# Patient Record
Sex: MALE | Race: WHITE | Employment: FULL TIME | URBAN - METROPOLITAN AREA
[De-identification: names, ages, dates, MRNs, and addresses within clinical notes are randomized per-mention and may not be internally consistent; named-entity substitution may affect disease eponyms.]

---
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
## 2020-01-02 ENCOUNTER — TELEPHONE (OUTPATIENT)
Dept: NEPHROLOGY | Facility: CLINIC | Age: 20
End: 2020-01-02

## 2020-01-02 NOTE — TELEPHONE ENCOUNTER
Patients mom called to report patients /79 due to increasing ramipril to 2.5 daily. Patient will have labs drawn tomorrow. email order to patients mom as he will have drawn at [facility].  Azeb Villanueva LPN  Nephrology  [phone]

## 2020-01-03 ENCOUNTER — AMBULATORY - HEALTHEAST (OUTPATIENT)
Dept: LAB | Facility: CLINIC | Age: 20
End: 2020-01-03

## 2020-01-03 DIAGNOSIS — N18.4 CHRONIC KIDNEY DISEASE, STAGE IV (SEVERE) (H): ICD-10-CM

## 2020-01-03 LAB
ALBUMIN SERPL-MCNC: 3.1 G/DL (ref 3.5–5)
ANION GAP SERPL CALCULATED.3IONS-SCNC: 9 MMOL/L (ref 5–18)
BUN SERPL-MCNC: 64 MG/DL (ref 8–22)
CALCIUM SERPL-MCNC: 8.8 MG/DL (ref 8.5–10.5)
CHLORIDE BLD-SCNC: 113 MMOL/L (ref 98–107)
CO2 SERPL-SCNC: 16 MMOL/L (ref 22–31)
CREAT SERPL-MCNC: 5.57 MG/DL (ref 0.7–1.3)
GFR SERPL CREATININE-BSD FRML MDRD: 13 ML/MIN/1.73M2
GLUCOSE BLD-MCNC: 94 MG/DL (ref 70–125)
PHOSPHATE SERPL-MCNC: 5.5 MG/DL (ref 2.5–4.5)
POTASSIUM BLD-SCNC: 5 MMOL/L (ref 3.5–5)
SODIUM SERPL-SCNC: 138 MMOL/L (ref 136–145)

## 2020-01-07 NOTE — TELEPHONE ENCOUNTER
Reviewed with Dr. Diaz, please have renal panel rechecked as soon as possible and ok to schedule to see Dr. Osorio end of February. Renal panel entered. Left voice message for patient, with mom.  Azeb Villanueva LPN  Nephrology  [phone]

## 2020-01-07 NOTE — TELEPHONE ENCOUNTER
Spoke to patients mom and she said she will try to talk to him again regarding kidney smart. She reports his appetite is not well, eating pizza and lots of things unhealthy for him. Cr 5.57. K 5.0. updated Care everywhere. Message to Dr. Diaz. Appointment in April. Message if more labs are needed before or sooner appointment.  Azeb Villanueva LPN  Nephrology  [phone]

## 2020-01-08 ENCOUNTER — AMBULATORY - HEALTHEAST (OUTPATIENT)
Dept: LAB | Facility: CLINIC | Age: 20
End: 2020-01-08

## 2020-01-08 LAB
ALBUMIN SERPL-MCNC: 3.1 G/DL (ref 3.5–5)
ANION GAP SERPL CALCULATED.3IONS-SCNC: 8 MMOL/L (ref 5–18)
BUN SERPL-MCNC: 67 MG/DL (ref 8–22)
CALCIUM SERPL-MCNC: 8.7 MG/DL (ref 8.5–10.5)
CHLORIDE BLD-SCNC: 117 MMOL/L (ref 98–107)
CO2 SERPL-SCNC: 14 MMOL/L (ref 22–31)
CREAT SERPL-MCNC: 4.4 MG/DL (ref 0.7–1.3)
GFR SERPL CREATININE-BSD FRML MDRD: 17 ML/MIN/1.73M2
GLUCOSE BLD-MCNC: 94 MG/DL (ref 70–125)
PHOSPHATE SERPL-MCNC: 4.8 MG/DL (ref 2.5–4.5)
POTASSIUM BLD-SCNC: 4.8 MMOL/L (ref 3.5–5)
SODIUM SERPL-SCNC: 139 MMOL/L (ref 136–145)

## 2020-01-08 NOTE — TELEPHONE ENCOUNTER
Patients mom, Darcy called to report that patient had labs drawn today. Updated Care everywhere:  Cr 4.40 from 5.57, K 4.8 from 5.0, phosphorus 4.8 from 5.5. informed Darcy of lab results. Darcy stating that she talked to patient and wanted to know if a referral to dietician would be ok before Kidney Smart at this time. Will send to Dr. Diaz for review.  Azeb Villanueva LPN  Nephrology  [phone]

## 2020-01-10 DIAGNOSIS — N18.4 CKD (CHRONIC KIDNEY DISEASE) STAGE 4, GFR 15-29 ML/MIN (H): Primary | ICD-10-CM

## 2020-01-10 DIAGNOSIS — N18.30 CKD (CHRONIC KIDNEY DISEASE) STAGE 3, GFR 30-59 ML/MIN (H): ICD-10-CM

## 2020-01-10 RX ORDER — RAMIPRIL 1.25 MG/1
2.5 CAPSULE ORAL DAILY
Qty: 60 CAPSULE | Refills: 11 | Status: SHIPPED | OUTPATIENT
Start: 2020-01-10 | End: 2021-11-01

## 2020-01-10 NOTE — TELEPHONE ENCOUNTER
Reviewed labs with Dr. Diaz (update Care everywhere). Cr 4.40, K 4.8. recommendations per Dr. Diaz, no changes to medications at this time, please repeat renal panel in 2 weeks and referral to dietician ok. Orders placed. Left voice message for patients Darcy owens. Will follow up in a couple weeks.  Azeb Villanueva LPN  Nephrology  [phone]

## 2020-01-20 ENCOUNTER — ALLIED HEALTH/NURSE VISIT (OUTPATIENT)
Dept: TRANSPLANT | Facility: CLINIC | Age: 20
End: 2020-01-20
Attending: INTERNAL MEDICINE
Payer: COMMERCIAL

## 2020-01-20 DIAGNOSIS — N18.4 CKD (CHRONIC KIDNEY DISEASE) STAGE 4, GFR 15-29 ML/MIN (H): Primary | ICD-10-CM

## 2020-01-20 PROCEDURE — 97802 MEDICAL NUTRITION INDIV IN: CPT | Mod: ZF

## 2020-01-20 NOTE — PROGRESS NOTES
Outpatient MNT     Time Spent: 30 minutes  Visit Type: Initial  Referring Physician: Dr. Diaz  Reason for RD Visit: Nutrition education: low Na+, low K+, moderate protein   Pt accompanied by: mom    Medical dx associated with RD referral  CKD Stage 4    Nutrition Assessment  Appetite: Pt reports having a good appetite. He eats TID meals with some snacking. The pt is trying to follow a low Na+/low K+ diet but is not sure what foods follow these restrictions.     Vitamins, Supplements, Pertinent Meds: Fish oil, Vit D  Herbal Medicines/Supplements: None    Diet Recall  Breakfast Hot sandwich like tuna melt, kathleen, or grilled cheese with ham; chili; soup   Lunch Sub sandwich or chicken strips/onion rings from [employer]    Dinner Meat and potatoes, meat and veggies    Snacks Popcorn with salt as PM snack    Beverages > 1 gallon of water   Alcohol None    Dining out 4 times per week      Physical Activity  No designated exercise time      Anthropometrics  Height:   71 in   BMI:    38 kg/m2    Weight Status:Obesity Grade II BMI 35-39.9   Weight:  271 lbs            IBW (lb): 172#  % IBW: 158%    Wt Hx: Pt and his mom report that he has slowly been gaining wt over the last 2 years.     Adj/dosing BW: 197 lbs/89 kg       Malnutrition  % Intake: No decreased intake noted  % Weight Loss: None noted  Subcutaneous Fat Loss: None noted  Muscle Loss: None noted   Fluid Accumulation/Edema: None noted  Malnutrition Diagnosis: Patient does not meet two of the above criteria necessary for diagnosing malnutrition     Estimated Nutrition Needs  Energy  8686-1862 kcal/day      (20-25 kcal/kg dosing BW for desired wt loss)     Protein  71    (0.8 g/kg for maintenance with CKD)         Fluid  1 ml/kcal or per MD     Nutrition Diagnosis  Food and nutrition-related knowledge deficit related to CKD diet as evidenced by the pt and his mom verbalizing little to no education on the low Na+, low K+, and moderate protein diet.     Nutrition  Intervention  Nutrition Education:  (1) Discussed the importance of adequate but not excessive protein. Helped the pt identify several sources of protein in his diet and encouraged him to not exceed 75 g protein per day.     (2) Encouraged the pt to continue the low Na+ and low K+ diet. Provided the pt with a low K+ foods grocery list and resources of kidney friendly recipes.     (3) Discussed strategies for wt loss including increased physical activity, decreased portions, decreased snacking, etc.     Patient Understanding: Pt verbalized understanding of education provided.  Expected Compliance: Good  Follow-Up Plans: PRN     Nutrition Goals  1. Pt to consume <2000 mg of Na+ and <2000 mg of K+ per day   2. Patient to consume % of estimated energy and protein needs between TID meals and snacks.  3. Weight loss vs weight maintenance       Provided pt with contact info.   Deonna Francisco RD, LD  [facility] [phone]

## 2020-01-23 ENCOUNTER — TELEPHONE (OUTPATIENT)
Dept: NEPHROLOGY | Facility: CLINIC | Age: 20
End: 2020-01-23

## 2020-01-23 NOTE — TELEPHONE ENCOUNTER
Received call from patients mom, Darcy, wanting to know Dr. Diaz's thoughts on patient starting Ritalin again. Await to hear thoughts from MD. Will follow up with Darcy when I hear back. She does report that patient went to Nutrition consult and had good information and useful tips. Patient went to Kidney Smart class last week through [employer], and mom states that this was not as useful but they did learn some things about dialysis they did not know before.  Will follow up regarding Ritalin and send update to Dr. Diaz.  Azeb Villanueva, OPAL  Nephrology  [phone]

## 2020-01-27 NOTE — TELEPHONE ENCOUNTER
Reviewed with lois Houston to restart Ritalin. Left voice message with Darcy, kobe mom with recommendation. Provided number for call back with any questions. Patient has appointment with Dr. Osorio the end of Feb.  Azeb Villanueva LPN  Nephrology  [phone]

## 2020-01-30 ENCOUNTER — TELEPHONE (OUTPATIENT)
Dept: NEPHROLOGY | Facility: CLINIC | Age: 20
End: 2020-01-30

## 2020-01-30 DIAGNOSIS — N18.4 CKD (CHRONIC KIDNEY DISEASE) STAGE 4, GFR 15-29 ML/MIN (H): Primary | ICD-10-CM

## 2020-01-30 NOTE — TELEPHONE ENCOUNTER
Patients mom, Darcy called to report that patient was having some leg cramping and side cramping and wanted Dr. Diaz to be aware. Reviewed with Dr. Diaz, please have patient repeat renal panel and magnesium as soon as he can. Would also refer for kidney transplant.  Informed Darcy of recommendations and referral. Labs emailed to Darcy, patient to get drawn tomorrow. Darcy does report that patients cramps have subsided some.  Azeb Villanueva LPN  Nephrology  [phone]

## 2020-01-31 ENCOUNTER — AMBULATORY - HEALTHEAST (OUTPATIENT)
Dept: LAB | Facility: CLINIC | Age: 20
End: 2020-01-31

## 2020-01-31 DIAGNOSIS — N18.4 CKD (CHRONIC KIDNEY DISEASE) STAGE 4, GFR 15-29 ML/MIN (H): ICD-10-CM

## 2020-01-31 LAB — MAGNESIUM SERPL-MCNC: 2 MG/DL (ref 1.8–2.6)

## 2020-02-03 ENCOUNTER — AMBULATORY - HEALTHEAST (OUTPATIENT)
Dept: LAB | Facility: CLINIC | Age: 20
End: 2020-02-03

## 2020-02-03 LAB
ALBUMIN SERPL-MCNC: 3.2 G/DL (ref 3.5–5)
ANION GAP SERPL CALCULATED.3IONS-SCNC: 9 MMOL/L (ref 5–18)
BUN SERPL-MCNC: 59 MG/DL (ref 8–22)
CALCIUM SERPL-MCNC: 8.9 MG/DL (ref 8.5–10.5)
CHLORIDE BLD-SCNC: 116 MMOL/L (ref 98–107)
CO2 SERPL-SCNC: 15 MMOL/L (ref 22–31)
CREAT SERPL-MCNC: 4.19 MG/DL (ref 0.7–1.3)
GFR SERPL CREATININE-BSD FRML MDRD: 18 ML/MIN/1.73M2
GLUCOSE BLD-MCNC: 110 MG/DL (ref 70–125)
PHOSPHATE SERPL-MCNC: 3.9 MG/DL (ref 2.5–4.5)
POTASSIUM BLD-SCNC: 4.2 MMOL/L (ref 3.5–5)
SODIUM SERPL-SCNC: 140 MMOL/L (ref 136–145)

## 2020-02-04 ENCOUNTER — TELEPHONE (OUTPATIENT)
Dept: NEPHROLOGY | Facility: CLINIC | Age: 20
End: 2020-02-04

## 2020-02-04 NOTE — TELEPHONE ENCOUNTER
Call to patients mom to inform her of patients results. Cr 4.19, K 4.2, Mag 2.0. message routed to Dr. Diaz for review. Will follow up with any other recommendations.  Azeb Villanueva LPN  Nephrology  [phone]

## 2020-02-10 DIAGNOSIS — N18.4 CKD (CHRONIC KIDNEY DISEASE) STAGE 4, GFR 15-29 ML/MIN (H): Primary | ICD-10-CM

## 2020-02-26 ENCOUNTER — OFFICE VISIT (OUTPATIENT)
Dept: NEPHROLOGY | Facility: CLINIC | Age: 20
End: 2020-02-26
Attending: INTERNAL MEDICINE
Payer: COMMERCIAL

## 2020-02-26 VITALS
TEMPERATURE: 97.8 F | HEART RATE: 70 BPM | DIASTOLIC BLOOD PRESSURE: 76 MMHG | SYSTOLIC BLOOD PRESSURE: 118 MMHG | OXYGEN SATURATION: 96 %

## 2020-02-26 DIAGNOSIS — E87.8 LOW BICARBONATE LEVEL: ICD-10-CM

## 2020-02-26 DIAGNOSIS — N25.81 SECONDARY RENAL HYPERPARATHYROIDISM (H): ICD-10-CM

## 2020-02-26 DIAGNOSIS — I15.9 SECONDARY HYPERTENSION: ICD-10-CM

## 2020-02-26 DIAGNOSIS — N18.4 CKD (CHRONIC KIDNEY DISEASE) STAGE 4, GFR 15-29 ML/MIN (H): ICD-10-CM

## 2020-02-26 DIAGNOSIS — N18.4 CKD (CHRONIC KIDNEY DISEASE) STAGE 4, GFR 15-29 ML/MIN (H): Primary | ICD-10-CM

## 2020-02-26 LAB
ALBUMIN SERPL-MCNC: 3.3 G/DL (ref 3.4–5)
ANION GAP SERPL CALCULATED.3IONS-SCNC: 7 MMOL/L (ref 3–14)
BUN SERPL-MCNC: 56 MG/DL (ref 7–30)
CALCIUM SERPL-MCNC: 8.2 MG/DL (ref 8.5–10.1)
CHLORIDE SERPL-SCNC: 112 MMOL/L (ref 98–110)
CO2 SERPL-SCNC: 21 MMOL/L (ref 20–32)
CREAT SERPL-MCNC: 3.58 MG/DL (ref 0.5–1)
CREAT UR-MCNC: 48 MG/DL
ERYTHROCYTE [DISTWIDTH] IN BLOOD BY AUTOMATED COUNT: 12.1 % (ref 10–15)
FERRITIN SERPL-MCNC: 133 NG/ML (ref 26–388)
GFR SERPL CREATININE-BSD FRML MDRD: 23 ML/MIN/{1.73_M2}
GLUCOSE SERPL-MCNC: 105 MG/DL (ref 70–99)
HCT VFR BLD AUTO: 36.6 % (ref 40–53)
HGB BLD-MCNC: 12.3 G/DL (ref 13.3–17.7)
IRON SATN MFR SERPL: 26 % (ref 15–46)
IRON SERPL-MCNC: 66 UG/DL (ref 35–180)
MAGNESIUM SERPL-MCNC: 1.7 MG/DL (ref 1.6–2.3)
MCH RBC QN AUTO: 31 PG (ref 26.5–33)
MCHC RBC AUTO-ENTMCNC: 33.6 G/DL (ref 31.5–36.5)
MCV RBC AUTO: 92 FL (ref 78–100)
PHOSPHATE SERPL-MCNC: 3.9 MG/DL (ref 2.5–4.5)
PLATELET # BLD AUTO: 250 10E9/L (ref 150–450)
POTASSIUM SERPL-SCNC: 4.4 MMOL/L (ref 3.4–5.3)
PROT UR-MCNC: 2.06 G/L
PROT/CREAT 24H UR: 4.31 G/G CR (ref 0–0.2)
PTH-INTACT SERPL-MCNC: 201 PG/ML (ref 18–80)
RBC # BLD AUTO: 3.97 10E12/L (ref 4.4–5.9)
SODIUM SERPL-SCNC: 140 MMOL/L (ref 133–144)
TIBC SERPL-MCNC: 251 UG/DL (ref 240–430)
WBC # BLD AUTO: 6.6 10E9/L (ref 4–11)

## 2020-02-26 PROCEDURE — 82306 VITAMIN D 25 HYDROXY: CPT | Performed by: INTERNAL MEDICINE

## 2020-02-26 PROCEDURE — 85027 COMPLETE CBC AUTOMATED: CPT | Performed by: INTERNAL MEDICINE

## 2020-02-26 PROCEDURE — G0463 HOSPITAL OUTPT CLINIC VISIT: HCPCS | Mod: ZF

## 2020-02-26 PROCEDURE — 36415 COLL VENOUS BLD VENIPUNCTURE: CPT | Performed by: INTERNAL MEDICINE

## 2020-02-26 PROCEDURE — 80069 RENAL FUNCTION PANEL: CPT | Performed by: INTERNAL MEDICINE

## 2020-02-26 PROCEDURE — 83735 ASSAY OF MAGNESIUM: CPT | Performed by: INTERNAL MEDICINE

## 2020-02-26 PROCEDURE — 83540 ASSAY OF IRON: CPT | Performed by: INTERNAL MEDICINE

## 2020-02-26 PROCEDURE — 82728 ASSAY OF FERRITIN: CPT | Performed by: INTERNAL MEDICINE

## 2020-02-26 PROCEDURE — 83550 IRON BINDING TEST: CPT | Performed by: INTERNAL MEDICINE

## 2020-02-26 PROCEDURE — 83970 ASSAY OF PARATHORMONE: CPT | Performed by: INTERNAL MEDICINE

## 2020-02-26 PROCEDURE — 84156 ASSAY OF PROTEIN URINE: CPT | Performed by: INTERNAL MEDICINE

## 2020-02-26 RX ORDER — METHYLPHENIDATE HYDROCHLORIDE 10 MG/1
20 TABLET ORAL PRN
COMMUNITY
Start: 2020-01-17 | End: 2020-03-17

## 2020-02-26 ASSESSMENT — PAIN SCALES - GENERAL: PAINLEVEL: NO PAIN (0)

## 2020-02-26 NOTE — NURSING NOTE
Chief Complaint   Patient presents with     RECHECK     CKD Stage 4             /78 (BP Location: Left arm, Patient Position: Sitting, Cuff Size: Adult Large)   Pulse 70   Temp 97.8  F (36.6  C) (Oral)   SpO2 96%         Cheri Jara CMA    2/26/2020 3:41 PM

## 2020-02-26 NOTE — LETTER
"2020      RE: Boogie Villa  [address] [facility] [address]         Nephrology Clinic    2020     Name: Boogie Villa  MRN: [id number]  Age: 19 year old  : 2000  Referring provider: Edwin Palomo     Assessment and Plan:   1. 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    --  Initial had rise in Cr up to 5.5 mg/dL after starting higher dose Ramipril, but now creatinine is back to 3.5. No obvious insult, use of NSAID, or any additional medication that could explain the rise in Crt, and no clear explanation why the eGFR improved. He has an atrophic kidney, and may be more dependent on RAAS for perfusion for potentially one functioning kidney. He has been doing dietary modification, and better adherence to medications.   For now, will continue CKD education and ongoing preparation for Tx evaluation and RRT.  Otherwise,   - Continue RAAS blockade with ramipril 2.5 mg daily for management of persistent albuminuria and CKD  - He should continue to avoid NSAIDs  - Transplant evaluation  assigned, he will schedule when able   - Completed \"Kidney Smart\" class, at this time, he would pursue PD if Dialysis would be needed     2. Hypertension: At goal goal of <130/80 in clinic.  Euvolemic on exam.    - Continued on atenolol 37.5 mg, amlodipine 5 mg and will increase ramipril 2.5 mg daily  - Given previous severe rise in Crt after raising ramipril, will stick with current regimen.  Can consider re challenge of higher dose ACEi at 2020 follow up if renal function is stable, but would use caution     3. CKD-MBD: Corrected Ca and phos at goal. PTH and Vit D pending  - Continue cholecalciferol at 2000 units daily  - No need for phos binders or activated vitamin D (e.g. calcitriol) at this time     4. Low bicarb: Bicarb  improved to 21 today with improved adherence to Bicarb. Still with " some issues taking TID, but he will work on it  -encourage compliance with sodium bicarb tabs (650 mg) 4 tabs TID     5. Anemia: Mild and due to CKD. Hgb at goal (12.3 today). Iron stores adequate.   -Will follow up Iron Studies. No indication for TOM at this time      6. Urology: Followed by Dr. Melgar    Follow-up: April 6th 2020 w Dr. Diaz    Pt staffed with Dr. Wiley.     HASEEB Osorio MD  Neph Fellow  [phone]    Reason For Visit:   Follow-up.     HPI:   Boogie Villa is a 19 year old male with a history of posterior urethral valves with correction in the first year of life.  Recurrent episodes of urinary tract infection. Patient was last seen by Dr. Palomo in pediatric nephrology for follow-up of chronic kidney disease related to posterior ureteral valves. The only concern was medication compliance and he and his mother reported very partial (3-4 days a week) compliance. Plan at that time was to transfer care to adult nephrology. He was last seen in clinic with Dr. Diaz 11/2019.      Interval history:  Since last visit, the patient is doing well overall. He is at [employer] in [place]. Classes are going, no problems with focus after starting methylphenidate.   He is catheterizing every other day, and overall is doing well. He has been previously instructed to cath more often, but it has been hard to do so often. At his last visit, he was catheterizing at thsi frequency at last visit. No pain with cath, no bloody return or cloudy return. No change in UOP.  Appetite is normal. No abnormal taste or nausea. No orthopnea or PND, LE swelling. No diffuse itching. No fevers or chills. No chest pain or SOB with activity, he has been doing some exercise classes intermittently, looking to make it regular.     Review of Systems:   Pertinent items are noted in HPI or as below, remainder of complete ROS is negative.      Active Medications:     Current Outpatient Medications:      amLODIPine (NORVASC) 5 MG  tablet, Take 1 tablet (5 mg) by mouth daily, Disp: 90 tablet, Rfl: 3     atenolol (TENORMIN) 25 MG tablet, Take 1.5 tablets (37.5 mg) by mouth daily, Disp: 135 tablet, Rfl: 3     calcium carbonate (OSCAL 500) 1250 (500 Ca) MG TABS tablet, , Disp: , Rfl:      Cholecalciferol (VITAMIN D) 2000 units CAPS, , Disp: , Rfl:      FLUoxetine (PROZAC) 40 MG capsule, , Disp: , Rfl:      OXYBUTYNIN CHLORIDE, 10 mg daily., Disp: , Rfl:      ramipril (ALTACE) 1.25 MG capsule, Take 2 capsules (2.5 mg) by mouth daily, Disp: 60 capsule, Rfl: 11     sodium bicarbonate 650 MG tablet, Take 4 tablets (2,600 mg) by mouth 3 times daily, Disp: 180 tablet, Rfl: 3     Allergies:   Dust mites; Mold; and Other [seasonal allergies]      Past Medical History:  Attention deficit disorder    Posterior urethral valves   Lymphangioma  Hypertension  Chronic kidney disease stage 4, GFR 15-29 ml/min (H)    Past Surgical History:  ablation of posterior urethral valves - 2000  ureteral reimplantation with tapering - 2003    Social History:   Presents to clinic alone.  Tobacco Use: Never smoker.  Alcohol Use: Not on file.  PCP: VALENTIN MEEKS    Physical Exam:  /78 (BP Location: Left arm, Patient Position: Sitting, Cuff Size: Adult Large)   Pulse 70   Temp 97.8  F (36.6  C) (Oral)   SpO2 96%    GENERAL APPEARANCE: alert and no distress  EYES: PERRL, no scleral icterus  HENT: mouth without ulcers or lesions  NECK: supple, no adenopathy  RESP: lungs clear to auscultation   CV: regular rhythm, normal rate, no rub  GI: abd soft, NT, ND  : no CVA tenderness  Extremities: trace edema  SKIN: no concerning rash  NEURO: mentation intact and speech normal  PSYCH: affect normal/bright      Laboratory:  CMP  Recent Labs   Lab Test 11/25/19  0859 05/13/19  0712 12/04/18  1054 06/08/18  1256 01/16/18  1102 08/09/17  1626 02/10/17  1004    144 142 148* 144 146*  --    POTASSIUM 4.9 4.3 4.6 4.8 5.0 5.0  --    CHLORIDE 117* 116* 116* 121* 113* 120*  --     CO2 17* 20 17* 19* 25 14*  --    ANIONGAP 6 8 9 8 6 12  --    GLC 94 114* 100* 87 79 97  --    BUN 57* 54* 56* 47* 37* 50*  --    CR 3.80* 3.59* 3.33* 2.51* 2.40* 2.39*  --    GFRESTIMATED 22* 23* 24* 34* 36* 36*  --    GFRESTBLACK 25* 27* 29* 41* 43* 43*  --    NABIL 8.5 8.7* 8.8* 8.5* 8.6* 8.7*  --    MAG  --   --  1.9 1.6 1.8 1.9  --    PHOS 4.1 4.1 4.7* 4.6 4.0 3.8  --    PROTTOTAL  --   --  6.9 6.4*  --   --   --    ALBUMIN 3.1* 3.2* 3.1* 2.8* 3.0* 3.4  --    BILITOTAL  --   --  0.2 0.2  --   --   --    ALKPHOS  --   --  87 74  --  91  --    AST  --   --  20 20  --   --  24   ALT  --   --  24 19  --   --  18     CBC  Recent Labs   Lab Test 02/26/20  1520 11/25/19  0859 05/13/19  0712 12/04/18  1054 06/08/18  1256   HGB 12.3* 12.4* 12.7* 13.7 13.3   WBC 6.6  --  5.8 5.0 5.8   RBC 3.97*  --  4.07* 4.51 4.24*   HCT 36.6*  --  38.2* 41.4 39.1*   MCV 92  --  94 92 92   MCH 31.0  --  31.2 30.4 31.4   MCHC 33.6  --  33.2 33.1 34.0   RDW 12.1  --  12.5 12.6 12.6     --  232 232 201     INR  No lab results found.  ABG  No lab results found.   URINE STUDIES  Recent Labs   Lab Test 05/13/19  0728 12/04/18  1056 06/08/18  1310 02/10/17  1004   COLOR Straw Straw Straw Straw   APPEARANCE Clear Clear Clear Clear   URINEGLC 50* Negative Negative Negative   URINEBILI Negative Negative Negative Negative   URINEKETONE Negative Negative Negative Negative   SG 1.008 1.006 1.006 1.004   UBLD Negative Trace* Negative Negative   URINEPH 7.0 6.5 6.5 6.5   PROTEIN >499* 100* 100* 100*   NITRITE Negative Negative Negative Negative   LEUKEST Negative Negative Negative Negative   RBCU <1 <1 <1 <1   WBCU <1 1 <1 2     Recent Labs   Lab Test 11/25/19  0904 05/13/19  0728 12/04/18  1056 06/08/18  1310 02/10/17  1004 08/26/16  1150 02/19/16  1000   UTPG 4.92* 4.31* 4.59* 6.69* 2.78* 2.45* 2.17*     PTH  Recent Labs   Lab Test 11/25/19  0859 05/13/19  0712 12/04/18  1054 06/08/18  1256 01/16/18  1102 08/09/17  1626 02/10/17  1002  08/26/16  1245 02/19/16  0955   PTHI 154* 110* 177* 96* 133* 66 39 47 42     IRON STUDIES   Recent Labs   Lab Test 05/13/19  0712 06/08/18  1256 01/16/18  1102 08/09/17  1626 02/10/17  1002   IRON 71 87 116 93 54    257 290 284 297   IRONSAT 26 34 40 33 18   DAVID 109 82 86 100 113         Sukh Osorio MD

## 2020-02-26 NOTE — PROGRESS NOTES
"  Nephrology Clinic    2020     Name: Boogie Villa  MRN: [id number]  Age: 19 year old  : 2000  Referring provider: Edwin Palomo     Assessment and Plan:   1. 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    --  Initial had rise in Cr up to 5.5 mg/dL after starting higher dose Ramipril, but now creatinine is back to 3.5. No obvious insult, use of NSAID, or any additional medication that could explain the rise in Crt, and no clear explanation why the eGFR improved. He has an atrophic kidney, and may be more dependent on RAAS for perfusion for potentially one functioning kidney. He has been doing dietary modification, and better adherence to medications.   For now, will continue CKD education and ongoing preparation for Tx evaluation and RRT.  Otherwise,   - Continue RAAS blockade with ramipril 2.5 mg daily for management of persistent albuminuria and CKD  - He should continue to avoid NSAIDs  - Transplant evaluation assigned, he will schedule when able   - Completed \"Kidney Smart\" class, at this time, he would pursue PD if Dialysis would be needed     2. Hypertension: At goal goal of <130/80 in clinic.  Euvolemic on exam.    - Continued on atenolol 37.5 mg, amlodipine 5 mg and will increase ramipril 2.5 mg daily  - Given previous severe rise in Crt after raising ramipril, will stick with current regimen.  Can consider re challenge of higher dose ACEi at 2020 follow up if renal function is stable, but would use caution     3. CKD-MBD: Corrected Ca and phos at goal. PTH and Vit D pending  - Continue cholecalciferol at 2000 units daily  - No need for phos binders or activated vitamin D (e.g. calcitriol) at this time     4. Low bicarb: Bicarb improved to 21 today with improved adherence to Bicarb. Still with some issues taking TID, but he will work on it  -encourage compliance with sodium " bicarb tabs (650 mg) 4 tabs TID     5. Anemia: Mild and due to CKD. Hgb at goal (12.3 today). Iron stores adequate.   -Will follow up Iron Studies. No indication for TOM at this time      6. Urology: Followed by Dr. Melgar    Follow-up: April 6th 2020 w Dr. Diaz    Pt staffed with Dr. Wiley.     HASEEB Osorio MD  Neph Fellow  [phone]    Reason For Visit:   Follow-up.     HPI:   Boogie Villa is a 19 year old male with a history of posterior urethral valves with correction in the first year of life.  Recurrent episodes of urinary tract infection. Patient was last seen by Dr. Palomo in pediatric nephrology for follow-up of chronic kidney disease related to posterior ureteral valves. The only concern was medication compliance and he and his mother reported very partial (3-4 days a week) compliance. Plan at that time was to transfer care to adult nephrology. He was last seen in clinic with Dr. Diaz 11/2019.      Interval history:  Since last visit, the patient is doing well overall. He is at [employer] in [place]. Classes are going, no problems with focus after starting methylphenidate.   He is catheterizing every other day, and overall is doing well. He has been previously instructed to cath more often, but it has been hard to do so often. At his last visit, he was catheterizing at thsi frequency at last visit. No pain with cath, no bloody return or cloudy return. No change in UOP.  Appetite is normal. No abnormal taste or nausea. No orthopnea or PND, LE swelling. No diffuse itching. No fevers or chills. No chest pain or SOB with activity, he has been doing some exercise classes intermittently, looking to make it regular.     Review of Systems:   Pertinent items are noted in HPI or as below, remainder of complete ROS is negative.      Active Medications:     Current Outpatient Medications:      amLODIPine (NORVASC) 5 MG tablet, Take 1 tablet (5 mg) by mouth daily, Disp: 90 tablet, Rfl: 3     atenolol  (TENORMIN) 25 MG tablet, Take 1.5 tablets (37.5 mg) by mouth daily, Disp: 135 tablet, Rfl: 3     calcium carbonate (OSCAL 500) 1250 (500 Ca) MG TABS tablet, , Disp: , Rfl:      Cholecalciferol (VITAMIN D) 2000 units CAPS, , Disp: , Rfl:      FLUoxetine (PROZAC) 40 MG capsule, , Disp: , Rfl:      OXYBUTYNIN CHLORIDE, 10 mg daily., Disp: , Rfl:      ramipril (ALTACE) 1.25 MG capsule, Take 2 capsules (2.5 mg) by mouth daily, Disp: 60 capsule, Rfl: 11     sodium bicarbonate 650 MG tablet, Take 4 tablets (2,600 mg) by mouth 3 times daily, Disp: 180 tablet, Rfl: 3     Allergies:   Dust mites; Mold; and Other [seasonal allergies]      Past Medical History:  Attention deficit disorder    Posterior urethral valves   Lymphangioma  Hypertension  Chronic kidney disease stage 4, GFR 15-29 ml/min (H)    Past Surgical History:  ablation of posterior urethral valves - 2000  ureteral reimplantation with tapering - 2003    Social History:   Presents to clinic alone.  Tobacco Use: Never smoker.  Alcohol Use: Not on file.  PCP: VALENTIN MEEKS    Physical Exam:  /78 (BP Location: Left arm, Patient Position: Sitting, Cuff Size: Adult Large)   Pulse 70   Temp 97.8  F (36.6  C) (Oral)   SpO2 96%    GENERAL APPEARANCE: alert and no distress  EYES: PERRL, no scleral icterus  HENT: mouth without ulcers or lesions  NECK: supple, no adenopathy  RESP: lungs clear to auscultation   CV: regular rhythm, normal rate, no rub  GI: abd soft, NT, ND  : no CVA tenderness  Extremities: trace edema  SKIN: no concerning rash  NEURO: mentation intact and speech normal  PSYCH: affect normal/bright      Laboratory:  CMP  Recent Labs   Lab Test 11/25/19  0859 05/13/19  0712 12/04/18  1054 06/08/18  1256 01/16/18  1102 08/09/17  1626 02/10/17  1004    144 142 148* 144 146*  --    POTASSIUM 4.9 4.3 4.6 4.8 5.0 5.0  --    CHLORIDE 117* 116* 116* 121* 113* 120*  --    CO2 17* 20 17* 19* 25 14*  --    ANIONGAP 6 8 9 8 6 12  --    GLC 94 114* 100*  87 79 97  --    BUN 57* 54* 56* 47* 37* 50*  --    CR 3.80* 3.59* 3.33* 2.51* 2.40* 2.39*  --    GFRESTIMATED 22* 23* 24* 34* 36* 36*  --    GFRESTBLACK 25* 27* 29* 41* 43* 43*  --    NABIL 8.5 8.7* 8.8* 8.5* 8.6* 8.7*  --    MAG  --   --  1.9 1.6 1.8 1.9  --    PHOS 4.1 4.1 4.7* 4.6 4.0 3.8  --    PROTTOTAL  --   --  6.9 6.4*  --   --   --    ALBUMIN 3.1* 3.2* 3.1* 2.8* 3.0* 3.4  --    BILITOTAL  --   --  0.2 0.2  --   --   --    ALKPHOS  --   --  87 74  --  91  --    AST  --   --  20 20  --   --  24   ALT  --   --  24 19  --   --  18     CBC  Recent Labs   Lab Test 02/26/20  1520 11/25/19  0859 05/13/19  0712 12/04/18  1054 06/08/18  1256   HGB 12.3* 12.4* 12.7* 13.7 13.3   WBC 6.6  --  5.8 5.0 5.8   RBC 3.97*  --  4.07* 4.51 4.24*   HCT 36.6*  --  38.2* 41.4 39.1*   MCV 92  --  94 92 92   MCH 31.0  --  31.2 30.4 31.4   MCHC 33.6  --  33.2 33.1 34.0   RDW 12.1  --  12.5 12.6 12.6     --  232 232 201     INR  No lab results found.  ABG  No lab results found.   URINE STUDIES  Recent Labs   Lab Test 05/13/19  0728 12/04/18  1056 06/08/18  1310 02/10/17  1004   COLOR Straw Straw Straw Straw   APPEARANCE Clear Clear Clear Clear   URINEGLC 50* Negative Negative Negative   URINEBILI Negative Negative Negative Negative   URINEKETONE Negative Negative Negative Negative   SG 1.008 1.006 1.006 1.004   UBLD Negative Trace* Negative Negative   URINEPH 7.0 6.5 6.5 6.5   PROTEIN >499* 100* 100* 100*   NITRITE Negative Negative Negative Negative   LEUKEST Negative Negative Negative Negative   RBCU <1 <1 <1 <1   WBCU <1 1 <1 2     Recent Labs   Lab Test 11/25/19  0904 05/13/19  0728 12/04/18  1056 06/08/18  1310 02/10/17  1004 08/26/16  1150 02/19/16  1000   UTPG 4.92* 4.31* 4.59* 6.69* 2.78* 2.45* 2.17*     PTH  Recent Labs   Lab Test 11/25/19  0859 05/13/19  0712 12/04/18  1054 06/08/18  1256 01/16/18  1102 08/09/17  1626 02/10/17  1002 08/26/16  1245 02/19/16  0955   PTHI 154* 110* 177* 96* 133* 66 39 47 42     IRON  STUDIES   Recent Labs   Lab Test 05/13/19  0712 06/08/18  1256 01/16/18  1102 08/09/17  1626 02/10/17  1002   IRON 71 87 116 93 54    257 290 284 297   IRONSAT 26 34 40 33 18   DAVID 109 82 86 100 113       I was present with the fellow during the history and exam.  I discussed the case with the fellow and agree with the findings as documented in the assessment and plan.  Hiral Wiley

## 2020-02-27 LAB — DEPRECATED CALCIDIOL+CALCIFEROL SERPL-MC: 24 UG/L (ref 20–75)

## 2020-04-06 ENCOUNTER — VIRTUAL VISIT (OUTPATIENT)
Dept: NEPHROLOGY | Facility: CLINIC | Age: 20
End: 2020-04-06
Attending: INTERNAL MEDICINE
Payer: COMMERCIAL

## 2020-04-06 DIAGNOSIS — N18.4 CKD (CHRONIC KIDNEY DISEASE) STAGE 4, GFR 15-29 ML/MIN (H): Primary | ICD-10-CM

## 2020-04-08 NOTE — PROGRESS NOTES
"  Nephrology Clinic - Telephone Visit    Name: Boogie Villa  MRN: [id number]  Age: 19 year old  : 2000  DOS: 2020  Referring provider: Edwin Palomo     Assessment and Plan:   1. 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  Baseline serum creatinine  ~3.6 (eGFR of 23 ml/min) which has been relatively stable over past year despite increaing RAAS blockade. GFR decreased to 22 ml/min today. Serum albumin continues to be low with nephrotic range urine protein excretion at ~4.3 g/g in 2020. Hemoglobin is stable . I discussed the possibility of a kidney transplant and the efficacy of a live vs  donor. Patient inquires about anti-rejection medication and I counseled him and his mother about the risk for infection and cancer (and importance of regular dermatology follow-up). He will be eligible for a transplant listing once his GFR falls below 20 ml/min (22 ml/min in 2020). I also discussed the possible use of peritoneal dialysis vs home hemodialysis and in-center hemodialysis.   - continue RAAS blockade with ramipril now at 2.5 mg daily for management of persistent albuminuria and CKD  - He should continue to avoid NSAIDs  - If he becomes ill, he was instructed to hold his ramipril   - We will pursue transplant evaluation should his eGFR fall <20 ml/min  - He has completed \"Kidney Smart\" class for additional education  - RTC in 3 months     2. Hypertension: He has not been monitoring his blood pressure at home but wast at goal of <130/80 at his last clinic visit after increasing ramipril with BPS in the 110's/70's.   - Continued on atenolol 37.5 mg, amlodipine 5 mg and will increase ramipril 2.5 mg daily; repeat renal panel in 3 months  - Anticipate discontinuing atenolol and increasing ramipril next for management hypertension and albuminuria     3. CKD-MBD: Corrected Ca and " phos have been at goal.  PTH mildly elevated at 201 (Feb, 2020).  Vit D deficient in past but improving with last level of 24 micrograms/L (Feb 2020).   - Continue cholecalciferol at 200 units daily  - No need for phos binders or activated vitamin D (e.g. calcitriol) at this time     4. Low bicarb: Bicarb low at 17 today.  Presumed non-AG metabolic acidosis due to CKD.    -encourage compliance with sodium bicarb tabs (650 mg) 4 tabs TID     5. Anemia: Mild and due to CKD. Hgb has been stable in the 12's. Iron stores adequate.   -no need for iron or EPO at this time      6. Seasonal Allergies: Patient dose not complain of persistent seasonal allergy related symptoms today. He feels that this is being well managed with Claritin. Instructed him that it is ok to take it daily for a short period of time if needed.  Otherwise, we discussed prescribing Singulair in the future if he continues to be symptomatic.     7. Obesity: Significant increase in weight during 2018. Patient and his mother inquire about the possible effect weight will have on a possible kidney transplant. I discussed the implications obesity has on chronic kidney disease, blood pressure control, and heart disease. Goal BMI for kidney transplant should be less than 35 (current 38). If this becomes an issue, he may follow-up with our weight management clinic.       8. Urology: Followed by Dr. Melgar      Follow-up: Return in about 3 months (around 7/6/2020).     Reason For Visit:   CKD follow-up.     HPI:   Boogie Villa is a 19 year old man with a history of posterior urethral valves with correction in the first year of life.  Recurrent episodes of urinary tract infection. Patient was previously followed by Dr. Palomo in pediatric nephrology for chronic kidney disease related to posterior ureteral valves. The only concern was medication compliance and he and his mother reported very partial (3-4 days a week) compliance in the past though has not been an  issue over the past year.      Interval history:  The patient is doing well overall. He reports that he no longer is working at a job due to medical advice from our office to reduce potential exposure to COVID. He reports that he gets catheterized every other day. He continues to take his medications regularly.  He denies dyspnea, edema and dysuria.       Review of Systems:   Pertinent items are noted in HPI or as below, remainder of complete ROS is negative.      Active Medications:     Current Outpatient Medications:      amLODIPine (NORVASC) 5 MG tablet, Take 1 tablet (5 mg) by mouth daily, Disp: 90 tablet, Rfl: 3     atenolol (TENORMIN) 25 MG tablet, Take 1.5 tablets (37.5 mg) by mouth daily, Disp: 135 tablet, Rfl: 3     calcium carbonate (OSCAL 500) 1250 (500 Ca) MG TABS tablet, daily , Disp: , Rfl:      Cholecalciferol (VITAMIN D) 2000 units CAPS, , Disp: , Rfl:      ramipril (ALTACE) 1.25 MG capsule, Take 2 capsules (2.5 mg) by mouth daily, Disp: 60 capsule, Rfl: 11     sodium bicarbonate 650 MG tablet, Take 4 tablets (2,600 mg) by mouth 3 times daily (Patient taking differently: Take 2,600 mg by mouth daily ), Disp: 180 tablet, Rfl: 3     Allergies:   Dust mites; Mold; and Other [seasonal allergies]      Past Medical History:  Attention deficit disorder    Posterior urethral valves   Lymphangioma  Hypertension  Chronic kidney disease stage 4, GFR 15-29 ml/min (H)    Past Surgical History:  ablation of posterior urethral valves - 2000  ureteral reimplantation with tapering - 2003    Family History:   No family history on file.      Social History:   Presents to clinic alone.  Tobacco Use: Never smoker.  Alcohol Use: Not on file.  PCP: VALENTIN MEEKS    Physical Exam:  Not performed due to telephone visit.       Laboratory:  CMP  Recent Labs   Lab Test 02/26/20  1520 11/25/19  0859 05/13/19  0712 12/04/18  1054 06/08/18  1256 01/16/18  1102 08/09/17  1626 02/10/17  1004    140 144 142 148* 144 146*  --     POTASSIUM 4.4 4.9 4.3 4.6 4.8 5.0 5.0  --    CHLORIDE 112* 117* 116* 116* 121* 113* 120*  --    CO2 21 17* 20 17* 19* 25 14*  --    ANIONGAP 7 6 8 9 8 6 12  --    * 94 114* 100* 87 79 97  --    BUN 56* 57* 54* 56* 47* 37* 50*  --    CR 3.58* 3.80* 3.59* 3.33* 2.51* 2.40* 2.39*  --    GFRESTIMATED 23* 22* 23* 24* 34* 36* 36*  --    GFRESTBLACK 27* 25* 27* 29* 41* 43* 43*  --    NABIL 8.2* 8.5 8.7* 8.8* 8.5* 8.6* 8.7*  --    MAG 1.7  --   --  1.9 1.6 1.8 1.9  --    PHOS 3.9 4.1 4.1 4.7* 4.6 4.0 3.8  --    PROTTOTAL  --   --   --  6.9 6.4*  --   --   --    ALBUMIN 3.3* 3.1* 3.2* 3.1* 2.8* 3.0* 3.4  --    BILITOTAL  --   --   --  0.2 0.2  --   --   --    ALKPHOS  --   --   --  87 74  --  91  --    AST  --   --   --  20 20  --   --  24   ALT  --   --   --  24 19  --   --  18     CBC  Recent Labs   Lab Test 02/26/20  1520 11/25/19  0859 05/13/19  0712 12/04/18  1054 06/08/18  1256   HGB 12.3* 12.4* 12.7* 13.7 13.3   WBC 6.6  --  5.8 5.0 5.8   RBC 3.97*  --  4.07* 4.51 4.24*   HCT 36.6*  --  38.2* 41.4 39.1*   MCV 92  --  94 92 92   MCH 31.0  --  31.2 30.4 31.4   MCHC 33.6  --  33.2 33.1 34.0   RDW 12.1  --  12.5 12.6 12.6     --  232 232 201     URINE STUDIES  Recent Labs   Lab Test 05/13/19  0728 12/04/18  1056 06/08/18  1310 02/10/17  1004   COLOR Straw Straw Straw Straw   APPEARANCE Clear Clear Clear Clear   URINEGLC 50* Negative Negative Negative   URINEBILI Negative Negative Negative Negative   URINEKETONE Negative Negative Negative Negative   SG 1.008 1.006 1.006 1.004   UBLD Negative Trace* Negative Negative   URINEPH 7.0 6.5 6.5 6.5   PROTEIN >499* 100* 100* 100*   NITRITE Negative Negative Negative Negative   LEUKEST Negative Negative Negative Negative   RBCU <1 <1 <1 <1   WBCU <1 1 <1 2     Recent Labs   Lab Test 02/26/20  1530 11/25/19  0904 05/13/19  0728 12/04/18  1056 06/08/18  1310 02/10/17  1004 08/26/16  1150 02/19/16  1000   UTPG 4.31* 4.92* 4.31* 4.59* 6.69* 2.78* 2.45* 2.17*  "    PTH  Recent Labs   Lab Test 02/26/20  1520 11/25/19  0859 05/13/19  0712 12/04/18  1054 06/08/18  1256 01/16/18  1102 08/09/17  1626 02/10/17  1002 08/26/16  1245 02/19/16  0955   PTHI 201* 154* 110* 177* 96* 133* 66 39 47 42     IRON STUDIES   Recent Labs   Lab Test 02/26/20  1520 05/13/19  0712 06/08/18  1256 01/16/18  1102 08/09/17  1626 02/10/17  1002   IRON 66 71 87 116 93 54    269 257 290 284 297   IRONSAT 26 26 34 40 33 18   DAVID 133 109 82 86 100 113              Boogie Villa is a 19 year old male who is being evaluated via a billable telephone visit.      The patient has been notified of following:     \"This telephone visit will be conducted via a call between you and your physician/provider. We have found that certain health care needs can be provided without the need for a physical exam.  This service lets us provide the care you need with a short phone conversation.  If a prescription is necessary we can send it directly to your pharmacy.  If lab work is needed we can place an order for that and you can then stop by our lab to have the test done at a later time.    Telephone visits are billed at different rates depending on your insurance coverage. During this emergency period, for some insurers they may be billed the same as an in-person visit.  Please reach out to your insurance provider with any questions.    If during the course of the call the physician/provider feels a telephone visit is not appropriate, you will not be charged for this service.\"    Patient has given verbal consent for Telephone visit?  Yes    Total time spent was 30 minutes, and more than 50% oftime was spent in counseling and/or coordination of care regarding principles of multidisciplinary care, medication management, and chronic kidney disease education.    Michael Diaz MD  "

## 2020-04-22 ENCOUNTER — TELEPHONE (OUTPATIENT)
Dept: NEPHROLOGY | Facility: CLINIC | Age: 20
End: 2020-04-22

## 2020-04-22 NOTE — TELEPHONE ENCOUNTER
Message left for patients mom, STD paperwork emailed to them and sent for scanning.  Azeb Villanueva LPN  Nephrology  [phone]

## 2020-08-10 DIAGNOSIS — N18.4 CKD (CHRONIC KIDNEY DISEASE) STAGE 4, GFR 15-29 ML/MIN (H): Primary | ICD-10-CM

## 2020-08-17 ENCOUNTER — TELEPHONE (OUTPATIENT)
Dept: TRANSPLANT | Facility: CLINIC | Age: 20
End: 2020-08-17

## 2020-08-17 ENCOUNTER — VIRTUAL VISIT (OUTPATIENT)
Dept: NEPHROLOGY | Facility: CLINIC | Age: 20
End: 2020-08-17
Attending: INTERNAL MEDICINE
Payer: COMMERCIAL

## 2020-08-17 DIAGNOSIS — N18.4 CKD (CHRONIC KIDNEY DISEASE) STAGE 4, GFR 15-29 ML/MIN (H): Primary | ICD-10-CM

## 2020-08-17 ASSESSMENT — PAIN SCALES - GENERAL: PAINLEVEL: NO PAIN (0)

## 2020-08-17 NOTE — PROGRESS NOTES
Left voicemail for patient to call back to set up telemedicine visit, will call again before appointment time.  Marisol Gonzalez CMA on 8/17/2020 at 10:11 AM

## 2020-08-17 NOTE — LETTER
8/17/2020       RE: Boogie iVlla  [address] [id number]     Dear Colleague,    Thank you for referring your patient, Boogie Villa, to the [facility] NEPHROLOGY at [facility]. Please see a copy of my visit note below.      Nephrology Virtual Visit  August 17, 2020      Boogie Villa MRN:[id number] YOB: 2000  Date of Admission:(Not on file)  Primary care provider: Maurice Sultana  Requesting physician: Michael Diaz*    ASSESSMENT AND RECOMMENDATIONS:     1. 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    Baseline Cr 3.58 - 3.80 , since 2019  His Cr was in the mid 2S in 2017   Most recent Cr 3.58  And GFR 23 ml/min on  2/26/2020  ACR  Gradually rising  : 1.75 g/gCr ( 2016) --> 2.31 g/gCr ( 2017) --> 3.64 g/gCr ( 2018) --> 3.15 g/gCr ( 2019) --> 4g/gCr ( 11/2019)   UPCR has been rising similarly. Had significant elevation upto 6.69 g/gCr in 6/2018 . But in the last 2 years it has been in the 4 g/gCr range .  Most recent  4.31 g/gCr ( 2/26/2020)   He has attended the kidney smart class   He has met with nutritionist and is trying to follow a low potassium diet   He will be eligible for a transplant listing once his GFR falls below 20 ml/min (22 ml/min today).   - continue RAAS blockade with ramipril for persistent albuminuria and CKD  - He should continue to avoid NSAIDs  - If he becomes ill, he was instructed to hold his ramipril   - We will pursue transplant evaluation should his eGFR fall <20 ml/min  -  Advised him to get labs done which are due. He is concerned about COVID19 infection , so he did not do the labs . I acknowledged his concerns and I also agree with him that there is indeed risk of getting COVID19 Infection . But I also explained to him the dangers of his CKD going unsupervised without labs which  includes risks of severe electrolyte/acid base  abnormalities leading to complications  Including uremia,cardiac arrest and death. Also advised him universal precautions like wearing masks, washing hands and face regularly, maintaining 6 feet distance  To decrease risk of COVID19 infection .He verbalized understanding of what was discussed . He said he will give it a thought and later decide wether he will like to do the labs , with ongoing COVID 19 pandemic   - Lab orders were updated   - RTC in 3 months     2. Hypertension:  No recent BP  Encouraged him to use his home machine to start recording his blod pressure and heart rate and maintain  Log  For nor we will continue his current regimen   - Atenolol 37.5 mg, amlodipine 5 mg and Ramipril 2.5 mg daily  - Anticipate discontinuing atenolol and increasing ramipril next for management hypertension and albuminuria     3. CKD-MBD: Corrected Ca and phos ar goal.  PTH mildly elevated at 201 ( 2/26/20) .  Vit D 26 ( 2/26/20) .   - Continue cholecalciferol at 200 units daily  - No need for phos binders or activated vitamin D (e.g. calcitriol) at this time     4. Low bicarb: Bicarb low at  21 ( 2/26/20) .  Presumed non-AG metabolic acidosis due to CKD.    -encourage compliance with sodium bicarb tabs (650 mg) 4 tabs TID     5. Anemia: Mild and due to CKD.   Labs on 2/26/2020: Hb 12.3, ferritin 133  IS 26 - Iron replete   -no need for iron or EPO at this time      6. Seasonal Allergies:  On claritin prn      7. Obesity: once again counseled him on weight loss importance as that can be a potential barrier for his future renal transplant   Goal BMI for kidney transplant should be less than 35 (current 38). If this becomes an issue, he may follow-up with our weight management clinic.    Last weight at home 281 lbs  BMI Readings from Last 4 Encounters:   11/25/19 37.81 kg/m  (>99 %, Z= 2.48)*   06/25/19 36.68 kg/m  (>99 %, Z= 2.43)*   05/13/19 37.79 kg/m  (>99 %, Z= 2.53)*    12/04/18 36.64 kg/m  (>99 %, Z= 2.47)*     * Growth percentiles are based on CDC (Boys, 2-20 Years) data.     8. Urology: Followed by Dr. Melgar    9. Medication non compliance - I commended him for being honest in giving an accurate history .  He forgets to take meds every other day at least I encouraged him to have some sort of pill reminder in place to help him with medication compliance . There are pill reminder apps available in phone also . I explained to him not being compliant with his medications can lead to further worsening of his kidney disease  And increase cardiovascular complications risk    Patient verbalized understanding  Of what was discussed with him  And had no further questions . I asked him if he wants his parents to be part of this conversation and he said no to that .    Follow up in 3 months  With labs     Patient staffed with  Dr Joe Cuevas MD, FACP  Nephrology Fellow   [facility]   Pager [phone]        REASON FOR VISIT : Follow up    BRIEF HISTORY  Admitting provider and nursing notes reviewed  Boogie Villa is a 20 year old male with a history of posterior urethral valves with correction in the first year of life.  Recurrent episodes of urinary tract infection.   Patient was last seen by Dr. Palomo in pediatric nephrology on 5/13/2019  for follow-up of chronic kidney disease related to posterior ureteral valves. He was first sen here at Adult Nephrology clinic on 11/25/2019.  The only concern was medication compliance and he and his mother at that time reported very partial (3-4 days a week) compliance.      INTERVAL HISTORY     He reports that he gets catheterized  1-2 times a week .  Also voids around 2 times a day .  No hematuria or voiding difficulty. No flank pain . He reports good amount of UOP . Unable to quantify the UOP  Also during catheterization too, he gets almost similar amount of UOP like his usual UOP . He does not feel all that different  for him wether he caths or not .  He does not feel like there is a lot of urine in his bladder.   He would not like an overnight catheterization because it causes him too much pain.   He reports that he does not produce a large amount of urine every time he catheterizes himself.   Denies any CP/SOB/Cough/abdomen pain/nausea/vomiting.  No confusion/headache/focal weakness         PAST MEDICAL HISTORY:  Reviewed with patient on 08/17/2020     Past Medical History:   Diagnosis Date     ADD (attention deficit disorder)      CKD (chronic kidney disease)      Posterior urethral valves        Past Surgical History:   Procedure Laterality Date     ablation of posterior urethral valves  2000     ureteral reimplantation with tapering  2003        MEDICATIONS:    Prior to Admission medications    Medication Sig Last Dose Taking? Auth Provider   amLODIPine (NORVASC) 5 MG tablet Take 1 tablet (5 mg) by mouth daily Taking  Michael Diaz MD   atenolol (TENORMIN) 25 MG tablet Take 1.5 tablets (37.5 mg) by mouth daily Taking  Michael Diaz MD   calcium carbonate (OSCAL 500) 1250 (500 Ca) MG TABS tablet daily  Taking  Reported, Patient   Cholecalciferol (VITAMIN D) 2000 units CAPS  Taking  Reported, Patient   ramipril (ALTACE) 1.25 MG capsule Take 2 capsules (2.5 mg) by mouth daily Taking  Michael Diaz MD   sodium bicarbonate 650 MG tablet Take 4 tablets (2,600 mg) by mouth 3 times daily  Patient taking differently: Take 2,600 mg by mouth daily  Taking  Michael Daiz MD            ALLERGIES:    Allergies   Allergen Reactions     Dust Mites      Runny nose and watery eyes     Mold      Runny nose and watery eyes     Other [Seasonal Allergies]      Grass, Ragweed - gets runny nose and watery eyes       REVIEW OF SYSTEMS:  A comprehensive of systems was negative except as noted above.    SOCIAL HISTORY:   Social History     Socioeconomic History     Marital status: Single     Spouse  name: Not on file     Number of children: Not on file     Years of education: Not on file     Highest education level: Not on file   Occupational History     Not on file   Social Needs     Financial resource strain: Not on file     Food insecurity     Worry: Not on file     Inability: Not on file     Transportation needs     Medical: Not on file     Non-medical: Not on file   Tobacco Use     Smoking status: Never Smoker     Smokeless tobacco: Never Used   Substance and Sexual Activity     Alcohol use: Not on file     Drug use: Not on file     Sexual activity: Not on file   Lifestyle     Physical activity     Days per week: Not on file     Minutes per session: Not on file     Stress: Not on file   Relationships     Social connections     Talks on phone: Not on file     Gets together: Not on file     Attends Rastafari service: Not on file     Active member of club or organization: Not on file     Attends meetings of clubs or organizations: Not on file     Relationship status: Not on file     Intimate partner violence     Fear of current or ex partner: Not on file     Emotionally abused: Not on file     Physically abused: Not on file     Forced sexual activity: Not on file   Other Topics Concern     Not on file   Social History Narrative     Not on file     Reviewed with patient      FAMILY MEDICAL HISTORY:   Reviewed with patient   Non contributary  PHYSICAL EXAM:   Patient awake and alert, conversing normally, no conversational dyspnea and speaking in full sentences . Comprehension is normal    URINE STUDIES  Recent Labs   Lab Test 05/13/19  0728 12/04/18  1056 06/08/18  1310 02/10/17  1004   COLOR Straw Straw Straw Straw   APPEARANCE Clear Clear Clear Clear   URINEGLC 50* Negative Negative Negative   URINEBILI Negative Negative Negative Negative   URINEKETONE Negative Negative Negative Negative   SG 1.008 1.006 1.006 1.004   UBLD Negative Trace* Negative Negative   URINEPH 7.0 6.5 6.5 6.5   PROTEIN >499* 100* 100*  "100*   NITRITE Negative Negative Negative Negative   LEUKEST Negative Negative Negative Negative   RBCU <1 <1 <1 <1   WBCU <1 1 <1 2     Recent Labs   Lab Test 02/26/20  1530 11/25/19  0904 05/13/19  0728 12/04/18  1056 06/08/18  1310 02/10/17  1004 08/26/16  1150 02/19/16  1000   UTPG 4.31* 4.92* 4.31* 4.59* 6.69* 2.78* 2.45* 2.17*     PTH  Recent Labs   Lab Test 02/26/20  1520 11/25/19  0859 05/13/19  0712 12/04/18  1054 06/08/18  1256 01/16/18  1102 08/09/17  1626 02/10/17  1002 08/26/16  1245 02/19/16  0955   PTHI 201* 154* 110* 177* 96* 133* 66 39 47 42     IRON STUDIES  Recent Labs   Lab Test 02/26/20  1520 05/13/19  0712 06/08/18  1256 01/16/18  1102 08/09/17  1626 02/10/17  1002   IRON 66 71 87 116 93 54    269 257 290 284 297   IRONSAT 26 26 34 40 33 18   DAVID 133 109 82 86 100 113       IMAGING:  All imaging studies reviewed by me.     Faith Pak MD     This patient has been evaluated by me, Michael Diaz MD, on 8/17/2020.  I discussed with the fellow, Dr. Cuevas, and agree with the findings and plan in this note.  I have reviewed medications, vital signs and labs.       Total time I spent was 30 minutes, and more than 50% of face to face time with the patient was spent in counseling and/or coordination of care regarding principles of multidisciplinary care, medication management, and CKD education.    Michael Diaz MD      Left voicemail for patient to call back to set up telemedicine visit, will call again before appointment time.  Marisol Gonzalez CMA on 8/17/2020 at 10:11 AM      Boogie Villa is a 20 year old male who is being evaluated via a billable video visit.      The patient has been notified of following:     \"This video visit will be conducted via a call between you and your physician/provider. We have found that certain health care needs can be provided without the need for an in-person physical exam.  This service lets us provide the care you need with a video " "conversation.  If a prescription is necessary we can send it directly to your pharmacy.  If lab work is needed we can place an order for that and you can then stop by our lab to have the test done at a later time.    Video visits are billed at different rates depending on your insurance coverage.  Please reach out to your insurance provider with any questions.    If during the course of the call the physician/provider feels a video visit is not appropriate, you will not be charged for this service.\"    Patient has given verbal consent for Video visit? Yes  How would you like to obtain your AVS? MyChart  If you are dropped from the video visit, the video invite should be resent to: Text to cell phone: [phone]  Will anyone else be joining your video visit? No        Video-Visit Details    Type of service:  Video Visit    Video Start Time: 10:36 AM  Video End Time: 11:07 AM    Originating Location (pt. Location): Home    Distant Location (provider location):  [facility] NEPHROLOGY     Platform used for Video Visit: Sulaiman Pak MD  Staffed by Dr Diaz     This patient has been evaluated by me, Michael Diaz MD, on 8/17/2020.  I discussed with the fellow, Dr. Cuevas, and agree with the findings and plan in this note.  I have reviewed medications, vital signs and labs.       Total time I spent was 30 minutes, and more than 50% of face to face time with the patient was spent in counseling and/or coordination of care regarding principles of multidisciplinary care, medication management, and CKD education.    Michael Diaz MD        "

## 2020-08-17 NOTE — TELEPHONE ENCOUNTER
Patient Call: Voicemail  Date/Time: 8/17/20 1222pm  Reason for call: Mother left voicemail requesting a call back re: how patient can start the process to get on kidney waitlist

## 2020-08-17 NOTE — PROGRESS NOTES
"Boogie Villa is a 20 year old male who is being evaluated via a billable video visit.      The patient has been notified of following:     \"This video visit will be conducted via a call between you and your physician/provider. We have found that certain health care needs can be provided without the need for an in-person physical exam.  This service lets us provide the care you need with a video conversation.  If a prescription is necessary we can send it directly to your pharmacy.  If lab work is needed we can place an order for that and you can then stop by our lab to have the test done at a later time.    Video visits are billed at different rates depending on your insurance coverage.  Please reach out to your insurance provider with any questions.    If during the course of the call the physician/provider feels a video visit is not appropriate, you will not be charged for this service.\"    Patient has given verbal consent for Video visit? Yes  How would you like to obtain your AVS? MyChart  If you are dropped from the video visit, the video invite should be resent to: Text to cell phone: [phone]  Will anyone else be joining your video visit? No        Video-Visit Details    Type of service:  Video Visit    Video Start Time: 10:36 AM  Video End Time: 11:07 AM    Originating Location (pt. Location): Home    Distant Location (provider location):  [facility] NEPHROLOGY     Platform used for Video Visit: Sulaiman Pak MD  Staffed by Dr Diaz     This patient has been evaluated by me, Michael Diaz MD, on 8/17/2020.  I discussed with the fellow, Dr. Cuevas, and agree with the findings and plan in this note.  I have reviewed medications, vital signs and labs.       Total time I spent was 30 minutes, and more than 50% of face to face time with the patient was spent in counseling and/or coordination of care regarding principles of multidisciplinary care, medication management, and CKD education.  "   Michael Diaz MD

## 2020-08-17 NOTE — PROGRESS NOTES
Nephrology Virtual Visit  August 17, 2020      Boogie Villa MRN:[id number] YOB: 2000  Date of Admission:(Not on file)  Primary care provider: Maurice Sultana  Requesting physician: Michael Diaz*    ASSESSMENT AND RECOMMENDATIONS:     1. 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    Baseline Cr 3.58 - 3.80 , since 2019  His Cr was in the mid 2S in 2017   Most recent Cr 3.58  And GFR 23 ml/min on  2/26/2020  ACR  Gradually rising  : 1.75 g/gCr ( 2016) --> 2.31 g/gCr ( 2017) --> 3.64 g/gCr ( 2018) --> 3.15 g/gCr ( 2019) --> 4g/gCr ( 11/2019)   UPCR has been rising similarly. Had significant elevation upto 6.69 g/gCr in 6/2018 . But in the last 2 years it has been in the 4 g/gCr range .  Most recent  4.31 g/gCr ( 2/26/2020)   He has attended the kidney smart class   He has met with nutritionist and is trying to follow a low potassium diet   He will be eligible for a transplant listing once his GFR falls below 20 ml/min (22 ml/min today).   - continue RAAS blockade with ramipril for persistent albuminuria and CKD  - He should continue to avoid NSAIDs  - If he becomes ill, he was instructed to hold his ramipril   - We will pursue transplant evaluation should his eGFR fall <20 ml/min  -  Advised him to get labs done which are due. He is concerned about COVID19 infection , so he did not do the labs . I acknowledged his concerns and I also agree with him that there is indeed risk of getting COVID19 Infection . But I also explained to him the dangers of his CKD going unsupervised without labs which includes risks of severe electrolyte/acid base  abnormalities leading to complications  Including uremia,cardiac arrest and death. Also advised him universal precautions like wearing masks, washing hands and face regularly, maintaining 6 feet distance  To decrease risk of COVID19  infection .He verbalized understanding of what was discussed . He said he will give it a thought and later decide wether he will like to do the labs , with ongoing COVID 19 pandemic   - Lab orders were updated   - RTC in 3 months     2. Hypertension:  No recent BP  Encouraged him to use his home machine to start recording his blod pressure and heart rate and maintain  Log  For nor we will continue his current regimen   - Atenolol 37.5 mg, amlodipine 5 mg and Ramipril 2.5 mg daily  - Anticipate discontinuing atenolol and increasing ramipril next for management hypertension and albuminuria     3. CKD-MBD: Corrected Ca and phos ar goal.  PTH mildly elevated at 201 ( 2/26/20) .  Vit D 26 ( 2/26/20) .   - Continue cholecalciferol at 200 units daily  - No need for phos binders or activated vitamin D (e.g. calcitriol) at this time     4. Low bicarb: Bicarb low at  21 ( 2/26/20) .  Presumed non-AG metabolic acidosis due to CKD.    -encourage compliance with sodium bicarb tabs (650 mg) 4 tabs TID     5. Anemia: Mild and due to CKD.   Labs on 2/26/2020: Hb 12.3, ferritin 133  IS 26 - Iron replete   -no need for iron or EPO at this time      6. Seasonal Allergies:  On claritin prn      7. Obesity: once again counseled him on weight loss importance as that can be a potential barrier for his future renal transplant   Goal BMI for kidney transplant should be less than 35 (current 38). If this becomes an issue, he may follow-up with our weight management clinic.    Last weight at home 281 lbs  BMI Readings from Last 4 Encounters:   11/25/19 37.81 kg/m  (>99 %, Z= 2.48)*   06/25/19 36.68 kg/m  (>99 %, Z= 2.43)*   05/13/19 37.79 kg/m  (>99 %, Z= 2.53)*   12/04/18 36.64 kg/m  (>99 %, Z= 2.47)*     * Growth percentiles are based on CDC (Boys, 2-20 Years) data.     8. Urology: Followed by Dr. Melgar    9. Medication non compliance - I commended him for being honest in giving an accurate history .  He forgets to take meds every other  day at least I encouraged him to have some sort of pill reminder in place to help him with medication compliance . There are pill reminder apps available in phone also . I explained to him not being compliant with his medications can lead to further worsening of his kidney disease  And increase cardiovascular complications risk    Patient verbalized understanding  Of what was discussed with him  And had no further questions . I asked him if he wants his parents to be part of this conversation and he said no to that .    Follow up in 3 months  With labs     Patient staffed with  Dr Joe Cuevas MD, FACP  Nephrology Fellow   [facility]   Pager [phone]        REASON FOR VISIT : Follow up    BRIEF HISTORY  Admitting provider and nursing notes reviewed  Boogie Villa is a 20 year old male with a history of posterior urethral valves with correction in the first year of life.  Recurrent episodes of urinary tract infection.   Patient was last seen by Dr. Palomo in pediatric nephrology on 5/13/2019  for follow-up of chronic kidney disease related to posterior ureteral valves. He was first sen here at Adult Nephrology clinic on 11/25/2019.  The only concern was medication compliance and he and his mother at that time reported very partial (3-4 days a week) compliance.      INTERVAL HISTORY     He reports that he gets catheterized  1-2 times a week .  Also voids around 2 times a day .  No hematuria or voiding difficulty. No flank pain . He reports good amount of UOP . Unable to quantify the UOP  Also during catheterization too, he gets almost similar amount of UOP like his usual UOP . He does not feel all that different for him wether he caths or not .  He does not feel like there is a lot of urine in his bladder.   He would not like an overnight catheterization because it causes him too much pain.   He reports that he does not produce a large amount of urine every time he catheterizes himself.    Denies any CP/SOB/Cough/abdomen pain/nausea/vomiting.  No confusion/headache/focal weakness         PAST MEDICAL HISTORY:  Reviewed with patient on 08/17/2020     Past Medical History:   Diagnosis Date     ADD (attention deficit disorder)      CKD (chronic kidney disease)      Posterior urethral valves        Past Surgical History:   Procedure Laterality Date     ablation of posterior urethral valves  2000     ureteral reimplantation with tapering  2003        MEDICATIONS:    Prior to Admission medications    Medication Sig Last Dose Taking? Auth Provider   amLODIPine (NORVASC) 5 MG tablet Take 1 tablet (5 mg) by mouth daily Taking  Michael Diaz MD   atenolol (TENORMIN) 25 MG tablet Take 1.5 tablets (37.5 mg) by mouth daily Taking  Michael Diaz MD   calcium carbonate (OSCAL 500) 1250 (500 Ca) MG TABS tablet daily  Taking  Reported, Patient   Cholecalciferol (VITAMIN D) 2000 units CAPS  Taking  Reported, Patient   ramipril (ALTACE) 1.25 MG capsule Take 2 capsules (2.5 mg) by mouth daily Taking  Michael Diaz MD   sodium bicarbonate 650 MG tablet Take 4 tablets (2,600 mg) by mouth 3 times daily  Patient taking differently: Take 2,600 mg by mouth daily  Taking  Michael Diaz MD            ALLERGIES:    Allergies   Allergen Reactions     Dust Mites      Runny nose and watery eyes     Mold      Runny nose and watery eyes     Other [Seasonal Allergies]      Grass, Ragweed - gets runny nose and watery eyes       REVIEW OF SYSTEMS:  A comprehensive of systems was negative except as noted above.    SOCIAL HISTORY:   Social History     Socioeconomic History     Marital status: Single     Spouse name: Not on file     Number of children: Not on file     Years of education: Not on file     Highest education level: Not on file   Occupational History     Not on file   Social Needs     Financial resource strain: Not on file     Food insecurity     Worry: Not on file      Inability: Not on file     Transportation needs     Medical: Not on file     Non-medical: Not on file   Tobacco Use     Smoking status: Never Smoker     Smokeless tobacco: Never Used   Substance and Sexual Activity     Alcohol use: Not on file     Drug use: Not on file     Sexual activity: Not on file   Lifestyle     Physical activity     Days per week: Not on file     Minutes per session: Not on file     Stress: Not on file   Relationships     Social connections     Talks on phone: Not on file     Gets together: Not on file     Attends Uatsdin service: Not on file     Active member of club or organization: Not on file     Attends meetings of clubs or organizations: Not on file     Relationship status: Not on file     Intimate partner violence     Fear of current or ex partner: Not on file     Emotionally abused: Not on file     Physically abused: Not on file     Forced sexual activity: Not on file   Other Topics Concern     Not on file   Social History Narrative     Not on file     Reviewed with patient      FAMILY MEDICAL HISTORY:   Reviewed with patient   Non contributary  PHYSICAL EXAM:   Patient awake and alert, conversing normally, no conversational dyspnea and speaking in full sentences . Comprehension is normal    URINE STUDIES  Recent Labs   Lab Test 05/13/19  0728 12/04/18  1056 06/08/18  1310 02/10/17  1004   COLOR Straw Straw Straw Straw   APPEARANCE Clear Clear Clear Clear   URINEGLC 50* Negative Negative Negative   URINEBILI Negative Negative Negative Negative   URINEKETONE Negative Negative Negative Negative   SG 1.008 1.006 1.006 1.004   UBLD Negative Trace* Negative Negative   URINEPH 7.0 6.5 6.5 6.5   PROTEIN >499* 100* 100* 100*   NITRITE Negative Negative Negative Negative   LEUKEST Negative Negative Negative Negative   RBCU <1 <1 <1 <1   WBCU <1 1 <1 2     Recent Labs   Lab Test 02/26/20  1530 11/25/19  0904 05/13/19  0728 12/04/18  1056 06/08/18  1310 02/10/17  1004 08/26/16  1150  02/19/16  1000   UTPG 4.31* 4.92* 4.31* 4.59* 6.69* 2.78* 2.45* 2.17*     PTH  Recent Labs   Lab Test 02/26/20  1520 11/25/19  0859 05/13/19  0712 12/04/18  1054 06/08/18  1256 01/16/18  1102 08/09/17  1626 02/10/17  1002 08/26/16  1245 02/19/16  0955   PTHI 201* 154* 110* 177* 96* 133* 66 39 47 42     IRON STUDIES  Recent Labs   Lab Test 02/26/20  1520 05/13/19  0712 06/08/18  1256 01/16/18  1102 08/09/17  1626 02/10/17  1002   IRON 66 71 87 116 93 54    269 257 290 284 297   IRONSAT 26 26 34 40 33 18   DAVID 133 109 82 86 100 113       IMAGING:  All imaging studies reviewed by me.     Faith Pak MD     This patient has been evaluated by me, Michael Diaz MD, on 8/17/2020.  I discussed with the fellow, Dr. Cuevas, and agree with the findings and plan in this note.  I have reviewed medications, vital signs and labs.       Total time I spent was 30 minutes, and more than 50% of face to face time with the patient was spent in counseling and/or coordination of care regarding principles of multidisciplinary care, medication management, and CKD education.    Michael Diaz MD

## 2020-08-18 ENCOUNTER — AMBULATORY - HEALTHEAST (OUTPATIENT)
Dept: LAB | Facility: CLINIC | Age: 20
End: 2020-08-18

## 2020-08-18 DIAGNOSIS — N18.4 CHRONIC KIDNEY DISEASE, STAGE IV (SEVERE) (H): ICD-10-CM

## 2020-08-18 LAB
ALBUMIN SERPL-MCNC: 3.3 G/DL (ref 3.5–5)
ANION GAP SERPL CALCULATED.3IONS-SCNC: 9 MMOL/L (ref 5–18)
BUN SERPL-MCNC: 50 MG/DL (ref 8–22)
CALCIUM SERPL-MCNC: 9 MG/DL (ref 8.5–10.5)
CHLORIDE BLD-SCNC: 114 MMOL/L (ref 98–107)
CO2 SERPL-SCNC: 15 MMOL/L (ref 22–31)
CREAT SERPL-MCNC: 4.45 MG/DL (ref 0.7–1.3)
CREAT UR-MCNC: 90 MG/DL
ERYTHROCYTE [DISTWIDTH] IN BLOOD BY AUTOMATED COUNT: 12 % (ref 11–14.5)
GFR SERPL CREATININE-BSD FRML MDRD: 17 ML/MIN/1.73M2
GLUCOSE BLD-MCNC: 162 MG/DL (ref 70–125)
HCT VFR BLD AUTO: 36.8 % (ref 40–54)
HGB BLD-MCNC: 12.4 G/DL (ref 14–18)
MCH RBC QN AUTO: 30.7 PG (ref 27–34)
MCHC RBC AUTO-ENTMCNC: 33.7 G/DL (ref 32–36)
MCV RBC AUTO: 91 FL (ref 80–100)
PHOSPHATE SERPL-MCNC: 3.8 MG/DL (ref 2.5–4.5)
PLATELET # BLD AUTO: 237 THOU/UL (ref 140–440)
PMV BLD AUTO: 10.1 FL (ref 8.5–12.5)
POTASSIUM BLD-SCNC: 4.6 MMOL/L (ref 3.5–5)
PROTEIN, RANDOM URINE - HISTORICAL: 314 MG/DL
PROTEIN/CREAT RATIO, RANDOM UR: 3.49
RBC # BLD AUTO: 4.04 MILL/UL (ref 4.4–6.2)
SODIUM SERPL-SCNC: 138 MMOL/L (ref 136–145)
WBC: 5.3 THOU/UL (ref 4–11)

## 2020-08-20 ENCOUNTER — TELEPHONE (OUTPATIENT)
Dept: NEPHROLOGY | Facility: CLINIC | Age: 20
End: 2020-08-20

## 2020-08-20 LAB
CREAT UR-MCNC: 87.3 MG/DL
MICROALBUMIN UR-MCNC: 223.52 MG/DL (ref 0–1.99)
MICROALBUMIN/CREAT UR: 2560.4 MG/G

## 2020-08-28 LAB
ALBUMIN SERPL-MCNC: 3.3 G/DL
ALBUMIN URINE MG/G CR: 2560.4 MG/G CREATININE
ALBUMIN URINE MG/SPEC: 223.52
ANION GAP SERPL CALCULATED.3IONS-SCNC: 9 MMOL/L
BUN SERPL-MCNC: 50 MG/DL
CALCIUM SERPL-MCNC: 9 MG/DL
CHLORIDE SERPLBLD-SCNC: 114 MMOL/L
CO2 SERPL-SCNC: 15 MMOL/L
CREAT SERPL-MCNC: 4.45 MG/DL
CREATININE URINE: 87.3
CREATININE URINE: 90
ERYTHROCYTE [DISTWIDTH] IN BLOOD BY AUTOMATED COUNT: ABNORMAL %
GFR SERPL CREATININE-BSD FRML MDRD: 17 ML/MIN/1.7
GLUCOSE SERPL-MCNC: 162 MG/DL (ref 70–99)
HCT VFR BLD AUTO: 36.8 %
HEMOGLOBIN: 12.4 G/DL (ref 13.3–17.7)
MCH RBC QN AUTO: 30.7 PG
MCHC RBC AUTO-ENTMCNC: 33.7 G/DL
MCV RBC AUTO: 91 FL
PHOSPHATE SERPL-MCNC: 3.8 MG/DL
PLATELET # BLD AUTO: 237 10^9/L
POTASSIUM SERPL-SCNC: 4.6 MMOL/L
PROT UR-MCNC: 314 G/DL
PROTEIN/CREATININE RATIO - QUEST: 3.49
RBC # BLD AUTO: 4.04 10^12/L
SODIUM SERPL-SCNC: 138 MMOL/L
WBC # BLD AUTO: 5.3 10^9/L

## 2020-09-11 ENCOUNTER — RECORDS - HEALTHEAST (OUTPATIENT)
Dept: ADMINISTRATIVE | Facility: OTHER | Age: 20
End: 2020-09-11

## 2020-09-11 ENCOUNTER — TELEPHONE (OUTPATIENT)
Dept: UROLOGY | Facility: CLINIC | Age: 20
End: 2020-09-11

## 2020-09-11 DIAGNOSIS — N31.9 NEUROGENIC BLADDER: Primary | ICD-10-CM

## 2020-09-11 NOTE — TELEPHONE ENCOUNTER
Darcy-patient's mother was notified that we extended the patient's renal ultrasound order and I notified her that we only need a creatinine (per new protocol). Darcy agreed with the plan. Orders was extended and placed int he patient chart.        Darshan Epstein MA

## 2020-09-11 NOTE — TELEPHONE ENCOUNTER
Health Call Center    Phone Message    May a detailed message be left on voicemail: yes     Reason for Call: Order(s): Other:   Reason for requested: Yearly follow up with US and BMP labs requested by Cris at last appt on 2019.  Orders have .  Please extend order (or place new ones) and then call Darcy so she can call imaging to get them scheduled  Date needed: 2020  Provider name: Cris Thakur      Action Taken: Message routed to:  [facility]  Urology    Travel Screening: Not Applicable

## 2020-09-17 ENCOUNTER — TELEPHONE (OUTPATIENT)
Dept: NEPHROLOGY | Facility: CLINIC | Age: 20
End: 2020-09-17

## 2020-09-17 NOTE — TELEPHONE ENCOUNTER
[facility] Call Center    Phone Message    May a detailed message be left on voicemail: yes     Reason for Call: Requesting Results   Name/type of test: Blood draw, labs  Date of test: 8/18/20  Was test done at a location other than [facility] (Please fill in the location if not [facility])?: Yes: [facility] in [place] at the hospital      Action Taken: Message routed to:  [facility] Nephrology    Travel Screening: Not Applicable

## 2020-09-22 LAB
ALBUMIN SERPL-MCNC: 3.3 G/DL
ALBUMIN URINE MG/G CR: 2560.4 MG/G CREATININE
ANION GAP SERPL CALCULATED.3IONS-SCNC: 9 MMOL/L
BUN SERPL-MCNC: 50 MG/DL
CALCIUM SERPL-MCNC: 9 MG/DL
CHLORIDE SERPLBLD-SCNC: 114 MMOL/L
CO2 SERPL-SCNC: 15 MMOL/L
CREAT SERPL-MCNC: 4.45 MG/DL
CREATININE URINE: 90
ERYTHROCYTE [DISTWIDTH] IN BLOOD BY AUTOMATED COUNT: 12 %
GFR SERPL CREATININE-BSD FRML MDRD: 17 ML/MIN/1.73M2
GLUCOSE SERPL-MCNC: 162 MG/DL (ref 70–99)
HCT VFR BLD AUTO: 36.8 %
HEMOGLOBIN: 12.4 G/DL (ref 13.3–17.7)
MCH RBC QN AUTO: 30.7 PG
MCHC RBC AUTO-ENTMCNC: 33.7 G/DL
MCV RBC AUTO: 91 FL
MICROALBUMIN RANDOM UR. - NOTE: 232.52 TEXT
PHOSPHATE SERPL-MCNC: 3.8 MG/DL
PLATELET # BLD AUTO: 237 10^9/L
POTASSIUM SERPL-SCNC: 4.6 MMOL/L
PROT UR-MCNC: 314 G/DL
PROTEIN/CREATININE RATIO - QUEST: 3.49
RBC # BLD AUTO: 4.04 10^12/L
SODIUM SERPL-SCNC: 138 MMOL/L
WBC # BLD AUTO: 5.3 10^9/L

## 2020-09-22 NOTE — TELEPHONE ENCOUNTER
"Clinic Care Coordination Contact  OUTREACH    Referral Information:  Referral Source: IP/TCU Report  Reason for Contact: Follow up from TCU discharge and right TKA  Care Conference: No     Universal Utilization:   ED Visits in last year: 0  Hospital visits in last year: 1  Last PCP appointment: 12/04/17  Missed Appointments: 0     Multiple Providers or Specialists: Ortho    Clinical Concerns:  Current Medical Concerns: Right TKA is healed.  Pt is scheduled to have left TKA tomorrow.     Current Behavioral Concerns: None  Education Provided to patient: To call clinic with all medical concerns. Pt states understanding.   Clinical Pathway: None    Medication Management:  Pt is independent with medication management.  Pt states no changes to regimen since last outreach.  No side effects reported.  No issues obtaining medications.      Functional Status:  Mobility Status: Independent  Equipment Currently Used at Home: none  Transportation: Pt is independent right now but  will provide post surgical.     Psychosocial:  Current living arrangement:: I live in a private home with spouse  Financial/Insurance: [employer].  Denies financial concerns.     Resources and Interventions:  Advanced Care Plans/Directives on file:: No  Patient/Caregiver understanding: Pt state she is doing well and \"feeling really good\"  She is having a left TKA tomorrow and has had her pre op physical last week. She denies pain and states right knee has healed well.  She is eating and sleeping well.  Had questions about her labs (Hgb and Carbon Dioxide)  that were flagged on lab report.   Labs were only off by 1 point and is wondering if she should worry about this.  Informed her that CC will consult with PCP and update her as needed.  CC states that labs will be drawn again prior to her surgery tomorrow.  Plan is for pt to go to TCU after surgery/discharge from hospital.   No other concerns expressed today.    Frequency of Care Coordination: Every " Entered results from 8/18 per patient request. My chart sent. Results have already been addressed.  Azeb Villanueva LPN  Nephrology  [phone]     3 weeks  Upcoming appointment: 12/29/17     Plan: Pt to have left TKA completed as scheduled.  CC will assess for needs post TKA in 3 weeks.    Erika Martines RN  [facility]  Care Coordination  [phone]

## 2020-10-01 ENCOUNTER — TELEPHONE (OUTPATIENT)
Dept: NEPHROLOGY | Facility: CLINIC | Age: 20
End: 2020-10-01

## 2020-10-01 DIAGNOSIS — N18.4 CKD (CHRONIC KIDNEY DISEASE) STAGE 4, GFR 15-29 ML/MIN (H): ICD-10-CM

## 2020-10-01 NOTE — TELEPHONE ENCOUNTER
Reviewed my chart message with Dr. Diaz, recommendation is to have patient have another renal panel drawn and go from there.   Spoke to patients mom, Darcy. She reports that patient really is not wanting to leave the house due to COVID and world happenings at this time and that he would not get another renal panel drawn at this time. Writer informed her of his creatinine levels in the past to most current and she reports that patient is feeling fine, no concerns. We will schedule telephone visit with Dr. Diaz Nov 2nd so they can further discuss plan moving forward and she agreed to call the clinic or my chart should patient become ill or have any changes to his current status.   Azeb Villanueva LPN  Nephrology  [phone]

## 2020-10-05 ENCOUNTER — REFERRAL (OUTPATIENT)
Dept: TRANSPLANT | Facility: CLINIC | Age: 20
End: 2020-10-05

## 2020-10-05 DIAGNOSIS — Q60.5 CONGENITAL RENAL AGENESIS AND DYSGENESIS: ICD-10-CM

## 2020-10-05 DIAGNOSIS — Q64.31 CONGENITAL URETHRAL VALVE OBSTRUCTION: ICD-10-CM

## 2020-10-05 DIAGNOSIS — I10 ESSENTIAL HYPERTENSION: ICD-10-CM

## 2020-10-05 DIAGNOSIS — Q60.2 CONGENITAL RENAL AGENESIS AND DYSGENESIS: ICD-10-CM

## 2020-10-05 DIAGNOSIS — Z01.818 PRE-TRANSPLANT EVALUATION FOR KIDNEY TRANSPLANT: ICD-10-CM

## 2020-10-05 DIAGNOSIS — N18.6 ESRD (END STAGE RENAL DISEASE) (H): Primary | ICD-10-CM

## 2020-10-05 DIAGNOSIS — Z76.82 ORGAN TRANSPLANT CANDIDATE: ICD-10-CM

## 2020-10-05 NOTE — Clinical Note
Please see orders for mini pre K eval - needs tx surg, tx nutritionist, Weight Loss Management appts - in person. BMI is 41.8. Pt is not on dialysis. Pt needs in person appts with visitor exception for Mom to attend. Pt is 21 yo, multiple medical issues and mental health issues.  Thanks ale

## 2020-10-05 NOTE — LETTER
Boogie Villa  [address] [place] [id number]          Dear Boogie,    Thank you for your interest in the Transplant Center at [facility], [facility]. We look forward to being a part of your care team and assisting you through the transplant process.    As we discussed, your transplant coordinator is Alexandra Grijalva (Kidney).  You may call your coordinator at any time with questions or concerns.  Your first scheduled call will be on 11/6/2020.  If this needs to change, call [phone].    Please complete the following.    1. Fill out and return the enclosed forms    Authorization for Electronic Communication    Authorization to Discuss Protected Health Information    Authorization for Release of Protected Health Information    Authorization for Care Everywhere Release of Information    2. Sign up for:    [employer], access to your electronic medical record (see enclosed pamphlet)    [employer], a transplant education website    You can use these tools to learn more about your transplant, communicate with your care team, and track your medical details      Sincerely,    Solid Organ Transplant  [facility], [facility]  cc: Maurice Sultana (PCP) Michael Diaz MD

## 2020-10-05 NOTE — LETTER
Boogie Villa  [address] [address]          Dear Boogie,    Thank you for your interest in the Transplant Center at [facility], [facility]. We look forward to being a part of your care team and assisting you through the transplant process.    As we discussed, your transplant coordinator is Alexandra Grijalva (Kidney).  You may call your coordinator at any time with questions or concerns.  Your first scheduled call will be on 11/6/2020.  If this needs to change, call [phone].    Please complete the following.    1. Fill out and return the enclosed forms    Authorization for Electronic Communication    Authorization to Discuss Protected Health Information    Authorization for Release of Protected Health Information    2. Sign up for:    [employer], access to your electronic medical record (see enclosed pamphlet)    [employer], a transplant education website    You can use these tools to learn more about your transplant, communicate with your care team, and track your medical details      Sincerely,      Solid Organ Transplant  [facility], [facility]    cc: Referring Physician PCP

## 2020-10-07 ENCOUNTER — TELEPHONE (OUTPATIENT)
Dept: UROLOGY | Facility: CLINIC | Age: 20
End: 2020-10-07

## 2020-10-07 NOTE — TELEPHONE ENCOUNTER
[facility] Call Center    Phone Message    May a detailed message be left on voicemail: yes     Reason for Call: Order(s): Other:   Reason for requested: Darcy states that Cris Poseysigifredocayla wanted an US done prior to his upcoming appointment. She is wanting to have it done at [facility] and is requesting that the order be faxed there.  Their phone number is [phone].  Please call Darcy back once the order has been submitted so she can schedule  Date needed: 10/7/2020  Provider name: Cris Thakur      Action Taken: Message routed to:  [facility]  Urology    Travel Screening: Not Applicable

## 2020-10-07 NOTE — TELEPHONE ENCOUNTER
Darcy was notified that we faxed the patient's renal ultrasound order to [facility] radiology at [phone]. She was notified that the needs a creatinine. Darcy agreed with the plan and we faxed the creatinine lab order to [facility] lab at  [phone].      Darshan Epstein MA

## 2020-10-08 ENCOUNTER — TELEPHONE (OUTPATIENT)
Dept: UROLOGY | Facility: CLINIC | Age: 20
End: 2020-10-08

## 2020-10-08 ENCOUNTER — RECORDS - HEALTHEAST (OUTPATIENT)
Dept: ADMINISTRATIVE | Facility: OTHER | Age: 20
End: 2020-10-08

## 2020-10-08 NOTE — TELEPHONE ENCOUNTER
Eligio from [facility] states that he received the Renal ultrasound.    Darshan Epstein MA

## 2020-10-08 NOTE — TELEPHONE ENCOUNTER
Jeffrey Shelton,     Can you please fax the renal ultrasound and creatinine order to [facility] ? If you look in my  the big white folder I faxed this orders yesterday. Can you please refax these orders?       Thanks, Darshan Epstein MA

## 2020-10-08 NOTE — TELEPHONE ENCOUNTER
[facility] Call Center    Phone Message    May a detailed message be left on voicemail: yes     Reason for Call: Order(s): Other:   Reason for requested: US Renal Complete  Date needed: 10/8/20  Provider name: Cris Del Valle calling from radiology to request that Boogie's order be faxed to the [facility] location for Boogie to have his ultrasound done there. Eligio says they are not able to access the current one that is in Boogie's file, so they would like it faxed to [phone]. Please give Eligio a call back if there are any questions.      Action Taken: Message routed to:  [facility]    Travel Screening: Not Applicable

## 2020-10-13 ENCOUNTER — PRE VISIT (OUTPATIENT)
Dept: UROLOGY | Facility: CLINIC | Age: 20
End: 2020-10-13

## 2020-10-13 ENCOUNTER — TELEPHONE (OUTPATIENT)
Dept: UROLOGY | Facility: CLINIC | Age: 20
End: 2020-10-13

## 2020-10-13 NOTE — TELEPHONE ENCOUNTER
Visit Type : year follow up with labs and imaging before     Records/Orders: sent message to get labs and imaging before appointment     Pt Contacted: no    At Rooming: video

## 2020-10-13 NOTE — TELEPHONE ENCOUNTER
----- Message from Mis Mercedes CMA sent at 10/13/2020  9:19 AM CDT -----  Please call this patient and have him get labs and imaging before appointment with Cris Abebe

## 2020-10-13 NOTE — TELEPHONE ENCOUNTER
Spoke with pt mother who state imaging and labs are being done at [facility] in [place] in the next week prior to appt on 10/20.

## 2020-10-19 ENCOUNTER — AMBULATORY - HEALTHEAST (OUTPATIENT)
Dept: LAB | Facility: CLINIC | Age: 20
End: 2020-10-19

## 2020-10-19 ENCOUNTER — HOSPITAL ENCOUNTER (OUTPATIENT)
Dept: ULTRASOUND IMAGING | Facility: CLINIC | Age: 20
Discharge: HOME | End: 2020-10-19

## 2020-10-19 DIAGNOSIS — N18.4 CHRONIC KIDNEY DISEASE, STAGE IV (SEVERE) (H): ICD-10-CM

## 2020-10-19 DIAGNOSIS — N31.9 NEUROGENIC BLADDER: ICD-10-CM

## 2020-10-19 LAB
ALBUMIN SERPL-MCNC: 3.7 G/DL (ref 3.5–5)
ALBUMIN UR-MCNC: ABNORMAL MG/DL
ALP SERPL-CCNC: 72 U/L (ref 45–120)
ALT SERPL W P-5'-P-CCNC: 22 U/L (ref 0–45)
APPEARANCE UR: CLEAR
AST SERPL W P-5'-P-CCNC: 16 U/L (ref 0–40)
BACTERIA #/AREA URNS HPF: ABNORMAL HPF
BASOPHILS # BLD AUTO: 0 THOU/UL (ref 0–0.2)
BASOPHILS NFR BLD AUTO: 1 % (ref 0–2)
BILIRUB DIRECT SERPL-MCNC: 0.1 MG/DL
BILIRUB SERPL-MCNC: 0.4 MG/DL (ref 0–1)
BILIRUB UR QL STRIP: NEGATIVE
COLOR UR AUTO: YELLOW
CREAT SERPL-MCNC: 5.56 MG/DL (ref 0.7–1.3)
EOSINOPHIL # BLD AUTO: 0.2 THOU/UL (ref 0–0.4)
EOSINOPHIL NFR BLD AUTO: 2 % (ref 0–6)
ERYTHROCYTE [DISTWIDTH] IN BLOOD BY AUTOMATED COUNT: 12.5 % (ref 11–14.5)
GFR SERPL CREATININE-BSD FRML MDRD: 13 ML/MIN/1.73M2
GLUCOSE UR STRIP-MCNC: ABNORMAL MG/DL
HCT VFR BLD AUTO: 41.6 % (ref 40–54)
HGB BLD-MCNC: 13.2 G/DL (ref 14–18)
HGB UR QL STRIP: ABNORMAL
IMM GRANULOCYTES # BLD: 0 THOU/UL
IMM GRANULOCYTES NFR BLD: 1 %
KETONES UR STRIP-MCNC: NEGATIVE MG/DL
LEUKOCYTE ESTERASE UR QL STRIP: NEGATIVE
LYMPHOCYTES # BLD AUTO: 2 THOU/UL (ref 0.8–4.4)
LYMPHOCYTES NFR BLD AUTO: 28 % (ref 20–40)
MCH RBC QN AUTO: 30.6 PG (ref 27–34)
MCHC RBC AUTO-ENTMCNC: 31.7 G/DL (ref 32–36)
MCV RBC AUTO: 96 FL (ref 80–100)
MONOCYTES # BLD AUTO: 0.4 THOU/UL (ref 0–0.9)
MONOCYTES NFR BLD AUTO: 6 % (ref 2–10)
MUCOUS THREADS #/AREA URNS LPF: ABNORMAL LPF
NEUTROPHILS # BLD AUTO: 4.4 THOU/UL (ref 2–7.7)
NEUTROPHILS NFR BLD AUTO: 62 % (ref 50–70)
NITRATE UR QL: NEGATIVE
PH UR STRIP: 6 [PH] (ref 4.5–8)
PLATELET # BLD AUTO: 256 THOU/UL (ref 140–440)
PMV BLD AUTO: 9.9 FL (ref 8.5–12.5)
PROT SERPL-MCNC: 7.4 G/DL (ref 6–8)
PTH-INTACT SERPL-MCNC: 131 PG/ML (ref 10–86)
RBC # BLD AUTO: 4.32 MILL/UL (ref 4.4–6.2)
RBC #/AREA URNS AUTO: ABNORMAL HPF
SP GR UR STRIP: 1 (ref 1–1.03)
SQUAMOUS #/AREA URNS AUTO: ABNORMAL LPF
UROBILINOGEN UR STRIP-ACNC: ABNORMAL
WBC #/AREA URNS AUTO: ABNORMAL HPF
WBC: 7.1 THOU/UL (ref 4–11)

## 2020-10-20 ENCOUNTER — VIRTUAL VISIT (OUTPATIENT)
Dept: UROLOGY | Facility: CLINIC | Age: 20
End: 2020-10-20
Payer: COMMERCIAL

## 2020-10-20 DIAGNOSIS — Z93.52 MITROFANOFF APPENDICOVESICOSTOMY PRESENT (H): ICD-10-CM

## 2020-10-20 DIAGNOSIS — N31.9 NEUROGENIC BLADDER: Primary | ICD-10-CM

## 2020-10-20 DIAGNOSIS — N18.4 CKD (CHRONIC KIDNEY DISEASE) STAGE 4, GFR 15-29 ML/MIN (H): ICD-10-CM

## 2020-10-20 LAB
25(OH)D3 SERPL-MCNC: 20.7 NG/ML (ref 30–80)
ALBUMIN SERPL-MCNC: 3.8 G/DL (ref 3.5–5)
ANION GAP SERPL CALCULATED.3IONS-SCNC: 12 MMOL/L (ref 5–18)
BUN SERPL-MCNC: 77 MG/DL (ref 8–22)
CALCIUM SERPL-MCNC: 9.3 MG/DL (ref 8.5–10.5)
CHLORIDE BLD-SCNC: 114 MMOL/L (ref 98–107)
CO2 SERPL-SCNC: 11 MMOL/L (ref 22–31)
CREAT SERPL-MCNC: 5.42 MG/DL (ref 0.7–1.3)
GFR SERPL CREATININE-BSD FRML MDRD: 14 ML/MIN/1.73M2
GLUCOSE BLD-MCNC: 99 MG/DL (ref 70–125)
PHOSPHATE SERPL-MCNC: 4.8 MG/DL (ref 2.5–4.5)
POTASSIUM BLD-SCNC: 4.8 MMOL/L (ref 3.5–5)
SODIUM SERPL-SCNC: 137 MMOL/L (ref 136–145)

## 2020-10-20 PROCEDURE — 99213 OFFICE O/P EST LOW 20 MIN: CPT | Mod: 95 | Performed by: PHYSICIAN ASSISTANT

## 2020-10-20 NOTE — LETTER
"10/20/2020       RE: Boogie Villa  [address] [address]     Dear Colleague,    Thank you for referring your patient, Boogie Villa, to the [facility] UROLOGY CLINIC [place] at [facility]. Please see a copy of my visit note below.    Boogie Villa is a 20 year old male who is being evaluated via a billable video visit.      The patient has been notified of following:     \"This video visit will be conducted via a call between you and your physician/provider. We have found that certain health care needs can be provided without the need for an in-person physical exam.  This service lets us provide the care you need with a video conversation.  If a prescription is necessary we can send it directly to your pharmacy.  If lab work is needed we can place an order for that and you can then stop by our lab to have the test done at a later time.    Video visits are billed at different rates depending on your insurance coverage.  Please reach out to your insurance provider with any questions.    If during the course of the call the physician/provider feels a video visit is not appropriate, you will not be charged for this service.\"    Patient has given verbal consent for Video visit? Yes  How would you like to obtain your AVS? MyChart  If you are dropped from the video visit, the video invite should be resent to: Send to e-mail at: [email or web address]  Will anyone else be joining your video visit? Yes: mom. How would they like to receive their invitation? Send to e-mail at: [email or web address]      Follow-up Virtual Visit for Neurogenic Bladder    Name: Boogie Villa    MRN: [id number]   YOB: 2000  Accompanied at today's visit by: mother               Chief Complaint:   Neurogenic Bladder          History of Present Illness:   HISTORY: Boogie Villa is a 20 year old male with a history of neurogenic bladder " "secondary to posterior urethral valves s/p Mitrofanoff creation in 2009. Patient is not wheelchair bound. He has CKD with worsening renal function over the past year, most recent Cr 5.42 (eGFR 14). He follows with nephrology - last visit 4/6/2020 and will be considered for a renal transplant should eGFR fall <20 ml/min (has an upcoming visit on 11/2/2020 to discuss further). He established care with Dr. Morton in adult urology clinic on 6/18/18 at which time he was doing well - noted to have bilateral hydronephrosis at that time which was felt to be chronic. I then saw him in annual follow up with his mother on 6/25/19 at which time Boogie reported that he was only catheterizing once every other day, otherwise voiding volitionally per urethra. At that time, a thorough discussion was had regarding the risks for untreated urinary retention (including more rapid renal deterioration), and he was recommended to increase his frequency of CIC to 4 times daily.     He returns today for annual follow up and is again accompanied by his mother on today's virtual visit. Boogie states that he has not been catheterizing at all. He cannot actually remember the last time he catheterized per Mitrofanoff. He is voiding volitionally per urethra roughly 5 times per day. Has infrequent incontinence per Mitrofanoff stoma when his bladder is very full (never leaks per urethra). He feels that his incontinence is worse when he takes Ritalin. He denies any gross hematuria, dysuria, or recent UTI's. He does not feel that he needs to catheterize because he believes he is emptying his bladder well on his own and he is having no bothersome urinary symptoms. He finds catheterizing to be \"irritating.\"    Per chart review, last UDS in 2009 at [facility]. Scanned tracings somewhat difficult to read, though it appears that max filling capacity was ~217 mL with Pdet 3 cm H2O at capacity, no leaks, and Valsalva voiding with PVR ~120 mL. "     The following subcategories were reviewed with the patient and updated accordingly. If no updates, this indicates no change since last visit:    Previous Bladder Surgeries:  Previous Bladder Augmentation: none  Catheterizable stoma: appendiceal Mitrofanoff in 2009. Revisions include: none   Anti-incontinence procedures: none  Botox injections: None    Pertinent Medications:  Current Anticholinergics: previously took oxybutynin 10 mg daily; stopped for unclear reason between 9712-3668  Current Prophylactic antibiotics: None  Intravesical gentamycin:  None  Intravesical oxybutinin: None    Catheterization History:  The patient previously catheterized per Mitrofanoff stoma with a 14F Coude catheter once every other day. Catheterization is performed by self. The patient uses a new catheter each time. He does not irrigate the bladder. He has been recommended to leave a catheter in the stoma overnight due to high outputs, but he has not been doing this as he finds it painful to sleep with a catheter in his channel. Overnight outputs have been as high as >1 L in the past.   Today, notes that he has not catheterized at all in many months.     Incontinence History:  He occasionally leaks between voids/caths when his bladder is very full.    Urinary Tract Infection History:  Treated with antibiotics for positive culture and non-specific symptoms: 0 times in the last year  Treated with antibiotics for positive culture plus symptoms of UTI: 0 times in the last year    Bowel Movement History:  The patient has a bowel movement q1 days. Bowel regimen includes none           Past Medical History:     Past Medical History:   Diagnosis Date     ADD (attention deficit disorder)      CKD (chronic kidney disease)      Posterior urethral valves             Past Surgical History:     Past Surgical History:   Procedure Laterality Date     ablation of posterior urethral valves  2000     ureteral reimplantation with tapering  2003             Social History:     Social History     Tobacco Use     Smoking status: Never Smoker     Smokeless tobacco: Never Used   Substance Use Topics     Alcohol use: Not on file            Family History:   No family history on file.           Allergies:     Allergies   Allergen Reactions     Dust Mites      Runny nose and watery eyes     Mold      Runny nose and watery eyes     Other [Seasonal Allergies]      Grass, Ragweed - gets runny nose and watery eyes            Medications:     Current Outpatient Medications   Medication Sig     amLODIPine (NORVASC) 5 MG tablet Take 1 tablet (5 mg) by mouth daily     atenolol (TENORMIN) 25 MG tablet Take 1.5 tablets (37.5 mg) by mouth daily     calcium carbonate (OSCAL 500) 1250 (500 Ca) MG TABS tablet daily      Cholecalciferol (VITAMIN D) 2000 units CAPS      ramipril (ALTACE) 1.25 MG capsule Take 2 capsules (2.5 mg) by mouth daily     sodium bicarbonate 650 MG tablet Take 4 tablets (2,600 mg) by mouth 3 times daily (Patient taking differently: Take 2,600 mg by mouth daily )     No current facility-administered medications for this visit.              Review of Systems:    ROS: 14 point ROS neg other than the symptoms noted above in the HPI and PMH.          Physical Exam:   GENERAL: Healthy, alert and no distress  EYES: Eyes grossly normal to inspection.  No discharge or erythema, or obvious scleral/conjunctival abnormalities.  RESP: No audible wheeze, cough, or visible cyanosis.  No visible retractions or increased work of breathing.    SKIN: Visible skin clear. No significant rash, abnormal pigmentation or lesions.  NEURO: Cranial nerves grossly intact.  Mentation and speech appropriate for age.  PSYCH: Mentation appears normal, affect normal/bright, judgement and insight intact, normal speech and appearance well-groomed.         Data:    Imaging:  EXAM: US KIDNEY BILATERAL WITH BLADDER  LOCATION: [facility]  DATE/TIME: 10/19/2020 3:27 PM    Result  Impression   1.  Mild to moderate right and left hydronephrosis.  2.  Hyperechoic renal parenchyma, which is consistent with medical renal disease.  3.  Atrophic left kidney.  4.  Wall thickening of the urinary bladder suggests cystitis.          Labs:  Cr   5.42   10/19/2020 (GFR 14)  Lab Results   Component Value Date    CR 4.45 08/18/2020    CR 4.45 08/18/2020    CR 3.58 02/26/2020    CR 3.80 11/25/2019    CR 3.59 05/13/2019    CR 3.33 12/04/2018    CR 2.51 06/08/2018    CR 2.40 01/16/2018    CR 2.39 08/09/2017    CR 2.38 02/10/2017            Assessment and Plan:   20 year old male with neurogenic bladder and CKD secondary to posterior urethral valves s/p Mitrofanoff creation in 2009. In June 2019, he reported catheterizing once every other day due to not remembering to cath despite reminder alarms set on his phone. Today, he reports that he has not catheterized in many months. He is instead voiding volitionally per urethra. Does not feel that he needs to cath as he believes he is emptying well on his own with no recent UTI's, minimal urinary incontinence, or other issues. Reviewed most recent UDS from 2009 which shows low filling pressures and Valsalva voiding with mild incomplete emptying ( mL). Also discussed how his incomplete bladder emptying may be contributing to more rapid renal deterioration. His GFR has now dropped below 20 so discussion for renal transplant will likely be taking place at upcoming appointment on 11/2/2020 per patient's mother. Further discussed that his bladder management needs to be optimized prior to renal transplant. To this end, would recommend repeat urodynamics to get a better assessment of current bladder dynamics and emptying ability. Would also like patient to visit with Dr. Morton following UDS. If catheterization needs to be re-initiated, may help patient to hear from another source as well.     -UDS then follow up with Dr. Morton to review.     Cris Thakur,  NAVARRO  October 20, 2020           Video-Visit Details    Type of service:  Video Visit    Video Start Time: 1:01 PM  Video End Time: 1:21 PM    Originating Location (pt. Location): Home    Distant Location (provider location):  [facility] UROLOGY [facility]     Platform used for Video Visit: Sulaiman Thakur PA-C

## 2020-10-20 NOTE — PROGRESS NOTES
"Boogie Villa is a 20 year old male who is being evaluated via a billable video visit.      The patient has been notified of following:     \"This video visit will be conducted via a call between you and your physician/provider. We have found that certain health care needs can be provided without the need for an in-person physical exam.  This service lets us provide the care you need with a video conversation.  If a prescription is necessary we can send it directly to your pharmacy.  If lab work is needed we can place an order for that and you can then stop by our lab to have the test done at a later time.    Video visits are billed at different rates depending on your insurance coverage.  Please reach out to your insurance provider with any questions.    If during the course of the call the physician/provider feels a video visit is not appropriate, you will not be charged for this service.\"    Patient has given verbal consent for Video visit? Yes  How would you like to obtain your AVS? MyChart  If you are dropped from the video visit, the video invite should be resent to: Send to e-mail at: [email or web address]  Will anyone else be joining your video visit? Yes: mom. How would they like to receive their invitation? Send to e-mail at: [email or web address]      Follow-up Virtual Visit for Neurogenic Bladder    Name: Boogie Villa    MRN: [id number]   YOB: 2000  Accompanied at today's visit by: mother               Chief Complaint:   Neurogenic Bladder          History of Present Illness:   HISTORY: Boogie Villa is a 20 year old male with a history of neurogenic bladder secondary to posterior urethral valves s/p Mitrofanoff creation in 2009. Patient is not wheelchair bound. He has CKD with worsening renal function over the past year, most recent Cr 5.42 (eGFR 14). He follows with nephrology - last visit 4/6/2020 and will be considered for a renal transplant should eGFR fall " "<20 ml/min (has an upcoming visit on 11/2/2020 to discuss further). He established care with Dr. Morton in adult urology clinic on 6/18/18 at which time he was doing well - noted to have bilateral hydronephrosis at that time which was felt to be chronic. I then saw him in annual follow up with his mother on 6/25/19 at which time Boogie reported that he was only catheterizing once every other day, otherwise voiding volitionally per urethra. At that time, a thorough discussion was had regarding the risks for untreated urinary retention (including more rapid renal deterioration), and he was recommended to increase his frequency of CIC to 4 times daily.     He returns today for annual follow up and is again accompanied by his mother on today's virtual visit. Boogie states that he has not been catheterizing at all. He cannot actually remember the last time he catheterized per Mitrofanoff. He is voiding volitionally per urethra roughly 5 times per day. Has infrequent incontinence per Mitrofanoff stoma when his bladder is very full (never leaks per urethra). He feels that his incontinence is worse when he takes Ritalin. He denies any gross hematuria, dysuria, or recent UTI's. He does not feel that he needs to catheterize because he believes he is emptying his bladder well on his own and he is having no bothersome urinary symptoms. He finds catheterizing to be \"irritating.\"    Per chart review, last UDS in 2009 at [facility]. Scanned tracings somewhat difficult to read, though it appears that max filling capacity was ~217 mL with Pdet 3 cm H2O at capacity, no leaks, and Valsalva voiding with PVR ~120 mL.     The following subcategories were reviewed with the patient and updated accordingly. If no updates, this indicates no change since last visit:    Previous Bladder Surgeries:  Previous Bladder Augmentation: none  Catheterizable stoma: appendiceal Mitrofanoff in 2009. Revisions include: none   Anti-incontinence " procedures: none  Botox injections: None    Pertinent Medications:  Current Anticholinergics: previously took oxybutynin 10 mg daily; stopped for unclear reason between 4129-2132  Current Prophylactic antibiotics: None  Intravesical gentamycin:  None  Intravesical oxybutinin: None    Catheterization History:  The patient previously catheterized per Mitrofanoff stoma with a 14F Coude catheter once every other day. Catheterization is performed by self. The patient uses a new catheter each time. He does not irrigate the bladder. He has been recommended to leave a catheter in the stoma overnight due to high outputs, but he has not been doing this as he finds it painful to sleep with a catheter in his channel. Overnight outputs have been as high as >1 L in the past.   Today, notes that he has not catheterized at all in many months.     Incontinence History:  He occasionally leaks between voids/caths when his bladder is very full.    Urinary Tract Infection History:  Treated with antibiotics for positive culture and non-specific symptoms: 0 times in the last year  Treated with antibiotics for positive culture plus symptoms of UTI: 0 times in the last year    Bowel Movement History:  The patient has a bowel movement q1 days. Bowel regimen includes none           Past Medical History:     Past Medical History:   Diagnosis Date     ADD (attention deficit disorder)      CKD (chronic kidney disease)      Posterior urethral valves             Past Surgical History:     Past Surgical History:   Procedure Laterality Date     ablation of posterior urethral valves  2000     ureteral reimplantation with tapering  2003            Social History:     Social History     Tobacco Use     Smoking status: Never Smoker     Smokeless tobacco: Never Used   Substance Use Topics     Alcohol use: Not on file            Family History:   No family history on file.           Allergies:     Allergies   Allergen Reactions     Dust Mites      Runny  nose and watery eyes     Mold      Runny nose and watery eyes     Other [Seasonal Allergies]      Grass, Ragweed - gets runny nose and watery eyes            Medications:     Current Outpatient Medications   Medication Sig     amLODIPine (NORVASC) 5 MG tablet Take 1 tablet (5 mg) by mouth daily     atenolol (TENORMIN) 25 MG tablet Take 1.5 tablets (37.5 mg) by mouth daily     calcium carbonate (OSCAL 500) 1250 (500 Ca) MG TABS tablet daily      Cholecalciferol (VITAMIN D) 2000 units CAPS      ramipril (ALTACE) 1.25 MG capsule Take 2 capsules (2.5 mg) by mouth daily     sodium bicarbonate 650 MG tablet Take 4 tablets (2,600 mg) by mouth 3 times daily (Patient taking differently: Take 2,600 mg by mouth daily )     No current facility-administered medications for this visit.              Review of Systems:    ROS: 14 point ROS neg other than the symptoms noted above in the HPI and PMH.          Physical Exam:   GENERAL: Healthy, alert and no distress  EYES: Eyes grossly normal to inspection.  No discharge or erythema, or obvious scleral/conjunctival abnormalities.  RESP: No audible wheeze, cough, or visible cyanosis.  No visible retractions or increased work of breathing.    SKIN: Visible skin clear. No significant rash, abnormal pigmentation or lesions.  NEURO: Cranial nerves grossly intact.  Mentation and speech appropriate for age.  PSYCH: Mentation appears normal, affect normal/bright, judgement and insight intact, normal speech and appearance well-groomed.         Data:    Imaging:  EXAM: US KIDNEY BILATERAL WITH BLADDER  LOCATION: [facility]  DATE/TIME: 10/19/2020 3:27 PM    Result Impression   1.  Mild to moderate right and left hydronephrosis.  2.  Hyperechoic renal parenchyma, which is consistent with medical renal disease.  3.  Atrophic left kidney.  4.  Wall thickening of the urinary bladder suggests cystitis.          Labs:  Cr   5.42   10/19/2020 (GFR 14)  Lab Results   Component  Value Date    CR 4.45 08/18/2020    CR 4.45 08/18/2020    CR 3.58 02/26/2020    CR 3.80 11/25/2019    CR 3.59 05/13/2019    CR 3.33 12/04/2018    CR 2.51 06/08/2018    CR 2.40 01/16/2018    CR 2.39 08/09/2017    CR 2.38 02/10/2017            Assessment and Plan:   20 year old male with neurogenic bladder and CKD secondary to posterior urethral valves s/p Mitrofanoff creation in 2009. In June 2019, he reported catheterizing once every other day due to not remembering to cath despite reminder alarms set on his phone. Today, he reports that he has not catheterized in many months. He is instead voiding volitionally per urethra. Does not feel that he needs to cath as he believes he is emptying well on his own with no recent UTI's, minimal urinary incontinence, or other issues. Reviewed most recent UDS from 2009 which shows low filling pressures and Valsalva voiding with mild incomplete emptying ( mL). Also discussed how his incomplete bladder emptying may be contributing to more rapid renal deterioration. His GFR has now dropped below 20 so discussion for renal transplant will likely be taking place at upcoming appointment on 11/2/2020 per patient's mother. Further discussed that his bladder management needs to be optimized prior to renal transplant. To this end, would recommend repeat urodynamics to get a better assessment of current bladder dynamics and emptying ability. Would also like patient to visit with Dr. Morton following UDS. If catheterization needs to be re-initiated, may help patient to hear from another source as well.     -UDS then follow up with Dr. Morton to review.     Cris Thakur PA-C  October 20, 2020           Video-Visit Details    Type of service:  Video Visit    Video Start Time: 1:01 PM  Video End Time: 1:21 PM    Originating Location (pt. Location): Home    Distant Location (provider location):  [facility] UROLOGY [facility]     Platform used for Video Visit:  Sulaiman Thakur PA-C

## 2020-10-20 NOTE — TELEPHONE ENCOUNTER
Mom trying to reach claire- She is avaialbe for a call today after 230 and then available tomorrow until 3pm

## 2020-10-20 NOTE — PATIENT INSTRUCTIONS
UROLOGY CLINIC VISIT PATIENT INSTRUCTIONS    Schedule urodynamics testing next available followed by a visit with Dr. Morton to review results (same day if possible).    URODYNAMIC TESTING      Where should I go for this test?  o The procedure is performed at:     [facility] [place] [facility]   [address] [place] [id number]   [address]    o If you have questions about your test, please call our nurse triage line at [phone], option #2. If you need to cancel or reschedule your test for any reason, please notify us as soon as possible.    o Please check in approximately 15 minutes prior to your procedure time.        What is urodynamic testing?   o Urodynamic testing refers to a group of tests used to assess bladder function by measuring various aspects of urine storage and emptying. The test takes about 75 minutes. For most patients, the test is not painful.       How should I get ready for this test?  o Please try to arrive with a comfortably full bladder if possible because you will be asked to try and urinate prior to your study.  o If you received a bladder diary, please complete this prior to your urodynamic test and bring it with you to your appointment. A bladder diary measures how much fluid you are drinking and how often and how much you are urinating. You can also record any urinary leakage that may have occurred and what you were doing when you leaked.    o If you have chronic constipation, please take stool softeners for two days before your test.      What happens during the test?  o You will first be asked to empty your bladder in a private restroom into a special toilet called a uroflow machine which measures the rate of urine flow.  o A nurse will then place a very small tube (called a catheter) into your bladder. This drains any urine left over after urinating and also measures the pressures inside of your bladder during your test. Another small  catheter will then be placed into your rectum to measure abdominal pressures. Two small sticky patches will be placed on the skin near your anus to measure pelvic floor function.   o We will then instill contrast dye into your bladder through the bladder catheter. The contrast is very dense and will allow us to take x-ray pictures of your bladder intermittently during your test.  o You will be asked to tell us when you first start to feel like your bladder is filling up, when you have moderate urgency to urinate, when you have very strong urgency to urinate and finally when you feel that your bladder is full. Once you feel full you will be asked to try and urinate.    o You may be asked to cough or bear down several times during your procedure. The provider running your study will be looking for urine leakage during this time.        What happens after the test?  o The provider running your urodynamic study will share the results of your test on the day of your procedure or very soon after.  o After your test, you may go about your day as normal. You may notice some blood in your urine for a couple of days which should clear up on its own. You may also feel a more urgent need to use the toilet or you may need to go more often - this is due to having a catheter placed and should resolve on its own in a few days.      If you have any issues, questions or concerns in the meantime, do not hesitate to contact us at [phone] or via [employer].     It was a pleasure meeting with you today.  Thank you for allowing me and my team the privilege of caring for you today.  YOU are the reason we are here, and I truly hope we provided you with the excellent service you deserve.  Please let us know if there is anything else we can do for you so that we can be sure you are leaving completely satisfied with your care experience.

## 2020-10-21 LAB
MISCELLANEOUS TEST DEPT. - HE HISTORICAL: NORMAL
PERFORMING LAB: NORMAL
SPECIMEN STATUS: NORMAL
TEST NAME: NORMAL

## 2020-10-22 VITALS — WEIGHT: 300 LBS | HEIGHT: 71 IN | BODY MASS INDEX: 42 KG/M2

## 2020-10-22 ASSESSMENT — MIFFLIN-ST. JEOR: SCORE: 2392.92

## 2020-10-22 NOTE — TELEPHONE ENCOUNTER
PCP: Maurice Bolivar   Referring Provider: JAKE Diaz  Referring Diagnosis: ESRD    Is patient under the age of 65? YES  Is patient diabetic? NO  Is patient on insulin? NO  Was patient offered a pancreas transplant referral? NO    Is patient in a group home/assisted living? NO  Does patient have a guardian? NO    Referral intake process completed.  Patient is aware that after financial approval is received, medical records will be requested.   Patient confirmed for a callback from transplant coordinator on 11/6/2020. (within 2 weeks)  Tentative evaluation date Not scheduled. (within 4 weeks)    Confirmed coordinator will discuss evaluation process in more detail at the time of their call.   Patient is aware of the need to arrange age appropriate cancer screening, vaccinations, and dental care.  Reminded patient to complete questionnaire, complete medical records release, and review packet prior to evaluation visit .  Assessed patient for special needs (ie--wheelchair, assistance, guardian, and ):  None   Patient instructed to call [phone] with questions.

## 2020-11-02 ENCOUNTER — VIRTUAL VISIT (OUTPATIENT)
Dept: NEPHROLOGY | Facility: CLINIC | Age: 20
End: 2020-11-02
Attending: INTERNAL MEDICINE
Payer: COMMERCIAL

## 2020-11-02 DIAGNOSIS — N18.4 CKD (CHRONIC KIDNEY DISEASE) STAGE 4, GFR 15-29 ML/MIN (H): ICD-10-CM

## 2020-11-02 DIAGNOSIS — N18.5 CKD (CHRONIC KIDNEY DISEASE) STAGE 5, GFR LESS THAN 15 ML/MIN (H): Primary | ICD-10-CM

## 2020-11-02 LAB
ALBUMIN SERPL-MCNC: 3.8 G/DL
ANION GAP SERPL CALCULATED.3IONS-SCNC: 12 MMOL/L
BUN SERPL-MCNC: 77 MG/DL
CALCIUM SERPL-MCNC: 9.3 MG/DL
CHLORIDE SERPLBLD-SCNC: 114 MMOL/L
CO2 SERPL-SCNC: 11 MMOL/L
CREAT SERPL-MCNC: 5.42 MG/DL
ERYTHROCYTE [DISTWIDTH] IN BLOOD BY AUTOMATED COUNT: 12.5 %
GFR SERPL CREATININE-BSD FRML MDRD: 14 ML/MIN/1.7
GLUCOSE SERPL-MCNC: 99 MG/DL (ref 70–99)
HCT VFR BLD AUTO: 41.6 %
HEMOGLOBIN: 13.2 G/DL (ref 13.3–17.7)
MCH RBC QN AUTO: 30.6 PG
MCHC RBC AUTO-ENTMCNC: 31.7 G/DL
MCV RBC AUTO: 96 FL
PHOSPHATE SERPL-MCNC: 4.8 MG/DL
PLATELET # BLD AUTO: 256 10^9/L
PMV BLD: 9.9 FL
POTASSIUM SERPL-SCNC: 4.8 MMOL/L
PTH, INTACT: 131
RBC # BLD AUTO: 4.32 10^12/L
SODIUM SERPL-SCNC: 137 MMOL/L
VITAMIN D TOTAL - HISTORICAL: 20.7
WBC # BLD AUTO: 7.1 10^9/L

## 2020-11-02 PROCEDURE — 99215 OFFICE O/P EST HI 40 MIN: CPT | Mod: 95 | Performed by: INTERNAL MEDICINE

## 2020-11-02 ASSESSMENT — PAIN SCALES - GENERAL: PAINLEVEL: NO PAIN (0)

## 2020-11-02 NOTE — TELEPHONE ENCOUNTER
Reviewed pt's chart for pre-evaluation planning for kidney transplant. 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 Michael Diaz's note from 4/6/20. Follows w/ urology regularly at our center for neurogenic bladder, last seen 10/20/20 by LINO Marie. Note indicated that the pt was not self-catheterizing at all as he finds it painful. Other medical problems are HTN, ADHD, and hx of recurrent Shingles (last note in [facility] on 2/11/13, was referred to ID, no documentation from ID found at this time). No heart or lung problems noted. No known history of smoking, alcohol, or recreational drug use. Current BMI 41.8. All ok for mini evaluation d/t BMI. Will plan to schedule pt for surgeon visit, nutrition, and weight-loss management only at this time.

## 2020-11-02 NOTE — LETTER
2020       RE: Boogie Villa  [address] [facility] [address]     Dear Colleague,    Thank you for referring your patient, Boogie Villa, to the [facility] [place] at [facility]. Please see a copy of my visit note below.      Nephrology Clinic - Video Visit    Name: Boogie Villa  MRN: [id number]  Age: 20 year old  : 2000  DOS: 2020  Referring provider: Edwin Palomo     Assessment and Plan:   1. 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  Baseline serum creatinine has significantly changed over the past year as kidney function has declined more rapidly.  Last serum Cr ~5.4 (eGFR of 14 ml/min) associated with significant albuminuria of 2.5 g/g (on low dose ramipril).  Decline seems more rapid than expected over the year and may be due to decreased self catheterization though he is adamant he is able to volitionally void and has had no urinary retention.   - Patient is asymptomatic and has no urgent need for dialysis  - Discussed RRT modalities and access planning but patient remains unsure about modality and would like to hold of on access placement  -Ideally, patient could undergo preemptive kidney transplantation and is expected to undergo evaluation soon as he could now be listed given that his eGFR is consistently < 20 ml/min  - continue RAAS blockade with ramipril now at 2.5 mg daily for management of persistent albuminuria and CKD though will consider discontinuing in the future should his kidney function continue to decline significantly  - He should continue to avoid NSAIDs  - If he becomes ill, he was instructed to hold his ramipril   - RTC in 6 weeks     2. Hypertension: He appears slightly above goal of <130/80.  - Continued on atenolol 37.5 mg, amlodipine 5 mg and ramipril 2.5 mg daily  - Will  continue to monitor home blood pressure and if persistently above goal while consider increasing amlodipine to 10 mg next (and potentially discontinuing ramipril if kidney function continues to significantly decline)     3. CKD-MBD: Corrected Ca and phos have been at goal.  PTH mildly elevated at 201 (Feb, 2020).  Vit D deficient in past but improving with last level of 24 micrograms/L (Feb 2020).   - Continue cholecalciferol at 2000 units daily  - No need for phos binders or activated vitamin D (e.g. calcitriol) at this time     4. Low bicarb: Bicarb low at 11 .  Presumed non-AG metabolic acidosis due to CKD.  He has not been taking sodium bicarb as initially prescribed and is taking 4 tabs daily instead of TID.      -encourage sodium bicarb tabs (650 mg) 4 tabs BID for now     5. Anemia: Mild and due to CKD. Hgb has been stable in the 12s. Iron stores adequate.   -no need for iron or EPO at this time      6. Seasonal Allergies: Patient dose not complain of persistent seasonal allergy related symptoms today. He feels that this is being well managed with Claritin. Instructed him that it is ok to take it daily for a short period of time if needed.  Otherwise, we discussed prescribing Singulair in the future if he continues to be symptomatic.     7. Obesity: Significant increase in weight during 2018. Patient and his mother inquire about the possible effect weight will have on a possible kidney transplant. I discussed the implications obesity has on chronic kidney disease, blood pressure control, and heart disease. Goal BMI for kidney transplant should be less than 35. If this becomes an issue, he may follow-up with our weight management clinic.       8. Urology: Followed by Dr. Melgar      Follow-up: No follow-ups on file.     Reason For Visit:   CKD follow-up.     HPI:   Boogie Villa is a 19 year old man with a history of posterior urethral valves with correction in the first year of life.  Recurrent episodes  "of urinary tract infection. Patient was previously followed by Dr. Palomo in pediatric nephrology for chronic kidney disease related to posterior ureteral valves. The only concern was medication compliance and he and his mother reported very partial (3-4 days a week) compliance in the past though has not been an issue over the past year.      Interval history:  The patient is doing well overall. He reports that he no longer is working at a job due to medical advice from our office to reduce potential exposure to COVID. This past summer, he continues to not catheterize himself regularly despite recommendations from Urology.  He reports that when he catheterizes himself he does not get much urine. He is expected to f/u Urology for more urodynamic testing.  He continues to take his medications regularly.  His home BP has been around 140/85 with pulse in the high 60's. He denies dyspnea, edema, and urinary retention.  He has attended the \"Kidney Smart\" class but is unsure which dialysis modality he would like if needed.  He is expected to undergo a transplant evaluation seen.         Review of Systems:   Pertinent items are noted in HPI or as below, remainder of complete ROS is negative.      Active Medications:     Current Outpatient Medications:      amLODIPine (NORVASC) 5 MG tablet, Take 1 tablet (5 mg) by mouth daily, Disp: 90 tablet, Rfl: 3     atenolol (TENORMIN) 25 MG tablet, Take 1.5 tablets (37.5 mg) by mouth daily, Disp: 135 tablet, Rfl: 3     calcium carbonate (OSCAL 500) 1250 (500 Ca) MG TABS tablet, daily , Disp: , Rfl:      Cholecalciferol (VITAMIN D) 2000 units CAPS, , Disp: , Rfl:      ramipril (ALTACE) 1.25 MG capsule, Take 2 capsules (2.5 mg) by mouth daily, Disp: 60 capsule, Rfl: 11     sodium bicarbonate 650 MG tablet, Take 4 tablets (2,600 mg) by mouth 3 times daily (Patient taking differently: Take 2,600 mg by mouth daily ), Disp: 180 tablet, Rfl: 3     Allergies:   Dust mites, Mold, and Other " [seasonal allergies]      Past Medical History:  Attention deficit disorder    Posterior urethral valves   Lymphangioma  Hypertension  Chronic kidney disease stage 4, GFR 15-29 ml/min (H)    Past Surgical History:  ablation of posterior urethral valves - 2000  ureteral reimplantation with tapering - 2003    Family History:   No family history on file.      Social History:   Presents to clinic alone.  Tobacco Use: Never smoker.  Alcohol Use: Not on file.  PCP: VALENTIN MEEKS    Physical Exam:  Not performed as encounter was a telephone visit.     Laboratory:  CMP  Recent Labs   Lab Test 10/19/20 08/18/20 02/26/20  1520 11/25/19  0859 05/13/19  0712 12/04/18  1054 06/08/18  1256 01/16/18  1102 08/09/17  1626 02/10/17  1004    138  138 140 140 144 142 148* 144 146*  --    POTASSIUM 4.8 4.6  4.6 4.4 4.9 4.3 4.6 4.8 5.0 5.0  --    CHLORIDE 114 114  114 112* 117* 116* 116* 121* 113* 120*  --    CO2 11 15  15 21 17* 20 17* 19* 25 14*  --    ANIONGAP 12 9  9 7 6 8 9 8 6 12  --    GLC 99 162*  162* 105* 94 114* 100* 87 79 97  --    BUN 77 50  50 56* 57* 54* 56* 47* 37* 50*  --    CR 5.42 4.45  4.45 3.58* 3.80* 3.59* 3.33* 2.51* 2.40* 2.39*  --    GFRESTIMATED  --  17 23* 22* 23* 24* 34* 36* 36*  --    GFRESTBLACK  --   --  27* 25* 27* 29* 41* 43* 43*  --    NABIL 9.3 9.0  9.0 8.2* 8.5 8.7* 8.8* 8.5* 8.6* 8.7*  --    MAG  --   --  1.7  --   --  1.9 1.6 1.8 1.9  --    PHOS 4.8 3.8  3.8 3.9 4.1 4.1 4.7* 4.6 4.0 3.8  --    PROTTOTAL  --   --   --   --   --  6.9 6.4*  --   --   --    ALBUMIN 3.8 3.3  3.3 3.3* 3.1* 3.2* 3.1* 2.8* 3.0* 3.4  --    BILITOTAL  --   --   --   --   --  0.2 0.2  --   --   --    ALKPHOS  --   --   --   --   --  87 74  --  91  --    AST  --   --   --   --   --  20 20  --   --  24   ALT  --   --   --   --   --  24 19  --   --  18     CBC  Recent Labs   Lab Test 10/19/20 08/18/20 02/26/20  1520 11/25/19  0859 05/13/19  0712   HGB 13.2* 12.4*  12.4* 12.3* 12.4* 12.7*   WBC 7.1 5.3  5.3 6.6   "--  5.8   RBC 4.32 4.04  4.04 3.97*  --  4.07*   HCT 41.6 36.8  36.8 36.6*  --  38.2*   MCV 96 91  91 92  --  94   MCH 30.6 30.7  30.7 31.0  --  31.2   MCHC 31.7 33.7  33.7 33.6  --  33.2   RDW 12.5 12.0  12.0. 12.1  --  12.5    237  237 250  --  232     URINE STUDIES  Recent Labs   Lab Test 05/13/19  0728 12/04/18  1056 06/08/18  1310 02/10/17  1004   COLOR Straw Straw Straw Straw   APPEARANCE Clear Clear Clear Clear   URINEGLC 50* Negative Negative Negative   URINEBILI Negative Negative Negative Negative   URINEKETONE Negative Negative Negative Negative   SG 1.008 1.006 1.006 1.004   UBLD Negative Trace* Negative Negative   URINEPH 7.0 6.5 6.5 6.5   PROTEIN >499* 100* 100* 100*   NITRITE Negative Negative Negative Negative   LEUKEST Negative Negative Negative Negative   RBCU <1 <1 <1 <1   WBCU <1 1 <1 2     Recent Labs   Lab Test 02/26/20  1530 11/25/19  0904 05/13/19  0728 12/04/18  1056 06/08/18  1310 02/10/17  1004 08/26/16  1150 02/19/16  1000   UTPG 4.31* 4.92* 4.31* 4.59* 6.69* 2.78* 2.45* 2.17*     PTH  Recent Labs   Lab Test 02/26/20  1520 11/25/19  0859 05/13/19  0712 12/04/18  1054 06/08/18  1256 01/16/18  1102 08/09/17  1626 02/10/17  1002 08/26/16  1245 02/19/16  0955   PTHI 201* 154* 110* 177* 96* 133* 66 39 47 42     IRON STUDIES   Recent Labs   Lab Test 02/26/20  1520 05/13/19  0712 06/08/18  1256 01/16/18  1102 08/09/17  1626 02/10/17  1002   IRON 66 71 87 116 93 54    269 257 290 284 297   IRONSAT 26 26 34 40 33 18   DAVID 133 109 82 86 100 113                      Boogie Villa is a 20 year old male who is being evaluated via a billable video visit.      The patient has been notified of following:     \"This video visit will be conducted via a call between you and your physician/provider. We have found that certain health care needs can be provided without the need for an in-person physical exam.  This service lets us provide the care you need with a video conversation.  If " "a prescription is necessary we can send it directly to your pharmacy.  If lab work is needed we can place an order for that and you can then stop by our lab to have the test done at a later time.    Video visits are billed at different rates depending on your insurance coverage.  Please reach out to your insurance provider with any questions.    If during the course of the call the physician/provider feels a video visit is not appropriate, you will not be charged for this service.\"    Patient has given verbal consent for Video visit? Yes  How would you like to obtain your AVS? Mail a copy  If you are dropped from the video visit, the video invite should be resent to: Send to e-mail at: [email or web address]  Will anyone else be joining your video visit? No        Video-Visit Details    Type of service:  Video Visit    Video Start Time: 10:35 AM  Video End Time: 11:27 AM    Originating Location (pt. Location): Home    Distant Location (provider location):  [facility] [place]     Platform used for Video Visit: Sunbeam    Total time spent was >40 minutes, and more than 50% of face to face time was spent in counseling and/or coordination of care regarding principles of multidisciplinary care, medication management, and chronic kidney disease education.  Michael Diaz MD  [phone]            Again, thank you for allowing me to participate in the care of your patient.      Sincerely,    Michael Diaz MD      "

## 2020-11-02 NOTE — PROGRESS NOTES
Nephrology Clinic - Video Visit    Name: Boogie Villa  MRN: [id number]  Age: 20 year old  : 2000  DOS: 2020  Referring provider: Edwin Palomo     Assessment and Plan:   1. 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  Baseline serum creatinine has significantly changed over the past year as kidney function has declined more rapidly.  Last serum Cr ~5.4 (eGFR of 14 ml/min) associated with significant albuminuria of 2.5 g/g (on low dose ramipril).  Decline seems more rapid than expected over the year and may be due to decreased self catheterization though he is adamant he is able to volitionally void and has had no urinary retention.   - Patient is asymptomatic and has no urgent need for dialysis  - Discussed RRT modalities and access planning but patient remains unsure about modality and would like to hold of on access placement  -Ideally, patient could undergo preemptive kidney transplantation and is expected to undergo evaluation soon as he could now be listed given that his eGFR is consistently < 20 ml/min  - continue RAAS blockade with ramipril now at 2.5 mg daily for management of persistent albuminuria and CKD though will consider discontinuing in the future should his kidney function continue to decline significantly  - He should continue to avoid NSAIDs  - If he becomes ill, he was instructed to hold his ramipril   - RTC in 6 weeks     2. Hypertension: He appears slightly above goal of <130/80.  - Continued on atenolol 37.5 mg, amlodipine 5 mg and ramipril 2.5 mg daily  - Will continue to monitor home blood pressure and if persistently above goal while consider increasing amlodipine to 10 mg next (and potentially discontinuing ramipril if kidney function continues to significantly decline)     3. CKD-MBD: Corrected Ca and phos have been at goal.  PTH mildly elevated at 201 (Feb,  2020).  Vit D deficient in past but improving with last level of 24 micrograms/L (Feb 2020).   - Continue cholecalciferol at 2000 units daily  - No need for phos binders or activated vitamin D (e.g. calcitriol) at this time     4. Low bicarb: Bicarb low at 11 .  Presumed non-AG metabolic acidosis due to CKD.  He has not been taking sodium bicarb as initially prescribed and is taking 4 tabs daily instead of TID.      -encourage sodium bicarb tabs (650 mg) 4 tabs BID for now     5. Anemia: Mild and due to CKD. Hgb has been stable in the 12s. Iron stores adequate.   -no need for iron or EPO at this time      6. Seasonal Allergies: Patient dose not complain of persistent seasonal allergy related symptoms today. He feels that this is being well managed with Claritin. Instructed him that it is ok to take it daily for a short period of time if needed.  Otherwise, we discussed prescribing Singulair in the future if he continues to be symptomatic.     7. Obesity: Significant increase in weight during 2018. Patient and his mother inquire about the possible effect weight will have on a possible kidney transplant. I discussed the implications obesity has on chronic kidney disease, blood pressure control, and heart disease. Goal BMI for kidney transplant should be less than 35. If this becomes an issue, he may follow-up with our weight management clinic.       8. Urology: Followed by Dr. Melgar      Follow-up: No follow-ups on file.     Reason For Visit:   CKD follow-up.     HPI:   Boogie Villa is a 19 year old man with a history of posterior urethral valves with correction in the first year of life.  Recurrent episodes of urinary tract infection. Patient was previously followed by Dr. Palomo in pediatric nephrology for chronic kidney disease related to posterior ureteral valves. The only concern was medication compliance and he and his mother reported very partial (3-4 days a week) compliance in the past though has not been  "an issue over the past year.      Interval history:  The patient is doing well overall. He reports that he no longer is working at a job due to medical advice from our office to reduce potential exposure to COVID. This past summer, he continues to not catheterize himself regularly despite recommendations from Urology.  He reports that when he catheterizes himself he does not get much urine. He is expected to f/u Urology for more urodynamic testing.  He continues to take his medications regularly.  His home BP has been around 140/85 with pulse in the high 60's. He denies dyspnea, edema, and urinary retention.  He has attended the \"Kidney Smart\" class but is unsure which dialysis modality he would like if needed.  He is expected to undergo a transplant evaluation seen.         Review of Systems:   Pertinent items are noted in HPI or as below, remainder of complete ROS is negative.      Active Medications:     Current Outpatient Medications:      amLODIPine (NORVASC) 5 MG tablet, Take 1 tablet (5 mg) by mouth daily, Disp: 90 tablet, Rfl: 3     atenolol (TENORMIN) 25 MG tablet, Take 1.5 tablets (37.5 mg) by mouth daily, Disp: 135 tablet, Rfl: 3     calcium carbonate (OSCAL 500) 1250 (500 Ca) MG TABS tablet, daily , Disp: , Rfl:      Cholecalciferol (VITAMIN D) 2000 units CAPS, , Disp: , Rfl:      ramipril (ALTACE) 1.25 MG capsule, Take 2 capsules (2.5 mg) by mouth daily, Disp: 60 capsule, Rfl: 11     sodium bicarbonate 650 MG tablet, Take 4 tablets (2,600 mg) by mouth 3 times daily (Patient taking differently: Take 2,600 mg by mouth daily ), Disp: 180 tablet, Rfl: 3     Allergies:   Dust mites, Mold, and Other [seasonal allergies]      Past Medical History:  Attention deficit disorder    Posterior urethral valves   Lymphangioma  Hypertension  Chronic kidney disease stage 4, GFR 15-29 ml/min (H)    Past Surgical History:  ablation of posterior urethral valves - 2000  ureteral reimplantation with tapering - 2003    Family " History:   No family history on file.      Social History:   Presents to clinic alone.  Tobacco Use: Never smoker.  Alcohol Use: Not on file.  PCP: VALENTIN MEEKS    Physical Exam:  Not performed as encounter was a telephone visit.     Laboratory:  CMP  Recent Labs   Lab Test 10/19/20 08/18/20 02/26/20  1520 11/25/19  0859 05/13/19  0712 12/04/18  1054 06/08/18  1256 01/16/18  1102 08/09/17  1626 02/10/17  1004    138  138 140 140 144 142 148* 144 146*  --    POTASSIUM 4.8 4.6  4.6 4.4 4.9 4.3 4.6 4.8 5.0 5.0  --    CHLORIDE 114 114  114 112* 117* 116* 116* 121* 113* 120*  --    CO2 11 15  15 21 17* 20 17* 19* 25 14*  --    ANIONGAP 12 9  9 7 6 8 9 8 6 12  --    GLC 99 162*  162* 105* 94 114* 100* 87 79 97  --    BUN 77 50  50 56* 57* 54* 56* 47* 37* 50*  --    CR 5.42 4.45  4.45 3.58* 3.80* 3.59* 3.33* 2.51* 2.40* 2.39*  --    GFRESTIMATED  --  17 23* 22* 23* 24* 34* 36* 36*  --    GFRESTBLACK  --   --  27* 25* 27* 29* 41* 43* 43*  --    NABIL 9.3 9.0  9.0 8.2* 8.5 8.7* 8.8* 8.5* 8.6* 8.7*  --    MAG  --   --  1.7  --   --  1.9 1.6 1.8 1.9  --    PHOS 4.8 3.8  3.8 3.9 4.1 4.1 4.7* 4.6 4.0 3.8  --    PROTTOTAL  --   --   --   --   --  6.9 6.4*  --   --   --    ALBUMIN 3.8 3.3  3.3 3.3* 3.1* 3.2* 3.1* 2.8* 3.0* 3.4  --    BILITOTAL  --   --   --   --   --  0.2 0.2  --   --   --    ALKPHOS  --   --   --   --   --  87 74  --  91  --    AST  --   --   --   --   --  20 20  --   --  24   ALT  --   --   --   --   --  24 19  --   --  18     CBC  Recent Labs   Lab Test 10/19/20 08/18/20 02/26/20  1520 11/25/19  0859 05/13/19  0712   HGB 13.2* 12.4*  12.4* 12.3* 12.4* 12.7*   WBC 7.1 5.3  5.3 6.6  --  5.8   RBC 4.32 4.04  4.04 3.97*  --  4.07*   HCT 41.6 36.8  36.8 36.6*  --  38.2*   MCV 96 91  91 92  --  94   MCH 30.6 30.7  30.7 31.0  --  31.2   MCHC 31.7 33.7  33.7 33.6  --  33.2   RDW 12.5 12.0  12.0. 12.1  --  12.5    237  237 250  --  232     URINE STUDIES  Recent Labs   Lab Test  "05/13/19  0728 12/04/18  1056 06/08/18  1310 02/10/17  1004   COLOR Straw Straw Straw Straw   APPEARANCE Clear Clear Clear Clear   URINEGLC 50* Negative Negative Negative   URINEBILI Negative Negative Negative Negative   URINEKETONE Negative Negative Negative Negative   SG 1.008 1.006 1.006 1.004   UBLD Negative Trace* Negative Negative   URINEPH 7.0 6.5 6.5 6.5   PROTEIN >499* 100* 100* 100*   NITRITE Negative Negative Negative Negative   LEUKEST Negative Negative Negative Negative   RBCU <1 <1 <1 <1   WBCU <1 1 <1 2     Recent Labs   Lab Test 02/26/20  1530 11/25/19  0904 05/13/19  0728 12/04/18  1056 06/08/18  1310 02/10/17  1004 08/26/16  1150 02/19/16  1000   UTPG 4.31* 4.92* 4.31* 4.59* 6.69* 2.78* 2.45* 2.17*     PTH  Recent Labs   Lab Test 02/26/20  1520 11/25/19  0859 05/13/19  0712 12/04/18  1054 06/08/18  1256 01/16/18  1102 08/09/17  1626 02/10/17  1002 08/26/16  1245 02/19/16  0955   PTHI 201* 154* 110* 177* 96* 133* 66 39 47 42     IRON STUDIES   Recent Labs   Lab Test 02/26/20  1520 05/13/19  0712 06/08/18  1256 01/16/18  1102 08/09/17  1626 02/10/17  1002   IRON 66 71 87 116 93 54    269 257 290 284 297   IRONSAT 26 26 34 40 33 18   DAVID 133 109 82 86 100 113                      Boogie Villa is a 20 year old male who is being evaluated via a billable video visit.      The patient has been notified of following:     \"This video visit will be conducted via a call between you and your physician/provider. We have found that certain health care needs can be provided without the need for an in-person physical exam.  This service lets us provide the care you need with a video conversation.  If a prescription is necessary we can send it directly to your pharmacy.  If lab work is needed we can place an order for that and you can then stop by our lab to have the test done at a later time.    Video visits are billed at different rates depending on your insurance coverage.  Please reach out to your " "insurance provider with any questions.    If during the course of the call the physician/provider feels a video visit is not appropriate, you will not be charged for this service.\"    Patient has given verbal consent for Video visit? Yes  How would you like to obtain your AVS? Mail a copy  If you are dropped from the video visit, the video invite should be resent to: Send to e-mail at: [email or web address]  Will anyone else be joining your video visit? No        Video-Visit Details    Type of service:  Video Visit    Video Start Time: 10:35 AM  Video End Time: 11:27 AM    Originating Location (pt. Location): Home    Distant Location (provider location):  [facility] NEPHROLOGY [facility]     Platform used for Video Visit: MEDOVENT    Total time spent was >40 minutes, and more than 50% of face to face time was spent in counseling and/or coordination of care regarding principles of multidisciplinary care, medication management, and chronic kidney disease education.  Michael Diaz MD  [phone]        "

## 2020-11-16 ENCOUNTER — PRE VISIT (OUTPATIENT)
Dept: UROLOGY | Facility: CLINIC | Age: 20
End: 2020-11-16

## 2020-11-16 ENCOUNTER — HEALTH MAINTENANCE LETTER (OUTPATIENT)
Age: 20
End: 2020-11-16

## 2020-11-17 DIAGNOSIS — E87.20 METABOLIC ACIDOSIS: Primary | ICD-10-CM

## 2020-11-19 RX ORDER — SODIUM BICARBONATE 650 MG/1
TABLET ORAL
Qty: 180 TABLET | Refills: 23 | Status: SHIPPED | OUTPATIENT
Start: 2020-11-19 | End: 2021-11-01

## 2020-11-27 DIAGNOSIS — N18.4 CKD (CHRONIC KIDNEY DISEASE) STAGE 4, GFR 15-29 ML/MIN (H): Primary | ICD-10-CM

## 2020-12-03 ENCOUNTER — ANCILLARY PROCEDURE (OUTPATIENT)
Dept: RADIOLOGY | Facility: AMBULATORY SURGERY CENTER | Age: 20
End: 2020-12-03
Attending: PHYSICIAN ASSISTANT
Payer: COMMERCIAL

## 2020-12-03 ENCOUNTER — OFFICE VISIT (OUTPATIENT)
Dept: UROLOGY | Facility: CLINIC | Age: 20
End: 2020-12-03
Payer: COMMERCIAL

## 2020-12-03 VITALS — DIASTOLIC BLOOD PRESSURE: 82 MMHG | SYSTOLIC BLOOD PRESSURE: 162 MMHG | HEART RATE: 81 BPM

## 2020-12-03 DIAGNOSIS — N31.9 NEUROGENIC BLADDER: Primary | ICD-10-CM

## 2020-12-03 PROBLEM — F41.9 ANXIETY: Status: ACTIVE | Noted: 2017-12-11

## 2020-12-03 PROBLEM — F90.9 ADHD (ATTENTION DEFICIT HYPERACTIVITY DISORDER): Status: ACTIVE | Noted: 2020-12-03

## 2020-12-03 PROBLEM — N18.9 CHRONIC RENAL DISEASE: Status: ACTIVE | Noted: 2020-12-03

## 2020-12-03 LAB
ALBUMIN UR-MCNC: >300 MG/DL
APPEARANCE UR: CLEAR
BILIRUB UR QL STRIP: NEGATIVE
COLOR UR AUTO: YELLOW
GLUCOSE UR STRIP-MCNC: 100 MG/DL
HGB UR QL STRIP: ABNORMAL
KETONES UR STRIP-MCNC: NEGATIVE MG/DL
LEUKOCYTE ESTERASE UR QL STRIP: NEGATIVE
NITRATE UR QL: NEGATIVE
PH UR STRIP: 7 PH (ref 5–7)
SP GR UR STRIP: 1.02 (ref 1–1.03)
UROBILINOGEN UR STRIP-ACNC: 0.2 EU/DL (ref 0.2–1)

## 2020-12-03 PROCEDURE — 51600 INJECTION FOR BLADDER X-RAY: CPT | Mod: 51 | Performed by: PHYSICIAN ASSISTANT

## 2020-12-03 PROCEDURE — 81003 URINALYSIS AUTO W/O SCOPE: CPT | Performed by: PHYSICIAN ASSISTANT

## 2020-12-03 PROCEDURE — 74430 CONTRAST X-RAY BLADDER: CPT | Performed by: PHYSICIAN ASSISTANT

## 2020-12-03 PROCEDURE — 51741 ELECTRO-UROFLOWMETRY FIRST: CPT | Mod: 51 | Performed by: PHYSICIAN ASSISTANT

## 2020-12-03 PROCEDURE — 51784 ANAL/URINARY MUSCLE STUDY: CPT | Mod: 51 | Performed by: PHYSICIAN ASSISTANT

## 2020-12-03 PROCEDURE — 51797 INTRAABDOMINAL PRESSURE TEST: CPT | Performed by: PHYSICIAN ASSISTANT

## 2020-12-03 PROCEDURE — 51728 CYSTOMETROGRAM W/VP: CPT | Performed by: PHYSICIAN ASSISTANT

## 2020-12-03 RX ORDER — CEPHALEXIN 500 MG/1
500 CAPSULE ORAL ONCE
Status: COMPLETED | OUTPATIENT
Start: 2020-12-03 | End: 2020-12-03

## 2020-12-03 RX ORDER — METHYLPHENIDATE HYDROCHLORIDE 20 MG/1
20 TABLET ORAL PRN
COMMUNITY
Start: 2020-02-16 | End: 2024-07-01

## 2020-12-03 RX ADMIN — CEPHALEXIN 500 MG: 500 CAPSULE ORAL at 16:05

## 2020-12-03 ASSESSMENT — PAIN SCALES - GENERAL: PAINLEVEL: NO PAIN (0)

## 2020-12-03 NOTE — LETTER
12/3/2020       RE: Boogie Villa  [address] [address]     Dear Colleague,    Thank you for referring your patient, Boogie Villa, to the [facility] [place] at [facility]. Please see a copy of my visit note below.    PREPROCEDURE DIAGNOSES:    1. Neurogenic bladder secondary to posterior urethral valves s/p resection, ureteral reimplantation, and Mitrofanoff  2. CKD, stage 5    POSTPROCEDURE DIAGNOSES:  -Normal bladder capacity (615 mL) with normal filling sensations.  -Excellent bladder compliance without DO/DOI or LISA.  -Good detrusor contraction during voiding to max Pdet 42 cm H2O, which he augments with mild abdominal straining, particularly toward the end of voiding.   -Good flow rate (Qmax 38 mL/s) with a bell-shape flow curve and complete bladder emptying (final PVR 0 mL).  -Quiet EMG activity during voiding.  -BOOI is -34.4 which is not suggestive for bladder outlet obstruction.  -Fluoroscopy reveals a mildly trabeculated bladder wall without diveritculi or VUR. The bladder neck is closed during filling. Unable to capture voiding images as patient stood to void.     PROCEDURE:    1. Uroflowmetry.  2. Sterile urethral catheterization for measurement of postvoid residual urine volume.  3. Complex filling cystometrogram with measurement of bladder and rectal pressures.  4. Complex voiding cystometrogram with measurement of bladder and rectal pressures.  5. Electromyography of the pelvic floor during urodynamics.  6. Fluoroscopic imaging of the bladder during urodynamics, at least 3 views.    7. Interpretation of urodynamics and flouroscopic imaging.      INDICATIONS FOR PROCEDURE:  Mr. Boogie Villa is a pleasant 20 year old male with neurogenic bladder secondary to posterior urethral valves s/p resection, ureteral reimplantation, and Mitrofanoff. He also has CKD stage 5 with more rapid kidney function decline over the  past year than expected, possibly secondary to not catheterizing at all for the past 1+ years as he feels to be emptying well on his own per urethra. Video urodynamic assessment is requested today to better characterize Mr. Boogie Villa's voiding dysfunction.      VOIDING DIARY:  Patient did not complete.     DESCRIPTION OF PROCEDURE:  Risks, benefits, and alternatives to urodynamics were discussed with the patient and he wished to proceed.  Urodynamics are planned to better assess the primary etiology for Mr. Villa's urologic dysfunction.  The patient does not currently take anticholinergic medications for his bladder.  After informed consent was obtained, the patient was taken to the procedure room where uroflowmetry was performed. Findings below.     PRE-STUDY UROFLOWMETRY:  Voided volume: 496 mL.  Maximum flow rate: 35.6 mL/sec.  Average flow rate: 18.6 mL/sec.  Character of the curve: bell-shape.  Postvoid residual by catheter: 140 mL.  Pretest urine dipstick was negative for leukocytes and nitrites.    Next a 7F double-lumen urodynamics catheter was inserted into the bladder under sterile technique via Mitrofanoff.  A 7F abdominal manometry catheter was placed in the rectum.  EMG pads were placed on both sides of the anal verge.  The bladder was filled with 200 mL of Iohexol at 50 mL/minute and serial pressures were recorded.  With coughing there was an appropriate rise in vesical and abdominal pressures with no change in detrusor pressure, confirming good study catheter placement.    DURING THE FILLING PHASE:  First sensation: 196 mL.  First Desire: 333 mL.  Strong Desire: 464 mL.  Maximum Capacity: filled to 541 mL; true alf 615 mL based on final voided volume + PVR volume.    Uninhibited detrusor contractions: none.  Compliance: excellent. PDet=2.8 cmH20 at capacity.   Continence: no DOI or LISA.  EMG: concordant during filling.    DURING THE VOIDING PHASE:  Maximum detrusor contraction with void: 42  cm of H2O pressure, which he augments with mild abdominal straining, particularly toward the end of voiding.   Voided volume: 614 mL.  Maximum flow rate: 38 mL/sec.  Average flow rate: 15 mL/sec.  Postvoid Residual: 0 mL.  EMG activity: quiet.  Character of voiding curve: bell-shape.  BOOI: -34.4 (suggesting no obstruction - see key below)  [obstructed (DESHPANDE index [BOOI] ? 40); equivocal (no definite   obstruction; BOOI 20-40); and no obstruction (BOOI ? 20)]      FLUOROSCOPIC IMAGING OF THE BLADDER DURING URODYNAMICS:  Please note, image numbers on UDS tracings correlate with iSite series numbers on PACS images. Fluoroscopy during today's procedure demonstrated a mildly trabeculated bladder wall without diverticulae or cellules.  No vesicoureteral reflux was observed.  The bladder neck was closed during filling. Unable to capture voiding images as patient stood to void.  After voiding to completion, all catheters were removed and the patient was brought back into the consultation room to further discuss today's study results.      ASSESSMENT/PLAN:  Mr. Boogie Villa is a pleasant 20 year old male with neurogenic bladder who demonstrated the following findings today on urodynamic evaluation:    -Normal bladder capacity (615 mL) with normal filling sensations.  -Excellent bladder compliance without DO/DOI or LISA.  -Good detrusor contraction during voiding to max Pdet 42 cm H2O, which he augments with mild abdominal straining, particularly toward the end of voiding.   -Good flow rate (Qmax 38 mL/s) with a bell-shape flow curve and complete bladder emptying (final PVR 0 mL).  -Quiet EMG activity during voiding.  -BOOI is -34.4 which is not suggestive for bladder outlet obstruction.  -Fluoroscopy reveals a mildly trabeculated bladder wall without diveritculi or VUR. The bladder neck is closed during filling. Unable to capture voiding images as patient stood to void.     The patient will follow up as scheduled with  Dr. Morton to further discuss today's study results and make plans for how best to proceed.      - A single cephalexin was provided for UTI prophylaxis following completion of today's study per department protocol.  The risk of UTI with VUDS is low at ~2.5-3%.      Thank you for allowing me to participate in the care of Mr. Boogie Villa and please don't hesitate to contact me with any questions or concerns.      Cris Thakur PA-C  Urology Physician Assistant

## 2020-12-03 NOTE — PATIENT INSTRUCTIONS
Follow up with Dr. Morton to discuss the results of your Urodynamics Study, and further treatment options.    It was a pleasure meeting with you today.  Thank you for allowing me and my team the privilege of caring for you today.  YOU are the reason we are here, and I truly hope we provided you with the excellent service you deserve.  Please let us know if there is anything else we can do for you so that we can be sure you are leaving completely satisfied with your care experience.        Lilia Bo, CMA

## 2020-12-03 NOTE — NURSING NOTE
Chief Complaint   Patient presents with     Urodynamics Study         Patient Active Problem List   Diagnosis     Lymphangioma     HTN (hypertension)     CKD (chronic kidney disease) stage 4, GFR 15-29 ml/min (H)     ADHD (attention deficit hyperactivity disorder)     Allergic rhinitis     Allergic rhinitis due to pollen     Anxiety     Congenital posterior urethral valves     Decreased creatinine clearance     Iron deficiency     Neurogenic bladder     Obesity     Sensorineural hearing loss, asymmetrical     Chronic renal disease     Hypertension       Allergies   Allergen Reactions     Dust Mites      Runny nose and watery eyes     Mold      Runny nose and watery eyes     Other [Seasonal Allergies]      Grass, Ragweed - gets runny nose and watery eyes       Current Outpatient Medications   Medication Sig Dispense Refill     methylphenidate (RITALIN) 10 MG tablet Take 25 mg by mouth       amLODIPine (NORVASC) 5 MG tablet Take 1 tablet (5 mg) by mouth daily 90 tablet 3     atenolol (TENORMIN) 25 MG tablet Take 1.5 tablets (37.5 mg) by mouth daily 135 tablet 3     calcium carbonate (OSCAL 500) 1250 (500 Ca) MG TABS tablet daily        Cholecalciferol (VITAMIN D) 2000 units CAPS        ramipril (ALTACE) 1.25 MG capsule Take 2 capsules (2.5 mg) by mouth daily 60 capsule 11     sodium bicarbonate 650 MG tablet TAKE 4 TABLETS (2,600 MG) THREE TIMES A  tablet 23       Social History     Tobacco Use     Smoking status: Never Smoker     Smokeless tobacco: Never Used   Substance Use Topics     Alcohol use: Not on file     Drug use: Not on file       Invasive Procedure Safety Checklist:    Procedure: Urodynamics    Action: Complete sections and checkboxes as appropriate.  Pre-procedure:  1. Patient ID Verified with 2 identifiers (Genesis and  or MRN) : YES    2. Procedure and site verified with patient/designee (when able) : YES    3. Accurate consent documentation in medical record : YES    4. H&P (or appropriate  assessment) documented in medical record : N/A  H&P must be up to 30 days prior to procedure an updated within 24 hours of Procedure as applicable.     5. Relevant diagnostic and radiology test results appropriately labeled and displayed as applicable : YES    6. Blood products, implants, devices, and/or special equipment available for the procedure as applicable : YES    7. Procedure site(s) marked with provider initials [Exclusions: none] : NO    8. Marking not required. Reason : Yes  Procedure does not require site marking    Time Out:     Time-Out performed immediately prior to starting procedure, including verbal and active participation of all team members addressing: YES    1. Correct patient identity.  2. Confirmed that the correct side and site are marked.  3. An accurate procedure to be done.  4. Agreement on the procedure to be done.  5. Correct patient position.  6. Relevant images and results are properly labeled and appropriately displayed.  7. The need to administer antibiotics or fluids for irrigation purposes during the procedure as applicable.  8. Safety precautions based on patient history or medication use.    During Procedure: Verification of correct person, site, and procedure occurs any time the responsibility for care of the patient is transferred to another member of the care team.    The following medication was given:    MEDICATION:  Keflex  ROUTE: PO  SITE: Orally  DOSE: 500 mg  LOT #: [id number]  : [employer]  EXPIRATION DATE: 12/2021  NDC#: [id number]   Was there drug waste? No    Prior to administration, verified patient identity using patient's name and date of birth.  Due to administration, patient instructed to remain in clinic for 15 minutes  afterwards, and to report any adverse reaction to me immediately.    Drug Amount Wasted:  None.  Vial/Syringe: Single dose vial      The following medication was given:     MEDICATION:  Omnipaque (Iohexol Injection)  (240mgI/mL)  ROUTE: Provider Administered  SITE: Provider Administered via catheter  DOSE: 200mL  LOT #: [id number]  : [employer]  EXPIRATION DATE: 2/15/2023  NDC#: [address]-56   Was there drug waste? No      Lilia Bo CMA  12/3/2020  3:00 PM

## 2020-12-03 NOTE — PROGRESS NOTES
PREPROCEDURE DIAGNOSES:    1. Neurogenic bladder secondary to posterior urethral valves s/p resection, ureteral reimplantation, and Mitrofanoff  2. CKD, stage 5    POSTPROCEDURE DIAGNOSES:  -Normal bladder capacity (615 mL) with normal filling sensations.  -Excellent bladder compliance without DO/DOI or LISA.  -Good detrusor contraction during voiding to max Pdet 42 cm H2O, which he augments with mild abdominal straining, particularly toward the end of voiding.   -Good flow rate (Qmax 38 mL/s) with a bell-shape flow curve and complete bladder emptying (final PVR 0 mL).  -Quiet EMG activity during voiding.  -BOOI is -34.4 which is not suggestive for bladder outlet obstruction.  -Fluoroscopy reveals a mildly trabeculated bladder wall without diveritculi or VUR. The bladder neck is closed during filling. Unable to capture voiding images as patient stood to void.     PROCEDURE:    1. Uroflowmetry.  2. Sterile urethral catheterization for measurement of postvoid residual urine volume.  3. Complex filling cystometrogram with measurement of bladder and rectal pressures.  4. Complex voiding cystometrogram with measurement of bladder and rectal pressures.  5. Electromyography of the pelvic floor during urodynamics.  6. Fluoroscopic imaging of the bladder during urodynamics, at least 3 views.    7. Interpretation of urodynamics and flouroscopic imaging.      INDICATIONS FOR PROCEDURE:  Mr. Boogie Villa is a pleasant 20 year old male with neurogenic bladder secondary to posterior urethral valves s/p resection, ureteral reimplantation, and Mitrofanoff. He also has CKD stage 5 with more rapid kidney function decline over the past year than expected, possibly secondary to not catheterizing at all for the past 1+ years as he feels to be emptying well on his own per urethra. Video urodynamic assessment is requested today to better characterize Mr. Boogie Villa's voiding dysfunction.      VOIDING DIARY:  Patient did not  complete.     DESCRIPTION OF PROCEDURE:  Risks, benefits, and alternatives to urodynamics were discussed with the patient and he wished to proceed.  Urodynamics are planned to better assess the primary etiology for Mr. Villa's urologic dysfunction.  The patient does not currently take anticholinergic medications for his bladder.  After informed consent was obtained, the patient was taken to the procedure room where uroflowmetry was performed. Findings below.     PRE-STUDY UROFLOWMETRY:  Voided volume: 496 mL.  Maximum flow rate: 35.6 mL/sec.  Average flow rate: 18.6 mL/sec.  Character of the curve: bell-shape.  Postvoid residual by catheter: 140 mL.  Pretest urine dipstick was negative for leukocytes and nitrites.    Next a 7F double-lumen urodynamics catheter was inserted into the bladder under sterile technique via Mitrofanoff.  A 7F abdominal manometry catheter was placed in the rectum.  EMG pads were placed on both sides of the anal verge.  The bladder was filled with 200 mL of Iohexol at 50 mL/minute and serial pressures were recorded.  With coughing there was an appropriate rise in vesical and abdominal pressures with no change in detrusor pressure, confirming good study catheter placement.    DURING THE FILLING PHASE:  First sensation: 196 mL.  First Desire: 333 mL.  Strong Desire: 464 mL.  Maximum Capacity: filled to 541 mL; true senior care 615 mL based on final voided volume + PVR volume.    Uninhibited detrusor contractions: none.  Compliance: excellent. PDet=2.8 cmH20 at capacity.   Continence: no DOI or LISA.  EMG: concordant during filling.    DURING THE VOIDING PHASE:  Maximum detrusor contraction with void: 42 cm of H2O pressure, which he augments with mild abdominal straining, particularly toward the end of voiding.   Voided volume: 614 mL.  Maximum flow rate: 38 mL/sec.  Average flow rate: 15 mL/sec.  Postvoid Residual: 0 mL.  EMG activity: quiet.  Character of voiding curve: bell-shape.  BOOI: -34.4  (suggesting no obstruction - see key below)  [obstructed (DESHPANDE index [BOOI] ? 40); equivocal (no definite   obstruction; BOOI 20-40); and no obstruction (BOOI ? 20)]      FLUOROSCOPIC IMAGING OF THE BLADDER DURING URODYNAMICS:  Please note, image numbers on UDS tracings correlate with iSite series numbers on PACS images. Fluoroscopy during today's procedure demonstrated a mildly trabeculated bladder wall without diverticulae or cellules.  No vesicoureteral reflux was observed.  The bladder neck was closed during filling. Unable to capture voiding images as patient stood to void.  After voiding to completion, all catheters were removed and the patient was brought back into the consultation room to further discuss today's study results.      ASSESSMENT/PLAN:  Mr. Boogie Villa is a pleasant 20 year old male with neurogenic bladder who demonstrated the following findings today on urodynamic evaluation:    -Normal bladder capacity (615 mL) with normal filling sensations.  -Excellent bladder compliance without DO/DOI or LISA.  -Good detrusor contraction during voiding to max Pdet 42 cm H2O, which he augments with mild abdominal straining, particularly toward the end of voiding.   -Good flow rate (Qmax 38 mL/s) with a bell-shape flow curve and complete bladder emptying (final PVR 0 mL).  -Quiet EMG activity during voiding.  -BOOI is -34.4 which is not suggestive for bladder outlet obstruction.  -Fluoroscopy reveals a mildly trabeculated bladder wall without diveritculi or VUR. The bladder neck is closed during filling. Unable to capture voiding images as patient stood to void.     The patient will follow up as scheduled with Dr. Morton to further discuss today's study results and make plans for how best to proceed.      - A single cephalexin was provided for UTI prophylaxis following completion of today's study per department protocol.  The risk of UTI with VUDS is low at ~2.5-3%.      Thank you for allowing me to  participate in the care of Mr. Boogie Villa and please don't hesitate to contact me with any questions or concerns.      LIGIA BrayC  Urology Physician Assistant

## 2020-12-07 ENCOUNTER — PRE VISIT (OUTPATIENT)
Dept: UROLOGY | Facility: CLINIC | Age: 20
End: 2020-12-07

## 2020-12-07 NOTE — TELEPHONE ENCOUNTER
Reason for visit: review UDS    Relevant information: Neurogenic bladder, referred by Cris Thakur PA-C    Records/imaging/labs/orders: all records available    Pt called: no need for a call

## 2020-12-08 NOTE — TELEPHONE ENCOUNTER
Spoke with Mom today at length today. Reviewed the pre kidney transplant evaluation process. Confirmed with Mom ( Darcy ) that patient's height is 5 foot 11 inches and weight of 300 pounds to equal BMI of 41.8. Explained our upper BMI limit is generally at 35, but tx surgeon will decide at appt exactly how much weight loss is required for transplant.  Confirmed with Mom that pt is not yet on dialysis. Explained to Mom that we will start with tx surg, tx nutritionist and Weight Loss Management appts now. Explained standardly once BMI is down to 38 would proceed with remainder of eval then. Suggested that pt be seen in person with Mom in attendance for these because of patient's mental health issues and that Mom is a strong advocate for patient. Explained a  will call her soon to make these appts. Mom expressed very good understanding of all and was in good agreement with the plan.     Smart set orders into Epic for tx surg, tx nutritionist, Weight Loss Management - routed to .

## 2020-12-11 NOTE — TELEPHONE ENCOUNTER
REFERRAL INFORMATION:    Referring Provider:  Yani Torrez PA-C    Referring Clinic:  [facility]     Reason for Visit/Diagnosis: Pre Kidney, BMI 41.8, Weight Mgmt        FUTURE VISIT INFORMATION:    Appointment Date: 12/21/2020    Appointment Time: 2 PM      NOTES RECORD STATUS  DETAILS   OFFICE NOTE from Referring Provider N/A    OFFICE NOTE from Other Specialists N/A    HOSPITAL DISCHARGE SUMMARY/ ED VISITS  N/A    OPERATIVE REPORT N/A    ENDOSCOPY (EGD)  N/A    PERTINENT LABS Internal/ Care Everywhere    PATHOLOGY REPORTS (RELATED) N/A    IMAGING (CT, MRI, US, XR)  N/A

## 2020-12-14 ENCOUNTER — VIRTUAL VISIT (OUTPATIENT)
Dept: UROLOGY | Facility: CLINIC | Age: 20
End: 2020-12-14
Payer: COMMERCIAL

## 2020-12-14 DIAGNOSIS — Q64.2 POSTERIOR URETHRAL VALVES: ICD-10-CM

## 2020-12-14 DIAGNOSIS — N18.4 CKD (CHRONIC KIDNEY DISEASE) STAGE 4, GFR 15-29 ML/MIN (H): Primary | ICD-10-CM

## 2020-12-14 PROCEDURE — 99213 OFFICE O/P EST LOW 20 MIN: CPT | Mod: 95 | Performed by: UROLOGY

## 2020-12-14 NOTE — PROGRESS NOTES
"Boogie is a 20-year-old man with posterior urethral valves and end-stage renal disease.  He previously did self intermittent catheterization per urethra during the day and left indwelling catheter overnight for high output kidney failure.  However, he has not done that for years.  When I last saw him his creatinine was 2.5 about 2 years ago and now it is 5.  He is currently being considered for renal transplant.    He has been voiding on his own.  He has no urinary tract infections.  He has no blood in the urine.    Video urodynamics were recently done by my colleague Cris.  These demonstrate normal pressures during filling and good emptying of the bladder with some detrusor contraction augmented by Valsalva maneuvers.  There is no visible reflux of contrast up the ureters.    Based on these findings, Boogie would be a good candidate for renal transplant without any further changes to his bladder management.  He can continue with spontaneous voiding without any catheterization.  The only thing I would recommend is to repeat the urodynamics about 3 to 6 months after the kidney transplant to make sure that nothing has changed.  He agrees to this.    While he does not use the Mitrofanoff any longer, it would be ideal to place a catheter in the Mitrofanoff at the time of the transplant so as not to accidentally injure the Mitrofanoff which could lead to postoperative complication such as abscess or fistula.    I spent 20 minutes with the patient and his mother with greater than half time in consultation and coordination of care.    Video Visit Technology for this patient: Slidely Video Visit- Patient was left in waiting room    Boogie Villa is a 20 year old male who is being evaluated via a billable video visit.      The patient has been notified of following:     \"This video visit will be conducted via a call between you and your physician/provider. We have found that certain health care needs can be provided " "without the need for an in-person physical exam.  This service lets us provide the care you need with a video conversation.  If a prescription is necessary we can send it directly to your pharmacy.  If lab work is needed we can place an order for that and you can then stop by our lab to have the test done at a later time.    Video visits are billed at different rates depending on your insurance coverage.  Please reach out to your insurance provider with any questions.    If during the course of the call the physician/provider feels a video visit is not appropriate, you will not be charged for this service.\"    Patient has given verbal consent for Video visit? Yes  How would you like to obtain your AVS? MyChart  If you are dropped from the video visit, the video invite should be resent to: Text to cell phone: [phone]  Will anyone else be joining your video visit? No      Video-Visit Details    Type of service:  Video Visit    Video Start Time: 10:20  Video End Time: 10:42 AM    Originating Location (pt. Location): Home    Distant Location (provider location):  [facility] [place]     Platform used for Video Visit: Sulaiman Mortno MD        "

## 2020-12-14 NOTE — NURSING NOTE
Chief Complaint   Patient presents with     Follow Up     review UDS, NGB       Patient Active Problem List   Diagnosis     Lymphangioma     HTN (hypertension)     CKD (chronic kidney disease) stage 4, GFR 15-29 ml/min (H)     ADHD (attention deficit hyperactivity disorder)     Allergic rhinitis     Allergic rhinitis due to pollen     Anxiety     Congenital posterior urethral valves     Decreased creatinine clearance     Iron deficiency     Neurogenic bladder     Obesity     Sensorineural hearing loss, asymmetrical     Chronic renal disease     Hypertension       Allergies   Allergen Reactions     Dust Mites      Runny nose and watery eyes     Mold      Runny nose and watery eyes     Other [Seasonal Allergies]      Grass, Ragweed - gets runny nose and watery eyes       Current Outpatient Medications   Medication Sig Dispense Refill     amLODIPine (NORVASC) 5 MG tablet Take 1 tablet (5 mg) by mouth daily 90 tablet 3     atenolol (TENORMIN) 25 MG tablet Take 1.5 tablets (37.5 mg) by mouth daily 135 tablet 3     calcium carbonate (OSCAL 500) 1250 (500 Ca) MG TABS tablet daily        Cholecalciferol (VITAMIN D) 2000 units CAPS        methylphenidate (RITALIN) 10 MG tablet Take 25 mg by mouth       ramipril (ALTACE) 1.25 MG capsule Take 2 capsules (2.5 mg) by mouth daily 60 capsule 11     sodium bicarbonate 650 MG tablet TAKE 4 TABLETS (2,600 MG) THREE TIMES A  tablet 23       Social History     Tobacco Use     Smoking status: Never Smoker     Smokeless tobacco: Never Used   Substance Use Topics     Alcohol use: None     Drug use: None       Annemarie Holbrook LPN  12/14/2020  10:19 AM

## 2020-12-14 NOTE — LETTER
12/14/2020       RE: Boogie Vlila  [address] [address]     Dear Colleague,    Thank you for referring your patient, Boogie Villa, to the [facility] [place] [facility]. Please see a copy of my visit note below.    Boogie is a 20-year-old man with posterior urethral valves and end-stage renal disease.  He previously did self intermittent catheterization per urethra during the day and left indwelling catheter overnight for high output kidney failure.  However, he has not done that for years.  When I last saw him his creatinine was 2.5 about 2 years ago and now it is 5.  He is currently being considered for renal transplant.    He has been voiding on his own.  He has no urinary tract infections.  He has no blood in the urine.    Video urodynamics were recently done by my colleague Cris.  These demonstrate normal pressures during filling and good emptying of the bladder with some detrusor contraction augmented by Valsalva maneuvers.  There is no visible reflux of contrast up the ureters.    Based on these findings, Boogie would be a good candidate for renal transplant without any further changes to his bladder management.  He can continue with spontaneous voiding without any catheterization.  The only thing I would recommend is to repeat the urodynamics about 3 to 6 months after the kidney transplant to make sure that nothing has changed.  He agrees to this.    While he does not use the Mitrofanoff any longer, it would be ideal to place a catheter in the Mitrofanoff at the time of the transplant so as not to accidentally injure the Mitrofanoff which could lead to postoperative complication such as abscess or fistula.    I spent 20 minutes with the patient and his mother with greater than half time in consultation and coordination of care.    Video Visit Technology for this patient: Sulaiman Video Visit- Patient was left in  "waiting room    Boogie Villa is a 20 year old male who is being evaluated via a billable video visit.      The patient has been notified of following:     \"This video visit will be conducted via a call between you and your physician/provider. We have found that certain health care needs can be provided without the need for an in-person physical exam.  This service lets us provide the care you need with a video conversation.  If a prescription is necessary we can send it directly to your pharmacy.  If lab work is needed we can place an order for that and you can then stop by our lab to have the test done at a later time.    Video visits are billed at different rates depending on your insurance coverage.  Please reach out to your insurance provider with any questions.    If during the course of the call the physician/provider feels a video visit is not appropriate, you will not be charged for this service.\"    Patient has given verbal consent for Video visit? Yes  How would you like to obtain your AVS? MyChart  If you are dropped from the video visit, the video invite should be resent to: Text to cell phone: [phone]  Will anyone else be joining your video visit? No      Video-Visit Details    Type of service:  Video Visit    Video Start Time: 10:20  Video End Time: 10:42 AM    Originating Location (pt. Location): Home    Distant Location (provider location):  [facility]     Platform used for Video Visit: Sulaiman Morton MD    "

## 2020-12-16 ENCOUNTER — AMBULATORY - HEALTHEAST (OUTPATIENT)
Dept: LAB | Facility: CLINIC | Age: 20
End: 2020-12-16

## 2020-12-16 DIAGNOSIS — N18.4 CKD (CHRONIC KIDNEY DISEASE) STAGE 4, GFR 15-29 ML/MIN (H): ICD-10-CM

## 2020-12-16 LAB
ALBUMIN SERPL-MCNC: 3.6 G/DL (ref 3.5–5)
ANION GAP SERPL CALCULATED.3IONS-SCNC: 10 MMOL/L (ref 5–18)
BUN SERPL-MCNC: 75 MG/DL (ref 8–22)
CALCIUM SERPL-MCNC: 9.1 MG/DL (ref 8.5–10.5)
CHLORIDE BLD-SCNC: 114 MMOL/L (ref 98–107)
CO2 SERPL-SCNC: 15 MMOL/L (ref 22–31)
CREAT SERPL-MCNC: 5.27 MG/DL (ref 0.7–1.3)
CREAT UR-MCNC: 106.5 MG/DL
CREAT UR-MCNC: 107.8 MG/DL
GFR SERPL CREATININE-BSD FRML MDRD: 14 ML/MIN/1.73M2
GLUCOSE BLD-MCNC: 89 MG/DL (ref 70–125)
HGB BLD-MCNC: 12.9 G/DL (ref 14–18)
MICROALBUMIN UR-MCNC: 263.84 MG/DL (ref 0–1.99)
MICROALBUMIN/CREAT UR: 2447.5 MG/G
PHOSPHATE SERPL-MCNC: 4.6 MG/DL (ref 2.5–4.5)
POTASSIUM BLD-SCNC: 5 MMOL/L (ref 3.5–5)
PROTEIN, RANDOM URINE - HISTORICAL: 389 MG/DL
PROTEIN/CREAT RATIO, RANDOM UR: 3.65
PTH-INTACT SERPL-MCNC: 94 PG/ML (ref 10–86)
SODIUM SERPL-SCNC: 139 MMOL/L (ref 136–145)

## 2020-12-17 LAB — 25(OH)D3 SERPL-MCNC: 20.8 NG/ML (ref 30–80)

## 2020-12-18 LAB
ALBUMIN SERPL-MCNC: 3.6 G/DL
ALBUMIN URINE MG/G CR: 2447.5 MG/G CREATININE
ANION GAP SERPL CALCULATED.3IONS-SCNC: 10 MMOL/L
BUN SERPL-MCNC: 75 MG/DL
CALCIUM SERPL-MCNC: 9.1 MG/DL
CHLORIDE SERPLBLD-SCNC: 114 MMOL/L
CO2 SERPL-SCNC: 15 MMOL/L
CREAT SERPL-MCNC: 5.27 MG/DL
CREATININE URINE: 107.8
CREATININE URINE: 107.8
GLUCOSE SERPL-MCNC: 89 MG/DL (ref 70–99)
HEMOGLOBIN: 12.9 G/DL (ref 13.3–17.7)
PARATHYROID HORMONE INTACT: 94
PHOSPHATE SERPL-MCNC: 4.6 MG/DL
POTASSIUM SERPL-SCNC: 5 MMOL/L
PROT UR-MCNC: 389 G/DL
PROTEIN/CREATININE RATIO - QUEST: 3.65
SODIUM SERPL-SCNC: 139 MMOL/L
VITAMIN D 25 HYDROXY (EXTERNAL): 20.8

## 2020-12-21 ENCOUNTER — VIRTUAL VISIT (OUTPATIENT)
Dept: NEPHROLOGY | Facility: CLINIC | Age: 20
End: 2020-12-21
Attending: INTERNAL MEDICINE
Payer: COMMERCIAL

## 2020-12-21 ENCOUNTER — VIRTUAL VISIT (OUTPATIENT)
Dept: TRANSPLANT | Facility: CLINIC | Age: 20
End: 2020-12-21
Attending: PHYSICIAN ASSISTANT
Payer: COMMERCIAL

## 2020-12-21 ENCOUNTER — PRE VISIT (OUTPATIENT)
Dept: ENDOCRINOLOGY | Facility: CLINIC | Age: 20
End: 2020-12-21

## 2020-12-21 ENCOUNTER — VIRTUAL VISIT (OUTPATIENT)
Dept: ENDOCRINOLOGY | Facility: CLINIC | Age: 20
End: 2020-12-21
Attending: PHYSICIAN ASSISTANT
Payer: COMMERCIAL

## 2020-12-21 VITALS — HEIGHT: 71 IN | BODY MASS INDEX: 39.48 KG/M2 | WEIGHT: 282 LBS

## 2020-12-21 DIAGNOSIS — N18.6 ESRD (END STAGE RENAL DISEASE) (H): ICD-10-CM

## 2020-12-21 DIAGNOSIS — Z01.818 PRE-TRANSPLANT EVALUATION FOR KIDNEY TRANSPLANT: ICD-10-CM

## 2020-12-21 DIAGNOSIS — E66.812 OBESITY, CLASS II, BMI 35-39.9: Primary | ICD-10-CM

## 2020-12-21 DIAGNOSIS — Q60.2 CONGENITAL RENAL AGENESIS AND DYSGENESIS: ICD-10-CM

## 2020-12-21 DIAGNOSIS — E66.01 OBESITY, CLASS III, BMI 40-49.9 (MORBID OBESITY) (H): Primary | ICD-10-CM

## 2020-12-21 DIAGNOSIS — Z76.82 ORGAN TRANSPLANT CANDIDATE: ICD-10-CM

## 2020-12-21 DIAGNOSIS — Q60.5 CONGENITAL RENAL AGENESIS AND DYSGENESIS: ICD-10-CM

## 2020-12-21 DIAGNOSIS — Q64.31 CONGENITAL URETHRAL VALVE OBSTRUCTION: ICD-10-CM

## 2020-12-21 DIAGNOSIS — I10 ESSENTIAL HYPERTENSION: ICD-10-CM

## 2020-12-21 DIAGNOSIS — N18.5 CKD (CHRONIC KIDNEY DISEASE) STAGE 5, GFR LESS THAN 15 ML/MIN (H): Primary | ICD-10-CM

## 2020-12-21 PROCEDURE — 99202 OFFICE O/P NEW SF 15 MIN: CPT | Mod: 95 | Performed by: TRANSPLANT SURGERY

## 2020-12-21 PROCEDURE — 99203 OFFICE O/P NEW LOW 30 MIN: CPT | Mod: 95 | Performed by: NURSE PRACTITIONER

## 2020-12-21 PROCEDURE — 99214 OFFICE O/P EST MOD 30 MIN: CPT | Mod: 95 | Performed by: INTERNAL MEDICINE

## 2020-12-21 ASSESSMENT — MIFFLIN-ST. JEOR: SCORE: 2311.27

## 2020-12-21 ASSESSMENT — PAIN SCALES - GENERAL: PAINLEVEL: NO PAIN (0)

## 2020-12-21 NOTE — NURSING NOTE
"Chief Complaint   Patient presents with     Consult     New wt mgmt appt.        Vitals:    12/21/20 1332   Weight: 127.9 kg (282 lb)   Height: 1.803 m (5' 11\")       Body mass index is 39.33 kg/m .                            CARLO FLORENTINO, EMT    "

## 2020-12-21 NOTE — PROGRESS NOTES
"    New Medical Weight Management Consult    PATIENT:  Boogie Villa  MRN:         [id number]  :         2000  LEANN:         2020    Dear Joe    I had the pleasure of seeing your patient, Boogie Villa. Full intake/assessment was done to determine barriers to weight loss success and develop a treatment plan. Boogie Villa is a 20 year old male interested in treatment of medical problems associated with excess weight. He has a height of 5' 11\", a weight of 282 lbs 0 oz, and the calculated Body mass index is 39.33 kg/m .    ASSESSMENT/PLAN:  Boogie is a patient with early onset obesity with significant element of familial/genetic influence and with current health consequences. He does need aggressive weight loss plan due to need for kidney transplant.  Boogie Villa eats a high carb diet and eats to obtain specific degree of fullness.    His ability to lose weight is impacted by lack of education on nutrition and dietary needs.    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     No fluid restrictions.     Patient Goals 2020   I am interested in having a healthier weight to diminish current health problems: Yes   I am interested in having a healthier weight in order to prevent future health problems: Yes   I am interested in having a healthier weight in order to have " "a future surgery: Yes   If yes, please indicate which surgery? Kidney Transplant     Goal for kidney transplant is BMI < 35  Saw transplant today, needs BMI <38 to get listed for transplant      Referring Provider 12/18/2020   Please name the provider who referred you to Medical Weight Management.  If you do not know, please answer: \"I Don't Know\". Joe       Weight History 12/18/2020   How concerned are you about your weight? Somewhat Concerned   Would you describe your weight gain as gradual? Yes   I became overweight: As a Teenager   The following factors have contributed to my weight gain:  Change in Schedule, Eating Wrong Types of Food, Eating Too Much, Lack of Exercise, Genetic (Runs in the Family)   I have tried the following methods to lose weight: Watching Portions or Calories, Exercise   My lowest weight since age 18 was: 230   My highest weight since age 18 was: 280   The most weight I have ever lost was: (lbs) 5   I have the following family history of obesity/being overweight:  My father is overweight, One or more of my siblings are overweight, Many of my relatives are overweight   Has anyone in your family had weight loss surgery? No   How has your weight changed over the last year?  Gained   How many pounds? 25     No previous attempts at weight loss  Motivated by need for kidney transplant     Diet Recall Review with Patient 12/18/2020   Do you typically eat breakfast? No   Do you typically eat lunch? Yes   If you do eat lunch, what types of food do you typically eat?  Soup, hand pies, sandwiches, chimichangas   Do you typically eat supper? Yes   If you do eat supper, what types of food do you typically eat? Soup, hand pies, sandwiches, chimichangas   Do you typically eat snacks? No   Do you like vegetables?  No   Do you drink water? Yes   How many glasses of juice do you drink in a typical day? 0   How many of glasses of milk do you drink in a typical day? 0   How many 8oz glasses of sugar " containing drinks such as Jitendra-Aid/sweet tea do you drink in a day? 0   How many cans/bottles of sugar pop/soda/tea/sports drinks do you drink in a day? 0   How many cans/bottles of diet pop/soda/tea or sports drink do you drink in a day? 0   How often do you have a drink of alcohol? Never     2 meals a day   Gets distracted in the morning and forgets to eat until very hungry   Eats 4-5 hours after waking up - realizes he is very hunger then   No snacks   Second meal usually alone, with family on the weekends - leftovers- soup, chicken pattys, chimmichangas, calzones   Meal prep on Saturday/ Sunday - as a family   Feels like he needs larger portions to get full  Will take larger portions to start so he doesn't have to go back for seconds   Doesn't realize he is full until food is gone         Eating Habits 12/18/2020   Generally, my meals include foods like these: bread, pasta, rice, potatoes, corn, crackers, sweet dessert, pop, or juice. Everyday   Generally, my meals include foods like these: fried meats, brats, burgers, french fries, pizza, cheese, chips, or ice cream. A Few Times a Week   Eat fast food (like McDonalds, [employer], Taco Bell). Never   Eat at a buffet or sit-down restaurant. Once a Week   Eat most of my meals in front of the TV or computer. Everyday   Often skip meals, eat at random times, have no regular eating times. Everyday   Rarely sit down for a meal but snack or graze throughout.  Never   Eat extra snacks between meals. Less Than Weekly   Eat most of my food at the end of the day. A Few Times a Week   Eat in the middle of the night or wake up at night to eat. Never   Eat extra snacks to prevent or correct low blood sugar. Never   Eat to prevent acid reflux or stomach pain. Never   Worry about not having enough food to eat. Never   Have you been to the food shelf at least a few times this year? No   I eat when I am depressed. Never   I eat when I am stressed. Never   I eat when I am bored.  Never   I eat when I am anxious. Never   I eat when I am happy or as a reward. Less Than Weekly   I feel hungry all the time even if I just have eaten. Never   Feeling full is important to me. Once a Week   I finish all the food on my plate even if I am already full. A Few Times a Week   I can't resist eating delicious food or walk past the good food/smell. Less Than Weekly   I eat/snack without noticing that I am eating. Never   I eat when I am preparing the meal. Never   I eat more than usual when I see others eating. Never   I have trouble not eating sweets, ice cream, cookies, or chips if they are around the house. Never   I think about food all day. Less Than Weekly   What foods, if any, do you crave? None   Please list any other foods you crave? potatoes       Amount of Food 12/18/2020   I make myself vomit what I have eaten or use laxatives to get rid of food. Never   I eat a large amount of food, like a loaf of bread, a box of cookies, a pint/quart of ice cream, all at once. Never   I eat a large amount of food even when I am not hungry. Never   I eat rapidly. Everyday   I eat alone because I feel embarrassed and do not want others to see how much I have eaten. Never   I eat until I am uncomfortably full. Monthly   I feel bad, disgusted, or guilty after I overeat. Never   I make myself vomit what I have eaten or use laxatives to get rid of food. Never       Activity/Exercise History 12/18/2020   How much of a typical 12 hour day do you spend sitting? Most of the Day   How much of a typical 12 hour day do you spend lying down? Less Than Half the Day   How much of a typical day do you spend walking/standing? Less Than Half the Day   How many hours (not including work) do you spend on the TV/Video Games/Computer/Tablet/Phone? 6 Hours or More   How many times a week are you active for the purpose of exercise? Never   What keeps you from being more active? Unsure What To Do, Worried People Will Look At Me, Other    How many total minutes do you spend doing some activity for the purpose of exercising when you exercise? Less Than 15 Minutes     No current exercise routine   Plans to increase activity to 3 times a week     PAST MEDICAL HISTORY:  Past Medical History:   Diagnosis Date     ADD (attention deficit disorder)      CKD (chronic kidney disease)      Hypertension 2000     Posterior urethral valves        Work/Social History Reviewed With Patient 12/18/2020   My employment status is: Student   What is your marital status? Single   If in a relationship, is your significant other overweight? N/A   Do you have children? No   If you have children, are they overweight? N/A   Who do you live with?  Alone   Who does the food shopping?  Me       Mental Health History Reviewed With Patient 12/18/2020   Have you ever been physically or sexually abused? No   How often in the past 2 weeks have you felt little interest or pleasure in doing things? Not at all   Over the past 2 weeks how often have you felt down, depressed, or hopeless? Not at all       Sleep History Reviewed With Patient 12/18/2020   How many hours do you sleep at night? 8   Do you think that you snore loudly or has anybody ever heard you snore loudly (louder than talking or so loud it can be heard behind a shut door)? Yes   Has anyone seen or heard you stop breathing during your sleep? No   Do you often feel tired, fatigued, or sleepy during the day? No   Do you have a TV/Computer in your bedroom? Yes       MEDICATIONS:   Current Outpatient Medications   Medication Sig Dispense Refill     amLODIPine (NORVASC) 5 MG tablet Take 1 tablet (5 mg) by mouth daily 90 tablet 3     atenolol (TENORMIN) 25 MG tablet Take 1.5 tablets (37.5 mg) by mouth daily 135 tablet 3     calcium carbonate (OSCAL 500) 1250 (500 Ca) MG TABS tablet daily        Cholecalciferol (VITAMIN D) 2000 units CAPS        methylphenidate (RITALIN) 10 MG tablet Take 25 mg by mouth       ramipril (ALTACE)  "1.25 MG capsule Take 2 capsules (2.5 mg) by mouth daily 60 capsule 11     sodium bicarbonate 650 MG tablet TAKE 4 TABLETS (2,600 MG) THREE TIMES A  tablet 23       ALLERGIES:   Allergies   Allergen Reactions     Dust Mites      Runny nose and watery eyes     Mold      Runny nose and watery eyes     Other [Seasonal Allergies]      Grass, Ragweed - gets runny nose and watery eyes       PHYSICAL EXAM:  Ht 1.803 m (5' 11\")   Wt 127.9 kg (282 lb)   BMI 39.33 kg/m      Waist circumference:      Wt Readings from Last 4 Encounters:   12/21/20 127.9 kg (282 lb)   10/22/20 136.1 kg (300 lb)   11/25/19 123 kg (271 lb 1.6 oz) (>99 %, Z= 2.72)*   06/25/19 119.3 kg (263 lb) (>99 %, Z= 2.61)*     * Growth percentiles are based on CDC (Boys, 2-20 Years) data.     A & O x 3  HEENT: NCAT, mucous membranes moist  Respirations unlabored  Location of obesity: Mixed Obesity    FOLLOW-UP:   4 weeks.    TIME: 25 min spent on evaluation, management, counseling, education, & motivational interviewing with greater than 50 % of the total time was spent on counseling and coordinating care    Sincerely,    Yessenia Heller NP      "

## 2020-12-21 NOTE — LETTER
2020         RE: Boogie Villa  [address] [facility] [address]        Dear Colleague,    Thank you for referring your patient, Boogie Villa, to the [facility]. Please see a copy of my visit note below.      Transplant Surgery Consult Note    Medical record number: [id number]  YOB: 2000,   Consult requested  for evaluation of kidney transplant candidacy.    Assessment and Recommendations: Mr. Villa is a good candidate for transplantation and has a good understanding of the risks and benefits of this approach to management of renal failure and diabetes. The following issues should be addressed prior to transplant:     Mr. 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  Cardiology consult for cardiac risk stratification to be ordered: No  CT abdomen and pelvis without contrast to be ordered for assessment of vascular targets: No  Transplant listing labs ordered to include HLA, ABOx2, Cr, etc.  Dietician consult ordered: Yes  Social work consult ordered: Yes  Imaging reports reviewed:  none  Radiology images reviewed:Renal US    Will need to lose weight to become candidate for transplant.  Currently weighs 300 lb.  Needs to lose 20 lb to be listed.  When BMI 38 can come in for in person assessment.    The majority of our visit was spent in counselling, discussing the medical and surgical risks of living or  donor kidney and pancreas transplantation, either in a simultaneous or sequential fashion. I contrasted approximate wait time for SPK vs living vs  donor kidneys from normal (0-85%) or higher (%) kidney donor profile index (KDPI) donors and their associated outcomes. I would not recommend this individual to consider kidneys from high KDPI donors. The reason for this decision  is best summarized as: improved long term graft survival.  Access to transplant will be impacted by living donor availability and overall candidacy for SPK, as well as the influence of blood type and degree of sensitization. We discussed advantages of preemptive transplant as well as living donor kidney transplant, and graft and patient survival outcomes associated with these options. Potential surgical complications of kidney and pancreas transplantation include bleeding, clotting, infection, wound complications, anastomotic failure and other issues such as cardiac complications, pneumonia, deep venous thrombosis, pulmonary embolism, post transplant diabetes and death. We discussed the need for protocol biopsy of the kidney and the possible need for a ureteral stent (and subsequent removal). We discussed benefits and risks associated with different approaches to exocrine drainage of pancreatic secretions. We also discussed differences in the average length of stay, recovery process, and posttransplant lab and monitoring protocol. We discussed the risk of graft rejection and recurrent diabetic nephropathy in the setting of poor glycemic control. I emphasized the need for strict immunosuppression adherence and the potential for complications of immunosuppression such as skin cancer or lymphoma, as well as a very low but not zero risk of donor-derived disease transmission risks (infection, cancer). Mr. Villa asked good questions and the patient's candidacy will be reviewed at our Multidisciplinary Selection Committee. Thank you for the opportunity to participate in Mr. Villa's care.    Total time: 25 minutes  Counselling time: 20 minutes    Ken Raya MD, PhD  Associate Professor of Surgery      ---------------------------------------------------------------------------------------------------    HPI: Mr. 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   He has had several surgeries for  ureter repair and has a Mitrofanoff (not currently using).  Not on dialysis.  Has 2 possible donors (parents).  Otherwise well.      Past Medical History:   Diagnosis Date     ADD (attention deficit disorder)      CKD (chronic kidney disease)      Hypertension 2000     Posterior urethral valves      Past Surgical History:   Procedure Laterality Date     ABDOMEN SURGERY  2003    Bilateral urereral tapering and re-implantation     ABDOMEN SURGERY  2008    Mitrofanoff     ablation of posterior urethral valves  2000     APPENDECTOMY  2008     ureteral reimplantation with tapering  2003     No family history on file.  Social History     Socioeconomic History     Marital status: Single     Spouse name: Not on file     Number of children: Not on file     Years of education: Not on file     Highest education level: Not on file   Occupational History     Not on file   Social Needs     Financial resource strain: Not on file     Food insecurity     Worry: Not on file     Inability: Not on file     Transportation needs     Medical: Not on file     Non-medical: Not on file   Tobacco Use     Smoking status: Never Smoker     Smokeless tobacco: Never Used   Substance and Sexual Activity     Alcohol use: Not on file     Drug use: Not on file     Sexual activity: Not on file   Lifestyle     Physical activity     Days per week: Not on file     Minutes per session: Not on file     Stress: Not on file   Relationships     Social connections     Talks on phone: Not on file     Gets together: Not on file     Attends Restoration service: Not on file     Active member of club or organization: Not on file     Attends meetings of clubs or organizations: Not on file     Relationship status: Not on file     Intimate partner violence     Fear of current or ex partner: Not on file     Emotionally abused: Not on file     Physically abused: Not on file     Forced sexual activity: Not on file   Other Topics Concern     Parent/sibling w/ CABG, MI or  angioplasty before 65F 55M? Not Asked   Social History Narrative     Not on file       ROS:   CONSTITUTIONAL:  No fevers or chills  EYES: negative for icterus  ENT:  negative for hearing loss, tinnitus and sore throat  RESPIRATORY:  negative for cough, sputum, dyspnea  CARDIOVASCULAR:  negative for chest pain None  GASTROINTESTINAL:  negative for nausea, vomiting, diarrhea or constipation  GENITOURINARY:  negative for incontinence, dysuria, bladder emptying problems  HEME:  No easy bruising  INTEGUMENT:  negative for rash and pruritus  NEURO:  Negative for headache, seizure disorder    Allergies:   Allergies   Allergen Reactions     Dust Mites      Runny nose and watery eyes     Mold      Runny nose and watery eyes     Other [Seasonal Allergies]      Grass, Ragweed - gets runny nose and watery eyes       Medications:  Prescription Medications as of 12/21/2020       Rx Number Disp Refills Start End Last Dispensed Date Next Fill Date Owning Pharmacy    amLODIPine (NORVASC) 5 MG tablet  90 tablet 3 11/25/2019    [facility]  [address]    Sig: Take 1 tablet (5 mg) by mouth daily    Class: E-Prescribe    Route: Oral    atenolol (TENORMIN) 25 MG tablet  135 tablet 3 11/25/2019    [facility]  for [address]    Sig: Take 1.5 tablets (37.5 mg) by mouth daily    Class: E-Prescribe    Route: Oral    calcium carbonate (OSCAL 500) 1250 (500 Ca) MG TABS tablet    1/1/2017        Sig: daily     Class: Historical    Cholecalciferol (VITAMIN D) 2000 units CAPS    1/1/2017        Class: Historical    methylphenidate (RITALIN) 10 MG tablet    2/16/2020    [facility] - [address]    Sig: Take 25 mg by mouth    Class: Historical    Earliest Fill Date: 2/16/2020    Route: Oral    ramipril (ALTACE) 1.25 MG capsule  60 capsule 11 1/10/2020    [id number] - [place], [place] - [address] AT [facility] [address]    Sig: Take 2 capsules (2.5 mg) by mouth daily    Class: E-Prescribe    Route: Oral    sodium bicarbonate 650 MG tablet  180 tablet 23 11/19/2020    [facility] - [place], [place] - [address] [facility] [address]    Sig: TAKE 4 TABLETS (2,600 MG) THREE TIMES A DAY    Class: E-Prescribe          Exam:   Constitutional - A&O in NAD.   Eyes - no redness or discharge.  Sclera anicteric  Respiratory - no cough, no labored breathing  Musculoskeletal - range of motion normal  Skin - no discoloration, no jaundice  Neurological - no tremors.  No facial droop or dysarthria  Psychiatric - normal mood and affect  The rest of a comprehensive physical examination is deferred due to PHE (public health emergency) video visit restrictions    Diagnostics:   Recent Results (from the past 672 hour(s))   Clinitek Urine Macroscopic POCT    Collection Time: 12/03/20  3:25 PM   Result Value Ref Range    Color Urine Yellow     Appearance Urine Clear     Glucose Urine 100 (A) NEG^Negative mg/dL    Bilirubin Urine Negative NEG^Negative    Ketones Urine Negative NEG^Negative mg/dL    Specific Gravity Urine 1.025 1.003 - 1.035    Blood Urine Small (A) NEG^Negative    pH Urine 7.0 5.0 - 7.0 pH    Protein Albumin Urine >300 (A) NEG^Negative mg/dL    Urobilinogen Urine 0.2 0.2 - 1.0 EU/dL    Nitrite Urine Negative NEG^Negative    Leukocyte Esterase Urine Negative NEG^Negative   Renal panel    Collection Time: 12/16/20 12:00 AM   Result Value Ref Range    Sodium 139 mmol/L    Potassium 5.0 mmol/L    Chloride 114 mmol/L    Carbon Dioxide 15 mmol/L    Anion Gap 10 mmol/L    Glucose 89 70 - 99 mg/dL    Urea Nitrogen 75 mg/dL    Creatinine 5.27 mg/dL    Calcium 9.1 mg/dL    Phosphorus 4.6 mg/dL    Albumin 3.6 g/dL   Hemoglobin    Collection Time: 12/16/20 12:00 AM   Result Value Ref Range    Hemoglobin 12.9 (A) 13.3 - 17.7 g/dL   Parathyroid Hormone Intact    Collection Time: 12/16/20 12:00 AM   Result  "Value Ref Range    Parathyroid Hormone Intact 94    Vitamin D 25-Hydroxy ([facility])    Collection Time: 12/16/20 12:00 AM   Result Value Ref Range    Vitamin D 25 Hydroxy (External) 20.8    Protein  random urine with Creat Ratio    Collection Time: 12/16/20 12:00 AM   Result Value Ref Range    Protein Random Urine 389     Creatinine Urine 107.8     Protein/Creatinine Ratio 3.65    Albumin Random Urine Quantitative with Creat Ratio    Collection Time: 12/16/20 12:00 AM   Result Value Ref Range    Albumin Urine mg/g Cr 2,447.5 MG/G Creatinine    Creatinine Urine 107.8      No results found for: LARRY Villa is a 20 year old male who is being evaluated via a billable video visit.          The patient has been notified of following:     \"This video visit will be conducted via a call between you and your physician/provider. We have found that certain health care needs can be provided without the need for an in-person physical exam.  This service lets us provide the care you need with a video conversation.  If a prescription is necessary we can send it directly to your pharmacy.  If lab work is needed we can place an order for that and you can then stop by our lab to have the test done at a later time.    Video visits are billed at different rates depending on your insurance coverage.  Please reach out to your insurance provider with any questions.    If during the course of the call the physician/provider feels a video visit is not appropriate, you will not be charged for this service.\"    Patient has given verbal consent for Video visit? Yes  How would you like to obtain your AVS? MyChart  If you are dropped from the video visit, the video invite should be resent to: Text to cell phone: [phone]  Will anyone else be joining your video visit? No      Video-Visit Details    Type of service:  Video Visit    Video Start Time: 1:20 pm  Video End Time: 1:45 PM    Originating Location (pt. " Location): Home    Distant Location (provider location):  [facility]     Platform used for Video Visit: Sulaiman Raya MD

## 2020-12-21 NOTE — PROGRESS NOTES
"Boogie Villa is a 20 year old male who is being evaluated via a billable video visit.      The patient has been notified of following:     \"This video visit will be conducted via a call between you and your physician/provider. We have found that certain health care needs can be provided without the need for an in-person physical exam.  This service lets us provide the care you need with a video conversation.  If a prescription is necessary we can send it directly to your pharmacy.  If lab work is needed we can place an order for that and you can then stop by our lab to have the test done at a later time.    Video visits are billed at different rates depending on your insurance coverage.  Please reach out to your insurance provider with any questions.    If during the course of the call the physician/provider feels a video visit is not appropriate, you will not be charged for this service.\"    Patient has given verbal consent for Video visit? Yes  How would you like to obtain your AVS? MyChart  If you are dropped from the video visit, the video invite should be resent to: Text to cell phone: [phone]  Will anyone else be joining your video visit? No        Video-Visit Details    Type of service:  Video Visit    Video Start Time: 1355  Video End Time: 1422    Originating Location (pt. Location): Home    Distant Location (provider location):  [facility] [place]     Platform used for Video Visit: AmWell     Last 10 minutes had to be by phone on doximity due to connection issues     Yessenia Heller NP      "

## 2020-12-21 NOTE — LETTER
"12/21/2020       RE: Boogie Villa  [address] [address]     Dear Colleague,    Thank you for referring your patient, Boogie Villa, to the [facility] [place] at [facility]. Please see a copy of my visit note below.    Boogie Villa is a 20 year old male who is being evaluated via a billable video visit.     The patient has been notified of following:     \"This video visit will be conducted via a call between you and your physician/provider. We have found that certain health care needs can be provided without the need for an in-person physical exam.  This service lets us provide the care you need with a video conversation.  If a prescription is necessary we can send it directly to your pharmacy.  If lab work is needed we can place an order for that and you can then stop by our lab to have the test done at a later time.    Video visits are billed at different rates depending on your insurance coverage.  Please reach out to your insurance provider with any questions.    If during the course of the call the physician/provider feels a video visit is not appropriate, you will not be charged for this service.\"    Patient has given verbal consent for Video visit? Yes  How would you like to obtain your AVS? MyChart  If you are dropped from the video visit, the video invite should be resent to: Text to cell phone: [phone]  Will anyone else be joining your video visit? No      Video-Visit Details    Type of service:  Video Visit    Video Start Time: 10:30 AM  Video End Time: 10:42 AM    Time with staff Dr Diaz: 11:12-11:17 AM    Originating Location (pt. 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  Baseline serum creatinine has significantly changed over the past year as kidney function has declined more rapidly.  Last serum Cr ~5.3 associated with significant albuminuria of 2.5 g/g (on low dose ramipril).  - Patient is asymptomatic and has no urgent need for dialysis  - Undergoing evaluation for kidney transplant  - Continue ramipril for now but may discontinue if kidney function worsens  - Continue to avoid NSAIDs  - If he becomes ill, he is instructed to hold his ramipril    HTN  Patient unsure of recent measurements. Will continue with current regimen of atenolol, amlodipine, and ramipril.    MBD  PTH improved to 94. Most recent Vit D ~21. Phos remains normal. No indication for phos binder or activated vit D. Continue cholecalciferol supplement.    Low bicarb/NAGMA  Presumed due to CKD. Bicarb improved to 15 from 11 after increasing bicarb supplement to 4 pills of 650 mg BID from daily although patient does note he continues to take it daily sometimes.  - Encouraged increasing supplement to 4 pills BID consistently, and discussed that he should ideally be taking this TID    Anemia  Mild and due to CKD. Hgb has been stable in the 12s. Iron stores adequate. No indication for iron supplementation or Epo at this time.    Follow-up in 10 weeks    Discussed with Dr Joe Payne MD  Renal Fellow  [phone]    HPI: Boogie Villa is a 20 year old male who presents for follow-up. PMH includes history of posterior urethral valves with correction in the first year of life.  Recurrent episodes of urinary tract infection. Patient was previously followed by Dr. Palomo in pediatric nephrology for chronic kidney disease related to posterior ureteral valves. The only concern was medication compliance and he and his mother reported very partial (3-4 days a week) compliance in the past though has not been an issue over the past  year.    Interval History:  Continues to feel unsure about which dialysis modality he would like to pursue. Admits he feels like he has received enough information via [employer] and otherwise but needs to sit down and think about his preference more. Now trying to take sodium bicarbonate 650 mg 4 pills BID as instructed at previous visit but does still take the supplement daily instead of BID and can be better at this. Denies any specific concerns. Notes he has not recently checked his BP but denies any symptoms of uncontrolled HTN including headache, SOB, chest pain, vision changes. No changes in urine, including dysuria, hematuria, change in frequency, thinks he is making about the same amount and no issues voiding. Denies nausea/vomiting, diarrhea, abdominal pain, lightheadedness, cough, fever/chills, appetite is good and hydrating well. No issues taking medications and denies taking anything new or OTC recently.    Allergies   Allergen Reactions     Dust Mites      Runny nose and watery eyes     Mold      Runny nose and watery eyes     Other [Seasonal Allergies]      Grass, Ragweed - gets runny nose and watery eyes            amLODIPine (NORVASC) 5 MG tablet, Take 1 tablet (5 mg) by mouth daily       atenolol (TENORMIN) 25 MG tablet, Take 1.5 tablets (37.5 mg) by mouth daily       calcium carbonate (OSCAL 500) 1250 (500 Ca) MG TABS tablet, daily        Cholecalciferol (VITAMIN D) 2000 units CAPS,        methylphenidate (RITALIN) 10 MG tablet, Take 25 mg by mouth       ramipril (ALTACE) 1.25 MG capsule, Take 2 capsules (2.5 mg) by mouth daily       sodium bicarbonate 650 MG tablet, TAKE 4 TABLETS (2,600 MG) THREE TIMES A DAY    No current facility-administered medications on file prior to visit.       Past Medical History:   Diagnosis Date     ADD (attention deficit disorder)      CKD (chronic kidney disease)      Hypertension 2000     Posterior urethral valves        Past Surgical History:   Procedure  Laterality Date     ABDOMEN SURGERY  2003    Bilateral urereral tapering and re-implantation     ABDOMEN SURGERY  2008    Mitrofanoff     ablation of posterior urethral valves  2000     APPENDECTOMY  2008     ureteral reimplantation with tapering  2003       Social History     Tobacco Use     Smoking status: Never Smoker     Smokeless tobacco: Never Used   Substance Use Topics     Alcohol use: None     Drug use: None     No family history on file.    ROS: A 12 system review of systems was negative other than noted here or above.    Exam:  There were no vitals taken for this visit.    GENERAL APPEARANCE: alert and no distress  EYES: EOMI, no scleral icterus  HENT: mouth without ulcers or lesions  RESP: breathing non-labored  NEURO: mentation intact and speech normal  PSYCH: affect normal/bright    Results:  No visits with results within 1 Day(s) from this visit.   Latest known visit with results is:   Abstract on 12/18/2020   Component Date Value Ref Range Status     Sodium 12/16/2020 139  mmol/L Final     Potassium 12/16/2020 5.0  mmol/L Final     Chloride 12/16/2020 114  mmol/L Final     Carbon Dioxide 12/16/2020 15  mmol/L Final     Anion Gap 12/16/2020 10  mmol/L Final     Glucose 12/16/2020 89  70 - 99 mg/dL Final     Urea Nitrogen 12/16/2020 75  mg/dL Final     Creatinine 12/16/2020 5.27  mg/dL Final     Calcium 12/16/2020 9.1  mg/dL Final     Phosphorus 12/16/2020 4.6  mg/dL Final     Albumin 12/16/2020 3.6  g/dL Final     Hemoglobin 12/16/2020 12.9* 13.3 - 17.7 g/dL Final     Parathyroid Hormone Intact 12/16/2020 94   Final     Vitamin D 25 Hydroxy (External) 12/16/2020 20.8   Final     Protein Random Urine 12/16/2020 389   Final     Creatinine Urine 12/16/2020 107.8   Final     Protein/Creatinine Ratio 12/16/2020 3.65   Final     Albumin Urine mg/g Cr 12/16/2020 2,447.5  MG/G Creatinine Final     Creatinine Urine 12/16/2020 107.8   Final       Attestation signed by Michael Diaz MD at 1/10/2021   5:40 PM:  This patient has been evaluated by me, Michael Diaz MD, on 12/21/2020.  I discussed with the fellow, Dr. Payne, and agree with the findings and plan in this note.  I have reviewed medications, vital signs and labs.       Total time I spent was 30 minutes, and more than 50% of face to face time with the patient was spent in counseling and/or coordination of care regarding principles of multidisciplinary care, medication management, and CKD education.    Michael Diaz MD

## 2020-12-21 NOTE — PROGRESS NOTES
"Boogie Villa is a 20 year old male who is being evaluated via a billable video visit.     The patient has been notified of following:     \"This video visit will be conducted via a call between you and your physician/provider. We have found that certain health care needs can be provided without the need for an in-person physical exam.  This service lets us provide the care you need with a video conversation.  If a prescription is necessary we can send it directly to your pharmacy.  If lab work is needed we can place an order for that and you can then stop by our lab to have the test done at a later time.    Video visits are billed at different rates depending on your insurance coverage.  Please reach out to your insurance provider with any questions.    If during the course of the call the physician/provider feels a video visit is not appropriate, you will not be charged for this service.\"    Patient has given verbal consent for Video visit? Yes  How would you like to obtain your AVS? MyChart  If you are dropped from the video visit, the video invite should be resent to: Text to cell phone: [phone]  Will anyone else be joining your video visit? No      Video-Visit Details    Type of service:  Video Visit    Video Start Time: 10:30 AM  Video End Time: 10:42 AM    Time with staff Dr Diaz: 11:12-11:17 AM    Originating Location (pt. 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  Baseline serum creatinine has significantly changed over the past year as kidney function has declined more rapidly.  Last serum Cr ~5.3 associated with significant albuminuria of 2.5 g/g (on low dose ramipril).  - Patient is " asymptomatic and has no urgent need for dialysis  - Undergoing evaluation for kidney transplant  - Continue ramipril for now but may discontinue if kidney function worsens  - Continue to avoid NSAIDs  - If he becomes ill, he is instructed to hold his ramipril    HTN  Patient unsure of recent measurements. Will continue with current regimen of atenolol, amlodipine, and ramipril.    MBD  PTH improved to 94. Most recent Vit D ~21. Phos remains normal. No indication for phos binder or activated vit D. Continue cholecalciferol supplement.    Low bicarb/NAGMA  Presumed due to CKD. Bicarb improved to 15 from 11 after increasing bicarb supplement to 4 pills of 650 mg BID from daily although patient does note he continues to take it daily sometimes.  - Encouraged increasing supplement to 4 pills BID consistently, and discussed that he should ideally be taking this TID    Anemia  Mild and due to CKD. Hgb has been stable in the 12s. Iron stores adequate. No indication for iron supplementation or Epo at this time.    Follow-up in 10 weeks    Discussed with Dr Joe Payne MD  Renal Fellow  [phone]    HPI: Boogie Villa is a 20 year old male who presents for follow-up. PMH includes history of posterior urethral valves with correction in the first year of life.  Recurrent episodes of urinary tract infection. Patient was previously followed by Dr. Palomo in pediatric nephrology for chronic kidney disease related to posterior ureteral valves. The only concern was medication compliance and he and his mother reported very partial (3-4 days a week) compliance in the past though has not been an issue over the past year.    Interval History:  Continues to feel unsure about which dialysis modality he would like to pursue. Admits he feels like he has received enough information via [employer] and otherwise but needs to sit down and think about his preference more. Now trying to take sodium bicarbonate 650 mg 4 pills BID  as instructed at previous visit but does still take the supplement daily instead of BID and can be better at this. Denies any specific concerns. Notes he has not recently checked his BP but denies any symptoms of uncontrolled HTN including headache, SOB, chest pain, vision changes. No changes in urine, including dysuria, hematuria, change in frequency, thinks he is making about the same amount and no issues voiding. Denies nausea/vomiting, diarrhea, abdominal pain, lightheadedness, cough, fever/chills, appetite is good and hydrating well. No issues taking medications and denies taking anything new or OTC recently.    Allergies   Allergen Reactions     Dust Mites      Runny nose and watery eyes     Mold      Runny nose and watery eyes     Other [Seasonal Allergies]      Grass, Ragweed - gets runny nose and watery eyes            amLODIPine (NORVASC) 5 MG tablet, Take 1 tablet (5 mg) by mouth daily       atenolol (TENORMIN) 25 MG tablet, Take 1.5 tablets (37.5 mg) by mouth daily       calcium carbonate (OSCAL 500) 1250 (500 Ca) MG TABS tablet, daily        Cholecalciferol (VITAMIN D) 2000 units CAPS,        methylphenidate (RITALIN) 10 MG tablet, Take 25 mg by mouth       ramipril (ALTACE) 1.25 MG capsule, Take 2 capsules (2.5 mg) by mouth daily       sodium bicarbonate 650 MG tablet, TAKE 4 TABLETS (2,600 MG) THREE TIMES A DAY    No current facility-administered medications on file prior to visit.       Past Medical History:   Diagnosis Date     ADD (attention deficit disorder)      CKD (chronic kidney disease)      Hypertension 2000     Posterior urethral valves        Past Surgical History:   Procedure Laterality Date     ABDOMEN SURGERY  2003    Bilateral urereral tapering and re-implantation     ABDOMEN SURGERY  2008    Mitrofanoff     ablation of posterior urethral valves  2000     APPENDECTOMY  2008     ureteral reimplantation with tapering  2003       Social History     Tobacco Use     Smoking status: Never  Smoker     Smokeless tobacco: Never Used   Substance Use Topics     Alcohol use: None     Drug use: None     No family history on file.    ROS: A 12 system review of systems was negative other than noted here or above.    Exam:  There were no vitals taken for this visit.    GENERAL APPEARANCE: alert and no distress  EYES: EOMI, no scleral icterus  HENT: mouth without ulcers or lesions  RESP: breathing non-labored  NEURO: mentation intact and speech normal  PSYCH: affect normal/bright    Results:  No visits with results within 1 Day(s) from this visit.   Latest known visit with results is:   Abstract on 12/18/2020   Component Date Value Ref Range Status     Sodium 12/16/2020 139  mmol/L Final     Potassium 12/16/2020 5.0  mmol/L Final     Chloride 12/16/2020 114  mmol/L Final     Carbon Dioxide 12/16/2020 15  mmol/L Final     Anion Gap 12/16/2020 10  mmol/L Final     Glucose 12/16/2020 89  70 - 99 mg/dL Final     Urea Nitrogen 12/16/2020 75  mg/dL Final     Creatinine 12/16/2020 5.27  mg/dL Final     Calcium 12/16/2020 9.1  mg/dL Final     Phosphorus 12/16/2020 4.6  mg/dL Final     Albumin 12/16/2020 3.6  g/dL Final     Hemoglobin 12/16/2020 12.9* 13.3 - 17.7 g/dL Final     Parathyroid Hormone Intact 12/16/2020 94   Final     Vitamin D 25 Hydroxy (External) 12/16/2020 20.8   Final     Protein Random Urine 12/16/2020 389   Final     Creatinine Urine 12/16/2020 107.8   Final     Protein/Creatinine Ratio 12/16/2020 3.65   Final     Albumin Urine mg/g Cr 12/16/2020 2,447.5  MG/G Creatinine Final     Creatinine Urine 12/16/2020 107.8   Final

## 2020-12-21 NOTE — PROGRESS NOTES
Transplant Surgery Consult Note    Medical record number: [id number]  YOB: 2000,   Consult requested  for evaluation of kidney transplant candidacy.    Assessment and Recommendations: Mr. Villa is a good candidate for transplantation and has a good understanding of the risks and benefits of this approach to management of renal failure and diabetes. The following issues should be addressed prior to transplant:     Mr. 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  Cardiology consult for cardiac risk stratification to be ordered: No  CT abdomen and pelvis without contrast to be ordered for assessment of vascular targets: No  Transplant listing labs ordered to include HLA, ABOx2, Cr, etc.  Dietician consult ordered: Yes  Social work consult ordered: Yes  Imaging reports reviewed:  none  Radiology images reviewed:Renal US    Will need to lose weight to become candidate for transplant.  Currently weighs 300 lb.  Needs to lose 20 lb to be listed.  When BMI 38 can come in for in person assessment.    The majority of our visit was spent in counselling, discussing the medical and surgical risks of living or  donor kidney and pancreas transplantation, either in a simultaneous or sequential fashion. I contrasted approximate wait time for SPK vs living vs  donor kidneys from normal (0-85%) or higher (%) kidney donor profile index (KDPI) donors and their associated outcomes. I would not recommend this individual to consider kidneys from high KDPI donors. The reason for this decision is best summarized as: improved long term graft survival.  Access to transplant will be impacted by living donor availability and overall candidacy for SPK, as well as the influence of blood type and degree of sensitization. We discussed advantages of  preemptive transplant as well as living donor kidney transplant, and graft and patient survival outcomes associated with these options. Potential surgical complications of kidney and pancreas transplantation include bleeding, clotting, infection, wound complications, anastomotic failure and other issues such as cardiac complications, pneumonia, deep venous thrombosis, pulmonary embolism, post transplant diabetes and death. We discussed the need for protocol biopsy of the kidney and the possible need for a ureteral stent (and subsequent removal). We discussed benefits and risks associated with different approaches to exocrine drainage of pancreatic secretions. We also discussed differences in the average length of stay, recovery process, and posttransplant lab and monitoring protocol. We discussed the risk of graft rejection and recurrent diabetic nephropathy in the setting of poor glycemic control. I emphasized the need for strict immunosuppression adherence and the potential for complications of immunosuppression such as skin cancer or lymphoma, as well as a very low but not zero risk of donor-derived disease transmission risks (infection, cancer). Mr. Villa asked good questions and the patient's candidacy will be reviewed at our Multidisciplinary Selection Committee. Thank you for the opportunity to participate in Mr. Villa's care.    Total time: 25 minutes  Counselling time: 20 minutes    Ken Raya MD, PhD  Associate Professor of Surgery      ---------------------------------------------------------------------------------------------------    HPI: Mr. 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   He has had several surgeries for ureter repair and has a Mitrofanoff (not currently using).  Not on dialysis.  Has 2 possible donors (parents).  Otherwise well.      Past Medical History:   Diagnosis Date     ADD (attention deficit disorder)      CKD (chronic kidney disease)       Hypertension 2000     Posterior urethral valves      Past Surgical History:   Procedure Laterality Date     ABDOMEN SURGERY  2003    Bilateral urereral tapering and re-implantation     ABDOMEN SURGERY  2008    Mitrofanoff     ablation of posterior urethral valves  2000     APPENDECTOMY  2008     ureteral reimplantation with tapering  2003     No family history on file.  Social History     Socioeconomic History     Marital status: Single     Spouse name: Not on file     Number of children: Not on file     Years of education: Not on file     Highest education level: Not on file   Occupational History     Not on file   Social Needs     Financial resource strain: Not on file     Food insecurity     Worry: Not on file     Inability: Not on file     Transportation needs     Medical: Not on file     Non-medical: Not on file   Tobacco Use     Smoking status: Never Smoker     Smokeless tobacco: Never Used   Substance and Sexual Activity     Alcohol use: Not on file     Drug use: Not on file     Sexual activity: Not on file   Lifestyle     Physical activity     Days per week: Not on file     Minutes per session: Not on file     Stress: Not on file   Relationships     Social connections     Talks on phone: Not on file     Gets together: Not on file     Attends [employer] service: Not on file     Active member of club or organization: Not on file     Attends meetings of clubs or organizations: Not on file     Relationship status: Not on file     Intimate partner violence     Fear of current or ex partner: Not on file     Emotionally abused: Not on file     Physically abused: Not on file     Forced sexual activity: Not on file   Other Topics Concern     Parent/sibling w/ CABG, MI or angioplasty before 65F 55M? Not Asked   Social History Narrative     Not on file       ROS:   CONSTITUTIONAL:  No fevers or chills  EYES: negative for icterus  ENT:  negative for hearing loss, tinnitus and sore throat  RESPIRATORY:  negative for  cough, sputum, dyspnea  CARDIOVASCULAR:  negative for chest pain None  GASTROINTESTINAL:  negative for nausea, vomiting, diarrhea or constipation  GENITOURINARY:  negative for incontinence, dysuria, bladder emptying problems  HEME:  No easy bruising  INTEGUMENT:  negative for rash and pruritus  NEURO:  Negative for headache, seizure disorder    Allergies:   Allergies   Allergen Reactions     Dust Mites      Runny nose and watery eyes     Mold      Runny nose and watery eyes     Other [Seasonal Allergies]      Grass, Ragweed - gets runny nose and watery eyes       Medications:  Prescription Medications as of 12/21/2020       Rx Number Disp Refills Start End Last Dispensed Date Next Fill Date Owning Pharmacy    amLODIPine (NORVASC) 5 MG tablet  90 tablet 3 11/25/2019    [facility]  for [address]    Sig: Take 1 tablet (5 mg) by mouth daily    Class: E-Prescribe    Route: Oral    atenolol (TENORMIN) 25 MG tablet  135 tablet 3 11/25/2019    [facility]  for [address]    Sig: Take 1.5 tablets (37.5 mg) by mouth daily    Class: E-Prescribe    Route: Oral    calcium carbonate (OSCAL 500) 1250 (500 Ca) MG TABS tablet    1/1/2017        Sig: daily     Class: Historical    Cholecalciferol (VITAMIN D) 2000 units CAPS    1/1/2017        Class: Historical    methylphenidate (RITALIN) 10 MG tablet    2/16/2020    [facility] - [address]    Sig: Take 25 mg by mouth    Class: Historical    Earliest Fill Date: 2/16/2020    Route: Oral    ramipril (ALTACE) 1.25 MG capsule  60 capsule 11 1/10/2020    [id number] - [place], [place] - [address]    Sig: Take 2 capsules (2.5 mg) by mouth daily    Class: E-Prescribe    Route: Oral    sodium bicarbonate 650 MG tablet  180 tablet 23 11/19/2020    [facility] [address]  [facility]    Sig: TAKE 4 TABLETS (2,600 MG) THREE TIMES A DAY    Class: E-Prescribe          Exam:   Constitutional - A&O in NAD.   Eyes - no redness or discharge.  Sclera anicteric  Respiratory - no cough, no labored breathing  Musculoskeletal - range of motion normal  Skin - no discoloration, no jaundice  Neurological - no tremors.  No facial droop or dysarthria  Psychiatric - normal mood and affect  The rest of a comprehensive physical examination is deferred due to PHE (public health emergency) video visit restrictions    Diagnostics:   Recent Results (from the past 672 hour(s))   [facility] Urine Macroscopic POCT    Collection Time: 12/03/20  3:25 PM   Result Value Ref Range    Color Urine Yellow     Appearance Urine Clear     Glucose Urine 100 (A) NEG^Negative mg/dL    Bilirubin Urine Negative NEG^Negative    Ketones Urine Negative NEG^Negative mg/dL    Specific Gravity Urine 1.025 1.003 - 1.035    Blood Urine Small (A) NEG^Negative    pH Urine 7.0 5.0 - 7.0 pH    Protein Albumin Urine >300 (A) NEG^Negative mg/dL    Urobilinogen Urine 0.2 0.2 - 1.0 EU/dL    Nitrite Urine Negative NEG^Negative    Leukocyte Esterase Urine Negative NEG^Negative   Renal panel    Collection Time: 12/16/20 12:00 AM   Result Value Ref Range    Sodium 139 mmol/L    Potassium 5.0 mmol/L    Chloride 114 mmol/L    Carbon Dioxide 15 mmol/L    Anion Gap 10 mmol/L    Glucose 89 70 - 99 mg/dL    Urea Nitrogen 75 mg/dL    Creatinine 5.27 mg/dL    Calcium 9.1 mg/dL    Phosphorus 4.6 mg/dL    Albumin 3.6 g/dL   Hemoglobin    Collection Time: 12/16/20 12:00 AM   Result Value Ref Range    Hemoglobin 12.9 (A) 13.3 - 17.7 g/dL   Parathyroid Hormone Intact    Collection Time: 12/16/20 12:00 AM   Result Value Ref Range    Parathyroid Hormone Intact 94    Vitamin D 25-Hydroxy ([facility])    Collection Time: 12/16/20 12:00 AM   Result Value Ref Range    Vitamin D 25 Hydroxy (External) 20.8    Protein  random urine with Creat Ratio    Collection Time:  "12/16/20 12:00 AM   Result Value Ref Range    Protein Random Urine 389     Creatinine Urine 107.8     Protein/Creatinine Ratio 3.65    Albumin Random Urine Quantitative with Creat Ratio    Collection Time: 12/16/20 12:00 AM   Result Value Ref Range    Albumin Urine mg/g Cr 2,447.5 MG/G Creatinine    Creatinine Urine 107.8      No results found for: LARRY Villa is a 20 year old male who is being evaluated via a billable video visit.          The patient has been notified of following:     \"This video visit will be conducted via a call between you and your physician/provider. We have found that certain health care needs can be provided without the need for an in-person physical exam.  This service lets us provide the care you need with a video conversation.  If a prescription is necessary we can send it directly to your pharmacy.  If lab work is needed we can place an order for that and you can then stop by our lab to have the test done at a later time.    Video visits are billed at different rates depending on your insurance coverage.  Please reach out to your insurance provider with any questions.    If during the course of the call the physician/provider feels a video visit is not appropriate, you will not be charged for this service.\"    Patient has given verbal consent for Video visit? Yes  How would you like to obtain your AVS? MyChart  If you are dropped from the video visit, the video invite should be resent to: Text to cell phone: [phone]  Will anyone else be joining your video visit? No      Video-Visit Details    Type of service:  Video Visit    Video Start Time: 1:20 pm  Video End Time: 1:45 PM    Originating Location (pt. Location): Home    Distant Location (provider location):  [facility]     Platform used for Video Visit: Sulaiman Raya MD        "

## 2020-12-21 NOTE — LETTER
"2020       RE: Boogie Villa  [address] [facility] [address]     Dear Colleague,    Thank you for referring your patient, Boogie Villa, to the [facility]. Please see a copy of my visit note below.        New Medical Weight Management Consult    PATIENT:  Boogie Villa  MRN:         [id number]  :         2000  LEANN:         2020    Dear Joe    I had the pleasure of seeing your patient, Boogie Villa. Full intake/assessment was done to determine barriers to weight loss success and develop a treatment plan. Boogie Villa is a 20 year old male interested in treatment of medical problems associated with excess weight. He has a height of 5' 11\", a weight of 282 lbs 0 oz, and the calculated Body mass index is 39.33 kg/m .    ASSESSMENT/PLAN:  Boogie is a patient with early onset obesity with significant element of familial/genetic influence and with current health consequences. He does need aggressive weight loss plan due to need for kidney transplant.  Boogie Villa eats a high carb diet and eats to obtain specific degree of fullness.    His ability to lose weight is impacted by lack of education on nutrition and dietary needs.    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     No fluid restrictions.     Patient Goals 12/18/2020   I am interested in having a healthier weight to diminish current health problems: Yes   I am interested in having a healthier weight in order to prevent future health problems: Yes   I am interested in having a healthier weight in order to have a future surgery: Yes   If yes, please indicate which surgery? Kidney Transplant     Goal for kidney transplant is BMI < 35  Saw transplant today, needs BMI <38 to get listed for transplant      Referring Provider 12/18/2020   Please name the provider who referred you to Medical Weight Management.  If you do not know, please answer: \"I Don't Know\". Joe       Weight History 12/18/2020   How concerned are you about your weight? Somewhat Concerned   Would you describe your weight gain as gradual? Yes   I became overweight: As a Teenager   The following factors have contributed to my weight gain:  Change in Schedule, Eating Wrong Types of Food, Eating Too Much, Lack of Exercise, Genetic (Runs in the Family)   I have tried the following methods to lose weight: Watching Portions or Calories, Exercise   My lowest weight since age 18 was: 230   My highest weight since age 18 was: 280   The most weight I have ever lost was: (lbs) 5   I have the following family history of obesity/being overweight:  My father is overweight, One or more of my siblings are overweight, Many of my relatives are overweight   Has anyone in your family had weight loss surgery? No   How has your weight changed over the last year?  Gained   How many pounds? 25     No previous attempts at weight loss  Motivated by need for kidney transplant     Diet Recall Review with Patient 12/18/2020   Do you typically eat breakfast? No   Do you typically eat lunch? Yes   If you do eat lunch, what types of food do you typically eat?  Soup, hand pies, sandwiches, chimichangas   Do you typically eat supper? Yes   If you do eat supper, what types of " food do you typically eat? Soup, hand pies, sandwiches, chimichangas   Do you typically eat snacks? No   Do you like vegetables?  No   Do you drink water? Yes   How many glasses of juice do you drink in a typical day? 0   How many of glasses of milk do you drink in a typical day? 0   How many 8oz glasses of sugar containing drinks such as Jitendra-Aid/sweet tea do you drink in a day? 0   How many cans/bottles of sugar pop/soda/tea/sports drinks do you drink in a day? 0   How many cans/bottles of diet pop/soda/tea or sports drink do you drink in a day? 0   How often do you have a drink of alcohol? Never     2 meals a day   Gets distracted in the morning and forgets to eat until very hungry   Eats 4-5 hours after waking up - realizes he is very hunger then   No snacks   Second meal usually alone, with family on the weekends - leftovers- soup, chicken pattys, chimmichangas, calzones   Meal prep on Saturday/ Sunday - as a family   Feels like he needs larger portions to get full  Will take larger portions to start so he doesn't have to go back for seconds   Doesn't realize he is full until food is gone         Eating Habits 12/18/2020   Generally, my meals include foods like these: bread, pasta, rice, potatoes, corn, crackers, sweet dessert, pop, or juice. Everyday   Generally, my meals include foods like these: fried meats, brats, burgers, french fries, pizza, cheese, chips, or ice cream. A Few Times a Week   Eat fast food (like McDonalds, [employer], Taco Bell). Never   Eat at a buffet or sit-down restaurant. Once a Week   Eat most of my meals in front of the TV or computer. Everyday   Often skip meals, eat at random times, have no regular eating times. Everyday   Rarely sit down for a meal but snack or graze throughout.  Never   Eat extra snacks between meals. Less Than Weekly   Eat most of my food at the end of the day. A Few Times a Week   Eat in the middle of the night or wake up at night to eat. Never   Eat extra  snacks to prevent or correct low blood sugar. Never   Eat to prevent acid reflux or stomach pain. Never   Worry about not having enough food to eat. Never   Have you been to the food shelf at least a few times this year? No   I eat when I am depressed. Never   I eat when I am stressed. Never   I eat when I am bored. Never   I eat when I am anxious. Never   I eat when I am happy or as a reward. Less Than Weekly   I feel hungry all the time even if I just have eaten. Never   Feeling full is important to me. Once a Week   I finish all the food on my plate even if I am already full. A Few Times a Week   I can't resist eating delicious food or walk past the good food/smell. Less Than Weekly   I eat/snack without noticing that I am eating. Never   I eat when I am preparing the meal. Never   I eat more than usual when I see others eating. Never   I have trouble not eating sweets, ice cream, cookies, or chips if they are around the house. Never   I think about food all day. Less Than Weekly   What foods, if any, do you crave? None   Please list any other foods you crave? potatoes       Amount of Food 12/18/2020   I make myself vomit what I have eaten or use laxatives to get rid of food. Never   I eat a large amount of food, like a loaf of bread, a box of cookies, a pint/quart of ice cream, all at once. Never   I eat a large amount of food even when I am not hungry. Never   I eat rapidly. Everyday   I eat alone because I feel embarrassed and do not want others to see how much I have eaten. Never   I eat until I am uncomfortably full. Monthly   I feel bad, disgusted, or guilty after I overeat. Never   I make myself vomit what I have eaten or use laxatives to get rid of food. Never       Activity/Exercise History 12/18/2020   How much of a typical 12 hour day do you spend sitting? Most of the Day   How much of a typical 12 hour day do you spend lying down? Less Than Half the Day   How much of a typical day do you spend  walking/standing? Less Than Half the Day   How many hours (not including work) do you spend on the TV/Video Games/Computer/Tablet/Phone? 6 Hours or More   How many times a week are you active for the purpose of exercise? Never   What keeps you from being more active? Unsure What To Do, Worried People Will Look At Me, Other   How many total minutes do you spend doing some activity for the purpose of exercising when you exercise? Less Than 15 Minutes     No current exercise routine   Plans to increase activity to 3 times a week     PAST MEDICAL HISTORY:  Past Medical History:   Diagnosis Date     ADD (attention deficit disorder)      CKD (chronic kidney disease)      Hypertension 2000     Posterior urethral valves        Work/Social History Reviewed With Patient 12/18/2020   My employment status is: Student   What is your marital status? Single   If in a relationship, is your significant other overweight? N/A   Do you have children? No   If you have children, are they overweight? N/A   Who do you live with?  Alone   Who does the food shopping?  Me       Mental Health History Reviewed With Patient 12/18/2020   Have you ever been physically or sexually abused? No   How often in the past 2 weeks have you felt little interest or pleasure in doing things? Not at all   Over the past 2 weeks how often have you felt down, depressed, or hopeless? Not at all       Sleep History Reviewed With Patient 12/18/2020   How many hours do you sleep at night? 8   Do you think that you snore loudly or has anybody ever heard you snore loudly (louder than talking or so loud it can be heard behind a shut door)? Yes   Has anyone seen or heard you stop breathing during your sleep? No   Do you often feel tired, fatigued, or sleepy during the day? No   Do you have a TV/Computer in your bedroom? Yes       MEDICATIONS:   Current Outpatient Medications   Medication Sig Dispense Refill     amLODIPine (NORVASC) 5 MG tablet Take 1 tablet (5 mg) by  "mouth daily 90 tablet 3     atenolol (TENORMIN) 25 MG tablet Take 1.5 tablets (37.5 mg) by mouth daily 135 tablet 3     calcium carbonate (OSCAL 500) 1250 (500 Ca) MG TABS tablet daily        Cholecalciferol (VITAMIN D) 2000 units CAPS        methylphenidate (RITALIN) 10 MG tablet Take 25 mg by mouth       ramipril (ALTACE) 1.25 MG capsule Take 2 capsules (2.5 mg) by mouth daily 60 capsule 11     sodium bicarbonate 650 MG tablet TAKE 4 TABLETS (2,600 MG) THREE TIMES A  tablet 23       ALLERGIES:   Allergies   Allergen Reactions     Dust Mites      Runny nose and watery eyes     Mold      Runny nose and watery eyes     Other [Seasonal Allergies]      Grass, Ragweed - gets runny nose and watery eyes       PHYSICAL EXAM:  Ht 1.803 m (5' 11\")   Wt 127.9 kg (282 lb)   BMI 39.33 kg/m      Waist circumference:      Wt Readings from Last 4 Encounters:   12/21/20 127.9 kg (282 lb)   10/22/20 136.1 kg (300 lb)   11/25/19 123 kg (271 lb 1.6 oz) (>99 %, Z= 2.72)*   06/25/19 119.3 kg (263 lb) (>99 %, Z= 2.61)*     * Growth percentiles are based on CDC (Boys, 2-20 Years) data.     A & O x 3  HEENT: NCAT, mucous membranes moist  Respirations unlabored  Location of obesity: Mixed Obesity    FOLLOW-UP:   4 weeks.    TIME: 25 min spent on evaluation, management, counseling, education, & motivational interviewing with greater than 50 % of the total time was spent on counseling and coordinating care    Sincerely,    Yessenia Heller NP        Boogie Villa is a 20 year old male who is being evaluated via a billable video visit.      The patient has been notified of following:     \"This video visit will be conducted via a call between you and your physician/provider. We have found that certain health care needs can be provided without the need for an in-person physical exam.  This service lets us provide the care you need with a video conversation.  If a prescription is necessary we can send it directly to your pharmacy.  If lab " "work is needed we can place an order for that and you can then stop by our lab to have the test done at a later time.    Video visits are billed at different rates depending on your insurance coverage.  Please reach out to your insurance provider with any questions.    If during the course of the call the physician/provider feels a video visit is not appropriate, you will not be charged for this service.\"    Patient has given verbal consent for Video visit? Yes  How would you like to obtain your AVS? MyChart  If you are dropped from the video visit, the video invite should be resent to: Text to cell phone: [phone]  Will anyone else be joining your video visit? No        Video-Visit Details    Type of service:  Video Visit    Video Start Time: 1355  Video End Time: 1422    Originating Location (pt. Location): Home    Distant Location (provider location):  [facility] [place]     Platform used for Video Visit: AmWell     Last 10 minutes had to be by phone on doximity due to connection issues     Yessenia Heller NP        "

## 2020-12-21 NOTE — PROGRESS NOTES
[facility] Solid Organ Transplant  Outpatient MNT: Kidney Transplant Evaluation    Current BMI: 41.8 (HT 71 in,  lbs/136 kg)- data from 10/22   BMI is outside recommendation of <35 for kidney transplant  Goal weight for kidney transplant <250 lbs (50 lb loss) / per surgeon discretion      Time Spent: 15 minutes  Visit Type: Initial   Referring Physician: Finger   Pt accompanied by: his mom    History of previous txp: none   Dialysis: no    Nutrition Assessment  Follows lower sodium, lower K diet ~1 year. Pt doesn't read labels, but pt reports all his food is homemade (pt/mom cook). No added salt. Uses garlic, pepper, Mrs Dash.      Pt aware of weight goals for transplant. No previous weight loss attempts. May consider TID meals, smaller portions. Fruits/veggies not consistent. Does not like fruits or veggies. Moving in 1 week to apartment on his own. He has thought about establishing some routine activity (will have space in apartment), but hasn't thought of specifics quite yet. Pt reports his activity goal is for 3x/week. Has done dance videos online. Virtual reality.     Appetite: good/baseline     Vitamins, Supplements, Pertinent Meds: vit D, calcium, probiotic   Herbal Medicines/Supplements: none    Weight hx: going up over the past year, about 25 lbs; stopped working/less active     Edema: none     Diet Recall (no routine schedule)  Breakfast Skips d/t sleeping    Lunch Waits 3-4 hours after waking- soup (homemade with veggies), chicken brianne (with bun with cheese, lettuce, cueva), chimmichangas   Dinner 11p-2a: similar to L    Snacks None    Beverages Water   Alcohol None    Dining out 1x/week      Physical Activity  Previously was working in retail/on feet most of the time  Stopped working in March  No routine activity now      Labs  12/16 K 5 Phos 4.6     Anthropometrics   IBW/%IBW: 172/174  Dosing wt: 204 lbs/93 kg    Estimated Nutrition Needs   Calories: 9368-5827 (15-20 kcal/kg) for weight  "loss    Nutrition Diagnosis  Obesity r/t positive energy balance and inadequate physical activity AEB BMI 41.    Nutrition Intervention  Nutrition education provided:  Discussed strategies for weight loss. Discussed considering tracking food/calories and goal per above range. Discussed monitoring portion size/reverse measuring. Consider addition of fruit or veggie into intake, perhaps 1x/week. Discussed specifics around activity and ways to hold himself accountable (check in with mom, set routine/scheduled time, etc). Also reviewed eating a small snack within 1 hour of waking and why (ie string cheese stick, handful of fruit).     Discussed sodium intake (low sodium foods and drinks, seasoning food without salt and tips for low sodium diet). Reviewed wnl K/Phos labs.     Did not review post txp nutrition ed at this time. Will review once back for full eval.     Patient Understanding: Pt verbalized understanding of education provided.  Expected Engagement: Good  Follow-Up Plans: PRN     Nutrition Goals  Weight loss to goal wt: < 250 lbs (BMI <35 or per MD)     Baylee Roberson, RD, LD, CCTD    Boogie Villa is a 20 year old male who is being evaluated via a billable telephone visit.      The patient has been notified of following:     \"This telephone visit will be conducted via a call between you and your physician/provider. We have found that certain health care needs can be provided without the need for a physical exam.  This service lets us provide the care you need with a short phone conversation.  If a prescription is necessary we can send it directly to your pharmacy.  If lab work is needed we can place an order for that and you can then stop by our lab to have the test done at a later time.    Telephone visits are billed at different rates depending on your insurance coverage. During this emergency period, for some insurers they may be billed the same as an in-person visit.  Please reach out to your " "insurance provider with any questions.    If during the course of the call the physician/provider feels a telephone visit is not appropriate, you will not be charged for this service.\"    Patient has given verbal consent for Telephone visit?  yes    Phone call duration: 15 minutes                                      "

## 2020-12-21 NOTE — PATIENT INSTRUCTIONS
"Thank you for allowing us the privilege of caring for you. We hope we provided you with the excellent service you deserve.   Please let us know if there is anything else we can do for you so that we can be sure you are completely satisfied with your care experience.    To ensure the quality of our services you may be receiving a patient satisfaction survey from an independent patient satisfaction monitoring company.    The greatest compliment you can give is a \"Likely to Recommend\"    Your visit was with Yessenia Heller NP today.    Instructions per today's visit:     Jeffrey Villa, it was great to visit with you today.  Here is a review of our visit.  If our clinic scheduler is not able to reach you please call [phone] to schedule your next appointments.    -read information about topiramate (information attached at bottom of this section)   -read information about sleeve gastrectomy  -review seminar   [email or web address] to watch online seminar     -follow up 1/2021 with myself or Sunitha Juan     Information about Video Visits with [email or web address]: video visit information    Please call our contact center at [phone] to schedule your next appointments.  To find a lab location near you, please call [phone].  For any nursing questions or concerns call Jannet Matute LPN at [phone] or Verna Mcmanus RN at [phone]  Please call during clinic hours Monday through Friday 8:00a - 4:00p if you have questions or you can contact us via [employer] at anytime and we will reply during clinic hours.    Lab results will be communicated through My Chart or letter (if My Chart not used). Please call the clinic if you have not received communication after 1 week or if you have any questions.?  Clinic Fax: " [phone]  ______________________________________________________________________________________________________________________    _________________________________________________________________________________________________________________________________  Interested in working with a health ?  Health coaches work with you to improve your overall health and wellbeing.  They look at the whole person, and may involve discussion of different areas of life, including, but not limited to the four pillars of health (sleep, exercise, nutrition, and stress management). Discuss with your care team if you would like to start working a health .  Health Coaching-3 Pack: Schedule by calling [phone]    $99 for three health coaching visits    Visits may be done in person or via phone    Coaching is a partnership between the  and the client; Coaches do not prescribe or diagnose    Coaching helps inspire the client to reach his/her personal goals   _________________________________________________________________________________________________________________________________  SUPPORT GROUPS    [facility] Weight Loss Surgery Support Group    [facility] Weight Loss Surgery Support Group    The ealth [facility] Weight Loss Surgery Support Group is a patient-lead forum that meets monthly. Due to Covid-19, we are meeting remotely from 5 PM - 6 PM via Microsoft Teams. The group is facilitated by Latanya Edwards, the program s Clinical Coordinator. The support group shares experiences, encouragement and education. It is open to those who have had weight loss surgery, are scheduled for surgery, and those who are considering surgery.   If you are interested in attending, please contact the clinic via [employer] or call the nurse line directly at [phone] to inform our staff that you would like an invite sent to you. Please let us know the email you would like the invite  sent to. Prior to the meeting, a link with directions on how to join the meeting will be sent to you.    2020 Meetings December 2 - The group will not have a speaker. The focus will be to offer support to one another during the Holiday season.    2021 Meetings January 20 - Guest Speaker: Yobany Kim RD, CON[place]     February 17 - The group will not have a speaker. This will be a time of group support.     March 17 - Guest Speaker: Angelika Kirkland, [facility], [facility], [facility]     [facility] and [facility] Support Groups    Connections: Bariatric Care Support Group?  This group takes place the second Tuesday of each month from 6:30 PM - 8 PM virtually using Microsoft Teams. It is led by Evert Peres, Ph.D who is a Licensed Psychologist with the [facility] Comprehensive Weight Management Program. There is no cost for group participation and it is open to all [facility] (and those external to this program) pre- and post- operative bariatric surgery patients as well as their support system.   Please send an email to Evert Peres, Ph.D., LP at?[email or web address]?if you would like an invitation to the group and to learn about using Microsoft Teams.    2020 Meetings     November 10 - Healthy Eating  Guest Speaker: Yeny Gill RD, CONSTANZA     December 8    Connections: Post-Operative Bariatric Surgery Support Group  This support group meets the 4th Wednesday of the month from 11 AM - 12 PM virtually using Microsoft Teams. It is led by Kasey Marie RN. This is a support group for [facility] bariatric patients (and those external to [facility]) who have had bariatric surgery and are at least 3 months post-surgery. There is no cost to attend and registration is not required. Please send an email to Kasey Marie RN at [email or web address] if you would like an invitation to the group and to learn about using Microsoft Teams.    2020 Meetings November  25 December 23    [facility] Healthy Lifestyle Virtual Support Group    Healthy Lifestyle Virtual Support Group?    This group is held monthly on a Friday on the fourth Friday from 12:30 PM - to 1:30 PM. It is 60 minutes of small group guided discussion, support and resources led by National Board Certified Health , Kasey Villanueva. All are welcome who want a healthy lifestyle. To receive monthly invites to the Healthy Lifestyle Virtual Support Group or if you have any questions about having a health  or attending support group, please email Kasey [email or web address]. Kasey will send out invites for each session, with the phone number and the conference ID number specific to that session.    2020 Meetings     November 13 - The Importance of Self Care and Connection During Covid December 18 - Open Forum    2021 Meetings    January 29 - How to Stay Active and Healthy during the Winter Months    February 26 - Reading Food Labels: What do I Need to Know?  Guest Speaker: Joann Roach RD    March 19 - Finding Health, Happiness and Confidence at Every Size  Guest Speaker: Cydney Alvarez, Health Psychology Fellow    April 30 - Healthy Eating on a Budget  Guest Speaker: Trisha Hernandez RD    May 21 - Open Forum  _________________________________________________________________________________________    MEDICATION considering AT THIS APPOINTMENT  We are considering starting topiramate at bedtime.  Start one tab, 25 mg, for a week. Go up to 50 mg (2 tabs) for the next week. At the third week, take  3 tabs (75 mg).  Stay at 3 tabs until you are seen again.     For any questions/concerns contact Jannet Matute LPN at [phone]    (Do not stop taking it if you don't think it's working. For some people it works even though they do not feel much different.)    Topiramate (Topamax) is a medication that is used most often to treat migraine headaches or for seizures.  It has also been found to help with weight loss. Although it's not currently FDA approved for weight loss, it has been used safely for a number of years to help people who are carrying extra weight.     Just how topiramate helps with weight loss has not been exactly determined. However it seems to work on areas of the brain to quiet down signals related to eating.      Topiramate may make you:    >feel less interest in eating in between meals   >think less about food and eating   >find it easier to push the plate away   >find giving up pop easier    >have an easier time eating less    For some of our patients, the pills work right away. They feel and think quite differently about food. Other patients don't feel much of a change but find in fact they have lost weight! Like all weight loss medications, topiramate works best when you help it work.  This means:    1) Have less tempting high calorie (fattening) food around the house or office    2) Have lower calorie food (fruits, vegetables,low fat meats and dairy) for snacks    3) Eat out only one time or less each week.   4) Eat your meals at a table with the TV or computer off.    Side-effects. Topiramate is generally well tolerated. The main side-effects we see are:   Tingling in hands,feet, or face (usually not very troublesome)   Mental confusion and word finding trouble (about 10% of patients have this.)     Feeling sleepy or a bit dopey- this goes away very soon after starting.    One of the dangers of topiramate is the possibility of birth defects--if you get pregnant when you are on it, there is the risk that your baby will be born with a cleft lip or palate.  If you are on topiramate and of child bearing age, you need to be on a reliable form of birth control or refrain from sexual intercourse.     Please refer to the pharmacy insert for more information on side-effects. Since many pharmacists are not familiar with the use of topiramate in weight loss,  calling the clinic will get you the most accurate information on the use of this medication for weight loss.     In order to get refills of this or any medication we prescribe you must be seen in the medical weight mgmt clinic every 2-3 months. Please have your pharmacy fax a refill request to [phone].

## 2020-12-21 NOTE — LETTER
12/21/2020         RE: Boogie Villa  [address] [address]        Dear Colleague,    Thank you for referring your patient, Boogie Villa, to the [facility] TRANSPL[facility]. Please see a copy of my visit note below.    [facility] Solid Organ Transplant  Outpatient MNT: Kidney Transplant Evaluation    Current BMI: 41.8 (HT 71 in,  lbs/136 kg)- data from 10/22   BMI is outside recommendation of <35 for kidney transplant  Goal weight for kidney transplant <250 lbs (50 lb loss) / per surgeon discretion      Time Spent: 15 minutes  Visit Type: Initial   Referring Physician: Finger   Pt accompanied by: his mom    History of previous txp: none   Dialysis: no    Nutrition Assessment  Follows lower sodium, lower K diet ~1 year. Pt doesn't read labels, but pt reports all his food is homemade (pt/mom cook). No added salt. Uses garlic, pepper, Mrs Dash.      Pt aware of weight goals for transplant. No previous weight loss attempts. May consider TID meals, smaller portions. Fruits/veggies not consistent. Does not like fruits or veggies. Moving in 1 week to apartment on his own. He has thought about establishing some routine activity (will have space in apartment), but hasn't thought of specifics quite yet. Pt reports his activity goal is for 3x/week. Has done dance videos online. Virtual reality.     Appetite: good/baseline     Vitamins, Supplements, Pertinent Meds: vit D, calcium, probiotic   Herbal Medicines/Supplements: none    Weight hx: going up over the past year, about 25 lbs; stopped working/less active     Edema: none     Diet Recall (no routine schedule)  Breakfast Skips d/t sleeping    Lunch Waits 3-4 hours after waking- soup (homemade with veggies), chicken brianne (with bun with cheese, lettuce, cueva), chimmichangas   Dinner 11p-2a: similar to L    Snacks None    Beverages Water   Alcohol None    Dining out 1x/week      Physical Activity  Previously was working in  "retail/on feet most of the time  Stopped working in March  No routine activity now      Labs  12/16 K 5 Phos 4.6     Anthropometrics   IBW/%IBW: 172/174  Dosing wt: 204 lbs/93 kg    Estimated Nutrition Needs   Calories: 8068-6696 (15-20 kcal/kg) for weight loss    Nutrition Diagnosis  Obesity r/t positive energy balance and inadequate physical activity AEB BMI 41.    Nutrition Intervention  Nutrition education provided:  Discussed strategies for weight loss. Discussed considering tracking food/calories and goal per above range. Discussed monitoring portion size/reverse measuring. Consider addition of fruit or veggie into intake, perhaps 1x/week. Discussed specifics around activity and ways to hold himself accountable (check in with mom, set routine/scheduled time, etc). Also reviewed eating a small snack within 1 hour of waking and why (ie string cheese stick, handful of fruit).     Discussed sodium intake (low sodium foods and drinks, seasoning food without salt and tips for low sodium diet). Reviewed wnl K/Phos labs.     Did not review post txp nutrition ed at this time. Will review once back for full eval.     Patient Understanding: Pt verbalized understanding of education provided.  Expected Engagement: Good  Follow-Up Plans: PRN     Nutrition Goals  Weight loss to goal wt: < 250 lbs (BMI <35 or per MD)     Baylee Roberson, RD, LD, CCTD    Boogie Villa is a 20 year old male who is being evaluated via a billable telephone visit.      The patient has been notified of following:     \"This telephone visit will be conducted via a call between you and your physician/provider. We have found that certain health care needs can be provided without the need for a physical exam.  This service lets us provide the care you need with a short phone conversation.  If a prescription is necessary we can send it directly to your pharmacy.  If lab work is needed we can place an order for that and you can then stop by our lab " "to have the test done at a later time.    Telephone visits are billed at different rates depending on your insurance coverage. During this emergency period, for some insurers they may be billed the same as an in-person visit.  Please reach out to your insurance provider with any questions.    If during the course of the call the physician/provider feels a telephone visit is not appropriate, you will not be charged for this service.\"    Patient has given verbal consent for Telephone visit?  yes    Phone call duration: 15 minutes                              "

## 2020-12-31 ENCOUNTER — TELEPHONE (OUTPATIENT)
Dept: TRANSPLANT | Facility: CLINIC | Age: 20
End: 2020-12-31

## 2020-12-31 NOTE — TELEPHONE ENCOUNTER
Called patient today and LM on  that Transplant Team reviewed his status at yesterday's meeting and he is recommended to either lose 10 pounds or to engage in gastric sleeve surgery pathway in order to qualify to proceed with full eval - afterwhich could be listed. Generated Transplant Evaluation Summary Letter today in Epic - electronically routed.

## 2021-02-17 ENCOUNTER — VIRTUAL VISIT (OUTPATIENT)
Dept: ENDOCRINOLOGY | Facility: CLINIC | Age: 21
End: 2021-02-17
Payer: COMMERCIAL

## 2021-02-17 VITALS — HEIGHT: 71 IN | BODY MASS INDEX: 38.22 KG/M2 | WEIGHT: 273 LBS

## 2021-02-17 DIAGNOSIS — E66.812 CLASS 2 SEVERE OBESITY DUE TO EXCESS CALORIES WITH SERIOUS COMORBIDITY AND BODY MASS INDEX (BMI) OF 39.0 TO 39.9 IN ADULT (H): Primary | ICD-10-CM

## 2021-02-17 DIAGNOSIS — E66.01 CLASS 2 SEVERE OBESITY DUE TO EXCESS CALORIES WITH SERIOUS COMORBIDITY AND BODY MASS INDEX (BMI) OF 39.0 TO 39.9 IN ADULT (H): Primary | ICD-10-CM

## 2021-02-17 PROBLEM — E66.9 OBESITY: Status: ACTIVE | Noted: 2017-12-15

## 2021-02-17 PROCEDURE — 99213 OFFICE O/P EST LOW 20 MIN: CPT | Mod: GT | Performed by: NURSE PRACTITIONER

## 2021-02-17 ASSESSMENT — PAIN SCALES - GENERAL: PAINLEVEL: NO PAIN (0)

## 2021-02-17 ASSESSMENT — MIFFLIN-ST. JEOR: SCORE: 2270.45

## 2021-02-17 NOTE — PROGRESS NOTES
Return Medical Weight Management Note     Boogie Villa  MRN:  [id number]  :  2000  LEANN:  2021    Dear VALENTIN MEEKS,    I had the pleasure of seeing your patient Boogie Villa. He is a 20 year old male who I am continuing to see for treatment of obesity related to:       2020   I have the following health issues associated with obesity: High Blood Pressure   I have the following symptoms associated with obesity: Groin Rash       Assessment & Plan   Problem List Items Addressed This Visit        Digestive    Class 2 severe obesity due to excess calories with serious comorbidity and body mass index (BMI) of 39.0 to 39.9 in adult (H) - Primary     Down 9lb since last visit, 25lb in total since 10/2020. BMI is now 38.08. He will reach out to transplant team. Patient is essentially intermittent fasting, 1 meal a day- mid to late afternoon. Denies hunger between meals. Now living on own and independently making meals- meal preps ahead of time- looking for low sodium/ potassium meals online. Making good substitutes like turnips instead of potatoes. Continue with current plan. Discussed ongoing monitoring of progress and adjustments if needed.                     20 minutes spent on the date of the encounter doing chart review, history and exam, documentation and further activities as noted above  0956}  MEDICATIONS:  Continue current medications without change  FUTURE APPOINTMENTS:       - Follow-up visit in 3 months- sooner if needed      INTERVAL HISTORY:  Down 9lbs since last visit. Eating 1 meal a day, occasional snack. Has been meal prepping in advance.   Still wakes up late and eats mid to late afternoon.   Now living on his own  Not hungry outside of meal times      CURRENT WEIGHT:   273 lbs 0 oz    Initial Weight (lbs): 282 lbs  Last Visits Weight: 127.9 kg (282 lb)  Cumulative weight loss (lbs): 9  Weight Loss Percentage: 3.19%    Changes and Difficulties 2021   I have made the  "following changes to my diet since my last visit: Eating less   With regards to my diet, I am still struggling with: Eating healthy foods   I have made the following changes to my activity/exercise since my last visit: Exercising a few times per week (VR and swords)   With regards to my activity/exercise, I am still struggling with: Do other types of exercise       VITALS:  Ht 1.803 m (5' 11\")   Wt 123.8 kg (273 lb)   BMI 38.08 kg/m      MEDICATIONS:   Current Outpatient Medications   Medication Sig Dispense Refill     amLODIPine (NORVASC) 5 MG tablet Take 1 tablet (5 mg) by mouth daily 90 tablet 3     atenolol (TENORMIN) 25 MG tablet Take 1.5 tablets (37.5 mg) by mouth daily 135 tablet 3     calcium carbonate (OSCAL 500) 1250 (500 Ca) MG TABS tablet daily        Cholecalciferol (VITAMIN D) 2000 units CAPS        methylphenidate (RITALIN) 10 MG tablet Take 25 mg by mouth       ramipril (ALTACE) 1.25 MG capsule Take 2 capsules (2.5 mg) by mouth daily 60 capsule 11     sodium bicarbonate 650 MG tablet TAKE 4 TABLETS (2,600 MG) THREE TIMES A  tablet 23       Weight Loss Medication History Reviewed With Patient 2/17/2021   Which weight loss medications are you currently taking on a regular basis?  None       Abstract on 12/18/2020   Component Date Value Ref Range Status     Sodium 12/16/2020 139  mmol/L Final     Potassium 12/16/2020 5.0  mmol/L Final     Chloride 12/16/2020 114  mmol/L Final     Carbon Dioxide 12/16/2020 15  mmol/L Final     Anion Gap 12/16/2020 10  mmol/L Final     Glucose 12/16/2020 89  70 - 99 mg/dL Final     Urea Nitrogen 12/16/2020 75  mg/dL Final     Creatinine 12/16/2020 5.27  mg/dL Final     Calcium 12/16/2020 9.1  mg/dL Final     Phosphorus 12/16/2020 4.6  mg/dL Final     Albumin 12/16/2020 3.6  g/dL Final     Hemoglobin 12/16/2020 12.9* 13.3 - 17.7 g/dL Final     Parathyroid Hormone Intact 12/16/2020 94   Final     Vitamin D 25 Hydroxy (External) 12/16/2020 20.8   Final     Protein " "Random Urine 12/16/2020 389   Final     Creatinine Urine 12/16/2020 107.8   Final     Protein/Creatinine Ratio 12/16/2020 3.65   Final     Albumin Urine mg/g Cr 12/16/2020 2,447.5  MG/G Creatinine Final     Creatinine Urine 12/16/2020 107.8   Final       PHYSICAL EXAM:  Objective    Ht 1.803 m (5' 11\")   Wt 123.8 kg (273 lb)   BMI 38.08 kg/m    Vitals - Patient Reported  Pain Score: No Pain (0)      Vitals:  No vitals were obtained today due to virtual visit.    Physical Exam   GENERAL: Healthy, alert and no distress  EYES: Eyes grossly normal to inspection.  No discharge or erythema, or obvious scleral/conjunctival abnormalities.  RESP: No audible wheeze, cough, or visible cyanosis.  No visible retractions or increased work of breathing.    SKIN: Visible skin clear. No significant rash, abnormal pigmentation or lesions.  NEURO: Cranial nerves grossly intact.  Mentation and speech appropriate for age.  PSYCH: Mentation appears normal, affect normal/bright, judgement and insight intact, normal speech and appearance well-groomed.      Sincerely,    Yessenia Heller NP  "

## 2021-02-17 NOTE — PROGRESS NOTES
Boogie is a 20 year old who is being evaluated via a billable video visit.      How would you like to obtain your AVS? MyChart  If the video visit is dropped, the invitation should be resent by: Text to cell phone: [phone]  Will anyone else be joining your video visit? Yes: [phone]. How would they like to receive their invitation? Text to cell phone: [phone]      Video Start Time: 1325  Video-Visit Details    Type of service:  Video Visit    Video End Time:1334    Originating Location (pt. Location): Home    Distant Location (provider location):  [facility]NAG[facility] [place]     Platform used for Video Visit: [employer]

## 2021-02-17 NOTE — LETTER
2021       RE: Boogie Villa  [address] [facility] [address]     Dear Colleague,    Thank you for referring your patient, Boogie Villa, to the [facility]. Please see a copy of my visit note below.    Return Medical Weight Management Note     Boogie Villa  MRN:  [id number]  :  2000  LEANN:  2021    Dear VALENTIN MEEKS,    I had the pleasure of seeing your patient Boogie Villa. He is a 20 year old male who I am continuing to see for treatment of obesity related to:       2020   I have the following health issues associated with obesity: High Blood Pressure   I have the following symptoms associated with obesity: Groin Rash       Assessment & Plan   Problem List Items Addressed This Visit        Digestive    Class 2 severe obesity due to excess calories with serious comorbidity and body mass index (BMI) of 39.0 to 39.9 in adult (H) - Primary     Down 9lb since last visit, 25lb in total since 10/2020. BMI is now 38.08. He will reach out to transplant team. Patient is essentially intermittent fasting, 1 meal a day- mid to late afternoon. Denies hunger between meals. Now living on own and independently making meals- meal preps ahead of time- looking for low sodium/ potassium meals online. Making good substitutes like turnips instead of potatoes. Continue with current plan. Discussed ongoing monitoring of progress and adjustments if needed.                     20 minutes spent on the date of the encounter doing chart review, history and exam, documentation and further activities as noted above  0956}  MEDICATIONS:  Continue current medications without change  FUTURE APPOINTMENTS:       - Follow-up visit in 3 months- sooner if needed      INTERVAL HISTORY:  Down 9lbs since last visit. Eating 1 meal a day, occasional snack. Has been meal prepping in advance.   Still wakes up  "late and eats mid to late afternoon.   Now living on his own  Not hungry outside of meal times      CURRENT WEIGHT:   273 lbs 0 oz    Initial Weight (lbs): 282 lbs  Last Visits Weight: 127.9 kg (282 lb)  Cumulative weight loss (lbs): 9  Weight Loss Percentage: 3.19%    Changes and Difficulties 2/17/2021   I have made the following changes to my diet since my last visit: Eating less   With regards to my diet, I am still struggling with: Eating healthy foods   I have made the following changes to my activity/exercise since my last visit: Exercising a few times per week (MamboCar and swMinutta)   With regards to my activity/exercise, I am still struggling with: Do other types of exercise       VITALS:  Ht 1.803 m (5' 11\")   Wt 123.8 kg (273 lb)   BMI 38.08 kg/m      MEDICATIONS:   Current Outpatient Medications   Medication Sig Dispense Refill     amLODIPine (NORVASC) 5 MG tablet Take 1 tablet (5 mg) by mouth daily 90 tablet 3     atenolol (TENORMIN) 25 MG tablet Take 1.5 tablets (37.5 mg) by mouth daily 135 tablet 3     calcium carbonate (OSCAL 500) 1250 (500 Ca) MG TABS tablet daily        Cholecalciferol (VITAMIN D) 2000 units CAPS        methylphenidate (RITALIN) 10 MG tablet Take 25 mg by mouth       ramipril (ALTACE) 1.25 MG capsule Take 2 capsules (2.5 mg) by mouth daily 60 capsule 11     sodium bicarbonate 650 MG tablet TAKE 4 TABLETS (2,600 MG) THREE TIMES A  tablet 23       Weight Loss Medication History Reviewed With Patient 2/17/2021   Which weight loss medications are you currently taking on a regular basis?  None       Abstract on 12/18/2020   Component Date Value Ref Range Status     Sodium 12/16/2020 139  mmol/L Final     Potassium 12/16/2020 5.0  mmol/L Final     Chloride 12/16/2020 114  mmol/L Final     Carbon Dioxide 12/16/2020 15  mmol/L Final     Anion Gap 12/16/2020 10  mmol/L Final     Glucose 12/16/2020 89  70 - 99 mg/dL Final     Urea Nitrogen 12/16/2020 75  mg/dL Final     Creatinine " "12/16/2020 5.27  mg/dL Final     Calcium 12/16/2020 9.1  mg/dL Final     Phosphorus 12/16/2020 4.6  mg/dL Final     Albumin 12/16/2020 3.6  g/dL Final     Hemoglobin 12/16/2020 12.9* 13.3 - 17.7 g/dL Final     Parathyroid Hormone Intact 12/16/2020 94   Final     Vitamin D 25 Hydroxy (External) 12/16/2020 20.8   Final     Protein Random Urine 12/16/2020 389   Final     Creatinine Urine 12/16/2020 107.8   Final     Protein/Creatinine Ratio 12/16/2020 3.65   Final     Albumin Urine mg/g Cr 12/16/2020 2,447.5  MG/G Creatinine Final     Creatinine Urine 12/16/2020 107.8   Final       PHYSICAL EXAM:  Objective    Ht 1.803 m (5' 11\")   Wt 123.8 kg (273 lb)   BMI 38.08 kg/m    Vitals - Patient Reported  Pain Score: No Pain (0)      Vitals:  No vitals were obtained today due to virtual visit.    Physical Exam   GENERAL: Healthy, alert and no distress  EYES: Eyes grossly normal to inspection.  No discharge or erythema, or obvious scleral/conjunctival abnormalities.  RESP: No audible wheeze, cough, or visible cyanosis.  No visible retractions or increased work of breathing.    SKIN: Visible skin clear. No significant rash, abnormal pigmentation or lesions.  NEURO: Cranial nerves grossly intact.  Mentation and speech appropriate for age.  PSYCH: Mentation appears normal, affect normal/bright, judgement and insight intact, normal speech and appearance well-groomed.      Sincerely,    Yessenia Heller NP    Boogie is a 20 year old who is being evaluated via a billable video visit.      How would you like to obtain your AVS? MyChart  If the video visit is dropped, the invitation should be resent by: Text to cell phone: [phone]  Will anyone else be joining your video visit? Yes: [phone]. How would they like to receive their invitation? Text to cell phone: [phone]      Video Start Time: 1325  Video-Visit Details    Type of service:  Video Visit    Video End Time:1334    Originating Location (pt. Location): " Home    Distant Location (provider location):  [facility]NAG[facility]IN[facility] [place]     Platform used for Video Visit: Sulaiman

## 2021-02-17 NOTE — PATIENT INSTRUCTIONS
"It was a pleasure meeting with you today.    Thank you for allowing us the privilege of caring for you. We hope we provided you with the excellent service you deserve.   Please let us know if there is anything else we can do for you so that we can be sure you are leaving completely satisfied with your care experience.    To ensure the quality of our services you may be receiving a patient satisfaction survey from an independent patient satisfaction monitoring company.    The greatest compliment you can give is a \"Likely to Recommend\"      Your visit was with Yessenia Helelr NP today.     Instructions per today's visit:     Keep up the great work   Can look at heart healthy recipes as well  Follow up in 3 months, sooner if running into trouble       To schedule appointments with our team, please call [phone] option #1    Please call during clinic hours Monday through Friday 8:00a - 4:00p if you have questions or you can contact us via Twitch at anytime.      Nurses: [phone]  Fax: [phone]   _____________________________________________________________________________________________________________________________    Meal Replacement Products:    Here is the link to our new e-store where you can purchase our meal replacement products    [facility] E-PIERIS [employer]/store    The one week starter kit is a great way to sample a variety of products and see what works for you.    If you want more information about the product go to: [employer]    Free Shipping for orders over $75     Benefits of meal replacements products:    Portion and calorie control  Improved nutrition  Structured eating  Simplified food choices  Avoid contact with trigger foods  ______________________________________________________________________________________________________________________________    Healthy Lifestyle Support Group    [facility] [place] [facility]  Virtual Support " Group Now Available    60 minutes of small group guided discussion, support and resources    Led by Health , Kasey Villanueva    For questions, and to receive the invite information, contact Kasey at [email or web address]    All our welcome!    One Friday per month, 12:30pm to 1:30pm  SELF COMPASSION: July 31st  CREATING MY WELLNESS VISION: August 28th  MINDFULNESS PRACTICES FOR A CALM BODY & MIND: September 25th  MINDFUL EATING TOOLS: October 30th  HEALTHY& HAPPY HOLIDAYS: November 20th  OPEN FORUM: December 18th  _______________________________________________________________________________________________________________________________    Connections: Bariatric Care support group  Due to the Covid-19 restrictions, we will be doing our support group virtually using Microsoft Teams. You will need to get an invitation to the group from Evert Peres, Ph.D., LP at [email or web address] and to learn about using Microsoft Teams. The next meeting is on Tuesday, July 14 between 6:30 and 8 PM and there will be no formal speaker.    This group meets the second Tuesday of each month from 6:30 to 8 PM and will be held again at [facility] in the [address] [place] [address] (A-B room) when the Covid-19 restrictions are lifted. The group is led by Evert Peres, Ph.D., Licensed Psychologist, [facility] Comprehensive Weight Management Program. There is no cost for group participation and is open to all [facility] (and those external to this program) pre- and post-operative bariatric surgery patients as well as their support system.   _________________________________________________________________________________________________________________________________      Interested in working with a health ?  Health coaches work with you to improve your overall health and wellbeing.  They look at the whole person, and may involve discussion of different areas of life, including, but not  limited to the four pillars of health (sleep, exercise, nutrition, and stress management). Discuss with your care team if you would like to start working a health .     Health Coaching-3 Pack:      $99 for three health coaching visits    Visits may be done in person or via phone    Coaching is a partnership between the  and the client; Coaches do not prescribe or diagnose    Coaching helps inspire the client to reach his/her personal goals   __________________________________________________________________________________________________________________________________    [place] [facility] Get Moving Program    Our team of physical therapists is trained to help you understand and take control of your condition. They will perform a thorough evaluation to determine your ability for activity and develop a customized plan to fit your goals and physical ability.  Scheduling: Unsure if the Get Moving program is right for you? Discuss the program with your medical provider or diabetes educator. You can also call us at [phone] to ask questions or schedule an appointment.   [facility] Get Moving Program  ______________________________________________________________________________________________________________________  Bluetooth Scale:    We hope to provide you with high quality telephone and virtual healthcare visits while social distancing for COVID-19 is necessary, as well as in the future when virtual visits may be more convenient for you.     Our technology team made it possible for Bluetooth scales to send weight measurements to our electronic medical record. This allows weights from you weighing at home to securely flow into the medical record, which will improve telephone and virtual visits.   Additionally, studies have shown that adults actually lose more weight when their weights are automatically sent to someone else, and also that this process is not stressful for those adults.    Below  is a link for purchasing the scale, with a discount code for our patients. You may call your insurance company to see if they will reimburse you for the cost of the scale, as a piece of durable medical equipment. The scales only go up to a weight of 400 pounds. This is an issue and we are working with the developer on increasing this. We found no scales that go over 400lb that have blue-tooth for connecting to Gecko TV.    Scale to purchase: the [employer]  Body  Scale: [email or web address]=aw.ds    Discount Code: We have a discount code for our patients to bring the cost down to $50, the code is: [place]_Scale_20%off    Steps to link the scale to Gecko TV via an Android Phone (you can always disconnect at any point in the future):  1. The order must be placed first before the patient can access Track My Health within Gecko TV.  2. Download Google Fit matias from the Google Play Store   a. Log in or register using your Google account   3. Download the Gecko TV matias from Google Play Store  a. Select add organization   b. Search for [employer]pa and select it   c. Log into Gecko TV  d. Select Track My Health   e. Select the green connect my account button   f. When prompted log into your Google account   g. Select okay to confirm the account   4. Download the [employer] Health Mate matias from Google Play Store  a. [place] for [employer]   b. Go to profile   c. Tap google fit under the Apps section  d. Select the option to activate Google Fit integration   e. Select the same Google account   f. Select okay to confirm the account  g.   Steps to link the scale to Gecko TV via an iPhone (you can always disconnect at any point in the future):  **Note Sympler is not available for download on an iPad**  1. The order must be placed first before the patient can access Track My Health within Gecko TV.  2. Locate the Health matias on your iPhone.  a. Set up  your Apple Health account as prompted  b. The Sources page will show Apps that communicate with your Health queenie. Once all steps are completed, you should see [employer] and [employer] listed under the Apps section and your iPhone under the devices section.  i. Select Health Mate  1. Under 'ALLOW  Plovgh  TO WRITE DATA ensure the toggle is on for Weight.  2. This will allow the scale to add your weight to the [employer] [facility]  ii. Select MyChart  1. Under 'ALLOW  [place] - The Bigger Picture  TO READ DATA ensure the toggle is on for Weight.  2. This allows [employer] to grab the weight from [employer] [employer] so your provider can see your weights.  3. Download the [employer] queenie from the Queenie Store   a. Select add organization                                                  b. Search for [employer] and select it  c. Log into [employer]  d. Select Track My Health   e. Select the green connect my account button   f. Follow prompts to link your device to [employer].  4. Download the Withings health mate queenie in the Queenie Store   a. [place] [employer]   b. Go to profile   c. [employer] Health under the Apps section  d. If prompted to allow access with the Health Queenie, toggle weight on for read and write access.

## 2021-02-17 NOTE — ASSESSMENT & PLAN NOTE
Down 9lb since last visit, 25lb in total since 10/2020. BMI is now 38.08. He will reach out to transplant team. Patient is essentially intermittent fasting, 1 meal a day- mid to late afternoon. Denies hunger between meals. Now living on own and independently making meals- meal preps ahead of time- looking for low sodium/ potassium meals online. Making good substitutes like turnips instead of potatoes. Continue with current plan. Discussed ongoing monitoring of progress and adjustments if needed.

## 2021-02-17 NOTE — NURSING NOTE
"Chief Complaint   Patient presents with     Follow Up     Follow-up 2 Months Weight Management       Vitals:    02/17/21 1250   Weight: 123.8 kg (273 lb)   Height: 1.803 m (5' 11\")       Body mass index is 38.08 kg/m .                          "

## 2021-02-23 ENCOUNTER — TELEPHONE (OUTPATIENT)
Dept: TRANSPLANT | Facility: CLINIC | Age: 21
End: 2021-02-23

## 2021-02-23 DIAGNOSIS — Z76.82 ORGAN TRANSPLANT CANDIDATE: ICD-10-CM

## 2021-02-23 DIAGNOSIS — N18.9 CHRONIC RENAL FAILURE: ICD-10-CM

## 2021-02-23 DIAGNOSIS — Z01.818 PRE-TRANSPLANT EVALUATION FOR KIDNEY TRANSPLANT: ICD-10-CM

## 2021-02-23 NOTE — Clinical Note
Please see smart set orders for pre kidney alone transplant evaluation. Patient does not need a surg appt at this time - just had virtual with Finger on 12/30/2021.  Should have in person appt because he needs labs drawn so we can list him. Is not yet on dialysis. Talk with mom Darcy to schedule. Thanks, Alexandra

## 2021-02-23 NOTE — TELEPHONE ENCOUNTER
Received a phone call from patient's mom stating he has lost more then 10 pounds and ready to proceed with appts to complete pre kidney evaluation. Called her back and LM stating I called, to call me if she has questions but I will proceed with pre kidney transplant evaluation appts.     Reviewed recent Weight Loss Management note here on 02/17/2021 and BMI is down to 38 with weight of 273 pounds. Plan is to proceed with pre kidney transplant eval when BMI is down to 38.     Smart set into Epic for full pre kidney transplant evaluation - routed to .

## 2021-03-15 ENCOUNTER — IMMUNIZATION (OUTPATIENT)
Dept: NURSING | Facility: CLINIC | Age: 21
End: 2021-03-15
Payer: COMMERCIAL

## 2021-03-15 PROCEDURE — 91300 PR COVID VAC PFIZER DIL RECON 30 MCG/0.3 ML IM: CPT

## 2021-03-15 PROCEDURE — 0001A PR COVID VAC PFIZER DIL RECON 30 MCG/0.3 ML IM: CPT

## 2021-03-26 ENCOUNTER — TELEPHONE (OUTPATIENT)
Dept: TRANSPLANT | Facility: CLINIC | Age: 21
End: 2021-03-26

## 2021-03-26 NOTE — PROGRESS NOTES
[facility] Solid Organ Transplant  Outpatient MNT: Kidney Transplant Evaluation    Current BMI: 38 (HT 71.5 in,  lbs/125 kg)  BMI is outside recommendation of <35 for kidney transplant  Goal weight for kidney transplant <254 lbs (22 lb loss)/ per surgeon discretion    Frailty Assessment -- Not Frail (0/5)      Time Spent: 15 minutes  Visit Type: F/U  Referring Physician: Jonathan   Pt accompanied by: his mom     History of previous txp: none   Dialysis: no    Nutrition Assessment  Saw for eval 12/2020 BMI 41.8/300 lbs  Pt reports eating different foods in addition to monitoring portion size. His mom makes all his food. He does not keep snacks or any additional food in his apartment.     Vitamins, Supplements, Pertinent Meds: calcium, vit D, probiotic  Herbal Medicines/Supplements: none     Edema: none     Weight hx: pt has intentionally lost 25 lbs x 4 months     Food Security: any concerns about having enough money to buy food or access to grocery stores? No     Diet Recall  Breakfast None    Lunch Eats 1-2x/day    Dinner Mom makes Mexican bean pie, chicken wild rice soup, turnip soup   Snacks None    Beverages Water (150 oz/day)   Alcohol None    Dining out Food delivered 1x/week- nachos     Physical Activity  No routine      Labs  No recent K/Phos on file     Nutrition Diagnosis  Obesity r/t positive energy balance and inadequate physical activity AEB BMI 38.    Nutrition Intervention  Nutrition education provided:  Discussed ongoing recommendation for weight loss, perhaps only 10 more pounds per surgeon today.     Discussed sodium intake (low sodium foods and drinks, seasoning food without salt and tips for low sodium diet).  No recent K/Phos on file, but likely will get redrawn within coming weeks/month.     Reviewed post txp diet guidelines in brief (will review in further detail post txp):  (1) Review of proper food safety measures d/t immunosuppressant therapy post-op and increased risk for  food-borne illness    (2) Avoid the following post txp d/t risk for rejection, unknown effects on the organs, and/or potential interactions with immunosuppressants:  - Herbal, Chinese, holistic, chiropractic, natural, alternative medicines and supplements  - Detoxes and cleanses  - Weight loss pills  - Protein powders or other products with extracts or herbs (ie green tea extract)    (3) Med regimen and possible side effects    Patient Understanding: Pt verbalized understanding of education provided.  Expected Engagement: Good  Follow-Up Plans: PRN     Nutrition Goals  BMI <35 or <254 lbs for kidney transplant / per surgeon discretion     Baylee Roberson, RD, LD, CCTD

## 2021-03-26 NOTE — TELEPHONE ENCOUNTER
VM message left on VM reminding patient of upcoming PKE appointments 3/29/21 starting at 7:30 am. Patient instructed he may eat breakfast, take regularly scheduled medications and have 1 adult visitor accompany him. Request for return call and rescheduling of appointments if any Covid 19 symptoms/exposure within the last 14 days. Transplant Office number given.

## 2021-03-29 ENCOUNTER — ALLIED HEALTH/NURSE VISIT (OUTPATIENT)
Dept: TRANSPLANT | Facility: CLINIC | Age: 21
End: 2021-03-29
Attending: PHYSICIAN ASSISTANT
Payer: COMMERCIAL

## 2021-03-29 ENCOUNTER — OFFICE VISIT (OUTPATIENT)
Dept: TRANSPLANT | Facility: CLINIC | Age: 21
End: 2021-03-29
Attending: SURGERY
Payer: COMMERCIAL

## 2021-03-29 ENCOUNTER — DOCUMENTATION ONLY (OUTPATIENT)
Dept: TRANSPLANT | Facility: CLINIC | Age: 21
End: 2021-03-29

## 2021-03-29 ENCOUNTER — ANCILLARY PROCEDURE (OUTPATIENT)
Dept: GENERAL RADIOLOGY | Facility: CLINIC | Age: 21
End: 2021-03-29
Attending: PHYSICIAN ASSISTANT
Payer: COMMERCIAL

## 2021-03-29 ENCOUNTER — ANCILLARY PROCEDURE (OUTPATIENT)
Dept: CARDIOLOGY | Facility: CLINIC | Age: 21
End: 2021-03-29
Attending: PHYSICIAN ASSISTANT
Payer: COMMERCIAL

## 2021-03-29 VITALS
OXYGEN SATURATION: 95 % | HEIGHT: 72 IN | HEART RATE: 77 BPM | WEIGHT: 276.3 LBS | SYSTOLIC BLOOD PRESSURE: 135 MMHG | DIASTOLIC BLOOD PRESSURE: 74 MMHG | BODY MASS INDEX: 37.42 KG/M2

## 2021-03-29 DIAGNOSIS — Z01.818 PRE-TRANSPLANT EVALUATION FOR KIDNEY TRANSPLANT: Primary | ICD-10-CM

## 2021-03-29 DIAGNOSIS — Z76.82 ORGAN TRANSPLANT CANDIDATE: ICD-10-CM

## 2021-03-29 DIAGNOSIS — Z01.818 PRE-TRANSPLANT EVALUATION FOR KIDNEY TRANSPLANT: ICD-10-CM

## 2021-03-29 DIAGNOSIS — N18.9 CHRONIC RENAL FAILURE: ICD-10-CM

## 2021-03-29 DIAGNOSIS — I10 ESSENTIAL HYPERTENSION: ICD-10-CM

## 2021-03-29 DIAGNOSIS — Q60.5 CONGENITAL RENAL AGENESIS AND DYSGENESIS: ICD-10-CM

## 2021-03-29 DIAGNOSIS — Q60.2 CONGENITAL RENAL AGENESIS AND DYSGENESIS: ICD-10-CM

## 2021-03-29 DIAGNOSIS — N18.4 CKD (CHRONIC KIDNEY DISEASE) STAGE 4, GFR 15-29 ML/MIN (H): ICD-10-CM

## 2021-03-29 DIAGNOSIS — Q60.2 CONGENITAL RENAL AGENESIS AND DYSGENESIS: Primary | ICD-10-CM

## 2021-03-29 DIAGNOSIS — Q60.5 CONGENITAL RENAL AGENESIS AND DYSGENESIS: Primary | ICD-10-CM

## 2021-03-29 LAB
ABO + RH BLD: NORMAL
ALBUMIN SERPL-MCNC: 3.2 G/DL (ref 3.4–5)
ALBUMIN UR-MCNC: >499 MG/DL
ALP SERPL-CCNC: 73 U/L (ref 40–150)
ALT SERPL W P-5'-P-CCNC: 28 U/L (ref 0–70)
ANION GAP SERPL CALCULATED.3IONS-SCNC: 9 MMOL/L (ref 3–14)
APPEARANCE UR: CLEAR
APTT PPP: 26 SEC (ref 22–37)
AST SERPL W P-5'-P-CCNC: 11 U/L (ref 0–45)
BASOPHILS # BLD AUTO: 0 10E9/L (ref 0–0.2)
BASOPHILS NFR BLD AUTO: 0.5 %
BILIRUB SERPL-MCNC: 0.2 MG/DL (ref 0.2–1.3)
BILIRUB UR QL STRIP: NEGATIVE
BLD GP AB SCN SERPL QL: NORMAL
BLOOD BANK CMNT PATIENT-IMP: NORMAL
BUN SERPL-MCNC: 62 MG/DL (ref 7–30)
CALCIUM SERPL-MCNC: 8.9 MG/DL (ref 8.5–10.1)
CHLORIDE SERPL-SCNC: 114 MMOL/L (ref 94–109)
CO2 SERPL-SCNC: 18 MMOL/L (ref 20–32)
COLOR UR AUTO: ABNORMAL
CREAT SERPL-MCNC: 4.48 MG/DL (ref 0.66–1.25)
DIFFERENTIAL METHOD BLD: ABNORMAL
EOSINOPHIL # BLD AUTO: 0.2 10E9/L (ref 0–0.7)
EOSINOPHIL NFR BLD AUTO: 2.7 %
ERYTHROCYTE [DISTWIDTH] IN BLOOD BY AUTOMATED COUNT: 12.9 % (ref 10–15)
GFR SERPL CREATININE-BSD FRML MDRD: 17 ML/MIN/{1.73_M2}
GLUCOSE SERPL-MCNC: 90 MG/DL (ref 70–99)
GLUCOSE UR STRIP-MCNC: 50 MG/DL
HBV CORE AB SERPL QL IA: NONREACTIVE
HBV SURFACE AB SERPL IA-ACNC: 12.36 M[IU]/ML
HBV SURFACE AG SERPL QL IA: NONREACTIVE
HCT VFR BLD AUTO: 37.9 % (ref 40–53)
HCV AB SERPL QL IA: NONREACTIVE
HGB BLD-MCNC: 12.4 G/DL (ref 13.3–17.7)
HGB UR QL STRIP: NEGATIVE
HIV 1+2 AB+HIV1 P24 AG SERPL QL IA: NONREACTIVE
IMM GRANULOCYTES # BLD: 0 10E9/L (ref 0–0.4)
IMM GRANULOCYTES NFR BLD: 0.6 %
INR PPP: 1.06 (ref 0.86–1.14)
KETONES UR STRIP-MCNC: NEGATIVE MG/DL
LEUKOCYTE ESTERASE UR QL STRIP: NEGATIVE
LYMPHOCYTES # BLD AUTO: 1.9 10E9/L (ref 0.8–5.3)
LYMPHOCYTES NFR BLD AUTO: 28.4 %
MCH RBC QN AUTO: 30 PG (ref 26.5–33)
MCHC RBC AUTO-ENTMCNC: 32.7 G/DL (ref 31.5–36.5)
MCV RBC AUTO: 92 FL (ref 78–100)
MONOCYTES # BLD AUTO: 0.4 10E9/L (ref 0–1.3)
MONOCYTES NFR BLD AUTO: 6.2 %
MUCOUS THREADS #/AREA URNS LPF: PRESENT /LPF
NEUTROPHILS # BLD AUTO: 4.1 10E9/L (ref 1.6–8.3)
NEUTROPHILS NFR BLD AUTO: 61.6 %
NITRATE UR QL: NEGATIVE
NRBC # BLD AUTO: 0 10*3/UL
NRBC BLD AUTO-RTO: 0 /100
PH UR STRIP: 6 PH (ref 5–7)
PLATELET # BLD AUTO: 240 10E9/L (ref 150–450)
POTASSIUM SERPL-SCNC: 4 MMOL/L (ref 3.4–5.3)
PROT SERPL-MCNC: 7.1 G/DL (ref 6.8–8.8)
RBC # BLD AUTO: 4.14 10E12/L (ref 4.4–5.9)
RBC #/AREA URNS AUTO: 0 /HPF (ref 0–2)
SODIUM SERPL-SCNC: 140 MMOL/L (ref 133–144)
SOURCE: ABNORMAL
SP GR UR STRIP: 1.01 (ref 1–1.03)
SPECIMEN EXP DATE BLD: NORMAL
SPECIMEN EXP DATE BLD: NORMAL
T PALLIDUM AB SER QL: NONREACTIVE
UROBILINOGEN UR STRIP-MCNC: 0 MG/DL (ref 0–2)
WBC # BLD AUTO: 6.6 10E9/L (ref 4–11)
WBC #/AREA URNS AUTO: <1 /HPF (ref 0–5)

## 2021-03-29 PROCEDURE — 86147 CARDIOLIPIN ANTIBODY EA IG: CPT | Mod: 90 | Performed by: PATHOLOGY

## 2021-03-29 PROCEDURE — 86850 RBC ANTIBODY SCREEN: CPT | Mod: 90 | Performed by: PATHOLOGY

## 2021-03-29 PROCEDURE — 81001 URINALYSIS AUTO W/SCOPE: CPT | Performed by: PATHOLOGY

## 2021-03-29 PROCEDURE — 85610 PROTHROMBIN TIME: CPT | Performed by: PATHOLOGY

## 2021-03-29 PROCEDURE — 36415 COLL VENOUS BLD VENIPUNCTURE: CPT | Performed by: PATHOLOGY

## 2021-03-29 PROCEDURE — 86787 VARICELLA-ZOSTER ANTIBODY: CPT | Mod: 90 | Performed by: PATHOLOGY

## 2021-03-29 PROCEDURE — 86665 EPSTEIN-BARR CAPSID VCA: CPT | Mod: 90 | Performed by: PATHOLOGY

## 2021-03-29 PROCEDURE — 85613 RUSSELL VIPER VENOM DILUTED: CPT | Mod: 90 | Performed by: PATHOLOGY

## 2021-03-29 PROCEDURE — 87340 HEPATITIS B SURFACE AG IA: CPT | Mod: 90 | Performed by: PATHOLOGY

## 2021-03-29 PROCEDURE — 99214 OFFICE O/P EST MOD 30 MIN: CPT | Performed by: SURGERY

## 2021-03-29 PROCEDURE — 85670 THROMBIN TIME PLASMA: CPT | Mod: 90 | Performed by: PATHOLOGY

## 2021-03-29 PROCEDURE — 86481 TB AG RESPONSE T-CELL SUSP: CPT | Mod: 90 | Performed by: PATHOLOGY

## 2021-03-29 PROCEDURE — 85390 FIBRINOLYSINS SCREEN I&R: CPT | Performed by: PATHOLOGY

## 2021-03-29 PROCEDURE — 99000 SPECIMEN HANDLING OFFICE-LAB: CPT | Performed by: PATHOLOGY

## 2021-03-29 PROCEDURE — 86803 HEPATITIS C AB TEST: CPT | Mod: 90 | Performed by: PATHOLOGY

## 2021-03-29 PROCEDURE — 85025 COMPLETE CBC W/AUTO DIFF WBC: CPT | Performed by: PATHOLOGY

## 2021-03-29 PROCEDURE — 86901 BLOOD TYPING SEROLOGIC RH(D): CPT | Mod: 90 | Performed by: PATHOLOGY

## 2021-03-29 PROCEDURE — 86644 CMV ANTIBODY: CPT | Mod: 90 | Performed by: PATHOLOGY

## 2021-03-29 PROCEDURE — 86900 BLOOD TYPING SEROLOGIC ABO: CPT | Mod: 90 | Performed by: PATHOLOGY

## 2021-03-29 PROCEDURE — 71046 X-RAY EXAM CHEST 2 VIEWS: CPT | Mod: GC | Performed by: RADIOLOGY

## 2021-03-29 PROCEDURE — 85730 THROMBOPLASTIN TIME PARTIAL: CPT | Mod: 90 | Performed by: PATHOLOGY

## 2021-03-29 PROCEDURE — 85730 THROMBOPLASTIN TIME PARTIAL: CPT | Performed by: PATHOLOGY

## 2021-03-29 PROCEDURE — 99245 OFF/OP CONSLTJ NEW/EST HI 55: CPT

## 2021-03-29 PROCEDURE — 81241 F5 GENE: CPT | Performed by: PATHOLOGY

## 2021-03-29 PROCEDURE — 86780 TREPONEMA PALLIDUM: CPT | Mod: 90 | Performed by: PATHOLOGY

## 2021-03-29 PROCEDURE — 86905 BLOOD TYPING RBC ANTIGENS: CPT | Mod: 90 | Performed by: PATHOLOGY

## 2021-03-29 PROCEDURE — 86886 COOMBS TEST INDIRECT TITER: CPT | Mod: 90 | Performed by: PATHOLOGY

## 2021-03-29 PROCEDURE — 81240 F2 GENE: CPT | Mod: 90 | Performed by: PATHOLOGY

## 2021-03-29 PROCEDURE — 86706 HEP B SURFACE ANTIBODY: CPT | Mod: 90 | Performed by: PATHOLOGY

## 2021-03-29 PROCEDURE — 86704 HEP B CORE ANTIBODY TOTAL: CPT | Mod: 90 | Performed by: PATHOLOGY

## 2021-03-29 PROCEDURE — 80053 COMPREHEN METABOLIC PANEL: CPT | Performed by: PATHOLOGY

## 2021-03-29 PROCEDURE — G0452 MOLECULAR PATHOLOGY INTERPR: HCPCS | Performed by: PATHOLOGY

## 2021-03-29 PROCEDURE — 93306 TTE W/DOPPLER COMPLETE: CPT | Performed by: INTERNAL MEDICINE

## 2021-03-29 RX ORDER — NICOTINE 14MG/24HR
PATCH, TRANSDERMAL 24 HOURS TRANSDERMAL
COMMUNITY
End: 2023-02-09

## 2021-03-29 ASSESSMENT — MIFFLIN-ST. JEOR: SCORE: 2293.35

## 2021-03-29 NOTE — PROGRESS NOTES
Transplant Surgery Consult Note     Medical record number: [id number]  YOB: 2000,   Consult requested  for evaluation of kidney transplant candidacy.    Assessment and Recommendations:Mr. Villa appears to be a excellent candidate for kidney transplantation and has a good understanding of the risks and benefits of this approach to the management of renal failure. The following issues should be addressed prior to finalizing his transplant candidacy:     BMI: recommend additional ten pound weight loss prior to surgery. Has central adiposity with moderate pannus, thus high risk of infection postoperatively. Has been making good progress with diet modification and exercise, with steady 1-1.5 pound loss per week.     Urologic issues: Saw Dr. Morton in consultation. Has Mitrofanoff channel but does not catheterize this anymore. He voids normally and has appropriate bladder pressures without demonstrable ureteral reflux. Recommends that Mitrofanoff be catheterized during transplant to avoid injury, and that he follow up with Urology to have repeat urodynamics studies 3-6 months following transplant to verify no reflux and appropriate bladder pressures.     Transplant order: Mr. 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  Recommend he be considered as a candidate for kidney transplant alone.  Cardiology consult for cardiac risk stratification to be ordered: No- young, absence of risk factors, no symptoms with exertion. EKG/Echo pending   CT abdomen and pelvis without contrast to be ordered for assessment of vascular targets: No- young, nondiabetic, palpable femoral pulses  Transplant listing labs ordered to include HLA, ABOx2, Cr, etc.  Dietician consult ordered: Yes  Social work consult ordered: Yes  Imaging reports reviewed:  n/a  Radiology  images reviewed:n/a  Recipient suitable to move forward with work up of living donors:  Yes- mother/family interested in donation. Discussed tissue matching/eplet matching with them in length      The majority of our visit was spent in counselling, discussing the medical and surgical risks of kidney transplantation. We discussed approximate wait time and how that is influenced by issues such as blood type and sensitization (PRA) and access to a living donor. I contrasted potential waiting time for living vs  donor kidneys from  normal (0-85%) or higher (%) kidney donor profile index (KDPI) donors and their associated outcomes. I would not recommend this individual to consider kidneys from high KDPI donors. The reason for this decision is best summarized as: multiple potential living donors. Potential surgical complications of kidney transplantation include bleeding, superficial or deep wound complications (infection, hernia, lymphocele), ureteral anastomotic failure (leak or stenosis), graft thrombosis, need for reoperation and other issues such as cardiac complications, pneumonia, deep venous thrombosis, pulmonary embolism, post transplant diabetes and death. The potential for recurrent disease or need for retransplantation was also addressed. We discussed the possible need for ureteral stent (and subsequent removal), and the utility of protocol biopsy and laboratory studies to evaluate for rejection or recurrent disease. We discussed the risk of graft rejection, our center's average graft and patient survival rates, immunosuppression protocols, as well as the potential opportunity to participate in clinical trials.  We also discussed the average length of stay, recovery process, and posttransplant lab and monitoring protocol.  I emphasized the need for strict immunosuppression medication adherence and the potential for complications of immunosuppression such as skin cancer or lymphoma, as well as a  very low but not zero risk of donor-derived disease transmission risks (infection, cancer). Mr. Villa asked good questions and his candidacy will be reviewed at our Multidisciplinary Selection Committee. Thank you for the opportunity to participate in Mr. Villa's care.      Total time: 30 minutes  Counselling time: 25 minutes      John Castillo MD    ---------------------------------------------------------------------------------------------------    HPI: Mr. 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 The patient is non-diabetic.       The patient is not on dialysis.    Has potential kidney donors:  Yes .  Interested in participation in paired exchange if a donor is willing: Doesn't know     The patient has the following pertinent history:       No    Yes  Dialysis:    [x]      [] via:       Blood Transfusion                  [x]      []  Number of units:   Most recently:  Pregnancy:    [x]      [] Number:       Previous Transplant:  [x]      [] Details:    Cancer    [x]      [] Comment:   Kidney stones   [x]      [] Comment:      Recurrent infections  [x]      []  Type:                  Bladder dysfunction  []      [x] Cause:  Posterior urethral valves with reflux/ureteral reimplantation, s/p Mitrofanoff. Currently voids normally  Claudication   [x]      [] Distance:    Previous Amputation  [x]      [] Cause:     Chronic anticoagulation  [x]      [] Indication:   Hindu  [x]      []      Past Medical History:   Diagnosis Date     ADD (attention deficit disorder)      CKD (chronic kidney disease)      Hypertension 2000     Posterior urethral valves      Past Surgical History:   Procedure Laterality Date     ABDOMEN SURGERY  2003    Bilateral urereral tapering and re-implantation     ABDOMEN SURGERY  2008    Mitrofanoff     ablation of posterior urethral valves  2000     APPENDECTOMY  2008     ureteral reimplantation with tapering  2003     No family history on  file.  Social History     Socioeconomic History     Marital status: Single     Spouse name: Not on file     Number of children: Not on file     Years of education: Not on file     Highest education level: Not on file   Occupational History     Not on file   Social Needs     Financial resource strain: Not on file     Food insecurity     Worry: Not on file     Inability: Not on file     Transportation needs     Medical: Not on file     Non-medical: Not on file   Tobacco Use     Smoking status: Never Smoker     Smokeless tobacco: Never Used   Substance and Sexual Activity     Alcohol use: Not on file     Drug use: Not on file     Sexual activity: Not on file   Lifestyle     Physical activity     Days per week: Not on file     Minutes per session: Not on file     Stress: Not on file   Relationships     Social connections     Talks on phone: Not on file     Gets together: Not on file     Attends Zoroastrianism service: Not on file     Active member of club or organization: Not on file     Attends meetings of clubs or organizations: Not on file     Relationship status: Not on file     Intimate partner violence     Fear of current or ex partner: Not on file     Emotionally abused: Not on file     Physically abused: Not on file     Forced sexual activity: Not on file   Other Topics Concern     Parent/sibling w/ CABG, MI or angioplasty before 65F 55M? Not Asked   Social History Narrative     Not on file       ROS:   CONSTITUTIONAL:  No fevers or chills  EYES: negative for icterus  ENT:  negative for hearing loss, tinnitus and sore throat  RESPIRATORY:  negative for cough, sputum, dyspnea  CARDIOVASCULAR:  negative for chest pain negative for claudication symptoms or nonhealing lower extremity ulcers  GASTROINTESTINAL:  negative for nausea, vomiting, diarrhea or constipation  GENITOURINARY:  negative for incontinence, dysuria, bladder emptying problems  HEME:  No easy bruising  INTEGUMENT:  negative for rash and pruritus  NEURO:   Negative for headache, seizure disorder  Allergies:   Allergies   Allergen Reactions     Dust Mites      Runny nose and watery eyes     Mold      Runny nose and watery eyes     Other [Seasonal Allergies]      Grass, Ragweed - gets runny nose and watery eyes     Medications:  Prescription Medications as of 3/30/2021       Rx Number Disp Refills Start End Last Dispensed Date Next Fill Date Owning Pharmacy    amLODIPine (NORVASC) 5 MG tablet  90 tablet 3 2019    [facility]  for [address]    Sig: Take 1 tablet (5 mg) by mouth daily    Class: E-Prescribe    Route: Oral    atenolol (TENORMIN) 25 MG tablet  135 tablet 3 2019    [facility]  for [address]    Sig: Take 1.5 tablets (37.5 mg) by mouth daily    Class: E-Prescribe    Route: Oral    calcium carbonate (OSCAL 500) 1250 (500 Ca) MG TABS tablet    2017        Sig: daily     Class: Historical    Cholecalciferol (VITAMIN D) 2000 units CAPS    2017        Class: Historical    methylphenidate (RITALIN) 10 MG tablet    2020    [facility] - [address]    Sig: Take 25 mg by mouth    Class: Historical    Earliest Fill Date: 2020    Route: Oral    ramipril (ALTACE) 1.25 MG capsule  60 capsule 11 1/10/2020    [id number] - [place], [place] - [address]    Sig: Take 2 capsules (2.5 mg) by mouth daily    Class: E-Prescribe    Route: Oral    Saccharomyces boulardii (PROBIOTIC) 250 MG CAPS            Si capsule daily    Class: Historical    Route: Oral    sodium bicarbonate 650 MG tablet  180 tablet 23 2020    [facility] - [address]    Sig: TAKE 4 TABLETS (2,600 MG) THREE TIMES A DAY    Class: E-Prescribe        Exam:     There were no vitals taken for this visit.  Appearance: in no apparent distress.    Skin: normal, warm, dry  Eyes:  no redness or discharge.  Sclera anicteric  Head and Neck: Normal, no rashes or jaundice  Respiratory: easy respirations, no audible wheezing.  Abdomen: abdomen soft, nontender. Well-healed lower midline scar from ureteral reimplant and mitrofanoff creation. No hernia. Obese with overhanging pannus  Extremeties: femoral 2+/2+, Edema, none  Neuro: without deficit   Psychiatric: Normal mood and affect    Diagnostics:   Recent Results (from the past 672 hour(s))   EKG 12-lead, tracing only [EKG1]    Collection Time: 03/29/21 12:08 PM   Result Value Ref Range    Interpretation ECG Click View Image link to view waveform and result    Varicella Zoster Virus Antibody IgG [CQI9034]    Collection Time: 03/29/21  1:33 PM   Result Value Ref Range    Varicella Zoster Virus Antibody IgG 1.8 (H) 0.0 - 0.8 AI   Treponema Abs w Reflex to RPR and Titer [EBS2008]    Collection Time: 03/29/21  1:33 PM   Result Value Ref Range    Treponema Antibodies Nonreactive NR^Nonreactive   HIV Antigen Antibody Combo Pretransplant    Collection Time: 03/29/21  1:33 PM   Result Value Ref Range    HIV Antigen Antibody Combo Pretransplant Nonreactive NR^Nonreactive   Hepatitis C antibody [[id number]]    Collection Time: 03/29/21  1:33 PM   Result Value Ref Range    Hepatitis C Antibody Nonreactive NR^Nonreactive   Hepatitis B surface antigen [RVA217]    Collection Time: 03/29/21  1:33 PM   Result Value Ref Range    Hep B Surface Agn Nonreactive NR^Nonreactive   Hepatitis B Surface Antibody [[id number]]    Collection Time: 03/29/21  1:33 PM   Result Value Ref Range    Hepatitis B Surface Antibody 12.36 (H) <8.00 m[IU]/mL   Hepatitis B core antibody [[id number]]    Collection Time: 03/29/21  1:33 PM   Result Value Ref Range    Hepatitis B Core Dianne Nonreactive NR^Nonreactive   EBV Capsid Antibody IgG [[id number]]    Collection Time: 03/29/21  1:33 PM   Result Value Ref Range    EBV Capsid Antibody IgG <0.2 0.0 - 0.8 AI   CMV  Antibody IgG [[id number]]    Collection Time: 03/29/21  1:33 PM   Result Value Ref Range    CMV Antibody IgG <0.2 0.0 - 0.8 AI   Thrombin time [LST514]    Collection Time: 03/29/21  1:33 PM   Result Value Ref Range    Thrombin Time 17.0 13.0 - 19.0 sec   Partial thromboplastin time [LAB56]    Collection Time: 03/29/21  1:33 PM   Result Value Ref Range    PTT 26 22 - 37 sec   INR [NPQ0040]    Collection Time: 03/29/21  1:33 PM   Result Value Ref Range    INR 1.06 0.86 - 1.14   Cardiolipin Dianne IgG and IgM [LAB 6836]    Collection Time: 03/29/21  1:33 PM   Result Value Ref Range    Cardiolipin Antibody IgG <1.6 0.0 - 19.9 GPL-U/mL    Cardiolipin Antibody IgM <0.2 0.0 - 19.9 MPL-U/mL   CBC with platelets differential [[id number]]    Collection Time: 03/29/21  1:33 PM   Result Value Ref Range    WBC 6.6 4.0 - 11.0 10e9/L    RBC Count 4.14 (L) 4.4 - 5.9 10e12/L    Hemoglobin 12.4 (L) 13.3 - 17.7 g/dL    Hematocrit 37.9 (L) 40.0 - 53.0 %    MCV 92 78 - 100 fl    MCH 30.0 26.5 - 33.0 pg    MCHC 32.7 31.5 - 36.5 g/dL    RDW 12.9 10.0 - 15.0 %    Platelet Count 240 150 - 450 10e9/L    Diff Method Automated Method     % Neutrophils 61.6 %    % Lymphocytes 28.4 %    % Monocytes 6.2 %    % Eosinophils 2.7 %    % Basophils 0.5 %    % Immature Granulocytes 0.6 %    Nucleated RBCs 0 0 /100    Absolute Neutrophil 4.1 1.6 - 8.3 10e9/L    Absolute Lymphocytes 1.9 0.8 - 5.3 10e9/L    Absolute Monocytes 0.4 0.0 - 1.3 10e9/L    Absolute Eosinophils 0.2 0.0 - 0.7 10e9/L    Absolute Basophils 0.0 0.0 - 0.2 10e9/L    Abs Immature Granulocytes 0.0 0 - 0.4 10e9/L    Absolute Nucleated RBC 0.0    Comprehensive metabolic panel [LAB17]    Collection Time: 03/29/21  1:33 PM   Result Value Ref Range    Sodium 140 133 - 144 mmol/L    Potassium 4.0 3.4 - 5.3 mmol/L    Chloride 114 (H) 94 - 109 mmol/L    Carbon Dioxide 18 (L) 20 - 32 mmol/L    Anion Gap 9 3 - 14 mmol/L    Glucose 90 70 - 99 mg/dL    Urea Nitrogen 62 (H) 7 - 30 mg/dL    Creatinine 4.48 (H)  0.66 - 1.25 mg/dL    GFR Estimate 17 (L) >60 mL/min/[1.73_m2]    GFR Estimate If Black 20 (L) >60 mL/min/[1.73_m2]    Calcium 8.9 8.5 - 10.1 mg/dL    Bilirubin Total 0.2 0.2 - 1.3 mg/dL    Albumin 3.2 (L) 3.4 - 5.0 g/dL    Protein Total 7.1 6.8 - 8.8 g/dL    Alkaline Phosphatase 73 40 - 150 U/L    ALT 28 0 - 70 U/L    AST 11 0 - 45 U/L   Blood Group A Subtype    Collection Time: 03/29/21  1:33 PM   Result Value Ref Range    Antigen Type Canceled, Test credited     Blood Bank Comment       Patient is not ABO type A or AB. A subtyping not applicable.   ABO/Rh type and screen [[id number]]    Collection Time: 03/29/21  1:33 PM   Result Value Ref Range    ABO O     RH(D) Pos     Antibody Screen Neg     Test Valid Only At          [facility]    Specimen Expires 04/01/2021    ABO type    Collection Time: 03/29/21  1:55 PM   Result Value Ref Range    ABO O     RH(D) Pos     Specimen Expires 04/01/2021    UA with Microscopic reflex to Culture    Collection Time: 03/29/21  2:04 PM    Specimen: Midstream Urine   Result Value Ref Range    Color Urine Straw     Appearance Urine Clear     Glucose Urine 50 (A) NEG^Negative mg/dL    Bilirubin Urine Negative NEG^Negative    Ketones Urine Negative NEG^Negative mg/dL    Specific Gravity Urine 1.008 1.003 - 1.035    Blood Urine Negative NEG^Negative    pH Urine 6.0 5.0 - 7.0 pH    Protein Albumin Urine >499 (A) NEG^Negative mg/dL    Urobilinogen mg/dL 0.0 0.0 - 2.0 mg/dL    Nitrite Urine Negative NEG^Negative    Leukocyte Esterase Urine Negative NEG^Negative    Source Midstream Urine     WBC Urine <1 0 - 5 /HPF    RBC Urine 0 0 - 2 /HPF    Mucous Urine Present (A) NEG^Negative /LPF     No results found for: CPRA

## 2021-03-29 NOTE — LETTER
3/29/2021         RE: Boogie Villa  [address] [address]        Dear Colleague,    Thank you for referring your patient, Boogie Villa, to the [facility]. Please see a copy of my visit note below.    TRANSPLANT NEPHROLOGY RECIPIENT EVALUATION NOTE    Assessment and Plan:  # Kidney Transplant Evaluation: Patient is a good candidate overall. Benefits of a living donor transplant were discussed.    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 He is not yet on dialysis, eGFR 17 ml/min, and overall feeling well, but would likely benefit from a kidney transplant.     # Posterior Urethral Valves: s/p reimplantation and Mitrofanoff. Currently voiding volitionally. No recent UTI. Recently evaluated by urology with UDS showing normal pressures during filling, good emptying of the bladder with some detrusor contraction augmented by Valsalva maneuvers, and no evidence of reflux. Please see Dr. Morton's note from 12/14/2020 regarding perioperative recommendations.    # Obesity: following with weight loss mgmt and has had some success with weight loss. Current BMI 38. Appreciate dietician and surgeon input.     # Cardiac Risk: he has no known history of cardiac disease or events and is asymptomatic with exertion. EKG and ECHO pending.    # Health Maintenance: Dental: Up to date    Discussed the risks and benefits of a transplant, including the risk of surgery and immunosuppression medications.  Patient's overall evaluation will be discussed in the Transplant Program's regular meeting with a final recommendation on the patients suitability for transplant to be made at that time.    Pending completion of the full evaluation, patient presently appears to be enough of an acceptable kidney transplant recipient candidate to have any potential kidney donors start the evaluation process.    Evaluation:  Boogie Villa was seen in  consultation at the request of Dr. John Castillo for evaluation as a potential kidney transplant recipient.    Reason for Visit:  Boogie Villa is a 20-year-old male with CKD from posterior urethral valves, who presents for kidney transplant evaluation.    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 Recurrent UTIs were an issue when he was doing CIC, but now voiding volitionally (no longer using mitrofanoff), and hasn't had a UTI in some time. eGFR currently around 17 ml/min.        Kidney Disease Dx: Posterior urethral valves       Biopsy Proven: No         On Dialysis: No       Primary Nephrologist: Dr. Diaz       H/o Kidney Stones: No         Diabetic Hx: None           Cardiac/Vascular Disease Risk Factors:        - No known history         Viral Serology Status       CMV IgG Antibody: Negative       EBV IgG Antibody: Negative         Volume Status/Weight:        Volume status: Euvolemic       BMI: Body mass index is 38 kg/m .         Functional Capacity/Frailty:        He does some walking and dancing for exercise. No chest pain, SOB, or claudication.      Fatigue/Decreased Energy: [x] No [] Yes    Chest Pain or SOB with Exertion: [x] No [] Yes    Significant Weight Change: [x] No [] Yes    Nausea, Vomiting or Diarrhea: [x] No [] Yes    Fever, Sweats or Chills:  [x] No [] Yes    Leg Swelling [x] No [] Yes        History of Cancer: None    Other Significant Medical Issues: None    Review of Systems:  A comprehensive review of systems was obtained and negative, except as noted in the HPI or PMH.    Past Medical History:   Medical record was reviewed and PMH was discussed with patient and noted below.  Past Medical History:   Diagnosis Date     ADD (attention deficit disorder)      CKD (chronic kidney disease)      Hypertension 2000     Posterior urethral valves        Past Social History:   Past  Surgical History:   Procedure Laterality Date     ABDOMEN SURGERY  2003    Bilateral urereral tapering and re-implantation     ABDOMEN SURGERY  2008    Mitrofanoff     ablation of posterior urethral valves  2000     APPENDECTOMY  2008     ureteral reimplantation with tapering  2003     Personal history of bleeding or anesthesia problems: No    Family History:  No family history on file.    Personal History:   Social History     Socioeconomic History     Marital status: Single     Spouse name: Not on file     Number of children: Not on file     Years of education: Not on file     Highest education level: Not on file   Occupational History     Not on file   Social Needs     Financial resource strain: Not on file     Food insecurity     Worry: Not on file     Inability: Not on file     Transportation needs     Medical: Not on file     Non-medical: Not on file   Tobacco Use     Smoking status: Never Smoker     Smokeless tobacco: Never Used   Substance and Sexual Activity     Alcohol use: Not on file     Drug use: Not on file     Sexual activity: Not on file   Lifestyle     Physical activity     Days per week: Not on file     Minutes per session: Not on file     Stress: Not on file   Relationships     Social connections     Talks on phone: Not on file     Gets together: Not on file     Attends Spiritism service: Not on file     Active member of club or organization: Not on file     Attends meetings of clubs or organizations: Not on file     Relationship status: Not on file     Intimate partner violence     Fear of current or ex partner: Not on file     Emotionally abused: Not on file     Physically abused: Not on file     Forced sexual activity: Not on file   Other Topics Concern     Parent/sibling w/ CABG, MI or angioplasty before 65F 55M? Not Asked   Social History Narrative     Not on file       Allergies:  Allergies   Allergen Reactions     Dust Mites      Runny nose and watery eyes     Mold      Runny nose and  "watery eyes     Other [Seasonal Allergies]      Grass, Ragweed - gets runny nose and watery eyes       Medications:  Current Outpatient Medications   Medication Sig     amLODIPine (NORVASC) 5 MG tablet Take 1 tablet (5 mg) by mouth daily     atenolol (TENORMIN) 25 MG tablet Take 1.5 tablets (37.5 mg) by mouth daily     calcium carbonate (OSCAL 500) 1250 (500 Ca) MG TABS tablet daily      Cholecalciferol (VITAMIN D) 2000 units CAPS      methylphenidate (RITALIN) 10 MG tablet Take 25 mg by mouth     ramipril (ALTACE) 1.25 MG capsule Take 2 capsules (2.5 mg) by mouth daily     sodium bicarbonate 650 MG tablet TAKE 4 TABLETS (2,600 MG) THREE TIMES A DAY     No current facility-administered medications for this visit.        Vitals:  /74   Pulse 77   Ht 1.816 m (5' 11.5\")   Wt 125.3 kg (276 lb 4.8 oz)   SpO2 95%   BMI 38.00 kg/m      Exam:  GENERAL APPEARANCE: alert and no distress  HENT: mouth without ulcers or lesions, good dentition  LYMPHATICS: no cervical or supraclavicular nodes  RESP: lungs clear to auscultation - no rales, rhonchi or wheezes  CV: regular rhythm, normal rate, no rub, no murmur  EDEMA: no LE edema bilaterally  ABDOMEN: soft, nondistended, nontender  MS: extremities normal - no gross deformities noted, no evidence of inflammation in joints, no muscle tenderness  SKIN: no rash    Results:   No results found for this or any previous visit (from the past 336 hour(s)).    Physician Attestation   I, Imelda Barrera, saw and evaluated Boogie Villa as part of a shared visit.  I have reviewed and discussed with the advanced practice provider their history, physical and plan.    I personally reviewed the vital signs, medications, labs and imaging.    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 good candidate overall  - Posterior urethral valves: s/p reimplantation,no recent UTIs, urodynamic studies nl pressures, no reflux, urology evaluation by  12/2020  - Cardiac risk: asymptomatic, no hx of cardiac disease, EKG/echo  - obesity: refer to weight management clinic  - health maintenance: dental    Imelda [facility]  Date of Service (when I saw the patient): 3/29/2021      Again, thank you for allowing me to participate in the care of your patient.        Sincerely,        AMBER

## 2021-03-29 NOTE — PROGRESS NOTES
Psychosocial Assessment  Patient Name/ Age: Boogie Villa 20 year old   Medical Record #: [id number]  Duration of Interview:     30  min  Process:   Face-to-Face Interview                (counseling < 50%)   Present at Appointment: Boogie and his mother Darcy        :PAYAL Harp, [facility] Date:  March 29, 2021        Type of transplant: Kidney    Donor type:   Boogie indicated he does not know of any potential donors at this time.   Cadaver   Prior Transplants:    No Status of Transplant:       Current Living Situation    Location:   [address] [place] [address]  With Whom: lives alone       Family/ Social Support:    Boogie's parents and sister live in [place], [place].  He has a half Sister who lives in [place], [place].   Available, helpful   Committed relationship:   Single   Other supports:   Friends Available, helpful       Activities/ Functional Ability    Current level: Ambulatory and independent with ADL's     Transportation Drives self       Vocational/Employment/Financial     Employment   Unemployed and student   Job Description      Income   Other: Parents   Insurance  [facility] through father's employer.    At this time, patient can afford medication costs:  Yes  with parents assistance Private Insurance       Medical Status    Current mode of treatment for ESRD None   Complications - Non diabetic        Behavioral    Tobacco Use No Chemical Dependency No       Psychiatric Impairment No    Reading ability Good  Education Level: Currently working on obtaining his AA Degree Recent Legal History No    Coping Style/Strategies: Boogie indicated when under stress he will play video games.     Ability to Adhere to Complex Medical Regime: Yes     Adherence History:        Education  _X_ Medicare  _X_ Rehabilitation  _X_ Donor issues  _X_ Community resources  _X_ Post discharge housing  _X_ Financial resources  _X_ Medical insurance options  _X_ Psych adjustment  _X_ Family  adjustment  _X_ Health Care Directive - Provided Education and Declined Completing at this time.  Arvind is in agreement with his parents making his medical decisions for him if he is unable. Psychosocial Risks of Transplant Reviewed and Discussed:  _X_ Increased stress related to emotional,            family, social, employment or financial           situation  _X_ Affect on work and/or disability benefits  _X_ Affect on future life insurance  _X_ Transplant outcome expectations may           not be met  _X_ Mental Health Risks: anxiety,           depression, PTSD, guilt, grief and           chronic fatigue     Notable Items:   Unsure if Boogie will qualify for Medicare due to not having enough work credits at this time.      Final Evaluation/Assessment   Patient seemed to process information well. Appeared well informed, motivated and able to follow post transplant requirements. Behavior was appropriate during interview. Has adequate income and insurance coverage. Adequate social support. No major contraindications noted for transplant.  At this time patient appears to understand the risks and benefits of transplant.      Recommendation  Acceptable    Selection Criteria Met:  Plan for support Yes   Chemical Dependence Yes   Smoking Yes   Mental Health Yes   Adequate Finances Yes    Signature[facility]AL Harp, [facility]   Title: Clinical

## 2021-03-29 NOTE — LETTER
3/29/2021         RE: Boogie Villa  [address] [address]        Dear Colleague,    Thank you for referring your patient, Boogie Villa, to the [facility]. Please see a copy of my visit note below.      Transplant Surgery Consult Note     Medical record number: [id number]  YOB: 2000,   Consult requested  for evaluation of kidney transplant candidacy.    Assessment and Recommendations:Mr. Villa appears to be a excellent candidate for kidney transplantation and has a good understanding of the risks and benefits of this approach to the management of renal failure. The following issues should be addressed prior to finalizing his transplant candidacy:     BMI: recommend additional ten pound weight loss prior to surgery. Has central adiposity with moderate pannus, thus high risk of infection postoperatively. Has been making good progress with diet modification and exercise, with steady 1-1.5 pound loss per week.     Urologic issues: Saw Dr. Morton in consultation. Has Mitrofanoff channel but does not catheterize this anymore. He voids normally and has appropriate bladder pressures without demonstrable ureteral reflux. Recommends that Mitrofanoff be catheterized during transplant to avoid injury, and that he follow up with Urology to have repeat urodynamics studies 3-6 months following transplant to verify no reflux and appropriate bladder pressures.     Transplant order: Mr. 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  Recommend he be considered as a candidate for kidney transplant alone.  Cardiology consult for cardiac risk stratification to be ordered: No- young, absence of risk factors, no symptoms with exertion. EKG/Echo pending   CT abdomen and pelvis without contrast to be ordered for  assessment of vascular targets: No- young, nondiabetic, palpable femoral pulses  Transplant listing labs ordered to include HLA, ABOx2, Cr, etc.  Dietician consult ordered: Yes  Social work consult ordered: Yes  Imaging reports reviewed:  n/a  Radiology images reviewed:n/a  Recipient suitable to move forward with work up of living donors:  Yes- mother/family interested in donation. Discussed tissue matching/eplet matching with them in length      The majority of our visit was spent in counselling, discussing the medical and surgical risks of kidney transplantation. We discussed approximate wait time and how that is influenced by issues such as blood type and sensitization (PRA) and access to a living donor. I contrasted potential waiting time for living vs  donor kidneys from  normal (0-85%) or higher (%) kidney donor profile index (KDPI) donors and their associated outcomes. I would not recommend this individual to consider kidneys from high KDPI donors. The reason for this decision is best summarized as: multiple potential living donors. Potential surgical complications of kidney transplantation include bleeding, superficial or deep wound complications (infection, hernia, lymphocele), ureteral anastomotic failure (leak or stenosis), graft thrombosis, need for reoperation and other issues such as cardiac complications, pneumonia, deep venous thrombosis, pulmonary embolism, post transplant diabetes and death. The potential for recurrent disease or need for retransplantation was also addressed. We discussed the possible need for ureteral stent (and subsequent removal), and the utility of protocol biopsy and laboratory studies to evaluate for rejection or recurrent disease. We discussed the risk of graft rejection, our center's average graft and patient survival rates, immunosuppression protocols, as well as the potential opportunity to participate in clinical trials.  We also discussed the average  length of stay, recovery process, and posttransplant lab and monitoring protocol.  I emphasized the need for strict immunosuppression medication adherence and the potential for complications of immunosuppression such as skin cancer or lymphoma, as well as a very low but not zero risk of donor-derived disease transmission risks (infection, cancer). Mr. Villa asked good questions and his candidacy will be reviewed at our Multidisciplinary Selection Committee. Thank you for the opportunity to participate in Mr. Villa's care.      Total time: 30 minutes  Counselling time: 25 minutes      John Castillo MD    ---------------------------------------------------------------------------------------------------    HPI: Mr. 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 The patient is non-diabetic.       The patient is not on dialysis.    Has potential kidney donors:  Yes .  Interested in participation in paired exchange if a donor is willing: Doesn't know     The patient has the following pertinent history:       No    Yes  Dialysis:    [x]      [] via:       Blood Transfusion                  [x]      []  Number of units:   Most recently:  Pregnancy:    [x]      [] Number:       Previous Transplant:  [x]      [] Details:    Cancer    [x]      [] Comment:   Kidney stones   [x]      [] Comment:      Recurrent infections  [x]      []  Type:                  Bladder dysfunction  []      [x] Cause:  Posterior urethral valves with reflux/ureteral reimplantation, s/p Mitrofanoff. Currently voids normally  Claudication   [x]      [] Distance:    Previous Amputation  [x]      [] Cause:     Chronic anticoagulation  [x]      [] Indication:   Moravian  [x]      []      Past Medical History:   Diagnosis Date     ADD (attention deficit disorder)      CKD (chronic kidney disease)      Hypertension 2000     Posterior urethral valves      Past Surgical History:   Procedure Laterality Date     ABDOMEN  SURGERY  2003    Bilateral urereral tapering and re-implantation     ABDOMEN SURGERY  2008    Mitrofanoff     ablation of posterior urethral valves  2000     APPENDECTOMY  2008     ureteral reimplantation with tapering  2003     No family history on file.  Social History     Socioeconomic History     Marital status: Single     Spouse name: Not on file     Number of children: Not on file     Years of education: Not on file     Highest education level: Not on file   Occupational History     Not on file   Social Needs     Financial resource strain: Not on file     Food insecurity     Worry: Not on file     Inability: Not on file     Transportation needs     Medical: Not on file     Non-medical: Not on file   Tobacco Use     Smoking status: Never Smoker     Smokeless tobacco: Never Used   Substance and Sexual Activity     Alcohol use: Not on file     Drug use: Not on file     Sexual activity: Not on file   Lifestyle     Physical activity     Days per week: Not on file     Minutes per session: Not on file     Stress: Not on file   Relationships     Social connections     Talks on phone: Not on file     Gets together: Not on file     Attends Orthodox service: Not on file     Active member of club or organization: Not on file     Attends meetings of clubs or organizations: Not on file     Relationship status: Not on file     Intimate partner violence     Fear of current or ex partner: Not on file     Emotionally abused: Not on file     Physically abused: Not on file     Forced sexual activity: Not on file   Other Topics Concern     Parent/sibling w/ CABG, MI or angioplasty before 65F 55M? Not Asked   Social History Narrative     Not on file       ROS:   CONSTITUTIONAL:  No fevers or chills  EYES: negative for icterus  ENT:  negative for hearing loss, tinnitus and sore throat  RESPIRATORY:  negative for cough, sputum, dyspnea  CARDIOVASCULAR:  negative for chest pain negative for claudication symptoms or nonhealing lower  extremity ulcers  GASTROINTESTINAL:  negative for nausea, vomiting, diarrhea or constipation  GENITOURINARY:  negative for incontinence, dysuria, bladder emptying problems  HEME:  No easy bruising  INTEGUMENT:  negative for rash and pruritus  NEURO:  Negative for headache, seizure disorder  Allergies:   Allergies   Allergen Reactions     Dust Mites      Runny nose and watery eyes     Mold      Runny nose and watery eyes     Other [Seasonal Allergies]      Grass, Ragweed - gets runny nose and watery eyes     Medications:  Prescription Medications as of 3/30/2021       Rx Number Disp Refills Start End Last Dispensed Date Next Fill Date Owning Pharmacy    amLODIPine (NORVASC) 5 MG tablet  90 tablet 3 2019    [facility]  for [address]    Sig: Take 1 tablet (5 mg) by mouth daily    Class: E-Prescribe    Route: Oral    atenolol (TENORMIN) 25 MG tablet  135 tablet 3 2019    [facility]  for [address]    Sig: Take 1.5 tablets (37.5 mg) by mouth daily    Class: E-Prescribe    Route: Oral    calcium carbonate (OSCAL 500) 1250 (500 Ca) MG TABS tablet    2017        Sig: daily     Class: Historical    Cholecalciferol (VITAMIN D) 2000 units CAPS    2017        Class: Historical    methylphenidate (RITALIN) 10 MG tablet    2020    [facility] - [address]    Sig: Take 25 mg by mouth    Class: Historical    Earliest Fill Date: 2020    Route: Oral    ramipril (ALTACE) 1.25 MG capsule  60 capsule 11 1/10/2020    [id number] - [place], [place] - [address] AT [facility] [address]    Sig: Take 2 capsules (2.5 mg) by mouth daily    Class: E-Prescribe    Route: Oral    Saccharomyces boulardii (PROBIOTIC) 250 MG CAPS            Si capsule daily    Class: Historical    Route: Oral    sodium bicarbonate 650 MG tablet  180 tablet 23 2020     [facility] - [address]    Sig: TAKE 4 TABLETS (2,600 MG) THREE TIMES A DAY    Class: E-Prescribe        Exam:     There were no vitals taken for this visit.  Appearance: in no apparent distress.   Skin: normal, warm, dry  Eyes:  no redness or discharge.  Sclera anicteric  Head and Neck: Normal, no rashes or jaundice  Respiratory: easy respirations, no audible wheezing.  Abdomen: abdomen soft, nontender. Well-healed lower midline scar from ureteral reimplant and mitrofanoff creation. No hernia. Obese with overhanging pannus  Extremeties: femoral 2+/2+, Edema, none  Neuro: without deficit   Psychiatric: Normal mood and affect    Diagnostics:   Recent Results (from the past 672 hour(s))   EKG 12-lead, tracing only [EKG1]    Collection Time: 03/29/21 12:08 PM   Result Value Ref Range    Interpretation ECG Click View Image link to view waveform and result    Varicella Zoster Virus Antibody IgG [[id number]]    Collection Time: 03/29/21  1:33 PM   Result Value Ref Range    Varicella Zoster Virus Antibody IgG 1.8 (H) 0.0 - 0.8 AI   Treponema Abs w Reflex to RPR and Titer [[id number]]    Collection Time: 03/29/21  1:33 PM   Result Value Ref Range    Treponema Antibodies Nonreactive NR^Nonreactive   HIV Antigen Antibody Combo Pretransplant    Collection Time: 03/29/21  1:33 PM   Result Value Ref Range    HIV Antigen Antibody Combo Pretransplant Nonreactive NR^Nonreactive   Hepatitis C antibody [[id number]]    Collection Time: 03/29/21  1:33 PM   Result Value Ref Range    Hepatitis C Antibody Nonreactive NR^Nonreactive   Hepatitis B surface antigen [[id number]]    Collection Time: 03/29/21  1:33 PM   Result Value Ref Range    Hep B Surface Agn Nonreactive NR^Nonreactive   Hepatitis B Surface Antibody [[id number]]    Collection Time: 03/29/21  1:33 PM   Result Value Ref Range    Hepatitis B Surface Antibody 12.36 (H) <8.00 m[IU]/mL   Hepatitis B core antibody [[id number]]    Collection Time:  03/29/21  1:33 PM   Result Value Ref Range    Hepatitis B Core Dianne Nonreactive NR^Nonreactive   EBV Capsid Antibody IgG [[id number]]    Collection Time: 03/29/21  1:33 PM   Result Value Ref Range    EBV Capsid Antibody IgG <0.2 0.0 - 0.8 AI   CMV Antibody IgG [[id number]]    Collection Time: 03/29/21  1:33 PM   Result Value Ref Range    CMV Antibody IgG <0.2 0.0 - 0.8 AI   Thrombin time [ZIM511]    Collection Time: 03/29/21  1:33 PM   Result Value Ref Range    Thrombin Time 17.0 13.0 - 19.0 sec   Partial thromboplastin time [LAB56]    Collection Time: 03/29/21  1:33 PM   Result Value Ref Range    PTT 26 22 - 37 sec   INR [[id number]]    Collection Time: 03/29/21  1:33 PM   Result Value Ref Range    INR 1.06 0.86 - 1.14   Cardiolipin Dianne IgG and IgM [LAB 6836]    Collection Time: 03/29/21  1:33 PM   Result Value Ref Range    Cardiolipin Antibody IgG <1.6 0.0 - 19.9 GPL-U/mL    Cardiolipin Antibody IgM <0.2 0.0 - 19.9 MPL-U/mL   CBC with platelets differential [[id number]]    Collection Time: 03/29/21  1:33 PM   Result Value Ref Range    WBC 6.6 4.0 - 11.0 10e9/L    RBC Count 4.14 (L) 4.4 - 5.9 10e12/L    Hemoglobin 12.4 (L) 13.3 - 17.7 g/dL    Hematocrit 37.9 (L) 40.0 - 53.0 %    MCV 92 78 - 100 fl    MCH 30.0 26.5 - 33.0 pg    MCHC 32.7 31.5 - 36.5 g/dL    RDW 12.9 10.0 - 15.0 %    Platelet Count 240 150 - 450 10e9/L    Diff Method Automated Method     % Neutrophils 61.6 %    % Lymphocytes 28.4 %    % Monocytes 6.2 %    % Eosinophils 2.7 %    % Basophils 0.5 %    % Immature Granulocytes 0.6 %    Nucleated RBCs 0 0 /100    Absolute Neutrophil 4.1 1.6 - 8.3 10e9/L    Absolute Lymphocytes 1.9 0.8 - 5.3 10e9/L    Absolute Monocytes 0.4 0.0 - 1.3 10e9/L    Absolute Eosinophils 0.2 0.0 - 0.7 10e9/L    Absolute Basophils 0.0 0.0 - 0.2 10e9/L    Abs Immature Granulocytes 0.0 0 - 0.4 10e9/L    Absolute Nucleated RBC 0.0    Comprehensive metabolic panel [LAB17]    Collection Time: 03/29/21  1:33 PM   Result Value Ref Range    Sodium 140  133 - 144 mmol/L    Potassium 4.0 3.4 - 5.3 mmol/L    Chloride 114 (H) 94 - 109 mmol/L    Carbon Dioxide 18 (L) 20 - 32 mmol/L    Anion Gap 9 3 - 14 mmol/L    Glucose 90 70 - 99 mg/dL    Urea Nitrogen 62 (H) 7 - 30 mg/dL    Creatinine 4.48 (H) 0.66 - 1.25 mg/dL    GFR Estimate 17 (L) >60 mL/min/[1.73_m2]    GFR Estimate If Black 20 (L) >60 mL/min/[1.73_m2]    Calcium 8.9 8.5 - 10.1 mg/dL    Bilirubin Total 0.2 0.2 - 1.3 mg/dL    Albumin 3.2 (L) 3.4 - 5.0 g/dL    Protein Total 7.1 6.8 - 8.8 g/dL    Alkaline Phosphatase 73 40 - 150 U/L    ALT 28 0 - 70 U/L    AST 11 0 - 45 U/L   Blood Group A Subtype    Collection Time: 03/29/21  1:33 PM   Result Value Ref Range    Antigen Type Canceled, Test credited     Blood Bank Comment       Patient is not ABO type A or AB. A subtyping not applicable.   ABO/Rh type and screen [[id number]]    Collection Time: 03/29/21  1:33 PM   Result Value Ref Range    ABO O     RH(D) Pos     Antibody Screen Neg     Test Valid Only At          [facility]    Specimen Expires 04/01/2021    ABO type    Collection Time: 03/29/21  1:55 PM   Result Value Ref Range    ABO O     RH(D) Pos     Specimen Expires 04/01/2021    UA with Microscopic reflex to Culture    Collection Time: 03/29/21  2:04 PM    Specimen: Midstream Urine   Result Value Ref Range    Color Urine Straw     Appearance Urine Clear     Glucose Urine 50 (A) NEG^Negative mg/dL    Bilirubin Urine Negative NEG^Negative    Ketones Urine Negative NEG^Negative mg/dL    Specific Gravity Urine 1.008 1.003 - 1.035    Blood Urine Negative NEG^Negative    pH Urine 6.0 5.0 - 7.0 pH    Protein Albumin Urine >499 (A) NEG^Negative mg/dL    Urobilinogen mg/dL 0.0 0.0 - 2.0 mg/dL    Nitrite Urine Negative NEG^Negative    Leukocyte Esterase Urine Negative NEG^Negative    Source Midstream Urine     WBC Urine <1 0 - 5 /HPF    RBC Urine 0 0 - 2 /HPF    Mucous Urine Present (A) NEG^Negative /LPF     No results found  for: CPRA      Again, thank you for allowing me to participate in the care of your patient.        Sincerely,        John Castillo MD

## 2021-03-29 NOTE — PROGRESS NOTES
TRANSPLANT NEPHROLOGY RECIPIENT EVALUATION NOTE    Assessment and Plan:  # Kidney Transplant Evaluation: Patient is a good candidate overall. Benefits of a living donor transplant were discussed.    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 He is not yet on dialysis, eGFR 17 ml/min, and overall feeling well, but would likely benefit from a kidney transplant.     # Posterior Urethral Valves: s/p reimplantation and Mitrofanoff. Currently voiding volitionally. No recent UTI. Recently evaluated by urology with UDS showing normal pressures during filling, good emptying of the bladder with some detrusor contraction augmented by Valsalva maneuvers, and no evidence of reflux. Please see Dr. Morton's note from 12/14/2020 regarding perioperative recommendations.    # Obesity: following with weight loss mgmt and has had some success with weight loss. Current BMI 38. Appreciate dietician and surgeon input.     # Cardiac Risk: he has no known history of cardiac disease or events and is asymptomatic with exertion. EKG and ECHO pending.    # Health Maintenance: Dental: Up to date    Discussed the risks and benefits of a transplant, including the risk of surgery and immunosuppression medications.  Patient's overall evaluation will be discussed in the Transplant Program's regular meeting with a final recommendation on the patients suitability for transplant to be made at that time.    Pending completion of the full evaluation, patient presently appears to be enough of an acceptable kidney transplant recipient candidate to have any potential kidney donors start the evaluation process.    Evaluation:  Boogie Villa was seen in consultation at the request of Dr. John Castillo for evaluation as a potential kidney transplant recipient.    Reason for Visit:  Boogie Villa is a 20-year-old male with CKD from posterior urethral valves, who presents for kidney transplant  evaluation.    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 Recurrent UTIs were an issue when he was doing CIC, but now voiding volitionally (no longer using mitrofanoff), and hasn't had a UTI in some time. eGFR currently around 17 ml/min.        Kidney Disease Dx: Posterior urethral valves       Biopsy Proven: No         On Dialysis: No       Primary Nephrologist: Dr. Diaz       H/o Kidney Stones: No         Diabetic Hx: None           Cardiac/Vascular Disease Risk Factors:        - No known history         Viral Serology Status       CMV IgG Antibody: Negative       EBV IgG Antibody: Negative         Volume Status/Weight:        Volume status: Euvolemic       BMI: Body mass index is 38 kg/m .         Functional Capacity/Frailty:        He does some walking and dancing for exercise. No chest pain, SOB, or claudication.      Fatigue/Decreased Energy: [x] No [] Yes    Chest Pain or SOB with Exertion: [x] No [] Yes    Significant Weight Change: [x] No [] Yes    Nausea, Vomiting or Diarrhea: [x] No [] Yes    Fever, Sweats or Chills:  [x] No [] Yes    Leg Swelling [x] No [] Yes        History of Cancer: None    Other Significant Medical Issues: None    Review of Systems:  A comprehensive review of systems was obtained and negative, except as noted in the HPI or PMH.    Past Medical History:   Medical record was reviewed and PMH was discussed with patient and noted below.  Past Medical History:   Diagnosis Date     ADD (attention deficit disorder)      CKD (chronic kidney disease)      Hypertension 2000     Posterior urethral valves        Past Social History:   Past Surgical History:   Procedure Laterality Date     ABDOMEN SURGERY  2003    Bilateral urereral tapering and re-implantation     ABDOMEN SURGERY  2008    Mitrofanoff     ablation of posterior urethral valves  2000     APPENDECTOMY  2008     ureteral  reimplantation with tapering  2003     Personal history of bleeding or anesthesia problems: No    Family History:  No family history on file.    Personal History:   Social History     Socioeconomic History     Marital status: Single     Spouse name: Not on file     Number of children: Not on file     Years of education: Not on file     Highest education level: Not on file   Occupational History     Not on file   Social Needs     Financial resource strain: Not on file     Food insecurity     Worry: Not on file     Inability: Not on file     Transportation needs     Medical: Not on file     Non-medical: Not on file   Tobacco Use     Smoking status: Never Smoker     Smokeless tobacco: Never Used   Substance and Sexual Activity     Alcohol use: Not on file     Drug use: Not on file     Sexual activity: Not on file   Lifestyle     Physical activity     Days per week: Not on file     Minutes per session: Not on file     Stress: Not on file   Relationships     Social connections     Talks on phone: Not on file     Gets together: Not on file     Attends Hoahaoism service: Not on file     Active member of club or organization: Not on file     Attends meetings of clubs or organizations: Not on file     Relationship status: Not on file     Intimate partner violence     Fear of current or ex partner: Not on file     Emotionally abused: Not on file     Physically abused: Not on file     Forced sexual activity: Not on file   Other Topics Concern     Parent/sibling w/ CABG, MI or angioplasty before 65F 55M? Not Asked   Social History Narrative     Not on file       Allergies:  Allergies   Allergen Reactions     Dust Mites      Runny nose and watery eyes     Mold      Runny nose and watery eyes     Other [Seasonal Allergies]      Grass, Ragweed - gets runny nose and watery eyes       Medications:  Current Outpatient Medications   Medication Sig     amLODIPine (NORVASC) 5 MG tablet Take 1 tablet (5 mg) by mouth daily     atenolol  "(TENORMIN) 25 MG tablet Take 1.5 tablets (37.5 mg) by mouth daily     calcium carbonate (OSCAL 500) 1250 (500 Ca) MG TABS tablet daily      Cholecalciferol (VITAMIN D) 2000 units CAPS      methylphenidate (RITALIN) 10 MG tablet Take 25 mg by mouth     ramipril (ALTACE) 1.25 MG capsule Take 2 capsules (2.5 mg) by mouth daily     sodium bicarbonate 650 MG tablet TAKE 4 TABLETS (2,600 MG) THREE TIMES A DAY     No current facility-administered medications for this visit.        Vitals:  /74   Pulse 77   Ht 1.816 m (5' 11.5\")   Wt 125.3 kg (276 lb 4.8 oz)   SpO2 95%   BMI 38.00 kg/m      Exam:  GENERAL APPEARANCE: alert and no distress  HENT: mouth without ulcers or lesions, good dentition  LYMPHATICS: no cervical or supraclavicular nodes  RESP: lungs clear to auscultation - no rales, rhonchi or wheezes  CV: regular rhythm, normal rate, no rub, no murmur  EDEMA: no LE edema bilaterally  ABDOMEN: soft, nondistended, nontender  MS: extremities normal - no gross deformities noted, no evidence of inflammation in joints, no muscle tenderness  SKIN: no rash    Results:   No results found for this or any previous visit (from the past 336 hour(s)).    Physician Attestation   I, Imelda Barrera, saw and evaluated Boogie Villa as part of a shared visit.  I have reviewed and discussed with the advanced practice provider their history, physical and plan.    I personally reviewed the vital signs, medications, labs and imaging.    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 good candidate overall  - Posterior urethral valves: s/p reimplantation,no recent UTIs, urodynamic studies nl pressures, no reflux, urology evaluation by  12/2020  - Cardiac risk: asymptomatic, no hx of cardiac " disease, EKG/echo  - obesity: refer to weight management clinic  - health maintenance: dental    Imelda Barrera  Date of Service (when I saw the patient): 3/29/2021

## 2021-03-29 NOTE — PROGRESS NOTES
Kidney Transplant Evaluation - 12/30/2020      Summary    Team s concerns/comments:   1) BMI/Obesity  2) Cardiac risk assessment  3) Mitrofanoff channel  4) Health maintenance    Candidacy category: Yellow    Action/Plan:   1) Following w Weight Loss Management,   In process of losing weight. BMI currenently 38  2) EKG, Echocardiogram today  3) Urology evaluation per Dr. Morton. Recommend mitrofanoff channel be catheterized during transplant and f/u w Urology for repeat urodynamic study 3-6 months after transplant.  4) Dental UTD    Expected Selection Meeting Discussion: 4/7/21

## 2021-03-30 LAB
CARDIOLIPIN ANTIBODY IGG: <1.6 GPL-U/ML (ref 0–19.9)
CARDIOLIPIN ANTIBODY IGM: <0.2 MPL-U/ML (ref 0–19.9)
CMV IGG SERPL QL IA: <0.2 AI (ref 0–0.8)
EBV VCA IGG SER QL IA: <0.2 AI (ref 0–0.8)
INTERPRETATION ECG - MUSE: NORMAL
PROTOCOL CUTOFF: NORMAL
SA1 CELL: NORMAL
SA1 COMMENTS: NORMAL
SA1 HI RISK ABY: NORMAL
SA1 MOD RISK ABY: NORMAL
SA1 TEST METHOD: NORMAL
SA2 CELL: NORMAL
SA2 COMMENTS: NORMAL
SA2 HI RISK ABY UA: NORMAL
SA2 MOD RISK ABY: NORMAL
SA2 TEST METHOD: NORMAL
THROMBIN TIME: 17 SEC (ref 13–19)
UNACCEPTABLE ANTIGEN: NORMAL
UNOS CPRA: 0
VZV IGG SER QL IA: 1.8 AI (ref 0–0.8)

## 2021-03-31 LAB
BLD GP AB SCN TITR SERPL: NORMAL {TITER}
GAMMA INTERFERON BACKGROUND BLD IA-ACNC: 0.1 IU/ML
LA PPP-IMP: NEGATIVE
M TB IFN-G CD4+ BCKGRND COR BLD-ACNC: 9.9 IU/ML
M TB TUBERC IFN-G BLD QL: NEGATIVE
MITOGEN IGNF BCKGRD COR BLD-ACNC: 0 IU/ML
MITOGEN IGNF BCKGRD COR BLD-ACNC: 0 IU/ML

## 2021-04-02 LAB
A* LOCUS: NORMAL
A*: NORMAL
ABTEST METHOD: NORMAL
B* LOCUS: NORMAL
B*: NORMAL
BW-1: NORMAL
C* LOCUS: NORMAL
C*: NORMAL
DPA1* NMDP: NORMAL
DPA1*: NORMAL
DPB1* LOCUS NMDP: NORMAL
DPB1* NMDP: NORMAL
DPB1*: NORMAL
DPB1*LOCUS: NORMAL
DQA1*: NORMAL
DQA1*LOCUS: NORMAL
DQB1* LOCUS: NORMAL
DQB1*: NORMAL
DRB1* LOCUS: NORMAL
DRB1*: NORMAL
DRB3* LOCUS: NORMAL
DRSSO TEST METHOD: NORMAL

## 2021-04-05 ENCOUNTER — IMMUNIZATION (OUTPATIENT)
Dept: NURSING | Facility: CLINIC | Age: 21
End: 2021-04-05
Attending: FAMILY MEDICINE
Payer: COMMERCIAL

## 2021-04-05 PROCEDURE — 0002A PR COVID VAC PFIZER DIL RECON 30 MCG/0.3 ML IM: CPT

## 2021-04-05 PROCEDURE — 91300 PR COVID VAC PFIZER DIL RECON 30 MCG/0.3 ML IM: CPT

## 2021-04-06 LAB — COPATH REPORT: NORMAL

## 2021-04-16 ENCOUNTER — DOCUMENTATION ONLY (OUTPATIENT)
Dept: TRANSPLANT | Facility: CLINIC | Age: 21
End: 2021-04-16

## 2021-04-16 ENCOUNTER — TELEPHONE (OUTPATIENT)
Dept: TRANSPLANT | Facility: CLINIC | Age: 21
End: 2021-04-16

## 2021-04-16 DIAGNOSIS — Z76.82 AWAITING ORGAN TRANSPLANT: Primary | ICD-10-CM

## 2021-04-16 NOTE — PROGRESS NOTES
"Kidney Transplant Evaluation - 12/30/2020  Boogie Villa attended the pre-transplant patient education class today. The [facility] website pre-transplant modules were viewed; class participants were educated on using the site.     Content reviewed:    Living Donation and how to access that program    Paired exchange    Kidney Donor Profile Index (KDPI)    Waiting list issues (right to decline without penalty, high PHS risk donors, what to expect when called with an offer)     Hospital experience, length of stay, need to stay locally post-discharge (2-4 weeks)     Surgical options (with pictures)                             Post-surgery lifting and driving restrictions    Post-transplant routines, frequency of lab work and clinic visits    Need to stay locally post-discharge (2-4 weeks)    Role of Transplant Coordinator    Participants were informed of the benefits of transplant as well as potential risks such as infection, cancer, and death.  The need for total adherence with immunosuppression medications and following transplant regimens was stressed.  The overall evaluation/approval/listing process was reviewed.        The patient was provided with the following documents:  What You Need to Know About a Kidney Transplant  Adult Kidney Transplant - A Guide for Patients  SRTR Data Sheet - Kidney  Brochure - Kidney Allocation  Brochure - Multiple Listing and Waiting Time Transfer  What Every Patient Needs to Know (UNOS)  UNOS Facts and Figures  Finding a Donor  [facility] - Quick Start Guide  KDPI Consent  Receipt of Information form    Boogie Villa signed the  Receipt of Information for Organ Transplant Recipient and KDPI > 85% with a no response. \"      "

## 2021-04-16 NOTE — LETTER
2021    Boogie Villa  [address] [address]      Dear Mr. Villa,    This letter is sent to confirm that you are a candidate in the kidney transplant program at the [facility].  You were placed on the kidney INACTIVE waitlist on 2021.  This means you will accumulate waiting time but not receive  donor calls.  You have been placed on INACTIVE status due to an incomplete evaluation, please see my separate letter regarding the details of this.     Per our routine Office workflow, you will now be transferred to our kidney transplant wait list coordinator team. Your new coordinator is Ivis BENAVIDES assisted by Ely JOSEPH. Either can be reached by calling our Main Office Number at [phone].      Items we will need from you:      We will receive approval from your insurance company for the transplant procedure when your status is ready to be changed to ACTIVE.  It is critical that you notify us if there is any change in your insurance.  It is also important that you familiarize yourself with the details of your specific insurance policy.  Our patient  is available to assist you if you should have any questions regarding your coverage.      An ALA or PRA blood sample will need to be sent here every 3 months to match you with  donors or any potential living donors when you are on ACTIVE status. Ivis BENAVIDES will let you know when you need to start having these drawn. For your future use, special mailing boxes (called mailers) will be sent to you directly from the Outreach Department.  You should take the physician order form and the  to your home laboratory when it is time to for this testing to be done.  Additional mailers can be obtained by calling the Transplant Office and asking to speak to a kidney .      During this waiting period, we may request additional periodic  laboratory tests with your primary physician.  It will be your responsibility to remind your physician to forward your results to the Transplant Office.      We need to be kept informed of any changes in your medical condition such as:    o changes in your medications,   o significant changes in your health  o significant infections (such as pneumonia or abscesses)  o blood transfusions  o any condition which requires hospitalization  o any surgery      Remember to complete any routine cancer screening tests required before your transplant.  This includes colonoscopy; prostrate screening for men, and mammogram and gynecologic testing for women, as well as dental work.  Your primary care clinic can assist you with arranging for these exams.  Remind your caregivers to forward copies of the records and final reports.    We want you to know that our program has physician and surgeon coverage 24 hours a day, 365 days a year. If this coverage changes or there are substantial program changes, you will be notified in writing by letter.     Attached is a letter from the United Network for Organ Sharing (UNOS). It describes the services and information offered to patients by UNOS and the Organ Procurement and Transplantation Network.    We appreciate having had the opportunity to participate in your care.  If you have questions, please feel free to call the Transplant Office at [phone] or [phone].      Sincerely,       Kidney Transplant Program    Enclosures: ALA/PRA Physician Order, Telephone Contact List, Travel Resources, UNOS Letter, Waitlist Information Update and While You Are Waiting  CC: Dr. Maurice Sultana, Dr. Raciel Morton, Dr. Michael Diaz                                 The Organ Procurement and Transplantation Network  Toll-free patient services line:     Your resource for organ transplant information    If you have a question regarding your own medical care, you always should call your transplant  hospital first. However, for general organ transplant-related information, you can call the Organ Procurement and Transplantation Network (OPTN) toll-free patient services line at [phone]- [phone]. Anyone, including potential transplant candidates, candidates, recipients, family members, friends, living donors, and donor family members, can call this number to:          Talk about organ donation, living donation, the transplant process, the donation process, and transplant policies.    Get a free patient information kit with helpful booklets, waiting list and transplant information, and a list of all transplant hospitals.    Ask questions about the OPTN website (https://optn.transplant.hrsa.gov/), the United Network for Organ Sharing s (UNOS) website (https://unos.org/), or the UNOS website for living donors and transplant recipients. (https://www.transplantliving.org/).    Learn how the OPTN can help you.    Talk about any concerns that you may have with a transplant hospital.    The [facility] transplant system, the OPTN, is managed under federal contract by the United Network for Organ Sharing (UNOS), which is a non-profit charitable organization. The OPTN helps create and define organ sharing policies that make the best use of donated organs. This process continuously evaluating new advances and discoveries so policies can be adapted to best serve patients waiting for transplants. To do so, the OPTN works closely with transplant professionals, transplant patients, transplant candidates, donor families, living donors, and the public. All transplant programs and organ procurement organizations throughout the country are OPTN members and are obligated to follow the policies the OPTN creates for allocating organs.    The OPTN also is responsible for:      Providing educational material for patients, the public, and professionals.    Raising awareness of the need for donated organs and tissue.    Coordinating organ  procurement, matching, and placement.    Collecting information about every organ transplant and donation that occurs in the [place].    Remember, you should contact your transplant hospital directly if you have questions or concerns about your own medical care including medical records, work-up progress, and test results.    We are not your transplant hospital, and our staff will not be able to answer questions about your case, so please keep your transplant hospital s phone number handy.    However, while you research your transplant needs and learn as much as you can about transplantation and donation, we welcome your call to our toll-free patient services line at [phone].          Updated 4/1/2019

## 2021-04-16 NOTE — TELEPHONE ENCOUNTER
Contacted patient to review outcome of selection committee meeting (See selection committee encounter) and spoke with his mom Darcy at length.  Reviewed that Receipt of Information and KDPI > 85% consents were both received here. Reviewed [facility] patient education in detail today. Reviewed with Mom that patient is CMV and EBV negative with pre transplant labs. Explained that both of these can result in sometime severe infections post transplant and in the case of a severe EBV infection can turn in to post transplant lymphoproliferative disorder. Explained that patient will have more monitoring then standard post transplant to look for any developing signs of these infections. Explained to patient that he/she needs to complete all components of the evaluation to be eligible for active status on the waiting list or to proceed with a live donor kidney transplant.   Reviewed next steps based on outcomes: patient to lose weight to 265 pounds per Dr. Castillo's in person visit 03/26/2021 - will notify new coordinator Ivis when reached 265 pounds and then will need to RTC with Dr. Castillo for approval of weight loss.  Patient will be transferred to kidney wait list coordinator team now with Ivis BENAVIDES as new coord and Ely JOSEPH assisting.   Patient will be listed as inactive (is not on dialysis and evaluation is not complete)-will receive:   -An Evaluation Summary Letter indicating what is needed to complete evaluation-discussed with patient if they would like to have testing done with [facility] or locally   -A listing letter indicating inactive status   -A PRA Order   -PRA Mailers  Confirmed with patient that on successful completion of outstanding components, patient is eligible for active status and they will receive a follow-up call.   Confirmed that mom  has contact information for additional questions or concerns. Mom expressed excellent understanding of all and was in good agreement with the plan.    Listed patient today on kidney alone wait list on INACTIVE status due to an incomplete evaluation. Patient is not yet on dialysis. Patient does qualify for wait time with a GFR of 17 done with evaluation labs on 03/29/2021. Generated listing letter, Transplant Evaluation Summary Letter and PRA order today in Epic - electronically routed to patient and providers.

## 2021-04-16 NOTE — LETTER
04/16/21        Boogie Villa  [address] [address]        Dear Boogie,    It was a pleasure to see you recently for consideration of kidney transplantation. Your pre-transplant evaluation results were reviewed at our Multidisciplinary Selection Committee on 04/07/2021. The Committee is requesting the following items are completed before determining your candidacy:    1. Weight loss to 265 pounds per Dr. Castillo's consult 03/29/2021. Please call your coordinator, Ivis, when you have reached 265 and she will arrange for an appointment again for you to see Dr. Castillo for weight loss approval then.     You have already been added onto the kidney transplant waitlist today on INACTIVE status, please see my separate letter regarding the details of this. You have been placed on INACTIVE status due to the above item. When the above item has been successfully completed, you will then be eligible to be changed to ACTIVE status on the waitlist as well as to proceed with a live donor kidney transplant in the event of an approved live donor. If not yet on dialysis, we will be in contact with your primary nephrologist for input of the timing of when to schedule a live donor kidney transplant.     Please continue to have all potential live donors register now online with our Program at [email or web address].  You will be notified in the event of an approved live donor.     For any questions, please contact your new coordinator, Ivis, at the Transplant Office at [phone].              Sincerely,      Alexandra Grijalva RN BSN Transplant Coordinator   Solid Organ Transplant  [facility], [facility]    CC's: Dr. Maurice Sultana, Dr. Raciel Morton, Dr. Michael Diaz

## 2021-04-16 NOTE — LETTER
PHYSICIAN ORDER   ALA/PRA BLOOD    DATE & TIME ISSUED: 2021 4:48 PM  PATIENT NAME: Boogie Villa   : 2000     [facility] MR#  [id number]     DIAGNOSIS/ICD-10 CODE: Awaiting Organ transplant [Z76.82}   EXPIRES: (1 YEAR AFTER DATE ISSUED)  EVERY 12 weeks / 3 months   1. Please draw 20ml of blood in red top (plain) tube for Antileukocyte Antibody (ALA or PRA).   2. Label tubes with the patient s name, complete lab slip.         3. Mailers, lab slips with instructions are sent to patient separately.      4. Call the Outreach Lab at [phone] to reorder mailers.       5. Mail blood to (this address is also on the mailers):    IMMUNOLOGY LABORATORY   [facility]   Room [phone]  [address]        Elvis Houser MD  Surgical Director, Kidney Transplantation

## 2021-04-19 ENCOUNTER — VIRTUAL VISIT (OUTPATIENT)
Dept: NEPHROLOGY | Facility: CLINIC | Age: 21
End: 2021-04-19
Attending: INTERNAL MEDICINE
Payer: COMMERCIAL

## 2021-04-19 ENCOUNTER — DOCUMENTATION ONLY (OUTPATIENT)
Dept: TRANSPLANT | Facility: CLINIC | Age: 21
End: 2021-04-19

## 2021-04-19 DIAGNOSIS — N18.5 CKD (CHRONIC KIDNEY DISEASE) STAGE 5, GFR LESS THAN 15 ML/MIN (H): Primary | ICD-10-CM

## 2021-04-19 PROCEDURE — 99214 OFFICE O/P EST MOD 30 MIN: CPT | Mod: 95 | Performed by: INTERNAL MEDICINE

## 2021-04-19 SDOH — HEALTH STABILITY: MENTAL HEALTH: HOW OFTEN DO YOU HAVE 6 OR MORE DRINKS ON ONE OCCASION?: NEVER

## 2021-04-19 SDOH — HEALTH STABILITY: MENTAL HEALTH: HOW MANY STANDARD DRINKS CONTAINING ALCOHOL DO YOU HAVE ON A TYPICAL DAY?: NOT ASKED

## 2021-04-19 SDOH — HEALTH STABILITY: MENTAL HEALTH: HOW OFTEN DO YOU HAVE A DRINK CONTAINING ALCOHOL?: NEVER

## 2021-04-19 NOTE — PROGRESS NOTES
Boogie is a 20 year old who is being evaluated via a billable video visit.      How would you like to obtain your AVS? [employer]  If the video visit is dropped, the invitation should be resent by: Text to cell phone: [phone]  Will anyone else be joining your video visit? No      Video Start Time: 10:57 AM  Video-Visit Details    Type of service:  Video Visit    Video End Time: 11:30 AM    Originating Location (pt. Location): Home    Distant Location (provider location):  [facility] NEPHROLOGY [facility]     Platform used for Video Visit: [facility]    [phone]

## 2021-04-19 NOTE — LETTER
2021       RE: Boogie Villa  [address] [facility] [address]     Dear Colleague,    Thank you for referring your patient, Boogie Villa, to the [facility] at [facility]. Please see a copy of my visit note below.    Boogie is a 20 year old who is being evaluated via a billable video visit.      How would you like to obtain your AVS? MyChart  If the video visit is dropped, the invitation should be resent by: Text to cell phone: [phone]  Will anyone else be joining your video visit? No      Video Start Time: 10:57 AM  Video-Visit Details    Type of service:  Video Visit    Video End Time: 11:30 AM    Originating Location (pt. Location): Home    Distant Location (provider location):  [facility]     Platform used for Video Visit: [facility]    [phone]      Nephrology Clinic - Video Visit    Name: Boogie Villa  MRN: [id number]  Age: 21 year old  : 2000  DOS: 2020  Referring provider: Edwin Palomo     Assessment and Plan:   1. 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  Baseline serum creatinine has significantly changed over the past year as kidney function has declined more rapidly.  Last serum Cr ~4.5 (eGFR of 17 ml/min) associated with significant albuminuria of ~2.5 g/g (on low dose ramipril).  Decline seems more rapid than expected over the year and may be due to decreased self catheterization though he is adamant he is able to volitionally void and has had no urinary retention.   - Patient is asymptomatic and has no urgent need for dialysis  - Discussed RRT modalities and access planning but patient remains unsure about modality and would like to hold of on access placement  -Ideally, patient could undergo preemptive kidney  transplantation and is expected to undergo evaluation soon as he could now be listed given that his eGFR is consistently < 20 ml/min  - continue RAAS blockade with ramipril now at 2.5 mg daily for management of persistent albuminuria and CKD though will consider discontinuing in the future should his kidney function continue to decline significantly  - He should continue to avoid NSAIDs  - If he becomes ill, he was instructed to hold his ramipril   - RTC in 3 months     2. Hypertension: He appears slightly above goal of <130/80.  - Continued on atenolol 37.5 mg, amlodipine 5 mg and ramipril 2.5 mg daily  - Will continue to monitor home blood pressure and if persistently above goal while consider increasing amlodipine to 10 mg next (and potentially discontinuing ramipril if kidney function continues to significantly decline)     3. CKD-MBD: Corrected Ca and phos have been at goal.  Last PTH normal (Dec, 2020).  Vit D deficient in past but improving with last level of 24 micrograms/L (Feb 2020).   - Continue cholecalciferol at 2000 units daily  - No need for phos binders or activated vitamin D (e.g. calcitriol) at this time     4. Low bicarb: Bicarb low at 18 .  Presumed non-AG metabolic acidosis due to CKD.  He has not been taking sodium bicarb as initially prescribed and is taking 4 tabs daily instead of TID.      -encourage sodium bicarb tabs (650 mg) 4 tabs BID for now     5. Anemia: Mild and due to CKD. Hgb has been stable in the 12s. Iron stores adequate.   -no need for iron or EPO at this time      6. Seasonal Allergies: Patient dose not complain of persistent seasonal allergy related symptoms today. He feels that this is being well managed with Claritin. Instructed him that it is ok to take it daily for a short period of time if needed.  Otherwise, we discussed prescribing Singulair in the future if he continues to be symptomatic.     7. Obesity: Significant increase in weight during 2018. Patient and his  "mother inquire about the possible effect weight will have on a possible kidney transplant. I discussed the implications obesity has on chronic kidney disease, blood pressure control, and heart disease. Goal BMI for kidney transplant should be less than 35. If this becomes an issue, he may follow-up with our weight management clinic.       8. Urology: Followed by Dr. Melgar      Follow-up: No follow-ups on file.     Reason For Visit:   CKD follow-up.     HPI:   Boogie Villa is a 19 year old man with a history of posterior urethral valves with correction in the first year of life.  Recurrent episodes of urinary tract infection. Patient was previously followed by Dr. Palomo in pediatric nephrology for chronic kidney disease related to posterior ureteral valves. The only concern was medication compliance and he and his mother reported very partial (3-4 days a week) compliance in the past though has not been an issue over the past year.      Interval history:  The patient is doing well overall. He reports that he no longer is working at a job due to medical advice from our office to reduce potential exposure to COVID. This past summer, he continues to not catheterize himself regularly despite recommendations from Urology.  He reports that when he catheterizes himself he does not get much urine. He is expected to f/u Urology for more urodynamic testing.  He continues to take his medications regularly.  His home BP has been around 140/85 with pulse in the high 60's. He denies dyspnea, edema, and urinary retention.  He has attended the \"Kidney Smart\" class but is unsure which dialysis modality he would like if needed.  He is expected to undergo a transplant evaluation seen.         Review of Systems:   Pertinent items are noted in HPI or as below, remainder of complete ROS is negative.      Active Medications:     Current Outpatient Medications:      amLODIPine (NORVASC) 5 MG tablet, Take 1 tablet (5 mg) by mouth daily, " Disp: 90 tablet, Rfl: 3     atenolol (TENORMIN) 25 MG tablet, Take 1.5 tablets (37.5 mg) by mouth daily, Disp: 135 tablet, Rfl: 3     calcium carbonate (OSCAL 500) 1250 (500 Ca) MG TABS tablet, daily , Disp: , Rfl:      Cholecalciferol (VITAMIN D) 2000 units CAPS, , Disp: , Rfl:      methylphenidate (RITALIN) 10 MG tablet, Take 25 mg by mouth, Disp: , Rfl:      ramipril (ALTACE) 1.25 MG capsule, Take 2 capsules (2.5 mg) by mouth daily, Disp: 60 capsule, Rfl: 11     Saccharomyces boulardii (PROBIOTIC) 250 MG CAPS, 1 capsule daily, Disp: , Rfl:      sodium bicarbonate 650 MG tablet, TAKE 4 TABLETS (2,600 MG) THREE TIMES A DAY, Disp: 180 tablet, Rfl: 23     Allergies:   Dust mites, Mold, and Other [seasonal allergies]      Past Medical History:  Attention deficit disorder    Posterior urethral valves   Lymphangioma  Hypertension  Chronic kidney disease stage 4, GFR 15-29 ml/min (H)    Past Surgical History:  ablation of posterior urethral valves - 2000  ureteral reimplantation with tapering - 2003    Family History:   No family history on file.      Social History:   Presents to clinic alone.  Tobacco Use: Never smoker.  Alcohol Use: Not on file.  PCP: VALENTIN MEEKS    Physical Exam:  Not performed as encounter was a telephone visit.     Laboratory:  [facility]  Recent Labs   Lab Test 03/29/21  1333 12/16/20 10/19/20 08/18/20 02/26/20  1520 11/25/19  0859 05/13/19  0712 12/04/18  1054 06/08/18  1256 01/16/18  1102 08/09/17  1626 02/10/17  1004    139 137 138  138 140 140 144 142 148* 144 146*  --    POTASSIUM 4.0 5.0 4.8 4.6  4.6 4.4 4.9 4.3 4.6 4.8 5.0 5.0  --    CHLORIDE 114* 114 114 114  114 112* 117* 116* 116* 121* 113* 120*  --    CO2 18* 15 11 15  15 21 17* 20 17* 19* 25 14*  --    ANIONGAP 9 10 12 9  9 7 6 8 9 8 6 12  --    GLC 90 89 99 162*  162* 105* 94 114* 100* 87 79 97  --    BUN 62* 75 77 50  50 56* 57* 54* 56* 47* 37* 50*  --    CR 4.48* 5.27 5.42 4.45  4.45 3.58* 3.80* 3.59* 3.33* 2.51* 2.40*  2.39*  --    GFRESTIMATED 17*  --   --  17 23* 22* 23* 24* 34* 36* 36*  --    GFRESTBLACK 20*  --   --   --  27* 25* 27* 29* 41* 43* 43*  --    NABIL 8.9 9.1 9.3 9.0  9.0 8.2* 8.5 8.7* 8.8* 8.5* 8.6* 8.7*  --    MAG  --   --   --   --  1.7  --   --  1.9 1.6 1.8 1.9  --    PHOS  --  4.6 4.8 3.8  3.8 3.9 4.1 4.1 4.7* 4.6 4.0 3.8  --    PROTTOTAL 7.1  --   --   --   --   --   --  6.9 6.4*  --   --   --    ALBUMIN 3.2* 3.6 3.8 3.3  3.3 3.3* 3.1* 3.2* 3.1* 2.8* 3.0* 3.4  --    BILITOTAL 0.2  --   --   --   --   --   --  0.2 0.2  --   --   --    ALKPHOS 73  --   --   --   --   --   --  87 74  --  91  --    AST 11  --   --   --   --   --   --  20 20  --   --  24   ALT 28  --   --   --   --   --   --  24 19  --   --  18     CBC  Recent Labs   Lab Test 03/29/21  1333 12/16/20 10/19/20 08/18/20 02/26/20  1520   HGB 12.4* 12.9* 13.2* 12.4*  12.4* 12.3*   WBC 6.6  --  7.1 5.3  5.3 6.6   RBC 4.14*  --  4.32 4.04  4.04 3.97*   HCT 37.9*  --  41.6 36.8  36.8 36.6*   MCV 92  --  96 91  91 92   MCH 30.0  --  30.6 30.7  30.7 31.0   MCHC 32.7  --  31.7 33.7  33.7 33.6   RDW 12.9  --  12.5 12.0  12.0. 12.1     --  256 237  237 250     URINE STUDIES  Recent Labs   Lab Test 03/29/21  1404 12/03/20  1525 05/13/19  0728 12/04/18  1056 06/08/18  1310   COLOR Straw Yellow Straw Straw Straw   APPEARANCE Clear Clear Clear Clear Clear   URINEGLC 50* 100* 50* Negative Negative   URINEBILI Negative Negative Negative Negative Negative   URINEKETONE Negative Negative Negative Negative Negative   SG 1.008 1.025 1.008 1.006 1.006   UBLD Negative Small* Negative Trace* Negative   URINEPH 6.0 7.0 7.0 6.5 6.5   PROTEIN >499* >300* >499* 100* 100*   UROBILINOGEN  --  0.2  --   --   --    NITRITE Negative Negative Negative Negative Negative   LEUKEST Negative Negative Negative Negative Negative   RBCU 0  --  <1 <1 <1   WBCU <1  --  <1 1 <1     Recent Labs   Lab Test 02/26/20  1530 11/25/19  0904 05/13/19  0728 12/04/18  1056  06/08/18  1310 02/10/17  1004 08/26/16  1150 02/19/16  1000   UTPG 4.31* 4.92* 4.31* 4.59* 6.69* 2.78* 2.45* 2.17*     PTH  Recent Labs   Lab Test 12/16/20 02/26/20  1520 11/25/19  0859 05/13/19  0712 12/04/18  1054 06/08/18  1256 01/16/18  1102 08/09/17  1626 02/10/17  1002 08/26/16  1245 02/19/16  0955   PTHI 94 201* 154* 110* 177* 96* 133* 66 39 47 42     IRON STUDIES   Recent Labs   Lab Test 02/26/20  1520 05/13/19  0712 06/08/18  1256 01/16/18  1102 08/09/17  1626 02/10/17  1002   IRON 66 71 87 116 93 54    269 257 290 284 297   IRONSAT 26 26 34 40 33 18   DAVID 133 109 82 86 100 113                  All above labs reviewed by me.   Michael Diaz MD  [phone]

## 2021-04-19 NOTE — PROGRESS NOTES
Nephrology Clinic - Video Visit    Name: Boogie Villa  MRN: [id number]  Age: 21 year old  : 2000  DOS: 2020  Referring provider: Edwin Palomo     Assessment and Plan:   1. 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  Baseline serum creatinine has significantly changed over the past year as kidney function has declined more rapidly.  Last serum Cr ~4.5 (eGFR of 17 ml/min) associated with significant albuminuria of ~2.5 g/g (on low dose ramipril).  Decline seems more rapid than expected over the year and may be due to decreased self catheterization though he is adamant he is able to volitionally void and has had no urinary retention.   - Patient is asymptomatic and has no urgent need for dialysis  - Discussed RRT modalities and access planning but patient remains unsure about modality and would like to hold of on access placement  -Ideally, patient could undergo preemptive kidney transplantation and is expected to undergo evaluation soon as he could now be listed given that his eGFR is consistently < 20 ml/min  - continue RAAS blockade with ramipril now at 2.5 mg daily for management of persistent albuminuria and CKD though will consider discontinuing in the future should his kidney function continue to decline significantly  - He should continue to avoid NSAIDs  - If he becomes ill, he was instructed to hold his ramipril   - RTC in 3 months     2. Hypertension: He appears slightly above goal of <130/80.  - Continued on atenolol 37.5 mg, amlodipine 5 mg and ramipril 2.5 mg daily  - Will continue to monitor home blood pressure and if persistently above goal while consider increasing amlodipine to 10 mg next (and potentially discontinuing ramipril if kidney function continues to significantly decline)     3. CKD-MBD: Corrected Ca and phos have been at goal.  Last PTH normal (Dec, 2020).   Vit D deficient in past but improving with last level of 24 micrograms/L (Feb 2020).   - Continue cholecalciferol at 2000 units daily  - No need for phos binders or activated vitamin D (e.g. calcitriol) at this time     4. Low bicarb: Bicarb low at 18 .  Presumed non-AG metabolic acidosis due to CKD.  He has not been taking sodium bicarb as initially prescribed and is taking 4 tabs daily instead of TID.      -encourage sodium bicarb tabs (650 mg) 4 tabs BID for now     5. Anemia: Mild and due to CKD. Hgb has been stable in the 12s. Iron stores adequate.   -no need for iron or EPO at this time      6. Seasonal Allergies: Patient dose not complain of persistent seasonal allergy related symptoms today. He feels that this is being well managed with Claritin. Instructed him that it is ok to take it daily for a short period of time if needed.  Otherwise, we discussed prescribing Singulair in the future if he continues to be symptomatic.     7. Obesity: Significant increase in weight during 2018. Patient and his mother inquire about the possible effect weight will have on a possible kidney transplant. I discussed the implications obesity has on chronic kidney disease, blood pressure control, and heart disease. Goal BMI for kidney transplant should be less than 35. If this becomes an issue, he may follow-up with our weight management clinic.       8. Urology: Followed by Dr. Melgar      Follow-up: No follow-ups on file.     Reason For Visit:   CKD follow-up.     HPI:   Boogie Villa is a 19 year old man with a history of posterior urethral valves with correction in the first year of life.  Recurrent episodes of urinary tract infection. Patient was previously followed by Dr. Palomo in pediatric nephrology for chronic kidney disease related to posterior ureteral valves. The only concern was medication compliance and he and his mother reported very partial (3-4 days a week) compliance in the past though has not been an issue  "over the past year.      Interval history:  The patient is doing well overall. He reports that he no longer is working at a job due to medical advice from our office to reduce potential exposure to COVID. This past summer, he continues to not catheterize himself regularly despite recommendations from Urology.  He reports that when he catheterizes himself he does not get much urine. He is expected to f/u Urology for more urodynamic testing.  He continues to take his medications regularly.  His home BP has been around 140/85 with pulse in the high 60's. He denies dyspnea, edema, and urinary retention.  He has attended the \"Kidney Smart\" class but is unsure which dialysis modality he would like if needed.  He is expected to undergo a transplant evaluation seen.         Review of Systems:   Pertinent items are noted in HPI or as below, remainder of complete ROS is negative.      Active Medications:     Current Outpatient Medications:      amLODIPine (NORVASC) 5 MG tablet, Take 1 tablet (5 mg) by mouth daily, Disp: 90 tablet, Rfl: 3     atenolol (TENORMIN) 25 MG tablet, Take 1.5 tablets (37.5 mg) by mouth daily, Disp: 135 tablet, Rfl: 3     calcium carbonate (OSCAL 500) 1250 (500 Ca) MG TABS tablet, daily , Disp: , Rfl:      Cholecalciferol (VITAMIN D) 2000 units CAPS, , Disp: , Rfl:      methylphenidate (RITALIN) 10 MG tablet, Take 25 mg by mouth, Disp: , Rfl:      ramipril (ALTACE) 1.25 MG capsule, Take 2 capsules (2.5 mg) by mouth daily, Disp: 60 capsule, Rfl: 11     Saccharomyces boulardii (PROBIOTIC) 250 MG CAPS, 1 capsule daily, Disp: , Rfl:      sodium bicarbonate 650 MG tablet, TAKE 4 TABLETS (2,600 MG) THREE TIMES A DAY, Disp: 180 tablet, Rfl: 23     Allergies:   Dust mites, Mold, and Other [seasonal allergies]      Past Medical History:  Attention deficit disorder    Posterior urethral valves   Lymphangioma  Hypertension  Chronic kidney disease stage 4, GFR 15-29 ml/min (H)    Past Surgical History:  ablation " of posterior urethral valves - 2000  ureteral reimplantation with tapering - 2003    Family History:   No family history on file.      Social History:   Presents to clinic alone.  Tobacco Use: Never smoker.  Alcohol Use: Not on file.  PCP: VALENTIN MEEKS    Physical Exam:  Not performed as encounter was a telephone visit.     Laboratory:  CMP  Recent Labs   Lab Test 03/29/21  1333 12/16/20 10/19/20 08/18/20 02/26/20  1520 11/25/19  0859 05/13/19  0712 12/04/18  1054 06/08/18  1256 01/16/18  1102 08/09/17  1626 02/10/17  1004    139 137 138  138 140 140 144 142 148* 144 146*  --    POTASSIUM 4.0 5.0 4.8 4.6  4.6 4.4 4.9 4.3 4.6 4.8 5.0 5.0  --    CHLORIDE 114* 114 114 114  114 112* 117* 116* 116* 121* 113* 120*  --    CO2 18* 15 11 15  15 21 17* 20 17* 19* 25 14*  --    ANIONGAP 9 10 12 9  9 7 6 8 9 8 6 12  --    GLC 90 89 99 162*  162* 105* 94 114* 100* 87 79 97  --    BUN 62* 75 77 50  50 56* 57* 54* 56* 47* 37* 50*  --    CR 4.48* 5.27 5.42 4.45  4.45 3.58* 3.80* 3.59* 3.33* 2.51* 2.40* 2.39*  --    GFRESTIMATED 17*  --   --  17 23* 22* 23* 24* 34* 36* 36*  --    GFRESTBLACK 20*  --   --   --  27* 25* 27* 29* 41* 43* 43*  --    NABIL 8.9 9.1 9.3 9.0  9.0 8.2* 8.5 8.7* 8.8* 8.5* 8.6* 8.7*  --    MAG  --   --   --   --  1.7  --   --  1.9 1.6 1.8 1.9  --    PHOS  --  4.6 4.8 3.8  3.8 3.9 4.1 4.1 4.7* 4.6 4.0 3.8  --    PROTTOTAL 7.1  --   --   --   --   --   --  6.9 6.4*  --   --   --    ALBUMIN 3.2* 3.6 3.8 3.3  3.3 3.3* 3.1* 3.2* 3.1* 2.8* 3.0* 3.4  --    BILITOTAL 0.2  --   --   --   --   --   --  0.2 0.2  --   --   --    ALKPHOS 73  --   --   --   --   --   --  87 74  --  91  --    AST 11  --   --   --   --   --   --  20 20  --   --  24   ALT 28  --   --   --   --   --   --  24 19  --   --  18     CBC  Recent Labs   Lab Test 03/29/21  1333 12/16/20 10/19/20 08/18/20 02/26/20  1520   HGB 12.4* 12.9* 13.2* 12.4*  12.4* 12.3*   WBC 6.6  --  7.1 5.3  5.3 6.6   RBC 4.14*  --  4.32 4.04  4.04 3.97*    HCT 37.9*  --  41.6 36.8  36.8 36.6*   MCV 92  --  96 91  91 92   MCH 30.0  --  30.6 30.7  30.7 31.0   MCHC 32.7  --  31.7 33.7  33.7 33.6   RDW 12.9  --  12.5 12.0  12.0. 12.1     --  256 237  237 250     URINE STUDIES  Recent Labs   Lab Test 03/29/21  1404 12/03/20  1525 05/13/19  0728 12/04/18  1056 06/08/18  1310   COLOR Straw Yellow Straw Straw Straw   APPEARANCE Clear Clear Clear Clear Clear   URINEGLC 50* 100* 50* Negative Negative   URINEBILI Negative Negative Negative Negative Negative   URINEKETONE Negative Negative Negative Negative Negative   SG 1.008 1.025 1.008 1.006 1.006   UBLD Negative Small* Negative Trace* Negative   URINEPH 6.0 7.0 7.0 6.5 6.5   PROTEIN >499* >300* >499* 100* 100*   UROBILINOGEN  --  0.2  --   --   --    NITRITE Negative Negative Negative Negative Negative   LEUKEST Negative Negative Negative Negative Negative   RBCU 0  --  <1 <1 <1   WBCU <1  --  <1 1 <1     Recent Labs   Lab Test 02/26/20  1530 11/25/19  0904 05/13/19  0728 12/04/18  1056 06/08/18  1310 02/10/17  1004 08/26/16  1150 02/19/16  1000   UTPG 4.31* 4.92* 4.31* 4.59* 6.69* 2.78* 2.45* 2.17*     PTH  Recent Labs   Lab Test 12/16/20 02/26/20  1520 11/25/19  0859 05/13/19  0712 12/04/18  1054 06/08/18  1256 01/16/18  1102 08/09/17  1626 02/10/17  1002 08/26/16  1245 02/19/16  0955   PTHI 94 201* 154* 110* 177* 96* 133* 66 39 47 42     IRON STUDIES   Recent Labs   Lab Test 02/26/20  1520 05/13/19  0712 06/08/18  1256 01/16/18  1102 08/09/17  1626 02/10/17  1002   IRON 66 71 87 116 93 54    269 257 290 284 297   IRONSAT 26 26 34 40 33 18   DAVID 133 109 82 86 100 113                  All above labs reviewed by me.   Michael Diaz MD  [phone]

## 2021-05-03 ENCOUNTER — TELEPHONE (OUTPATIENT)
Dept: TRANSPLANT | Facility: CLINIC | Age: 21
End: 2021-05-03

## 2021-05-03 NOTE — TELEPHONE ENCOUNTER
Called pt to introduce myself as [facility] coordinator and check in on weight loss information. Left VM with direct information and encouraged to reach out with any questions/concerns.

## 2021-07-02 ENCOUNTER — TELEPHONE (OUTPATIENT)
Dept: ENDOCRINOLOGY | Facility: CLINIC | Age: 21
End: 2021-07-02

## 2021-07-16 ENCOUNTER — LAB (OUTPATIENT)
Dept: LAB | Facility: CLINIC | Age: 21
End: 2021-07-16
Payer: COMMERCIAL

## 2021-07-16 DIAGNOSIS — N18.4 CHRONIC KIDNEY DISEASE, STAGE IV (SEVERE) (H): ICD-10-CM

## 2021-07-16 DIAGNOSIS — I10 ESSENTIAL HYPERTENSION: ICD-10-CM

## 2021-07-16 DIAGNOSIS — Q60.5 CONGENITAL RENAL AGENESIS AND DYSGENESIS: ICD-10-CM

## 2021-07-16 DIAGNOSIS — N18.4 CKD (CHRONIC KIDNEY DISEASE) STAGE 4, GFR 15-29 ML/MIN (H): ICD-10-CM

## 2021-07-16 DIAGNOSIS — Z76.82 ORGAN TRANSPLANT CANDIDATE: ICD-10-CM

## 2021-07-16 DIAGNOSIS — Z01.818 PRE-TRANSPLANT EVALUATION FOR KIDNEY TRANSPLANT: Primary | ICD-10-CM

## 2021-07-16 DIAGNOSIS — Q60.2 CONGENITAL RENAL AGENESIS AND DYSGENESIS: ICD-10-CM

## 2021-07-16 LAB
ALBUMIN MFR UR ELPH: 308.7 MG/DL
ALBUMIN SERPL-MCNC: 3 G/DL (ref 3.5–5)
ANION GAP SERPL CALCULATED.3IONS-SCNC: 8 MMOL/L (ref 5–18)
BASOPHILS # BLD AUTO: 0 10E3/UL (ref 0–0.2)
BASOPHILS NFR BLD AUTO: 0 %
BUN SERPL-MCNC: 52 MG/DL (ref 8–22)
CALCIUM SERPL-MCNC: 8.5 MG/DL (ref 8.5–10.5)
CHLORIDE BLD-SCNC: 116 MMOL/L (ref 98–107)
CO2 SERPL-SCNC: 14 MMOL/L (ref 22–31)
CREAT SERPL-MCNC: 5.08 MG/DL (ref 0.7–1.3)
CREAT UR-MCNC: 51 MG/DL
EOSINOPHIL # BLD AUTO: 0.2 10E3/UL (ref 0–0.7)
EOSINOPHIL NFR BLD AUTO: 2 %
ERYTHROCYTE [DISTWIDTH] IN BLOOD BY AUTOMATED COUNT: 12.9 % (ref 10–15)
GFR SERPL CREATININE-BSD FRML MDRD: 15 ML/MIN/1.73M2
GLUCOSE BLD-MCNC: 106 MG/DL (ref 70–125)
HCT VFR BLD AUTO: 37.8 % (ref 40–53)
HGB BLD-MCNC: 12.5 G/DL (ref 13.3–17.7)
IMM GRANULOCYTES # BLD: 0 10E3/UL
IMM GRANULOCYTES NFR BLD: 1 %
LYMPHOCYTES # BLD AUTO: 2.1 10E3/UL (ref 0.8–5.3)
LYMPHOCYTES NFR BLD AUTO: 29 %
MCH RBC QN AUTO: 29.9 PG (ref 26.5–33)
MCHC RBC AUTO-ENTMCNC: 33.1 G/DL (ref 31.5–36.5)
MCV RBC AUTO: 90 FL (ref 78–100)
MONOCYTES # BLD AUTO: 0.4 10E3/UL (ref 0–1.3)
MONOCYTES NFR BLD AUTO: 5 %
NEUTROPHILS # BLD AUTO: 4.7 10E3/UL (ref 1.6–8.3)
NEUTROPHILS NFR BLD AUTO: 63 %
NRBC # BLD AUTO: 0 10E3/UL
NRBC BLD AUTO-RTO: 0 /100
PHOSPHATE SERPL-MCNC: 5.2 MG/DL (ref 2.5–4.5)
PLATELET # BLD AUTO: 256 10E3/UL (ref 150–450)
POTASSIUM BLD-SCNC: 4.5 MMOL/L (ref 3.5–5)
PROT/CREAT 24H UR: 6.05 MG/MG CR
PTH-INTACT SERPL-MCNC: 308 PG/ML (ref 10–86)
RBC # BLD AUTO: 4.18 10E6/UL (ref 4.4–5.9)
SODIUM SERPL-SCNC: 138 MMOL/L (ref 136–145)
WBC # BLD AUTO: 7.4 10E3/UL (ref 4–11)

## 2021-07-16 PROCEDURE — 83970 ASSAY OF PARATHORMONE: CPT

## 2021-07-16 PROCEDURE — 82043 UR ALBUMIN QUANTITATIVE: CPT

## 2021-07-16 PROCEDURE — 80069 RENAL FUNCTION PANEL: CPT

## 2021-07-16 PROCEDURE — 86833 HLA CLASS II HIGH DEFIN QUAL: CPT | Performed by: INTERNAL MEDICINE

## 2021-07-16 PROCEDURE — 36415 COLL VENOUS BLD VENIPUNCTURE: CPT

## 2021-07-16 PROCEDURE — 86832 HLA CLASS I HIGH DEFIN QUAL: CPT | Performed by: INTERNAL MEDICINE

## 2021-07-16 PROCEDURE — 85025 COMPLETE CBC W/AUTO DIFF WBC: CPT

## 2021-07-16 PROCEDURE — 82306 VITAMIN D 25 HYDROXY: CPT

## 2021-07-16 PROCEDURE — 84156 ASSAY OF PROTEIN URINE: CPT

## 2021-07-17 LAB
CREAT UR-MCNC: 52 MG/DL
MICROALBUMIN UR-MCNC: 210.74 MG/DL (ref 0–1.99)
MICROALBUMIN/CREAT UR: 4052.7 MG/G CR

## 2021-07-19 ENCOUNTER — VIRTUAL VISIT (OUTPATIENT)
Dept: NEPHROLOGY | Facility: CLINIC | Age: 21
End: 2021-07-19
Attending: INTERNAL MEDICINE
Payer: COMMERCIAL

## 2021-07-19 VITALS — WEIGHT: 270 LBS | HEIGHT: 71 IN | BODY MASS INDEX: 37.8 KG/M2

## 2021-07-19 DIAGNOSIS — N18.5 CKD (CHRONIC KIDNEY DISEASE) STAGE 5, GFR LESS THAN 15 ML/MIN (H): Primary | ICD-10-CM

## 2021-07-19 PROCEDURE — 99214 OFFICE O/P EST MOD 30 MIN: CPT | Mod: 95 | Performed by: INTERNAL MEDICINE

## 2021-07-19 ASSESSMENT — MIFFLIN-ST. JEOR: SCORE: 2251.84

## 2021-07-19 NOTE — LETTER
"2021       RE: Boogie Villa  [address] [facility] [address]     Dear Colleague,    Thank you for referring your patient, Boogie Villa, to the [facility] NEPHROLOGY CLINIC [place] [facility]. Please see a copy of my visit note below.    Chief Complaint   Patient presents with     Follow Up     3 month     Height 1.803 m (5' 11\"), weight 122.5 kg (270 lb).    Boogie is a 21 year old who is being evaluated via a billable video visit.      How would you like to obtain your AVS? MyChart  If the video visit is dropped, the invitation should be resent by: Other e-mail: use [email or web address]  Will anyone else be joining your video visit? No      Video Start Time: 10:14 AM  Video-Visit Details    Type of service:  Video Visit    Video End Time: 10:45 AM    Originating Location (pt. Location): Home    Distant Location (provider location):  [facility] NEPHROLOGY [facility]     Platform used for Video Visit: [facility]        Nephrology Clinic - Video Visit    Take bicarb 4 tabs BID; f/u in 3 mon    Name: Boogie Villa  MRN: [id number]  Age: 21 year old  : 2000  DOS: 2020  Referring provider: Edwin Palomo     Assessment and Plan:   1. 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  Baseline serum creatinine has significantly changed over the past year as kidney function has declined more rapidly.  Last serum Cr ~5.1 (eGFR of 15 ml/min) associated with significant albuminuria of ~4 g/g (on low dose ramipril).  Decline seems more rapid than expected over the year and may be due to decreased self catheterization though he is adamant he is able to volitionally void and has had no urinary retention.   - Patient is asymptomatic and has no urgent need for dialysis  - Discussed RRT modalities and access planning but patient " remains unsure about modality and would like to hold of on access placement  -Ideally, patient could undergo preemptive kidney transplantation and is now inactive on the kidney transplant waiting list until his BMI is < 35  - continue RAAS blockade with ramipril now at 2.5 mg daily for management of persistent albuminuria and CKD though will consider discontinuing in the future should his kidney function continue to decline significantly  - He should continue to avoid NSAIDs  - If he becomes ill, he was instructed to hold his ramipril   - RTC in 3 months     2. Hypertension: He appears slightly above goal of <130/80.  - Continued on atenolol 37.5 mg, amlodipine 5 mg and ramipril 2.5 mg daily  - Will continue to monitor home blood pressure and if persistently above goal while consider increasing amlodipine to 10 mg next (and potentially discontinuing ramipril if kidney function continues to significantly decline)     3. CKD-MBD: Corrected Ca and phos have been at goal.  Last PTH mildely elevated at 308 (July, 2021).  Vit D deficient in past but has improved but remains on low side.  - Continue cholecalciferol at 2000 units daily  - No need for phos binders or activated vitamin D (e.g. calcitriol) at this time     4. Low bicarb: Bicarb low at 14 .  Presumed non-AG metabolic acidosis due to CKD.  He has not been taking sodium bicarb as initially prescribed and is taking 4 tabs daily instead of TID.      -encourage sodium bicarb tabs (650 mg) 4 tabs BID for now     5. Anemia: Mild and due to CKD. Hgb has been stable in the 12s. Iron stores on low side.   -no need for iron or EPO at this time      6. Seasonal Allergies: Patient dose not complain of persistent seasonal allergy related symptoms today. He feels that this is being well managed with Claritin. Instructed him that it is ok to take it daily for a short period of time if needed.  Otherwise, we discussed prescribing Singulair in the future if he continues to be  "symptomatic.     7. Obesity: Significant increase in weight during 2018. Patient and his mother inquire about the possible effect weight will have on a possible kidney transplant. I discussed the implications obesity has on chronic kidney disease, blood pressure control, and heart disease. Goal BMI for kidney transplant should be less than 35.  -he will f/u with our weight loss management clinic     8. Urology: No active issues. Followed by Dr. Melgar      Follow-up: RTC in 3 months    Reason For Visit:   CKD follow-up.     HPI:   Boogie Villa is a 21 year old man with a history of posterior urethral valves with correction in the first year of life.  Recurrent episodes of urinary tract infection. Patient was previously followed by Dr. Palomo in pediatric nephrology for chronic kidney disease related to posterior ureteral valves. The only concern was medication compliance and he and his mother reported very partial (3-4 days a week) compliance in the past though has not been an issue over the past year.      Interval history:  The patient is doing well overall. This past summer, he continues to not catheterize himself regularly despite recommendations from Urology.  He reports that when he catheterizes himself he does not get much urine. He is expected to f/u Urology for more urodynamic testing.  He continues to take his medications regularly.  His home BP has been around 140/85 with pulse in the high 60's though he has not measured it more recently. He denies dyspnea, edema, and urinary retention.  He has attended the \"Kidney Smart\" class but is unsure which dialysis modality he would like if needed.  He appears to be an appropriate candidate for kidney transplantation and is now inactive on the list until his BMI is < 35.      Review of Systems:   Pertinent items are noted in HPI or as below, remainder of complete ROS is negative.      Active Medications:     Current Outpatient Medications:      amLODIPine " (NORVASC) 5 MG tablet, Take 1 tablet (5 mg) by mouth daily, Disp: 90 tablet, Rfl: 3     atenolol (TENORMIN) 25 MG tablet, Take 1.5 tablets (37.5 mg) by mouth daily, Disp: 135 tablet, Rfl: 3     calcium carbonate (OSCAL 500) 1250 (500 Ca) MG TABS tablet, daily , Disp: , Rfl:      Cholecalciferol (VITAMIN D) 2000 units CAPS, , Disp: , Rfl:      methylphenidate (RITALIN) 10 MG tablet, Take 25 mg by mouth, Disp: , Rfl:      ramipril (ALTACE) 1.25 MG capsule, Take 2 capsules (2.5 mg) by mouth daily, Disp: 60 capsule, Rfl: 11     Saccharomyces boulardii (PROBIOTIC) 250 MG CAPS, 1 capsule daily, Disp: , Rfl:      sodium bicarbonate 650 MG tablet, TAKE 4 TABLETS (2,600 MG) THREE TIMES A DAY, Disp: 180 tablet, Rfl: 23     Allergies:   Dust mites, Mold, and Other [seasonal allergies]      Past Medical History:  Attention deficit disorder    Posterior urethral valves   Lymphangioma  Hypertension  Chronic kidney disease stage 4, GFR 15-29 ml/min (H)    Past Surgical History:  ablation of posterior urethral valves - 2000  ureteral reimplantation with tapering - 2003    Family History:   No family history on file.      Social History:   Presents to clinic alone.  Tobacco Use: Never smoker.  Alcohol Use: Not on file.  PCP: VALENTIN MEEKS    Physical Exam:  Not performed as encounter was a telephone visit.     Laboratory:  CMP  Recent Labs   Lab Test 07/16/21  1650 03/29/21  1333 12/16/20  1611 12/16/20  0000 10/19/20  1614 08/18/20  1735 02/26/20  1520 01/31/20  1154 05/13/19  0712 12/04/18  1054 06/08/18  1256    140 139 139 137 138 140  --    < > 142 148*   POTASSIUM 4.5 4.0 5.0 5.0 4.8 4.6 4.4  --    < > 4.6 4.8   CHLORIDE 116* 114* 114* 114 114* 114* 112*  --    < > 116* 121*   CO2 14* 18* 15* 15 11* 15* 21  --    < > 17* 19*   ANIONGAP 8 9 10 10 12 9 7  --    < > 9 8    90 89 89 99 162* 105*  --    < > 100* 87   BUN 52* 62* 75* 75 77* 50* 56*  --    < > 56* 47*   CR 5.08* 4.48* 5.27* 5.27 5.42*  5.56* 4.45*  3.58*  --    < > 3.33* 2.51*   GFRESTIMATED 15* 17* 14*  --  14*  13* 17* 23*  --    < > 24* 34*   GFRESTBLACK  --  20* 17*  --  16*  16* 21* 27*  --    < > 29* 41*   NABIL 8.5 8.9 9.1 9.1 9.3 9.0 8.2*  --    < > 8.8* 8.5*   MAG  --   --   --   --   --   --  1.7 2.0  --  1.9 1.6   PHOS 5.2*  --  4.6* 4.6 4.8* 3.8 3.9  --    < > 4.7* 4.6   PROTTOTAL  --  7.1  --   --  7.4  --   --   --   --  6.9 6.4*   ALBUMIN 3.0* 3.2* 3.6 3.6 3.8  3.7 3.3* 3.3*  --    < > 3.1* 2.8*   BILITOTAL  --  0.2  --   --  0.4  --   --   --   --  0.2 0.2   ALKPHOS  --  73  --   --  72  --   --   --   --  87 74   AST  --  11  --   --  16  --   --   --   --  20 20   ALT  --  28  --   --  22  --   --   --   --  24 19    < > = values in this interval not displayed.     CBC  Recent Labs   Lab Test 07/16/21  1650 03/29/21  1333 12/16/20  1611 12/16/20  0000 10/19/20  1614 10/19/20  0000 08/18/20  1735   HGB 12.5* 12.4* 12.9* 12.9* 13.2* 13.2*   < >   WBC 7.4 6.6  --   --  7.1 7.1  --    RBC 4.18* 4.14*  --   --  4.32* 4.32  --    HCT 37.8* 37.9*  --   --  41.6 41.6  --    MCV 90 92  --   --  96 96  --    MCH 29.9 30.0  --   --  30.6 30.6  --    MCHC 33.1 32.7  --   --  31.7* 31.7  --    RDW 12.9 12.9  --   --  12.5 12.5  --     240  --   --  256 256  --     < > = values in this interval not displayed.     URINE STUDIES  Recent Labs   Lab Test 03/29/21  1404 12/03/20  1525 10/19/20  1645 05/13/19  0728 12/04/18  1056   COLOR Straw Yellow Yellow Straw Straw   APPEARANCE Clear Clear Clear Clear Clear   URINEGLC 50* 100* 50 mg/dL* 50* Negative   URINEBILI Negative Negative Negative Negative Negative   URINEKETONE Negative Negative Negative Negative Negative   SG 1.008 1.025 1.005 1.008 1.006   UBLD Negative Small* Small* Negative Trace*   URINEPH 6.0 7.0 6.0 7.0 6.5   PROTEIN >499* >300* >=500 mg/dL* >499* 100*   UROBILINOGEN  --  0.2 <2.0 E.U./dL  --   --    NITRITE Negative Negative Negative Negative Negative   LEUKEST Negative Negative  Negative Negative Negative   RBCU 0  --  0-2 <1 <1   WBCU <1  --  0-5 <1 1     Recent Labs   Lab Test 02/26/20  1530 11/25/19  0904 05/13/19  0728 12/04/18  1056 06/08/18  1310 02/10/17  1004 08/26/16  1150 02/19/16  1000   UTPG 4.31* 4.92* 4.31* 4.59* 6.69* 2.78* 2.45* 2.17*     PTH  Recent Labs   Lab Test 07/16/21  1650 12/16/20  1611 12/16/20  0000 10/19/20  1614 02/26/20  1520 11/25/19  0859 05/13/19  0712 12/04/18  1054 06/08/18  1256 01/16/18  1102 08/09/17  1626 02/10/17  1002 08/26/16  1245 02/19/16  0955   PTHI 308* 94* 94 131* 201* 154* 110* 177* 96* 133* 66 39 47 42     IRON STUDIES   Recent Labs   Lab Test 02/26/20  1520 05/13/19  0712 06/08/18  1256 01/16/18  1102 08/09/17  1626 02/10/17  1002   IRON 66 71 87 116 93 54    269 257 290 284 297   IRONSAT 26 26 34 40 33 18   DAVID 133 109 82 86 100 113                  All above labs reviewed by me.   Michael Diaz MD  [phone]          Again, thank you for allowing me to participate in the care of your patient.      Sincerely,    Michael Diaz MD

## 2021-07-19 NOTE — PROGRESS NOTES
"Chief Complaint   Patient presents with     Follow Up     3 month     Height 1.803 m (5' 11\"), weight 122.5 kg (270 lb).    Boogie is a 21 year old who is being evaluated via a billable video visit.      How would you like to obtain your AVS? MyChart  If the video visit is dropped, the invitation should be resent by: Other e-mail: use [email or web address]  Will anyone else be joining your video visit? No      Video Start Time: 10:14 AM  Video-Visit Details    Type of service:  Video Visit    Video End Time: 10:45 AM    Originating Location (pt. Location): Home    Distant Location (provider location):  [facility] NEPHROLOGY CLINIC [place]     Platform used for Video Visit: [employer]    "

## 2021-07-19 NOTE — PROGRESS NOTES
Nephrology Clinic - Video Visit    Take bicarb 4 tabs BID; f/u in 3 mon    Name: Boogie Villa  MRN: [id number]  Age: 21 year old  : 2000  DOS: 2020  Referring provider: Edwin Palomo     Assessment and Plan:   1. 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  Baseline serum creatinine has significantly changed over the past year as kidney function has declined more rapidly.  Last serum Cr ~5.1 (eGFR of 15 ml/min) associated with significant albuminuria of ~4 g/g (on low dose ramipril).  Decline seems more rapid than expected over the year and may be due to decreased self catheterization though he is adamant he is able to volitionally void and has had no urinary retention.   - Patient is asymptomatic and has no urgent need for dialysis  - Discussed RRT modalities and access planning but patient remains unsure about modality and would like to hold of on access placement  -Ideally, patient could undergo preemptive kidney transplantation and is now inactive on the kidney transplant waiting list until his BMI is < 35  - continue RAAS blockade with ramipril now at 2.5 mg daily for management of persistent albuminuria and CKD though will consider discontinuing in the future should his kidney function continue to decline significantly  - He should continue to avoid NSAIDs  - If he becomes ill, he was instructed to hold his ramipril   - RTC in 3 months     2. Hypertension: He appears slightly above goal of <130/80.  - Continued on atenolol 37.5 mg, amlodipine 5 mg and ramipril 2.5 mg daily  - Will continue to monitor home blood pressure and if persistently above goal while consider increasing amlodipine to 10 mg next (and potentially discontinuing ramipril if kidney function continues to significantly decline)     3. CKD-MBD: Corrected Ca and phos have been at goal.  Last PTH mildely elevated at  308 (July, 2021).  Vit D deficient in past but has improved but remains on low side.  - Continue cholecalciferol at 2000 units daily  - No need for phos binders or activated vitamin D (e.g. calcitriol) at this time     4. Low bicarb: Bicarb low at 14 .  Presumed non-AG metabolic acidosis due to CKD.  He has not been taking sodium bicarb as initially prescribed and is taking 4 tabs daily instead of TID.      -encourage sodium bicarb tabs (650 mg) 4 tabs BID for now     5. Anemia: Mild and due to CKD. Hgb has been stable in the 12s. Iron stores on low side.   -no need for iron or EPO at this time      6. Seasonal Allergies: Patient dose not complain of persistent seasonal allergy related symptoms today. He feels that this is being well managed with Claritin. Instructed him that it is ok to take it daily for a short period of time if needed.  Otherwise, we discussed prescribing Singulair in the future if he continues to be symptomatic.     7. Obesity: Significant increase in weight during 2018. Patient and his mother inquire about the possible effect weight will have on a possible kidney transplant. I discussed the implications obesity has on chronic kidney disease, blood pressure control, and heart disease. Goal BMI for kidney transplant should be less than 35.  -he will f/u with our weight loss management clinic     8. Urology: No active issues. Followed by Dr. Melgar      Follow-up: RTC in 3 months    Reason For Visit:   CKD follow-up.     HPI:   Boogie Villa is a 21 year old man with a history of posterior urethral valves with correction in the first year of life.  Recurrent episodes of urinary tract infection. Patient was previously followed by Dr. Palomo in pediatric nephrology for chronic kidney disease related to posterior ureteral valves. The only concern was medication compliance and he and his mother reported very partial (3-4 days a week) compliance in the past though has not been an issue over the past  "year.      Interval history:  The patient is doing well overall. This past summer, he continues to not catheterize himself regularly despite recommendations from Urology.  He reports that when he catheterizes himself he does not get much urine. He is expected to f/u Urology for more urodynamic testing.  He continues to take his medications regularly.  His home BP has been around 140/85 with pulse in the high 60's though he has not measured it more recently. He denies dyspnea, edema, and urinary retention.  He has attended the \"Kidney Smart\" class but is unsure which dialysis modality he would like if needed.  He appears to be an appropriate candidate for kidney transplantation and is now inactive on the list until his BMI is < 35.      Review of Systems:   Pertinent items are noted in HPI or as below, remainder of complete ROS is negative.      Active Medications:     Current Outpatient Medications:      amLODIPine (NORVASC) 5 MG tablet, Take 1 tablet (5 mg) by mouth daily, Disp: 90 tablet, Rfl: 3     atenolol (TENORMIN) 25 MG tablet, Take 1.5 tablets (37.5 mg) by mouth daily, Disp: 135 tablet, Rfl: 3     calcium carbonate (OSCAL 500) 1250 (500 Ca) MG TABS tablet, daily , Disp: , Rfl:      Cholecalciferol (VITAMIN D) 2000 units CAPS, , Disp: , Rfl:      methylphenidate (RITALIN) 10 MG tablet, Take 25 mg by mouth, Disp: , Rfl:      ramipril (ALTACE) 1.25 MG capsule, Take 2 capsules (2.5 mg) by mouth daily, Disp: 60 capsule, Rfl: 11     Saccharomyces boulardii (PROBIOTIC) 250 MG CAPS, 1 capsule daily, Disp: , Rfl:      sodium bicarbonate 650 MG tablet, TAKE 4 TABLETS (2,600 MG) THREE TIMES A DAY, Disp: 180 tablet, Rfl: 23     Allergies:   Dust mites, Mold, and Other [seasonal allergies]      Past Medical History:  Attention deficit disorder    Posterior urethral valves   Lymphangioma  Hypertension  Chronic kidney disease stage 4, GFR 15-29 ml/min (H)    Past Surgical History:  ablation of posterior urethral valves - " 2000  ureteral reimplantation with tapering - 2003    Family History:   No family history on file.      Social History:   Presents to clinic alone.  Tobacco Use: Never smoker.  Alcohol Use: Not on file.  PCP: VALENTIN MEEKS    Physical Exam:  Not performed as encounter was a telephone visit.     Laboratory:  CMP  Recent Labs   Lab Test 07/16/21  1650 03/29/21  1333 12/16/20  1611 12/16/20  0000 10/19/20  1614 08/18/20  1735 02/26/20  1520 01/31/20  1154 05/13/19  0712 12/04/18  1054 06/08/18  1256    140 139 139 137 138 140  --    < > 142 148*   POTASSIUM 4.5 4.0 5.0 5.0 4.8 4.6 4.4  --    < > 4.6 4.8   CHLORIDE 116* 114* 114* 114 114* 114* 112*  --    < > 116* 121*   CO2 14* 18* 15* 15 11* 15* 21  --    < > 17* 19*   ANIONGAP 8 9 10 10 12 9 7  --    < > 9 8    90 89 89 99 162* 105*  --    < > 100* 87   BUN 52* 62* 75* 75 77* 50* 56*  --    < > 56* 47*   CR 5.08* 4.48* 5.27* 5.27 5.42*  5.56* 4.45* 3.58*  --    < > 3.33* 2.51*   GFRESTIMATED 15* 17* 14*  --  14*  13* 17* 23*  --    < > 24* 34*   GFRESTBLACK  --  20* 17*  --  16*  16* 21* 27*  --    < > 29* 41*   NABIL 8.5 8.9 9.1 9.1 9.3 9.0 8.2*  --    < > 8.8* 8.5*   MAG  --   --   --   --   --   --  1.7 2.0  --  1.9 1.6   PHOS 5.2*  --  4.6* 4.6 4.8* 3.8 3.9  --    < > 4.7* 4.6   PROTTOTAL  --  7.1  --   --  7.4  --   --   --   --  6.9 6.4*   ALBUMIN 3.0* 3.2* 3.6 3.6 3.8  3.7 3.3* 3.3*  --    < > 3.1* 2.8*   BILITOTAL  --  0.2  --   --  0.4  --   --   --   --  0.2 0.2   ALKPHOS  --  73  --   --  72  --   --   --   --  87 74   AST  --  11  --   --  16  --   --   --   --  20 20   ALT  --  28  --   --  22  --   --   --   --  24 19    < > = values in this interval not displayed.     CBC  Recent Labs   Lab Test 07/16/21  1650 03/29/21  1333 12/16/20  1611 12/16/20  0000 10/19/20  1614 10/19/20  0000 08/18/20  1735   HGB 12.5* 12.4* 12.9* 12.9* 13.2* 13.2*   < >   WBC 7.4 6.6  --   --  7.1 7.1  --    RBC 4.18* 4.14*  --   --  4.32* 4.32  --    HCT  37.8* 37.9*  --   --  41.6 41.6  --    MCV 90 92  --   --  96 96  --    MCH 29.9 30.0  --   --  30.6 30.6  --    MCHC 33.1 32.7  --   --  31.7* 31.7  --    RDW 12.9 12.9  --   --  12.5 12.5  --     240  --   --  256 256  --     < > = values in this interval not displayed.     URINE STUDIES  Recent Labs   Lab Test 03/29/21  1404 12/03/20  1525 10/19/20  1645 05/13/19  0728 12/04/18  1056   COLOR Straw Yellow Yellow Straw Straw   APPEARANCE Clear Clear Clear Clear Clear   URINEGLC 50* 100* 50 mg/dL* 50* Negative   URINEBILI Negative Negative Negative Negative Negative   URINEKETONE Negative Negative Negative Negative Negative   SG 1.008 1.025 1.005 1.008 1.006   UBLD Negative Small* Small* Negative Trace*   URINEPH 6.0 7.0 6.0 7.0 6.5   PROTEIN >499* >300* >=500 mg/dL* >499* 100*   UROBILINOGEN  --  0.2 <2.0 E.U./dL  --   --    NITRITE Negative Negative Negative Negative Negative   LEUKEST Negative Negative Negative Negative Negative   RBCU 0  --  0-2 <1 <1   WBCU <1  --  0-5 <1 1     Recent Labs   Lab Test 02/26/20  1530 11/25/19  0904 05/13/19  0728 12/04/18  1056 06/08/18  1310 02/10/17  1004 08/26/16  1150 02/19/16  1000   UTPG 4.31* 4.92* 4.31* 4.59* 6.69* 2.78* 2.45* 2.17*     PTH  Recent Labs   Lab Test 07/16/21  1650 12/16/20  1611 12/16/20  0000 10/19/20  1614 02/26/20  1520 11/25/19  0859 05/13/19  0712 12/04/18  1054 06/08/18  1256 01/16/18  1102 08/09/17  1626 02/10/17  1002 08/26/16  1245 02/19/16  0955   PTHI 308* 94* 94 131* 201* 154* 110* 177* 96* 133* 66 39 47 42     IRON STUDIES   Recent Labs   Lab Test 02/26/20  1520 05/13/19  0712 06/08/18  1256 01/16/18  1102 08/09/17  1626 02/10/17  1002   IRON 66 71 87 116 93 54    269 257 290 284 297   IRONSAT 26 26 34 40 33 18   DAVID 133 109 82 86 100 113                  All above labs reviewed by me.   Michael Diaz MD  [phone]

## 2021-07-20 LAB
DEPRECATED CALCIDIOL+CALCIFEROL SERPL-MC: <23 UG/L (ref 20–75)
VITAMIN D2 SERPL-MCNC: <5 UG/L
VITAMIN D3 SERPL-MCNC: 18 UG/L

## 2021-07-22 LAB
PROTOCOL CUTOFF: NORMAL
SA 1 CELL: NORMAL
SA 1 TEST METHOD: NORMAL
SA 2 CELL: NORMAL
SA 2 TEST METHOD: NORMAL
SA1 HI RISK ABY: NORMAL
SA1 MOD RISK ABY: NORMAL
SA2 HI RISK ABY: NORMAL
SA2 MOD RISK ABY: NORMAL
UNACCEPTABLE ANTIGENS: NORMAL
UNOS CPRA: 0
ZZZSA 1  COMMENTS: NORMAL
ZZZSA 2 COMMENTS: NORMAL

## 2021-07-23 LAB — HISTOTRAC: YES

## 2021-09-18 ENCOUNTER — HEALTH MAINTENANCE LETTER (OUTPATIENT)
Age: 21
End: 2021-09-18

## 2021-10-21 DIAGNOSIS — N18.5 CKD (CHRONIC KIDNEY DISEASE) STAGE 5, GFR LESS THAN 15 ML/MIN (H): Primary | ICD-10-CM

## 2021-11-01 ENCOUNTER — VIRTUAL VISIT (OUTPATIENT)
Dept: NEPHROLOGY | Facility: CLINIC | Age: 21
End: 2021-11-01
Attending: INTERNAL MEDICINE
Payer: COMMERCIAL

## 2021-11-01 DIAGNOSIS — N18.5 CKD (CHRONIC KIDNEY DISEASE) STAGE 5, GFR LESS THAN 15 ML/MIN (H): ICD-10-CM

## 2021-11-01 DIAGNOSIS — I12.9 HYPERTENSION, RENAL: Primary | ICD-10-CM

## 2021-11-01 DIAGNOSIS — E87.20 METABOLIC ACIDOSIS: ICD-10-CM

## 2021-11-01 PROCEDURE — G0463 HOSPITAL OUTPT CLINIC VISIT: HCPCS | Mod: PN,RTG | Performed by: INTERNAL MEDICINE

## 2021-11-01 PROCEDURE — 99214 OFFICE O/P EST MOD 30 MIN: CPT | Mod: 95 | Performed by: INTERNAL MEDICINE

## 2021-11-01 RX ORDER — SODIUM BICARBONATE 650 MG/1
2600 TABLET ORAL 2 TIMES DAILY
Qty: 180 TABLET | Refills: 23 | Status: SHIPPED | OUTPATIENT
Start: 2021-11-01 | End: 2023-01-03

## 2021-11-01 RX ORDER — AMLODIPINE BESYLATE 5 MG/1
5 TABLET ORAL DAILY
Qty: 90 TABLET | Refills: 3 | Status: SHIPPED | OUTPATIENT
Start: 2021-11-01 | End: 2022-11-21

## 2021-11-01 RX ORDER — ATENOLOL 25 MG/1
37.5 TABLET ORAL DAILY
Qty: 135 TABLET | Refills: 3 | Status: SHIPPED | OUTPATIENT
Start: 2021-11-01 | End: 2022-12-28

## 2021-11-01 RX ORDER — MULTIVIT-MIN/IRON/FOLIC ACID/K 18-600-40
2000 CAPSULE ORAL DAILY
Qty: 90 CAPSULE | Refills: 11 | Status: ON HOLD | OUTPATIENT
Start: 2021-11-01 | End: 2024-01-05

## 2021-11-01 RX ORDER — RAMIPRIL 1.25 MG/1
2.5 CAPSULE ORAL DAILY
Qty: 60 CAPSULE | Refills: 11 | Status: SHIPPED | OUTPATIENT
Start: 2021-11-01 | End: 2023-02-09

## 2021-11-01 NOTE — LETTER
2021     RE: Boogie Villa  [address]  [place] [address]     Dear Colleague,    Thank you for referring your patient, Boogie Villa, to the [facility]PHROLOG[facility] [place] [facility]. Please see a copy of my visit note below.    Boogie is a 21 year old who is being evaluated via a billable video visit.      How would you like to obtain your AVS? MyChart  If the video visit is dropped, the invitation should be resent by: Text to cell phone: [phone]  Will anyone else be joining your video visit? No      Video Start Time: 9:31 AM  Video-Visit Details    Type of service:  Video Visit    Video End Time:9:50 AM    Originating Location (pt. Location): Home    Distant Location (provider location):  [facility] NEPHROLOG[facility]     Platform used for Video Visit: [facility]        Nephrology Clinic - Video Visit    Name: Boogie Villa  MRN: [id number]  Age: 21 year old  : 2000  DOS: 2021   Referring provider: Edwin Palomo     Assessment and Plan:   1. 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  Baseline serum creatinine has significantly changed over the past year as kidney function has declined more rapidly.  Last serum Cr ~5.1 (eGFR of 15 ml/min) associated with significant albuminuria of ~4 g/g (on low dose ramipril).  Decline seems more rapid than expected over the year and may be due to decreased self catheterization though he is adamant he is able to volitionally void and has had no urinary retention.   - Obtain routine CKD labs today  - Patient is asymptomatic and has no urgent need for dialysis  - Discussed RRT modalities and access planning but patient remains unsure about modality and would like to hold of on access placement  -Ideally, patient could undergo preemptive kidney  transplantation and is now inactive on the kidney transplant waiting list until his BMI is < 35  - continue RAAS blockade with ramipril now at 2.5 mg daily for management of persistent albuminuria and CKD though will consider discontinuing in the future should his kidney function continue to decline significantly  - He should continue to avoid NSAIDs  - If he becomes ill, he was instructed to hold his ramipril   - RTC in 4 months     2. Hypertension: He appears slightly above goal of <130/80 though we have no recent home measurements.  - Continued on atenolol 37.5 mg, amlodipine 5 mg and ramipril 2.5 mg daily  - Monitor home blood pressure and if persistently above goal will likely increase amlodipine to 10 mg next (and potentially discontinuing ramipril if kidney function continues to significantly decline)     3. CKD-MBD: Corrected Ca and phos have been at goal.  Last PTH mildely elevated at 308 (July, 2021).  Vit D deficient in past but has improved but remains on low side.  - Continue cholecalciferol at 2000 units daily  - No need for phos binders or activated vitamin D (e.g. calcitriol) at this time     4. Low bicarb: Bicarb low at 14 in July, 2021.  Presumed non-AG metabolic acidosis due to CKD.  He has not been taking sodium bicarb as initially prescribed and is taking 4 tabs daily instead of TID.      -encourage sodium bicarb tabs (650 mg) 4 tabs BID for now     5. Anemia: Mild and due to CKD. Hgb has been stable in the 12s. Iron stores on low side.   -no need for iron or EPO at this time      6. Seasonal Allergies: Patient dose not complain of persistent seasonal allergy related symptoms today. He feels that this is being well managed with Claritin. Instructed him that it is ok to take it daily for a short period of time if needed.  Otherwise, we discussed prescribing Singulair in the future if he continues to be symptomatic.     7. Obesity: Significant increase in weight during 2018. Patient and his mother  "have inquired about the possible effect weight will have on a possible kidney transplant. I discussed the implications obesity has on chronic kidney disease, blood pressure control, and heart disease. Goal BMI for kidney transplant should be less than 35.  -he will f/u with our weight loss management clinic     8. Urology: No active issues. Followed by Dr. Melgar    Follow-up: RTC in 4 months    Reason For Visit:   CKD follow-up.     HPI:   Boogie Villa is a 21 year old man with a history of posterior urethral valves with correction in the first year of life.  Recurrent episodes of urinary tract infection. Patient was previously followed by Dr. Palomo in pediatric nephrology for chronic kidney disease related to posterior ureteral valves. The only concern was medication compliance and he and his mother reported very partial (3-4 days a week) compliance in the past though has not been an issue over the past year.      Interval history:  The patient is doing well overall. He no longer has a need for self catheterizations.  He reports that when he catheterizes himself he does not get much urine. He is expected to f/u Urology for more urodynamic testing.  He continues to take his medications regularly.  He has not measured his home BP recently. He denies dyspnea, edema, and urinary retention.  He has attended the \"Kidney Smart\" class but is unsure which dialysis modality he would like if needed.  He appears to be an appropriate candidate for kidney transplantation and is now inactive on the list until his BMI is < 35.      Review of Systems:   Pertinent items are noted in HPI or as below, remainder of complete ROS is negative.      Active Medications:     Current Outpatient Medications:      amLODIPine (NORVASC) 5 MG tablet, Take 1 tablet (5 mg) by mouth daily, Disp: 90 tablet, Rfl: 3     atenolol (TENORMIN) 25 MG tablet, Take 1.5 tablets (37.5 mg) by mouth daily, Disp: 135 tablet, Rfl: 3     calcium carbonate (OSCAL " 500) 1250 (500 Ca) MG TABS tablet, daily , Disp: , Rfl:      Cholecalciferol (VITAMIN D) 2000 units CAPS, , Disp: , Rfl:      methylphenidate (RITALIN) 10 MG tablet, Take 25 mg by mouth, Disp: , Rfl:      ramipril (ALTACE) 1.25 MG capsule, Take 2 capsules (2.5 mg) by mouth daily, Disp: 60 capsule, Rfl: 11     Saccharomyces boulardii (PROBIOTIC) 250 MG CAPS, 1 capsule daily, Disp: , Rfl:      sodium bicarbonate 650 MG tablet, TAKE 4 TABLETS (2,600 MG) THREE TIMES A DAY, Disp: 180 tablet, Rfl: 23     Allergies:   Dust mites, Mold, and Other [seasonal allergies]      Past Medical History:  Attention deficit disorder    Posterior urethral valves   Lymphangioma  Hypertension  Chronic kidney disease stage 4, GFR 15-29 ml/min (H)    Past Surgical History:  ablation of posterior urethral valves - 2000  ureteral reimplantation with tapering - 2003    Family History:   No family history on file.      Social History:   Presents to clinic alone.  Tobacco Use: Never smoker.  Alcohol Use: Not on file.  PCP: VALENTIN MEEKS    Physical Exam:  Not performed as encounter was a video visit.     Laboratory:  [facility]  Recent Labs   Lab Test 07/16/21  1650 03/29/21  1333 12/16/20  1611 12/16/20  0000 10/19/20  1614 10/19/20  0000 08/18/20  1735 08/18/20  0000 02/26/20  1520 02/03/20  1451 01/31/20  1154 05/13/19  0712 12/04/18  1054 06/08/18  1256 06/08/18  1256    140 139 139 137   < > 138   < > 140   < >  --    < > 142   < > 148*   POTASSIUM 4.5 4.0 5.0 5.0 4.8   < > 4.6   < > 4.4   < >  --    < > 4.6   < > 4.8   CHLORIDE 116* 114* 114* 114 114*   < > 114*   < > 112*   < >  --    < > 116*   < > 121*   CO2 14* 18* 15* 15 11*   < > 15*   < > 21   < >  --    < > 17*   < > 19*   ANIONGAP 8 9 10 10 12   < > 9   < > 7   < >  --    < > 9   < > 8    90 89 89 99   < > 162*   < > 105*   < >  --    < > 100*   < > 87   BUN 52* 62* 75* 75 77*   < > 50*   < > 56*   < >  --    < > 56*   < > 47*   CR 5.08* 4.48* 5.27* 5.27 5.42*  5.56*   <  > 4.45*   < > 3.58*   < >  --    < > 3.33*   < > 2.51*   GFRESTIMATED 15* 17* 14*  --  14*  13*  --  17*   < > 23*   < >  --    < > 24*   < > 34*   GFRESTBLACK  --  20* 17*  --  16*  16*  --  21*  --  27*   < >  --    < > 29*   < > 41*   NABIL 8.5 8.9 9.1 9.1 9.3   < > 9.0   < > 8.2*   < >  --    < > 8.8*   < > 8.5*   MAG  --   --   --   --   --   --   --   --  1.7  --  2.0  --  1.9  --  1.6   PHOS 5.2*  --  4.6* 4.6 4.8*   < > 3.8   < > 3.9   < >  --    < > 4.7*   < > 4.6   PROTTOTAL  --  7.1  --   --  7.4  --   --   --   --   --   --   --  6.9  --  6.4*   ALBUMIN 3.0* 3.2* 3.6 3.6 3.8  3.7   < > 3.3*   < > 3.3*   < >  --    < > 3.1*   < > 2.8*   BILITOTAL  --  0.2  --   --  0.4  --   --   --   --   --   --   --  0.2  --  0.2   ALKPHOS  --  73  --   --  72  --   --   --   --   --   --   --  87  --  74   AST  --  11  --   --  16  --   --   --   --   --   --   --  20  --  20   ALT  --  28  --   --  22  --   --   --   --   --   --   --  24  --  19    < > = values in this interval not displayed.     CBC  Recent Labs   Lab Test 07/16/21  1650 03/29/21  1333 12/16/20  1611 12/16/20  0000 10/19/20  1614 10/19/20  0000 10/19/20  0000 08/18/20  1735   HGB 12.5* 12.4* 12.9* 12.9* 13.2*   < > 13.2*   < >   WBC 7.4 6.6  --   --  7.1  --  7.1  --    RBC 4.18* 4.14*  --   --  4.32*  --  4.32  --    HCT 37.8* 37.9*  --   --  41.6  --  41.6  --    MCV 90 92  --   --  96  --  96  --    MCH 29.9 30.0  --   --  30.6  --  30.6  --    MCHC 33.1 32.7  --   --  31.7*  --  31.7  --    RDW 12.9 12.9  --   --  12.5  --  12.5  --     240  --   --  256  --  256  --     < > = values in this interval not displayed.     URINE STUDIES  Recent Labs   Lab Test 03/29/21  1404 12/03/20  1525 10/19/20  1645 05/13/19  0728 12/04/18  1056 12/04/18  1056   COLOR Straw Yellow Yellow Straw   < > Straw   APPEARANCE Clear Clear Clear Clear   < > Clear   URINEGLC 50* 100* 50 mg/dL* 50*   < > Negative   URINEBILI Negative Negative Negative Negative   <  > Negative   URINEKETONE Negative Negative Negative Negative   < > Negative   SG 1.008 1.025 1.005 1.008   < > 1.006   UBLD Negative Small* Small* Negative   < > Trace*   URINEPH 6.0 7.0 6.0 7.0   < > 6.5   PROTEIN >499* >300* >=500 mg/dL* >499*   < > 100*   UROBILINOGEN  --  0.2 <2.0 E.U./dL  --   --   --    NITRITE Negative Negative Negative Negative   < > Negative   LEUKEST Negative Negative Negative Negative   < > Negative   RBCU 0  --  0-2 <1  --  <1   WBCU <1  --  0-5 <1  --  1    < > = values in this interval not displayed.     Recent Labs   Lab Test 02/26/20  1530 11/25/19  0904 05/13/19  0728 12/04/18  1056 06/08/18  1310 02/10/17  1004 08/26/16  1150 02/19/16  1000   UTPG 4.31* 4.92* 4.31* 4.59* 6.69* 2.78* 2.45* 2.17*     PTH  Recent Labs   Lab Test 07/16/21  1650 12/16/20  1611 12/16/20  0000 10/19/20  1614 02/26/20  1520 11/25/19  0859 05/13/19  0712 12/04/18  1054 06/08/18  1256 01/16/18  1102 08/09/17  1626 02/10/17  1002 08/26/16  1245 02/19/16  0955   PTHI 308* 94* 94 131* 201* 154* 110* 177* 96* 133* 66 39 47 42     IRON STUDIES   Recent Labs   Lab Test 02/26/20  1520 05/13/19  0712 06/08/18  1256 01/16/18  1102 08/09/17  1626 02/10/17  1002   IRON 66 71 87 116 93 54    269 257 290 284 297   IRONSAT 26 26 34 40 33 18   DAVID 133 109 82 86 100 113               All above labs reviewed by me.   Michael Diaz MD  [phone]

## 2021-11-01 NOTE — PROGRESS NOTES
Nephrology Clinic - Video Visit    Name: Boogie Villa  MRN: [id number]  Age: 21 year old  : 2000  DOS: 2021   Referring provider: Edwin Palomo     Assessment and Plan:   1. 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  Baseline serum creatinine has significantly changed over the past year as kidney function has declined more rapidly.  Last serum Cr ~5.1 (eGFR of 15 ml/min) associated with significant albuminuria of ~4 g/g (on low dose ramipril).  Decline seems more rapid than expected over the year and may be due to decreased self catheterization though he is adamant he is able to volitionally void and has had no urinary retention.   - Obtain routine CKD labs today  - Patient is asymptomatic and has no urgent need for dialysis  - Discussed RRT modalities and access planning but patient remains unsure about modality and would like to hold of on access placement  -Ideally, patient could undergo preemptive kidney transplantation and is now inactive on the kidney transplant waiting list until his BMI is < 35  - continue RAAS blockade with ramipril now at 2.5 mg daily for management of persistent albuminuria and CKD though will consider discontinuing in the future should his kidney function continue to decline significantly  - He should continue to avoid NSAIDs  - If he becomes ill, he was instructed to hold his ramipril   - RTC in 4 months     2. Hypertension: He appears slightly above goal of <130/80 though we have no recent home measurements.  - Continued on atenolol 37.5 mg, amlodipine 5 mg and ramipril 2.5 mg daily  - Monitor home blood pressure and if persistently above goal will likely increase amlodipine to 10 mg next (and potentially discontinuing ramipril if kidney function continues to significantly decline)     3. CKD-MBD: Corrected Ca and phos have been at goal.  Last  PTH mildely elevated at 308 (July, 2021).  Vit D deficient in past but has improved but remains on low side.  - Continue cholecalciferol at 2000 units daily  - No need for phos binders or activated vitamin D (e.g. calcitriol) at this time     4. Low bicarb: Bicarb low at 14 in July, 2021.  Presumed non-AG metabolic acidosis due to CKD.  He has not been taking sodium bicarb as initially prescribed and is taking 4 tabs daily instead of TID.      -encourage sodium bicarb tabs (650 mg) 4 tabs BID for now     5. Anemia: Mild and due to CKD. Hgb has been stable in the 12s. Iron stores on low side.   -no need for iron or EPO at this time      6. Seasonal Allergies: Patient dose not complain of persistent seasonal allergy related symptoms today. He feels that this is being well managed with Claritin. Instructed him that it is ok to take it daily for a short period of time if needed.  Otherwise, we discussed prescribing Singulair in the future if he continues to be symptomatic.     7. Obesity: Significant increase in weight during 2018. Patient and his mother have inquired about the possible effect weight will have on a possible kidney transplant. I discussed the implications obesity has on chronic kidney disease, blood pressure control, and heart disease. Goal BMI for kidney transplant should be less than 35.  -he will f/u with our weight loss management clinic     8. Urology: No active issues. Followed by Dr. Melgar    Follow-up: RTC in 4 months    Reason For Visit:   CKD follow-up.     HPI:   Boogie Villa is a 21 year old man with a history of posterior urethral valves with correction in the first year of life.  Recurrent episodes of urinary tract infection. Patient was previously followed by Dr. Palomo in pediatric nephrology for chronic kidney disease related to posterior ureteral valves. The only concern was medication compliance and he and his mother reported very partial (3-4 days a week) compliance in the past  "though has not been an issue over the past year.      Interval history:  The patient is doing well overall. He no longer has a need for self catheterizations.  He reports that when he catheterizes himself he does not get much urine. He is expected to f/u Urology for more urodynamic testing.  He continues to take his medications regularly.  He has not measured his home BP recently. He denies dyspnea, edema, and urinary retention.  He has attended the \"Kidney Smart\" class but is unsure which dialysis modality he would like if needed.  He appears to be an appropriate candidate for kidney transplantation and is now inactive on the list until his BMI is < 35.      Review of Systems:   Pertinent items are noted in HPI or as below, remainder of complete ROS is negative.      Active Medications:     Current Outpatient Medications:      amLODIPine (NORVASC) 5 MG tablet, Take 1 tablet (5 mg) by mouth daily, Disp: 90 tablet, Rfl: 3     atenolol (TENORMIN) 25 MG tablet, Take 1.5 tablets (37.5 mg) by mouth daily, Disp: 135 tablet, Rfl: 3     calcium carbonate (OSCAL 500) 1250 (500 Ca) MG TABS tablet, daily , Disp: , Rfl:      Cholecalciferol (VITAMIN D) 2000 units CAPS, , Disp: , Rfl:      methylphenidate (RITALIN) 10 MG tablet, Take 25 mg by mouth, Disp: , Rfl:      ramipril (ALTACE) 1.25 MG capsule, Take 2 capsules (2.5 mg) by mouth daily, Disp: 60 capsule, Rfl: 11     Saccharomyces boulardii (PROBIOTIC) 250 MG CAPS, 1 capsule daily, Disp: , Rfl:      sodium bicarbonate 650 MG tablet, TAKE 4 TABLETS (2,600 MG) THREE TIMES A DAY, Disp: 180 tablet, Rfl: 23     Allergies:   Dust mites, Mold, and Other [seasonal allergies]      Past Medical History:  Attention deficit disorder    Posterior urethral valves   Lymphangioma  Hypertension  Chronic kidney disease stage 4, GFR 15-29 ml/min (H)    Past Surgical History:  ablation of posterior urethral valves - 2000  ureteral reimplantation with tapering - 2003    Family History:   No " family history on file.      Social History:   Presents to clinic alone.  Tobacco Use: Never smoker.  Alcohol Use: Not on file.  PCP: VALENTIN MEEKS    Physical Exam:  Not performed as encounter was a video visit.     Laboratory:  CMP  Recent Labs   Lab Test 07/16/21  1650 03/29/21  1333 12/16/20  1611 12/16/20  0000 10/19/20  1614 10/19/20  0000 08/18/20  1735 08/18/20  0000 02/26/20  1520 02/03/20  1451 01/31/20  1154 05/13/19  0712 12/04/18  1054 06/08/18  1256 06/08/18  1256    140 139 139 137   < > 138   < > 140   < >  --    < > 142   < > 148*   POTASSIUM 4.5 4.0 5.0 5.0 4.8   < > 4.6   < > 4.4   < >  --    < > 4.6   < > 4.8   CHLORIDE 116* 114* 114* 114 114*   < > 114*   < > 112*   < >  --    < > 116*   < > 121*   CO2 14* 18* 15* 15 11*   < > 15*   < > 21   < >  --    < > 17*   < > 19*   ANIONGAP 8 9 10 10 12   < > 9   < > 7   < >  --    < > 9   < > 8    90 89 89 99   < > 162*   < > 105*   < >  --    < > 100*   < > 87   BUN 52* 62* 75* 75 77*   < > 50*   < > 56*   < >  --    < > 56*   < > 47*   CR 5.08* 4.48* 5.27* 5.27 5.42*  5.56*   < > 4.45*   < > 3.58*   < >  --    < > 3.33*   < > 2.51*   GFRESTIMATED 15* 17* 14*  --  14*  13*  --  17*   < > 23*   < >  --    < > 24*   < > 34*   GFRESTBLACK  --  20* 17*  --  16*  16*  --  21*  --  27*   < >  --    < > 29*   < > 41*   NABIL 8.5 8.9 9.1 9.1 9.3   < > 9.0   < > 8.2*   < >  --    < > 8.8*   < > 8.5*   MAG  --   --   --   --   --   --   --   --  1.7  --  2.0  --  1.9  --  1.6   PHOS 5.2*  --  4.6* 4.6 4.8*   < > 3.8   < > 3.9   < >  --    < > 4.7*   < > 4.6   PROTTOTAL  --  7.1  --   --  7.4  --   --   --   --   --   --   --  6.9  --  6.4*   ALBUMIN 3.0* 3.2* 3.6 3.6 3.8  3.7   < > 3.3*   < > 3.3*   < >  --    < > 3.1*   < > 2.8*   BILITOTAL  --  0.2  --   --  0.4  --   --   --   --   --   --   --  0.2  --  0.2   ALKPHOS  --  73  --   --  72  --   --   --   --   --   --   --  87  --  74   AST  --  11  --   --  16  --   --   --   --   --   --   --   20  --  20   ALT  --  28  --   --  22  --   --   --   --   --   --   --  24  --  19    < > = values in this interval not displayed.     CBC  Recent Labs   Lab Test 07/16/21  1650 03/29/21  1333 12/16/20  1611 12/16/20  0000 10/19/20  1614 10/19/20  0000 10/19/20  0000 08/18/20  1735   HGB 12.5* 12.4* 12.9* 12.9* 13.2*   < > 13.2*   < >   WBC 7.4 6.6  --   --  7.1  --  7.1  --    RBC 4.18* 4.14*  --   --  4.32*  --  4.32  --    HCT 37.8* 37.9*  --   --  41.6  --  41.6  --    MCV 90 92  --   --  96  --  96  --    MCH 29.9 30.0  --   --  30.6  --  30.6  --    MCHC 33.1 32.7  --   --  31.7*  --  31.7  --    RDW 12.9 12.9  --   --  12.5  --  12.5  --     240  --   --  256  --  256  --     < > = values in this interval not displayed.     URINE STUDIES  Recent Labs   Lab Test 03/29/21  1404 12/03/20  1525 10/19/20  1645 05/13/19  0728 12/04/18  1056 12/04/18  1056   COLOR Straw Yellow Yellow Straw   < > Straw   APPEARANCE Clear Clear Clear Clear   < > Clear   URINEGLC 50* 100* 50 mg/dL* 50*   < > Negative   URINEBILI Negative Negative Negative Negative   < > Negative   URINEKETONE Negative Negative Negative Negative   < > Negative   SG 1.008 1.025 1.005 1.008   < > 1.006   UBLD Negative Small* Small* Negative   < > Trace*   URINEPH 6.0 7.0 6.0 7.0   < > 6.5   PROTEIN >499* >300* >=500 mg/dL* >499*   < > 100*   UROBILINOGEN  --  0.2 <2.0 E.U./dL  --   --   --    NITRITE Negative Negative Negative Negative   < > Negative   LEUKEST Negative Negative Negative Negative   < > Negative   RBCU 0  --  0-2 <1  --  <1   WBCU <1  --  0-5 <1  --  1    < > = values in this interval not displayed.     Recent Labs   Lab Test 02/26/20  1530 11/25/19  0904 05/13/19  0728 12/04/18  1056 06/08/18  1310 02/10/17  1004 08/26/16  1150 02/19/16  1000   UTPG 4.31* 4.92* 4.31* 4.59* 6.69* 2.78* 2.45* 2.17*     PTH  Recent Labs   Lab Test 07/16/21  1650 12/16/20  1611 12/16/20  0000 10/19/20  1614 02/26/20  1520 11/25/19  0859 05/13/19  0712  12/04/18  1054 06/08/18  1256 01/16/18  1102 08/09/17  1626 02/10/17  1002 08/26/16  1245 02/19/16  0955   PTHI 308* 94* 94 131* 201* 154* 110* 177* 96* 133* 66 39 47 42     IRON STUDIES   Recent Labs   Lab Test 02/26/20  1520 05/13/19  0712 06/08/18  1256 01/16/18  1102 08/09/17  1626 02/10/17  1002   IRON 66 71 87 116 93 54    269 257 290 284 297   IRONSAT 26 26 34 40 33 18   DAVID 133 109 82 86 100 113                  All above labs reviewed by me.   Michael Diaz MD  [phone]

## 2021-11-01 NOTE — PROGRESS NOTES
Boogie is a 21 year old who is being evaluated via a billable video visit.      How would you like to obtain your AVS? [employer]  If the video visit is dropped, the invitation should be resent by: Text to cell phone: [phone]  Will anyone else be joining your video visit? No      Video Start Time: 9:31 AM  Video-Visit Details    Type of service:  Video Visit    Video End Time:9:50 AM    Originating Location (pt. Location): Home    Distant Location (provider location):  [facility] NEPHROLOGY [facility]     Platform used for Video Visit: AG&P

## 2021-11-05 ENCOUNTER — TELEPHONE (OUTPATIENT)
Dept: NEPHROLOGY | Facility: CLINIC | Age: 21
End: 2021-11-05

## 2022-01-08 ENCOUNTER — HEALTH MAINTENANCE LETTER (OUTPATIENT)
Age: 22
End: 2022-01-08

## 2022-07-06 DIAGNOSIS — N18.5 CKD (CHRONIC KIDNEY DISEASE) STAGE 5, GFR LESS THAN 15 ML/MIN (H): Primary | ICD-10-CM

## 2022-07-06 DIAGNOSIS — N18.4 CKD (CHRONIC KIDNEY DISEASE) STAGE 4, GFR 15-29 ML/MIN (H): ICD-10-CM

## 2022-07-06 DIAGNOSIS — I12.9 HYPERTENSION, RENAL: ICD-10-CM

## 2022-07-28 ENCOUNTER — TRANSFERRED RECORDS (OUTPATIENT)
Dept: NEPHROLOGY | Facility: CLINIC | Age: 22
End: 2022-07-28

## 2022-07-28 LAB
ALBUMIN (URINE) MG/SPEC: 204.7 MG/DL
ALBUMIN/CREATININE RATIO: 3777 MG/G
CREATININE (EXTERNAL): 6.86 MG/DL (ref 0.8–1.4)
CREATININE (URINE): 54.2 MG/DL
GFR ESTIMATED (EXTERNAL): 11 ML/MIN/1.73M2
GLUCOSE (EXTERNAL): 84 MG/DL (ref 70–99)
POTASSIUM (EXTERNAL): 5 MMOL/L (ref 3.5–5.4)

## 2022-08-01 ENCOUNTER — RESULT FOLLOW UP (OUTPATIENT)
Dept: NEPHROLOGY | Facility: CLINIC | Age: 22
End: 2022-08-01

## 2022-08-01 NOTE — PROGRESS NOTES
DATE:  8/1/2022   TIME OF RECEIPT FROM LAB:  1220  LAB TEST:  Cr  LAB VALUE:  5.0   Received page over vocera from Clinical Pathology Lab to report Cr 5. Routed to provider and LPN.

## 2022-09-27 ENCOUNTER — DOCUMENTATION ONLY (OUTPATIENT)
Dept: TRANSPLANT | Facility: CLINIC | Age: 22
End: 2022-09-27

## 2022-09-27 VITALS — HEIGHT: 71 IN | BODY MASS INDEX: 38.5 KG/M2 | WEIGHT: 275 LBS

## 2022-10-20 NOTE — TELEPHONE ENCOUNTER
24 HR BP Monitor scheduled per Darcy (mother) for 3/30/18 at 8AM, called her back and left her a voicemail with the details.  
no

## 2022-11-10 DIAGNOSIS — N18.5 CKD (CHRONIC KIDNEY DISEASE) STAGE 5, GFR LESS THAN 15 ML/MIN (H): Primary | ICD-10-CM

## 2022-11-10 NOTE — PROGRESS NOTES
Nephrology Clinic   11/15/2022    Name: Boogie Villa  MRN: [id number]  Age: 22 year old  : 2000  DOS: 2022    Referring provider: Edwin Palomo     Assessment and Plan:   1. 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  Baseline serum creatinine has significantly changed over the past year as kidney function has declined more rapidly.  Last serum Cr ~6  (eGFR of 12 ml/min) associated with significant albuminuria of ~7 g/g, up from 4 g last check (on low dose ramipril).  Decline seems more rapid than expected over the year and may be due to decreased self catheterization though he is adamant he is able to volitionally void and has had no urinary retention.   - Patient denies uremic symptoms and has no obvious need for dialysis,  though may likely need it in coming months  - Patient is asymptomatic and has no urgent need for dialysis  - Discussed RRT modalities and access planning but patient remains unsure about modality and would like to hold off on access placement. He is hesitant about starting dialysis  -Ideally, patient could undergo preemptive kidney transplantation and is now inactive on the kidney transplant waiting list until his BMI is < 35.  He currently weighs 275 lbs and will need to get to 250 lbs to be at a BMI < 35.     - will stop ramipril with lower GFR  - He should continue to avoid NSAIDs       2. Hypertension: He has not been checking BP at home, previously slightly above goal of <130/80 though we have no recent home measurements.  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  Last PTH mildely elevated at 308 (). Current PTH ~800, Vit D  7  - Continue cholecalciferol at 2000 units daily  - No need for phos binders yet, start low phos diet.   - start calcitriol 0.25 mcg daily   - Obtain routine CKD labs     4. Low bicarb: Bicarb low at 14.  Presumed non-AG metabolic acidosis due to CKD.  He has not been taking sodium bicarb as as prescribed, is taking 4 tabs daily instead of BID. Forgets the evening dose  -encourage sodium bicarb tabs (650 mg) 4 tabs BID for now  -obtain routine CKD labs     5. Anemia: Mild and due to CKD. Hgb has been stable in the 12s, recently 11.5. Iron stores 31%.   -no need for iron or EPO at this time      6. Seasonal Allergies: Patient dose not complain of persistent seasonal allergy related symptoms today. He feels that this is being well managed with Claritin. Instructed him that it is ok to take it daily for a short period of time if needed.  Otherwise, we discussed prescribing Singulair in the future if he continues to be symptomatic.     7. Obesity: Significant increase in weight during 2018. Patient and his mother have inquired about the possible effect weight will have on a possible kidney transplant. I discussed the implications obesity has on chronic kidney disease, blood pressure control, and heart disease. Goal BMI for kidney transplant should be less than 35. As such his weight should be < 250 pounds.    -he is not interested in f/u with our weight loss management clinic     8. Urology: No active issues. Followed by Dr. Melgar    Follow-up: RTC in 1 month with me and in March as planned with Dr. Diaz.     Reason For Visit:   CKD follow-up.     HPI:   Boogie Villa is a 22 year old man with a history of posterior urethral valves with correction in the first year of life.  Recurrent episodes of urinary tract infection. Patient was previously followed by Dr. Palomo in pediatric nephrology for chronic kidney disease related to posterior ureteral valves. The only concern was medication compliance and he and his  "mother previously reported very partial (3-4 days a week) compliance in the past though has not been an issue over the past year.      Interval history:  The patient is doing well overall. He no longer has a need for self catheterizations.  He reports that when he catheterizes himself he does not get much urine. He is expected to f/u with Urology for more urodynamic testing.  He continues to take his medications regularly except for missing second dose of sodium bicarb.  He has not measured his home BP recently. He denies dyspnea, edema, and urinary retention.  He has attended the \"Kidney Smart\" class but is unsure which dialysis modality he would like if needed.  He appears to be an appropriate candidate for kidney transplantation and is now inactive on the list until his BMI is < 35.  He reports that he has lost 2 pounds but will be more aggressive in trying to reduce his BMI.  He currently weighs ~275 pounds.  He is now on Ritalin 20 mg once a day as needed for ADHD.  He will have a blood pressure checked at a local clinic a few times this week. He otherwise feels well and denies uremic symptoms. No LE edema, no shortness of breath. Has a good appetite, occasional nausea if he eats poorly before bed. He has no n/v/d. Denies pruritis.       Review of Systems:   Pertinent items are noted in HPI or as below, remainder of complete ROS is negative.      Active Medications:     Current Outpatient Medications:      amLODIPine (NORVASC) 5 MG tablet, Take 1 tablet (5 mg) by mouth daily, Disp: 90 tablet, Rfl: 3     atenolol (TENORMIN) 25 MG tablet, Take 1.5 tablets (37.5 mg) by mouth daily, Disp: 135 tablet, Rfl: 3     calcium carbonate (OSCAL 500) 1250 (500 Ca) MG TABS tablet, daily , Disp: , Rfl:      Cholecalciferol (VITAMIN D) 50 MCG (2000 UT) CAPS, Take 2,000 unit marking on an U-100 insulin syringe by mouth daily, Disp: 90 capsule, Rfl: 11     methylphenidate (RITALIN) 10 MG tablet, Take 25 mg by mouth, Disp: , Rfl: "      ramipril (ALTACE) 1.25 MG capsule, Take 2 capsules (2.5 mg) by mouth daily, Disp: 60 capsule, Rfl: 11     Saccharomyces boulardii (PROBIOTIC) 250 MG CAPS, 1 capsule daily, Disp: , Rfl:      sodium bicarbonate 650 MG tablet, Take 4 tablets (2,600 mg) by mouth 2 times daily, Disp: 180 tablet, Rfl: 23     Allergies:   Dust mites, Mold, and Other [seasonal allergies]      Past Medical History:  Attention deficit disorder    Posterior urethral valves   Lymphangioma  Hypertension  Chronic kidney disease stage 4, GFR 15-29 ml/min (H)    Past Surgical History:  ablation of posterior urethral valves - 2000  ureteral reimplantation with tapering - 2003    Family History:   Reviewed and noncontributory.       Social History:   Presents to clinic alone.  Tobacco Use: Never smoker.  Alcohol Use: Not on file.  PCP: VALENTIN MEEKS    Physical Exam:  Not performed as encounter was a telephone visit.     Laboratory:  CMP  Recent Labs   Lab Test 07/16/21  1650 03/29/21  1333 12/16/20  1611 12/16/20  0000 10/19/20  1614 10/19/20  0000 08/18/20  1735 08/18/20  0000 02/26/20  1520 02/03/20  1451 01/31/20  1154 05/13/19  0712 12/04/18  1054 06/08/18  1256    140 139 139 137   < > 138   < > 140   < >  --    < > 142 148*   POTASSIUM 4.5 4.0 5.0 5.0 4.8   < > 4.6   < > 4.4   < >  --    < > 4.6 4.8   CHLORIDE 116* 114* 114* 114 114*   < > 114*   < > 112*   < >  --    < > 116* 121*   CO2 14* 18* 15* 15 11*   < > 15*   < > 21   < >  --    < > 17* 19*   ANIONGAP 8 9 10 10 12   < > 9   < > 7   < >  --    < > 9 8    90 89 89 99   < > 162*   < > 105*   < >  --    < > 100* 87   BUN 52* 62* 75* 75 77*   < > 50*   < > 56*   < >  --    < > 56* 47*   CR 5.08* 4.48* 5.27* 5.27 5.42*  5.56*   < > 4.45*   < > 3.58*   < >  --    < > 3.33* 2.51*   GFRESTIMATED 15* 17* 14*  --  14*  13*  --  17*   < > 23*   < >  --    < > 24* 34*   GFRESTBLACK  --  20* 17*  --  16*  16*  --  21*  --  27*   < >  --    < > 29* 41*   NABIL 8.5 8.9 9.1 9.1  9.3   < > 9.0   < > 8.2*   < >  --    < > 8.8* 8.5*   MAG  --   --   --   --   --   --   --   --  1.7  --  2.0  --  1.9 1.6   PHOS 5.2*  --  4.6* 4.6 4.8*   < > 3.8   < > 3.9   < >  --    < > 4.7* 4.6   PROTTOTAL  --  7.1  --   --  7.4  --   --   --   --   --   --   --  6.9 6.4*   ALBUMIN 3.0* 3.2* 3.6 3.6 3.8  3.7   < > 3.3*   < > 3.3*   < >  --    < > 3.1* 2.8*   BILITOTAL  --  0.2  --   --  0.4  --   --   --   --   --   --   --  0.2 0.2   ALKPHOS  --  73  --   --  72  --   --   --   --   --   --   --  87 74   AST  --  11  --   --  16  --   --   --   --   --   --   --  20 20   ALT  --  28  --   --  22  --   --   --   --   --   --   --  24 19    < > = values in this interval not displayed.     CBC  Recent Labs   Lab Test 07/16/21  1650 03/29/21  1333 12/16/20  1611 12/16/20  0000 10/19/20  1614 10/19/20  0000   HGB 12.5* 12.4* 12.9* 12.9* 13.2* 13.2*   WBC 7.4 6.6  --   --  7.1 7.1   RBC 4.18* 4.14*  --   --  4.32* 4.32   HCT 37.8* 37.9*  --   --  41.6 41.6   MCV 90 92  --   --  96 96   MCH 29.9 30.0  --   --  30.6 30.6   MCHC 33.1 32.7  --   --  31.7* 31.7   RDW 12.9 12.9  --   --  12.5 12.5    240  --   --  256 256     URINE STUDIES  Recent Labs   Lab Test 03/29/21  1404 12/03/20  1525 10/19/20  1645 05/13/19  0728 12/04/18  1056   COLOR Straw Yellow Yellow Straw Straw   APPEARANCE Clear Clear Clear Clear Clear   URINEGLC 50* 100* 50 mg/dL* 50* Negative   URINEBILI Negative Negative Negative Negative Negative   URINEKETONE Negative Negative Negative Negative Negative   SG 1.008 1.025 1.005 1.008 1.006   UBLD Negative Small* Small* Negative Trace*   URINEPH 6.0 7.0 6.0 7.0 6.5   PROTEIN >499* >300* >=500 mg/dL* >499* 100*   UROBILINOGEN  --  0.2 <2.0 E.U./dL  --   --    NITRITE Negative Negative Negative Negative Negative   LEUKEST Negative Negative Negative Negative Negative   RBCU 0  --  0-2 <1 <1   WBCU <1  --  0-5 <1 1     Recent Labs   Lab Test 02/26/20  1530 11/25/19  0904 05/13/19  0728  12/04/18  1056 06/08/18  1310 02/10/17  1004 08/26/16  1150 02/19/16  1000   UTPG 4.31* 4.92* 4.31* 4.59* 6.69* 2.78* 2.45* 2.17*     PTH  Recent Labs   Lab Test 07/16/21  1650 12/16/20  1611 12/16/20  0000 10/19/20  1614 02/26/20  1520 11/25/19  0859 05/13/19  0712 12/04/18  1054 06/08/18  1256 01/16/18  1102 08/09/17  1626 02/10/17  1002 08/26/16  1245 02/19/16  0955   PTHI 308* 94* 94 131* 201* 154* 110* 177* 96* 133* 66 39 47 42     IRON STUDIES   Recent Labs   Lab Test 02/26/20  1520 05/13/19  0712 06/08/18  1256 01/16/18  1102 08/09/17  1626 02/10/17  1002   IRON 66 71 87 116 93 54    269 257 290 284 297   IRONSAT 26 26 34 40 33 18   DAVID 133 109 82 86 100 113                   All above labs reviewed by me.     OLIVA MEEKS, PA-C     Visit length 14 minutes. An additional 25 minutes were spent on date of service in chart review, documentation, and other activities as noted.

## 2022-11-15 ENCOUNTER — VIRTUAL VISIT (OUTPATIENT)
Dept: NEPHROLOGY | Facility: CLINIC | Age: 22
End: 2022-11-15
Attending: PHYSICIAN ASSISTANT
Payer: COMMERCIAL

## 2022-11-15 VITALS — WEIGHT: 275 LBS | BODY MASS INDEX: 38.35 KG/M2

## 2022-11-15 DIAGNOSIS — N18.5 ANEMIA IN STAGE 5 CHRONIC KIDNEY DISEASE, NOT ON CHRONIC DIALYSIS (H): ICD-10-CM

## 2022-11-15 DIAGNOSIS — I10 HYPERTENSION, ESSENTIAL: ICD-10-CM

## 2022-11-15 DIAGNOSIS — D63.1 ANEMIA IN STAGE 5 CHRONIC KIDNEY DISEASE, NOT ON CHRONIC DIALYSIS (H): ICD-10-CM

## 2022-11-15 DIAGNOSIS — N18.5 CKD (CHRONIC KIDNEY DISEASE) STAGE 5, GFR LESS THAN 15 ML/MIN (H): Primary | ICD-10-CM

## 2022-11-15 DIAGNOSIS — E83.39 HYPERPHOSPHATEMIA: ICD-10-CM

## 2022-11-15 DIAGNOSIS — E87.20 METABOLIC ACIDOSIS: ICD-10-CM

## 2022-11-15 PROCEDURE — 99214 OFFICE O/P EST MOD 30 MIN: CPT | Mod: GT | Performed by: PHYSICIAN ASSISTANT

## 2022-11-15 ASSESSMENT — PAIN SCALES - GENERAL: PAINLEVEL: NO PAIN (0)

## 2022-11-15 NOTE — PATIENT INSTRUCTIONS
Start calcitriol 0.25 mcg daily, prescription sent.   Stop ramipril.   Continue good hydration, follow low sodium and low phosphorus diet.   Avoid ibuprofen and aleve.   Check blood pressure 2-3 times over the next week and nursing staff will call for report.  Follow up with Dayana Jim in 1 month and with Dr. Diaz in March as planned.   Call sooner with any questions or concerns.

## 2022-11-15 NOTE — LETTER
11/15/2022       RE: Boogie Villa  [address]  [facility] [address]     Dear Colleague,    Thank you for referring your patient, Boogie Villa, to the [facility]ROLOG[facility] [place] [facility]. Please see a copy of my visit note below.      Nephrology Clinic   11/15/2022    Name: Boogie Villa  MRN: [id number]  Age: 22 year old  : 2000  DOS: 2022    Referring provider: Edwin Palomo     Assessment and Plan:   1. 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  Baseline serum creatinine has significantly changed over the past year as kidney function has declined more rapidly.  Last serum Cr ~6  (eGFR of 12 ml/min) associated with significant albuminuria of ~7 g/g, up from 4 g last check (on low dose ramipril).  Decline seems more rapid than expected over the year and may be due to decreased self catheterization though he is adamant he is able to volitionally void and has had no urinary retention.   - Patient denies uremic symptoms and has no obvious need for dialysis,  though may likely need it in coming months  - Patient is asymptomatic and has no urgent need for dialysis  - Discussed RRT modalities and access planning but patient remains unsure about modality and would like to hold off on access placement. He is hesitant about starting dialysis  -Ideally, patient could undergo preemptive kidney transplantation and is now inactive on the kidney transplant waiting list until his BMI is < 35.  He currently weighs 275 lbs and will need to get to 250 lbs to be at a BMI < 35.     - will stop ramipril with lower GFR  - He should continue to avoid NSAIDs       2. Hypertension: He has not been checking BP at home, previously slightly above goal of <130/80 though we have no recent home measurements.  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  Last PTH mildely elevated at 308 (July, 2021). Current PTH ~800, Vit D 7  - Continue cholecalciferol at 2000 units daily  - No need for phos binders yet, start low phos diet.   - start calcitriol 0.25 mcg daily   - Obtain routine CKD labs     4. Low bicarb: Bicarb low at 14.  Presumed non-AG metabolic acidosis due to CKD.  He has not been taking sodium bicarb as as prescribed, is taking 4 tabs daily instead of BID. Forgets the evening dose  -encourage sodium bicarb tabs (650 mg) 4 tabs BID for now  -obtain routine CKD labs     5. Anemia: Mild and due to CKD. Hgb has been stable in the 12s, recently 11.5. Iron stores 31%.   -no need for iron or EPO at this time      6. Seasonal Allergies: Patient dose not complain of persistent seasonal allergy related symptoms today. He feels that this is being well managed with Claritin. Instructed him that it is ok to take it daily for a short period of time if needed.  Otherwise, we discussed prescribing Singulair in the future if he continues to be symptomatic.     7. Obesity: Significant increase in weight during 2018. Patient and his mother have inquired about the possible effect weight will have on a possible kidney transplant. I discussed the implications obesity has on chronic kidney disease, blood pressure control, and heart disease. Goal BMI for kidney transplant should be less than 35. As such his weight should be < 250 pounds.    -he is not interested in f/u with our weight loss management clinic     8. Urology: No active issues. Followed by Dr. Melgar    Follow-up: RTC in 1 month with me and in March as planned with Dr. Diaz.     Reason For Visit:   CKD follow-up.     HPI:   Boogie Villa is a 22 year old man with a  "history of posterior urethral valves with correction in the first year of life.  Recurrent episodes of urinary tract infection. Patient was previously followed by Dr. Palomo in pediatric nephrology for chronic kidney disease related to posterior ureteral valves. The only concern was medication compliance and he and his mother previously reported very partial (3-4 days a week) compliance in the past though has not been an issue over the past year.      Interval history:  The patient is doing well overall. He no longer has a need for self catheterizations.  He reports that when he catheterizes himself he does not get much urine. He is expected to f/u with Urology for more urodynamic testing.  He continues to take his medications regularly except for missing second dose of sodium bicarb.  He has not measured his home BP recently. He denies dyspnea, edema, and urinary retention.  He has attended the \"Kidney Smart\" class but is unsure which dialysis modality he would like if needed.  He appears to be an appropriate candidate for kidney transplantation and is now inactive on the list until his BMI is < 35.  He reports that he has lost 2 pounds but will be more aggressive in trying to reduce his BMI.  He currently weighs ~275 pounds.  He is now on Ritalin 20 mg once a day as needed for ADHD.  He will have a blood pressure checked at a local clinic a few times this week. He otherwise feels well and denies uremic symptoms. No LE edema, no shortness of breath. Has a good appetite, occasional nausea if he eats poorly before bed. He has no n/v/d. Denies pruritis.       Review of Systems:   Pertinent items are noted in HPI or as below, remainder of complete ROS is negative.      Active Medications:     Current Outpatient Medications:      amLODIPine (NORVASC) 5 MG tablet, Take 1 tablet (5 mg) by mouth daily, Disp: 90 tablet, Rfl: 3     atenolol (TENORMIN) 25 MG tablet, Take 1.5 tablets (37.5 mg) by mouth daily, Disp: 135 tablet, " Rfl: 3     calcium carbonate (OSCAL 500) 1250 (500 Ca) MG TABS tablet, daily , Disp: , Rfl:      Cholecalciferol (VITAMIN D) 50 MCG (2000 UT) CAPS, Take 2,000 unit marking on an U-100 insulin syringe by mouth daily, Disp: 90 capsule, Rfl: 11     methylphenidate (RITALIN) 10 MG tablet, Take 25 mg by mouth, Disp: , Rfl:      ramipril (ALTACE) 1.25 MG capsule, Take 2 capsules (2.5 mg) by mouth daily, Disp: 60 capsule, Rfl: 11     Saccharomyces boulardii (PROBIOTIC) 250 MG CAPS, 1 capsule daily, Disp: , Rfl:      sodium bicarbonate 650 MG tablet, Take 4 tablets (2,600 mg) by mouth 2 times daily, Disp: 180 tablet, Rfl: 23     Allergies:   Dust mites, Mold, and Other [seasonal allergies]      Past Medical History:  Attention deficit disorder    Posterior urethral valves   Lymphangioma  Hypertension  Chronic kidney disease stage 4, GFR 15-29 ml/min (H)    Past Surgical History:  ablation of posterior urethral valves - 2000  ureteral reimplantation with tapering - 2003    Family History:   Reviewed and noncontributory.       Social History:   Presents to clinic alone.  Tobacco Use: Never smoker.  Alcohol Use: Not on file.  PCP: VALENTIN MEEKS    Physical Exam:  Not performed as encounter was a telephone visit.     Laboratory:  [facility]  Recent Labs   Lab Test 07/16/21  1650 03/29/21  1333 12/16/20  1611 12/16/20  0000 10/19/20  1614 10/19/20  0000 08/18/20  1735 08/18/20  0000 02/26/20  1520 02/03/20  1451 01/31/20  1154 05/13/19  0712 12/04/18  1054 06/08/18  1256    140 139 139 137   < > 138   < > 140   < >  --    < > 142 148*   POTASSIUM 4.5 4.0 5.0 5.0 4.8   < > 4.6   < > 4.4   < >  --    < > 4.6 4.8   CHLORIDE 116* 114* 114* 114 114*   < > 114*   < > 112*   < >  --    < > 116* 121*   CO2 14* 18* 15* 15 11*   < > 15*   < > 21   < >  --    < > 17* 19*   ANIONGAP 8 9 10 10 12   < > 9   < > 7   < >  --    < > 9 8    90 89 89 99   < > 162*   < > 105*   < >  --    < > 100* 87   BUN 52* 62* 75* 75 77*   < > 50*   < >  56*   < >  --    < > 56* 47*   CR 5.08* 4.48* 5.27* 5.27 5.42*  5.56*   < > 4.45*   < > 3.58*   < >  --    < > 3.33* 2.51*   GFRESTIMATED 15* 17* 14*  --  14*  13*  --  17*   < > 23*   < >  --    < > 24* 34*   GFRESTBLACK  --  20* 17*  --  16*  16*  --  21*  --  27*   < >  --    < > 29* 41*   NABIL 8.5 8.9 9.1 9.1 9.3   < > 9.0   < > 8.2*   < >  --    < > 8.8* 8.5*   MAG  --   --   --   --   --   --   --   --  1.7  --  2.0  --  1.9 1.6   PHOS 5.2*  --  4.6* 4.6 4.8*   < > 3.8   < > 3.9   < >  --    < > 4.7* 4.6   PROTTOTAL  --  7.1  --   --  7.4  --   --   --   --   --   --   --  6.9 6.4*   ALBUMIN 3.0* 3.2* 3.6 3.6 3.8  3.7   < > 3.3*   < > 3.3*   < >  --    < > 3.1* 2.8*   BILITOTAL  --  0.2  --   --  0.4  --   --   --   --   --   --   --  0.2 0.2   ALKPHOS  --  73  --   --  72  --   --   --   --   --   --   --  87 74   AST  --  11  --   --  16  --   --   --   --   --   --   --  20 20   ALT  --  28  --   --  22  --   --   --   --   --   --   --  24 19    < > = values in this interval not displayed.     CBC  Recent Labs   Lab Test 07/16/21  1650 03/29/21  1333 12/16/20  1611 12/16/20  0000 10/19/20  1614 10/19/20  0000   HGB 12.5* 12.4* 12.9* 12.9* 13.2* 13.2*   WBC 7.4 6.6  --   --  7.1 7.1   RBC 4.18* 4.14*  --   --  4.32* 4.32   HCT 37.8* 37.9*  --   --  41.6 41.6   MCV 90 92  --   --  96 96   MCH 29.9 30.0  --   --  30.6 30.6   MCHC 33.1 32.7  --   --  31.7* 31.7   RDW 12.9 12.9  --   --  12.5 12.5    240  --   --  256 256     URINE STUDIES  Recent Labs   Lab Test 03/29/21  1404 12/03/20  1525 10/19/20  1645 05/13/19  0728 12/04/18  1056   COLOR Straw Yellow Yellow Straw Straw   APPEARANCE Clear Clear Clear Clear Clear   URINEGLC 50* 100* 50 mg/dL* 50* Negative   URINEBILI Negative Negative Negative Negative Negative   URINEKETONE Negative Negative Negative Negative Negative   SG 1.008 1.025 1.005 1.008 1.006   UBLD Negative Small* Small* Negative Trace*   URINEPH 6.0 7.0 6.0 7.0 6.5   PROTEIN >499*  >300* >=500 mg/dL* >499* 100*   UROBILINOGEN  --  0.2 <2.0 E.U./dL  --   --    NITRITE Negative Negative Negative Negative Negative   LEUKEST Negative Negative Negative Negative Negative   RBCU 0  --  0-2 <1 <1   WBCU <1  --  0-5 <1 1     Recent Labs   Lab Test 02/26/20  1530 11/25/19  0904 05/13/19  0728 12/04/18  1056 06/08/18  1310 02/10/17  1004 08/26/16  1150 02/19/16  1000   UTPG 4.31* 4.92* 4.31* 4.59* 6.69* 2.78* 2.45* 2.17*     PTH  Recent Labs   Lab Test 07/16/21  1650 12/16/20  1611 12/16/20  0000 10/19/20  1614 02/26/20  1520 11/25/19  0859 05/13/19  0712 12/04/18  1054 06/08/18  1256 01/16/18  1102 08/09/17  1626 02/10/17  1002 08/26/16  1245 02/19/16  0955   PTHI 308* 94* 94 131* 201* 154* 110* 177* 96* 133* 66 39 47 42     IRON STUDIES   Recent Labs   Lab Test 02/26/20  1520 05/13/19  0712 06/08/18  1256 01/16/18  1102 08/09/17  1626 02/10/17  1002   IRON 66 71 87 116 93 54    269 257 290 284 297   IRONSAT 26 26 34 40 33 18   DAVID 133 109 82 86 100 113                All above labs reviewed by me.     OLIVA MEEKS PADarleenC     Visit length 14 minutes. An additional 25 minutes were spent on date of service in chart review, documentation, and other activities as noted.

## 2022-11-15 NOTE — PROGRESS NOTES
Boogie is a 22 year old who is being evaluated via a billable video visit.      How would you like to obtain your AVS? [email or web address]  If the video visit is dropped, the invitation should be resent by: Text to cell phone: [phone]  Will anyone else be joining your video visit? No        Video-Visit Details    Video Start Time: 3:18 pm    Type of service:  Video Visit    Video End Time: 3:34 pm    Originating Location (pt. Location): Home    Distant Location (provider location):  Offsite    Platform used for Video Visit: Sulaiman

## 2022-11-21 ENCOUNTER — PATIENT OUTREACH (OUTPATIENT)
Dept: NEPHROLOGY | Facility: CLINIC | Age: 22
End: 2022-11-21

## 2022-11-21 DIAGNOSIS — I12.9 HYPERTENSION, RENAL: ICD-10-CM

## 2022-11-21 DIAGNOSIS — N18.5 CKD (CHRONIC KIDNEY DISEASE) STAGE 5, GFR LESS THAN 15 ML/MIN (H): ICD-10-CM

## 2022-11-21 DIAGNOSIS — E21.3 HYPERPARATHYROIDISM (H): Primary | ICD-10-CM

## 2022-11-21 RX ORDER — AMLODIPINE BESYLATE 5 MG/1
5 TABLET ORAL DAILY
Qty: 90 TABLET | Refills: 3 | Status: ON HOLD | OUTPATIENT
Start: 2022-11-21 | End: 2024-01-05

## 2022-11-21 RX ORDER — CALCITRIOL 0.25 UG/1
0.25 CAPSULE, LIQUID FILLED ORAL DAILY
Qty: 90 CAPSULE | Refills: 3 | Status: ON HOLD | OUTPATIENT
Start: 2022-11-21 | End: 2024-01-05

## 2022-11-21 NOTE — PROGRESS NOTES
Neph Tracking Flowsheet Last Filled Values     CKD Education Status Complete    CKD Education Type Kidney Smart Modality Education; Kidney Smart CKD Basics    Preferred Modality Peritoneal Dialysis    Patient's Referral Dates Auto Populate Patient's Referral Dates    Dietician Referral 3/29/21    Transplant Evaluation Referral 10/22/20        Spoke to Boogie. He has not had transportation to get any BP's done at clinic this week. Writer will check in with him next week on this. We talked a little about dialysis modalities and why it is important to have something in mind in case he does not get a transplant right away. He seemed to understand this and asked a few questions. In end, he states he would want PD at home.  Will check with Dayana on calcitriol medication as it was not ordered but indicated in notes.     Writer will order 0.25mcg calcitriol daily per Dayana Jim.    Alvarado Hyman.        ---------------------------------------------------------------------------------------------------------------------------------------------------------------------------    12/2- Spoke to Boogie. He has appointments set up to go get his BP's checked in clinic next week on Monday and Wednesday. Writer states she will call back after those appointments next week. Boogie verbalized understanding.     ===============================================================================================    12/8- Spoke to Boogie. He reports that he was still waiting on his mail order for his BP meds since they were prescribed on 11/15 (writer was not aware of this). He looked at tracking while on the phone and noted that it is supposed to be delivered today. He will start the medications and try to get into clinic a couple times to get BP checked. Writer will check back next Thursday with him. He verbalized  understanding.    ================================================================================================  12/15- Boogie has now started his BP med but has not found transportation to get his BP checked. He will call writer when he has gotten into clinic a couple of times to get BP's checked and report those numbers.

## 2022-12-26 DIAGNOSIS — N18.5 CKD (CHRONIC KIDNEY DISEASE) STAGE 5, GFR LESS THAN 15 ML/MIN (H): ICD-10-CM

## 2022-12-26 DIAGNOSIS — I12.9 HYPERTENSION, RENAL: ICD-10-CM

## 2022-12-27 ENCOUNTER — MYC MEDICAL ADVICE (OUTPATIENT)
Dept: NEPHROLOGY | Facility: CLINIC | Age: 22
End: 2022-12-27

## 2022-12-27 DIAGNOSIS — I12.9 HYPERTENSION, RENAL: Primary | ICD-10-CM

## 2022-12-28 RX ORDER — ATENOLOL 25 MG/1
TABLET ORAL
Qty: 135 TABLET | Refills: 3 | Status: SHIPPED | OUTPATIENT
Start: 2022-12-28 | End: 2023-01-03

## 2022-12-29 ENCOUNTER — TELEPHONE (OUTPATIENT)
Dept: TRANSPLANT | Facility: CLINIC | Age: 22
End: 2022-12-29

## 2022-12-29 DIAGNOSIS — N18.9 CHRONIC KIDNEY DISEASE (CKD): ICD-10-CM

## 2022-12-29 DIAGNOSIS — Z76.82 ORGAN TRANSPLANT CANDIDATE: ICD-10-CM

## 2022-12-29 NOTE — TELEPHONE ENCOUNTER
Received a call from pt's mom with pt on the line. Wanting info on how to sign up for medicare. Provided contact info for SW and . Also reviewed pt BMI < 40, recently updated BMI guidelines for kidney alone transplant. Pt is interested in coming in for an updated visit with the surgeon to review BMI. Orders placed. Pt aware to keep an eye on his email for scheduling. Pt verbalized understanding of information and has no further questions. Encouraged to reach out if questions arise.

## 2023-01-02 NOTE — PROGRESS NOTES
Nephrology Clinic   1/3/2023    Name: Boogie Villa  MRN: [id number]  Age: 22 year old  : 2000  DOS: 2022    Referring provider: Edwin Palomo     Assessment and Plan:   1. 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  Baseline serum creatinine has significantly changed over the past year as kidney function has declined more rapidly.  Last serum Cr ~6  (eGFR of 12 ml/min) associated with significant albuminuria of ~7 g/g, up from 4 g last check (on low dose ramipril).  Decline seems more rapid than expected over the year and may be due to decreased self catheterization though he is adamant he is able to volitionally void and has had no urinary retention.   - Patient denies uremic symptoms and has no obvious need for dialysis,  though may likely need it in coming months  - Patient is asymptomatic and has no urgent need for dialysis  - Discussed RRT modalities and access planning but patient remains unsure about modality and would like to hold off on access placement. He is hesitant about starting dialysis  -Ideally, patient could undergo preemptive kidney transplantation and is now inactive on the kidney transplant waiting list until his BMI is < 35.  He recently weighed 275 lbs and will need to get to 250 lbs to be at a BMI < 35, though requirements may have changed to BMI of 40- has appt with Dr. Gold later this month.  - stopped ramipril last visit with lower GFR  - He should continue to avoid NSAIDs       2. Hypertension: He has not been checking BP at home, previously slightly above goal of <130/80 though we have no recent home measurements.  - Current regimen: atenolol 37.5 mg, amlodipine 5 mg and ramipril 2.5 mg daily  - Stopped ramipril last visit  - Monitor home blood pressure and if persistently above goal will likely increase amlodipine to 10 mg next     3.  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  Last PTH mildely elevated at 308 (July, 2021). Current PTH ~800, Vit D 7  - Continue cholecalciferol at 2000 units daily  - No need for phos binders yet, start low phos diet.   - start calcitriol 0.25 mcg daily   - heck PTH and Vit D next visit     4. Low bicarb: Bicarb low at 14.  Presumed non-AG metabolic acidosis due to CKD.  He has not been taking sodium bicarb as as prescribed, is taking 4 tabs daily instead of BID. Forgets the evening dose  -encourage sodium bicarb tabs (650 mg) 4 tabs BID for now, rx renewed  -obtain routine CKD labs     5. Anemia: Mild and due to CKD. Hgb has been stable in the 12s, recently 11.5. Iron stores 31%.   -no need for iron or EPO at this time      6. Seasonal Allergies: Patient dose not complain of persistent seasonal allergy related symptoms today. He feels that this is being well managed with Claritin. Instructed him that it is ok to take it daily for a short period of time if needed.  Otherwise, we discussed prescribing Singulair in the future if he continues to be symptomatic.     7. Obesity: Significant increase in weight during 2018. Patient and his mother have inquired about the possible effect weight will have on a possible kidney transplant. I discussed the implications obesity has on chronic kidney disease, blood pressure control, and heart disease. Goal BMI for kidney transplant should be less than 35. As such his weight should be < 250 pounds.    -he is not interested in f/u with our weight loss management clinic     8. Urology: No active issues. Followed by Dr. Melgar    Follow-up: RTC in 1 month with me and in March as planned with Dr. Diaz.     Reason For Visit:   CKD follow-up.     HPI:   Boogie Villa is a 22 year old man with a history of posterior urethral valves with correction in the first year of life.  Recurrent episodes of urinary tract infection. Patient was previously followed by Dr. Palomo in  "pediatric nephrology for chronic kidney disease related to posterior ureteral valves. The only concern was medication compliance and he and his mother previously reported very partial (3-4 days a week) compliance in the past though has not been an issue over the past year.      Interval history:  The patient is doing well overall. He no longer has a need for self catheterizations.  He reports that when he catheterizes himself he does not get much urine. He is expected to f/u with Urology for more urodynamic testing.  He continues to take his medications regularly except for missing second dose of sodium bicarb.  He has not measured his home BP recently. He denies dyspnea, edema, and urinary retention.     He has attended the \"Kidney Smart\" class and feels he would prefer PD if dialysis is needed.  He appears to be an appropriate candidate for kidney transplantation and is now inactive on the list until his BMI is < 35.  He reports that he has lost 2 pounds but will be more aggressive in trying to reduce his BMI.  He has not been weighing himself but recently weighed ~275 pounds. He is scheduled to see Dr. Gold later this month and may be considered for active list due to change in BMI requirements.     He is now on Ritalin 20 mg once a day as needed for ADHD.  He will have a blood pressure checked at a local clinic a few times this week and write down. He otherwise feels well and denies uremic symptoms. No LE edema, no shortness of breath. Has a good appetite, occasional nausea if he eats poorly before bed. He has no n/v/d. Denies pruritis.       He does note a fever / chills this morning, no associated symptoms. Taking tylenol which helps.     Review of Systems:   Pertinent items are noted in HPI or as below, remainder of complete ROS is negative.      Active Medications:     Current Outpatient Medications:      amLODIPine (NORVASC) 5 MG tablet, Take 1 tablet (5 mg) by mouth daily, Disp: 90 tablet, Rfl: 3     " atenolol (TENORMIN) 25 MG tablet, TAKE ONE AND ONE-HALF TABLETS DAILY, Disp: 135 tablet, Rfl: 3     calcitRIOL (ROCALTROL) 0.25 MCG capsule, Take 1 capsule (0.25 mcg) by mouth daily, Disp: 90 capsule, Rfl: 3     calcium carbonate (OSCAL 500) 1250 (500 Ca) MG TABS tablet, daily , Disp: , Rfl:      Cholecalciferol (VITAMIN D) 50 MCG (2000 UT) CAPS, Take 2,000 unit marking on an U-100 insulin syringe by mouth daily, Disp: 90 capsule, Rfl: 11     methylphenidate (RITALIN) 10 MG tablet, Take 25 mg by mouth, Disp: , Rfl:      ramipril (ALTACE) 1.25 MG capsule, Take 2 capsules (2.5 mg) by mouth daily, Disp: 60 capsule, Rfl: 11     Saccharomyces boulardii (PROBIOTIC) 250 MG CAPS, 1 capsule daily, Disp: , Rfl:      sodium bicarbonate 650 MG tablet, Take 4 tablets (2,600 mg) by mouth 2 times daily, Disp: 180 tablet, Rfl: 23     Allergies:   Dust mites, Mold, and Other [seasonal allergies]      Past Medical History:  Attention deficit disorder    Posterior urethral valves   Lymphangioma  Hypertension  Chronic kidney disease stage 4, GFR 15-29 ml/min (H)    Past Surgical History:  ablation of posterior urethral valves - 2000  ureteral reimplantation with tapering - 2003    Family History:   Reviewed and noncontributory.       Social History:   Presents to clinic alone.  Tobacco Use: Never smoker.  Alcohol Use: Not on file.  PCP: VALENTIN MEEKS    Physical Exam:  Not performed as encounter was a telephone visit.     Laboratory:  CMP  Recent Labs   Lab Test 07/16/21  1650 03/29/21  1333 12/16/20  1611 12/16/20  0000 10/19/20  1614 10/19/20  0000 08/18/20  1735 08/18/20  0000 02/26/20  1520 02/03/20  1451 01/31/20  1154 05/13/19  0712 12/04/18  1054 06/08/18  1256    140 139 139 137   < > 138   < > 140   < >  --    < > 142 148*   POTASSIUM 4.5 4.0 5.0 5.0 4.8   < > 4.6   < > 4.4   < >  --    < > 4.6 4.8   CHLORIDE 116* 114* 114* 114 114*   < > 114*   < > 112*   < >  --    < > 116* 121*   CO2 14* 18* 15* 15 11*   < > 15*   <  > 21   < >  --    < > 17* 19*   ANIONGAP 8 9 10 10 12   < > 9   < > 7   < >  --    < > 9 8    90 89 89 99   < > 162*   < > 105*   < >  --    < > 100* 87   BUN 52* 62* 75* 75 77*   < > 50*   < > 56*   < >  --    < > 56* 47*   CR 5.08* 4.48* 5.27* 5.27 5.42*  5.56*   < > 4.45*   < > 3.58*   < >  --    < > 3.33* 2.51*   GFRESTIMATED 15* 17* 14*  --  14*  13*  --  17*   < > 23*   < >  --    < > 24* 34*   GFRESTBLACK  --  20* 17*  --  16*  16*  --  21*  --  27*   < >  --    < > 29* 41*   NABIL 8.5 8.9 9.1 9.1 9.3   < > 9.0   < > 8.2*   < >  --    < > 8.8* 8.5*   MAG  --   --   --   --   --   --   --   --  1.7  --  2.0  --  1.9 1.6   PHOS 5.2*  --  4.6* 4.6 4.8*   < > 3.8   < > 3.9   < >  --    < > 4.7* 4.6   PROTTOTAL  --  7.1  --   --  7.4  --   --   --   --   --   --   --  6.9 6.4*   ALBUMIN 3.0* 3.2* 3.6 3.6 3.8  3.7   < > 3.3*   < > 3.3*   < >  --    < > 3.1* 2.8*   BILITOTAL  --  0.2  --   --  0.4  --   --   --   --   --   --   --  0.2 0.2   ALKPHOS  --  73  --   --  72  --   --   --   --   --   --   --  87 74   AST  --  11  --   --  16  --   --   --   --   --   --   --  20 20   ALT  --  28  --   --  22  --   --   --   --   --   --   --  24 19    < > = values in this interval not displayed.     CBC  Recent Labs   Lab Test 07/16/21  1650 03/29/21  1333 12/16/20  1611 12/16/20  0000 10/19/20  1614 10/19/20  0000   HGB 12.5* 12.4* 12.9* 12.9* 13.2* 13.2*   WBC 7.4 6.6  --   --  7.1 7.1   RBC 4.18* 4.14*  --   --  4.32* 4.32   HCT 37.8* 37.9*  --   --  41.6 41.6   MCV 90 92  --   --  96 96   MCH 29.9 30.0  --   --  30.6 30.6   MCHC 33.1 32.7  --   --  31.7* 31.7   RDW 12.9 12.9  --   --  12.5 12.5    240  --   --  256 256     URINE STUDIES  Recent Labs   Lab Test 03/29/21  1404 12/03/20  1525 10/19/20  1645 05/13/19  0728 12/04/18  1056   COLOR Straw Yellow Yellow Straw Straw   APPEARANCE Clear Clear Clear Clear Clear   URINEGLC 50* 100* 50 mg/dL* 50* Negative   URINEBILI Negative Negative Negative  Negative Negative   URINEKETONE Negative Negative Negative Negative Negative   SG 1.008 1.025 1.005 1.008 1.006   UBLD Negative Small* Small* Negative Trace*   URINEPH 6.0 7.0 6.0 7.0 6.5   PROTEIN >499* >300* >=500 mg/dL* >499* 100*   UROBILINOGEN  --  0.2 <2.0 E.U./dL  --   --    NITRITE Negative Negative Negative Negative Negative   LEUKEST Negative Negative Negative Negative Negative   RBCU 0  --  0-2 <1 <1   WBCU <1  --  0-5 <1 1     Recent Labs   Lab Test 02/26/20  1530 11/25/19  0904 05/13/19  0728 12/04/18  1056 06/08/18  1310 02/10/17  1004 08/26/16  1150 02/19/16  1000   UTPG 4.31* 4.92* 4.31* 4.59* 6.69* 2.78* 2.45* 2.17*     PTH  Recent Labs   Lab Test 07/16/21  1650 12/16/20  1611 12/16/20  0000 10/19/20  1614 02/26/20  1520 11/25/19  0859 05/13/19  0712 12/04/18  1054 06/08/18  1256 01/16/18  1102 08/09/17  1626 02/10/17  1002 08/26/16  1245 02/19/16  0955   PTHI 308* 94* 94 131* 201* 154* 110* 177* 96* 133* 66 39 47 42     IRON STUDIES   Recent Labs   Lab Test 02/26/20  1520 05/13/19  0712 06/08/18  1256 01/16/18  1102 08/09/17  1626 02/10/17  1002   IRON 66 71 87 116 93 54    269 257 290 284 297   IRONSAT 26 26 34 40 33 18   DAVID 133 109 82 86 100 113                   All above labs reviewed by me.     OLIVA MEEKS PADarleenC     Visit length 11 minutes. An additional 20 minutes were spent on date of service in chart review, documentation, and other activities as noted.

## 2023-01-03 ENCOUNTER — VIRTUAL VISIT (OUTPATIENT)
Dept: NEPHROLOGY | Facility: CLINIC | Age: 23
End: 2023-01-03
Attending: PHYSICIAN ASSISTANT
Payer: COMMERCIAL

## 2023-01-03 ENCOUNTER — DOCUMENTATION ONLY (OUTPATIENT)
Dept: NEPHROLOGY | Facility: CLINIC | Age: 23
End: 2023-01-03

## 2023-01-03 DIAGNOSIS — N18.5 CKD (CHRONIC KIDNEY DISEASE) STAGE 5, GFR LESS THAN 15 ML/MIN (H): ICD-10-CM

## 2023-01-03 DIAGNOSIS — I12.9 HYPERTENSION, RENAL: ICD-10-CM

## 2023-01-03 DIAGNOSIS — E21.3 HYPERPARATHYROIDISM (H): Primary | ICD-10-CM

## 2023-01-03 DIAGNOSIS — E87.20 METABOLIC ACIDOSIS: ICD-10-CM

## 2023-01-03 PROCEDURE — 99214 OFFICE O/P EST MOD 30 MIN: CPT | Mod: GT | Performed by: PHYSICIAN ASSISTANT

## 2023-01-03 RX ORDER — ATENOLOL 25 MG/1
37.5 TABLET ORAL DAILY
Qty: 135 TABLET | Refills: 3 | Status: ON HOLD | OUTPATIENT
Start: 2023-01-03 | End: 2024-01-05

## 2023-01-03 RX ORDER — SODIUM BICARBONATE 650 MG/1
2600 TABLET ORAL 2 TIMES DAILY
Qty: 360 TABLET | Refills: 3 | Status: SHIPPED | OUTPATIENT
Start: 2023-01-03 | End: 2023-02-17

## 2023-01-03 NOTE — PROGRESS NOTES
Boogie is a 22 year old who is being evaluated via a billable video visit.      How would you like to obtain your AVS? [employer]  If the video visit is dropped, the invitation should be resent by: Text to cell phone: [phone]  Will anyone else be joining your video visit? No        Video-Visit Details    Type of service:  Video Visit   Start time: 12:31 pm  End time: 12:42 pm    Originating Location (pt. Location): Home    Distant Location (provider location):  Offsite  Platform used for Video Visit: Sulaiman

## 2023-01-03 NOTE — LETTER
1/3/2023      RE: Boogie Villa  [address]  [facility] [address]         Nephrology Clinic   1/3/2023    Name: Boogie Villa  MRN: [id number]  Age: 22 year old  : 2000  DOS: 2022    Referring provider: Edwin Palomo     Assessment and Plan:   1. 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  Baseline serum creatinine has significantly changed over the past year as kidney function has declined more rapidly.  Last serum Cr ~6  (eGFR of 12 ml/min) associated with significant albuminuria of ~7 g/g, up from 4 g last check (on low dose ramipril).  Decline seems more rapid than expected over the year and may be due to decreased self catheterization though he is adamant he is able to volitionally void and has had no urinary retention.   - Patient denies uremic symptoms and has no obvious need for dialysis,  though may likely need it in coming months  - Patient is asymptomatic and has no urgent need for dialysis  - Discussed RRT modalities and access planning but patient remains unsure about modality and would like to hold off on access placement. He is hesitant about starting dialysis  -Ideally, patient could undergo preemptive kidney transplantation and is now inactive on the kidney transplant waiting list until his BMI is < 35.  He recently weighed 275 lbs and will need to get to 250 lbs to be at a BMI < 35, though requirements may have changed to BMI of 40- has appt with Dr. Gold later this month.  - stopped ramipril last visit with lower GFR  - He should continue to avoid NSAIDs       2. Hypertension: He has not been checking BP at home, previously slightly above goal of <130/80 though we have no recent home measurements.  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  Last PTH mildely elevated at 308 (July, 2021). Current PTH ~800, Vit D 7  - Continue cholecalciferol at 2000 units daily  - No need for phos binders yet, start low phos diet.   - start calcitriol 0.25 mcg daily   - heck PTH and Vit D next visit     4. Low bicarb: Bicarb low at 14.  Presumed non-AG metabolic acidosis due to CKD.  He has not been taking sodium bicarb as as prescribed, is taking 4 tabs daily instead of BID. Forgets the evening dose  -encourage sodium bicarb tabs (650 mg) 4 tabs BID for now, rx renewed  -obtain routine CKD labs     5. Anemia: Mild and due to CKD. Hgb has been stable in the 12s, recently 11.5. Iron stores 31%.   -no need for iron or EPO at this time      6. Seasonal Allergies: Patient dose not complain of persistent seasonal allergy related symptoms today. He feels that this is being well managed with Claritin. Instructed him that it is ok to take it daily for a short period of time if needed.  Otherwise, we discussed prescribing Singulair in the future if he continues to be symptomatic.     7. Obesity: Significant increase in weight during 2018. Patient and his mother have inquired about the possible effect weight will have on a possible kidney transplant. I discussed the implications obesity has on chronic kidney disease, blood pressure control, and heart disease. Goal BMI for kidney transplant should be less than 35. As such his weight should be < 250 pounds.    -he is not interested in f/u with our weight loss management clinic     8. Urology: No active issues. Followed by Dr. Melgar    Follow-up: RTC in 1 month with me and in March as planned with Dr. Diaz.     Reason For Visit:   CKD follow-up.     HPI:   Boogie Villa is a 22 year old man with a history of posterior urethral valves with correction in the first year of life.  Recurrent episodes of  "urinary tract infection. Patient was previously followed by Dr. Palomo in pediatric nephrology for chronic kidney disease related to posterior ureteral valves. The only concern was medication compliance and he and his mother previously reported very partial (3-4 days a week) compliance in the past though has not been an issue over the past year.      Interval history:  The patient is doing well overall. He no longer has a need for self catheterizations.  He reports that when he catheterizes himself he does not get much urine. He is expected to f/u with Urology for more urodynamic testing.  He continues to take his medications regularly except for missing second dose of sodium bicarb.  He has not measured his home BP recently. He denies dyspnea, edema, and urinary retention.     He has attended the \"Kidney Smart\" class and feels he would prefer PD if dialysis is needed.  He appears to be an appropriate candidate for kidney transplantation and is now inactive on the list until his BMI is < 35.  He reports that he has lost 2 pounds but will be more aggressive in trying to reduce his BMI.  He has not been weighing himself but recently weighed ~275 pounds. He is scheduled to see Dr. Gold later this month and may be considered for active list due to change in BMI requirements.     He is now on Ritalin 20 mg once a day as needed for ADHD.  He will have a blood pressure checked at a local clinic a few times this week and write down. He otherwise feels well and denies uremic symptoms. No LE edema, no shortness of breath. Has a good appetite, occasional nausea if he eats poorly before bed. He has no n/v/d. Denies pruritis.       He does note a fever / chills this morning, no associated symptoms. Taking tylenol which helps.     Review of Systems:   Pertinent items are noted in HPI or as below, remainder of complete ROS is negative.      Active Medications:     Current Outpatient Medications:      amLODIPine (NORVASC) 5 MG " tablet, Take 1 tablet (5 mg) by mouth daily, Disp: 90 tablet, Rfl: 3     atenolol (TENORMIN) 25 MG tablet, TAKE ONE AND ONE-HALF TABLETS DAILY, Disp: 135 tablet, Rfl: 3     calcitRIOL (ROCALTROL) 0.25 MCG capsule, Take 1 capsule (0.25 mcg) by mouth daily, Disp: 90 capsule, Rfl: 3     calcium carbonate (OSCAL 500) 1250 (500 Ca) MG TABS tablet, daily , Disp: , Rfl:      Cholecalciferol (VITAMIN D) 50 MCG (2000 UT) CAPS, Take 2,000 unit marking on an U-100 insulin syringe by mouth daily, Disp: 90 capsule, Rfl: 11     methylphenidate (RITALIN) 10 MG tablet, Take 25 mg by mouth, Disp: , Rfl:      ramipril (ALTACE) 1.25 MG capsule, Take 2 capsules (2.5 mg) by mouth daily, Disp: 60 capsule, Rfl: 11     Saccharomyces boulardii (PROBIOTIC) 250 MG CAPS, 1 capsule daily, Disp: , Rfl:      sodium bicarbonate 650 MG tablet, Take 4 tablets (2,600 mg) by mouth 2 times daily, Disp: 180 tablet, Rfl: 23     Allergies:   Dust mites, Mold, and Other [seasonal allergies]      Past Medical History:  Attention deficit disorder    Posterior urethral valves   Lymphangioma  Hypertension  Chronic kidney disease stage 4, GFR 15-29 ml/min (H)    Past Surgical History:  ablation of posterior urethral valves - 2000  ureteral reimplantation with tapering - 2003    Family History:   Reviewed and noncontributory.       Social History:   Presents to clinic alone.  Tobacco Use: Never smoker.  Alcohol Use: Not on file.  PCP: VALENTIN MEEKS    Physical Exam:  Not performed as encounter was a telephone visit.     Laboratory:  [facility]  Recent Labs   Lab Test 07/16/21  1650 03/29/21  1333 12/16/20  1611 12/16/20  0000 10/19/20  1614 10/19/20  0000 08/18/20  1735 08/18/20  0000 02/26/20  1520 02/03/20  1451 01/31/20  1154 05/13/19  0712 12/04/18  1054 06/08/18  1256    140 139 139 137   < > 138   < > 140   < >  --    < > 142 148*   POTASSIUM 4.5 4.0 5.0 5.0 4.8   < > 4.6   < > 4.4   < >  --    < > 4.6 4.8   CHLORIDE 116* 114* 114* 114 114*   < > 114*   <  > 112*   < >  --    < > 116* 121*   CO2 14* 18* 15* 15 11*   < > 15*   < > 21   < >  --    < > 17* 19*   ANIONGAP 8 9 10 10 12   < > 9   < > 7   < >  --    < > 9 8    90 89 89 99   < > 162*   < > 105*   < >  --    < > 100* 87   BUN 52* 62* 75* 75 77*   < > 50*   < > 56*   < >  --    < > 56* 47*   CR 5.08* 4.48* 5.27* 5.27 5.42*  5.56*   < > 4.45*   < > 3.58*   < >  --    < > 3.33* 2.51*   GFRESTIMATED 15* 17* 14*  --  14*  13*  --  17*   < > 23*   < >  --    < > 24* 34*   GFRESTBLACK  --  20* 17*  --  16*  16*  --  21*  --  27*   < >  --    < > 29* 41*   NABIL 8.5 8.9 9.1 9.1 9.3   < > 9.0   < > 8.2*   < >  --    < > 8.8* 8.5*   MAG  --   --   --   --   --   --   --   --  1.7  --  2.0  --  1.9 1.6   PHOS 5.2*  --  4.6* 4.6 4.8*   < > 3.8   < > 3.9   < >  --    < > 4.7* 4.6   PROTTOTAL  --  7.1  --   --  7.4  --   --   --   --   --   --   --  6.9 6.4*   ALBUMIN 3.0* 3.2* 3.6 3.6 3.8  3.7   < > 3.3*   < > 3.3*   < >  --    < > 3.1* 2.8*   BILITOTAL  --  0.2  --   --  0.4  --   --   --   --   --   --   --  0.2 0.2   ALKPHOS  --  73  --   --  72  --   --   --   --   --   --   --  87 74   AST  --  11  --   --  16  --   --   --   --   --   --   --  20 20   ALT  --  28  --   --  22  --   --   --   --   --   --   --  24 19    < > = values in this interval not displayed.     CBC  Recent Labs   Lab Test 07/16/21  1650 03/29/21  1333 12/16/20  1611 12/16/20  0000 10/19/20  1614 10/19/20  0000   HGB 12.5* 12.4* 12.9* 12.9* 13.2* 13.2*   WBC 7.4 6.6  --   --  7.1 7.1   RBC 4.18* 4.14*  --   --  4.32* 4.32   HCT 37.8* 37.9*  --   --  41.6 41.6   MCV 90 92  --   --  96 96   MCH 29.9 30.0  --   --  30.6 30.6   MCHC 33.1 32.7  --   --  31.7* 31.7   RDW 12.9 12.9  --   --  12.5 12.5    240  --   --  256 256     URINE STUDIES  Recent Labs   Lab Test 03/29/21  1404 12/03/20  1525 10/19/20  1645 05/13/19  0728 12/04/18  1056   COLOR Straw Yellow Yellow Straw Straw   APPEARANCE Clear Clear Clear Clear Clear   URINEGLC 50*  100* 50 mg/dL* 50* Negative   URINEBILI Negative Negative Negative Negative Negative   URINEKETONE Negative Negative Negative Negative Negative   SG 1.008 1.025 1.005 1.008 1.006   UBLD Negative Small* Small* Negative Trace*   URINEPH 6.0 7.0 6.0 7.0 6.5   PROTEIN >499* >300* >=500 mg/dL* >499* 100*   UROBILINOGEN  --  0.2 <2.0 E.U./dL  --   --    NITRITE Negative Negative Negative Negative Negative   LEUKEST Negative Negative Negative Negative Negative   RBCU 0  --  0-2 <1 <1   WBCU <1  --  0-5 <1 1     Recent Labs   Lab Test 02/26/20  1530 11/25/19  0904 05/13/19  0728 12/04/18  1056 06/08/18  1310 02/10/17  1004 08/26/16  1150 02/19/16  1000   UTPG 4.31* 4.92* 4.31* 4.59* 6.69* 2.78* 2.45* 2.17*     PTH  Recent Labs   Lab Test 07/16/21  1650 12/16/20  1611 12/16/20  0000 10/19/20  1614 02/26/20  1520 11/25/19  0859 05/13/19  0712 12/04/18  1054 06/08/18  1256 01/16/18  1102 08/09/17  1626 02/10/17  1002 08/26/16  1245 02/19/16  0955   PTHI 308* 94* 94 131* 201* 154* 110* 177* 96* 133* 66 39 47 42     IRON STUDIES   Recent Labs   Lab Test 02/26/20  1520 05/13/19  0712 06/08/18  1256 01/16/18  1102 08/09/17  1626 02/10/17  1002   IRON 66 71 87 116 93 54    269 257 290 284 297   IRONSAT 26 26 34 40 33 18   DAVID 133 109 82 86 100 113                   All above labs reviewed by me.     OLIVA MEEKS PA-C     Visit length 11 minutes. An additional 20 minutes were spent on date of service in chart review, documentation, and other activities as noted.           OLIVA MEEKS PA-C

## 2023-01-03 NOTE — PROGRESS NOTES
Dayana Jim, Debra Briones, RN  Please fax lab orders to [facility] per patient request.     Dayana Canada     ===========================================================    Labs faxed to [facility] lab at [phone].

## 2023-01-03 NOTE — LETTER
1/3/2023       RE: Boogie Villa  [address]  [facility] [place] [address]     Dear Colleague,    Thank you for referring your patient, Boogie Villa, to the [facility] [place] at [facility]. Please see a copy of my visit note below.      Nephrology Clinic   1/3/2023    Name: Boogie Villa  MRN: [id number]  Age: 22 year old  : 2000  DOS: 2022    Referring provider: Edwin Palomo     Assessment and Plan:   1. 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  Baseline serum creatinine has significantly changed over the past year as kidney function has declined more rapidly.  Last serum Cr ~6  (eGFR of 12 ml/min) associated with significant albuminuria of ~7 g/g, up from 4 g last check (on low dose ramipril).  Decline seems more rapid than expected over the year and may be due to decreased self catheterization though he is adamant he is able to volitionally void and has had no urinary retention.   - Patient denies uremic symptoms and has no obvious need for dialysis,  though may likely need it in coming months  - Patient is asymptomatic and has no urgent need for dialysis  - Discussed RRT modalities and access planning but patient remains unsure about modality and would like to hold off on access placement. He is hesitant about starting dialysis  -Ideally, patient could undergo preemptive kidney transplantation and is now inactive on the kidney transplant waiting list until his BMI is < 35.  He recently weighed 275 lbs and will need to get to 250 lbs to be at a BMI < 35, though requirements may have changed to BMI of 40- has appt with Dr. Gold later this month.  - stopped ramipril last visit with lower GFR  - He should continue to avoid NSAIDs       2. Hypertension: He has not been checking BP at home,  previously slightly above goal of <130/80 though we have no recent home measurements.  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  Last PTH mildely elevated at 308 (July, 2021). Current PTH ~800, Vit D 7  - Continue cholecalciferol at 2000 units daily  - No need for phos binders yet, start low phos diet.   - start calcitriol 0.25 mcg daily   - heck PTH and Vit D next visit     4. Low bicarb: Bicarb low at 14.  Presumed non-AG metabolic acidosis due to CKD.  He has not been taking sodium bicarb as as prescribed, is taking 4 tabs daily instead of BID. Forgets the evening dose  -encourage sodium bicarb tabs (650 mg) 4 tabs BID for now, rx renewed  -obtain routine CKD labs     5. Anemia: Mild and due to CKD. Hgb has been stable in the 12s, recently 11.5. Iron stores 31%.   -no need for iron or EPO at this time      6. Seasonal Allergies: Patient dose not complain of persistent seasonal allergy related symptoms today. He feels that this is being well managed with Claritin. Instructed him that it is ok to take it daily for a short period of time if needed.  Otherwise, we discussed prescribing Singulair in the future if he continues to be symptomatic.     7. Obesity: Significant increase in weight during 2018. Patient and his mother have inquired about the possible effect weight will have on a possible kidney transplant. I discussed the implications obesity has on chronic kidney disease, blood pressure control, and heart disease. Goal BMI for kidney transplant should be less than 35. As such his weight should be < 250 pounds.    -he is not interested in f/u with our weight loss management clinic     8. Urology: No active issues. Followed by Dr. Melgar    Follow-up: RTC in 1 month with me and in  "March as planned with Dr. Diaz.     Reason For Visit:   CKD follow-up.     HPI:   Boogei Villa is a 22 year old man with a history of posterior urethral valves with correction in the first year of life.  Recurrent episodes of urinary tract infection. Patient was previously followed by Dr. Palomo in pediatric nephrology for chronic kidney disease related to posterior ureteral valves. The only concern was medication compliance and he and his mother previously reported very partial (3-4 days a week) compliance in the past though has not been an issue over the past year.      Interval history:  The patient is doing well overall. He no longer has a need for self catheterizations.  He reports that when he catheterizes himself he does not get much urine. He is expected to f/u with Urology for more urodynamic testing.  He continues to take his medications regularly except for missing second dose of sodium bicarb.  He has not measured his home BP recently. He denies dyspnea, edema, and urinary retention.     He has attended the \"Kidney Smart\" class and feels he would prefer PD if dialysis is needed.  He appears to be an appropriate candidate for kidney transplantation and is now inactive on the list until his BMI is < 35.  He reports that he has lost 2 pounds but will be more aggressive in trying to reduce his BMI.  He has not been weighing himself but recently weighed ~275 pounds. He is scheduled to see Dr. Gold later this month and may be considered for active list due to change in BMI requirements.     He is now on Ritalin 20 mg once a day as needed for ADHD.  He will have a blood pressure checked at a local clinic a few times this week and write down. He otherwise feels well and denies uremic symptoms. No LE edema, no shortness of breath. Has a good appetite, occasional nausea if he eats poorly before bed. He has no n/v/d. Denies pruritis.       He does note a fever / chills this morning, no associated " symptoms. Taking tylenol which helps.     Review of Systems:   Pertinent items are noted in HPI or as below, remainder of complete ROS is negative.      Active Medications:     Current Outpatient Medications:      amLODIPine (NORVASC) 5 MG tablet, Take 1 tablet (5 mg) by mouth daily, Disp: 90 tablet, Rfl: 3     atenolol (TENORMIN) 25 MG tablet, TAKE ONE AND ONE-HALF TABLETS DAILY, Disp: 135 tablet, Rfl: 3     calcitRIOL (ROCALTROL) 0.25 MCG capsule, Take 1 capsule (0.25 mcg) by mouth daily, Disp: 90 capsule, Rfl: 3     calcium carbonate (OSCAL 500) 1250 (500 Ca) MG TABS tablet, daily , Disp: , Rfl:      Cholecalciferol (VITAMIN D) 50 MCG (2000 UT) CAPS, Take 2,000 unit marking on an U-100 insulin syringe by mouth daily, Disp: 90 capsule, Rfl: 11     methylphenidate (RITALIN) 10 MG tablet, Take 25 mg by mouth, Disp: , Rfl:      ramipril (ALTACE) 1.25 MG capsule, Take 2 capsules (2.5 mg) by mouth daily, Disp: 60 capsule, Rfl: 11     Saccharomyces boulardii (PROBIOTIC) 250 MG CAPS, 1 capsule daily, Disp: , Rfl:      sodium bicarbonate 650 MG tablet, Take 4 tablets (2,600 mg) by mouth 2 times daily, Disp: 180 tablet, Rfl: 23     Allergies:   Dust mites, Mold, and Other [seasonal allergies]      Past Medical History:  Attention deficit disorder    Posterior urethral valves   Lymphangioma  Hypertension  Chronic kidney disease stage 4, GFR 15-29 ml/min (H)    Past Surgical History:  ablation of posterior urethral valves - 2000  ureteral reimplantation with tapering - 2003    Family History:   Reviewed and noncontributory.       Social History:   Presents to clinic alone.  Tobacco Use: Never smoker.  Alcohol Use: Not on file.  PCP: VALENTIN MEEKS    Physical Exam:  Not performed as encounter was a telephone visit.     Laboratory:  [facility]  Recent Labs   Lab Test 07/16/21  1650 03/29/21  1333 12/16/20  1611 12/16/20  0000 10/19/20  1614 10/19/20  0000 08/18/20  1735 08/18/20  0000 02/26/20  1520 02/03/20  1451 01/31/20  1154  05/13/19  0712 12/04/18  1054 06/08/18  1256    140 139 139 137   < > 138   < > 140   < >  --    < > 142 148*   POTASSIUM 4.5 4.0 5.0 5.0 4.8   < > 4.6   < > 4.4   < >  --    < > 4.6 4.8   CHLORIDE 116* 114* 114* 114 114*   < > 114*   < > 112*   < >  --    < > 116* 121*   CO2 14* 18* 15* 15 11*   < > 15*   < > 21   < >  --    < > 17* 19*   ANIONGAP 8 9 10 10 12   < > 9   < > 7   < >  --    < > 9 8    90 89 89 99   < > 162*   < > 105*   < >  --    < > 100* 87   BUN 52* 62* 75* 75 77*   < > 50*   < > 56*   < >  --    < > 56* 47*   CR 5.08* 4.48* 5.27* 5.27 5.42*  5.56*   < > 4.45*   < > 3.58*   < >  --    < > 3.33* 2.51*   GFRESTIMATED 15* 17* 14*  --  14*  13*  --  17*   < > 23*   < >  --    < > 24* 34*   GFRESTBLACK  --  20* 17*  --  16*  16*  --  21*  --  27*   < >  --    < > 29* 41*   NABIL 8.5 8.9 9.1 9.1 9.3   < > 9.0   < > 8.2*   < >  --    < > 8.8* 8.5*   MAG  --   --   --   --   --   --   --   --  1.7  --  2.0  --  1.9 1.6   PHOS 5.2*  --  4.6* 4.6 4.8*   < > 3.8   < > 3.9   < >  --    < > 4.7* 4.6   PROTTOTAL  --  7.1  --   --  7.4  --   --   --   --   --   --   --  6.9 6.4*   ALBUMIN 3.0* 3.2* 3.6 3.6 3.8  3.7   < > 3.3*   < > 3.3*   < >  --    < > 3.1* 2.8*   BILITOTAL  --  0.2  --   --  0.4  --   --   --   --   --   --   --  0.2 0.2   ALKPHOS  --  73  --   --  72  --   --   --   --   --   --   --  87 74   AST  --  11  --   --  16  --   --   --   --   --   --   --  20 20   ALT  --  28  --   --  22  --   --   --   --   --   --   --  24 19    < > = values in this interval not displayed.     CBC  Recent Labs   Lab Test 07/16/21  1650 03/29/21  1333 12/16/20  1611 12/16/20  0000 10/19/20  1614 10/19/20  0000   HGB 12.5* 12.4* 12.9* 12.9* 13.2* 13.2*   WBC 7.4 6.6  --   --  7.1 7.1   RBC 4.18* 4.14*  --   --  4.32* 4.32   HCT 37.8* 37.9*  --   --  41.6 41.6   MCV 90 92  --   --  96 96   MCH 29.9 30.0  --   --  30.6 30.6   MCHC 33.1 32.7  --   --  31.7* 31.7   RDW 12.9 12.9  --   --  12.5 12.5     240  --   --  256 256     URINE STUDIES  Recent Labs   Lab Test 03/29/21  1404 12/03/20  1525 10/19/20  1645 05/13/19  0728 12/04/18  1056   COLOR Straw Yellow Yellow Straw Straw   APPEARANCE Clear Clear Clear Clear Clear   URINEGLC 50* 100* 50 mg/dL* 50* Negative   URINEBILI Negative Negative Negative Negative Negative   URINEKETONE Negative Negative Negative Negative Negative   SG 1.008 1.025 1.005 1.008 1.006   UBLD Negative Small* Small* Negative Trace*   URINEPH 6.0 7.0 6.0 7.0 6.5   PROTEIN >499* >300* >=500 mg/dL* >499* 100*   UROBILINOGEN  --  0.2 <2.0 E.U./dL  --   --    NITRITE Negative Negative Negative Negative Negative   LEUKEST Negative Negative Negative Negative Negative   RBCU 0  --  0-2 <1 <1   WBCU <1  --  0-5 <1 1     Recent Labs   Lab Test 02/26/20  1530 11/25/19  0904 05/13/19  0728 12/04/18  1056 06/08/18  1310 02/10/17  1004 08/26/16  1150 02/19/16  1000   UTPG 4.31* 4.92* 4.31* 4.59* 6.69* 2.78* 2.45* 2.17*     PTH  Recent Labs   Lab Test 07/16/21  1650 12/16/20  1611 12/16/20  0000 10/19/20  1614 02/26/20  1520 11/25/19  0859 05/13/19  0712 12/04/18  1054 06/08/18  1256 01/16/18  1102 08/09/17  1626 02/10/17  1002 08/26/16  1245 02/19/16  0955   PTHI 308* 94* 94 131* 201* 154* 110* 177* 96* 133* 66 39 47 42     IRON STUDIES   Recent Labs   Lab Test 02/26/20  1520 05/13/19  0712 06/08/18  1256 01/16/18  1102 08/09/17  1626 02/10/17  1002   IRON 66 71 87 116 93 54    269 257 290 284 297   IRONSAT 26 26 34 40 33 18   DAVID 133 109 82 86 100 113                   All above labs reviewed by me.     OLIVA MEEKS PADarleenC     Visit length 11 minutes. An additional 20 minutes were spent on date of service in chart review, documentation, and other activities as noted.               Boogie is a 22 year old who is being evaluated via a billable video visit.      How would you like to obtain your AVS? MyChart  If the video visit is dropped, the invitation should be resent by: Text to  cell phone: [phone]  Will anyone else be joining your video visit? No        Video-Visit Details    Type of service:  Video Visit   Start time: 12:31 pm  End time: 12:42 pm    Originating Location (pt. Location): Home    Distant Location (provider location):  Offsite  Platform used for Video Visit: Sulaiman      Again, thank you for allowing me to participate in the care of your patient.      Sincerely,    OLIVA MEEKS PA-C

## 2023-01-04 ENCOUNTER — TELEPHONE (OUTPATIENT)
Dept: NEPHROLOGY | Facility: CLINIC | Age: 23
End: 2023-01-04

## 2023-01-04 ENCOUNTER — TELEPHONE (OUTPATIENT)
Dept: TRANSPLANT | Facility: CLINIC | Age: 23
End: 2023-01-04

## 2023-01-04 NOTE — TELEPHONE ENCOUNTER
Spoke with Vineet. She did not notice that the order was electronically signed. They do not need anything further.

## 2023-01-04 NOTE — TELEPHONE ENCOUNTER
Left message with Darcy pt's mother, asking for return call regarding rescheduling txp surg appt on a Monday or Tuesday.

## 2023-01-04 NOTE — TELEPHONE ENCOUNTER
[facility] Call Center    Phone Message    May a detailed message be left on voicemail: yes     Reason for Call: Other: . Vineet from [facility] is calling- they are needing a new lab order faxed over with the providers signature- please fax to [phone], thank you    Action Taken: Message routed to:  Other: neph    Travel Screening: Not Applicable

## 2023-01-05 NOTE — TELEPHONE ENCOUNTER
Spoke to [facility]. His [facility] insurance only covers BP machines for those on dialysis. Will updated Boogie.

## 2023-01-09 ENCOUNTER — TELEPHONE (OUTPATIENT)
Dept: TRANSPLANT | Facility: CLINIC | Age: 23
End: 2023-01-09

## 2023-01-09 NOTE — TELEPHONE ENCOUNTER
Transplant Social Work Services Phone Call      Data: SW received call from Darcy Villa (Boogie's mother) to discuss ESRD Medicare. Boogie is not on dialysis and under an employer health care policy. It is uncertain if Boogie will qualify for Medicare due to not having enough work credits at this time.   Intervention: TONYA called Darcy to provide psychoeducation   Assessment: Darcy was alert, orientated and appropriate. She was uncertain if/when she should assist Boogie with ESRD Medicare. SW noted he will not be eligible for ESRD Medicare until the time of transplant as he is not on dialysis. SW provided a brief overview of ESRD Medicare.   Education provided by TONYA:   Part A:   Inpatient hospitalization, skilled nursing  Part B:   80% of immuo medications   Other available resources include:    Medicare: www.medicare.gov    Centers for Medicare and Medicaid Services ( CMS): www.cms.gov    Social Security Administration: www.ssa.gov    Life Options: www.lifeoptions.org    Your local ESRD Network    Forum of ESRD Networks: www.esrdnetworks.org    Medicare Rights Center: www.medicarerights.org    The CMS publication  Medicare Coverage of Kidney Dialysis and Transplant  Services : www.medicare.gov/Publications/Pubs/pdf/19473.p  Plan: SW will continue to follow for psychosocial support, resources and advocate on behalf of the patient.    Trisha JOSEPH [facility]  Outpatient Kidney/Pancreas/Auto Islet Transplant Program   [address] [place] [id number]  [email or web address]  Office: [phone] I Fax: [phone]

## 2023-01-23 ENCOUNTER — TELEPHONE (OUTPATIENT)
Dept: NEPHROLOGY | Facility: CLINIC | Age: 23
End: 2023-01-23
Payer: COMMERCIAL

## 2023-01-23 DIAGNOSIS — I12.9 HYPERTENSION, RENAL: Primary | ICD-10-CM

## 2023-01-23 RX ORDER — ATENOLOL 25 MG/1
37.5 TABLET ORAL DAILY
Qty: 21 TABLET | Refills: 0 | Status: SHIPPED | OUTPATIENT
Start: 2023-01-23 | End: 2023-07-05

## 2023-01-23 NOTE — TELEPHONE ENCOUNTER
Received a call from Boogie. His mail order pharmacy stated they sent out his atenolol to him but he never received it. They will send a new refill out but may take a week or more to get. He is worried about being off of the medication that long. Wondering if he can get a couple weeks worth of the medication.  Will check on this for him. Writer also was able to get him scheduled for nephrology follow up with Dayana Jim while on phone. He will get labs at  this week or next.

## 2023-02-01 ENCOUNTER — PATIENT OUTREACH (OUTPATIENT)
Dept: NEPHROLOGY | Facility: CLINIC | Age: 23
End: 2023-02-01
Payer: COMMERCIAL

## 2023-02-01 DIAGNOSIS — N18.5 CKD (CHRONIC KIDNEY DISEASE) STAGE 5, GFR LESS THAN 15 ML/MIN (H): Primary | ICD-10-CM

## 2023-02-01 NOTE — PROGRESS NOTES
Nephrology Note: Nursing Outreach Encounter    REASON FOR CALL:                                                      REASON FOR CALL: Symptoms                                           SITUATION/BACKROUND:                                                    Patient is being treated for CKD Stage 5.    Last GFR was 12 in Nov 2022.    Patient Journey Status:  Active:   Neph Tracking Flowsheet Last Filled Values     CKD Education Status Complete    CKD Education Type Kidney Smart Modality Education; Kidney Smart CKD Basics    Preferred Modality Peritoneal Dialysis    Patient's Referral Dates Auto Populate Patient's Referral Dates    Dietician Referral 3/29/21    Transplant Evaluation Referral 10/22/20          ASSESSMENT:                                                      Received a call from Boogie. He is concerned about the level of fatigue he has had over the last three weeks. He has trouble staying awake to even work.  He also reports leg cramps as well. No other s/sx of uremia.  He is wondering if this is from his kidney's or something else. Writer told him that his last labs were in November 2022 and his GFR was 12 at that time, hgb 11. Likely this  is not anemia causing this fatigue. Stated that it could be related to his kidney's especially if it has dropped even more. He is scheduled to have labs on 2/6 and appointment with Dayana Jim on 2/9. Writer did mention that if his kidney function declined quite a bit more and he was feeling the effects of uremia, the only way to feel better is to start dialysis treatments but that we would see what those labs are and go from there. Boogie has also gotten a BP cuff so writer asked that he measure and document those blood pressures for visit on 2/9. He verbalized understanding. Writer will route to Dayana Jim as FYI.    Neph Assessments:  Uremic Symptoms  Fatigue: yes  Cold: no  Nausea: no  Vomiting: no  Diarrhea: no  Edema: no  Poor appetite: no  Metallic  Taste: no  Pruritis: no  Tremor: no (cramping in legs)  Confusion: no      PLAN:                                                      Education:  Method:  general discussion/verbal explanation  Discussed habit change regarding uremic symptoms  These interventions were used: Empathy/Understanding  Education material provided and patient was given an opportunity to ask questions.      Follow Up:   Patient to follow up as scheduled at next apt     Patient verbalized understanding and will follow up as recommended.    GLORIA PALOMARES RN      =======================================================================    Dayana Jim received updated. Writer added him to CKD Journey.

## 2023-02-01 NOTE — PROGRESS NOTES
Nephrology Clinic   2023    Video-Visit Details    Type of service:  Video Visit    Video Start Time (time video started): 12:33 pm    Video End Time (time video stopped): 12:53 pm    Originating Location (pt. Location): Home        Distant Location (provider location):  On-site    Mode of Communication:  Video Conference via [employer]          Name: Boogie Villa  MRN: [id number]  Age: 22 year old  : 2000  DOS: 2022    Referring provider: Edwin Palomo     Assessment and Plan:   1. 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  Baseline serum creatinine has significantly changed over the past year as kidney function has declined more rapidly.      On  BUN 92, Scr 11.99, eGFR 6  associated with albuminuria of ~3 mg/g.  Decline seems more rapid than expected over the year and may be due to decreased self catheterization though he is adamant he is able to volitionally void and has had no urinary retention.   - Patient denies uremic symptoms but clinically needs dialysis  - PD is preferred modality but not a good option, will require incenter or possibly home hemo?  -Ideally, patient could undergo preemptive kidney transplantation and is now inactive on the kidney transplant waiting list   - recently saw Dr. Martinez and is recommended to get to BMI of 38, currently 40.  - will be completing other requirements  - His mother is completing donor eval in preparation for possible paired donor exchange   - stopped ramipril with lower GFR  - He should continue to avoid NSAIDs  - Pt is having uremic symptoms of extreme fatigue and brain fog  - recommend initiation of RRT, he prefers to wait one more week so he will qualify for fmla.   - will have nursing staff coordinate CVC placement and admission to HD unit. Plan to discuss with   Dr. Diaz to determine if pt is okay to  start on outpatient basis vs inpatient.        2. Hypertension: He has not been checking BP at home, previously slightly above goal of <130/80 though we have no recent home measurements.  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  Last PTH mildly elevated at 308 (July, 2021). Current PTH ~800, Vit D 7  - Continue cholecalciferol at 2000 units daily  - start phos lo 667 mg TID with meals  - continue calcitriol 0.25 mcg daily last visit  - check PTH and Vit D next visit     4. Low bicarb: Bicarb low at 14.  Presumed non-AG metabolic acidosis due to CKD.  He has not been taking sodium bicarb as as prescribed, is taking 4 tabs daily instead of BID. Forgets the evening dose  -encourage sodium bicarb tabs (650 mg) 4 tabs BID for now, rx renewed  -obtain routine CKD labs     5. Anemia: Mild and due to CKD. Hgb has been stable in the 12s, recently 11.5. Iron stores 31%.   -no need for iron or EPO at this time      6. Seasonal Allergies: Patient dose not complain of persistent seasonal allergy related symptoms today. He feels that this is being well managed with Claritin. Instructed him that it is ok to take it daily for a short period of time if needed.  Otherwise, we discussed prescribing Singulair in the future if he continues to be symptomatic.     7. Obesity: Significant increase in weight during 2018. Patient and his mother have inquired about the possible effect weight will have on a possible kidney transplant. I discussed the implications obesity has on chronic kidney disease, blood pressure control, and heart disease. Goal BMI for kidney transplant should is 38. Per transplant note, needs to lose ~ 20 lbs.  - he has been more sedentary with increased  "fatigue    -he is not interested in f/u with our weight loss management clinic     8. Urology: No active issues. Followed by Dr. Melgar    Follow-up: recommend starting dialysis, otherwise is scheduled in March with Dr. Diaz.     Reason For Visit:   CKD follow-up.     HPI:   Boogie Villa is a 22 year old man with a history of posterior urethral valves with correction in the first year of life.  Recurrent episodes of urinary tract infection. Patient was previously followed by Dr. Palomo in pediatric nephrology for chronic kidney disease related to posterior ureteral valves. The only concern was medication compliance and he and his mother previously reported very partial (3-4 days a week) compliance in the past though has not been an issue over the past year.      Interval history:  The patient is feeling extremely fatigued and has brain fog. He is finding it difficult to work. He is expected to f/u with Urology for more urodynamic testing after transplant.  He continues to take his medications regularly except for missing second dose of sodium bicarb.  He has not measured his home BP recently. He denies dyspnea, edema, and urinary retention. He still has a good appetite, no n/v/d. He denies pruritis. He has no LE edema and no shortness of breath.    He has attended the \"Kidney Smart\" class and feels he would prefer PD if dialysis is needed. However, with history of Mitrofanoff surgery he is not really a candidate. He appears to be an appropriate candidate for kidney transplantation and is now inactive on the list until his BMI is 38 or lower. He reports his weight is up due to being sedentary due to the fatigue.      He is now on Ritalin 20 mg once a day as needed for ADHD.    He does note a fever / chills this morning, no associated symptoms. Taking tylenol which helps.     Review of Systems:   Pertinent items are noted in HPI or as below, remainder of complete ROS is negative.      Active Medications: "     Current Outpatient Medications:      amLODIPine (NORVASC) 5 MG tablet, Take 1 tablet (5 mg) by mouth daily, Disp: 90 tablet, Rfl: 3     atenolol (TENORMIN) 25 MG tablet, Take 1.5 tablets (37.5 mg) by mouth daily, Disp: 135 tablet, Rfl: 3     calcitRIOL (ROCALTROL) 0.25 MCG capsule, Take 1 capsule (0.25 mcg) by mouth daily, Disp: 90 capsule, Rfl: 3     Cholecalciferol (VITAMIN D) 50 MCG (2000 UT) CAPS, Take 2,000 unit marking on an U-100 insulin syringe by mouth daily, Disp: 90 capsule, Rfl: 11     methylphenidate (RITALIN) 10 MG tablet, Take 25 mg by mouth, Disp: , Rfl:      sodium bicarbonate 650 MG tablet, Take 4 tablets (2,600 mg) by mouth 2 times daily, Disp: 360 tablet, Rfl: 3     atenolol (TENORMIN) 25 MG tablet, Take 1.5 tablets (37.5 mg) by mouth daily for 14 days, Disp: 21 tablet, Rfl: 0     Allergies:   Dust mites, Mold, and Other [seasonal allergies]      Past Medical History:  Attention deficit disorder    Posterior urethral valves   Lymphangioma  Hypertension  Chronic kidney disease stage 4, GFR 15-29 ml/min (H)    Past Surgical History:  ablation of posterior urethral valves - 2000  ureteral reimplantation with tapering - 2003    Family History:   Reviewed and noncontributory.       Social History:   Presents to clinic alone.  Tobacco Use: Never smoker.  Alcohol Use: Not on file.  PCP: VALENTIN MEEKS    Physical Exam:  Not performed     Laboratory:  CMP  Recent Labs   Lab Test 07/16/21  1650 03/29/21  1333 12/16/20  1611 12/16/20  0000 10/19/20  1614 10/19/20  0000 08/18/20  1735 08/18/20  0000 02/26/20  1520 02/03/20  1451 01/31/20  1154 05/13/19  0712 12/04/18  1054 06/08/18  1256    140 139 139 137   < > 138   < > 140   < >  --    < > 142 148*   POTASSIUM 4.5 4.0 5.0 5.0 4.8   < > 4.6   < > 4.4   < >  --    < > 4.6 4.8   CHLORIDE 116* 114* 114* 114 114*   < > 114*   < > 112*   < >  --    < > 116* 121*   CO2 14* 18* 15* 15 11*   < > 15*   < > 21   < >  --    < > 17* 19*   ANIONGAP 8 9 10  10 12   < > 9   < > 7   < >  --    < > 9 8    90 89 89 99   < > 162*   < > 105*   < >  --    < > 100* 87   BUN 52* 62* 75* 75 77*   < > 50*   < > 56*   < >  --    < > 56* 47*   CR 5.08* 4.48* 5.27* 5.27 5.42*  5.56*   < > 4.45*   < > 3.58*   < >  --    < > 3.33* 2.51*   GFRESTIMATED 15* 17* 14*  --  14*  13*  --  17*   < > 23*   < >  --    < > 24* 34*   GFRESTBLACK  --  20* 17*  --  16*  16*  --  21*  --  27*   < >  --    < > 29* 41*   NABIL 8.5 8.9 9.1 9.1 9.3   < > 9.0   < > 8.2*   < >  --    < > 8.8* 8.5*   MAG  --   --   --   --   --   --   --   --  1.7  --  2.0  --  1.9 1.6   PHOS 5.2*  --  4.6* 4.6 4.8*   < > 3.8   < > 3.9   < >  --    < > 4.7* 4.6   PROTTOTAL  --  7.1  --   --  7.4  --   --   --   --   --   --   --  6.9 6.4*   ALBUMIN 3.0* 3.2* 3.6 3.6 3.8  3.7   < > 3.3*   < > 3.3*   < >  --    < > 3.1* 2.8*   BILITOTAL  --  0.2  --   --  0.4  --   --   --   --   --   --   --  0.2 0.2   ALKPHOS  --  73  --   --  72  --   --   --   --   --   --   --  87 74   AST  --  11  --   --  16  --   --   --   --   --   --   --  20 20   ALT  --  28  --   --  22  --   --   --   --   --   --   --  24 19    < > = values in this interval not displayed.     CBC  Recent Labs   Lab Test 07/16/21  1650 03/29/21  1333 12/16/20  1611 12/16/20  0000 10/19/20  1614 10/19/20  0000   HGB 12.5* 12.4* 12.9* 12.9* 13.2* 13.2*   WBC 7.4 6.6  --   --  7.1 7.1   RBC 4.18* 4.14*  --   --  4.32* 4.32   HCT 37.8* 37.9*  --   --  41.6 41.6   MCV 90 92  --   --  96 96   MCH 29.9 30.0  --   --  30.6 30.6   MCHC 33.1 32.7  --   --  31.7* 31.7   RDW 12.9 12.9  --   --  12.5 12.5    240  --   --  256 256     URINE STUDIES  Recent Labs   Lab Test 03/29/21  1404 12/03/20  1525 10/19/20  1645 05/13/19  0728 12/04/18  1056   COLOR Straw Yellow Yellow Straw Straw   APPEARANCE Clear Clear Clear Clear Clear   URINEGLC 50* 100* 50 mg/dL* 50* Negative   URINEBILI Negative Negative Negative Negative Negative   URINEKETONE Negative Negative  Negative Negative Negative   SG 1.008 1.025 1.005 1.008 1.006   UBLD Negative Small* Small* Negative Trace*   URINEPH 6.0 7.0 6.0 7.0 6.5   PROTEIN >499* >300* >=500 mg/dL* >499* 100*   UROBILINOGEN  --  0.2 <2.0 E.U./dL  --   --    NITRITE Negative Negative Negative Negative Negative   LEUKEST Negative Negative Negative Negative Negative   RBCU 0  --  0-2 <1 <1   WBCU <1  --  0-5 <1 1     Recent Labs   Lab Test 02/26/20  1530 11/25/19  0904 05/13/19  0728 12/04/18  1056 06/08/18  1310 02/10/17  1004 08/26/16  1150 02/19/16  1000   UTPG 4.31* 4.92* 4.31* 4.59* 6.69* 2.78* 2.45* 2.17*     PTH  Recent Labs   Lab Test 07/16/21  1650 12/16/20  1611 12/16/20  0000 10/19/20  1614 02/26/20  1520 11/25/19  0859 05/13/19  0712 12/04/18  1054 06/08/18  1256 01/16/18  1102 08/09/17  1626 02/10/17  1002 08/26/16  1245 02/19/16  0955   PTHI 308* 94* 94 131* 201* 154* 110* 177* 96* 133* 66 39 47 42     IRON STUDIES   Recent Labs   Lab Test 02/26/20  1520 05/13/19  0712 06/08/18  1256 01/16/18  1102 08/09/17  1626 02/10/17  1002   IRON 66 71 87 116 93 54    269 257 290 284 297   IRONSAT 26 26 34 40 33 18   DAVID 133 109 82 86 100 113                   All above labs reviewed by me.     OLIVA MEEKS PADarleenC     Visit length 20 minutes. An additional 20 minutes were spent on date of service in chart review, documentation, and other activities as noted.

## 2023-02-06 ENCOUNTER — TELEPHONE (OUTPATIENT)
Dept: TRANSPLANT | Facility: CLINIC | Age: 23
End: 2023-02-06
Payer: COMMERCIAL

## 2023-02-06 NOTE — TELEPHONE ENCOUNTER
DATE:  2/6/2023     TIME OF RECEIPT FROM LAB:  2:35 PM    ORDERING PROVIDER:     LAB TEST:  Phosphorus and Creatinine    LAB VALUE:  11.7 and 11.99    RESULTS GIVEN WITH READ-BACK TO (PROVIDER):  Teams message sent to Trisha Blankenship RN    TIME LAB VALUE REPORTED TO PROVIDER:   2:41 PM    2/6/23 addendum: Trisha MARTINEZ RN critical result routed to primary nephrologist for review.

## 2023-02-07 ENCOUNTER — OFFICE VISIT (OUTPATIENT)
Dept: TRANSPLANT | Facility: CLINIC | Age: 23
End: 2023-02-07
Attending: NURSE PRACTITIONER
Payer: COMMERCIAL

## 2023-02-07 VITALS
HEART RATE: 85 BPM | WEIGHT: 292 LBS | DIASTOLIC BLOOD PRESSURE: 84 MMHG | BODY MASS INDEX: 40.73 KG/M2 | SYSTOLIC BLOOD PRESSURE: 133 MMHG | OXYGEN SATURATION: 94 %

## 2023-02-07 DIAGNOSIS — E66.01 CLASS 3 SEVERE OBESITY DUE TO EXCESS CALORIES WITH SERIOUS COMORBIDITY AND BODY MASS INDEX (BMI) OF 40.0 TO 44.9 IN ADULT (H): ICD-10-CM

## 2023-02-07 DIAGNOSIS — Q64.2 CONGENITAL POSTERIOR URETHRAL VALVES: ICD-10-CM

## 2023-02-07 DIAGNOSIS — Z76.82 ORGAN TRANSPLANT CANDIDATE: Primary | ICD-10-CM

## 2023-02-07 DIAGNOSIS — N31.9 NEUROGENIC BLADDER: ICD-10-CM

## 2023-02-07 DIAGNOSIS — E66.813 CLASS 3 SEVERE OBESITY DUE TO EXCESS CALORIES WITH SERIOUS COMORBIDITY AND BODY MASS INDEX (BMI) OF 40.0 TO 44.9 IN ADULT (H): ICD-10-CM

## 2023-02-07 DIAGNOSIS — N18.5 STAGE 5 CHRONIC KIDNEY DISEASE NOT ON CHRONIC DIALYSIS (H): ICD-10-CM

## 2023-02-07 PROCEDURE — G0463 HOSPITAL OUTPT CLINIC VISIT: HCPCS | Performed by: SURGERY

## 2023-02-07 PROCEDURE — 99215 OFFICE O/P EST HI 40 MIN: CPT | Performed by: SURGERY

## 2023-02-07 NOTE — LETTER
2/7/2023         RE: Boogie Villa  [address]  [facility] [address]        Dear Colleague,    Thank you for referring your patient, Boogie Villa, to the [facility]. Please see a copy of my visit note below.    Transplant Surgery Consult Note     Medical record number: [id number]  YOB: 2000,   Consult requested  for evaluation of kidney transplant candidacy.    Assessment and Recommendations:Mr. Villa appears to be a good candidate for kidney transplantation and has a good understanding of the risks and benefits of this approach to the management of renal failure. The following issues should be addressed prior to finalizing his transplant candidacy:     Transplant order: Mr. 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  Recommend he be considered as a candidate for kidney transplant.  Cardiology consult for cardiac risk stratification to be ordered: No  CT abdomen and pelvis without contrast to be ordered for assessment of vascular targets: No  Transplant listing labs ordered to include HLA, ABOx2, Cr, etc.  Dietician consult ordered: Yes  Social work consult ordered: Yes  Imaging reports reviewed:  None available  Radiology images reviewed: none available  Recipient suitable to move forward with work up of living donors:  Yes. Mother went through eval last week  Last cath of Aneesh was approximately 3 years ago and use 14f cudet. Dr. Morton recommends placing catheter in aneesh at the time of transplant but this may be difficult and may need urology assistance. Very difficult to see and sunk down into the umbilicus. Will need repeat urodynamics with Dr. Morton 3-6 months post transplant to verify no reflux and appropriate bladder pressures  Recommend BMI down to 38 based on body habitus and  revisit with surgeon for kidney transplant consideration. His BMI in 202 was down to 38 (from 41.8) however he has gained 16lb since we last saw him and is now up to 40.73..  Interested in weight management clinic. Please arrange appt  Had a long discussion with him about compliance with meds. He states he is not good at taking his evening meds/bicarb and we discussed the importance of his immunosuppression meds. May need follow up with SW and possibly compliance contract. Pt lives on his own.       The majority of our visit was spent in counselling, discussing the medical and surgical risks of kidney transplantation. We discussed approximate wait time and how that is influenced by issues such as blood type and sensitization (PRA) and access to a living donor. I contrasted potential waiting time for living vs  donor kidneys from  normal (0-85%) or higher (%) kidney donor profile index (KDPI) donors and their associated outcomes. I would not recommend this individual to consider kidneys from high KDPI donors. The reason for this decision is best summarized as: improved long term graft survival. Potential surgical complications of kidney transplantation include bleeding, superficial or deep wound complications (infection, hernia, lymphocele), ureteral anastomotic failure (leak or stenosis), graft thrombosis, need for reoperation and other issues such as cardiac complications, pneumonia, deep venous thrombosis, pulmonary embolism, post transplant diabetes and death. The potential for recurrent disease or need for retransplantation was also addressed. We discussed the possible need for ureteral stent (and subsequent removal), and the utility of protocol biopsy and laboratory studies to evaluate for rejection or recurrent disease. We discussed the risk of graft rejection, our center's average graft and patient survival rates, immunosuppression protocols, as well as the potential opportunity to participate  in clinical trials.  We also discussed the average length of stay, recovery process, and posttransplant lab and monitoring protocol.  I emphasized the need for strict immunosuppression medication adherence and the potential for complications of immunosuppression such as skin cancer or lymphoma, as well as a very low but not zero risk of donor-derived disease transmission risks (infection, cancer). Mr. Villa asked good questions and his candidacy will be reviewed at our Multidisciplinary Selection Committee. Thank you for the opportunity to participate in Mr. Villa's care.      Total time: 60 minutes        Nita Martinez MD FACS  Assistant Professor of Surgery  Director, Living Kidney Donor Program.    ---------------------------------------------------------------------------------------------------    HPI: Mr. 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 The patient is non-diabetic.       The patient is not on dialysis.    Has potential kidney donors:  Yes .  Interested in participation in paired exchange if a donor is willing: Yes     The patient has the following pertinent history:       No    Yes  Dialysis:    [x]      [] via:       Blood Transfusion                  [x]      []  Number of units:   Most recently:  Pregnancy:    [x]      [] Number:       Previous Transplant:  [x]      [] Details:    Cancer    [x]      [] Comment:   Kidney stones   [x]      [] Comment:      Recurrent infections  [x]      []  Type:                  Bladder dysfunction  [x]      [] Cause:    Claudication   [x]      [] Distance:    Previous Amputation  [x]      [] Cause:     Chronic anticoagulation  [x]      [] Indication:   Restoration  [x]      []      Past Medical History:   Diagnosis Date     ADD (attention deficit disorder)      CKD (chronic kidney disease)      Hypertension 2000     Posterior urethral valves      Past Surgical History:   Procedure Laterality Date     ABDOMEN SURGERY   2003    Bilateral urereral tapering and re-implantation     ABDOMEN SURGERY  2008    Mitrofanoff     ablation of posterior urethral valves  2000     APPENDECTOMY  2008     ureteral reimplantation with tapering  2003     No family history on file.  Social History     Socioeconomic History     Marital status: Single     Spouse name: Not on file     Number of children: Not on file     Years of education: Not on file     Highest education level: Not on file   Occupational History     Not on file   Tobacco Use     Smoking status: Never     Smokeless tobacco: Never   Substance and Sexual Activity     Alcohol use: Never     Drug use: Never     Sexual activity: Not on file   Other Topics Concern     Parent/sibling w/ CABG, MI or angioplasty before 65F 55M? Not Asked   Social History Narrative     Not on file     Social Determinants of Health     Financial Resource Strain: Not on file   Food Insecurity: Not on file   Transportation Needs: Not on file   Physical Activity: Not on file   Stress: Not on file   Social Connections: Not on file   Intimate Partner Violence: Not on file   Housing Stability: Not on file       ROS:   CONSTITUTIONAL:  No fevers or chills  EYES: negative for icterus  ENT:  negative for hearing loss, tinnitus and sore throat  RESPIRATORY:  negative for cough, sputum, dyspnea  CARDIOVASCULAR:  negative for chest pain Fatigue  GASTROINTESTINAL:  negative for nausea, vomiting, diarrhea or constipation  GENITOURINARY:  negative for incontinence, dysuria, bladder emptying problems  HEME:  No easy bruising  INTEGUMENT:  negative for rash and pruritus  NEURO:  Negative for headache, seizure disorder  Allergies:   Allergies   Allergen Reactions     Dust Mites      Runny nose and watery eyes     Mold      Runny nose and watery eyes     Other [Seasonal Allergies]      Grass, Ragweed - gets runny nose and watery eyes     Medications:  Prescription Medications as of 2/7/2023       Rx Number Disp Refills Start End  Last Dispensed Date Next Fill Date Owning Pharmacy    amLODIPine (NORVASC) 5 MG tablet  90 tablet 3 11/21/2022    [facility] [address]    Sig: Take 1 tablet (5 mg) by mouth daily    Class: E-Prescribe    Route: Oral    atenolol (TENORMIN) 25 MG tablet  21 tablet 0 1/23/2023 2/6/2023   [address]    Sig: Take 1.5 tablets (37.5 mg) by mouth daily for 14 days    Class: E-Prescribe    Notes to Pharmacy: Do not bill insurance- client will pay out of pocket, this is to get him through until his mail order can get refills to him as it is delayed.    Route: Oral    atenolol (TENORMIN) 25 MG tablet  135 tablet 3 1/3/2023    [facility] [address]    Sig: Take 1.5 tablets (37.5 mg) by mouth daily    Class: E-Prescribe    Route: Oral    calcitRIOL (ROCALTROL) 0.25 MCG capsule  90 capsule 3 11/21/2022    [facility] [address]    Sig: Take 1 capsule (0.25 mcg) by mouth daily    Class: E-Prescribe    Route: Oral    calcium carbonate (OSCAL 500) 1250 (500 Ca) MG TABS tablet    1/1/2017        Sig: daily     Class: Historical    Cholecalciferol (VITAMIN D) 50 MCG (2000 UT) CAPS  90 capsule 11 11/1/2021    [facility] [address]    Sig: Take 2,000 unit marking on an U-100 insulin syringe by mouth daily    Class: E-Prescribe    Route: Oral    methylphenidate (RITALIN) 10 MG tablet    2/16/2020    [facility] [address]    Sig: Take 25 mg by mouth    Class: Historical    Earliest Fill Date: 2/16/2020    Route: Oral    ramipril (ALTACE) 1.25 MG capsule  60 capsule 11 11/1/2021    [facility] [address]    Sig: Take 2 capsules (2.5 mg) by mouth daily    Class: E-Prescribe    Route: Oral     Saccharomyces boulardii (PROBIOTIC) 250 MG CAPS            Si capsule daily    Class: Historical    Route: Oral    sodium bicarbonate 650 MG tablet  360 tablet 3 1/3/2023    [facility] - [place], [place] - [address] [place] [address]    Sig: Take 4 tablets (2,600 mg) by mouth 2 times daily    Class: E-Prescribe    Route: Oral        Exam:     /84   Pulse 85   Wt 132.5 kg (292 lb)   SpO2 94%   BMI 40.73 kg/m    Appearance: in no apparent distress.   Skin: normal  Eyes:  no redness or discharge.  Sclera anicteric  Head and Neck: Normal, no rashes or jaundice  Respiratory: easy respirations, no audible wheezing.  Abdomen: rounded, obese and protuberant, No distention, Surgical scars consistent with history and ASIS not palpable. Central obesity pronounced   Extremities: femoral difficult to palpate due to body habitus, Edema, pitting and 1+  Neuro: without deficit   Psychiatric: Normal mood and affect    Diagnostics:   No results found for this or any previous visit (from the past 672 hour(s)).  OS cPRA   Date Value Ref Range Status   2021 0  Final     OS CPRA   Date Value Ref Range Status   2021 0  Final       Again, thank you for allowing me to participate in the care of your patient.        Sincerely,        Nita Martinez MD

## 2023-02-07 NOTE — PROGRESS NOTES
Transplant Surgery Consult Note     Medical record number: [id number]  YOB: 2000,   Consult requested  for evaluation of kidney transplant candidacy.    Assessment and Recommendations:Mr. Villa appears to be a good candidate for kidney transplantation and has a good understanding of the risks and benefits of this approach to the management of renal failure. The following issues should be addressed prior to finalizing his transplant candidacy:     Transplant order: Mr. 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  Recommend he be considered as a candidate for kidney transplant.  Cardiology consult for cardiac risk stratification to be ordered: No  CT abdomen and pelvis without contrast to be ordered for assessment of vascular targets: No  Transplant listing labs ordered to include HLA, ABOx2, Cr, etc.  Dietician consult ordered: Yes  Social work consult ordered: Yes  Imaging reports reviewed:  None available  Radiology images reviewed: none available  Recipient suitable to move forward with work up of living donors:  Yes. Mother went through eval last week  Last cath of Caron was approximately 3 years ago and use 14f cudet. Dr. Morton recommends placing catheter in caron at the time of transplant but this may be difficult and may need urology assistance. Very difficult to see and sunk down into the umbilicus. Will need repeat urodynamics with Dr. Morton 3-6 months post transplant to verify no reflux and appropriate bladder pressures  Recommend BMI down to 38 based on body habitus and revisit with surgeon for kidney transplant consideration. His BMI in 2021 was down to 38 (from 41.8) however he has gained 16lb since we last saw him and is now up to 40.73..  Interested in weight management clinic. Please arrange appt  Had a long  discussion with him about compliance with meds. He states he is not good at taking his evening meds/bicarb and we discussed the importance of his immunosuppression meds. May need follow up with SW and possibly compliance contract. Pt lives on his own.       The majority of our visit was spent in counselling, discussing the medical and surgical risks of kidney transplantation. We discussed approximate wait time and how that is influenced by issues such as blood type and sensitization (PRA) and access to a living donor. I contrasted potential waiting time for living vs  donor kidneys from  normal (0-85%) or higher (%) kidney donor profile index (KDPI) donors and their associated outcomes. I would not recommend this individual to consider kidneys from high KDPI donors. The reason for this decision is best summarized as: improved long term graft survival. Potential surgical complications of kidney transplantation include bleeding, superficial or deep wound complications (infection, hernia, lymphocele), ureteral anastomotic failure (leak or stenosis), graft thrombosis, need for reoperation and other issues such as cardiac complications, pneumonia, deep venous thrombosis, pulmonary embolism, post transplant diabetes and death. The potential for recurrent disease or need for retransplantation was also addressed. We discussed the possible need for ureteral stent (and subsequent removal), and the utility of protocol biopsy and laboratory studies to evaluate for rejection or recurrent disease. We discussed the risk of graft rejection, our center's average graft and patient survival rates, immunosuppression protocols, as well as the potential opportunity to participate in clinical trials.  We also discussed the average length of stay, recovery process, and posttransplant lab and monitoring protocol.  I emphasized the need for strict immunosuppression medication adherence and the potential for complications of  immunosuppression such as skin cancer or lymphoma, as well as a very low but not zero risk of donor-derived disease transmission risks (infection, cancer). Mr. Villa asked good questions and his candidacy will be reviewed at our Multidisciplinary Selection Committee. Thank you for the opportunity to participate in Mr. Villa's care.      Total time: 60 minutes        Nita Martinez MD FACS  Assistant Professor of Surgery  Director, Living Kidney Donor Program.    ---------------------------------------------------------------------------------------------------    HPI: Mr. 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 The patient is non-diabetic.       The patient is not on dialysis.    Has potential kidney donors:  Yes .  Interested in participation in paired exchange if a donor is willing: Yes     The patient has the following pertinent history:       No    Yes  Dialysis:    [x]      [] via:       Blood Transfusion                  [x]      []  Number of units:   Most recently:  Pregnancy:    [x]      [] Number:       Previous Transplant:  [x]      [] Details:    Cancer    [x]      [] Comment:   Kidney stones   [x]      [] Comment:      Recurrent infections  [x]      []  Type:                  Bladder dysfunction  [x]      [] Cause:    Claudication   [x]      [] Distance:    Previous Amputation  [x]      [] Cause:     Chronic anticoagulation  [x]      [] Indication:   Catholic  [x]      []      Past Medical History:   Diagnosis Date     ADD (attention deficit disorder)      CKD (chronic kidney disease)      Hypertension 2000     Posterior urethral valves      Past Surgical History:   Procedure Laterality Date     ABDOMEN SURGERY  2003    Bilateral urereral tapering and re-implantation     ABDOMEN SURGERY  2008    Mitrofanoff     ablation of posterior urethral valves  2000     APPENDECTOMY  2008     ureteral reimplantation with tapering  2003     No family history on  file.  Social History     Socioeconomic History     Marital status: Single     Spouse name: Not on file     Number of children: Not on file     Years of education: Not on file     Highest education level: Not on file   Occupational History     Not on file   Tobacco Use     Smoking status: Never     Smokeless tobacco: Never   Substance and Sexual Activity     Alcohol use: Never     Drug use: Never     Sexual activity: Not on file   Other Topics Concern     Parent/sibling w/ CABG, MI or angioplasty before 65F 55M? Not Asked   Social History Narrative     Not on file     Social Determinants of Health     Financial Resource Strain: Not on file   Food Insecurity: Not on file   Transportation Needs: Not on file   Physical Activity: Not on file   Stress: Not on file   Social Connections: Not on file   Intimate Partner Violence: Not on file   Housing Stability: Not on file       ROS:   CONSTITUTIONAL:  No fevers or chills  EYES: negative for icterus  ENT:  negative for hearing loss, tinnitus and sore throat  RESPIRATORY:  negative for cough, sputum, dyspnea  CARDIOVASCULAR:  negative for chest pain Fatigue  GASTROINTESTINAL:  negative for nausea, vomiting, diarrhea or constipation  GENITOURINARY:  negative for incontinence, dysuria, bladder emptying problems  HEME:  No easy bruising  INTEGUMENT:  negative for rash and pruritus  NEURO:  Negative for headache, seizure disorder  Allergies:   Allergies   Allergen Reactions     Dust Mites      Runny nose and watery eyes     Mold      Runny nose and watery eyes     Other [Seasonal Allergies]      Grass, Ragweed - gets runny nose and watery eyes     Medications:  Prescription Medications as of 2/7/2023       Rx Number Disp Refills Start End Last Dispensed Date Next Fill Date Owning Pharmacy    amLODIPine (NORVASC) 5 MG tablet  90 tablet 3 11/21/2022    [facility] - [place], [place] [address]    Sig: Take 1 tablet (5 mg) by mouth daily    Class:  E-Prescribe    Route: Oral    atenolol (TENORMIN) 25 MG tablet  21 tablet 0 2023   [facility] [address] [place] [address]    Sig: Take 1.5 tablets (37.5 mg) by mouth daily for 14 days    Class: E-Prescribe    Notes to Pharmacy: Do not bill insurance- client will pay out of pocket, this is to get him through until his mail order can get refills to him as it is delayed.    Route: Oral    atenolol (TENORMIN) 25 MG tablet  135 tablet 3 1/3/2023    [facility] [address]    Sig: Take 1.5 tablets (37.5 mg) by mouth daily    Class: E-Prescribe    Route: Oral    calcitRIOL (ROCALTROL) 0.25 MCG capsule  90 capsule 3 2022    [facility] [address]    Sig: Take 1 capsule (0.25 mcg) by mouth daily    Class: E-Prescribe    Route: Oral    calcium carbonate (OSCAL 500) 1250 (500 Ca) MG TABS tablet    2017        Sig: daily     Class: Historical    Cholecalciferol (VITAMIN D) 50 MCG (2000 UT) CAPS  90 capsule 11 2021    [facility] [address]    Sig: Take 2,000 unit marking on an U-100 insulin syringe by mouth daily    Class: E-Prescribe    Route: Oral    methylphenidate (RITALIN) 10 MG tablet    2020    [facility] [address]    Sig: Take 25 mg by mouth    Class: Historical    Earliest Fill Date: 2020    Route: Oral    ramipril (ALTACE) 1.25 MG capsule  60 capsule 11 2021    [facility] [address]    Sig: Take 2 capsules (2.5 mg) by mouth daily    Class: E-Prescribe    Route: Oral    Saccharomyces boulardii (PROBIOTIC) 250 MG CAPS            Si capsule daily    Class: Historical    Route: Oral    sodium bicarbonate 650 MG tablet  360 tablet 3 1/3/2023    [facility] [address]    Sig: Take 4 tablets (2,600 mg) by mouth 2 times daily    Class: E-Prescribe    Route: Oral        Exam:     /84   Pulse 85   Wt 132.5 kg (292 lb)   SpO2 94%   BMI 40.73 kg/m    Appearance: in no apparent distress.   Skin: normal  Eyes:  no redness or discharge.  Sclera anicteric  Head and Neck: Normal, no rashes or jaundice  Respiratory: easy respirations, no audible wheezing.  Abdomen: rounded, obese and protuberant, No distention, Surgical scars consistent with history and ASIS not palpable. Central obesity pronounced   Extremities: femoral difficult to palpate due to body habitus, Edema, pitting and 1+  Neuro: without deficit   Psychiatric: Normal mood and affect    Diagnostics:   No results found for this or any previous visit (from the past 672 hour(s)).  UNOS cPRA   Date Value Ref Range Status   03/29/2021 0  Final     UNOS CPRA   Date Value Ref Range Status   07/16/2021 0  Final

## 2023-02-09 ENCOUNTER — PATIENT OUTREACH (OUTPATIENT)
Dept: NEPHROLOGY | Facility: CLINIC | Age: 23
End: 2023-02-09

## 2023-02-09 ENCOUNTER — VIRTUAL VISIT (OUTPATIENT)
Dept: NEPHROLOGY | Facility: CLINIC | Age: 23
End: 2023-02-09
Attending: PHYSICIAN ASSISTANT
Payer: COMMERCIAL

## 2023-02-09 DIAGNOSIS — I12.9 HYPERTENSION, RENAL: ICD-10-CM

## 2023-02-09 DIAGNOSIS — N18.5 ANEMIA IN STAGE 5 CHRONIC KIDNEY DISEASE, NOT ON CHRONIC DIALYSIS (H): ICD-10-CM

## 2023-02-09 DIAGNOSIS — Z11.59 SCREENING FOR VIRAL DISEASE: ICD-10-CM

## 2023-02-09 DIAGNOSIS — E83.39 HYPERPHOSPHATEMIA: ICD-10-CM

## 2023-02-09 DIAGNOSIS — Z11.1 SCREENING FOR TUBERCULOSIS: Primary | ICD-10-CM

## 2023-02-09 DIAGNOSIS — N18.5 CKD (CHRONIC KIDNEY DISEASE) STAGE 5, GFR LESS THAN 15 ML/MIN (H): Primary | ICD-10-CM

## 2023-02-09 DIAGNOSIS — D63.1 ANEMIA IN STAGE 5 CHRONIC KIDNEY DISEASE, NOT ON CHRONIC DIALYSIS (H): ICD-10-CM

## 2023-02-09 DIAGNOSIS — E87.20 METABOLIC ACIDOSIS: ICD-10-CM

## 2023-02-09 DIAGNOSIS — N18.5 CKD (CHRONIC KIDNEY DISEASE) STAGE 5, GFR LESS THAN 15 ML/MIN (H): ICD-10-CM

## 2023-02-09 DIAGNOSIS — E21.3 HYPERPARATHYROIDISM (H): ICD-10-CM

## 2023-02-09 PROCEDURE — G0463 HOSPITAL OUTPT CLINIC VISIT: HCPCS | Mod: PN,GT | Performed by: PHYSICIAN ASSISTANT

## 2023-02-09 PROCEDURE — 99215 OFFICE O/P EST HI 40 MIN: CPT | Mod: VID | Performed by: PHYSICIAN ASSISTANT

## 2023-02-09 RX ORDER — CALCIUM ACETATE 667 MG/1
667 TABLET ORAL
Qty: 90 TABLET | Refills: 1 | Status: ON HOLD | OUTPATIENT
Start: 2023-02-09 | End: 2024-01-05

## 2023-02-09 NOTE — LETTER
2023       RE: Boogie Villa  [address]  [place] [address]     Dear Colleague,    Thank you for referring your patient, Boogie Villa, to the [facility] NEPHROLOGY [facility]INIC [place] at [facility]. Please see a copy of my visit note below.      Nephrology Clinic   2023    Video-Visit Details    Type of service:  Video Visit    Video Start Time (time video started): 12:33 pm    Video End Time (time video stopped): 12:53 pm    Originating Location (pt. Location): Home        Distant Location (provider location):  On-site    Mode of Communication:  Video Conference via [employer]          Name: Boogie Villa  MRN: [id number]  Age: 22 year old  : 2000  DOS: 2022    Referring provider: Edwin Palomo     Assessment and Plan:   1. 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  Baseline serum creatinine has significantly changed over the past year as kidney function has declined more rapidly.      On  BUN 92, Scr 11.99, eGFR 6  associated with albuminuria of ~3 mg/g.  Decline seems more rapid than expected over the year and may be due to decreased self catheterization though he is adamant he is able to volitionally void and has had no urinary retention.   - Patient denies uremic symptoms but clinically needs dialysis  - PD is preferred modality but not a good option, will require incenter or possibly home hemo?  -Ideally, patient could undergo preemptive kidney transplantation and is now inactive on the kidney transplant waiting list   - recently saw Dr. Martinez and is recommended to get to BMI of 38, currently 40.  - will be completing other requirements  - His mother is completing donor eval in preparation for possible paired donor exchange   - stopped ramipril with lower GFR  - He should continue to  avoid NSAIDs  - Pt is having uremic symptoms of extreme fatigue and brain fog  - recommend initiation of RRT, he prefers to wait one more week so he will qualify for fmla.   - will have nursing staff coordinate CVC placement and admission to HD unit. Plan to discuss with   Dr. Diaz to determine if pt is okay to start on outpatient basis vs inpatient.        2. Hypertension: He has not been checking BP at home, previously slightly above goal of <130/80 though we have no recent home measurements.  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  Last PTH mildly elevated at 308 (July, 2021). Current PTH ~800, Vit D 7  - Continue cholecalciferol at 2000 units daily  - start phos lo 667 mg TID with meals  - continue calcitriol 0.25 mcg daily last visit  - check PTH and Vit D next visit     4. Low bicarb: Bicarb low at 14.  Presumed non-AG metabolic acidosis due to CKD.  He has not been taking sodium bicarb as as prescribed, is taking 4 tabs daily instead of BID. Forgets the evening dose  -encourage sodium bicarb tabs (650 mg) 4 tabs BID for now, rx renewed  -obtain routine CKD labs     5. Anemia: Mild and due to CKD. Hgb has been stable in the 12s, recently 11.5. Iron stores 31%.   -no need for iron or EPO at this time      6. Seasonal Allergies: Patient dose not complain of persistent seasonal allergy related symptoms today. He feels that this is being well managed with Claritin. Instructed him that it is ok to take it daily for a short period of time if needed.  Otherwise, we discussed prescribing Singulair in the future if he continues to be symptomatic.     7. Obesity: Significant increase in weight during 2018. Patient and his mother have inquired about  "the possible effect weight will have on a possible kidney transplant. I discussed the implications obesity has on chronic kidney disease, blood pressure control, and heart disease. Goal BMI for kidney transplant should is 38. Per transplant note, needs to lose ~ 20 lbs.  - he has been more sedentary with increased fatigue    -he is not interested in f/u with our weight loss management clinic     8. Urology: No active issues. Followed by Dr. Melgar    Follow-up: recommend starting dialysis, otherwise is scheduled in March with Dr. Diaz.     Reason For Visit:   CKD follow-up.     HPI:   Boogie Villa is a 22 year old man with a history of posterior urethral valves with correction in the first year of life.  Recurrent episodes of urinary tract infection. Patient was previously followed by Dr. Palomo in pediatric nephrology for chronic kidney disease related to posterior ureteral valves. The only concern was medication compliance and he and his mother previously reported very partial (3-4 days a week) compliance in the past though has not been an issue over the past year.      Interval history:  The patient is feeling extremely fatigued and has brain fog. He is finding it difficult to work. He is expected to f/u with Urology for more urodynamic testing after transplant.  He continues to take his medications regularly except for missing second dose of sodium bicarb.  He has not measured his home BP recently. He denies dyspnea, edema, and urinary retention. He still has a good appetite, no n/v/d. He denies pruritis. He has no LE edema and no shortness of breath.    He has attended the \"Kidney Smart\" class and feels he would prefer PD if dialysis is needed. However, with history of Mitrofanoff surgery he is not really a candidate. He appears to be an appropriate candidate for kidney transplantation and is now inactive on the list until his BMI is 38 or lower. He reports his weight is up due to being sedentary due to " the fatigue.      He is now on Ritalin 20 mg once a day as needed for ADHD.    He does note a fever / chills this morning, no associated symptoms. Taking tylenol which helps.     Review of Systems:   Pertinent items are noted in HPI or as below, remainder of complete ROS is negative.      Active Medications:     Current Outpatient Medications:      amLODIPine (NORVASC) 5 MG tablet, Take 1 tablet (5 mg) by mouth daily, Disp: 90 tablet, Rfl: 3     atenolol (TENORMIN) 25 MG tablet, Take 1.5 tablets (37.5 mg) by mouth daily, Disp: 135 tablet, Rfl: 3     calcitRIOL (ROCALTROL) 0.25 MCG capsule, Take 1 capsule (0.25 mcg) by mouth daily, Disp: 90 capsule, Rfl: 3     Cholecalciferol (VITAMIN D) 50 MCG (2000 UT) CAPS, Take 2,000 unit marking on an U-100 insulin syringe by mouth daily, Disp: 90 capsule, Rfl: 11     methylphenidate (RITALIN) 10 MG tablet, Take 25 mg by mouth, Disp: , Rfl:      sodium bicarbonate 650 MG tablet, Take 4 tablets (2,600 mg) by mouth 2 times daily, Disp: 360 tablet, Rfl: 3     atenolol (TENORMIN) 25 MG tablet, Take 1.5 tablets (37.5 mg) by mouth daily for 14 days, Disp: 21 tablet, Rfl: 0     Allergies:   Dust mites, Mold, and Other [seasonal allergies]      Past Medical History:  Attention deficit disorder    Posterior urethral valves   Lymphangioma  Hypertension  Chronic kidney disease stage 4, GFR 15-29 ml/min (H)    Past Surgical History:  ablation of posterior urethral valves - 2000  ureteral reimplantation with tapering - 2003    Family History:   Reviewed and noncontributory.       Social History:   Presents to clinic alone.  Tobacco Use: Never smoker.  Alcohol Use: Not on file.  PCP: VALENTIN MEEKS    Physical Exam:  Not performed as encounter was a telephone visit.     Laboratory:  CMP  Recent Labs   Lab Test 07/16/21  1650 03/29/21  1333 12/16/20  1611 12/16/20  0000 10/19/20  1614 10/19/20  0000 08/18/20  1735 08/18/20  0000 02/26/20  1520 02/03/20  1451 01/31/20  1154 05/13/19  0712  12/04/18  1054 06/08/18  1256    140 139 139 137   < > 138   < > 140   < >  --    < > 142 148*   POTASSIUM 4.5 4.0 5.0 5.0 4.8   < > 4.6   < > 4.4   < >  --    < > 4.6 4.8   CHLORIDE 116* 114* 114* 114 114*   < > 114*   < > 112*   < >  --    < > 116* 121*   CO2 14* 18* 15* 15 11*   < > 15*   < > 21   < >  --    < > 17* 19*   ANIONGAP 8 9 10 10 12   < > 9   < > 7   < >  --    < > 9 8    90 89 89 99   < > 162*   < > 105*   < >  --    < > 100* 87   BUN 52* 62* 75* 75 77*   < > 50*   < > 56*   < >  --    < > 56* 47*   CR 5.08* 4.48* 5.27* 5.27 5.42*  5.56*   < > 4.45*   < > 3.58*   < >  --    < > 3.33* 2.51*   GFRESTIMATED 15* 17* 14*  --  14*  13*  --  17*   < > 23*   < >  --    < > 24* 34*   GFRESTBLACK  --  20* 17*  --  16*  16*  --  21*  --  27*   < >  --    < > 29* 41*   NABIL 8.5 8.9 9.1 9.1 9.3   < > 9.0   < > 8.2*   < >  --    < > 8.8* 8.5*   MAG  --   --   --   --   --   --   --   --  1.7  --  2.0  --  1.9 1.6   PHOS 5.2*  --  4.6* 4.6 4.8*   < > 3.8   < > 3.9   < >  --    < > 4.7* 4.6   PROTTOTAL  --  7.1  --   --  7.4  --   --   --   --   --   --   --  6.9 6.4*   ALBUMIN 3.0* 3.2* 3.6 3.6 3.8  3.7   < > 3.3*   < > 3.3*   < >  --    < > 3.1* 2.8*   BILITOTAL  --  0.2  --   --  0.4  --   --   --   --   --   --   --  0.2 0.2   ALKPHOS  --  73  --   --  72  --   --   --   --   --   --   --  87 74   AST  --  11  --   --  16  --   --   --   --   --   --   --  20 20   ALT  --  28  --   --  22  --   --   --   --   --   --   --  24 19    < > = values in this interval not displayed.     CBC  Recent Labs   Lab Test 07/16/21  1650 03/29/21  1333 12/16/20  1611 12/16/20  0000 10/19/20  1614 10/19/20  0000   HGB 12.5* 12.4* 12.9* 12.9* 13.2* 13.2*   WBC 7.4 6.6  --   --  7.1 7.1   RBC 4.18* 4.14*  --   --  4.32* 4.32   HCT 37.8* 37.9*  --   --  41.6 41.6   MCV 90 92  --   --  96 96   MCH 29.9 30.0  --   --  30.6 30.6   MCHC 33.1 32.7  --   --  31.7* 31.7   RDW 12.9 12.9  --   --  12.5 12.5    240  --    --  256 256     URINE STUDIES  Recent Labs   Lab Test 03/29/21  1404 12/03/20  1525 10/19/20  1645 05/13/19  0728 12/04/18  1056   COLOR Straw Yellow Yellow Straw Straw   APPEARANCE Clear Clear Clear Clear Clear   URINEGLC 50* 100* 50 mg/dL* 50* Negative   URINEBILI Negative Negative Negative Negative Negative   URINEKETONE Negative Negative Negative Negative Negative   SG 1.008 1.025 1.005 1.008 1.006   UBLD Negative Small* Small* Negative Trace*   URINEPH 6.0 7.0 6.0 7.0 6.5   PROTEIN >499* >300* >=500 mg/dL* >499* 100*   UROBILINOGEN  --  0.2 <2.0 E.U./dL  --   --    NITRITE Negative Negative Negative Negative Negative   LEUKEST Negative Negative Negative Negative Negative   RBCU 0  --  0-2 <1 <1   WBCU <1  --  0-5 <1 1     Recent Labs   Lab Test 02/26/20  1530 11/25/19  0904 05/13/19  0728 12/04/18  1056 06/08/18  1310 02/10/17  1004 08/26/16  1150 02/19/16  1000   UTPG 4.31* 4.92* 4.31* 4.59* 6.69* 2.78* 2.45* 2.17*     PTH  Recent Labs   Lab Test 07/16/21  1650 12/16/20  1611 12/16/20  0000 10/19/20  1614 02/26/20  1520 11/25/19  0859 05/13/19  0712 12/04/18  1054 06/08/18  1256 01/16/18  1102 08/09/17  1626 02/10/17  1002 08/26/16  1245 02/19/16  0955   PTHI 308* 94* 94 131* 201* 154* 110* 177* 96* 133* 66 39 47 42     IRON STUDIES   Recent Labs   Lab Test 02/26/20  1520 05/13/19  0712 06/08/18  1256 01/16/18  1102 08/09/17  1626 02/10/17  1002   IRON 66 71 87 116 93 54    269 257 290 284 297   IRONSAT 26 26 34 40 33 18   DAVID 133 109 82 86 100 113                   All above labs reviewed by me.     LIGIA SHOOKC     Visit length 20 minutes. An additional 20 minutes were spent on date of service in chart review, documentation, and other activities as noted.

## 2023-02-09 NOTE — PATIENT INSTRUCTIONS
Recommend starting dialysis as soon as possible.   Nursing staff will help coordinate access placement and admission to dialysis unit.   In the mean time, start calcium acetate to help with phosphorus levels. Take one tablet with breakfast, lunch, and dinner.   Limit fluids to 1.5 liters per day, follow low potassium, low phosphorus, and low sodium diet.   Haley will reach out to you to help with fmla paper work, and to help coordinate dialysis access placement and admission to dialysis unit.   Please call with any questions or concerns.

## 2023-02-09 NOTE — PROGRESS NOTES
Dayana Jim PA-C Forrest, Rebecca J, RN Hi Becky,     Unfortunately Boogie's Bun is ~90, Scr is up to 11 and eGFR is 6. He met with Dr. Martinez yesterday and was told PD is not an option due to history of mitrofanoff. I am waiting to hear back from [facility] to see if he is stable enough to start on outpatient basis vs inpatient. His only uremic symptoms are extreme fatigue and brain fog. His phos is really high too. I started him on calcium acetate.  So a couple things:   Can you send him info on low phos diet?   He wants to apply for fmla through work but needs to work one more week to qualify. Can you give him the number to send the forms to?   Figure out where he's going to dialyze and start admission process and tentatively schedule CVC placement?     I'll update you once I talk to [facility].      ==============================================================================================    Spoke with both Boogie and his mother, Darcy by phone. We talked about process of starting dialysis and what that looks like. They would like referral sent to [facility] in [place], no preference on day or time.  I explained that he will have to go back to [facility] lab soon to have Quantiferon Gold drawn, hep b panel (lab orders faxed to [facility]) and explained that it can take 2-3 days for results to come back so need it done as soon as possible. We talked about CVC placement. Writer states she is still waiting on Dr. Diaz to see if he is ok with Boogie starting outpatient vs inpatient. If outpatient ok, we would try to set up CVC at [facility] as it is closer to home for them.  Writer provided contact information for Boogie to send his FMLA paperwork so that it could be filled out by provider. Writer also sent him information on a low phosphorous diet via [employer]. All questions answered for Meri at this time.    Referral sent to [facility] admissions for [facility]. Labs  faxed to [facility] for hep b panel/Quant gold. Will check with Dayana tomorrow about setting up CVC placement or not.

## 2023-02-10 DIAGNOSIS — N18.9 CHRONIC KIDNEY DISEASE (CKD): Primary | ICD-10-CM

## 2023-02-10 DIAGNOSIS — Z76.82 ORGAN TRANSPLANT CANDIDATE: ICD-10-CM

## 2023-02-10 RX ORDER — CALCIUM ACETATE 667 MG/1
667 CAPSULE ORAL
Qty: 90 CAPSULE | Refills: 0 | Status: SHIPPED | OUTPATIENT
Start: 2023-02-10 | End: 2023-07-05

## 2023-02-10 NOTE — PROGRESS NOTES
Received a call from Boogie today. He had questions on upcoming labs needed for dialysis as well as his new medication- calcium acetate.  He plans on getting labs this afternoon at [facility]. Writer explained that she did send the hep b and quant gold orders to them. He is also asking for a 30 day supply of his calcium acetate to be sent to [facility] pharmacy as his mail order pharmacy can sometimes take weeks to get him new scripts and doesn't want to go without for that long. Writer also did inform him that we can start him outpatient, he does not need to be initiated inpatient for dailysis.  He was happy to hear this.  Writer has received confirmation that there is a MWF, 1st shift available at [facility].  Boogie is not thrilled about the time as he is worried about getting a ride that early in the a.m. and also that he is not used to getting up that early.  Writer explained that he can ask to be put on list to get a later time at [facility] or writer can inquire with Maryann [facility] about any availability that they might have.  He verbalized he would like writer to look into that.    Anticipate hep b/ quant gold to be back early next week- will fax to [facility] once resulted. (2/13- Hep B and Quant gold results faxed to [facility] admissions).    Will check with IR early next week regarding CVC placement if Boogie has not been scheduled as of yet.    Writer sent message to [facility] admissions inquiring about any other spots available at [facility] or [facility]. (2/13- [place] back from [facility] admissions- the 1st shift spot is no longer available but the 3rd shift is - approximate start time of 1:45 p.m.).    Sent 30 day script to [facility] for calcium acetate.

## 2023-02-10 NOTE — PROGRESS NOTES
Called pt to discuss orders have been placed for weight management clinic. Scheduling should be reaching out to arrange. Will update visit with SW once closer to goal weight. Pt verbalized understanding of information and has no further questions. Encouraged to reach out if questions arise.

## 2023-02-13 ENCOUNTER — PATIENT OUTREACH (OUTPATIENT)
Dept: NEPHROLOGY | Facility: CLINIC | Age: 23
End: 2023-02-13
Payer: COMMERCIAL

## 2023-02-16 ENCOUNTER — PATIENT OUTREACH (OUTPATIENT)
Dept: NEPHROLOGY | Facility: CLINIC | Age: 23
End: 2023-02-16
Payer: COMMERCIAL

## 2023-02-16 ENCOUNTER — HOSPITAL ENCOUNTER (OUTPATIENT)
Dept: INTERVENTIONAL RADIOLOGY/VASCULAR | Facility: CLINIC | Age: 23
Discharge: HOME | End: 2023-02-16
Attending: PHYSICIAN ASSISTANT | Admitting: PHYSICIAN ASSISTANT
Payer: COMMERCIAL

## 2023-02-16 VITALS
SYSTOLIC BLOOD PRESSURE: 113 MMHG | RESPIRATION RATE: 16 BRPM | OXYGEN SATURATION: 98 % | DIASTOLIC BLOOD PRESSURE: 56 MMHG | HEART RATE: 74 BPM

## 2023-02-16 DIAGNOSIS — N18.5 CKD (CHRONIC KIDNEY DISEASE) STAGE 5, GFR LESS THAN 15 ML/MIN (H): ICD-10-CM

## 2023-02-16 DIAGNOSIS — N18.5 CKD (CHRONIC KIDNEY DISEASE) STAGE 5, GFR LESS THAN 15 ML/MIN (H): Primary | ICD-10-CM

## 2023-02-16 PROCEDURE — 77001 FLUOROGUIDE FOR VEIN DEVICE: CPT

## 2023-02-16 PROCEDURE — 272N000602 HC WOUND GLUE CR1

## 2023-02-16 PROCEDURE — 250N000009 HC RX 250: Performed by: RADIOLOGY

## 2023-02-16 PROCEDURE — 250N000011 HC RX IP 250 OP 636: Performed by: RADIOLOGY

## 2023-02-16 PROCEDURE — 99152 MOD SED SAME PHYS/QHP 5/>YRS: CPT

## 2023-02-16 PROCEDURE — 250N000011 HC RX IP 250 OP 636: Performed by: PHYSICIAN ASSISTANT

## 2023-02-16 PROCEDURE — C1769 GUIDE WIRE: HCPCS

## 2023-02-16 PROCEDURE — 272N000500 HC NEEDLE CR2

## 2023-02-16 PROCEDURE — 258N000003 HC RX IP 258 OP 636: Performed by: PHYSICIAN ASSISTANT

## 2023-02-16 RX ORDER — LIDOCAINE 40 MG/G
CREAM TOPICAL
Status: DISCONTINUED | OUTPATIENT
Start: 2023-02-16 | End: 2023-02-17 | Stop reason: HOSPADM

## 2023-02-16 RX ORDER — FLUMAZENIL 0.1 MG/ML
0.2 INJECTION, SOLUTION INTRAVENOUS
Status: DISCONTINUED | OUTPATIENT
Start: 2023-02-16 | End: 2023-02-17 | Stop reason: HOSPADM

## 2023-02-16 RX ORDER — FENTANYL CITRATE 50 UG/ML
25-50 INJECTION, SOLUTION INTRAMUSCULAR; INTRAVENOUS EVERY 5 MIN PRN
Status: DISCONTINUED | OUTPATIENT
Start: 2023-02-16 | End: 2023-02-17 | Stop reason: HOSPADM

## 2023-02-16 RX ORDER — HEPARIN SODIUM 1000 [USP'U]/ML
1-10 INJECTION, SOLUTION INTRAVENOUS; SUBCUTANEOUS ONCE
Status: COMPLETED | OUTPATIENT
Start: 2023-02-16 | End: 2023-02-16

## 2023-02-16 RX ORDER — NALOXONE HYDROCHLORIDE 0.4 MG/ML
0.2 INJECTION, SOLUTION INTRAMUSCULAR; INTRAVENOUS; SUBCUTANEOUS
Status: DISCONTINUED | OUTPATIENT
Start: 2023-02-16 | End: 2023-02-17 | Stop reason: HOSPADM

## 2023-02-16 RX ORDER — NALOXONE HYDROCHLORIDE 0.4 MG/ML
0.4 INJECTION, SOLUTION INTRAMUSCULAR; INTRAVENOUS; SUBCUTANEOUS
Status: DISCONTINUED | OUTPATIENT
Start: 2023-02-16 | End: 2023-02-17 | Stop reason: HOSPADM

## 2023-02-16 RX ORDER — CEFAZOLIN SODIUM IN 0.9 % NACL 3 G/100 ML
3 INTRAVENOUS SOLUTION, PIGGYBACK (ML) INTRAVENOUS
Status: COMPLETED | OUTPATIENT
Start: 2023-02-16 | End: 2023-02-16

## 2023-02-16 RX ORDER — SODIUM CHLORIDE 9 MG/ML
INJECTION, SOLUTION INTRAVENOUS CONTINUOUS
Status: DISCONTINUED | OUTPATIENT
Start: 2023-02-16 | End: 2023-02-17 | Stop reason: HOSPADM

## 2023-02-16 RX ADMIN — MIDAZOLAM HYDROCHLORIDE 1 MG: 1 INJECTION, SOLUTION INTRAMUSCULAR; INTRAVENOUS at 14:33

## 2023-02-16 RX ADMIN — CEFAZOLIN 3 G: 10 INJECTION, POWDER, FOR SOLUTION INTRAVENOUS at 14:04

## 2023-02-16 RX ADMIN — FENTANYL CITRATE 50 MCG: 50 INJECTION, SOLUTION INTRAMUSCULAR; INTRAVENOUS at 14:33

## 2023-02-16 RX ADMIN — LIDOCAINE HYDROCHLORIDE 10 ML: 10 INJECTION, SOLUTION INFILTRATION; PERINEURAL at 14:41

## 2023-02-16 RX ADMIN — MIDAZOLAM HYDROCHLORIDE 1 MG: 1 INJECTION, SOLUTION INTRAMUSCULAR; INTRAVENOUS at 14:32

## 2023-02-16 RX ADMIN — HEPARIN SODIUM 2500 UNITS: 1000 INJECTION INTRAVENOUS; SUBCUTANEOUS at 14:41

## 2023-02-16 RX ADMIN — FENTANYL CITRATE 50 MCG: 50 INJECTION, SOLUTION INTRAMUSCULAR; INTRAVENOUS at 14:32

## 2023-02-16 NOTE — IP AVS SNAPSHOT
[facility] Interventional Radiology  [address] [address]  Phone: [phone]  Fax: [phone]                                    After Visit Summary   2/16/2023    Boogie Villa   MRN: [id number]           After Visit Summary Signature Page    I have received my discharge instructions, and my questions have been answered. I have discussed any challenges I see with this plan with the nurse or doctor.    ..........................................................................................................................................  Patient/Patient Representative Signature      ..........................................................................................................................................  Patient Representative Print Name and Relationship to Patient    ..................................................               ................................................  Date                                   Time    ..........................................................................................................................................  Reviewed by Signature/Title    ...................................................              ..............................................  Date                                               Time          22EPIC Rev 08/18

## 2023-02-16 NOTE — PROGRESS NOTES
Patient Name: Boogie Villa  Medical Record Number: [id number]  Today's Date: 2/16/2023    Procedure: IR right tunneled dialysis catheter placement  Proceduralist: Dr. Wise    Procedure Start: 1427  Procedure end: 1447  Sedation medications administered: 2 mg midazolam and 100 mcg fentanyl   Sedation time: 20 minutes    Other Notes: Pt arrived to IR room 1 from Pre/post bay 1. Consent reviewed. Pt denies any questions or concerns regarding procedure. Pt positioned supine and monitored per protocol. Pt tolerated procedure without any noted complications. VSS on RA. Pt transferred back to Pre/post bay 1.

## 2023-02-16 NOTE — PROGRESS NOTES
Interventional Radiology - Pre Procedure Chart Review  2/16/2023      Tunneled HD catheter placement procedure requested by Dayana Jim PA-C.    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  Baseline serum creatinine has significantly changed over the past year as kidney function has declined more rapidly). Nephrology recommending HD initiation. Presenting for tunneled HD catheter placement.    Clinical notes reviewed: See note by Dayana iJm PA-C 2/9/23 - Nephrology clinic note    Pertinent medications reviewed: ancef ordered for procedure.    Allergies   Allergen Reactions     Dust Mites      Runny nose and watery eyes     Mold      Runny nose and watery eyes     Other [Seasonal Allergies]      Grass, Ragweed - gets runny nose and watery eyes         LABS:  INR (no units)   Date Value   03/29/2021 1.06       Lab Results   Component Value Date    WBC 7.4 07/16/2021    HGB 12.5 (L) 07/16/2021     07/16/2021         PLAN:  Planning for tunneled HD catheter placement in IR. Radiologist to further review procedure with patient.    Elba Borden PA-C  Interventional Radiology  [phone]

## 2023-02-16 NOTE — PROGRESS NOTES
Spoke with Boogie regarding his CVC placement today and starting dialysis tomorrow at [facility]. Sent him a [employer] message as well regarding everything we talked about. Will check in with him Monday to see how his first run went and then will resolve CKD Journey and removed self from care team. Dr. Diaz notified of plan and need to call dialysis orders to [facility].  Reason for Call    Call to patient to discuss outpatient hemodialysis arrangements and details.  Dialysis unit admission procedure initiated via [facility].    Plan    1. You will be dialyzing at [facility]   located in [facility], phone # [phone].  Your schedule will be MWF at 2:00.  2. Bring the following to your first treatment:  Insurance card/s, two forms of identification, all current medications/vitamin bottles you are taking  3. Bring to all treatments:  A pillow and blanket, a book, computer/tablet (all clinics have WiFI), or something else to do during your treatments to help pass the time.  4. You will no longer be followed in General Nephrology at [facility].  Please cancel any future appointments you have made.  5. Dr. Tessa MD will be your Nephrologist and will see you at the facility at least once a month.   6. All of your medication refills will be done by the Nephrologist and nursing staff at the dialysis clinic.  Please have your pharmacy forward them to the dialysis clinic.    Patient was given an opportunity to ask questions and have those questions answered to their satisfaction.  Patient verbalized understanding of instructions provided and agreed to plan of care.    Thank you for choosing [facility].  Please contact me via [employer] or call me at the number below if you have any questions or concerns.  I will be happy to assist you in any way I can.    GLORIA PALOMARES, RN 2/16/2023 10:08 AM

## 2023-02-17 DIAGNOSIS — N18.5 CKD (CHRONIC KIDNEY DISEASE) STAGE 5, GFR LESS THAN 15 ML/MIN (H): Primary | ICD-10-CM

## 2023-02-17 RX ORDER — FOLIC ACID/VIT B COMPLEX AND C 0.8 MG
1 TABLET ORAL DAILY
Qty: 90 TABLET | Refills: 3 | Status: ON HOLD | OUTPATIENT
Start: 2023-02-17 | End: 2024-01-05

## 2023-02-17 RX ORDER — VIT B COMP NO.3/FOLIC/C/BIOTIN 1 MG-60 MG
1 TABLET ORAL DAILY
Status: DISCONTINUED | OUTPATIENT
Start: 2023-02-17 | End: 2023-02-17

## 2023-02-20 ENCOUNTER — PATIENT OUTREACH (OUTPATIENT)
Dept: NEPHROLOGY | Facility: CLINIC | Age: 23
End: 2023-02-20
Payer: COMMERCIAL

## 2023-02-22 DIAGNOSIS — N18.6 ESRD (END STAGE RENAL DISEASE) (H): Primary | ICD-10-CM

## 2023-02-22 DIAGNOSIS — Z76.82 ORGAN TRANSPLANT CANDIDATE: ICD-10-CM

## 2023-03-03 ENCOUNTER — TELEPHONE (OUTPATIENT)
Dept: TRANSPLANT | Facility: CLINIC | Age: 23
End: 2023-03-03
Payer: COMMERCIAL

## 2023-03-09 ENCOUNTER — TELEPHONE (OUTPATIENT)
Dept: TRANSPLANT | Facility: CLINIC | Age: 23
End: 2023-03-09
Payer: COMMERCIAL

## 2023-03-09 NOTE — TELEPHONE ENCOUNTER
"Called pt today to review the Kidney Paired Donation Recipient Consent (revision date: 12/1/2015) and the Advanced Donor Voucher Recipient Consent Version 3.3.     I answered questions regarding typical time waiting for a match offer in KPD and informed them that ultimately waiting time is unknown and it could be 3 to 6 months even with preference in the NKR system as a voucher garcia.       Reviewed details pertaining to the Paired Exchange program: National Kidney Registry (NKR).                                 Discussed with patient the matching procedure and logistics of NKR.  Including that the pair/indiviudal cannot choose their match.  Reviewed our program's policy regarding communication between recipient and their matched donor.  Informed pt that only if both parties are willing to exchange identities, we can facilitate that, but must require a waiver.  I reviewed that shipping of the kidney is a process that is monitored closely but is not without risk. In the event that the kidney is damaged or lost that the programs managing the exchange try to repair broken chains but there is no guarantee that they would receive a kidney.   Reviewed with the patient that at any time they can withdraw from the KPD program or refuse a match offer.       Pt was sent docusign via e-mail, and pt signed consent forms for KPD and TRES.     Reviewed The Advanced Donation Program as follows:       \"ADP\" allows an acceptable (as determined by the transplant center) Intended Donor \"ID\" to donate their kidney via human organ paired donation (more commonly referred to as an exchange or a swap) before their Voucher Garcia \"VH\" receives a transplant. Once the ID's donation has occurred, the VH(s) may be activated by their transplant center for matching by the National Kidney Registry (NKR). I understand that my ID would like to participate in the ADP to donate a kidney to provide a voucher for me. This donation gives one Voucher " "Garcia an opportunity to receive a living donor kidney through the Standard Voucher option, in which the Voucher Garcia is likely to need a kidney transplant within 1 year. I consent to the release of my health, medical, and personal information to the NKR for the purpose of participating in the ADP. I authorize the NKR to disclose, share and use my health, medical and personal information in conducting the ADP, and I waive any and all privacy law claims in the use of this information as part of the ADP. I understand that this form must be completed and returned at least 3 weeks before the ID's scheduled surgery date. I am willing to undergo the identity verification process if I receive a kidney through this program. I understand that this Voucher is non-transferable, and non-assignable. The ADP program is unrelated to the [place]  donor system and participation in the ADP program does not give any wait time points for the VH in the  donor system. Should a voucher ever need to be redeemed, the patient is responsible for the transplant related costs.     We reviewed the circumstances which may prevent pt from receiving a kidney:     _ __ A situation whereby I become medically unable to undergo transplant surgery.  _ _ NKR's inability to find an acceptable compatible donor.  _ __ The NKR may unexpectedly shut down.  _ __ Having a blood type of \"O\" can often delay a match offer for 1 - 2 years or more after activation.  _ __ A sensitization event that increases your NKR cPRA (e.g. blood transfusion, pregnancy, prior transplant, etc.).  _ __ Having an increased NKR cPRA can delay a match offer for 1 - 2 years or more after activation.  _ __ Having an NKR cPRA of = 99% may result in never finding a match.  _ __ Unforeseen circumstances such as an act of nature.  _ _ Your Transplant Center not accepting potential donors, once you are activated for matching.      Pt was sent docusign via e-mail, and pt signed " the NKR Advanced Voucher Recipient Consent Version 3.3.     Patient's activation in NKR is pending their donor's advanced donation. Pt verbalized understanding of information and has no further questions. Encouraged to reach out if questions arise.        Trisha Blankenship RN, BSN  Kidney Transplant Waitlist Coordinator

## 2023-03-13 ENCOUNTER — PATIENT OUTREACH (OUTPATIENT)
Dept: NEPHROLOGY | Facility: CLINIC | Age: 23
End: 2023-03-13
Payer: COMMERCIAL

## 2023-03-13 DIAGNOSIS — Z01.818 ENCOUNTER FOR OTHER PREPROCEDURAL EXAMINATION: Primary | ICD-10-CM

## 2023-03-13 NOTE — PROGRESS NOTES
Dialysis Access Referral     Pt referred for Dialysis Access Surgical Consult.    Referring provider: self referral/ dialysis clinic    Patient is ready for access surgery.    Patient prefers In-Center Hemo.    Patient IS NOT a PD candidate.    Patient had been previously referred for Transplant.     Request sent to scheduling to schedule with Dr. Martinez .     Vein mapping was ordered, will need scheduling.     Patient IS aware dialysis access referral has been made and IS expecting appointments to be scheduled.    Neph Tracking has been updated.    Neph Tracking Flowsheet Last Filled Values     CKD Education Status Complete    CKD Education Type Kidney Smart Modality Education; Kidney Smart CKD Basics    Preferred Modality Hemodialysis    Final Modality Hemodialysis  [facility]    Patient's Referral Dates Auto Populate Patient's Referral Dates    Dietician Referral 3/29/21    Journey Referral 2/1/23    Vein Mapping/US Order 3/13/23    Access Surgeon Referral Status  Referred    Dialysis Access Referral 03/13/23  fistula consult with Dr. Martinez    Diaylsis Access Type CVC    Transplant Evaluation Referral 10/22/20    Transplant Status  Referred    Initiation of Dialysis Outpatient        Dialysis History      Start End Type Center Comments    2/17/2023  In-center Hemodialysis [facility]IALYS[facility] ([facility])               MALCOLM ZIMMERMAN RN   Dialysis Access Care Coordinator  Phone: [phone]  Pool: P_Dialysis_Access_Nurse

## 2023-03-24 DIAGNOSIS — E83.39 HYPERPHOSPHATEMIA: ICD-10-CM

## 2023-03-27 RX ORDER — CALCIUM ACETATE 667 MG/1
667 CAPSULE ORAL
Qty: 90 CAPSULE | Refills: 0 | OUTPATIENT
Start: 2023-03-27

## 2023-04-11 ENCOUNTER — HOSPITAL ENCOUNTER (OUTPATIENT)
Dept: ULTRASOUND IMAGING | Facility: CLINIC | Age: 23
Discharge: HOME | End: 2023-04-11
Admitting: SURGERY
Payer: COMMERCIAL

## 2023-04-11 DIAGNOSIS — Z01.818 ENCOUNTER FOR OTHER PREPROCEDURAL EXAMINATION: ICD-10-CM

## 2023-04-11 PROCEDURE — 93970 EXTREMITY STUDY: CPT

## 2023-04-15 ENCOUNTER — HEALTH MAINTENANCE LETTER (OUTPATIENT)
Age: 23
End: 2023-04-15

## 2023-04-25 ENCOUNTER — PATIENT OUTREACH (OUTPATIENT)
Dept: NEPHROLOGY | Facility: CLINIC | Age: 23
End: 2023-04-25
Payer: COMMERCIAL

## 2023-04-25 ENCOUNTER — OFFICE VISIT (OUTPATIENT)
Dept: TRANSPLANT | Facility: CLINIC | Age: 23
End: 2023-04-25
Attending: SURGERY
Payer: COMMERCIAL

## 2023-04-25 VITALS
SYSTOLIC BLOOD PRESSURE: 149 MMHG | HEART RATE: 73 BPM | BODY MASS INDEX: 40.29 KG/M2 | WEIGHT: 288.9 LBS | OXYGEN SATURATION: 98 % | DIASTOLIC BLOOD PRESSURE: 81 MMHG

## 2023-04-25 DIAGNOSIS — N18.6 ESRD (END STAGE RENAL DISEASE) (H): Primary | ICD-10-CM

## 2023-04-25 PROCEDURE — 99215 OFFICE O/P EST HI 40 MIN: CPT | Performed by: SURGERY

## 2023-04-25 PROCEDURE — G0463 HOSPITAL OUTPT CLINIC VISIT: HCPCS | Performed by: SURGERY

## 2023-04-25 RX ORDER — DIFLUPREDNATE OPHTHALMIC 0.5 MG/ML
EMULSION OPHTHALMIC
COMMUNITY
Start: 2023-04-04 | End: 2024-02-24

## 2023-04-25 RX ORDER — METHYLPHENIDATE HYDROCHLORIDE 20 MG/1
20 TABLET ORAL PRN
COMMUNITY
Start: 2022-07-29 | End: 2023-07-05

## 2023-04-25 RX ORDER — TRIAMCINOLONE ACETONIDE 5 MG/G
CREAM TOPICAL
COMMUNITY
Start: 2023-03-22 | End: 2023-07-05

## 2023-04-25 NOTE — LETTER
4/25/2023         RE: Boogie Villa  [address] [place] [id number]        Dear Colleague,    Thank you for referring your patient, Boogie Villa, to the [facility]. Please see a copy of my visit note below.    Dialysis Access Service  Consult Note    Referred by Dr. Zaarte for placement of permanent dialysis access.    HPI: Mr. 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  He is right handed. Mr. Villa is dialyzing at [facility] ([facility])  [address] [place] [id number].      Risk factors for vascular access:         Yes No  Hx of CVC    [x]    []   Comment:   Hx of PICC line         [x]     []  Comment:   Hx of Pacemaker    [x]     []  Comment:   History of failed access:  [x]         []  Comment:  SVC syndrome   [x]      []  Comment:  Heart Failure    [x]     []  EF:    Periph arterial disease  [x]     []  Comment:  Prior Fracture/Surgery  [x]     []  Location:   DVT    [x]    []   Location:  Diabetes    [x]        []  Comment:  Neuropathy   [x]     []  Comment:   Anticoagulation:   [x]    []  Agent:      Anticoagulation contraindication:[x]  []     Details:                   Pediatric    [x]         []  Age:                  Hx of transplant   [x]    []  Comment:     Current immunosuppression [x]    []  Comment:            ROS: 10 point ROS neg other than the symptoms noted above in the HPI.        Past Medical History:   Diagnosis Date    ADD (attention deficit disorder)     CKD (chronic kidney disease)     Hypertension 2000    Posterior urethral valves        Past Surgical History:   Procedure Laterality Date    ABDOMEN SURGERY  2003    Bilateral urereral tapering and re-implantation    ABDOMEN SURGERY  2008    Mitrofanoff    ablation of posterior urethral valves  2000    APPENDECTOMY  2008    IR CVC TUNNEL PLACEMENT > 5 YRS OF AGE  2/16/2023     ureteral reimplantation with tapering  2003       No family history on file.    Social History     Tobacco Use    Smoking status: Never    Smokeless tobacco: Never   Vaping Use    Vaping status: Not on file   Substance Use Topics    Alcohol use: Never         Current Outpatient Medications:     amLODIPine (NORVASC) 5 MG tablet, Take 1 tablet (5 mg) by mouth daily, Disp: 90 tablet, Rfl: 3    atenolol (TENORMIN) 25 MG tablet, Take 1.5 tablets (37.5 mg) by mouth daily, Disp: 135 tablet, Rfl: 3    B Complex-C-Folic Acid (DIALYVITE 800) 0.8 MG TABS, Take 1 tablet by mouth daily, Disp: 90 tablet, Rfl: 3    calcitRIOL (ROCALTROL) 0.25 MCG capsule, Take 1 capsule (0.25 mcg) by mouth daily, Disp: 90 capsule, Rfl: 3    calcium acetate (PHOSLO) 667 MG CAPS capsule, Take 1 capsule (667 mg) by mouth 3 times daily (with meals), Disp: 90 capsule, Rfl: 0    methylphenidate (RITALIN) 20 MG tablet, Take 20 mg by mouth, Disp: , Rfl:     triamcinolone (ARISTOCORT HP) 0.5 % external cream, , Disp: , Rfl:     atenolol (TENORMIN) 25 MG tablet, Take 1.5 tablets (37.5 mg) by mouth daily for 14 days, Disp: 21 tablet, Rfl: 0    Calcium Acetate 667 MG TABS, Take 667 mg by mouth 3 times daily (with meals), Disp: 90 tablet, Rfl: 1    Cholecalciferol (VITAMIN D) 50 MCG (2000 UT) CAPS, Take 2,000 unit marking on an U-100 insulin syringe by mouth daily, Disp: 90 capsule, Rfl: 11    difluprednate (DUREZOL) 0.05 % ophthalmic emulsion, INSTILL 1 DROP INTO EACH EYE SIX TIMES DAILY FOR THE FIRST 24 HOURS. THEN 1 DROP 4 TIMES DAILY UNTIL FOLLOW UP, Disp: , Rfl:     methylphenidate (RITALIN) 10 MG tablet, Take 25 mg by mouth Prn, Disp: , Rfl:                         PHYSICAL EXAM:  Pulse:  [73] 73  BP: (149)/(81) 149/81  SpO2:  [98 %] 98 %  Constitutional: healthy, alert and cooperative  HEENT: sclera anicteric, MMM, conjunctiva pink  Chest: HD catheter present on the right side.  CV:  NSR  : No CVA tenderness  Abdomen: old incision   Skin:  No rashes  or jaundice  Neuro: normal gait  Psych: normal mood and affect  EXTREMITY EXAM:   Vein Exam: Obvious suitable veins?    Left arm: YES   []           NO  [x]       Comment:    Right arm: YES   []           NO  [x]       Comment:   Arterial Exam:   Radial   L: 3+ R: 3+   Ulnar   L: 3+;R: 3+  Patent Palmar arch  L: YES   [x]  NO   []       R: YES   [x]   NO   []        Capillary refill:  L: <1sec, R:<1sec    Sensory exam:   Left hand: Normal   [x]       Abnormal   []     Comment:    Right hand: Normal   [x]       Abnormal   []     Comment:     Motor exam normal:   Left hand: Normal   [x]       Abnormal   []     Comment:     Right hand: Normal   [x]       Abnormal   []     Comment:                       Mapping Reviewed:  Will need repeat vein mapping    Assessment & Plan: Mr. Villa is a good candidate for placement of permanent dialysis access.  I would recommend left AVF to be determined creation under General. Unclear vein mapping. Will plan to vein map in the OR.    The surgical risks and benefits were reviewed and questions were answered. We discussed the day of surgery plan, anesthesia, postop care, risk of infection, numbness, injury to surrounding structures, bleeding, thrombosis, steal syndrome, possible need for future angioplasty or surgical revision, as well as nonmaturation or need for site abandonment. This was contrasted with morbidity and mortality risk of long-term catheter based hemodialysis access. The patient does wish to proceed with surgery for permanent access creation at this time.  The patient was counselled to contact one of the nurse coordinators, Kathy Medley RN or Ivis Springer RN at [phone] with any questions or concerns.  Thank you for the opportunity to participate in Mr. Villa's care.        Nita Martinez MD FACS  Assistant Professor of Surgery  Director, Living Kidney Donor Program.      Again, thank you for allowing me to participate in the care of your  patient.        Sincerely,        Nita Martinez MD, MD

## 2023-04-25 NOTE — PROGRESS NOTES
REASON FOR VISIT:                                                          -     Patient presents to clinic for AV fistula consult with Dr. Martinez. Patient is ESRD secondary to renal dysplasia secondary to posterior urethral valves (s/p resection, ureteral reimplantation and Mitrofanoff).     Patient accompanied by pt father.     TRES to [facility] signed.    SITUATION/BACKROUND:                                              -      Pt interested in an AV fistula.  Pt is on the paired exchange kidney transplant list but not yet active d/t need for weight loss (BMI <38).  Pt has been referred to weight management clinic and was encouraged to schedule an appointment with them ASAP.    Neph Tracking Flowsheet Last Filled Values     CKD Education Status Complete    CKD Education Type Kidney Smart Modality Education; Kidney Smart CKD Basics    Preferred Modality Hemodialysis    Final Modality Hemodialysis  [facility]    Patient's Referral Dates Auto Populate Patient's Referral Dates    Dietician Referral 3/29/21    Journey Referral 2/1/23    Vein Mapping/US Order 3/13/23    Vein Mapping/US  04/11/23    Access Surgeon Referral Status  Referred    Dialysis Access Referral 03/13/23  fistula consult with Dr. Martinez    Access Surgical Consult 04/25/23  Fistula consult with Dr. Martinez    Diaylsis Access Type CVC  RIJ    Dialysis Access Surgery --  4/25/23 request sent to schedule LUE vs RUE AVF surgery under general anesthesia.  Preference LUE.  Will need pre-op H&P with PCP    Transplant Evaluation Referral 10/22/20    Transplant Status  Referred    Initiation of Dialysis Outpatient        Dialysis History      Start End Type Center Comments    2/17/2023  In-center Hemodialysis [facility] ([facility]) F Nephrologist: Dr. Zarate with [facility]              ASSESSMENT:                                                                    -     Dialysis access screening:     Diabetic: No    Physical  limitations: No    Cognitive limitations: No    Storage space limitations: No    Dominant hand: right    Pt reports tingling in hands during dialysis at times.  Tingling resolves when pt does hand squeezes.    Previous/current dialysis access:    Access type: RIJ CVC    Placement date:  2/16/23    Removal date:  n/a    Surgeon:  IR    Outcome:  Currently using for HD    Relevant diagnoses/surgical history:  n/a    On immunosuppressant, anticoagulation, or antiplatelet medication(s):      Immunosuppressant(s):  n/a     Anticoagulation(s): n/a    Antiplatelet(s): n/a    Last Renal Panel:  Sodium   Date Value Ref Range Status   07/16/2021 138 136 - 145 mmol/L Final   03/29/2021 140 133 - 144 mmol/L Final     Potassium   Date Value Ref Range Status   07/16/2021 4.5 3.5 - 5.0 mmol/L Final   03/29/2021 4.0 3.4 - 5.3 mmol/L Final     Chloride   Date Value Ref Range Status   07/16/2021 116 (H) 98 - 107 mmol/L Final   03/29/2021 114 (H) 94 - 109 mmol/L Final     Carbon Dioxide   Date Value Ref Range Status   03/29/2021 18 (L) 20 - 32 mmol/L Final     Carbon Dioxide (CO2)   Date Value Ref Range Status   07/16/2021 14 (L) 22 - 31 mmol/L Final     Anion Gap   Date Value Ref Range Status   07/16/2021 8 5 - 18 mmol/L Final   03/29/2021 9 3 - 14 mmol/L Final     Glucose   Date Value Ref Range Status   07/16/2021 106 70 - 125 mg/dL Final   03/29/2021 90 70 - 99 mg/dL Final     Urea Nitrogen   Date Value Ref Range Status   07/16/2021 52 (H) 8 - 22 mg/dL Final   03/29/2021 62 (H) 7 - 30 mg/dL Final     Creatinine   Date Value Ref Range Status   07/16/2021 5.08 (H) 0.70 - 1.30 mg/dL Final   03/29/2021 4.48 (H) 0.66 - 1.25 mg/dL Final     GFR Estimate   Date Value Ref Range Status   07/16/2021 15 (L) >60 mL/min/1.73m2 Final     Comment:     As of July 11, 2021, eGFR is calculated by the CKD-EPI creatinine equation, without race adjustment. eGFR can be influenced by muscle mass, exercise, and diet. The reported eGFR is an estimation  only and is only applicable if the renal function is stable.   03/29/2021 17 (L) >60 mL/min/[1.73_m2] Final     Comment:     Non  GFR Calc  Starting 12/18/2018, serum creatinine based estimated GFR (eGFR) will be   calculated using the Chronic Kidney Disease Epidemiology Collaboration   (CKD-EPI) equation.       Calcium   Date Value Ref Range Status   07/16/2021 8.5 8.5 - 10.5 mg/dL Final   03/29/2021 8.9 8.5 - 10.1 mg/dL Final     Phosphorus   Date Value Ref Range Status   07/16/2021 5.2 (H) 2.5 - 4.5 mg/dL Final   12/16/2020 4.6 mg/dL Final     Albumin   Date Value Ref Range Status   07/16/2021 3.0 (L) 3.5 - 5.0 g/dL Final   03/29/2021 3.2 (L) 3.4 - 5.0 g/dL Final       EDUCATION:                                                                                   -   Dialysis access surgery education provided:     Pre-Op: pre-op H&P or PAC within 30 days prior to surgery; may need to hold certain medications (e.g. blood thinners) for a few days prior to surgery     Surgery: done as an outpatient procedure; patient will go home the same day     Post-Op: need someone to drive them home and stay with them for the first night after surgery; post-op appointment with the surgeon     Hemodialysis access education provided:     Basic process of hemodialysis; ICHD vs HHD and frequency of treatments     Fistula vs graft vs CVC and risks and benefits of each access     Surgical creation of a fistula/graft     Importance of assessing thrill and bruit daily     Maturation timeline; a second surgery or surgical revision may be necessary, delaying maturation     No blood pressures, blood draws, or IVs in the arm before AND after surgery     Monthly visits with the nephrologist; treatments are monitored by dialysis nurse     Future maintenance fistulograms should be expected; dialysis nurse will help identify need for fistulograms     Signs and symptoms of possible complications including infection, non-maturation,  steal syndrome, and thrombosis      PLAN:                                                                                   -   Will send request to schedule LUE vs RUE AV fistula creation with intraoperative ultrasound under general anesthesia.  Preference will be to stay in the LUE if possible.  Pt will need a pre-op H&P within 30 days of surgery with his PCP.  Pt will be scheduled for 2 week fistula follow up with Dr. Martinez once surgery date is confirmed.    Educational hand outs given to pt.    Pt to contact Dialysis Access Care Coordinator with any future questions or concerns.    MALCOLM ZIMMERMAN RN on 4/25/2023 at 3:59 PM  Dialysis Access Care Coordinator  Phone: [phone]  Pool: P_Dialysis_Access_Nurse

## 2023-04-25 NOTE — PATIENT INSTRUCTIONS
It was a pleasure seeing you in clinic today.  Thank you for choosing us for your care.    We will plan for left arm vs right arm AV fistula creation surgery in the coming weeks.  Dr. Martinez will ultrasound your arm in the operating room to determine the fistula location.  Her preference will be to stay in the left arm.  Our surgical scheduler will get in touch with you soon to determine a surgery date.      Within 30 days of surgery, you will need to have a pre-op history and physical with your primary care provider.    You will get a call 1-2 days prior to surgery from a pre-procedure nurse.  During this phone call, the nurse will review your medications and tell you what you should take the morning of surgery.  The nurse will also instruct you when to stop eating.  Typically you need to stop eating 8 hours before your scheduled surgery time.  During this phone call you will be told what time to arrive at the hospital and where to check in.  If you do not follow the instructions provided by the pre-procedure nurse, your surgery may be cancelled and rescheduled.    Plan to be at the hospital for approximately 6 hours or more.  You will need to have someone responsible drive you home after surgery and stay with you for 24 hours after surgery.    A clinic coordinator will schedule you for a follow up appointment with Dr. Martinez  2 weeks after your surgery.  If this does not get scheduled, please call the Dialysis Access Care Coordinator to assist you in scheduling.    Dialysis Access Care Coordinators  MAKAYLA Gomez RN  Direct:  [phone]    If you have any questions, please call the Dialysis Access Care Coordinators or contact our clinic at [phone].   We also encourage the use of [employer] to communicate with your HealthCare Provider.    If you have an urgent need after business hours (8:00 am to 4:30 pm) please call [phone], option 4, and ask for the Transplant Surgery  Fellow on call. For non-urgent after hours needs, please call the dialysis access nurse at [phone] and leave a detailed voicemail. For scheduling needs, please call our clinic directly at [phone].

## 2023-04-25 NOTE — PROGRESS NOTES
Dialysis Access Service  Consult Note    Referred by Dr. Zarate for placement of permanent dialysis access.    HPI: Mr. 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  He is right handed. Mr. Villa is dialyzing at [facility] ([facility])  [address] [id number].      Risk factors for vascular access:         Yes No  Hx of CVC    [x]    []   Comment:   Hx of PICC line         [x]     []  Comment:   Hx of Pacemaker    [x]     []  Comment:   History of failed access:  [x]         []  Comment:  SVC syndrome   [x]      []  Comment:  Heart Failure    [x]     []  EF:    Periph arterial disease  [x]     []  Comment:  Prior Fracture/Surgery  [x]     []  Location:   DVT    [x]    []   Location:  Diabetes    [x]        []  Comment:  Neuropathy   [x]     []  Comment:   Anticoagulation:   [x]    []  Agent:      Anticoagulation contraindication:[x]  []     Details:                   Pediatric    [x]         []  Age:                  Hx of transplant   [x]    []  Comment:     Current immunosuppression [x]    []  Comment:            ROS: 10 point ROS neg other than the symptoms noted above in the HPI.        Past Medical History:   Diagnosis Date     ADD (attention deficit disorder)      CKD (chronic kidney disease)      Hypertension 2000     Posterior urethral valves        Past Surgical History:   Procedure Laterality Date     ABDOMEN SURGERY  2003    Bilateral urereral tapering and re-implantation     ABDOMEN SURGERY  2008    Mitrofanoff     ablation of posterior urethral valves  2000     APPENDECTOMY  2008     IR CVC TUNNEL PLACEMENT > 5 YRS OF AGE  2/16/2023     ureteral reimplantation with tapering  2003       No family history on file.    Social History     Tobacco Use     Smoking status: Never     Smokeless tobacco: Never   Vaping Use     Vaping status: Not on file   Substance Use Topics     Alcohol use:  Never         Current Outpatient Medications:      amLODIPine (NORVASC) 5 MG tablet, Take 1 tablet (5 mg) by mouth daily, Disp: 90 tablet, Rfl: 3     atenolol (TENORMIN) 25 MG tablet, Take 1.5 tablets (37.5 mg) by mouth daily, Disp: 135 tablet, Rfl: 3     B Complex-C-Folic Acid (DIALYVITE 800) 0.8 MG TABS, Take 1 tablet by mouth daily, Disp: 90 tablet, Rfl: 3     calcitRIOL (ROCALTROL) 0.25 MCG capsule, Take 1 capsule (0.25 mcg) by mouth daily, Disp: 90 capsule, Rfl: 3     calcium acetate (PHOSLO) 667 MG CAPS capsule, Take 1 capsule (667 mg) by mouth 3 times daily (with meals), Disp: 90 capsule, Rfl: 0     methylphenidate (RITALIN) 20 MG tablet, Take 20 mg by mouth, Disp: , Rfl:      triamcinolone (ARISTOCORT HP) 0.5 % external cream, , Disp: , Rfl:      atenolol (TENORMIN) 25 MG tablet, Take 1.5 tablets (37.5 mg) by mouth daily for 14 days, Disp: 21 tablet, Rfl: 0     Calcium Acetate 667 MG TABS, Take 667 mg by mouth 3 times daily (with meals), Disp: 90 tablet, Rfl: 1     Cholecalciferol (VITAMIN D) 50 MCG (2000 UT) CAPS, Take 2,000 unit marking on an U-100 insulin syringe by mouth daily, Disp: 90 capsule, Rfl: 11     difluprednate (DUREZOL) 0.05 % ophthalmic emulsion, INSTILL 1 DROP INTO EACH EYE SIX TIMES DAILY FOR THE FIRST 24 HOURS. THEN 1 DROP 4 TIMES DAILY UNTIL FOLLOW UP, Disp: , Rfl:      methylphenidate (RITALIN) 10 MG tablet, Take 25 mg by mouth Prn, Disp: , Rfl:                         PHYSICAL EXAM:  Pulse:  [73] 73  BP: (149)/(81) 149/81  SpO2:  [98 %] 98 %  Constitutional: healthy, alert and cooperative  HEENT: sclera anicteric, MMM, conjunctiva pink  Chest: HD catheter present on the right side.  CV:  NSR  : No CVA tenderness  Abdomen: old incision   Skin:  No rashes or jaundice  Neuro: normal gait  Psych: normal mood and affect  EXTREMITY EXAM:   Vein Exam: Obvious suitable veins?    Left arm: YES   []           NO  [x]       Comment:    Right arm: YES   []           NO  [x]       Comment:    Arterial Exam:   Radial   L: 3+ R: 3+   Ulnar   L: 3+;R: 3+  Patent Palmar arch  L: YES   [x]  NO   []       R: YES   [x]   NO   []        Capillary refill:  L: <1sec, R:<1sec    Sensory exam:   Left hand: Normal   [x]       Abnormal   []     Comment:    Right hand: Normal   [x]       Abnormal   []     Comment:     Motor exam normal:   Left hand: Normal   [x]       Abnormal   []     Comment:     Right hand: Normal   [x]       Abnormal   []     Comment:                       Mapping Reviewed:  Will need repeat vein mapping    Assessment & Plan: Mr. Villa is a good candidate for placement of permanent dialysis access.  I would recommend left AVF to be determined creation under General. Unclear vein mapping. Will plan to vein map in the OR.    The surgical risks and benefits were reviewed and questions were answered. We discussed the day of surgery plan, anesthesia, postop care, risk of infection, numbness, injury to surrounding structures, bleeding, thrombosis, steal syndrome, possible need for future angioplasty or surgical revision, as well as nonmaturation or need for site abandonment. This was contrasted with morbidity and mortality risk of long-term catheter based hemodialysis access. The patient does wish to proceed with surgery for permanent access creation at this time.  The patient was counselled to contact one of the nurse coordinators, Kathy Medley RN or Ivis Springer RN at [phone] with any questions or concerns.  Thank you for the opportunity to participate in Mr. Villa's care.        Nita Martinez MD FACS  Assistant Professor of Surgery  Director, [facility].

## 2023-04-27 ENCOUNTER — PREP FOR PROCEDURE (OUTPATIENT)
Dept: TRANSPLANT | Facility: CLINIC | Age: 23
End: 2023-04-27
Payer: COMMERCIAL

## 2023-04-27 DIAGNOSIS — Z99.2 ESRD ON DIALYSIS (H): Primary | ICD-10-CM

## 2023-04-27 DIAGNOSIS — T82.9XXA COMPLICATION OF DIALYSIS ACCESS INSERTION, INITIAL ENCOUNTER: ICD-10-CM

## 2023-04-27 DIAGNOSIS — N18.6 ESRD ON DIALYSIS (H): Primary | ICD-10-CM

## 2023-04-29 NOTE — TELEPHONE ENCOUNTER
Fistula creation surgery scheduled for 5/25 with Dr. Martinez. Neph tracking flowsheet updated.  Request sent to schedule pre-op and post-op appointments.    Ivis Springer RN  Dialysis Access Care Coordinator  Phone: [phone]  Pool: Dialysis Access Nurse ([id number])

## 2023-05-10 NOTE — TELEPHONE ENCOUNTER
FUTURE VISIT INFORMATION      SURGERY INFORMATION:    Date: 5/25/23    Location: uu or    Surgeon:  Nita Martinez MD    Anesthesia Type:  General    Procedure: LEFT UPPER EXTREMITY VS RIGHT UPPER EXTREMITY ARTERIOVENOUS FISTULA CREATION SURGERY WITH INTRAOPERATIVE ULTRASOUND .    Consult: ov 4/25/23    RECORDS REQUESTED FROM:       Primary Care Provider:Maurice Sultana    Pertinent Medical History: hypertension    Most recent EKG+ Tracing: 3/29/21    Most recent ECHO: 3/29/21    Most recent Sleep Study: 12/11/17

## 2023-05-12 ENCOUNTER — PATIENT OUTREACH (OUTPATIENT)
Dept: NEPHROLOGY | Facility: CLINIC | Age: 23
End: 2023-05-12
Payer: COMMERCIAL

## 2023-05-12 NOTE — PROGRESS NOTES
Message sent to pt inquiring if fistula follow up appointment can be changed to see the Nurse Practitioner, Ivis Monzon on 6/20 instead of Dr. Martinez.  This would require his appointment to be moved from 1130 to 1100.    Neph Tracking Flowsheet Last Filled Values     CKD Education Status Complete    CKD Education Type Kidney Smart Modality Education; Kidney Smart CKD Basics    Preferred Modality Hemodialysis    Final Modality Hemodialysis  [facility]    Patient's Referral Dates Auto Populate Patient's Referral Dates    Dietician Referral 3/29/21    Journey Referral 2/1/23    Vein Mapping/US Order 3/13/23    Vein Mapping/US  04/11/23    Access Surgeon Referral Status  Referred    Dialysis Access Referral 03/13/23  fistula consult with Dr. Martinez    Access Surgical Consult 04/25/23  Fistula consult with Dr. Martinez    Diaylsis Access Type CVC  RIJ    Dialysis Access Surgery 05/25/23  LUE vs RUE AVF surgery under general anesthesia.  Preference LUE.  Will need pre-op H&P with PCP    Dialysis Access Surgeon Michelle    Transplant Evaluation Referral 10/22/20    Transplant Status  Referred    Initiation of Dialysis Outpatient        Will follow up with pt response.    MALCOLM ZIMMERMAN RN on 5/12/2023 at 9:03 AM  Dialysis Access Care Coordinator  Phone: [phone]  Pool: P_Dialysis_Access_Nurse

## 2023-05-18 DIAGNOSIS — N18.6 ESRD (END STAGE RENAL DISEASE) (H): ICD-10-CM

## 2023-05-18 DIAGNOSIS — Z76.82 ORGAN TRANSPLANT CANDIDATE: ICD-10-CM

## 2023-05-19 ENCOUNTER — PRE VISIT (OUTPATIENT)
Dept: SURGERY | Facility: CLINIC | Age: 23
End: 2023-05-19

## 2023-05-19 ENCOUNTER — VIRTUAL VISIT (OUTPATIENT)
Dept: SURGERY | Facility: CLINIC | Age: 23
End: 2023-05-19
Payer: COMMERCIAL

## 2023-05-19 ENCOUNTER — ANESTHESIA EVENT (OUTPATIENT)
Dept: SURGERY | Facility: CLINIC | Age: 23
End: 2023-05-19
Payer: COMMERCIAL

## 2023-05-19 DIAGNOSIS — Z99.2 ESRD ON DIALYSIS (H): ICD-10-CM

## 2023-05-19 DIAGNOSIS — Z01.818 PREOP EXAMINATION: Primary | ICD-10-CM

## 2023-05-19 DIAGNOSIS — Q61.4 RENAL DYSPLASIA: ICD-10-CM

## 2023-05-19 DIAGNOSIS — N18.6 ESRD ON DIALYSIS (H): ICD-10-CM

## 2023-05-19 PROCEDURE — 99203 OFFICE O/P NEW LOW 30 MIN: CPT | Mod: VID | Performed by: NURSE PRACTITIONER

## 2023-05-19 RX ORDER — CETIRIZINE HYDROCHLORIDE 10 MG/1
10 TABLET ORAL DAILY PRN
COMMUNITY

## 2023-05-19 ASSESSMENT — LIFESTYLE VARIABLES: TOBACCO_USE: 0

## 2023-05-19 ASSESSMENT — ENCOUNTER SYMPTOMS: ORTHOPNEA: 0

## 2023-05-19 NOTE — PROGRESS NOTES
Boogie is a 22 year old who is being evaluated via a billable video visit.      How would you like to obtain your AVS? Trever Scanlon   Boogie is a 22 year old, presenting for the following health issues:  Pre-Op Exam (/)    HPI           Review of Systems       Physical Exam     ALLY Rivera LPN

## 2023-05-19 NOTE — PATIENT INSTRUCTIONS
Preparing for Your Surgery      Name:  Boogie Villa   MRN:  [id number]   :  2000   Today's Date:  2023       Arriving for surgery:  Surgery date:  23  Arrival time:  10:30AM    Please come to:     [facility] [address]  [address] [place]  [address]    - ? parking is available in front of the hospital      -   Parking is available in the Patient Visitor Ramp on [address] and [address].     -   When entering the hospital please stop at Registration, you will be directed to the 3rd floor Surgery waiting room.     -   Please ask if you need an escort or a wheelchair to the Surgery Waiting Room.  Preop number- [phone]      What can I eat or drink?  -  You may eat and drink normally up to 8 hours prior to arrival time. (Until 23, 2:30AM)  -  You may have clear liquids until 2 hours prior to arrival time. (Until 23, 8:30AM)    Examples of clear liquids:  Water  Clear broth  Juices (apple, white grape, white cranberry  and cider) without pulp  Noncarbonated, powder based beverages  (lemonade and Jitendra-Aid)  Sodas (Sprite, 7-Up, ginger ale and seltzer)  Coffee or tea (without milk or cream)  Gatorade    -  No Alcohol for at least 24 hours before surgery.     Which medicines can I take?    Hold Aspirin for 7 days before surgery.   Hold Multivitamins for 7 days before surgery.  Hold Supplements for 7 days before surgery.  Hold Ibuprofen (Advil, Motrin) for 1 day(s) before surgery--unless otherwise directed by surgeon.  Hold Naproxen (Aleve) for 4 days before surgery.    -  DO NOT take these medications the day of surgery:    Vitamin B Complex    Calcitriol    Phoslo    Cetirizine(Zyrtec)    Vitamin D    Methylphenidate(Ritalin)    Probiotic    -  PLEASE TAKE these medications the day of surgery:    Amlodipine(Norvasc)    Atenolol    Durezol Eye drops    How do I prepare myself?  - Please take 2 showers (one the night  prior to surgery and one the morning of surgery) using Scrubcare or Hibiclens soap.    Use this soap only from the neck to your toes.     Leave the soap on your skin for one minute--then rinse thoroughly.      You may use your own shampoo and conditioner. No other hair products.   - Please remove all jewelry and body piercings.  - No lotions, deodorants or fragrance.  - Bring your ID and insurance card.    -If you have a Deep Brain Stimulator, Spinal Cord Stimulator, or any Neuro Stimulator device---you must bring the remote control to the hospital.      ALL PATIENTS GOING HOME THE SAME DAY OF SURGERY ARE REQUIRED TO HAVE A RESPONSIBLE ADULT TO DRIVE AND BE IN ATTENDANCE WITH THEM FOR 24 HOURS FOLLOWING SURGERY.    Covid testing policy as of 12/06/2022  Your surgeon will notify and schedule you for a COVID test if one is needed before surgery--please direct any questions or COVID symptoms to your surgeon      Questions or Concerns:    - For any questions regarding the day of surgery or your hospital stay, please contact the Pre Admission Nursing Office at [phone].       - If you have health changes between today and your surgery, please call your surgeon.       - For questions after surgery, please call your surgeons office.           Current Visitor Guidelines       Surgeries and procedures: Adult patients can have 2 visitors all through the surgery process.     Visiting hours: 8 a.m. to 8:30 p.m.     Hospital: Adult patients and children under age 18 can have 4 visitor at a time       No visitors under the age of 5 are allowed for hospital patients.       Double occupancy rooms: Patients can have only two visitors at a time.       Patients confirmed or suspected to have symptoms of COVID 19 or flu:     No visitors allowed for adult patients.   Children (under age 18) can have 1 named visitor.     People who are sick or showing symptoms of COVID 19 or flu:    Are not allowed to visit patients--we can only make  exceptions in special situations.       Please follow these guidelines for your visit:          Please maintain social distance          Masking is optional--however at times you may be asked to wear a mask for the safety of yourself and others     Clean your hands with alcohol hand . Do this when you arrive at and leave the building and patient room,    And again after you touch your mask or anything in the room.     Go directly to and from the room you are visiting.     Stay in the patient s room during your visit. Limit going to other places in the hospital as much as possible     Leave bags and jackets at home or in the car.     For everyone s health, please don t come and go during your visit. That includes for smoking   during your visit.

## 2023-05-19 NOTE — H&P
Pre-Operative H & P     CC:  Preoperative exam to assess for increased cardiopulmonary risk while undergoing surgery and anesthesia.    Date of Encounter: 5/19/2023  Primary Care Physician:  Maurice Sultana     Reason for visit:   Encounter Diagnoses   Name Primary?     Preop examination Yes     ESRD on dialysis (H)      Renal dysplasia        HPI  Boogie Villa is a 22 year old male who presents for pre-operative H & P in preparation for  Procedure Information     Case: [id number] Date/Time: 05/25/23 1230    Procedure: LEFT UPPER EXTREMITY VS RIGHT UPPER EXTREMITY ARTERIOVENOUS FISTULA CREATION SURGERY WITH INTRAOPERATIVE ULTRASOUND . 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  He is currently on dialysis MWF.  He has a right chest dialysis catheter which is working well, but consulted with Dr. Martinez recently to discuss more permanent dialysis access.  The above listed procedure has been recommended.     History is obtained from the patient and chart review    Hx of abnormal bleeding or anti-platelet use: none      Past Medical History  Past Medical History:   Diagnosis Date     ADD (attention deficit disorder)      Allergic rhinitis      ESRD (end stage renal disease) on dialysis (H)      Hypertension 2000     Mitrofanoff appendicovesicostomy present (H)      Obesity      Posterior urethral valves        Past Surgical History  Past Surgical History:   Procedure Laterality Date      ABDOMEN SURGERY  2003    Bilateral urereral tapering and re-implantation     ABDOMEN SURGERY  2008    Mitrofanoff     ablation of posterior urethral valves  2000     APPENDECTOMY  2008     IR CVC TUNNEL PLACEMENT > 5 YRS OF AGE  2/16/2023     ureteral reimplantation with tapering  2003       Prior to Admission Medications  Current Outpatient Medications   Medication Sig Dispense Refill     amLODIPine (NORVASC) 5 MG tablet Take 1 tablet (5 mg) by mouth daily (Patient taking differently: Take 5 mg by mouth every morning) 90 tablet 3     atenolol (TENORMIN) 25 MG tablet Take 1.5 tablets (37.5 mg) by mouth daily (Patient taking differently: Take 37.5 mg by mouth every morning) 135 tablet 3     B Complex-C-Folic Acid (DIALYVITE 800) 0.8 MG TABS Take 1 tablet by mouth daily (Patient taking differently: Take 1 tablet by mouth every morning) 90 tablet 3     calcitRIOL (ROCALTROL) 0.25 MCG capsule Take 1 capsule (0.25 mcg) by mouth daily (Patient taking differently: Take 0.25 mcg by mouth three times a week M-W-F) 90 capsule 3     calcium acetate (PHOSLO) 667 MG CAPS capsule Take 1 capsule (667 mg) by mouth 3 times daily (with meals) 90 capsule 0     cetirizine (ZYRTEC) 10 MG tablet Take 10 mg by mouth every morning       Cholecalciferol (VITAMIN D) 50 MCG (2000 UT) CAPS Take 2,000 unit marking on an U-100 insulin syringe by mouth daily (Patient taking differently: Take 2,000 unit marking on an U-100 insulin syringe by mouth every morning) 90 capsule 11     difluprednate (DUREZOL) 0.05 % ophthalmic emulsion INSTILL 1 DROP INTO EACH EYE SIX TIMES DAILY FOR THE FIRST 24 HOURS. THEN 1 DROP 4 TIMES DAILY UNTIL FOLLOW UP       methylphenidate (RITALIN) 20 MG tablet Take 20 mg by mouth as needed       Probiotic Product (PROBIOTIC-10 PO) Take 1 tablet by mouth every morning 1 tab capsule       triamcinolone (ARISTOCORT HP) 0.5 % external cream        atenolol (TENORMIN) 25 MG tablet Take 1.5 tablets (37.5 mg) by mouth daily for 14  days (Patient taking differently: Take 37.5 mg by mouth daily) 21 tablet 0     Calcium Acetate 667 MG TABS Take 667 mg by mouth 3 times daily (with meals) (Patient taking differently: Take 667 mg by mouth 3 times daily (with meals)) 90 tablet 1     methylphenidate (RITALIN) 10 MG tablet Take 25 mg by mouth as needed Prn         Allergies  Allergies   Allergen Reactions     Dust Mites      Runny nose and watery eyes     Mold      Runny nose and watery eyes     Nsaids Other (See Comments)     CKD     Other [Seasonal Allergies]      Grass, Ragweed - gets runny nose and watery eyes     Sulfa Antibiotics      PN: LW Reaction: GI Upset       Social History  Social History     Socioeconomic History     Marital status: Single     Spouse name: Not on file     Number of children: 0     Years of education: Not on file     Highest education level: Not on file   Occupational History     Occupation:      Comment: [employer]   Tobacco Use     Smoking status: Never     Passive exposure: Never     Smokeless tobacco: Never   Vaping Use     Vaping status: Not on file   Substance and Sexual Activity     Alcohol use: Never     Drug use: Never     Sexual activity: Not on file   Other Topics Concern     Parent/sibling w/ CABG, MI or angioplasty before 65F 55M? Not Asked   Social History Narrative     Not on file     Social Determinants of Health     Financial Resource Strain: Not on file   Food Insecurity: Not on file   Transportation Needs: Not on file   Physical Activity: Not on file   Stress: Not on file   Social Connections: Not on file   Intimate Partner Violence: Not on file   Housing Stability: Not on file       Family History  Family History   Problem Relation Age of Onset     No Known Problems Mother      No Known Problems Father      Anesthesia Reaction No family hx of      Thrombosis No family hx of        Review of Systems  The complete review of systems is negative other than noted in the HPI or here.    Anesthesia Evaluation   Pt has had prior anesthetic.     No history of anesthetic complications       ROS/MED HX  ENT/Pulmonary:     (+) SONYA risk factors, snores loudly, allergic rhinitis,  (-) tobacco use   Neurologic:  - neg neurologic ROS     Cardiovascular:     (+) hypertension-----Previous cardiac testing   Echo: Date: 2021 Results:  Interpretation Summary  Global and regional left ventricular function is normal with an EF of 60-65%.  Global right ventricular function is normal.  No significant valvular abnormalities were noted.  Previous study not available for comparison  Stress Test: Date: Results:    ECG Reviewed: Date: 2021 Results:    Cath: Date: Results:   (-) MARVIN and orthopnea/PND   METS/Exercise Tolerance: >4 METS Comment: Walks 15 minutes a day at a moderate pace.     Hematologic:     (+) anemia,     Musculoskeletal:  - neg musculoskeletal ROS     GI/Hepatic:  - neg GI/hepatic ROS     Renal/Genitourinary: Comment: Dialysis MWF  Right chest dialysis catheter    mitrofanoff - not being used.  Urinating normally currently.      (+) renal disease, type: ESRD, Pt requires dialysis, type: Hemodialysis,     Endo:     (+) Obesity,     Psychiatric/Substance Use:     (+) psychiatric history other (comment) and anxiety (ADHD)     Infectious Disease:  - neg infectious disease ROS     Malignancy:  - neg malignancy ROS     Other:  - neg other ROS          Virtual visit -  No vitals were obtained    Physical Exam  Constitutional: Awake, alert, cooperative, no apparent distress, and appears stated age.  Eyes: Pupils equal  HENT: Normocephalic  Respiratory: non labored breathing   Neurologic: Awake, alert, oriented to name, place and time.   Neuropsychiatric: Calm, cooperative. Normal affect.      Prior Labs/Diagnostic Studies   All labs and imaging personally reviewed     EKG/ stress test - if available please see in ROS above     Contains abnormal data Complete Blood Count-W/Diff  Order: [id number]   suggestion   Information displayed in this report may not trend or trigger automated decision support.      Ref Range & Units 1 mo ago   WBC 3.5 - 10.5 x10(9)/L 6.3    RBC 4.32 - 5.72 x10(12)/L 3.01 Low     Hemoglobin 13.5 - 17.5 g/dL 9.6 Low     HCT 38.8 - 50.0 % 29.4 Low     MCV 80.0 - 100.0 fL 97.7    MCH 27.6 - 33.3 pg 31.9    MCHC 31.5 - 35.2 g/dL 32.7    RDW 11.9 - 15.5 % 13.9    Platelets 150 - 450 x10(9)/L 234    Automated NRBC <=0 /100 WBC 0    Neutrophil Absolute 1.7 - 7.0 10(9)/L 3.7    Lymphocyte Absolute 1.0 - 4.8 10(9)/L 1.8    Monocytes Absolute 0.2 - 0.9 10(9)/L 0.5    Eosinophil Absolute 0.0 - 0.5 10(9)/L 0.3    Basophil Absolute 0.0 - 0.3 10(9)/L 0.0    Immature Gran % 0.0 - 0.5 % 0.8 High     Specimen Collected: 04/04/23  4:48 PM Last Resulted: 04/04/23  4:51 PM         The patient's records and results personally reviewed by this provider.     Outside records reviewed from: Care Everywhere      Assessment      Boogie Villa is a 22 year old male seen as a PAC referral for risk assessment and optimization for anesthesia.    Plan/Recommendations  Pt will be optimized for the proposed procedure.  See below for details on the assessment, risk, and preoperative recommendations    NEUROLOGY  - No history of TIA, CVA or seizure    -Post Op delirium risk factors:  No risk identified    ENT  - No current airway concerns.  Will need to be reassessed day of surgery.  Mallampati: Unable to assess  TM: Unable to assess    CARDIAC  - No history of CAD and Afib   - hypertension is managed with amlodipine and atenolol    - METS (Metabolic Equivalents)  Patient performs 4 or more METS exercise without symptoms            Total Score: 0      RCRI-Very low risk: Class 1 0.4% complication rate            Total Score: 0        PULMONARY    SONYA Medium Risk            Total Score: 4    SONYA: Snores loudly    SONYA: Hypertension    SONYA: BMI over 35 kg/m2    SONYA: Male        - Tobacco History      History   Smoking Status     Never  "  Smokeless Tobacco     Never       GI  Denies GERD  PONV Medium Risk  Total Score: 2           1 AN PONV: Patient is not a current smoker    1 AN PONV: Intended Post Op Opioids        /RENAL  - no recent creatinine available in the chart, but is getting labs drawn regularly at dialysis.  Will order BMP for DOS.  - dialysis MWF with right chest dialysis catheter  - has a mitrofanoff, but reports he hasn't used it in 2 years.  Able to urinate normally.  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  Being considered for kidney transplant.    - surgery planned as above.    ENDOCRINE   - BMI: Estimated body mass index is 40.29 kg/m  as calculated from the following:    Height as of 9/27/22: 1.803 m (5' 11\").    Weight as of 4/25/23: 131 kg (288 lb 14.4 oz).  Class 3 Obesity (BMI > 40)  - No history of Diabetes Mellitus    HEME  VTE Low Risk 0.5%            Total Score: 3    VTE: BMI greater than 39    VTE: Male      - No history of abnormal bleeding or antiplatelet use.  - Chronic anemia  Recommend perioperative use of blood conservation techniques intraoperatively and close monitoring for postoperative bleeding.      PSYCH  - hold ritalin DOS        Different anesthesia methods/types have been discussed with the patient, but they are aware that the final plan will be decided by the assigned anesthesia provider on the date of service.      The patient is optimized for their procedure. AVS with information on surgery time/arrival time, meds and NPO status given by nursing staff. No further diagnostic testing indicated.    Please refer to the physical examination documented by the anesthesiologist in the anesthesia record on the day of surgery.    Video-Visit Details    Type of service:  Video Visit    Provider received verbal consent for a Video Visit from the patient? Yes     Originating Location (pt. Location): at a friend's house in [place], [place]    Distant Location " (provider location):  Off-site  Mode of Communication:  Video Conference via [employer]     On the day of service:     Prep time: 10 minutes  Visit time: 10 minutes  Documentation time: 11 minutes  ------------------------------------------  Total time: 31 minutes      CHRISTIN Betancourt CNP  Preoperative Assessment Center  [facility]  [facility]  Phone: [phone]  Fax: [phone]

## 2023-05-24 ENCOUNTER — TELEPHONE (OUTPATIENT)
Dept: ENDOCRINOLOGY | Facility: CLINIC | Age: 23
End: 2023-05-24
Payer: COMMERCIAL

## 2023-05-24 ASSESSMENT — ENCOUNTER SYMPTOMS: ORTHOPNEA: 0

## 2023-05-24 ASSESSMENT — LIFESTYLE VARIABLES: TOBACCO_USE: 0

## 2023-05-24 NOTE — ANESTHESIA PREPROCEDURE EVALUATION
Pre-Operative H & P     CC:  Preoperative exam to assess for increased cardiopulmonary risk while undergoing surgery and anesthesia.    Date of Encounter: 5/19/2023  Primary Care Physician:  Maurice Sultana     Reason for visit:   No diagnosis found.    HPI  Boogie Villa is a 22 year old male who presents for pre-operative H & P in preparation for  Procedure Information     Case: [id number] Date/Time: 05/25/23 1230    Procedure: LEFT UPPER EXTREMITY VS RIGHT UPPER EXTREMITY ARTERIOVENOUS FISTULA CREATION SURGERY WITH INTRAOPERATIVE ULTRASOUND . 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  He is currently on dialysis MWF.  He has a right chest dialysis catheter which is working well, but consulted with Dr. Martinez recently to discuss more permanent dialysis access.  The above listed procedure has been recommended.     History is obtained from the patient and chart review    Hx of abnormal bleeding or anti-platelet use: none      Past Medical History  Past Medical History:   Diagnosis Date     ADD (attention deficit disorder)      Allergic rhinitis      ESRD (end stage renal disease) on dialysis (H)      Hypertension 2000     Mitrofanoff appendicovesicostomy present (H)      Obesity      Posterior urethral valves        Past Surgical History  Past Surgical History:   Procedure Laterality Date     ABDOMEN SURGERY  2003    Bilateral urereral tapering and re-implantation     ABDOMEN SURGERY   2008    Mitrofanoff     ablation of posterior urethral valves  2000     APPENDECTOMY  2008     IR CVC TUNNEL PLACEMENT > 5 YRS OF AGE  2/16/2023     ureteral reimplantation with tapering  2003       Prior to Admission Medications  Current Outpatient Medications   Medication Sig Dispense Refill     amLODIPine (NORVASC) 5 MG tablet Take 1 tablet (5 mg) by mouth daily (Patient taking differently: Take 5 mg by mouth every morning) 90 tablet 3     atenolol (TENORMIN) 25 MG tablet Take 1.5 tablets (37.5 mg) by mouth daily for 14 days (Patient taking differently: Take 37.5 mg by mouth daily) 21 tablet 0     atenolol (TENORMIN) 25 MG tablet Take 1.5 tablets (37.5 mg) by mouth daily (Patient taking differently: Take 37.5 mg by mouth every morning) 135 tablet 3     B Complex-C-Folic Acid (DIALYVITE 800) 0.8 MG TABS Take 1 tablet by mouth daily (Patient taking differently: Take 1 tablet by mouth every morning) 90 tablet 3     calcitRIOL (ROCALTROL) 0.25 MCG capsule Take 1 capsule (0.25 mcg) by mouth daily (Patient taking differently: Take 0.25 mcg by mouth three times a week M-W-F) 90 capsule 3     calcium acetate (PHOSLO) 667 MG CAPS capsule Take 1 capsule (667 mg) by mouth 3 times daily (with meals) 90 capsule 0     Calcium Acetate 667 MG TABS Take 667 mg by mouth 3 times daily (with meals) (Patient taking differently: Take 667 mg by mouth 3 times daily (with meals)) 90 tablet 1     cetirizine (ZYRTEC) 10 MG tablet Take 10 mg by mouth every morning       Cholecalciferol (VITAMIN D) 50 MCG (2000 UT) CAPS Take 2,000 unit marking on an U-100 insulin syringe by mouth daily (Patient taking differently: Take 2,000 unit marking on an U-100 insulin syringe by mouth every morning) 90 capsule 11     difluprednate (DUREZOL) 0.05 % ophthalmic emulsion INSTILL 1 DROP INTO EACH EYE SIX TIMES DAILY FOR THE FIRST 24 HOURS. THEN 1 DROP 4 TIMES DAILY UNTIL FOLLOW UP       methylphenidate (RITALIN) 10 MG tablet Take 25 mg by mouth as needed  Prn       methylphenidate (RITALIN) 20 MG tablet Take 20 mg by mouth as needed       Probiotic Product (PROBIOTIC-10 PO) Take 1 tablet by mouth every morning 1 tab capsule       triamcinolone (ARISTOCORT HP) 0.5 % external cream          Allergies  Allergies   Allergen Reactions     Dust Mites      Runny nose and watery eyes     Mold      Runny nose and watery eyes     Nsaids Other (See Comments)     CKD     Other [Seasonal Allergies]      Grass, Ragweed - gets runny nose and watery eyes     Sulfa Antibiotics      PN: LW Reaction: GI Upset       Social History  Social History     Socioeconomic History     Marital status: Single     Spouse name: Not on file     Number of children: 0     Years of education: Not on file     Highest education level: Not on file   Occupational History     Occupation:      Comment: [employer]   Tobacco Use     Smoking status: Never     Passive exposure: Never     Smokeless tobacco: Never   Vaping Use     Vaping status: Not on file   Substance and Sexual Activity     Alcohol use: Never     Drug use: Never     Sexual activity: Not on file   Other Topics Concern     Parent/sibling w/ CABG, MI or angioplasty before 65F 55M? Not Asked   Social History Narrative     Not on file     Social Determinants of Health     Financial Resource Strain: Not on file   Food Insecurity: Not on file   Transportation Needs: Not on file   Physical Activity: Not on file   Stress: Not on file   Social Connections: Not on file   Intimate Partner Violence: Not on file   Housing Stability: Not on file       Family History  Family History   Problem Relation Age of Onset     No Known Problems Mother      No Known Problems Father      Anesthesia Reaction No family hx of      Thrombosis No family hx of        Review of Systems  The complete review of systems is negative other than noted in the HPI or here.   Anesthesia Evaluation   Pt has had prior anesthetic.     No history of anesthetic  complications       ROS/MED HX  ENT/Pulmonary:     (+) SONYA risk factors, snores loudly, allergic rhinitis,  (-) tobacco use   Neurologic:  - neg neurologic ROS     Cardiovascular:     (+) hypertension-----Previous cardiac testing   Echo: Date: 2021 Results:  Interpretation Summary  Global and regional left ventricular function is normal with an EF of 60-65%.  Global right ventricular function is normal.  No significant valvular abnormalities were noted.  Previous study not available for comparison  Stress Test: Date: Results:    ECG Reviewed: Date: 2021 Results:    Cath: Date: Results:   (-) MARVIN and orthopnea/PND   METS/Exercise Tolerance: >4 METS Comment: Walks 15 minutes a day at a moderate pace.     Hematologic:     (+) anemia,     Musculoskeletal:  - neg musculoskeletal ROS     GI/Hepatic:  - neg GI/hepatic ROS     Renal/Genitourinary: Comment: Dialysis MWF  Right chest dialysis catheter    mitrofanoff - not being used.  Urinating normally currently.      (+) renal disease, type: ESRD, Pt requires dialysis, type: Hemodialysis,     Endo:     (+) Obesity,     Psychiatric/Substance Use:     (+) psychiatric history other (comment) and anxiety (ADHD)     Infectious Disease:  - neg infectious disease ROS     Malignancy:  - neg malignancy ROS     Other:  - neg other ROS            Physical Exam  Constitutional: Awake, alert, cooperative, no apparent distress, and appears stated age.  Eyes: Pupils equal  HENT: Normocephalic  Respiratory: non labored breathing   Neurologic: Awake, alert, oriented to name, place and time.   Neuropsychiatric: Calm, cooperative. Normal affect.      Prior Labs/Diagnostic Studies   All labs and imaging personally reviewed     EKG/ stress test - if available please see in ROS above     Contains abnormal data Complete Blood Count-W/Diff  Order: [id number]   suggestion  Information displayed in this report may not trend or trigger automated decision support.      Ref Range & Units 1 mo ago   WBC  3.5 - 10.5 x10(9)/L 6.3    RBC 4.32 - 5.72 x10(12)/L 3.01 Low     Hemoglobin 13.5 - 17.5 g/dL 9.6 Low     HCT 38.8 - 50.0 % 29.4 Low     MCV 80.0 - 100.0 fL 97.7    MCH 27.6 - 33.3 pg 31.9    MCHC 31.5 - 35.2 g/dL 32.7    RDW 11.9 - 15.5 % 13.9    Platelets 150 - 450 x10(9)/L 234    Automated NRBC <=0 /100 WBC 0    Neutrophil Absolute 1.7 - 7.0 10(9)/L 3.7    Lymphocyte Absolute 1.0 - 4.8 10(9)/L 1.8    Monocytes Absolute 0.2 - 0.9 10(9)/L 0.5    Eosinophil Absolute 0.0 - 0.5 10(9)/L 0.3    Basophil Absolute 0.0 - 0.3 10(9)/L 0.0    Immature Gran % 0.0 - 0.5 % 0.8 High     Specimen Collected: 04/04/23  4:48 PM Last Resulted: 04/04/23  4:51 PM         The patient's records and results personally reviewed by this provider.     Outside records reviewed from: Care Everywhere      Assessment      Boogie Villa is a 22 year old male seen as a PAC referral for risk assessment and optimization for anesthesia.    Plan/Recommendations  Pt will be optimized for the proposed procedure.  See below for details on the assessment, risk, and preoperative recommendations    NEUROLOGY  - No history of TIA, CVA or seizure    -Post Op delirium risk factors:  No risk identified    ENT  - No current airway concerns.  Will need to be reassessed day of surgery.  Mallampati: Unable to assess  TM: Unable to assess    CARDIAC  - No history of CAD and Afib   - hypertension is managed with amlodipine and atenolol    - METS (Metabolic Equivalents)  Patient performs 4 or more METS exercise without symptoms            Total Score: 0      RCRI-Very low risk: Class 1 0.4% complication rate            Total Score: 0        PULMONARY    SONYA Medium Risk            Total Score: 4    SONYA: Snores loudly    SONYA: Hypertension    SONYA: BMI over 35 kg/m2    SONYA: Male        - Tobacco History      History   Smoking Status     Never   Smokeless Tobacco     Never       GI  Denies GERD  PONV Medium Risk  Total Score: 2           1 AN PONV: Patient is not a  "current smoker    1 AN PONV: Intended Post Op Opioids        /RENAL  - no recent creatinine available in the chart, but is getting labs drawn regularly at dialysis.  Will order BMP for DOS.  - dialysis MWF with right chest dialysis catheter  - has a mitrofanoff, but reports he hasn't used it in 2 years.  Able to urinate normally.  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  Being considered for kidney transplant.    - surgery planned as above.    ENDOCRINE   - BMI: Estimated body mass index is 40.29 kg/m  as calculated from the following:    Height as of 9/27/22: 1.803 m (5' 11\").    Weight as of 4/25/23: 131 kg (288 lb 14.4 oz).  Class 3 Obesity (BMI > 40)  - No history of Diabetes Mellitus    HEME  VTE Low Risk 0.5%            Total Score: 3    VTE: BMI greater than 39    VTE: Male      - No history of abnormal bleeding or antiplatelet use.  - Chronic anemia  Recommend perioperative use of blood conservation techniques intraoperatively and close monitoring for postoperative bleeding.      PSYCH  - hold ritalin DOS    Physical Exam    Airway  airway exam normal      Mallampati: II   TM distance: > 3 FB   Neck ROM: full   Mouth opening: > 3 cm    Respiratory Devices and Support         Dental       (+) Completely normal teeth      Cardiovascular   cardiovascular exam normal       Rhythm and rate: regular and normal     Pulmonary   pulmonary exam normal        breath sounds clear to auscultation             Anesthesia Plan    ASA Status:  3   NPO Status:  NPO Appropriate    Anesthesia Type: General.     - Airway: ETT   Induction: Intravenous.   Maintenance: Balanced.   Techniques and Equipment:     - Lines/Monitors: BIS     Consents    Anesthesia Plan(s) and associated risks, benefits, and realistic alternatives discussed. Questions answered and patient/representative(s) expressed understanding.     - Discussed: Risks, Benefits and Alternatives for BOTH SEDATION and " the PROCEDURE were discussed     - Discussed with:  Patient      - Extended Intubation/Ventilatory Support Discussed: No.      - Patient is DNR/DNI Status: No    Use of blood products discussed: No .     Postoperative Care    Pain management: IV analgesics, Oral pain medications, Multi-modal analgesia.   PONV prophylaxis: Ondansetron (or other 5HT-3), Dexamethasone or Solumedrol     Comments:    Other Comments: Per surgeon request a Block is a requested.

## 2023-05-24 NOTE — TELEPHONE ENCOUNTER
LVM and sent [facility] for scheduling weight mgmt referral for medical weight management. Referral from Zev Ames CNP.

## 2023-05-25 ENCOUNTER — HOSPITAL ENCOUNTER (OUTPATIENT)
Facility: CLINIC | Age: 23
Discharge: HOME | End: 2023-05-25
Attending: SURGERY | Admitting: SURGERY
Payer: COMMERCIAL

## 2023-05-25 ENCOUNTER — ANESTHESIA (OUTPATIENT)
Dept: SURGERY | Facility: CLINIC | Age: 23
End: 2023-05-25
Payer: COMMERCIAL

## 2023-05-25 VITALS
BODY MASS INDEX: 40.59 KG/M2 | SYSTOLIC BLOOD PRESSURE: 122 MMHG | HEIGHT: 71 IN | WEIGHT: 289.9 LBS | OXYGEN SATURATION: 93 % | HEART RATE: 82 BPM | DIASTOLIC BLOOD PRESSURE: 70 MMHG | TEMPERATURE: 98.8 F

## 2023-05-25 DIAGNOSIS — N18.4 CKD (CHRONIC KIDNEY DISEASE) STAGE 4, GFR 15-29 ML/MIN (H): Primary | ICD-10-CM

## 2023-05-25 LAB
ANION GAP SERPL CALCULATED.3IONS-SCNC: 13 MMOL/L (ref 7–15)
BUN SERPL-MCNC: 16.5 MG/DL (ref 6–20)
CALCIUM SERPL-MCNC: 9.5 MG/DL (ref 8.6–10)
CHLORIDE SERPL-SCNC: 106 MMOL/L (ref 98–107)
CREAT SERPL-MCNC: 7.61 MG/DL (ref 0.67–1.17)
DEPRECATED HCO3 PLAS-SCNC: 21 MMOL/L (ref 22–29)
GFR SERPL CREATININE-BSD FRML MDRD: 9 ML/MIN/1.73M2
GLUCOSE SERPL-MCNC: 100 MG/DL (ref 70–99)
HGB BLD-MCNC: 10.2 G/DL (ref 13.3–17.7)
POTASSIUM SERPL-SCNC: 4.5 MMOL/L (ref 3.4–5.3)
SODIUM SERPL-SCNC: 140 MMOL/L (ref 136–145)

## 2023-05-25 PROCEDURE — 250N000011 HC RX IP 250 OP 636: Performed by: SURGERY

## 2023-05-25 PROCEDURE — 250N000011 HC RX IP 250 OP 636: Performed by: STUDENT IN AN ORGANIZED HEALTH CARE EDUCATION/TRAINING PROGRAM

## 2023-05-25 PROCEDURE — 710N000010 HC RECOVERY PHASE 1, LEVEL 2, PER MIN: Performed by: SURGERY

## 2023-05-25 PROCEDURE — 258N000003 HC RX IP 258 OP 636: Performed by: NURSE ANESTHETIST, CERTIFIED REGISTERED

## 2023-05-25 PROCEDURE — 250N000009 HC RX 250: Performed by: NURSE ANESTHETIST, CERTIFIED REGISTERED

## 2023-05-25 PROCEDURE — 36415 COLL VENOUS BLD VENIPUNCTURE: CPT | Performed by: NURSE PRACTITIONER

## 2023-05-25 PROCEDURE — 999N000141 HC STATISTIC PRE-PROCEDURE NURSING ASSESSMENT: Performed by: SURGERY

## 2023-05-25 PROCEDURE — 250N000011 HC RX IP 250 OP 636: Performed by: NURSE ANESTHETIST, CERTIFIED REGISTERED

## 2023-05-25 PROCEDURE — 250N000024 HC ISOFLURANE, PER MIN: Performed by: SURGERY

## 2023-05-25 PROCEDURE — 36821 AV FUSION DIRECT ANY SITE: CPT | Mod: LT | Performed by: SURGERY

## 2023-05-25 PROCEDURE — 272N000001 HC OR GENERAL SUPPLY STERILE: Performed by: SURGERY

## 2023-05-25 PROCEDURE — 85018 HEMOGLOBIN: CPT | Performed by: NURSE PRACTITIONER

## 2023-05-25 PROCEDURE — 370N000017 HC ANESTHESIA TECHNICAL FEE, PER MIN: Performed by: SURGERY

## 2023-05-25 PROCEDURE — 258N000001 HC RX 258: Performed by: SURGERY

## 2023-05-25 PROCEDURE — 76998 US GUIDE INTRAOP: CPT | Mod: 26 | Performed by: SURGERY

## 2023-05-25 PROCEDURE — 360N000076 HC SURGERY LEVEL 3, PER MIN: Performed by: SURGERY

## 2023-05-25 PROCEDURE — 80048 BASIC METABOLIC PNL TOTAL CA: CPT | Performed by: NURSE PRACTITIONER

## 2023-05-25 PROCEDURE — 258N000003 HC RX IP 258 OP 636: Performed by: STUDENT IN AN ORGANIZED HEALTH CARE EDUCATION/TRAINING PROGRAM

## 2023-05-25 PROCEDURE — 710N000012 HC RECOVERY PHASE 2, PER MINUTE: Performed by: SURGERY

## 2023-05-25 PROCEDURE — 250N000009 HC RX 250: Performed by: SURGERY

## 2023-05-25 PROCEDURE — 250N000013 HC RX MED GY IP 250 OP 250 PS 637: Performed by: STUDENT IN AN ORGANIZED HEALTH CARE EDUCATION/TRAINING PROGRAM

## 2023-05-25 RX ORDER — NALOXONE HYDROCHLORIDE 0.4 MG/ML
0.4 INJECTION, SOLUTION INTRAMUSCULAR; INTRAVENOUS; SUBCUTANEOUS
Status: DISCONTINUED | OUTPATIENT
Start: 2023-05-25 | End: 2023-05-25 | Stop reason: HOSPADM

## 2023-05-25 RX ORDER — ONDANSETRON 4 MG/1
4 TABLET, ORALLY DISINTEGRATING ORAL EVERY 30 MIN PRN
Status: DISCONTINUED | OUTPATIENT
Start: 2023-05-25 | End: 2023-05-25 | Stop reason: HOSPADM

## 2023-05-25 RX ORDER — NALOXONE HYDROCHLORIDE 0.4 MG/ML
0.2 INJECTION, SOLUTION INTRAMUSCULAR; INTRAVENOUS; SUBCUTANEOUS
Status: DISCONTINUED | OUTPATIENT
Start: 2023-05-25 | End: 2023-05-25 | Stop reason: HOSPADM

## 2023-05-25 RX ORDER — PROPOFOL 10 MG/ML
INJECTION, EMULSION INTRAVENOUS PRN
Status: DISCONTINUED | OUTPATIENT
Start: 2023-05-25 | End: 2023-05-25

## 2023-05-25 RX ORDER — HALOPERIDOL 5 MG/ML
1 INJECTION INTRAMUSCULAR
Status: DISCONTINUED | OUTPATIENT
Start: 2023-05-25 | End: 2023-05-25 | Stop reason: HOSPADM

## 2023-05-25 RX ORDER — LIDOCAINE HYDROCHLORIDE 20 MG/ML
INJECTION, SOLUTION INFILTRATION; PERINEURAL PRN
Status: DISCONTINUED | OUTPATIENT
Start: 2023-05-25 | End: 2023-05-25

## 2023-05-25 RX ORDER — FENTANYL CITRATE 50 UG/ML
50 INJECTION, SOLUTION INTRAMUSCULAR; INTRAVENOUS EVERY 5 MIN PRN
Status: DISCONTINUED | OUTPATIENT
Start: 2023-05-25 | End: 2023-05-25 | Stop reason: HOSPADM

## 2023-05-25 RX ORDER — ONDANSETRON 2 MG/ML
INJECTION INTRAMUSCULAR; INTRAVENOUS PRN
Status: DISCONTINUED | OUTPATIENT
Start: 2023-05-25 | End: 2023-05-25

## 2023-05-25 RX ORDER — ONDANSETRON 2 MG/ML
4 INJECTION INTRAMUSCULAR; INTRAVENOUS EVERY 30 MIN PRN
Status: DISCONTINUED | OUTPATIENT
Start: 2023-05-25 | End: 2023-05-25 | Stop reason: HOSPADM

## 2023-05-25 RX ORDER — HYDROMORPHONE HYDROCHLORIDE 1 MG/ML
0.2 INJECTION, SOLUTION INTRAMUSCULAR; INTRAVENOUS; SUBCUTANEOUS EVERY 5 MIN PRN
Status: DISCONTINUED | OUTPATIENT
Start: 2023-05-25 | End: 2023-05-25 | Stop reason: HOSPADM

## 2023-05-25 RX ORDER — SODIUM CHLORIDE, SODIUM LACTATE, POTASSIUM CHLORIDE, CALCIUM CHLORIDE 600; 310; 30; 20 MG/100ML; MG/100ML; MG/100ML; MG/100ML
INJECTION, SOLUTION INTRAVENOUS CONTINUOUS
Status: DISCONTINUED | OUTPATIENT
Start: 2023-05-25 | End: 2023-05-25 | Stop reason: HOSPADM

## 2023-05-25 RX ORDER — LABETALOL HYDROCHLORIDE 5 MG/ML
10 INJECTION, SOLUTION INTRAVENOUS
Status: DISCONTINUED | OUTPATIENT
Start: 2023-05-25 | End: 2023-05-25 | Stop reason: HOSPADM

## 2023-05-25 RX ORDER — FENTANYL CITRATE 50 UG/ML
INJECTION, SOLUTION INTRAMUSCULAR; INTRAVENOUS PRN
Status: DISCONTINUED | OUTPATIENT
Start: 2023-05-25 | End: 2023-05-25

## 2023-05-25 RX ORDER — HEPARIN SODIUM 1000 [USP'U]/ML
INJECTION, SOLUTION INTRAVENOUS; SUBCUTANEOUS PRN
Status: DISCONTINUED | OUTPATIENT
Start: 2023-05-25 | End: 2023-05-25

## 2023-05-25 RX ORDER — FLUMAZENIL 0.1 MG/ML
0.2 INJECTION, SOLUTION INTRAVENOUS
Status: DISCONTINUED | OUTPATIENT
Start: 2023-05-25 | End: 2023-05-25 | Stop reason: HOSPADM

## 2023-05-25 RX ORDER — HYDROMORPHONE HYDROCHLORIDE 1 MG/ML
0.4 INJECTION, SOLUTION INTRAMUSCULAR; INTRAVENOUS; SUBCUTANEOUS EVERY 5 MIN PRN
Status: DISCONTINUED | OUTPATIENT
Start: 2023-05-25 | End: 2023-05-25 | Stop reason: HOSPADM

## 2023-05-25 RX ORDER — ONDANSETRON 2 MG/ML
4 INJECTION INTRAMUSCULAR; INTRAVENOUS EVERY 30 MIN PRN
Status: CANCELLED | OUTPATIENT
Start: 2023-05-25

## 2023-05-25 RX ORDER — ONDANSETRON 4 MG/1
4 TABLET, ORALLY DISINTEGRATING ORAL EVERY 30 MIN PRN
Status: CANCELLED | OUTPATIENT
Start: 2023-05-25

## 2023-05-25 RX ORDER — OXYCODONE HYDROCHLORIDE 5 MG/1
5 TABLET ORAL
Status: CANCELLED | OUTPATIENT
Start: 2023-05-25

## 2023-05-25 RX ORDER — OXYCODONE HYDROCHLORIDE 10 MG/1
10 TABLET ORAL
Status: DISCONTINUED | OUTPATIENT
Start: 2023-05-25 | End: 2023-05-25 | Stop reason: HOSPADM

## 2023-05-25 RX ORDER — OXYCODONE HYDROCHLORIDE 5 MG/1
5 TABLET ORAL
Status: COMPLETED | OUTPATIENT
Start: 2023-05-25 | End: 2023-05-25

## 2023-05-25 RX ORDER — FENTANYL CITRATE 50 UG/ML
25 INJECTION, SOLUTION INTRAMUSCULAR; INTRAVENOUS EVERY 5 MIN PRN
Status: DISCONTINUED | OUTPATIENT
Start: 2023-05-25 | End: 2023-05-25 | Stop reason: HOSPADM

## 2023-05-25 RX ORDER — CEFAZOLIN SODIUM/WATER 2 G/20 ML
2 SYRINGE (ML) INTRAVENOUS
Status: COMPLETED | OUTPATIENT
Start: 2023-05-25 | End: 2023-05-25

## 2023-05-25 RX ORDER — ACETAMINOPHEN 325 MG/1
975 TABLET ORAL ONCE
Status: COMPLETED | OUTPATIENT
Start: 2023-05-25 | End: 2023-05-25

## 2023-05-25 RX ORDER — FENTANYL CITRATE 50 UG/ML
25-50 INJECTION, SOLUTION INTRAMUSCULAR; INTRAVENOUS
Status: DISCONTINUED | OUTPATIENT
Start: 2023-05-25 | End: 2023-05-25 | Stop reason: HOSPADM

## 2023-05-25 RX ORDER — HYDRALAZINE HYDROCHLORIDE 20 MG/ML
2.5-5 INJECTION INTRAMUSCULAR; INTRAVENOUS EVERY 10 MIN PRN
Status: DISCONTINUED | OUTPATIENT
Start: 2023-05-25 | End: 2023-05-25 | Stop reason: HOSPADM

## 2023-05-25 RX ORDER — OXYCODONE HYDROCHLORIDE 10 MG/1
10 TABLET ORAL
Status: CANCELLED | OUTPATIENT
Start: 2023-05-25

## 2023-05-25 RX ORDER — LIDOCAINE 40 MG/G
CREAM TOPICAL
Status: DISCONTINUED | OUTPATIENT
Start: 2023-05-25 | End: 2023-05-25 | Stop reason: HOSPADM

## 2023-05-25 RX ORDER — TRAMADOL HYDROCHLORIDE 50 MG/1
50 TABLET ORAL EVERY 6 HOURS PRN
Qty: 10 TABLET | Refills: 0 | Status: SHIPPED | OUTPATIENT
Start: 2023-05-25 | End: 2023-05-28

## 2023-05-25 RX ADMIN — Medication 2 G: at 11:00

## 2023-05-25 RX ADMIN — FENTANYL CITRATE 50 MCG: 50 INJECTION, SOLUTION INTRAMUSCULAR; INTRAVENOUS at 12:42

## 2023-05-25 RX ADMIN — LIDOCAINE HYDROCHLORIDE 100 MG: 20 INJECTION, SOLUTION INFILTRATION; PERINEURAL at 11:12

## 2023-05-25 RX ADMIN — SODIUM CHLORIDE, POTASSIUM CHLORIDE, SODIUM LACTATE AND CALCIUM CHLORIDE: 600; 310; 30; 20 INJECTION, SOLUTION INTRAVENOUS at 11:03

## 2023-05-25 RX ADMIN — SUGAMMADEX 200 MG: 100 INJECTION, SOLUTION INTRAVENOUS at 13:35

## 2023-05-25 RX ADMIN — SUGAMMADEX 200 MG: 100 INJECTION, SOLUTION INTRAVENOUS at 13:46

## 2023-05-25 RX ADMIN — PHENYLEPHRINE HYDROCHLORIDE 100 MCG: 10 INJECTION INTRAVENOUS at 11:38

## 2023-05-25 RX ADMIN — FENTANYL CITRATE 50 MCG: 50 INJECTION, SOLUTION INTRAMUSCULAR; INTRAVENOUS at 13:17

## 2023-05-25 RX ADMIN — PROPOFOL 200 MG: 10 INJECTION, EMULSION INTRAVENOUS at 11:07

## 2023-05-25 RX ADMIN — PHENYLEPHRINE HYDROCHLORIDE 100 MCG: 10 INJECTION INTRAVENOUS at 12:58

## 2023-05-25 RX ADMIN — PHENYLEPHRINE HYDROCHLORIDE 100 MCG: 10 INJECTION INTRAVENOUS at 11:34

## 2023-05-25 RX ADMIN — PROPOFOL 200 MG: 10 INJECTION, EMULSION INTRAVENOUS at 11:12

## 2023-05-25 RX ADMIN — HEPARIN SODIUM 2000 UNITS: 1000 INJECTION INTRAVENOUS; SUBCUTANEOUS at 12:37

## 2023-05-25 RX ADMIN — ACETAMINOPHEN 975 MG: 325 TABLET, FILM COATED ORAL at 10:29

## 2023-05-25 RX ADMIN — PHENYLEPHRINE HYDROCHLORIDE 0.1 MCG/KG/MIN: 10 INJECTION INTRAVENOUS at 11:40

## 2023-05-25 RX ADMIN — FENTANYL CITRATE 100 MCG: 50 INJECTION, SOLUTION INTRAMUSCULAR; INTRAVENOUS at 11:12

## 2023-05-25 RX ADMIN — FENTANYL CITRATE 50 MCG: 50 INJECTION, SOLUTION INTRAMUSCULAR; INTRAVENOUS at 13:45

## 2023-05-25 RX ADMIN — ONDANSETRON 4 MG: 2 INJECTION INTRAMUSCULAR; INTRAVENOUS at 13:33

## 2023-05-25 RX ADMIN — Medication 100 MG: at 11:12

## 2023-05-25 RX ADMIN — MIDAZOLAM 2 MG: 1 INJECTION INTRAMUSCULAR; INTRAVENOUS at 11:00

## 2023-05-25 ASSESSMENT — ACTIVITIES OF DAILY LIVING (ADL)
ADLS_ACUITY_SCORE: 35

## 2023-05-25 NOTE — ANESTHESIA PROCEDURE NOTES
Airway       Patient location during procedure: ICU       Procedure Start/Stop Times: 5/25/2023 11:16 AM  Staff -        CRNA: Carly Godoy APRN CRNA       Performed By: CRNA  Consent for Airway        Urgency: elective  Indications and Patient Condition       Indications for airway management: jewell-procedural       Mallampati: III     Induction type:intravenous       Mask difficulty assessment: 2 - vent by mask + OA or adjuvant +/- NMBA    Final Airway Details       Final airway type: endotracheal airway       Successful airway: ETT - single  Endotracheal Airway Details        ETT size (mm): 8.0       Cuffed: yes       Successful intubation technique: direct laryngoscopy       DL Blade Type: MAC 4       Grade View of Cords: 1       Adjucts: stylet       Position: Right       Measured from: lips       Secured at (cm): 22       Bite block used: None    Post intubation assessment        Placement verified by: capnometry        Number of attempts at approach: 1       Secured with: silk tape       Ease of procedure: easy       Dentition: Intact and Unchanged    Medication(s) Administered   Medication Administration Time: 5/25/2023 11:16 AM

## 2023-05-25 NOTE — ANESTHESIA CARE TRANSFER NOTE
Patient: Boogie Villa    Procedure: Procedure(s):  LEFT Brachial Basilic ARTERIOVENOUS FISTULA CREATION SURGERY WITH INTRAOPERATIVE ULTRASOUND .       Diagnosis: ESRD on dialysis (H) [N18.6, Z99.2]  Complication of dialysis access insertion, initial encounter [T82.9XXA]  Diagnosis Additional Information: No value filed.    Anesthesia Type:   General     Note:    Oropharynx: oropharynx clear of all foreign objects  Level of Consciousness: drowsy  Oxygen Supplementation: face mask  Level of Supplemental Oxygen (L/min / FiO2): 10  Independent Airway: airway patency satisfactory and stable  Dentition: dentition unchanged  Vital Signs Stable: post-procedure vital signs reviewed and stable  Report to RN Given: handoff report given  Patient transferred to: PACU    Handoff Report: Identifed the Patient, Identified the Reponsible Provider, Reviewed the pertinent medical history, Discussed the surgical course, Reviewed Intra-OP anesthesia mangement and issues during anesthesia, Set expectations for post-procedure period and Allowed opportunity for questions and acknowledgement of understanding      Vitals:  Vitals Value Taken Time   BP     Temp     Pulse 83 05/25/23 1356   Resp     SpO2 96 % 05/25/23 1356   Vitals shown include unvalidated device data.    Electronically Signed By: CHRISTIN Godfrey CRNA  May 25, 2023  1:57 PM

## 2023-05-25 NOTE — OP NOTE
Transplant Surgery  Operative Note    Date:  5/25/2023  Preop Dx:  Chronic Kidney Disease/End Stage Renal Disease  Postop Dx: Chronic Kidney Disease/End Stage Renal Disease  Procedure: Left BrachioBasilic Arteriovenous Fistula Creation with intraoperative ultrasound  Surgeon: Nita Martinez MD  ASSISTANT:  Dr. Dianne MD fellow. There was no qualified general surgery resident available to assist during this procedure.   Anesthesia: General with Block  EBL: 10 ml  Drains: no drain  Specimen: n/a.  Complications: None  Findings:   Intraoperative Ultrasound demonstrated adequate sized brachial artery and basilic vein for fistula formation    Indication: The patient has need for permanent hemodialysis access,   After discussing the risks and benefits of surgery and potential complications, the patient provided informed consent.     DETAILS OF PROCEDURE:  The patient was brought to the operating room, placed in a supine position.  Perioperative prophylactic IV antibiotics were given.  Anesthesia was adminisitered. The patient was positioned supine, and the left extremity was positioned at 90 , prepped and draped in the usual sterile fashion. An ultrasound was performed to assess the size, quality, and position of the artery and vein. The patient received preoperative IV antibiotics. The basilic vein chosen, was compressible, thin walled,  and chosen for venous outflow given those characteristics.    The skin over the antecubital fossa was infil- trated with 0.5 % lidocaine. A 5-cm transverse skin incision was then performed in the antecubital fossa. The basilic vein was identified and encircled with a vessel loop. The vein was dissected for a segment of 5 cm. The dissection was carried as proximal and distal as possible through that incision with ligation of branches.  Attention was then directed to the brachial artery. The tendinous aponeurosis of the biceps muscle was incised. The location of the brachial  artery was identified by palpation.The soft tissue over the brachial artery was then incised, and the brachial artery was identified and encircled with a vessel loop.    The patient was given 2000U of heparin intravenously. The basilic vein was then ligated at its most distal end. Hyphet clips were applied on the brachial artery and a 6-mm incision in the anterior wall of the brachial artery was performed. The basilic vein was then allowed to lie over the arteriotomy. The end of the vein was spatulated to match the size of the arteriotomy.  The anastomosis was then performed using a 7-O Prolene running suture.At the completion of the suture line, the brachial artery was forward flushed and then allowed to backbleed. The anastomosis was irrigated with heparinized solution. The sutures were then tied and the suture line evaluated for hemostasis, which was adequate.There was evidence of a palpable pulse in the basilic vein. There was evidence of strong palpable pulses in the brachial, radial, and ulnar arteries at the wrist. There was no evidence of any kinks. The wound was then irrigated. The subcutaneous tissue was closed with 3-0 Vicryl  and the skin closed with 4-0 subcuticular monocryl sutures. Sterile surgical glue sealed the incision. A debriefing checklist was completed to share information critical to postoperative care of the patient.    The patient tolerated the procedure well and was taken to the postanesthesia care unit in stable condition. Dr. Martinez was present and scrubbed for the entire procedure.    Physician Attestation   I was present for the key portions of the procedure and I was immediately available for the entire procedure between opening and closing.    Nita Martinez MD, MD  Date of Service (when I saw the patient): 5/25/2023  The transplant fellow, Dank Dean MD, was present and assisted with all aspects of the operation described above from opening to closing.  There was  no suitable surgical resident available to assist in this procedure.

## 2023-06-01 ENCOUNTER — PATIENT OUTREACH (OUTPATIENT)
Dept: NEPHROLOGY | Facility: CLINIC | Age: 23
End: 2023-06-01
Payer: COMMERCIAL

## 2023-06-01 NOTE — PROGRESS NOTES
Called pt to follow up s/p LUE brachiobasilic AVF creation on 5/25/23 with Dr. Martinez.    Per chart review, pt reported to the ED on 5/26 2/2 fever and chills during HD tx.  Pt was admitted 5/27-5/28 for sepsis work up.  During that admission, Vascular Surgery was consulted to rule out surgical site infection.  Per their assessment, the surgical site was consistent with early post-op appearance and there was no evidence of surgical site infection.  CVC cultures were obtained and without growth.  Source of the fever was not found during the admission and pt was discharged.    Pt reports he is feeling a lot better since his admission.  Pt reports his left arm is still a little sore, but otherwise fine.  Pt notes some numbness along forearm that has been consistent since surgery or improving slightly.  Pt has no concerns at this time.  Pt is unable to feel the thrill which is to be expected after a 1st stage brachiobasilic AVF, as the fistula is likely too deep to feel the thrill.    Neph Tracking Flowsheet Last Filled Values     CKD Education Status Complete    CKD Education Type Kidney Smart Modality Education; Kidney Smart CKD Basics    Preferred Modality Hemodialysis    Final Modality Hemodialysis  [facility]    Patient's Referral Dates Auto Populate Patient's Referral Dates    Dietician Referral 3/29/21    Journey Referral 2/1/23    Vein Mapping/US Order 3/13/23    Vein Mapping/US  04/11/23    Access Surgeon Referral Status  Referred    Dialysis Access Referral 03/13/23  fistula consult with Dr. Martinez    Access Surgical Consult 04/25/23  Fistula consult with Dr. Martinez    Diaylsis Access Type AV Fistula    Dialysis Access Site DANIEL  brachiobasilic    Dialysis Access Surgery 05/25/23  first stage BVT    Dialysis Access Surgeon Michelle    Transplant Evaluation Referral 10/22/20    Transplant Status  Referred    Initiation of Dialysis Outpatient        Dialysis History      Start End  Type Center Comments    2/17/2023  In-center Hemodialysis [facility] DIALYSIS (ESRD) [facility] Nephrologist: Dr. Zarate with [employer]              Pt is scheduled for follow up on 6/20/23.    Pt denied any questions or concerns at this time and will contact Dialysis Access Care Coordinator should any arise.    MALCOLM ZIMMERMAN RN on 6/1/2023 at 3:49 PM  Dialysis Access Care Coordinator  Phone: [phone]  Pool: P_Dialysis_Access_Nurse

## 2023-06-20 ENCOUNTER — OFFICE VISIT (OUTPATIENT)
Dept: TRANSPLANT | Facility: CLINIC | Age: 23
End: 2023-06-20
Attending: SURGERY
Payer: COMMERCIAL

## 2023-06-20 ENCOUNTER — ANCILLARY PROCEDURE (OUTPATIENT)
Dept: ULTRASOUND IMAGING | Facility: CLINIC | Age: 23
End: 2023-06-20
Attending: NURSE PRACTITIONER
Payer: COMMERCIAL

## 2023-06-20 ENCOUNTER — PATIENT OUTREACH (OUTPATIENT)
Dept: NEPHROLOGY | Facility: CLINIC | Age: 23
End: 2023-06-20

## 2023-06-20 VITALS
WEIGHT: 286.1 LBS | SYSTOLIC BLOOD PRESSURE: 133 MMHG | RESPIRATION RATE: 20 BRPM | OXYGEN SATURATION: 96 % | TEMPERATURE: 98.1 F | HEART RATE: 63 BPM | BODY MASS INDEX: 39.9 KG/M2 | DIASTOLIC BLOOD PRESSURE: 83 MMHG

## 2023-06-20 DIAGNOSIS — N18.6 ESRD (END STAGE RENAL DISEASE) (H): ICD-10-CM

## 2023-06-20 DIAGNOSIS — N18.6 ESRD (END STAGE RENAL DISEASE) (H): Primary | ICD-10-CM

## 2023-06-20 PROCEDURE — 99214 OFFICE O/P EST MOD 30 MIN: CPT | Performed by: NURSE PRACTITIONER

## 2023-06-20 PROCEDURE — G0463 HOSPITAL OUTPT CLINIC VISIT: HCPCS | Performed by: NURSE PRACTITIONER

## 2023-06-20 PROCEDURE — 93990 DOPPLER FLOW TESTING: CPT | Mod: GC | Performed by: RADIOLOGY

## 2023-06-20 ASSESSMENT — PAIN SCALES - GENERAL: PAINLEVEL: NO PAIN (0)

## 2023-06-20 NOTE — TELEPHONE ENCOUNTER
Dialysis Access Care Coordination: General Outreach    REASON FOR VISIT:     REASON FOR VISIT: Clinic Care Coordination - Face To Face (Dialysis Access - fistula follow up)                                  SITUATION/BACKROUND:     Neph Tracking Flowsheet Last Filled Values     CKD Education Status Complete    CKD Education Type Kidney Smart Modality Education; Kidney Smart CKD Basics    Preferred Modality Hemodialysis    Final Modality Hemodialysis  [facility]    Patient's Referral Dates Auto Populate Patient's Referral Dates    Dietician Referral 3/29/21    Journey Referral 2/1/23    Vein Mapping/US Order 3/13/23    Vein Mapping/US  04/11/23    Access Surgeon Referral Status  Referred    Dialysis Access Referral 03/13/23  fistula consult with Dr. Martinez    Access Surgical Consult 04/25/23  Fistula consult with Dr. Martinez    Diaylsis Access Type AV Fistula    Dialysis Access Site DANIEL  [email or web address]    Dialysis Access Surgery 05/25/23  first stage BVT    Dialysis Access Surgeon Michelle    Transplant Evaluation Referral 10/22/20    Transplant Status  Referred    Initiation of Dialysis Outpatient        Dialysis History      Start End Type Center Comments    2/17/2023  In-center Hemodialysis [facility] DIALYSIS (ESRD) MWF Nephrologist: Dr. Zarate with [facility]                ASSESSMENT:       Dialysis Access Assessments:  Fistula / Graft  Fever/Chills: no  Redness/warmth: no  Swelling: yes  Swelling site: access site  Pain: none  Drainage: none  Current infection: no  Steal Symptoms: none  Thrill: (S) absent  Bruit: (S) absent (faint bruit with doppler)      PLAN:     Appointments in Next Year    Jun 20, 2023  5:50 PM  (Arrive by 5:35 PM)  US EXTREMITY ARTERIAL VENOUS DIALYSIS ACCESS GRAFT with [id number] ) [phone]   Sep 20, 2023  8:00 AM  (Arrive by 7:30 AM)  New Weight Management Visit  with Adam Szymanski MD  [facility] ([facility] ) [phone]   Sep 20, 2023  9:30 AM  (Arrive by 9:15 AM)  New Weight Managment Nutrition with Yeny Harmon RD  [facility] [place] ([facility] ) [phone]          Follow Up:     Ultrasound ordered and scheduled for later today. Plan dependent on results, 2nd stage transposition vs new fistula creation (left AVG vs right AVF).    Patient verbalized understanding and will follow up as recommended.    Ivis Springer RN  Dialysis Access Care Coordinator  Phone: [phone]  Pool: P_Dialysis_Access_Nurse

## 2023-06-20 NOTE — LETTER
6/20/2023         RE: Boogie Villa  [address]  [facility] [address]        Dear Colleague,    Thank you for referring your patient, Boogie Villa, to the [facility]. Please see a copy of my visit note below.    Dialysis Access Service   Progress Note    S:  Mr. Villa is being seen today for surgical followup of his dialysis access.  He reports no issues with the wound, and  no steal syndrome of the distal extremity.     O:     GENERAL: alert  Circulation:   Radial pulse 1+  Ulnar pulse  1+   Capillary refill:  capillary refill brisk    Sensory exam:   arm: Normal   [x]           Abnormal   []          Comment:    hand: Normal   [x]           Abnormal   []          Comment:   Motor exam:   arm: Normal   [x]           Abnormal   []          Comment:    hand: Normal   [x]           Abnormal   []          Comment:    Access: L upper extremity wound(s) healed. non-tender. capillary refill brisk dopplerable    Assessment & Plan: Mr. Villa's dialysis access has matured marginally at this time point.  Ultrasound to assess flow We would like to see the patient back in the clinic in 3 weeks time to assess progress. The patient was counselled to contact one of the nurse coordinators, Kathy Medley RN or Ivis Springer RN at [phone] with any questions or concerns.  Thank you for the opportunity to participate in Mr. Villa's care.    TT: 25 min  CT: 15 min            Again, thank you for allowing me to participate in the care of your patient.        Sincerely,        Ivis Monzon NP

## 2023-06-22 ENCOUNTER — TELEPHONE (OUTPATIENT)
Dept: ENDOCRINOLOGY | Facility: CLINIC | Age: 23
End: 2023-06-22
Payer: COMMERCIAL

## 2023-06-22 NOTE — PROGRESS NOTES
Dialysis Access Service   Progress Note    S:  Mr. Villa is being seen today for surgical followup of his dialysis access.  He reports no issues with the wound, and  no steal syndrome of the distal extremity.     O:     GENERAL: alert  Circulation:   Radial pulse 1+  Ulnar pulse  1+   Capillary refill:  capillary refill brisk    Sensory exam:   arm: Normal   [x]           Abnormal   []          Comment:    hand: Normal   [x]           Abnormal   []          Comment:   Motor exam:   arm: Normal   [x]           Abnormal   []          Comment:    hand: Normal   [x]           Abnormal   []          Comment:    Access: L upper extremity wound(s) healed. non-tender. capillary refill brisk dopplerable    Assessment & Plan: Mr. Villa's dialysis access has matured marginally at this time point.  Ultrasound to assess flow We would like to see the patient back in the clinic in 3 weeks time to assess progress. The patient was counselled to contact one of the nurse coordinators, Kathy Medley RN or Ivis Springer RN at [phone] with any questions or concerns.  Thank you for the opportunity to participate in Mr. Villa's care.    TT: 25 min  CT: 15 min

## 2023-06-22 NOTE — TELEPHONE ENCOUNTER
Called to offer sooner weight mgmt appt than 09/20. LVM with K pod #. Will also send MyChart.    Offer [facility] hospital INO slot on 07/05/23.

## 2023-06-23 ENCOUNTER — PATIENT OUTREACH (OUTPATIENT)
Dept: NEPHROLOGY | Facility: CLINIC | Age: 23
End: 2023-06-23
Payer: COMMERCIAL

## 2023-06-23 DIAGNOSIS — T82.9XXA COMPLICATION OF VASCULAR ACCESS FOR DIALYSIS, INITIAL ENCOUNTER: ICD-10-CM

## 2023-06-23 DIAGNOSIS — T82.9XXA COMPLICATION OF VASCULAR ACCESS FOR DIALYSIS, INITIAL ENCOUNTER: Primary | ICD-10-CM

## 2023-06-23 NOTE — CONSULTS
Outpatient IR Referral  06/23/23    Boogie Villa  [id number]    Referring Provider:  Nita Martinez MD in  MEDICINE RENAL  Call Back # [phone]      IR referral Request:  Aspiration of hematoma    Procedure Approved for: IR ultrasound-guided aspiration of hematoma around fistula.    Case and imaging reviewed with IR Dr. Yogi Weathers.    ===  Brief History:      Diagnosis: Complication of vascular access for dialysis, initial encounter [T82.9XXA]    Left brachiobasilic AVF, not yet transposed. Ultrasound revealed hematoma compressing the basilic vein. Concern for fistula clotting with compression.     ===  Pertinent Imaging Reviewed:      Duplex ultrasound of the left upper extremity dated 6/20/2023    Clinical history: Endstage renal disease; left brachial basilic  fistula placement    Impression:  1. Hematoma near the anastomosis of the brachial basilic fistula  obscuring visualization.   2. Brachial basilic fistula is not maturing. Hematoma at the  anastomosis is likely causing significant compression at/or near the  anastomosis. Caliber of the basilic vein has not changed significantly  since the preoperative ultrasound and velocity through the basilic  vein is less than 20 cm/s.    ===  Plan:    Procedure order placed.    No IR pre-op clinic visit is required.    Jose C Rosas PA-C  Interventional Radiology   IR on-call pager: [phone]

## 2023-06-23 NOTE — TELEPHONE ENCOUNTER
Dialysis Access Care Coordination: General Outreach    REASON FOR CALL:     REASON FOR CALL: Clinic Care Coordination - Follow-up (Dialysis Access)                                  SITUATION/BACKROUND:     Patient had an ultrasound of his fistula on 6/20:    Impression:  1. Hematoma near the anastomosis of the brachial basilic fistula  obscuring visualization.   2. Brachial basilic fistula is not maturing. Hematoma at the  anastomosis is likely causing significant compression at/or near the  anastomosis. Caliber of the basilic vein has not changed significantly  since the preoperative ultrasound and velocity through the basilic  vein is less than 20 cm/s.    Discussed results with Dr. Martinez. Concern for hematoma compressing the fistula and fistula may eventually clot off. Dr. Martinez requests IR to aspirate the hematoma and relieve the compression.    Neph Tracking Flowsheet Last Filled Values     CKD Education Status Complete    CKD Education Type Kidney Smart Modality Education; Kidney Smart CKD Basics    Preferred Modality Hemodialysis    Final Modality Hemodialysis  [facility]    Patient's Referral Dates Auto Populate Patient's Referral Dates    Dietician Referral 3/29/21    Journey Referral 2/1/23    Vein Mapping/US Order 3/13/23    Vein Mapping/US  04/11/23    Access Surgeon Referral Status  Referred    Dialysis Access Referral 03/13/23  fistula consult with Dr. Martinez    Access Surgical Consult 04/25/23  Fistula consult with Dr. Martinez    Diaylsis Access Type AV Fistula    Dialysis Access Site DANIEL  [email or web address]    Dialysis Access Surgery 05/25/23  first stage BVT    Dialysis Access Surgeon Michelle    Transplant Evaluation Referral 10/22/20    Transplant Status  Referred    Initiation of Dialysis Outpatient        Dialysis History      Start End Type Center Comments    2/17/2023  In-center Hemodialysis [facility] DIALYSIS (ESRD) MWF Nephrologist: Dr. Zarate with  [facility]                ASSESSMENT:       Dialysis Access Assessments:  No assessment indicated    PLAN:     Appointments in Next Year    Jul 05, 2023  8:00 AM  (Arrive by 7:30 AM)  New Weight Management Visit with Yessenia Heller NP  [facility] [place] ([facility] ) [phone]   Sep 20, 2023  9:30 AM  (Arrive by 9:15 AM)  New Weight Managment Nutrition with Yeny Harmon RD  [facility] [place] ([facility] ) [phone]            Follow Up:     IR referral order placed: aspiration of hematoma.    Updated dialysis unit RN with the plan.    Ivis Springer RN  Dialysis Access Care Coordinator  Phone: [phone]  Pool: P_Dialysis_Access_Nurse

## 2023-06-28 ENCOUNTER — HOSPITAL ENCOUNTER (OUTPATIENT)
Facility: CLINIC | Age: 23
End: 2023-06-28
Admitting: SURGERY
Payer: COMMERCIAL

## 2023-07-02 ENCOUNTER — TELEPHONE (OUTPATIENT)
Dept: TRANSPLANT | Facility: CLINIC | Age: 23
End: 2023-07-02
Payer: COMMERCIAL

## 2023-07-03 NOTE — TELEPHONE ENCOUNTER
Darcy, wife, called to report that patient has temp 102.5. not feeling well. Nauseated.   Has dialysis three days a week.     Pt did take tylenol and fever not decreasing. Pt will go in for evaluation.

## 2023-07-05 ENCOUNTER — VIRTUAL VISIT (OUTPATIENT)
Dept: ENDOCRINOLOGY | Facility: CLINIC | Age: 23
End: 2023-07-05
Attending: NURSE PRACTITIONER
Payer: COMMERCIAL

## 2023-07-05 ENCOUNTER — TELEPHONE (OUTPATIENT)
Dept: ENDOCRINOLOGY | Facility: CLINIC | Age: 23
End: 2023-07-05

## 2023-07-05 ENCOUNTER — DOCUMENTATION ONLY (OUTPATIENT)
Dept: ENDOCRINOLOGY | Facility: CLINIC | Age: 23
End: 2023-07-05

## 2023-07-05 VITALS — BODY MASS INDEX: 40.04 KG/M2 | WEIGHT: 286 LBS | HEIGHT: 71 IN

## 2023-07-05 DIAGNOSIS — E66.812 CLASS 2 SEVERE OBESITY DUE TO EXCESS CALORIES WITH SERIOUS COMORBIDITY AND BODY MASS INDEX (BMI) OF 39.0 TO 39.9 IN ADULT (H): ICD-10-CM

## 2023-07-05 DIAGNOSIS — E66.01 CLASS 2 SEVERE OBESITY DUE TO EXCESS CALORIES WITH SERIOUS COMORBIDITY AND BODY MASS INDEX (BMI) OF 39.0 TO 39.9 IN ADULT (H): ICD-10-CM

## 2023-07-05 DIAGNOSIS — N18.6 ESRD (END STAGE RENAL DISEASE) (H): ICD-10-CM

## 2023-07-05 DIAGNOSIS — E66.812 CLASS 2 SEVERE OBESITY WITH SERIOUS COMORBIDITY AND BODY MASS INDEX (BMI) OF 39.0 TO 39.9 IN ADULT, UNSPECIFIED OBESITY TYPE (H): Primary | ICD-10-CM

## 2023-07-05 DIAGNOSIS — Z76.82 ORGAN TRANSPLANT CANDIDATE: ICD-10-CM

## 2023-07-05 DIAGNOSIS — E66.01 CLASS 2 SEVERE OBESITY WITH SERIOUS COMORBIDITY AND BODY MASS INDEX (BMI) OF 39.0 TO 39.9 IN ADULT, UNSPECIFIED OBESITY TYPE (H): Primary | ICD-10-CM

## 2023-07-05 PROCEDURE — 99215 OFFICE O/P EST HI 40 MIN: CPT | Mod: VID | Performed by: NURSE PRACTITIONER

## 2023-07-05 ASSESSMENT — PAIN SCALES - GENERAL: PAINLEVEL: MILD PAIN (2)

## 2023-07-05 NOTE — LETTER
"2023       RE: Boogie Villa  [address]  [facility] [address]     Dear Colleague,    Thank you for referring your patient, Boogie Villa, to the [facility] [place] at [facility]. Please see a copy of my visit note below.    Virtual Visit Details    Type of service:  Video Visit     Originating Location (pt. Location): Home    Distant Location (provider location):  Off-site  Platform used for Video Visit: Be Here    45 minutes spent by me on the date of the encounter doing chart review, history and exam, documentation and further activities per the note    New Medical Weight Management Consult    PATIENT:  Boogie Villa  MRN:         [id number]  :         2000  LEANN:         2023    Dear Dr. Riddle  I had the pleasure of seeing your patient, Boogie Villa. Full intake/assessment was done to determine barriers to weight loss success and develop a treatment plan. Boogie Villa is a 23 year old male interested in treatment of medical problems associated with excess weight. He has a height of 5' 11\", a weight of 286 lbs 0 oz, and the calculated Body mass index is 39.89 kg/m .    Assessment & Plan   Problem List Items Addressed This Visit          Digestive    Class 2 severe obesity due to excess calories with serious comorbidity and body mass index (BMI) of 39.0 to 39.9 in adult (H)     ESRD on dialysis needing BMI <35 for kidney transplant. Last seen 2020 for medical weight management, declining discussion about  antiobesity medications or bariatric surgery. Lost to follow up due to covid isolation and changing health status. Reviewed today patient's health goals. Struggling to find motivation to make big changes in diet, lifestyle, activity level. Describes limited routine in his day, structured around appointments and when he wants to work (makes his own schedule, works from home).     Describes " only eating 2 meals a day, maybe a snack. Doesn't feel he eats a lot of food so isn't sure  antiobesity medications would be helpful with weight loss. Despite that, he is open to discussing today. We discussed topiramate and saxenda, both of which we'd taper slowly given kidney status. Diet includes fast food and food his mother has prepared. He acknowledges fast food is not the best option but does not cook for himself. Cooking for himself is limited by his ADHD which he prefers not to treat given side effects to the ritalin he is prescribed.     Plan:  See dietitian   Update labs  Start saxenda if covered by insurance, will do topiramate to 50mg if not covered   Lauren Bloch [facility] Pharmacist in 6 weeks   Start with smaller portions          Relevant Medications    liraglutide - Weight Management (SAXENDA) 18 MG/3ML pen     Other Visit Diagnoses       Class 2 severe obesity with serious comorbidity and body mass index (BMI) of 39.0 to 39.9 in adult, unspecified obesity type (H)    -  Primary    Relevant Medications    liraglutide - Weight Management (SAXENDA) 18 MG/3ML pen    insulin pen needle (31G X 5 MM) 31G X 5 MM miscellaneous    Other Relevant Orders    Hemoglobin A1c    Comprehensive metabolic panel    Med Therapy Management Referral    ESRD (end stage renal disease) (H)        Relevant Orders    Hemoglobin A1c    Comprehensive metabolic panel    Med Therapy Management Referral    Organ transplant candidate                 He has the following co-morbidities:        6/28/2023    12:01 PM   --   I have the following health issues associated with obesity High Blood Pressure   I have the following symptoms associated with obesity Fatigue     ESRD on Dialysis needing BMI <35 for kidney transplant   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         6/28/2023    12:01 PM   Patient Goals   If yes, please indicate which surgery? Kidney Transplant  "          6/28/2023    12:01 PM   Referring Provider   Please name the provider who referred you to Medical Weight Management  If you do not know, please answer \"I Don't Know\" I Don't Know           6/28/2023    12:01 PM   Weight History   How concerned are you about your weight? Very Concerned   I became overweight As a Teenager   The following factors have contributed to my weight gain Mental Health Issues    Eating Wrong Types of Food    Eating Too Much    Lack of Exercise    Genetic (Runs in the Family)   I have tried the following methods to lose weight Watching Portions or Calories    Exercise   My lowest weight since age 18 was 243   My highest weight since age 18 was 300   The most weight I have ever lost was (lbs) 18   I have the following family history of obesity/being overweight My father is overweight    One or more of my siblings are overweight   How has your weight changed over the last year? Gained   How many pounds? 11       Struggling with motivation in general today. Works with therapist- has talked with them about this but not making any progress. Also has ADHD which makes things challenging- doesn't like the side effects of ritalin so he doesn't take unless absolutely necessary.          6/28/2023    12:01 PM   Diet Recall Review with Patient   If you do eat breakfast, what types of food do you eat? Fast food, frozen meals, or leftovers   If you do eat supper, what types of food do you typically eat? Fast food, frozen meals, or leftovers   How many glasses of juice do you drink in a typical day? 0   How many of glasses of milk do you drink in a typical day? 0   How many 8oz glasses of sugar containing drinks such as Jitendra-Aid/sweet tea do you drink in a day? 0   How many cans/bottles of sugar pop/soda/tea/sports drinks do you drink in a day? 0   How many cans/bottles of diet pop/soda/tea or sports drink do you drink in a day? 0   How often do you have a drink of alcohol? Never           6/28/2023 "    12:01 PM   Eating Habits   Generally, my meals include foods like these bread, pasta, rice, potatoes, corn, crackers, sweet dessert, pop, or juice Everyday   Generally, my meals include foods like these fried meats, brats, burgers, french fries, pizza, cheese, chips, or ice cream Almost Everyday   Eat fast food (like McDonal[employer], Burger Benigno, Taco Bell) Almost Everyday   Eat at a buffet or sit-down restaurant Once a Week   Eat most of my meals in front of the TV or computer Everyday   Often skip meals, eat at random times, have no regular eating times A Few Times a Week   Rarely sit down for a meal but snack or graze throughout Never   Eat extra snacks between meals Less Than Weekly   Eat most of my food at the end of the day Never   Eat in the middle of the night or wake up at night to eat Never   Eat extra snacks to prevent or correct low blood sugar Never   Eat to prevent acid reflux or stomach pain Never   Worry about not having enough food to eat Never   I eat when I am depressed Less Than Weekly   I eat when I am stressed Never   I eat when I am bored Never   I eat when I am anxious Never   I eat when I am happy or as a reward Less Than Weekly   I feel hungry all the time even if I just have eaten Never   Feeling full is important to me A Few Times a Week   I finish all the food on my plate even if I am already full Everyday   I can't resist eating delicious food or walk past the good food/smell Everyday   I eat/snack without noticing that I am eating Never   I eat when I am preparing the meal Never   I eat more than usual when I see others eating Never   I have trouble not eating sweets, ice cream, cookies, or chips if they are around the house Everyday   I think about food all day Never   What foods, if any, do you crave? Chips/Crackers   Please list any other foods you crave? Nachos      Not a lot of schedule/ routine to the day   2 meals a day - around noon and 6pm   -sometimes frozen meals  -mom will  cook food for leftovers to have at his place  -will order in often for 1 meal of the day (fast food)   -doesn't cook     Snacks- tries to avoid having them on hand. Difficult to portion control   Doesn't feel excessive hunger during the day     No significant restrictions at this time- just limiting potassium and phos     Drinks primarily water  1 energy drink a week           6/28/2023    12:01 PM   Amount of Food   I feel out of control when eating Never   I eat a large amount of food, like a loaf of bread, a box of cookies, a pint/quart of ice cream, all at once Monthly   I eat a large amount of food even when I am not hungry Never   I eat rapidly Never   I eat alone because I feel embarrassed and do not want others to see how much I have eaten Never   I eat until I am uncomfortably full Weekly   I feel bad, disgusted, or guilty after I overeat Never           6/28/2023    12:01 PM   Activity/Exercise History   How much of a typical 12 hour day do you spend sitting? Most of the Day   How much of a typical 12 hour day do you spend lying down? Less Than Half the Day   How much of a typical day do you spend walking/standing? Less Than Half the Day   How many hours (not including work) do you spend on the TV/Video Games/Computer/Tablet/Phone? 6 Hours or More   How many times a week are you active for the purpose of exercise? 2-3 Times a Week   What keeps you from being more active? Shortness of Breath    Too tired    Other   How many total minutes do you spend doing some activity for the purpose of exercising when you exercise? Less Than 15 Minutes       PAST MEDICAL HISTORY:  Past Medical History:   Diagnosis Date    ADD (attention deficit disorder)     Allergic rhinitis     ESRD (end stage renal disease) on dialysis (H)     Hypertension 2000    Mitrofanoff appendicovesicostomy present (H)     Obesity     Posterior urethral valves            6/28/2023    12:01 PM   Work/Social History Reviewed With Patient   My  employment status is Full-Time   My job is    How much of your job is spent on the computer or phone? 100%   How many hours do you spend commuting to work daily? 0   What is your marital status? Single   Who do you live with? Nobody   Who does the food shopping? I do              6/28/2023    12:01 PM   Mental Health History Reviewed With Patient   Have you ever been physically or sexually abused? No   How often in the past 2 weeks have you felt little interest or pleasure in doing things? For Several Days   Over the past 2 weeks how often have you felt down, depressed, or hopeless? Not at all           6/28/2023    12:01 PM   Sleep History Reviewed With Patient   How many hours do you sleep at night? 8      Sleeping in chair so port doesn't get pulled     MEDICATIONS:   Current Outpatient Medications   Medication Sig Dispense Refill    amLODIPine (NORVASC) 5 MG tablet Take 1 tablet (5 mg) by mouth daily (Patient taking differently: Take 5 mg by mouth every morning) 90 tablet 3    atenolol (TENORMIN) 25 MG tablet Take 1.5 tablets (37.5 mg) by mouth daily (Patient taking differently: Take 37.5 mg by mouth every morning) 135 tablet 3    B Complex-C-Folic Acid (DIALYVITE 800) 0.8 MG TABS Take 1 tablet by mouth daily (Patient taking differently: Take 1 tablet by mouth every morning) 90 tablet 3    calcitRIOL (ROCALTROL) 0.25 MCG capsule Take 1 capsule (0.25 mcg) by mouth daily (Patient taking differently: Take 0.25 mcg by mouth three times a week M-W-F) 90 capsule 3    Calcium Acetate 667 MG TABS Take 667 mg by mouth 3 times daily (with meals) (Patient taking differently: Take 667 mg by mouth 3 times daily (with meals)) 90 tablet 1    cetirizine (ZYRTEC) 10 MG tablet Take 10 mg by mouth every morning      Cholecalciferol (VITAMIN D) 50 MCG (2000 UT) CAPS Take 2,000 unit marking on an U-100 insulin syringe by mouth daily (Patient taking differently: Take 2,000 unit marking on an U-100 insulin syringe by  mouth every morning) 90 capsule 11    difluprednate (DUREZOL) 0.05 % ophthalmic emulsion INSTILL 1 DROP INTO EACH EYE SIX TIMES DAILY FOR THE FIRST 24 HOURS. THEN 1 DROP 4 TIMES DAILY UNTIL FOLLOW UP      insulin pen needle (31G X 5 MM) 31G X 5 MM miscellaneous Use 1 pen needles daily or as directed. 100 each 1    liraglutide - Weight Management (SAXENDA) 18 MG/3ML pen Week 1: 0.6 mg subcutaneous daily, Week 2: 1.2 mg daily, Week 3: 1.8 mg daily, Week 4: 2.4 mg daily, Week 5 & on: 3 mg daily 15 mL 3    methylphenidate (RITALIN) 10 MG tablet Take 25 mg by mouth as needed Prn      Probiotic Product (PROBIOTIC-10 PO) Take 1 tablet by mouth every morning 1 tab capsule         ALLERGIES:   Allergies   Allergen Reactions    Banana Itching     Raw banana; itchy mouth      Dust Mites      Runny nose and watery eyes    Mold      Runny nose and watery eyes    Nsaids Other (See Comments)     CKD    Other [Seasonal Allergies]      Grass, Ragweed - gets runny nose and watery eyes    Sulfa Antibiotics      PN: LW Reaction: GI Upset       Admission on 05/25/2023, Discharged on 05/25/2023   Component Date Value Ref Range Status    Hemoglobin 05/25/2023 10.2 (L)  13.3 - 17.7 g/dL Final    Sodium 05/25/2023 140  136 - 145 mmol/L Final    Potassium 05/25/2023 4.5  3.4 - 5.3 mmol/L Final    Chloride 05/25/2023 106  98 - 107 mmol/L Final    Carbon Dioxide (CO2) 05/25/2023 21 (L)  22 - 29 mmol/L Final    Anion Gap 05/25/2023 13  7 - 15 mmol/L Final    Urea Nitrogen 05/25/2023 16.5  6.0 - 20.0 mg/dL Final    Creatinine 05/25/2023 7.61 (H)  0.67 - 1.17 mg/dL Final    Calcium 05/25/2023 9.5  8.6 - 10.0 mg/dL Final    Glucose 05/25/2023 100 (H)  70 - 99 mg/dL Final    GFR Estimate 05/25/2023 9 (L)  >60 mL/min/1.73m2 Final    eGFR calculated using 2021 CKD-EPI equation.       Anti-obesity medication ROS:    HEENT  Hx of glaucoma: No    Cardiovascular  CAD:No  HTN:Yes    Gastrointestinal  GERD:No  Constipation/diarrhea/GI issues:No  Liver  "Dz:No  Computed FIB-4 Calculation unavailable. Necessary lab results were not found in the last year.    H/O Pancreatitis:No    Psychiatric  Bipolar: No  Anxiety:yes  Depression:Yes  History of alcohol/drug abuse: No  Hx of eating disorder:No    Endocrine  Personal or family hx of MTC or MEN2:No  Diabetes/prediabetes: No    Neurologic:  Hx of seizures: No  Hx of migraines: No  Memory Impairment: No  Chronic pain/opioids use: No      History of kidney stones: No  Kidney disease: Yes  Current birth control: NA    PHYSICAL EXAM:  Objective    Ht 1.803 m (5' 11\")   Wt 129.7 kg (286 lb)   BMI 39.89 kg/m    Physical Exam   GENERAL: Healthy, alert and no distress  EYES: Eyes grossly normal to inspection.  No discharge or erythema, or obvious scleral/conjunctival abnormalities.  RESP: No audible wheeze, cough, or visible cyanosis.  No visible retractions or increased work of breathing.    SKIN: Visible skin clear. No significant rash, abnormal pigmentation or lesions.  NEURO: Cranial nerves grossly intact.  Mentation and speech appropriate for age.  PSYCH: Mentation appears normal, affect normal/bright, judgement and insight intact, normal speech and appearance well-groomed.    Computed FIB-4 Calculation unavailable. Necessary lab results were not found in the last year.    Fib-4 < 1.3: No further evaluation at this point, unless other concerns  - If the Fib-4 is > 2.67,  Fibroscan and elective liver clinic referral  - Intermediate Fib-4 scores: Get a Fibroscan, consider repeating this in 1-2 years.    Sincerely,    Yessenia Heller NP    "

## 2023-07-05 NOTE — TELEPHONE ENCOUNTER
Prior Authorization Approval    Medication: SAXENDA 18 MG/3ML SC SOPN  Authorization Effective Date: 6/5/2023  Authorization Expiration Date: 11/2/2023  Approved Dose/Quantity: 1 month  Reference #: [id number]   Insurance Company: [employer] - Phone [phone] Fax [phone]  Expected CoPay:       CoPay Card Available:      Financial Assistance Needed: n/a  Which Pharmacy is filling the prescription: [facility] [address] [place] [address] Notified: Yes  Patient Notified: Yes      CALLED INSURANCE AND COMPLETED PA OVER THE PHONE SPOKE TO AIDEN  CASE ID [id number]  SHE SAID SHE WILL FAX OVER COPY OF APPROVAL. ONCE THAT COMES I WILL UPDATE ENCOUNTER

## 2023-07-05 NOTE — PATIENT INSTRUCTIONS
"Hi Yessenia Heller NP, it was nice to meet you today!  Thank you for allowing us the privilege of caring for you. We hope we provided you with the excellent service you deserve.   Please let us know if there is anything else we can do for you so that we can be sure you are completely satisfied with your care experience.    To ensure the quality of our services you may be receiving a patient satisfaction survey from an independent patient satisfaction monitoring company.    The greatest compliment you can give is a \"Likely to Recommend\"    Your visit was with Yessenia Heller NP today.    Instructions per today's visit:     Follow up  plan:  Start saxenda- stay at lower doses longer if having side effects  If saxenda not covered will start topiramate  Consider follow up with dietitian   Lauren Bloch MTM Pharmacist in 6 weeks  See me in 3 months   ___________________________________________________________________________  Important contact and scheduling information:  Please call our contact center at [phone] to schedule your next appointments.  For any nursing questions or concerns call Jannet Dougherty LPN at [phone] or Verna Mcmanus RN at [phone]  Please call during clinic hours Monday through Friday 8:00a - 4:00p if you have questions or you can contact us via [employer] at anytime and we will reply during clinic hours.    Lab results will be communicated through My Chart or letter (if My Chart not used). Please call the clinic if you have not received communication after 1 week or if you have any questions.?  Clinic Fax: [phone]  __________________________________________________________________________    If labs were ordered today:    Please make an appointment to have them drawn at your convenience.     To schedule the Lab Appointment using [employer]:  Select \"Schedule an Appointment\"  Select \"Lab Only\"  For \"A couple of questions\", select \"Other\"  For \"Which locations work for you?, select the location " and set up the appointment    To schedule by phone call [phone] to schedule a lab only appointment at any [facility] lab.  ___________________________________________________________________________  Work with [facility] !  Virtual Sessions are Available through [facility] Weight Management Clinics    To learn more, call to schedule a free, Health  Q&A appointment: [phone]     What is Health Coaching?  Do you know what you are supposed to do, but you just aren't doing it?  Then, HEALTH COACHING may help you!   Get unstuck and move forward with the support of a professionally trained [employer]-HWC (National Board-Certified Health and ) who uses evidence-based approaches to help you move forward with healthy lifestyle changes in the areas of weight loss, stress management and overall well-being.    Health Coaches help you identify goals that will work best for you. Health Coaches provide support and encouragement with overcoming barriers and help you to find inspiration and motivation to lead a healthy lifestyle.    Option one:  Health Coaching 3-Pack; Three, 30-minute Health Coaching Visits, for $99  Visits are done virtually (phone or video)  This is a self pay service; we do not accept insurance for estephania coaching.    Option two:   The 24 week Plan; 11 Health Coaching Visits, and a 7 months subscription to [employer]-- on-demand fitness, nutrition and mindfulness classes, for $499 (employee discounts may be available). Participants will also meet regularly with a weight management Medical Provider and a Registered/Licensed Dietician.  This is a self-pay service; we do not accept insurance for health coaching.    To Schedule a free Health  Q&A appointment to learn more,  call [phone].  ____________________________________________________________________    [facility]   Healthy Lifestyle Virtual Support Group    Healthy  Lifestyle Virtual Support Group?  This is 60 minutes of small group guided discussion, support and resources. All are welcome who want a healthy lifestyle.  WHEN: Starting in July 2023, this group meets the 1st Friday of the month from 12:30 PM - 1:30 PM virtually using [employer] Teams.    FACILITATOR: Led by National Board Certified Health and , Kasey Villanueva [facility].   TO REGISTER: Please send an email to [email or web address] [email or web address] to receive monthly invites to the group or if you have any questions about having a health .  Prior to the meeting, a link with directions on how to join the meeting will be sent to you.    2023 Meetings  May 19: Let's Talk  June 9: Create Your Coaching Toolkit: Learn How to  Yourself  July 7: Let's Talk  August 4: Benefits of Fiber with HARLEEN Mina  September 1: Show and Tell (share your aps, podcasts, recipes, hacks, books)  October 6 :Let's Talk  November 3: Introduction to Mindfulness   December 1: Let's Talk    If you would like bariatric surgery specific support group info please let your care team know.   Thank you,   [facility] Comprehensive Weight Management Team    SAXENDA (liralutide)    We are considering starting a GLP-1 (Glucagon-like Peptide-1) medication called Saxenda. One of the ways it works is by slowing down the rate that food leaves your stomach. You feel toscano and will eat less. It also helps regulate hormones that can help improve your blood sugars.    If you are a patient that checks blood sugars, continue to check as instructed by your doctor. Low blood sugars are rare but can happen if patients are on insulin or other oral agents. If you notice consistent low sugars or high sugars, your medication may need to be adjusted after your appointment. If this is the case, please call RN and provide her your blood sugar record from the last 3-4 days. The RN will get in touch with the doctor and call you back/[employer] message with  recommendations. We tolerate high sugars for a bit, so if sugars are running 180-200, this is ok. As weight starts dropping the blood sugars should too. If readings are consistently over 200 for 1-2 weeks, then you should call the doctor/nurse.    Dosing for this medication:   Week 1- Inject 0.6 mg daily  Week 2- Inject 1.2 mg daily  Week 3- Inject 1.8 mg daily  Week 4- Inject 2.4 mg daily  Week 5 and thereafter- Inject 3.0 mg daily    Side effects of GLP- Medications include: The most common side effects are all GI related and consist of: nausea, constipation, diarrhea, burping, or gassiness. Patients are advised to eat slowly and less, and nausea typically passes if people can stick it out.     The risk of pancreatitis (inflammation of the pancreas) has been associated with this type of medication, but is very rare.  If you have had pancreatitis in the past, this medication may not be for you. Please let us know about any past history of pancreas problems.    Symptoms of pancreatitis include: Pain in your upper stomach area which may travel to your back and be worse after eating. Your stomach area may be tender to the touch.  You may have vomiting or nausea and/or have a fever. If you should develop any of these symptoms, stop the medication and contact your primary care doctor. They will do a blood test to check for pancreatitis.         There is a small chance you may have some low blood sugar after taking the medication.   The signs of low blood sugar are:  Weakness  Shaky   Hungry  Sweating  Confusion      See below for ways to treat low blood sugar without adding in lots of extra calories.      Treating Low Blood Sugar    If you have symptoms of low blood sugar (sweating, shaking, dizzy, confused) eat 15 grams of carbs and wait 15 minutes:    Glucose Tabs are best for sugars under 70 -  Dex4 or BD Glucose tablets are good, you will need to take 3-4 of these to equal 15 grams.     One small box of ann-marie  4  oz fruit juice box or   cup fruit juice  1 small apple  1 small banana    cup canned fruit in water    English muffin or a slice of bread with jelly   1 low fat frozen waffle with sugar-free syrup    cup cottage cheese with   cup frozen or fresh blueberries  1 cup skim or low-fat milk    cup whole grain cereal  4-6 crackers such as Triscuits      This medication is usually not covered by insurance and can be quite expensive. Sometimes a prior authorization is required, which may take up to 1-2 weeks for an insurance company to make a decision if they will cover the medication. Please be patient, you will be notified after a decision has been made.    For any questions or concerns please send a Timely message to our team or call our weight management call center at [phone] during regular business hours. For questions during evenings or weekends your messages will be addressed during the next business day.  For emergencies please call 911 or seek immediate medical care.      (Do not stop taking it if you don't think it's working. For some people it works without them knowing it.)     In order to get refills of this or any medication we prescribe you must be seen in the medical weight mgmt clinic every 2-4 months. Please have your pharmacy fax a refill request to [phone].

## 2023-07-05 NOTE — PROGRESS NOTES
"Virtual Visit Details    Type of service:  Video Visit     Originating Location (pt. Location): Home    Distant Location (provider location):  Off-site  Platform used for Video Visit: Sulaiman    45 minutes spent by me on the date of the encounter doing chart review, history and exam, documentation and further activities per the note    New Medical Weight Management Consult    PATIENT:  Boogie Villa  MRN:         [id number]  :         2000  LEANN:         2023    Dear Dr. Riddle  I had the pleasure of seeing your patient, Boogie Villa. Full intake/assessment was done to determine barriers to weight loss success and develop a treatment plan. Boogie Villa is a 23 year old male interested in treatment of medical problems associated with excess weight. He has a height of 5' 11\", a weight of 286 lbs 0 oz, and the calculated Body mass index is 39.89 kg/m .    Assessment & Plan   Problem List Items Addressed This Visit        Digestive    Class 2 severe obesity due to excess calories with serious comorbidity and body mass index (BMI) of 39.0 to 39.9 in adult (H)     ESRD on dialysis needing BMI <35 for kidney transplant. Last seen 2020 for medical weight management, declining discussion about  antiobesity medications or bariatric surgery. Lost to follow up due to covid isolation and changing health status. Reviewed today patient's health goals. Struggling to find motivation to make big changes in diet, lifestyle, activity level. Describes limited routine in his day, structured around appointments and when he wants to work (makes his own schedule, works from home).     Describes only eating 2 meals a day, maybe a snack. Doesn't feel he eats a lot of food so isn't sure  antiobesity medications would be helpful with weight loss. Despite that, he is open to discussing today. We discussed topiramate and saxenda, both of which we'd taper slowly given kidney status. Diet includes fast food and food his " "mother has prepared. He acknowledges fast food is not the best option but does not cook for himself. Cooking for himself is limited by his ADHD which he prefers not to treat given side effects to the ritalin he is prescribed.     Plan:  See dietitian   Update labs  Start saxenda if covered by insurance, will do topiramate to 50mg if not covered   Lauren Bloch MTM Pharmacist in 6 weeks   Start with smaller portions          Relevant Medications    liraglutide - Weight Management (SAXENDA) 18 MG/3ML pen   Other Visit Diagnoses     Class 2 severe obesity with serious comorbidity and body mass index (BMI) of 39.0 to 39.9 in adult, unspecified obesity type (H)    -  Primary    Relevant Medications    liraglutide - Weight Management (SAXENDA) 18 MG/3ML pen    insulin pen needle (31G X 5 MM) 31G X 5 MM miscellaneous    Other Relevant Orders    Hemoglobin A1c    Comprehensive metabolic panel    Med Therapy Management Referral    ESRD (end stage renal disease) (H)        Relevant Orders    Hemoglobin A1c    Comprehensive metabolic panel    Med Therapy Management Referral    Organ transplant candidate               He has the following co-morbidities:        6/28/2023    12:01 PM   --   I have the following health issues associated with obesity High Blood Pressure   I have the following symptoms associated with obesity Fatigue     ESRD on Dialysis needing BMI <35 for kidney transplant   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         6/28/2023    12:01 PM   Patient Goals   If yes, please indicate which surgery? Kidney Transplant           6/28/2023    12:01 PM   Referring Provider   Please name the provider who referred you to Medical Weight Management  If you do not know, please answer \"I Don't Know\" I Don't Know           6/28/2023    12:01 PM   Weight History   How concerned are you about your weight? Very Concerned   I became overweight As a " Teenager   The following factors have contributed to my weight gain Mental Health Issues    Eating Wrong Types of Food    Eating Too Much    Lack of Exercise    Genetic (Runs in the Family)   I have tried the following methods to lose weight Watching Portions or Calories    Exercise   My lowest weight since age 18 was 243   My highest weight since age 18 was 300   The most weight I have ever lost was (lbs) 18   I have the following family history of obesity/being overweight My father is overweight    One or more of my siblings are overweight   How has your weight changed over the last year? Gained   How many pounds? 11       Struggling with motivation in general today. Works with therapist- has talked with them about this but not making any progress. Also has ADHD which makes things challenging- doesn't like the side effects of ritalin so he doesn't take unless absolutely necessary.          6/28/2023    12:01 PM   Diet Recall Review with Patient   If you do eat breakfast, what types of food do you eat? Fast food, frozen meals, or leftovers   If you do eat supper, what types of food do you typically eat? Fast food, frozen meals, or leftovers   How many glasses of juice do you drink in a typical day? 0   How many of glasses of milk do you drink in a typical day? 0   How many 8oz glasses of sugar containing drinks such as Jitendra-Aid/sweet tea do you drink in a day? 0   How many cans/bottles of sugar pop/soda/tea/sports drinks do you drink in a day? 0   How many cans/bottles of diet pop/soda/tea or sports drink do you drink in a day? 0   How often do you have a drink of alcohol? Never           6/28/2023    12:01 PM   Eating Habits   Generally, my meals include foods like these bread, pasta, rice, potatoes, corn, crackers, sweet dessert, pop, or juice Everyday   Generally, my meals include foods like these fried meats, brats, burgers, french fries, pizza, cheese, chips, or ice cream Almost Everyday   Eat fast food (like  Kennedy Kelsey, [employer]) Almost Everyday   Eat at a buffet or sit-down restaurant Once a Week   Eat most of my meals in front of the TV or computer Everyday   Often skip meals, eat at random times, have no regular eating times A Few Times a Week   Rarely sit down for a meal but snack or graze throughout Never   Eat extra snacks between meals Less Than Weekly   Eat most of my food at the end of the day Never   Eat in the middle of the night or wake up at night to eat Never   Eat extra snacks to prevent or correct low blood sugar Never   Eat to prevent acid reflux or stomach pain Never   Worry about not having enough food to eat Never   I eat when I am depressed Less Than Weekly   I eat when I am stressed Never   I eat when I am bored Never   I eat when I am anxious Never   I eat when I am happy or as a reward Less Than Weekly   I feel hungry all the time even if I just have eaten Never   Feeling full is important to me A Few Times a Week   I finish all the food on my plate even if I am already full Everyday   I can't resist eating delicious food or walk past the good food/smell Everyday   I eat/snack without noticing that I am eating Never   I eat when I am preparing the meal Never   I eat more than usual when I see others eating Never   I have trouble not eating sweets, ice cream, cookies, or chips if they are around the house Everyday   I think about food all day Never   What foods, if any, do you crave? Chips/Crackers   Please list any other foods you crave? Nachos      Not a lot of schedule/ routine to the day   2 meals a day - around noon and 6pm   -sometimes frozen meals  -mom will cook food for leftovers to have at his place  -will order in often for 1 meal of the day (fast food)   -doesn't cook     Snacks- tries to avoid having them on hand. Difficult to portion control   Doesn't feel excessive hunger during the day     No significant restrictions at this time- just limiting potassium and phos      Drinks primarily water  1 energy drink a week           6/28/2023    12:01 PM   Amount of Food   I feel out of control when eating Never   I eat a large amount of food, like a loaf of bread, a box of cookies, a pint/quart of ice cream, all at once Monthly   I eat a large amount of food even when I am not hungry Never   I eat rapidly Never   I eat alone because I feel embarrassed and do not want others to see how much I have eaten Never   I eat until I am uncomfortably full Weekly   I feel bad, disgusted, or guilty after I overeat Never           6/28/2023    12:01 PM   Activity/Exercise History   How much of a typical 12 hour day do you spend sitting? Most of the Day   How much of a typical 12 hour day do you spend lying down? Less Than Half the Day   How much of a typical day do you spend walking/standing? Less Than Half the Day   How many hours (not including work) do you spend on the TV/Video Games/Computer/Tablet/Phone? 6 Hours or More   How many times a week are you active for the purpose of exercise? 2-3 Times a Week   What keeps you from being more active? Shortness of Breath    Too tired    Other   How many total minutes do you spend doing some activity for the purpose of exercising when you exercise? Less Than 15 Minutes       PAST MEDICAL HISTORY:  Past Medical History:   Diagnosis Date     ADD (attention deficit disorder)      Allergic rhinitis      ESRD (end stage renal disease) on dialysis (H)      Hypertension 2000     Mitrofanoff appendicovesicostomy present (H)      Obesity      Posterior urethral valves            6/28/2023    12:01 PM   Work/Social History Reviewed With Patient   My employment status is Full-Time   My job is    How much of your job is spent on the computer or phone? 100%   How many hours do you spend commuting to work daily? 0   What is your marital status? Single   Who do you live with? Nobody   Who does the food shopping? I do              6/28/2023    12:01  PM   Mental Health History Reviewed With Patient   Have you ever been physically or sexually abused? No   How often in the past 2 weeks have you felt little interest or pleasure in doing things? For Several Days   Over the past 2 weeks how often have you felt down, depressed, or hopeless? Not at all           6/28/2023    12:01 PM   Sleep History Reviewed With Patient   How many hours do you sleep at night? 8      Sleeping in chair so port doesn't get pulled     MEDICATIONS:   Current Outpatient Medications   Medication Sig Dispense Refill     amLODIPine (NORVASC) 5 MG tablet Take 1 tablet (5 mg) by mouth daily (Patient taking differently: Take 5 mg by mouth every morning) 90 tablet 3     atenolol (TENORMIN) 25 MG tablet Take 1.5 tablets (37.5 mg) by mouth daily (Patient taking differently: Take 37.5 mg by mouth every morning) 135 tablet 3     B Complex-C-Folic Acid (DIALYVITE 800) 0.8 MG TABS Take 1 tablet by mouth daily (Patient taking differently: Take 1 tablet by mouth every morning) 90 tablet 3     calcitRIOL (ROCALTROL) 0.25 MCG capsule Take 1 capsule (0.25 mcg) by mouth daily (Patient taking differently: Take 0.25 mcg by mouth three times a week M-W-F) 90 capsule 3     Calcium Acetate 667 MG TABS Take 667 mg by mouth 3 times daily (with meals) (Patient taking differently: Take 667 mg by mouth 3 times daily (with meals)) 90 tablet 1     cetirizine (ZYRTEC) 10 MG tablet Take 10 mg by mouth every morning       Cholecalciferol (VITAMIN D) 50 MCG (2000 UT) CAPS Take 2,000 unit marking on an U-100 insulin syringe by mouth daily (Patient taking differently: Take 2,000 unit marking on an U-100 insulin syringe by mouth every morning) 90 capsule 11     difluprednate (DUREZOL) 0.05 % ophthalmic emulsion INSTILL 1 DROP INTO EACH EYE SIX TIMES DAILY FOR THE FIRST 24 HOURS. THEN 1 DROP 4 TIMES DAILY UNTIL FOLLOW UP       insulin pen needle (31G X 5 MM) 31G X 5 MM miscellaneous Use 1 pen needles daily or as directed. 100  each 1     liraglutide - Weight Management (SAXENDA) 18 MG/3ML pen Week 1: 0.6 mg subcutaneous daily, Week 2: 1.2 mg daily, Week 3: 1.8 mg daily, Week 4: 2.4 mg daily, Week 5 & on: 3 mg daily 15 mL 3     methylphenidate (RITALIN) 10 MG tablet Take 25 mg by mouth as needed Prn       Probiotic Product (PROBIOTIC-10 PO) Take 1 tablet by mouth every morning 1 tab capsule         ALLERGIES:   Allergies   Allergen Reactions     Banana Itching     Raw banana; itchy mouth       Dust Mites      Runny nose and watery eyes     Mold      Runny nose and watery eyes     Nsaids Other (See Comments)     CKD     Other [Seasonal Allergies]      Grass, Ragweed - gets runny nose and watery eyes     Sulfa Antibiotics      PN: LW Reaction: GI Upset       Admission on 05/25/2023, Discharged on 05/25/2023   Component Date Value Ref Range Status     Hemoglobin 05/25/2023 10.2 (L)  13.3 - 17.7 g/dL Final     Sodium 05/25/2023 140  136 - 145 mmol/L Final     Potassium 05/25/2023 4.5  3.4 - 5.3 mmol/L Final     Chloride 05/25/2023 106  98 - 107 mmol/L Final     Carbon Dioxide (CO2) 05/25/2023 21 (L)  22 - 29 mmol/L Final     Anion Gap 05/25/2023 13  7 - 15 mmol/L Final     Urea Nitrogen 05/25/2023 16.5  6.0 - 20.0 mg/dL Final     Creatinine 05/25/2023 7.61 (H)  0.67 - 1.17 mg/dL Final     Calcium 05/25/2023 9.5  8.6 - 10.0 mg/dL Final     Glucose 05/25/2023 100 (H)  70 - 99 mg/dL Final     GFR Estimate 05/25/2023 9 (L)  >60 mL/min/1.73m2 Final    eGFR calculated using 2021 CKD-EPI equation.       Anti-obesity medication ROS:    HEENT  Hx of glaucoma: No    Cardiovascular  CAD:No  HTN:Yes    Gastrointestinal  GERD:No  Constipation/diarrhea/GI issues:No  Liver Dz:No  Computed FIB-4 Calculation unavailable. Necessary lab results were not found in the last year.    H/O Pancreatitis:No    Psychiatric  Bipolar: No  Anxiety:yes  Depression:Yes  History of alcohol/drug abuse: No  Hx of eating disorder:No    Endocrine  Personal or family hx of MTC or  "MEN2:No  Diabetes/prediabetes: No    Neurologic:  Hx of seizures: No  Hx of migraines: No  Memory Impairment: No  Chronic pain/opioids use: No      History of kidney stones: No  Kidney disease: Yes  Current birth control: NA    PHYSICAL EXAM:  Objective    Ht 1.803 m (5' 11\")   Wt 129.7 kg (286 lb)   BMI 39.89 kg/m    Physical Exam   GENERAL: Healthy, alert and no distress  EYES: Eyes grossly normal to inspection.  No discharge or erythema, or obvious scleral/conjunctival abnormalities.  RESP: No audible wheeze, cough, or visible cyanosis.  No visible retractions or increased work of breathing.    SKIN: Visible skin clear. No significant rash, abnormal pigmentation or lesions.  NEURO: Cranial nerves grossly intact.  Mentation and speech appropriate for age.  PSYCH: Mentation appears normal, affect normal/bright, judgement and insight intact, normal speech and appearance well-groomed.    Computed FIB-4 Calculation unavailable. Necessary lab results were not found in the last year.    Fib-4 < 1.3: No further evaluation at this point, unless other concerns  - If the Fib-4 is > 2.67,  Fibroscan and elective liver clinic referral  - Intermediate Fib-4 scores: Get a Fibroscan, consider repeating this in 1-2 years.    Sincerely,    Yessenia Heller NP      "

## 2023-07-06 ENCOUNTER — TELEPHONE (OUTPATIENT)
Dept: TRANSPLANT | Facility: CLINIC | Age: 23
End: 2023-07-06
Payer: COMMERCIAL

## 2023-07-06 RX ORDER — SODIUM CHLORIDE 9 MG/ML
INJECTION, SOLUTION INTRAVENOUS CONTINUOUS
Status: CANCELLED | OUTPATIENT
Start: 2023-07-06

## 2023-07-06 RX ORDER — LIDOCAINE 40 MG/G
CREAM TOPICAL
Status: CANCELLED | OUTPATIENT
Start: 2023-07-06

## 2023-07-06 NOTE — TELEPHONE ENCOUNTER
Pts mom called wondering about what to do with pts fever. Pt was seen in ED and has the flu. Now has temp of 99.9F. Reviewed pt should discuss with PCP, if fever worsens should go back to ED. They are also wondering if he needs to have hematoma procedure next week as he had an US in the hospital and it is no longer present. Will reach out to dialysis access team. Pt verbalized understanding of information and has no further questions. Encouraged to reach out if questions arise.

## 2023-07-06 NOTE — ASSESSMENT & PLAN NOTE
ESRD on dialysis needing BMI <35 for kidney transplant. Last seen 12/2020 for medical weight management, declining discussion about  antiobesity medications or bariatric surgery. Lost to follow up due to covid isolation and changing health status. Reviewed today patient's health goals. Struggling to find motivation to make big changes in diet, lifestyle, activity level. Describes limited routine in his day, structured around appointments and when he wants to work (makes his own schedule, works from home).     Describes only eating 2 meals a day, maybe a snack. Doesn't feel he eats a lot of food so isn't sure  antiobesity medications would be helpful with weight loss. Despite that, he is open to discussing today. We discussed topiramate and saxenda, both of which we'd taper slowly given kidney status. Diet includes fast food and food his mother has prepared. He acknowledges fast food is not the best option but does not cook for himself. Cooking for himself is limited by his ADHD which he prefers not to treat given side effects to the ritalin he is prescribed.     Plan:  See dietitian   Update labs  Start saxenda if covered by insurance, will do topiramate to 50mg if not covered   Lauren Bloch MT Pharmacist in 6 weeks   Start with smaller portions

## 2023-07-07 ENCOUNTER — PATIENT OUTREACH (OUTPATIENT)
Dept: NEPHROLOGY | Facility: CLINIC | Age: 23
End: 2023-07-07
Payer: COMMERCIAL

## 2023-07-07 DIAGNOSIS — T82.9XXD COMPLICATION OF VASCULAR ACCESS FOR DIALYSIS, SUBSEQUENT ENCOUNTER: Primary | ICD-10-CM

## 2023-07-07 DIAGNOSIS — N18.6 ESRD ON HEMODIALYSIS (H): ICD-10-CM

## 2023-07-07 DIAGNOSIS — Z99.2 ESRD ON HEMODIALYSIS (H): ICD-10-CM

## 2023-07-07 NOTE — TELEPHONE ENCOUNTER
Dialysis Access Care Coordination: General Outreach    REASON FOR CALL:     REASON FOR CALL: Clinic Care Coordination - Follow-up (Dialysis Access)                                  SITUATION/BACKROUND:     Patient had ultrasounds completed during an ER visit on 7/4. The hematoma that was previously compressing his fistula has now resolved. Patient has an IR procedure scheduled for next week to aspirate the hematoma. Discussed with Dr. Martienz, patient can cancel the IR procedure and have an ultrasound of his fistula instead.    Neph Tracking Flowsheet Last Filled Values     CKD Education Status Complete    CKD Education Type Kidney Smart Modality Education; Kidney Smart CKD Basics    Preferred Modality Hemodialysis    Final Modality Hemodialysis  [facility]    Patient's Referral Dates Auto Populate Patient's Referral Dates    Dietician Referral 3/29/21    Journey Referral 2/1/23    Vein Mapping/US Order 3/13/23    Vein Mapping/US  04/11/23    Access Surgeon Referral Status  Referred    Dialysis Access Referral 03/13/23  fistula consult with Dr. Martinez    Access Surgical Consult 04/25/23  Fistula consult with Dr. Martinez    Diaylsis Access Type AV Fistula    Dialysis Access Site DANIEL  [email or web address]    Dialysis Access Surgery 05/25/23  first stage BVT    Dialysis Access Surgeon Michelle    Transplant Evaluation Referral 10/22/20    Transplant Status  Referred    Initiation of Dialysis Outpatient        Dialysis History      Start End Type Center Comments    2/17/2023  In-center Hemodialysis [facility] DIALYSIS (ESRD) [facility] Nephrologist: Dr. Zarate with [facility]                ASSESSMENT:       Dialysis Access Assessments:  No assessment indicated    PLAN:     Appointments in Next Year    Jul 11, 2023 10:00 AM  (Arrive by 9:45 AM)  US EXTREMITY ARTERIAL VENOUS DIALYSIS ACCESS GRAFT with [address] [facility]  [place] ) [phone]   Sep 20, 2023  9:30 AM  (Arrive by 9:15 AM)  New Weight Managment Nutrition with Yeny Harmon RD  [facility] [place] ([facility] ) [phone]            Follow Up:     Ultrasound ordered: fistula  Called patient and scheduled ultrasound for Tuesday.  Called IR scheduling and cancelled IR procedure.    Ivis Springer RN  Dialysis Access Care Coordinator  Phone: [phone]  Pool: P_Dialysis_Access_Nurse

## 2023-07-11 ENCOUNTER — ANCILLARY PROCEDURE (OUTPATIENT)
Dept: ULTRASOUND IMAGING | Facility: CLINIC | Age: 23
End: 2023-07-11
Attending: SURGERY
Payer: COMMERCIAL

## 2023-07-11 DIAGNOSIS — Z99.2 ESRD ON HEMODIALYSIS (H): ICD-10-CM

## 2023-07-11 DIAGNOSIS — N18.6 ESRD ON HEMODIALYSIS (H): ICD-10-CM

## 2023-07-11 DIAGNOSIS — T82.9XXD COMPLICATION OF VASCULAR ACCESS FOR DIALYSIS, SUBSEQUENT ENCOUNTER: ICD-10-CM

## 2023-07-11 LAB — RADIOLOGIST FLAGS: ABNORMAL

## 2023-07-11 PROCEDURE — 93990 DOPPLER FLOW TESTING: CPT | Performed by: RADIOLOGY

## 2023-07-12 ENCOUNTER — PATIENT OUTREACH (OUTPATIENT)
Dept: NEPHROLOGY | Facility: CLINIC | Age: 23
End: 2023-07-12
Payer: COMMERCIAL

## 2023-07-12 NOTE — TELEPHONE ENCOUNTER
"Dialysis Access Care Coordination: General Outreach    REASON FOR CALL:     REASON FOR CALL: Clinic Care Coordination - Follow-up (Dialysis Access - ultrasound results)                                  SITUATION/BACKROUND:     Patient previously had a hematoma over the anastomosis of his fistula. Anastomosis was not visualized on ultrasound. The hematoma has now resolved. He had an ultrasound done yesterday to assess maturity of the fistula. Ultrasound had urgent findings. Basilic vein has a clot and vein is not arterialized.    Neph Tracking Flowsheet Last Filled Values     CKD Education Status Complete    CKD Education Type Kidney Smart Modality Education; Kidney Smart CKD Basics    Preferred Modality Hemodialysis    Final Modality Hemodialysis  [facility]    Patient's Referral Dates Auto Populate Patient's Referral Dates    Dietician Referral 3/29/21    Journey Referral 2/1/23    Vein Mapping/US Order 3/13/23    Vein Mapping/US  04/11/23    Access Surgeon Referral Status  Referred    Dialysis Access Referral 03/13/23  fistula consult with Dr. Martinez    Access Surgical Consult 04/25/23  Fistula consult with Dr. Martinez    Diaylsis Access Type AV Fistula    Dialysis Access Site DANIEL  [email or web address]    Dialysis Access Surgery 05/25/23  first stage BVT    Dialysis Access Surgeon Michelle    Transplant Evaluation Referral 10/22/20    Transplant Status  Referred    Initiation of Dialysis Outpatient        Dialysis History      Start End Type Center Comments    2/17/2023  In-center Hemodialysis [facility] DIALYSIS (ESRD) [facility] Nephrologist: Dr. Zarate with [facility]                ASSESSMENT:     Ultrasound results:    \"IMPRESSION:  1. Previous hematoma that obscured the arteriovenous anastomosis on  6/20/2023 has resolved. Anastomosis was likely occluded then. No  arteriovenous anastomosis visualized today.      2. Left basilic vein waveforms remain non arterialized. Occlusive  superficial " "venous thrombosis in the basilic vein in the distal  humerus.     [Access Center: 1. Arteriovenous anastomosis not visualized, likely  occluded on 6/202/2023.     2. Left basilic vein occlusive superficial venous thrombosis in the  distal humerus.\"    PLAN:     Appointments in Next Year    Sep 20, 2023  9:30 AM  (Arrive by 9:15 AM)  New Weight Managment Nutrition with Yeny Harmon RD  [facility] [place] ([facility] ) [phone]        Follow Up:     Notified Dr. Martinez of urgent findings on ultrasound. Plan to proceed with surgery for new fistula creation.    Called patient to discuss results and plan. Left voice mail requesting a return call.     Left arm may not be an option for another fistula. Right arm may have suitable veins for fistula creation vs left arm graft.    Ivis Springer RN  Dialysis Access Care Coordinator  Phone: [phone]  Pool: P_Dialysis_Access_Nurse  "

## 2023-08-01 ENCOUNTER — PATIENT OUTREACH (OUTPATIENT)
Dept: NEPHROLOGY | Facility: CLINIC | Age: 23
End: 2023-08-01
Payer: COMMERCIAL

## 2023-08-01 NOTE — PROGRESS NOTES
Neph Tracking Flowsheet Last Filled Values       CKD Education Status Complete    CKD Education Type Kidney Smart Modality Education; Kidney Smart CKD Basics    Preferred Modality Hemodialysis    Final Modality Hemodialysis  [facility]    Patient's Referral Dates Auto Populate Patient's Referral Dates    Dietician Referral 3/29/21    Journey Referral 2/1/23    Vein Mapping/US Order 3/13/23    Vein Mapping/US  04/11/23    Access Surgeon Referral Status  Referred    Dialysis Access Referral --  GFR 6 when CVC placed to initiate dialysis. Had dialysis access referral for fistula placement after he had already started dialysis.    Access Surgical Consult 04/25/23  Fistula consult with Dr. Martinez    Diaylkristen Access Type AV Fistula    Dialysis Access Site DANIEL  [email or web address]    Dialysis Access Surgery 05/25/23  first stage BVT    Dialysis Access Surgeon Michelle    Transplant Evaluation Referral 10/22/20    Transplant Status  --  GFR 14 at time of transplant referral.    Initiation of Dialysis Outpatient

## 2023-08-02 ENCOUNTER — PATIENT OUTREACH (OUTPATIENT)
Dept: NEPHROLOGY | Facility: CLINIC | Age: 23
End: 2023-08-02
Payer: COMMERCIAL

## 2023-08-02 NOTE — PROGRESS NOTES
Request sent to schedule LUE vs RUE AV fistula vs graft surgery s/p LUE brachiobasilic AV fistula thrombosed and is no longer viable.  LUE brachiobasilic AVF was never transposed and never used for HD.    Pt currently dialyzing via CVC.    Neph Tracking Flowsheet Last Filled Values       CKD Education Status Complete    CKD Education Type Kidney Smart Modality Education; Kidney Smart CKD Basics    Preferred Modality Hemodialysis    Final Modality Hemodialysis  [facility]    Patient's Referral Dates Auto Populate Patient's Referral Dates    Dietician Referral 3/29/21    Journey Referral 2/1/23    Vein Mapping/US Order 3/13/23    Vein Mapping/US  04/11/23    Access Surgeon Referral Status  Referred    Dialysis Access Referral --  GFR 6 when CVC placed to initiate dialysis. Had dialysis access referral for fistula placement after he had already started dialysis.    Access Surgical Consult 04/25/23  Fistula consult with Dr. Martinez    Diaylsis Access Type AV Fistula    Dialysis Access Site DANIEL  brachiobasilic    Dialysis Access Surgery 05/25/23  first stage BVT    Dialysis Access Surgeon Michelle    Dialysis Access Maturation --   thrombosed, never transposed.  needs new access.  Surgery requested for LUE vs RUE AV fistula vs graft on 8/2/23 KG    Transplant Evaluation Referral 10/22/20    Transplant Status  --  GFR 14 at time of transplant referral.    Initiation of Dialysis Outpatient          Dialysis History        Start End Type Center Comments    2/17/2023  In-center Hemodialysis [facility] DIALYSIS (ESRD) F Nephrologist: Dr. Zarate with [facility]                  MALCOLM ZIMMERMAN RN on 8/2/2023 at 12:51 PM  Dialysis Access Care Coordinator  Phone: [phone]  Pool: P_Dialysis_Access_Nurse

## 2023-08-04 ENCOUNTER — PREP FOR PROCEDURE (OUTPATIENT)
Dept: TRANSPLANT | Facility: CLINIC | Age: 23
End: 2023-08-04
Payer: COMMERCIAL

## 2023-08-04 DIAGNOSIS — N18.6 ESRD (END STAGE RENAL DISEASE) (H): Primary | ICD-10-CM

## 2023-08-23 ENCOUNTER — ANESTHESIA EVENT (OUTPATIENT)
Dept: SURGERY | Facility: CLINIC | Age: 23
End: 2023-08-23
Payer: COMMERCIAL

## 2023-08-23 ENCOUNTER — PATIENT OUTREACH (OUTPATIENT)
Dept: NEPHROLOGY | Facility: CLINIC | Age: 23
End: 2023-08-23
Payer: COMMERCIAL

## 2023-08-23 NOTE — TELEPHONE ENCOUNTER
Dialysis Access Care Coordination: General Outreach    REASON FOR CALL:     REASON FOR CALL: Clinic Care Coordination - Follow-up (Dialysis Access - moving up surgery)                                  SITUATION/BACKROUND:     Patient was offered an opening for surgery tomorrow. Patient is agreeable to moving up surgery date.    Neph Tracking Flowsheet Last Filled Values       CKD Education Status Complete    CKD Education Type Kidney Smart Modality Education; Kidney Smart CKD Basics    Preferred Modality Hemodialysis    Final Modality Hemodialysis  [facility]    Patient's Referral Dates Auto Populate Patient's Referral Dates    Dietician Referral 3/29/21    Journey Referral 2/1/23    Vein Mapping/US Order 3/13/23    Vein Mapping/US  04/11/23    Access Surgeon Referral Status  Referred    Dialysis Access Referral --  GFR 6 when CVC placed to initiate dialysis. Had dialysis access referral for fistula placement after he had already started dialysis.    Access Surgical Consult 04/25/23  Fistula consult with Dr. Michelle Noriega Access Type AV Fistula    Dialysis Access Site DANIEL  brachiobasilic    Dialysis Access Surgery 08/24/23    Dialysis Access Surgeon Dr. Martinez    Dialysis Access Maturation --   thrombosed, never transposed.  needs new access.  Surgery requested for LUE vs RUE AV fistula vs graft on 8/2/23 KG    Transplant Evaluation Referral 10/22/20    Transplant Status  --  GFR 14 at time of transplant referral.    Initiation of Dialysis Outpatient          Dialysis History        Start End Type Center Comments    2/17/2023  In-center Hemodialysis [facility] DIALYSIS (ESRD) [facility] Nephrologist: Dr. Zarate with [facility]                    ASSESSMENT:       Dialysis Access Assessments:  No assessment indicated    PLAN:     Appointments in Next Year      Sep 28, 2023 11:30 AM  Medication Education with Lauren Turner Bloch, [facility]  [facility]  [facility]  [phone]              Follow Up:     Request sent to reschedule surgery and move it up to tomorrow.  Smartsheet message sent to patient confirming the change. Surgery appt info faxed to [facility].    Ivis Springer RN  Dialysis Access Care Coordinator  Phone: [phone]  Pool: P_Dialysis_Access_Nurse

## 2023-08-24 ENCOUNTER — HOSPITAL ENCOUNTER (OUTPATIENT)
Facility: CLINIC | Age: 23
Discharge: HOME | End: 2023-08-24
Attending: SURGERY | Admitting: SURGERY
Payer: COMMERCIAL

## 2023-08-24 ENCOUNTER — ANESTHESIA (OUTPATIENT)
Dept: SURGERY | Facility: CLINIC | Age: 23
End: 2023-08-24
Payer: COMMERCIAL

## 2023-08-24 VITALS
WEIGHT: 284.39 LBS | TEMPERATURE: 98.7 F | RESPIRATION RATE: 18 BRPM | HEIGHT: 71 IN | DIASTOLIC BLOOD PRESSURE: 85 MMHG | SYSTOLIC BLOOD PRESSURE: 126 MMHG | HEART RATE: 80 BPM | OXYGEN SATURATION: 97 % | BODY MASS INDEX: 39.81 KG/M2

## 2023-08-24 DIAGNOSIS — N18.4 CKD (CHRONIC KIDNEY DISEASE) STAGE 4, GFR 15-29 ML/MIN (H): Primary | ICD-10-CM

## 2023-08-24 LAB
ERYTHROCYTE [DISTWIDTH] IN BLOOD BY AUTOMATED COUNT: 14.4 % (ref 10–15)
HCT VFR BLD AUTO: 38.6 % (ref 40–53)
HGB BLD-MCNC: 12.3 G/DL (ref 13.3–17.7)
HOLD SPECIMEN: NORMAL
INR PPP: 0.98 (ref 0.85–1.15)
MCH RBC QN AUTO: 31 PG (ref 26.5–33)
MCHC RBC AUTO-ENTMCNC: 31.9 G/DL (ref 31.5–36.5)
MCV RBC AUTO: 97 FL (ref 78–100)
PLATELET # BLD AUTO: 238 10E3/UL (ref 150–450)
RBC # BLD AUTO: 3.97 10E6/UL (ref 4.4–5.9)
WBC # BLD AUTO: 5.9 10E3/UL (ref 4–11)

## 2023-08-24 PROCEDURE — 250N000011 HC RX IP 250 OP 636: Mod: JZ | Performed by: NURSE ANESTHETIST, CERTIFIED REGISTERED

## 2023-08-24 PROCEDURE — 360N000076 HC SURGERY LEVEL 3, PER MIN: Performed by: SURGERY

## 2023-08-24 PROCEDURE — 710N000012 HC RECOVERY PHASE 2, PER MINUTE: Performed by: SURGERY

## 2023-08-24 PROCEDURE — 999N000141 HC STATISTIC PRE-PROCEDURE NURSING ASSESSMENT: Performed by: SURGERY

## 2023-08-24 PROCEDURE — 85014 HEMATOCRIT: CPT | Performed by: NURSE PRACTITIONER

## 2023-08-24 PROCEDURE — 250N000009 HC RX 250: Performed by: SURGERY

## 2023-08-24 PROCEDURE — 85610 PROTHROMBIN TIME: CPT | Performed by: NURSE PRACTITIONER

## 2023-08-24 PROCEDURE — 36821 AV FUSION DIRECT ANY SITE: CPT | Mod: RT | Performed by: SURGERY

## 2023-08-24 PROCEDURE — 250N000012 HC RX MED GY IP 250 OP 636 PS 637: Mod: JZ | Performed by: SURGERY

## 2023-08-24 PROCEDURE — 258N000003 HC RX IP 258 OP 636: Performed by: NURSE ANESTHETIST, CERTIFIED REGISTERED

## 2023-08-24 PROCEDURE — 272N000001 HC OR GENERAL SUPPLY STERILE: Performed by: SURGERY

## 2023-08-24 PROCEDURE — 76998 US GUIDE INTRAOP: CPT | Mod: 26 | Performed by: SURGERY

## 2023-08-24 PROCEDURE — 710N000010 HC RECOVERY PHASE 1, LEVEL 2, PER MIN: Performed by: SURGERY

## 2023-08-24 PROCEDURE — 370N000017 HC ANESTHESIA TECHNICAL FEE, PER MIN: Performed by: SURGERY

## 2023-08-24 PROCEDURE — 250N000009 HC RX 250: Performed by: NURSE ANESTHETIST, CERTIFIED REGISTERED

## 2023-08-24 PROCEDURE — 250N000011 HC RX IP 250 OP 636: Performed by: SURGERY

## 2023-08-24 PROCEDURE — 36415 COLL VENOUS BLD VENIPUNCTURE: CPT | Performed by: NURSE PRACTITIONER

## 2023-08-24 PROCEDURE — 250N000011 HC RX IP 250 OP 636: Performed by: NURSE ANESTHETIST, CERTIFIED REGISTERED

## 2023-08-24 PROCEDURE — 250N000025 HC SEVOFLURANE, PER MIN: Performed by: SURGERY

## 2023-08-24 RX ORDER — CEFAZOLIN SODIUM 1 G/3ML
INJECTION, POWDER, FOR SOLUTION INTRAMUSCULAR; INTRAVENOUS PRN
Status: DISCONTINUED | OUTPATIENT
Start: 2023-08-24 | End: 2023-08-24

## 2023-08-24 RX ORDER — SODIUM CHLORIDE, SODIUM LACTATE, POTASSIUM CHLORIDE, CALCIUM CHLORIDE 600; 310; 30; 20 MG/100ML; MG/100ML; MG/100ML; MG/100ML
INJECTION, SOLUTION INTRAVENOUS CONTINUOUS PRN
Status: DISCONTINUED | OUTPATIENT
Start: 2023-08-24 | End: 2023-08-24

## 2023-08-24 RX ORDER — HYDROMORPHONE HYDROCHLORIDE 1 MG/ML
0.4 INJECTION, SOLUTION INTRAMUSCULAR; INTRAVENOUS; SUBCUTANEOUS EVERY 5 MIN PRN
Status: DISCONTINUED | OUTPATIENT
Start: 2023-08-24 | End: 2023-08-24 | Stop reason: HOSPADM

## 2023-08-24 RX ORDER — PAPAVERINE HYDROCHLORIDE 30 MG/ML
INJECTION INTRAMUSCULAR; INTRAVENOUS PRN
Status: DISCONTINUED | OUTPATIENT
Start: 2023-08-24 | End: 2023-08-24 | Stop reason: HOSPADM

## 2023-08-24 RX ORDER — SODIUM CHLORIDE 9 MG/ML
INJECTION, SOLUTION INTRAVENOUS CONTINUOUS
Status: DISCONTINUED | OUTPATIENT
Start: 2023-08-24 | End: 2023-08-24 | Stop reason: HOSPADM

## 2023-08-24 RX ORDER — FENTANYL CITRATE 50 UG/ML
25 INJECTION, SOLUTION INTRAMUSCULAR; INTRAVENOUS EVERY 5 MIN PRN
Status: DISCONTINUED | OUTPATIENT
Start: 2023-08-24 | End: 2023-08-24 | Stop reason: HOSPADM

## 2023-08-24 RX ORDER — TRAMADOL HYDROCHLORIDE 50 MG/1
50 TABLET ORAL EVERY 6 HOURS PRN
Qty: 10 TABLET | Refills: 0 | Status: SHIPPED | OUTPATIENT
Start: 2023-08-24 | End: 2023-08-27

## 2023-08-24 RX ORDER — OXYCODONE HYDROCHLORIDE 10 MG/1
10 TABLET ORAL
Status: DISCONTINUED | OUTPATIENT
Start: 2023-08-24 | End: 2023-08-24 | Stop reason: HOSPADM

## 2023-08-24 RX ORDER — LIDOCAINE HYDROCHLORIDE 20 MG/ML
INJECTION, SOLUTION INFILTRATION; PERINEURAL PRN
Status: DISCONTINUED | OUTPATIENT
Start: 2023-08-24 | End: 2023-08-24

## 2023-08-24 RX ORDER — ONDANSETRON 2 MG/ML
4 INJECTION INTRAMUSCULAR; INTRAVENOUS EVERY 30 MIN PRN
Status: DISCONTINUED | OUTPATIENT
Start: 2023-08-24 | End: 2023-08-24 | Stop reason: HOSPADM

## 2023-08-24 RX ORDER — PROPOFOL 10 MG/ML
INJECTION, EMULSION INTRAVENOUS PRN
Status: DISCONTINUED | OUTPATIENT
Start: 2023-08-24 | End: 2023-08-24

## 2023-08-24 RX ORDER — HYDROMORPHONE HYDROCHLORIDE 1 MG/ML
0.2 INJECTION, SOLUTION INTRAMUSCULAR; INTRAVENOUS; SUBCUTANEOUS EVERY 5 MIN PRN
Status: DISCONTINUED | OUTPATIENT
Start: 2023-08-24 | End: 2023-08-24 | Stop reason: HOSPADM

## 2023-08-24 RX ORDER — FENTANYL CITRATE 50 UG/ML
50 INJECTION, SOLUTION INTRAMUSCULAR; INTRAVENOUS EVERY 5 MIN PRN
Status: DISCONTINUED | OUTPATIENT
Start: 2023-08-24 | End: 2023-08-24 | Stop reason: HOSPADM

## 2023-08-24 RX ORDER — OXYCODONE HYDROCHLORIDE 5 MG/1
5 TABLET ORAL
Status: DISCONTINUED | OUTPATIENT
Start: 2023-08-24 | End: 2023-08-24 | Stop reason: HOSPADM

## 2023-08-24 RX ORDER — ONDANSETRON 4 MG/1
4 TABLET, ORALLY DISINTEGRATING ORAL EVERY 30 MIN PRN
Status: DISCONTINUED | OUTPATIENT
Start: 2023-08-24 | End: 2023-08-24 | Stop reason: HOSPADM

## 2023-08-24 RX ORDER — ONDANSETRON 2 MG/ML
INJECTION INTRAMUSCULAR; INTRAVENOUS PRN
Status: DISCONTINUED | OUTPATIENT
Start: 2023-08-24 | End: 2023-08-24

## 2023-08-24 RX ORDER — HEPARIN SODIUM 1000 [USP'U]/ML
INJECTION, SOLUTION INTRAVENOUS; SUBCUTANEOUS PRN
Status: DISCONTINUED | OUTPATIENT
Start: 2023-08-24 | End: 2023-08-24

## 2023-08-24 RX ORDER — LIDOCAINE 40 MG/G
CREAM TOPICAL
Status: DISCONTINUED | OUTPATIENT
Start: 2023-08-24 | End: 2023-08-24 | Stop reason: HOSPADM

## 2023-08-24 RX ORDER — CALCIUM CHLORIDE 100 MG/ML
INJECTION INTRAVENOUS; INTRAVENTRICULAR PRN
Status: DISCONTINUED | OUTPATIENT
Start: 2023-08-24 | End: 2023-08-24

## 2023-08-24 RX ORDER — SODIUM CHLORIDE, SODIUM LACTATE, POTASSIUM CHLORIDE, CALCIUM CHLORIDE 600; 310; 30; 20 MG/100ML; MG/100ML; MG/100ML; MG/100ML
INJECTION, SOLUTION INTRAVENOUS CONTINUOUS
Status: DISCONTINUED | OUTPATIENT
Start: 2023-08-24 | End: 2023-08-24 | Stop reason: HOSPADM

## 2023-08-24 RX ORDER — SODIUM CHLORIDE 9 MG/ML
500 INJECTION, SOLUTION INTRAVENOUS CONTINUOUS
Status: DISCONTINUED | OUTPATIENT
Start: 2023-08-24 | End: 2023-08-24 | Stop reason: HOSPADM

## 2023-08-24 RX ORDER — HEPARIN SODIUM 1000 [USP'U]/ML
INJECTION, SOLUTION INTRAVENOUS; SUBCUTANEOUS PRN
Status: DISCONTINUED | OUTPATIENT
Start: 2023-08-24 | End: 2023-08-24 | Stop reason: HOSPADM

## 2023-08-24 RX ADMIN — PHENYLEPHRINE HYDROCHLORIDE 100 MCG: 10 INJECTION INTRAVENOUS at 14:34

## 2023-08-24 RX ADMIN — HYDROMORPHONE HYDROCHLORIDE 0.5 MG: 1 INJECTION, SOLUTION INTRAMUSCULAR; INTRAVENOUS; SUBCUTANEOUS at 15:39

## 2023-08-24 RX ADMIN — PROPOFOL 200 MG: 10 INJECTION, EMULSION INTRAVENOUS at 12:41

## 2023-08-24 RX ADMIN — Medication 50 MG: at 12:41

## 2023-08-24 RX ADMIN — SODIUM CHLORIDE, POTASSIUM CHLORIDE, SODIUM LACTATE AND CALCIUM CHLORIDE: 600; 310; 30; 20 INJECTION, SOLUTION INTRAVENOUS at 12:41

## 2023-08-24 RX ADMIN — SODIUM CHLORIDE, POTASSIUM CHLORIDE, SODIUM LACTATE AND CALCIUM CHLORIDE: 600; 310; 30; 20 INJECTION, SOLUTION INTRAVENOUS at 15:42

## 2023-08-24 RX ADMIN — CEFAZOLIN 2 G: 1 INJECTION, POWDER, FOR SOLUTION INTRAMUSCULAR; INTRAVENOUS at 12:45

## 2023-08-24 RX ADMIN — Medication 10 MG: at 15:28

## 2023-08-24 RX ADMIN — Medication 20 MG: at 14:34

## 2023-08-24 RX ADMIN — MIDAZOLAM 2 MG: 1 INJECTION INTRAMUSCULAR; INTRAVENOUS at 12:30

## 2023-08-24 RX ADMIN — PHENYLEPHRINE HYDROCHLORIDE 100 MCG: 10 INJECTION INTRAVENOUS at 15:12

## 2023-08-24 RX ADMIN — LIDOCAINE HYDROCHLORIDE 60 MG: 20 INJECTION, SOLUTION INFILTRATION; PERINEURAL at 12:41

## 2023-08-24 RX ADMIN — CALCIUM CHLORIDE INJECTION 500 MG: 100 INJECTION, SOLUTION INTRAVENOUS at 14:00

## 2023-08-24 RX ADMIN — Medication 20 MG: at 13:26

## 2023-08-24 RX ADMIN — HEPARIN SODIUM 6000 UNITS: 1000 INJECTION INTRAVENOUS; SUBCUTANEOUS at 13:51

## 2023-08-24 RX ADMIN — ONDANSETRON 4 MG: 2 INJECTION INTRAMUSCULAR; INTRAVENOUS at 15:32

## 2023-08-24 RX ADMIN — SUGAMMADEX 200 MG: 100 INJECTION, SOLUTION INTRAVENOUS at 15:46

## 2023-08-24 RX ADMIN — PHENYLEPHRINE HYDROCHLORIDE 100 MCG: 10 INJECTION INTRAVENOUS at 14:55

## 2023-08-24 ASSESSMENT — ACTIVITIES OF DAILY LIVING (ADL)
ADLS_ACUITY_SCORE: 35

## 2023-08-24 NOTE — DISCHARGE INSTRUCTIONS
Okay to shower tomorrow. Allow soap and water to run over incisions. Do not scrub or submerge incisions until fully healed.   Perform hand  exercises with squeeze ball as much as possible.   Follow up in clinic in 2 weeks.     [facility], [place]  Same-Day Surgery   Adult Discharge Orders & Instructions       *Take it easy when you get home.  Remember, same day surgery DOES NOT MEAN SAME DAY RECOVERY!    *Healing is a gradual process and you will need some time to recover - you may be more tired than you realize at first.    *Rest and relax for at least the first 24 hours at home.  You'll feel better and heal faster if you take good care of yourself.    For 24 hours after surgery    A responsible adult must stay with you for at least 24 hours after you leave the hospital.   Do not drink alcohol, drive, or use heavy equipment for 24 hours. This is because you have had anesthesia medications.  Avoid strenuous and risky activities for 24 hours.   You may feel lightheaded, if so, sit for a few minutes before standing.  You may need someone to help you get up. Ask for help when climbing stairs.  If you have nausea: Drink only clear liquids such as water, juice, soda, or broth. Rest may also help. Be sure to drink enough fluids. Move to a  regular diet as you feel able.  You may have a slight fever. Call the doctor if your fever is over 100 F (37.7 C) (taken under the tongue) or lasts longer than 24 hours.  You might have a dry mouth, sore throat, muscle aches or trouble sleeping.  These should go away after 24 hours.  Do not make important or legal decisions.     Call your doctor for any of the followin.  Signs of infection: fever, growing tenderness at the surgery site, a large amount of drainage or bleeding, severe pain, foul-smelling drainage, redness, swelling. Please call if you experience any of these symptoms.    2. If it has been 8 to 10 hours since surgery and you are  "still not able to urinate (pass water), please call.    3.  If you have a headache for over 24 hours, please call.    To contact a doctor call     '   [phone] and ask for \"the resident on call for transplant\" (this is the hospital and is answered 24 hours a day)    '   Emergency Department: [facility]: [phone]       (TTY for hearing impaired: [phone])    "

## 2023-08-24 NOTE — ANESTHESIA CARE TRANSFER NOTE
Patient: Boogie Villa    Procedure: Procedure(s):  Right RadioCephalic AV Fistula and Branch Ligation x3       Diagnosis: ESRD (end stage renal disease) (H) [N18.6]  Diagnosis Additional Information: No value filed.    Anesthesia Type:   General     Note:    Oropharynx: oropharynx clear of all foreign objects  Level of Consciousness: drowsy  Oxygen Supplementation: nasal cannula    Independent Airway: airway patency satisfactory and stable  Dentition: dentition unchanged  Vital Signs Stable: post-procedure vital signs reviewed and stable    Patient transferred to: PACU    Handoff Report: Identifed the Patient, Identified the Reponsible Provider, Reviewed the pertinent medical history, Discussed the surgical course, Reviewed Intra-OP anesthesia mangement and issues during anesthesia, Set expectations for post-procedure period and Allowed opportunity for questions and acknowledgement of understanding      Vitals:  Vitals Value Taken Time   /50 08/24/23 1610   Temp     Pulse 90 08/24/23 1611   Resp 16 08/24/23 1611   SpO2 95 % 08/24/23 1611   Vitals shown include unvalidated device data.    Electronically Signed By: CHRISTIN Silva CRNA  August 24, 2023  4:12 PM

## 2023-08-24 NOTE — ANESTHESIA PREPROCEDURE EVALUATION
Anesthesia Pre-Procedure Evaluation    Patient: Boogie Villa   MRN: [id number] : 2000        Procedure : Procedure(s):  Left Upper Extremity versus Right arteriovenous fistula versus graft surgery          Past Medical History:   Diagnosis Date    ADD (attention deficit disorder)     Allergic rhinitis     ESRD (end stage renal disease) on dialysis (H)     Hypertension     Mitrofanoff appendicovesicostomy present (H)     Obesity     Posterior urethral valves       Past Surgical History:   Procedure Laterality Date    ABDOMEN SURGERY      Bilateral urereral tapering and re-implantation    ABDOMEN SURGERY      Mitrofanoff    ablation of posterior urethral valves      APPENDECTOMY  2008    CREATE FISTULA ARTERIOVENOUS UPPER EXTREMITY Left 2023    Procedure: LEFT Brachial Basilic ARTERIOVENOUS FISTULA CREATION SURGERY WITH INTRAOPERATIVE ULTRASOUND .;  Surgeon: Nita Martinez MD;  Location: [facility] OR    IR CVC TUNNEL PLACEMENT > 5 YRS OF AGE  2023    ureteral reimplantation with tapering        Allergies   Allergen Reactions    Banana Itching     Raw banana; itchy mouth      Dust Mites      Runny nose and watery eyes    Mold      Runny nose and watery eyes    Nsaids Other (See Comments)     CKD    Other [Seasonal Allergies]      Grass, Ragweed - gets runny nose and watery eyes    Sulfa Antibiotics      PN: LW Reaction: GI Upset      Social History     Tobacco Use    Smoking status: Never     Passive exposure: Never    Smokeless tobacco: Never   Substance Use Topics    Alcohol use: Never      Wt Readings from Last 1 Encounters:   23 129 kg (284 lb 6.3 oz)        Anesthesia Evaluation   Pt has had prior anesthetic.         ROS/MED HX  ENT/Pulmonary:       Neurologic:       Cardiovascular:     (+)  hypertension- -   -  - -                                      METS/Exercise Tolerance:     Hematologic:       Musculoskeletal:       GI/Hepatic:       Renal/Genitourinary:      (+) renal disease, type: ESRD, Pt requires dialysis, type: Hemodialysis,          Endo:     (+)               Obesity,       Psychiatric/Substance Use:       Infectious Disease:       Malignancy:       Other:            Physical Exam    Airway        Mallampati: II       Respiratory Devices and Support         Dental       (+) Minor Abnormalities - some fillings, tiny chips      Cardiovascular          Rhythm and rate: regular and normal     Pulmonary           breath sounds clear to auscultation           OUTSIDE LABS:  CBC:   Lab Results   Component Value Date    WBC 7.4 07/16/2021    WBC 6.6 03/29/2021    HGB 10.2 (L) 05/25/2023    HGB 12.5 (L) 07/16/2021    HCT 37.8 (L) 07/16/2021    HCT 37.9 (L) 03/29/2021     07/16/2021     03/29/2021     BMP:   Lab Results   Component Value Date     05/25/2023     07/16/2021    POTASSIUM 4.5 05/25/2023    POTASSIUM 4.5 07/16/2021    CHLORIDE 103 07/13/2023    CHLORIDE 106 05/25/2023    CO2 21 (L) 05/25/2023    CO2 14 (L) 07/16/2021    BUN 16.5 05/25/2023    BUN 52 (H) 07/16/2021    CR 7.61 (H) 05/25/2023    CR 5.08 (H) 07/16/2021     (H) 05/25/2023     07/16/2021     COAGS:   Lab Results   Component Value Date    PTT 26 03/29/2021    INR 1.06 03/29/2021     POC: No results found for: BGM, HCG, HCGS  HEPATIC:   Lab Results   Component Value Date    ALBUMIN 3.0 (L) 07/16/2021    PROTTOTAL 7.1 03/29/2021    ALT 28 03/29/2021    AST 11 03/29/2021    ALKPHOS 73 03/29/2021    BILITOTAL 0.2 03/29/2021     OTHER:   Lab Results   Component Value Date    NABIL 9.5 05/25/2023    PHOS 5.2 (H) 07/16/2021    MAG 1.7 02/26/2020    CRP <2.9 08/09/2017       Anesthesia Plan    ASA Status:  3       Anesthesia Type: General.     - Airway: ETT   Induction: Intravenous.   Maintenance: Inhalation.        Consents    Anesthesia Plan(s) and associated risks, benefits, and realistic alternatives discussed. Questions answered and patient/representative(s)  expressed understanding.     - Discussed:     - Discussed with:  Patient            Postoperative Care    Pain management: IV analgesics.   PONV prophylaxis: Ondansetron (or other 5HT-3), Dexamethasone or Solumedrol     Comments:           H&P reviewed: Unable to attach H&P to encounter due to EHR limitations. H&P Update: appropriate H&P reviewed, patient examined. No interval changes since H&P (within 30 days).         Guevara Schmidt MD

## 2023-08-24 NOTE — TELEPHONE ENCOUNTER
Patient has fistula creation surgery today. Request sent to schedule 2-week follow up appointment.    Ivis Springer RN on 8/24/2023 at 10:54 AM

## 2023-08-24 NOTE — OP NOTE
Transplant Surgery  Operative Note    Preoperative Diagnosis: End Stage Renal Failure 2/2 posterior urethral valves  Postoperative Diagnosis: Same  Operation: Arteriovenous Fistula creation, right radial artery to cephalic vein. Intraoperative ultrasound  Faculty: Nita Martinez  Fellow: Matthew Virk     Anesthesia: General  EBL: 15 ml.  Specimen: none    Indication: The patient has ESRD requiring permanent dialysis access creation due to renal failure. The indications, benefits, and risks of permanent vascular access creation were discussed, questions answered and informed consent was obtained.     Findings: right radiocephalic fistula creation with 3 side branches ligated. Good thrill at end.     Procedure: The patient was positioned supine, and the right upper extremity was positioned, prepped and draped in the usual sterile fashion. An ultrasound was performed to assess the size, quality, and position of the artery and vein. The patient received preoperative IV antibiotics. An incision was made and extended through the subcutaneous tissues between radial artery and cephalic vein. The radial artery and cephalic vein were circumferentially dissected. The patient was heparinized. Arterial clamps were placed and arteriotomy was made measuring 10mm.  The distal aspect of the vein was then transected, the proximal vein dilated with heparinized saline and secured with a Heifitz clip. The vein was tailored and anastomosed with 7-0 Prolene. The clamps were removed sequentially. Hemostasis was obtained.  Fistula flow was good. The distal radial artery pulse was good and capillary refill good. Then, 3 sepearate small incisions were made to ligated three side branches. These were dissected free and test clamped to ensure the branches were not the cephalic vein. These were then ligated and divided. The wounds were closed in layers with absorbable suture and dressed with Dermabond. Counts were correct. The patient  was then awakened and transferred to PACU in good condition.      Physician Attestation   I was present for the key portions of the procedure and I was immediately available for the entire procedure between opening and closing.    Nita Martinez MD, MD  Date of Service (when I saw the patient): 8/24/2023  The transplant fellow, Matthew Virk MD, was present and assisted with all aspects of the operation described above from opening to closing.  There was no suitable surgical resident available to assist in this procedure.

## 2023-08-24 NOTE — ANESTHESIA PROCEDURE NOTES
Airway       Patient location during procedure: OR       Procedure Start/Stop Times: 8/24/2023 12:43 PM  Staff -        CRNA: Sivan Blue APRN CRNA       Performed By: CRNAIndications and Patient Condition       Induction type:intravenous       Mask difficulty assessment: 1 - vent by mask    Final Airway Details       Final airway type: endotracheal airway       Successful airway: ETT - single  Endotracheal Airway Details        ETT size (mm): 8.0       Cuffed: yes       Successful intubation technique: direct laryngoscopy       DL Blade Type: [place] 2       Grade View of Cords: 1       Adjucts: stylet       Bite block used: None    Post intubation assessment        Placement verified by: capnometry, equal breath sounds and chest rise        Number of attempts at approach: 1       Number of other approaches attempted: 0       Secured with: pink tape       Ease of procedure: easy       Dentition: Intact and Unchanged    Medication(s) Administered   Medication Administration Time: 8/24/2023 12:43 PM

## 2023-08-29 NOTE — ANESTHESIA POSTPROCEDURE EVALUATION
Patient: Boogie Villa    Procedure: Procedure(s):  Right RadioCephalic AV Fistula and Branch Ligation x3       Anesthesia Type:  General    Note:  Disposition: Outpatient   Postop Pain Control: Uneventful            Sign Out: Well controlled pain   PONV: No   Neuro/Psych: Uneventful            Sign Out: Acceptable/Baseline neuro status   Airway/Respiratory: Uneventful            Sign Out: Acceptable/Baseline resp. status   CV/Hemodynamics: Uneventful            Sign Out: Acceptable CV status; No obvious hypovolemia; No obvious fluid overload   Other NRE:    DID A NON-ROUTINE EVENT OCCUR? No           Last vitals:  Vitals Value Taken Time   /65 08/24/23 1705   Temp 36.7  C (98.1  F) 08/24/23 1620   Pulse 78 08/24/23 1705   Resp 17 08/24/23 1705   SpO2 94 % 08/24/23 1705       Electronically Signed By: Jackson Rincon MD  August 29, 2023  11:28 AM

## 2023-09-19 ENCOUNTER — PATIENT OUTREACH (OUTPATIENT)
Dept: NEPHROLOGY | Facility: CLINIC | Age: 23
End: 2023-09-19
Payer: COMMERCIAL

## 2023-09-19 ENCOUNTER — OFFICE VISIT (OUTPATIENT)
Dept: TRANSPLANT | Facility: CLINIC | Age: 23
End: 2023-09-19
Attending: SURGERY
Payer: COMMERCIAL

## 2023-09-19 VITALS
HEART RATE: 86 BPM | BODY MASS INDEX: 39.49 KG/M2 | SYSTOLIC BLOOD PRESSURE: 130 MMHG | WEIGHT: 282.1 LBS | HEIGHT: 71 IN | DIASTOLIC BLOOD PRESSURE: 87 MMHG | OXYGEN SATURATION: 96 %

## 2023-09-19 DIAGNOSIS — T82.9XXA COMPLICATION OF VASCULAR ACCESS FOR DIALYSIS, INITIAL ENCOUNTER: Primary | ICD-10-CM

## 2023-09-19 DIAGNOSIS — Z98.890 S/P ARTERIOVENOUS (AV) FISTULA CREATION: Primary | ICD-10-CM

## 2023-09-19 PROCEDURE — 99024 POSTOP FOLLOW-UP VISIT: CPT | Performed by: NURSE PRACTITIONER

## 2023-09-19 PROCEDURE — G0463 HOSPITAL OUTPT CLINIC VISIT: HCPCS | Performed by: NURSE PRACTITIONER

## 2023-09-19 NOTE — NURSING NOTE
"Chief Complaint   Patient presents with    Follow Up     Fistula follow up       /87 (BP Location: Left arm, Patient Position: Sitting, Cuff Size: Adult Large)   Pulse 86   Ht 1.803 m (5' 11\")   Wt 128 kg (282 lb 1.6 oz)   SpO2 96%   BMI 39.34 kg/m      IDA ZHU RN on 9/19/2023 at 1:23 PM    "

## 2023-09-19 NOTE — TELEPHONE ENCOUNTER
Dialysis Access Care Coordination: General Outreach    REASON FOR VISIT:     REASON FOR VISIT: Clinic Care Coordination - Face To Face (Dialysis Access - fistula follow up)                                  SITUATION/BACKROUND:     Patient presents to clinic for a fistula follow up. He had a right arm radiocephalic AVF created on 8/24/23 with Dr. Martinez.    Neph Tracking Flowsheet Last Filled Values       CKD Education Status Complete    CKD Education Type Kidney Smart Modality Education; Kidney Smart CKD Basics    Preferred Modality Hemodialysis    Final Modality Hemodialysis  [facility]    Patient's Referral Dates Auto Populate Patient's Referral Dates    Dietician Referral 3/29/21    Journey Referral 2/1/23    Vein Mapping/US Order 3/13/23    Vein Mapping/US  04/11/23    Access Surgeon Referral Status  Referred    Dialysis Access Referral --  GFR 6 when CVC placed to initiate dialysis. Had dialysis access referral for fistula placement after he had already started dialysis.    Access Surgical Consult 04/25/23  Fistula consult with Dr. Martinez    Diaylsis Access Type AV Fistula  radiocephalic    Dialysis Access Site RLA    Dialysis Access Surgery 08/24/23    Dialysis Access Surgeon Dr. Martinez    Dialysis Access Maturation --  ultrasound 10/17 to assess maturation    Transplant Evaluation Referral 10/22/20    Transplant Status  --  GFR 14 at time of transplant referral.    Initiation of Dialysis Outpatient          Dialysis History        Start End Type Center Comments    2/17/2023  In-center Hemodialysis [facility] DIALYSIS (ESRD) [facility] Nephrologist: Dr. Zarate with [facility]                    ASSESSMENT:     Patient reports very slight numbness in thumb area. Incisions appear to be healing well. Patient has been doing squeeze ball exercises. Thrill is palpable but diminished in upper portion of forearm.    Dialysis Access Assessments:  Fistula / Graft  Fever/Chills: no  Redness/warmth:  no  Swelling: no  Pain: none  Drainage: none  Current infection: no  Steal Symptoms: none  Thrill: positive  Bruit: positive    PLAN:     Appointments in Next Year      Sep 28, 2023 11:30 AM  Medication Education with Lauren Turner Bloch, RPH  [facility] ([facility] ) [phone]     Oct 17, 2023 10:30 AM  (Arrive by 10:15 AM)  US EXTREMITY ARTERIAL VENOUS DIALYSIS ACCESS GRAFT with [id number] [facility] ([facility] ) [phone]          Follow Up:     Ultrasound ordered and appointment scheduled for 10/17.    Patient verbalized understanding and will follow up as recommended.    Ivis Springer RN  Dialysis Access Care Coordinator  Phone: [phone]  Pool: P_Dialysis_Access_Nurse

## 2023-09-19 NOTE — LETTER
9/19/2023         RE: Boogie Villa  [address]  [place] [facility] [address]        Dear Colleague,    Thank you for referring your patient, Boogie Villa, to the [facility]. Please see a copy of my visit note below.    Dialysis Access Service   Progress Note    S:  Mr. Villa is being seen today for surgical followup of his dialysis access.  He reports no issues with the wound, and  no steal syndrome of the distal extremity. No pain. Scant numbness at base of thumb but sensation intact and no weakness.   No F/C.      O:     GENERAL: alert, no distress  Circulation:   Radial pulse 2+  Ulnar pulse  2+   Capillary refill:  capillary refill < 2 sec    Sensory exam:   arm: Normal   [x]           Abnormal   []          Comment:    hand: Normal   [x]           Abnormal   []          Comment:   Motor exam:   arm: Normal   [x]           Abnormal   []          Comment:    hand: Normal   [x]           Abnormal   []          Comment:    Access: R upper extremity wound(s) healed, healing. non-tender and clean without drainage. capillary refill < 2 sec. +bruit and thrill.     Assessment & Plan: Mr. Villa's dialysis access has matured well at this time point.  We would like to see the patient back in the clinic in 3 weeks time to assess progress. The patient was counselled to contact one of the nurse coordinators, Kathy Medley RN or Ivis Springer RN at [phone] with any questions or concerns.  Thank you for the opportunity to participate in Mr. Villa's care.    TT: 20 min  CT: 10 min    CHRISTIN Hernandez       Again, thank you for allowing me to participate in the care of your patient.        Sincerely,        CHRISTIN Hernandez CNP

## 2023-09-19 NOTE — PROGRESS NOTES
Dialysis Access Service   Progress Note    S:  Mr. Villa is being seen today for surgical followup of his dialysis access.  He reports no issues with the wound, and  no steal syndrome of the distal extremity. No pain. Scant numbness at base of thumb but sensation intact and no weakness.   No F/C.      O:     GENERAL: alert, no distress  Circulation:   Radial pulse 2+  Ulnar pulse  2+   Capillary refill:  capillary refill < 2 sec    Sensory exam:   arm: Normal   [x]           Abnormal   []          Comment:    hand: Normal   [x]           Abnormal   []          Comment:   Motor exam:   arm: Normal   [x]           Abnormal   []          Comment:    hand: Normal   [x]           Abnormal   []          Comment:    Access: R upper extremity wound(s) healed, healing. non-tender and clean without drainage. capillary refill < 2 sec. +bruit and thrill.     Assessment & Plan: Mr. Villa's dialysis access has matured well at this time point.  We would like to see the patient back in the clinic in 3 weeks time to assess progress. The patient was counselled to contact one of the nurse coordinators, Kathy Medley RN or Ivis Springer RN at [phone] with any questions or concerns.  Thank you for the opportunity to participate in Mr. Villa's care.    TT: 20 min  CT: 10 min    CHRISTIN Hernandez

## 2023-09-26 ENCOUNTER — DOCUMENTATION ONLY (OUTPATIENT)
Dept: TRANSPLANT | Facility: CLINIC | Age: 23
End: 2023-09-26
Payer: COMMERCIAL

## 2023-09-26 NOTE — PROGRESS NOTES
"Received notification from Dr. Martinez regarding pts BMI clearance: \"He is below 40 and feasible for transplant. I warned him not to gain any weight\" Will review chart and touch base with pt regarding items needed.    "

## 2023-09-27 ENCOUNTER — TELEPHONE (OUTPATIENT)
Dept: TRANSPLANT | Facility: CLINIC | Age: 23
End: 2023-09-27
Payer: COMMERCIAL

## 2023-09-27 DIAGNOSIS — Z76.82 ORGAN TRANSPLANT CANDIDATE: ICD-10-CM

## 2023-09-27 DIAGNOSIS — N18.6 ESRD (END STAGE RENAL DISEASE) (H): Primary | ICD-10-CM

## 2023-09-27 NOTE — TELEPHONE ENCOUNTER
Called pt to update orders placed for [facility]. Will update Neph, lab, and SW visit. Has not been to dentist in awhile, encouraged pt to make appt. Pt verbalized understanding of information and has no further questions. Encouraged to reach out if questions arise.

## 2023-09-28 ENCOUNTER — VIRTUAL VISIT (OUTPATIENT)
Dept: PHARMACY | Facility: CLINIC | Age: 23
End: 2023-09-28
Attending: NURSE PRACTITIONER
Payer: COMMERCIAL

## 2023-09-28 VITALS — BODY MASS INDEX: 39.06 KG/M2 | WEIGHT: 279 LBS | HEIGHT: 71 IN

## 2023-09-28 DIAGNOSIS — E66.01 CLASS 2 SEVERE OBESITY DUE TO EXCESS CALORIES WITH SERIOUS COMORBIDITY AND BODY MASS INDEX (BMI) OF 39.0 TO 39.9 IN ADULT (H): Primary | ICD-10-CM

## 2023-09-28 DIAGNOSIS — E66.812 CLASS 2 SEVERE OBESITY DUE TO EXCESS CALORIES WITH SERIOUS COMORBIDITY AND BODY MASS INDEX (BMI) OF 39.0 TO 39.9 IN ADULT (H): Primary | ICD-10-CM

## 2023-09-28 PROCEDURE — 99207 PR NO CHARGE LOS: CPT | Mod: VID | Performed by: PHARMACIST

## 2023-09-28 ASSESSMENT — PAIN SCALES - GENERAL: PAINLEVEL: NO PAIN (0)

## 2023-09-28 NOTE — PATIENT INSTRUCTIONS
Recommendations from today's disease management visit:                                                      Patient to stop Saxenda for now per patient preference. Patient to follow up with [facility] if wishing to restart in future.     To schedule another MTM appointment, please call the clinic directly or you may call the MTM scheduling line at [phone] or toll-free at [phone].     My Clinical Pharmacist's contact information:                                                      Please feel free to contact me with any questions or concerns you have.      Lauren Bloch, PharmD, BCACP   Medication Therapy Management Pharmacist   [facility] [place]

## 2023-09-28 NOTE — PROGRESS NOTES
Virtual Visit Details    Type of service:  Video Visit     Originating Location (pt. Location): Home    Distant Location (provider location):  Off-site  Platform used for Video Visit: Sulaiman

## 2023-09-28 NOTE — PROGRESS NOTES
"Disease State Management Encounter:                          Boogie Villa is a 23 year old male contacted via secure video for an initial visit. He was referred to me from Yessenia Heller CNP. Insurance does not cover comprehensive medication review with Medication Therapy Management (MTM). Patient declines private pay. Therefore, completed one time consult today. Medication reconciliation was completed today.     Reason for visit: Saxenda start follow up.    Weight Management:   Saxenda 1.2 mg once daily morning     Followed by Yessenia Heller NP, seen for [facility]. Patient is experiencing the follow side effects: None. He is also not noticing benefits as of now yet. He has ESRD on dialysis needing to get to BMI < 35 to be evaluated for transplant. He wants to try stopping Saxenda as he is not denoting efficacy of Saxenda.   Diet/Eating Habits: Patient reports  dietary changes to lose weight - now 1.5 meals daily. Usually eats small breakfast and then dinner meal is the larger meal. Prior to this change was doing 2 larger meals per day. He does denote that he has been able to make dietary changes with ease.   Exercise/Activity: Patient reports that he \"doesn't do much\" for activity. Tries to work out 2 times per week when is able to do it. Hasn't been able to do recently, but wants to get back to it.    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    Weight as of this encounter: 279 lb (126.6 kg).      Today's Vitals: Ht 5' 10.98\" (1.803 m)   Wt 279 lb (126.6 kg)   BMI 38.93 " kg/m      Assessment/Plan:  Due to patient preference in wanting to stop Saxenda, patient to stop. Did discuss efficacy of the drug in assisting with helping to feel toscano for longer and help with smaller portions. As patient doesn't believe this ease of changing dietary/behavioral habits is related to Saxenda, he will stop. Encouraged if he does denote greater hunger at and between meals, difficulty with feeling satisfied from previous portions of food, etc that there likely was more of an affect of Saxenda then previously thought and worth restarting slowly.     Patient to stop Saxenda for now. Patient to follow [facility] with [facility] if wishing to restart in future.     I spent 15 minutes with this patient today. All changes were made via collaborative practice agreement with Yessenia Heller CNP . A copy of the visit note was provided to the patient's provider(s).    A summary of these recommendations was declined.     Lauren Bloch, PharmD, BCACP   Medication Therapy Management Pharmacist   [facility]       Medication Therapy Recommendations  No medication therapy recommendations to display

## 2023-09-28 NOTE — NURSING NOTE
Is the patient currently in the state of [place]? YES    Visit mode:VIDEO    If the visit is dropped, the patient can be reconnected by: VIDEO VISIT: Text to cell phone:   Telephone Information:   Mobile [phone]       Will anyone else be joining the visit? NO  (If patient encounters technical issues they should call [phone]    How would you like to obtain your AVS? MyChart    Are changes needed to the allergy or medication list? Pt stated no changes to allergies and Pt stated no med changes    Reason for visit: Consult    Diane SAAVEDRAF

## 2023-10-19 ENCOUNTER — LAB (OUTPATIENT)
Dept: LAB | Facility: CLINIC | Age: 23
End: 2023-10-19
Attending: PHYSICIAN ASSISTANT
Payer: COMMERCIAL

## 2023-10-19 ENCOUNTER — ANCILLARY PROCEDURE (OUTPATIENT)
Dept: ULTRASOUND IMAGING | Facility: CLINIC | Age: 23
End: 2023-10-19
Attending: SURGERY
Payer: COMMERCIAL

## 2023-10-19 ENCOUNTER — OFFICE VISIT (OUTPATIENT)
Dept: TRANSPLANT | Facility: CLINIC | Age: 23
End: 2023-10-19
Attending: PHYSICIAN ASSISTANT
Payer: COMMERCIAL

## 2023-10-19 VITALS
HEART RATE: 74 BPM | SYSTOLIC BLOOD PRESSURE: 144 MMHG | DIASTOLIC BLOOD PRESSURE: 80 MMHG | OXYGEN SATURATION: 95 % | WEIGHT: 277 LBS | BODY MASS INDEX: 38.65 KG/M2

## 2023-10-19 DIAGNOSIS — Z76.82 ORGAN TRANSPLANT CANDIDATE: ICD-10-CM

## 2023-10-19 DIAGNOSIS — T82.9XXA COMPLICATION OF VASCULAR ACCESS FOR DIALYSIS, INITIAL ENCOUNTER: ICD-10-CM

## 2023-10-19 DIAGNOSIS — N18.6 ESRD (END STAGE RENAL DISEASE) (H): ICD-10-CM

## 2023-10-19 DIAGNOSIS — N25.81 SECONDARY RENAL HYPERPARATHYROIDISM (H): ICD-10-CM

## 2023-10-19 DIAGNOSIS — N18.6 ESRD (END STAGE RENAL DISEASE) (H): Primary | ICD-10-CM

## 2023-10-19 DIAGNOSIS — E55.9 VITAMIN D DEFICIENCY: ICD-10-CM

## 2023-10-19 PROCEDURE — 99213 OFFICE O/P EST LOW 20 MIN: CPT | Performed by: NURSE PRACTITIONER

## 2023-10-19 PROCEDURE — 86832 HLA CLASS I HIGH DEFIN QUAL: CPT | Performed by: PHYSICIAN ASSISTANT

## 2023-10-19 PROCEDURE — 99214 OFFICE O/P EST MOD 30 MIN: CPT | Performed by: NURSE PRACTITIONER

## 2023-10-19 PROCEDURE — 99000 SPECIMEN HANDLING OFFICE-LAB: CPT | Performed by: PATHOLOGY

## 2023-10-19 PROCEDURE — 86833 HLA CLASS II HIGH DEFIN QUAL: CPT | Performed by: PHYSICIAN ASSISTANT

## 2023-10-19 PROCEDURE — 93990 DOPPLER FLOW TESTING: CPT | Performed by: RADIOLOGY

## 2023-10-19 PROCEDURE — 36415 COLL VENOUS BLD VENIPUNCTURE: CPT | Performed by: PATHOLOGY

## 2023-10-19 NOTE — PROGRESS NOTES
TRANSPLANT NEPHROLOGY WAITLIST VISIT    Assessment and Plan:  # Kidney Transplant Wait List Evaluation: Patient is a good candidate overall. Patient should be active on the wait list.    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 Mother has donated already into paired exchange.     # Posterior Urethral Valves: s/p reimplantation and Mitrofanoff. Currently voiding volitionally.  No recent UTI. Prior UDS showed normal pressures during filling, good emptying of the bladder with some detrusor contraction augmented by Valsalva maneuvers, and no evidence of reflux. Please see Dr. Morton's note from 2020 regarding perioperative recommendations.     # Cardiac Risk: he has no known history of cardiac disease or events and is asymptomatic with exertion. EKG and ECHO  were normal in .     # Obesity: current BMI ~ 38.9. Weight OK per Dr. Martinez's assessment during recent dialysis access appt. Followed by weight loss management.     # Bilateral uveitis: no evidence of systemic autoimmune/inflammatory disease at last rheumatology visit in 2023. To see opthalmology next week with ongoing symptoms.     # Health Maintenance: Dental: has appt soon.      Discussed the risks and benefits of a transplant, including the risk of surgery and immunosuppression medications.    KDPI: We discussed approximate remaining wait time and how that is influenced by issues such as blood type and sensitization (PRA) and access to a living donor. I contrasted potential waiting time for living vs  donor kidneys from  normal (0-85%) or higher (%) kidney donor profile index (KDPI) donors and their associated outcomes. I would not recommend Mr. Villa to consider kidneys from high KDPI donors. The reason for this decision is best summarized as: improved long term graft survival.    Patient presently appears to be enough of an acceptable kidney  transplant recipient candidate to have any potential kidney donors start the evaluation process.  Patient s overall re-evaluation may require further discussion in the Transplant Program s multidisciplinary selection committee for a final recommendation on the patient s suitability for transplant.     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     Date of Initial Transplant Evaluation:  3/2021        Current Transplant Phase: Waitlist: Inactive  Official UNOS Listing Date: 4/16/2021  Blood Type: O       cPRA: 0%       Date of cPRA: 7/2021  Transplant Coordinator: Trisha Blankenship Transplant Office phone number [phone]          History of Present Illness:   Boogie Villa is a 23-year-old male with history of ESKD secondary to  posterior urethral valves on HD since 2/2023, s/p reimplantation and Mitrofanoff (voiding volitionally), obesity and HTN.             Interim Events:     2/2023: started dialysis.     Admits:    5/2023: fevers, but no sourse of infection found, presumed to be viral illness.     7/2023: admitted with low grade fevers/ chills, found to have parainfluenza.     8/2023: AVF creation with Dr. Martinez.     9/2023: +COVID 19, fevers, tachycardia, elevated procal. Respiratory panel did grow small GPCs, not treated for CAP. Lactate wnls. States he was back to baseline after two days.               Kidney Disease:        Kidney Disease Dx: posterior valves        On Dialysis: Yes, Date initiated: 2/2023 and Dialysis Type: Incenter HD; RUE AVF in place, but still using chest catheter, reports BPs are up and down but overall controlled.        Primary Nephrologist: Dr. Zarate          Cardiac/Vascular Disease History:       Known CAD: No       Known PAD/Claudication Symptoms: No       Known Heart Failure: No       Arrhythmia:  No       Pulmonary Hypertension: No        Valvular Disease:  No       Other: None       New Cardiac/Vascular Events: No         Functional Capacity/Frailty:       Finished college, working as a . Not doing anything for [employer]. Plans to start working with a  in efforts to lose more weight. No limitations with walking and can do a flight of stair OK without chest pains or shortness of breath.      #Identified Care Partner Post Transplant Surgery: mom       Allergy Testing Questions:   Medication that caused a reaction Sufla Drugs (Sufla/TMP or Bactrim) vomiting as a baby ?   Antibiotics used that didn't give an allergic reaction?  None    COVID Vaccination Up To Date: Yes          Other Pertinent Transplant Surgical Issues:  Recent Blood Transfusion: No  Previous Abdominal Transplant: No  Bladder Dysfunction: Yes; s/p reimplantation and Mitrofanoff (currently voiding volitionally)  Chronic/Recurrent Infections: No  Chronic Anticoagulation: No  Jehovah s Witness: No       Active Problem List:   Patient Active Problem List   Diagnosis    Lymphangioma    HTN (hypertension)    CKD (chronic kidney disease) stage 4, GFR 15-29 ml/min (H)    ADHD (attention deficit hyperactivity disorder)    Allergic rhinitis    Allergic rhinitis due to pollen    Anxiety    Congenital posterior urethral valves    Decreased creatinine clearance    Iron deficiency    Neurogenic bladder    Class 2 severe obesity due to excess calories with serious comorbidity and body mass index (BMI) of 39.0 to 39.9 in adult (H)    Sensorineural hearing loss, asymmetrical    Chronic renal disease    Hypertension       Personal History:  Nonsmoker      Allergies:  Allergies   Allergen Reactions    Banana Itching     Raw banana; itchy mouth      Dust Mites      Runny nose and watery eyes    Mold      Runny nose and watery eyes    Nsaids Other (See Comments)     CKD    Other [Seasonal Allergies]      Grass, Ragweed - gets runny nose and watery eyes    Sulfa Antibiotics      PN: LW  Reaction: GI Upset        Medications:  Current Outpatient Medications   Medication Sig    amLODIPine (NORVASC) 5 MG tablet Take 1 tablet (5 mg) by mouth daily (Patient taking differently: Take 5 mg by mouth every morning)    atenolol (TENORMIN) 25 MG tablet Take 1.5 tablets (37.5 mg) by mouth daily (Patient taking differently: Take 37.5 mg by mouth every morning)    B Complex-C-Folic Acid (DIALYVITE 800) 0.8 MG TABS Take 1 tablet by mouth daily (Patient taking differently: Take 1 tablet by mouth every morning)    calcitRIOL (ROCALTROL) 0.25 MCG capsule Take 1 capsule (0.25 mcg) by mouth daily (Patient taking differently: Take 0.25 mcg by mouth three times a week)    Calcium Acetate 667 MG TABS Take 667 mg by mouth 3 times daily (with meals) (Patient taking differently: Take 4 mg by mouth 3 times daily (with meals))    cetirizine (ZYRTEC) 10 MG tablet Take 10 mg by mouth daily as needed for allergies    Cholecalciferol (VITAMIN D) 50 MCG (2000 UT) CAPS Take 2,000 unit marking on an U-100 insulin syringe by mouth daily (Patient taking differently: Take 2,000 unit marking on an U-100 insulin syringe by mouth every morning)    difluprednate (DUREZOL) 0.05 % ophthalmic emulsion INSTILL 1 DROP INTO EACH EYE SIX TIMES DAILY FOR THE FIRST 24 HOURS. THEN 1 DROP 4 TIMES DAILY UNTIL FOLLOW UP    insulin pen needle (31G X 5 MM) 31G X 5 MM miscellaneous Use 1 pen needles daily or as directed.    liraglutide - Weight Management (SAXENDA) 18 MG/3ML pen Week 1: 0.6 mg subcutaneous daily, Week 2: 1.2 mg daily, Week 3: 1.8 mg daily, Week 4: 2.4 mg daily, Week 5 & on: 3 mg daily    methylphenidate (RITALIN) 20 MG tablet Take 20 mg by mouth as needed Prn    Probiotic Product (PROBIOTIC-10 PO) Take 1 tablet by mouth every morning 1 tab capsule     No current facility-administered medications for this visit.        Vitals:  There were no vitals taken for this visit.     Exam:  GENERAL APPEARANCE: alert and no distress  HENT: mouth  without ulcers or lesions  LYMPHATICS: no cervical or supraclavicular nodes  RESP: lungs clear to auscultation - no rales, rhonchi or wheezes  CV: regular rhythm, normal rate, no rub, no murmur  EDEMA: no LE edema bilaterally  ABDOMEN: soft, nondistended, nontender, bowel sounds normal  MS: extremities normal - no gross deformities noted, no evidence of inflammation in joints, no muscle tenderness  SKIN: no rash

## 2023-10-19 NOTE — LETTER
10/19/2023         RE: Boogie Villa  [address]  [place] [address]        Dear Colleague,    Thank you for referring your patient, Boogie Villa, to the [facility]. Please see a copy of my visit note below.    TRANSPLANT NEPHROLOGY WAITLIST VISIT    Assessment and Plan:  # Kidney Transplant Wait List Evaluation: Patient is a good candidate overall. Patient should be active on the wait list.    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 Mother has donated already into paired exchange.     # Posterior Urethral Valves: s/p reimplantation and Mitrofanoff. Currently voiding volitionally.  No recent UTI. Prior UDS showed normal pressures during filling, good emptying of the bladder with some detrusor contraction augmented by Valsalva maneuvers, and no evidence of reflux. Please see Dr. Morton's note from 2020 regarding perioperative recommendations.     # Cardiac Risk: he has no known history of cardiac disease or events and is asymptomatic with exertion. EKG and ECHO  were normal in .     # Obesity: current BMI ~ 38.9. Weight OK per Dr. Martinez's assessment during recent dialysis access appt. Followed by weight loss management.     # Bilateral uveitis: no evidence of systemic autoimmune/inflammatory disease at last rheumatology visit in 2023. To see opthalmology next week with ongoing symptoms.     # Health Maintenance: Dental: has appt soon.      Discussed the risks and benefits of a transplant, including the risk of surgery and immunosuppression medications.    KDPI: We discussed approximate remaining wait time and how that is influenced by issues such as blood type and sensitization (PRA) and access to a living donor. I contrasted potential waiting time for living vs  donor kidneys from  normal (0-85%) or higher (%) kidney donor profile index (KDPI) donors and their  associated outcomes. I would not recommend Mr. Villa to consider kidneys from high KDPI donors. The reason for this decision is best summarized as: improved long term graft survival.    Patient presently appears to be enough of an acceptable kidney transplant recipient candidate to have any potential kidney donors start the evaluation process.  Patient s overall re-evaluation may require further discussion in the Transplant Program s multidisciplinary selection committee for a final recommendation on the patient s suitability for transplant.     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     Date of Initial Transplant Evaluation:  3/2021        Current Transplant Phase: Waitlist: Inactive  Official UNOS Listing Date: 4/16/2021  Blood Type: O       cPRA: 0%       Date of cPRA: 7/2021  Transplant Coordinator: Trisha Blankenship Transplant Office phone number [phone]          History of Present Illness:   Boogie Villa is a 23-year-old male with history of ESKD secondary to  posterior urethral valves on HD since 2/2023, s/p reimplantation and Mitrofanoff (voiding volitionally), obesity and HTN.             Interim Events:     2/2023: started dialysis.     Admits:    5/2023: fevers, but no sourse of infection found, presumed to be viral illness.     7/2023: admitted with low grade fevers/ chills, found to have parainfluenza.     8/2023: AVF creation with Dr. Martinez.     9/2023: +COVID 19, fevers, tachycardia, elevated procal. Respiratory panel did grow small GPCs, not treated for CAP. Lactate wnls. States he was back to baseline after two days.               Kidney Disease:        Kidney Disease Dx: posterior valves        On Dialysis: Yes, Date initiated: 2/2023 and Dialysis Type: Incenter HD; RUE AVF in place, but still using chest catheter, reports BPs are up and down but overall controlled.         Primary Nephrologist: Dr. Zarate          Cardiac/Vascular Disease History:       Known CAD: No       Known PAD/Claudication Symptoms: No       Known Heart Failure: No       Arrhythmia:  No       Pulmonary Hypertension: No        Valvular Disease: No       Other: None       New Cardiac/Vascular Events: No         Functional Capacity/Frailty:       Finished college, working as a . Not doing anything for [employer]. Plans to start working with a  in efforts to lose more weight. No limitations with walking and can do a flight of stair OK without chest pains or shortness of breath.      #Identified Care Partner Post Transplant Surgery: mom       Allergy Testing Questions:   Medication that caused a reaction Sufla Drugs (Sufla/TMP or Bactrim) vomiting as a baby ?   Antibiotics used that didn't give an allergic reaction?  None    COVID Vaccination Up To Date: Yes          Other Pertinent Transplant Surgical Issues:  Recent Blood Transfusion: No  Previous Abdominal Transplant: No  Bladder Dysfunction: Yes; s/p reimplantation and Mitrofanoff (currently voiding volitionally)  Chronic/Recurrent Infections: No  Chronic Anticoagulation: No  Jehovah s Witness: No       Active Problem List:   Patient Active Problem List   Diagnosis    Lymphangioma    HTN (hypertension)    CKD (chronic kidney disease) stage 4, GFR 15-29 ml/min (H)    ADHD (attention deficit hyperactivity disorder)    Allergic rhinitis    Allergic rhinitis due to pollen    Anxiety    Congenital posterior urethral valves    Decreased creatinine clearance    Iron deficiency    Neurogenic bladder    Class 2 severe obesity due to excess calories with serious comorbidity and body mass index (BMI) of 39.0 to 39.9 in adult (H)    Sensorineural hearing loss, asymmetrical    Chronic renal disease    Hypertension       Personal History:  Nonsmoker      Allergies:  Allergies   Allergen Reactions    Banana Itching     Raw banana; itchy mouth       Dust Mites      Runny nose and watery eyes    Mold      Runny nose and watery eyes    Nsaids Other (See Comments)     CKD    Other [Seasonal Allergies]      Grass, Ragweed - gets runny nose and watery eyes    Sulfa Antibiotics      PN: LW Reaction: GI Upset        Medications:  Current Outpatient Medications   Medication Sig    amLODIPine (NORVASC) 5 MG tablet Take 1 tablet (5 mg) by mouth daily (Patient taking differently: Take 5 mg by mouth every morning)    atenolol (TENORMIN) 25 MG tablet Take 1.5 tablets (37.5 mg) by mouth daily (Patient taking differently: Take 37.5 mg by mouth every morning)    B Complex-C-Folic Acid (DIALYVITE 800) 0.8 MG TABS Take 1 tablet by mouth daily (Patient taking differently: Take 1 tablet by mouth every morning)    calcitRIOL (ROCALTROL) 0.25 MCG capsule Take 1 capsule (0.25 mcg) by mouth daily (Patient taking differently: Take 0.25 mcg by mouth three times a week)    Calcium Acetate 667 MG TABS Take 667 mg by mouth 3 times daily (with meals) (Patient taking differently: Take 4 mg by mouth 3 times daily (with meals))    cetirizine (ZYRTEC) 10 MG tablet Take 10 mg by mouth daily as needed for allergies    Cholecalciferol (VITAMIN D) 50 MCG (2000 UT) CAPS Take 2,000 unit marking on an U-100 insulin syringe by mouth daily (Patient taking differently: Take 2,000 unit marking on an U-100 insulin syringe by mouth every morning)    difluprednate (DUREZOL) 0.05 % ophthalmic emulsion INSTILL 1 DROP INTO EACH EYE SIX TIMES DAILY FOR THE FIRST 24 HOURS. THEN 1 DROP 4 TIMES DAILY UNTIL FOLLOW UP    insulin pen needle (31G X 5 MM) 31G X 5 MM miscellaneous Use 1 pen needles daily or as directed.    liraglutide - Weight Management (SAXENDA) 18 MG/3ML pen Week 1: 0.6 mg subcutaneous daily, Week 2: 1.2 mg daily, Week 3: 1.8 mg daily, Week 4: 2.4 mg daily, Week 5 & on: 3 mg daily    methylphenidate (RITALIN) 20 MG tablet Take 20 mg by mouth as needed Prn    Probiotic Product (PROBIOTIC-10 PO) Take  1 tablet by mouth every morning 1 tab capsule     No current facility-administered medications for this visit.        Vitals:  There were no vitals taken for this visit.     Exam:  GENERAL APPEARANCE: alert and no distress  HENT: mouth without ulcers or lesions  LYMPHATICS: no cervical or supraclavicular nodes  RESP: lungs clear to auscultation - no rales, rhonchi or wheezes  CV: regular rhythm, normal rate, no rub, no murmur  EDEMA: no LE edema bilaterally  ABDOMEN: soft, nondistended, nontender, bowel sounds normal  MS: extremities normal - no gross deformities noted, no evidence of inflammation in joints, no muscle tenderness  SKIN: no rash           Again, thank you for allowing me to participate in the care of your patient.        Sincerely,        CHRISTIN Batista CNP

## 2023-10-19 NOTE — PROGRESS NOTES
Patient Name: Boogie Villa  : 2000  Age: 23 year old  MRN: [id number]  Date of Initial Social Work Evaluation: 3/29/2021    Boogie is on transplant wait list.  Saw today to update psychosocial assessment.  His mother Darcy accompanied him to this appointment.     Presenting Information   Living Situation: Boogie lives in a duplex in [place], [place]. This is 45 minutes from the transplant center. I confirmed with Frida Ames, APRN, CNP that Boogie would not need to relocate post transplant. His parents also live 10 minutes away from him.   If not local, plans for short term stay:  N/A- Boogie is able to head home post transplant. He will stay with his mother and father who lives very close to his home.   Previous Functional Status: Independent with ADL's, drives self, works full time, lives alone   Cultural/Language/Spiritual Considerations: White English speaking male     Support System  Primary Support Person: Mother Darcy  Other support:  Father  Plan for support in immediate post-transplant period: Mother    Health Care Directive  Decision Maker: Self   Alternate Decision Maker: Mother and father   Health Care Directive: Provided Copy    Mental Health/Coping:   History of Mental Health: No major mental health history per previous assessment. No changes reported today.  History of Chemical Health: No major chemical health history. Boogie does not drink alcohol, use drugs or other substances, or use tobacco products.   Current status: Coping appropriately, appears to be in a positive mood today.   Coping: Playing video games  Services Needed/Recommended: None indicated at this time.     Financial   Income: Employed full time as a  and is able to work from home.   Impact of transplant on income: Boogie plans to use FMLA. He does have access to PTO.   Insurance and medication coverage: [facility] through his fathers insurance. Does not anticipate moving to his own  insurance plan before turning 26 but will keep us posted should he decide to do so.   Financial concerns: None reported at this time.   Resources needed: None at this time.     Assessment and recommendations and plan:  Boogie reports since his last SW visit on 3/9/21, he has graduated from college, gotten his first full time job, moved to [place], and began dialysis in February. Boogie reports dialysis is going well and he attends all of his appointments. He has been speaking with the dialysis SW about ESRD Medicare and he plans to sign up next year. Boogie continues to be a good candidate for transplant. Boogie and his mother had no further questions or concerns. Reviewed transplant education (Medicare, rehabilitation, donor issues, community/financial resources, and psych/family adjustment) as well as psychosocial risks of transplant. Provided patient with a copy of post-transplant informational sheet that includes information on potential costs of medications, Medicare ESRD, post-transplant lodging, etc. Patient seemed to process information well. Appeared well informed, motivated, and able to follow post transplant requirements. Behavior was appropriate during interview. Has adequate income and insurance coverage. Adequate social support. No major contraindications noted for transplant. At this time, patient appears to understand the risks and benefits of transplant.      PAYAL Sanchez, LICSW  SOT/BMT/CF Float

## 2023-10-26 ENCOUNTER — VIRTUAL VISIT (OUTPATIENT)
Dept: ENDOCRINOLOGY | Facility: CLINIC | Age: 23
End: 2023-10-26
Payer: COMMERCIAL

## 2023-10-26 VITALS — WEIGHT: 277 LBS | BODY MASS INDEX: 38.78 KG/M2 | HEIGHT: 71 IN

## 2023-10-26 DIAGNOSIS — E66.01 CLASS 2 SEVERE OBESITY DUE TO EXCESS CALORIES WITH SERIOUS COMORBIDITY AND BODY MASS INDEX (BMI) OF 39.0 TO 39.9 IN ADULT (H): ICD-10-CM

## 2023-10-26 DIAGNOSIS — E66.812 CLASS 2 SEVERE OBESITY DUE TO EXCESS CALORIES WITH SERIOUS COMORBIDITY AND BODY MASS INDEX (BMI) OF 39.0 TO 39.9 IN ADULT (H): ICD-10-CM

## 2023-10-26 PROCEDURE — 99214 OFFICE O/P EST MOD 30 MIN: CPT | Mod: VID | Performed by: NURSE PRACTITIONER

## 2023-10-26 RX ORDER — PREDNISOLONE ACETATE 10 MG/ML
1 SUSPENSION/ DROPS OPHTHALMIC EVERY 4 HOURS
Status: ON HOLD | COMMUNITY
Start: 2023-10-20 | End: 2023-12-29

## 2023-10-26 RX ORDER — CHLORAL HYDRATE 500 MG
1 CAPSULE ORAL DAILY
Status: ON HOLD | COMMUNITY
End: 2023-12-29

## 2023-10-26 ASSESSMENT — PAIN SCALES - GENERAL: PAINLEVEL: NO PAIN (0)

## 2023-10-26 NOTE — ASSESSMENT & PLAN NOTE
"Tolerated saxenda up to 1.2mg. didn't find beneficial and wanted to stop medication. Struggles more with \"self control\" around food option choices rather than hunger. Discussed how this can be cravings related sometimes. Has started to make meaningful changes to diet since last seen- losing about 1lb a month. Less sugar beverages, more water, smaller portions, monitoring caloric intake. His currently personally set goal is to have an average of 2200 calories daily. Tries to stick to 2,000 during the week and more calories on the weekend. Not actually tracking but feels he is near goal often. Discussed trying to actually track this now to help with consistency and then determining next goal for further progress. Possible that we underestimate frequency of splurges if not tracking. Encouraged a check point with RD in 4 weeks to see what he learned from this and then consider caloric goals. Also discussed strategies to further adjust food choices.     Also planning to increase activity. This is limited by time and energy. Currently doing some strength training once a week. Looking at . Discussed slow steps to start this process. Smaller increment goals like a 10min walk which can fit better in schedule.     Try tracking your calories for the day - daily   When looking for healthier options at restaurant- look for lower carbohydrate option   Increase activity as able   See dietitian in 4 weeks  See me in 3 months   "

## 2023-10-26 NOTE — PROGRESS NOTES
"  Return Medical Weight Management Note     Boogie Villa  MRN:  [id number]  :  2000  LEANN:  10/26/2023    Dear Rita Ward,    I had the pleasure of seeing your patient Boogie Villa. He is a 23 year old male who I am continuing to see for treatment of obesity related to:        2023    12:01 PM   --   I have the following health issues associated with obesity High Blood Pressure   I have the following symptoms associated with obesity Fatigue       Assessment & Plan   Problem List Items Addressed This Visit        Digestive    Class 2 severe obesity due to excess calories with serious comorbidity and body mass index (BMI) of 39.0 to 39.9 in adult (H)     Tolerated saxenda up to 1.2mg. didn't find beneficial and wanted to stop medication. Struggles more with \"self control\" around food option choices rather than hunger. Discussed how this can be cravings related sometimes. Has started to make meaningful changes to diet since last seen- losing about 1lb a month. Less sugar beverages, more water, smaller portions, monitoring caloric intake. His currently personally set goal is to have an average of 2200 calories daily. Tries to stick to 2,000 during the week and more calories on the weekend. Not actually tracking but feels he is near goal often. Discussed trying to actually track this now to help with consistency and then determining next goal for further progress. Possible that we underestimate frequency of splurges if not tracking. Encouraged a check point with RD in 4 weeks to see what he learned from this and then consider caloric goals. Also discussed strategies to further adjust food choices.     Also planning to increase activity. This is limited by time and energy. Currently doing some strength training once a week. Looking at . Discussed slow steps to start this process. Smaller increment goals like a 10min walk which can fit better in schedule.     Try tracking your " calories for the day - daily   When looking for healthier options at restaurant- look for lower carbohydrate option   Increase activity as able   See dietitian in 4 weeks  See me in 3 months                INTERVAL HISTORY:  MWM back [id number] but declined meds and surgery and didn't follow up. New MWM 7/2023 ESRD on dialysis needing BMI <35 for kidney transplant. Started saxenda and encouraged to work with RD. Discussed very slow taper to avoid further kidney issues.    9/28/2023 Lauren Bloch MTM Pharmacist  - preferred to stop saxenda    Anti-obesity medication history    Current:   None     Past/Failed/contraindicated:   Hx of ADHD and didn't like side effects of ritalin so avoids stimulants   saxenda - felt was ineffective at 1.2mg     Recent diet changes:   Breakfast- small - [employer] sausage biscuit with hashbrowns - convenient and likes it   Meal- around dinner time- orders meal to his home- doesn't want to cook (doesn't like it, isn't good at it)     Avoids soda- trying to stick to water   Tries to stick to smaller meals   Doesn't like salad- too bitter     Personal calorie goal is around 2,000 during the week and 2200 as an average daily for the week     Recent exercise/activity changes: going to the gym     Recent stressors:   Has to work a lot on non-dialysis days     Recent sleep changes:   Sleep is good - 8 hours recently     CURRENT WEIGHT:   277 lbs 0 oz    Initial Weight (lbs): 282 lbs  Last Visits Weight: 125.6 kg (277 lb)  Cumulative weight loss (lbs): 5  Weight Loss Percentage: 1.77%            10/26/2023    12:47 PM   Changes and Difficulties   I have made the following changes to my diet since my last visit: trying to eat less   With regards to my diet, I am still struggling with: eating healthy   I have made the following changes to my activity/exercise since my last visit: none   With regards to my activity/exercise, I am still struggling with: nothing         MEDICATIONS:   Current Outpatient  Medications   Medication Sig Dispense Refill     amLODIPine (NORVASC) 5 MG tablet Take 1 tablet (5 mg) by mouth daily (Patient taking differently: Take 5 mg by mouth every morning) 90 tablet 3     atenolol (TENORMIN) 25 MG tablet Take 1.5 tablets (37.5 mg) by mouth daily (Patient taking differently: Take 37.5 mg by mouth every morning) 135 tablet 3     B Complex-C-Folic Acid (DIALYVITE 800) 0.8 MG TABS Take 1 tablet by mouth daily (Patient taking differently: Take 1 tablet by mouth every morning) 90 tablet 3     calcitRIOL (ROCALTROL) 0.25 MCG capsule Take 1 capsule (0.25 mcg) by mouth daily (Patient taking differently: Take 0.25 mcg by mouth three times a week) 90 capsule 3     Calcium Acetate 667 MG TABS Take 667 mg by mouth 3 times daily (with meals) (Patient taking differently: Take 4 mg by mouth 3 times daily (with meals)) 90 tablet 1     cetirizine (ZYRTEC) 10 MG tablet Take 10 mg by mouth daily as needed for allergies       Cholecalciferol (VITAMIN D) 50 MCG (2000 UT) CAPS Take 2,000 unit marking on an U-100 insulin syringe by mouth daily (Patient taking differently: Take 2,000 unit marking on an U-100 insulin syringe by mouth every morning) 90 capsule 11     fish oil-omega-3 fatty acids 1000 MG capsule Take 1 g by mouth daily       insulin pen needle (31G X 5 MM) 31G X 5 MM miscellaneous Use 1 pen needles daily or as directed. 100 each 1     methylphenidate (RITALIN) 20 MG tablet Take 20 mg by mouth as needed Prn       prednisoLONE acetate (PRED FORTE) 1 % ophthalmic suspension Place 1 drop into both eyes every 4 hours       Probiotic Product (PROBIOTIC-10 PO) Take 1 tablet by mouth every morning 1 tab capsule       difluprednate (DUREZOL) 0.05 % ophthalmic emulsion INSTILL 1 DROP INTO EACH EYE SIX TIMES DAILY FOR THE FIRST 24 HOURS. THEN 1 DROP 4 TIMES DAILY UNTIL FOLLOW UP       liraglutide - Weight Management (SAXENDA) 18 MG/3ML pen Week 1: 0.6 mg subcutaneous daily, Week 2: 1.2 mg daily, Week 3: 1.8 mg  "daily, Week 4: 2.4 mg daily, Week 5 & on: 3 mg daily (Patient not taking: Reported on 10/26/2023) 15 mL 3           10/26/2023    12:47 PM   Weight Loss Medication History Reviewed With Patient   Which weight loss medications are you currently taking on a regular basis? None   If you are not taking a weight loss medication that was prescribed to you, please indicate why: Other   Are you having any side effects from the weight loss medication that we have prescribed you? No       Admission on 08/24/2023, Discharged on 08/24/2023   Component Date Value Ref Range Status     WBC Count 08/24/2023 5.9  4.0 - 11.0 10e3/uL Final     RBC Count 08/24/2023 3.97 (L)  4.40 - 5.90 10e6/uL Final     Hemoglobin 08/24/2023 12.3 (L)  13.3 - 17.7 g/dL Final     Hematocrit 08/24/2023 38.6 (L)  40.0 - 53.0 % Final     MCV 08/24/2023 97  78 - 100 fL Final     MCH 08/24/2023 31.0  26.5 - 33.0 pg Final     MCHC 08/24/2023 31.9  31.5 - 36.5 g/dL Final     RDW 08/24/2023 14.4  10.0 - 15.0 % Final     Platelet Count 08/24/2023 238  150 - 450 10e3/uL Final     INR 08/24/2023 0.98  0.85 - 1.15 Final     Hold Specimen 08/24/2023 JIC   Final           12/18/2020     2:41 PM 10/17/2023     3:53 PM   MORGAN Score (Last Two)   MORGAN Raw Score 37 29    29   Activation Score 79.2 52.9    52.9   MORGAN Level 4 2    2         PHYSICAL EXAM:  Objective    Ht 1.803 m (5' 11\")   Wt 125.6 kg (277 lb)   BMI 38.63 kg/m      Vitals - Patient Reported  Pain Score: No Pain (0)        GENERAL: Healthy, alert and no distress  EYES: Eyes grossly normal to inspection.  No discharge or erythema, or obvious scleral/conjunctival abnormalities.  RESP: No audible wheeze, cough, or visible cyanosis.  No visible retractions or increased work of breathing.    SKIN: Visible skin clear. No significant rash, abnormal pigmentation or lesions.  NEURO: Cranial nerves grossly intact.  Mentation and speech appropriate for age.  PSYCH: Mentation appears normal, affect normal/bright, " judgement and insight intact, normal speech and appearance well-groomed.        Sincerely,    Yessenia Heller NP      34 minutes spent by me on the date of the encounter doing chart review, history and exam, documentation and further activities per the note    Virtual Visit Details    Type of service:  Video Visit     Originating Location (pt. Location): Home    Distant Location (provider location):  Off-site  Platform used for Video Visit: OmerWell

## 2023-10-26 NOTE — PATIENT INSTRUCTIONS
"Jeffrey Hyman, it was nice to meet you today!  Thank you for allowing us the privilege of caring for you. We hope we provided you with the excellent service you deserve.   Please let us know if there is anything else we can do for you so that we can be sure you are completely satisfied with your care experience.    To ensure the quality of our services you may be receiving a patient satisfaction survey from an independent patient satisfaction monitoring company.    The greatest compliment you can give is a \"Likely to Recommend\"    Your visit was with Yessenia Heller NP today.    Instructions per today's visit:     Follow up  plan:  Try tracking your calories for the day - daily   When looking for healthier options at restaurant- look for lower carbohydrate option   Increase activity as able   See dietitian in 4 weeks  See me in 3 months   ___________________________________________________________________________  Important contact and scheduling information:  Please call our contact center at [phone] to schedule your next appointments.  For any nursing questions or concerns call Jannet Dougherty LPN at [phone] or Verna Mcmanus RN at [phone]  Please call during clinic hours Monday through Friday 8:00a - 4:00p if you have questions or you can contact us via Sellaround at anytime and we will reply during clinic hours.    Lab results will be communicated through My Chart or letter (if My Chart not used). Please call the clinic if you have not received communication after 1 week or if you have any questions.?  Clinic Fax: [phone]  __________________________________________________________________________    If labs were ordered today:    Please make an appointment to have them drawn at your convenience.     To schedule the Lab Appointment using MyChart:  Select \"Schedule an Appointment\"  Select \"Lab Only\"  For \"A couple of questions\", select \"Other\"  For \"Which locations work for you?, select the location and set " up the appointment    To schedule by phone call [phone] to schedule a lab only appointment at any [facility] lab.  ___________________________________________________________________________  Work with [facility] !  Virtual Sessions are Available through [facility] Weight Management Clinics    To learn more, call to schedule a free, Health  Q&A appointment: [phone]     What is Health Coaching?  Do you know what you are supposed to do, but you just aren't doing it?  Then, HEALTH COACHING may help you!   Get unstuck and move forward with the support of a professionally trained [employer]-HWC (National Board-Certified Health and ) who uses evidence-based approaches to help you move forward with healthy lifestyle changes in the areas of weight loss, stress management and overall well-being.    Health Coaches help you identify goals that will work best for you. Health Coaches provide support and encouragement with overcoming barriers and help you to find inspiration and motivation to lead a healthy lifestyle.    Option one:  Health Coaching 3-Pack; Three, 30-minute Health Coaching Visits, for $99  Visits are done virtually (phone or video)  This is a self pay service; we do not accept insurance for estephania coaching.    Option two:   The 24 week Plan; 11 Health Coaching Visits, and a 7 months subscription to [employer]-- on-demand fitness, nutrition and mindfulness classes, for $499 (employee discounts may be available). Participants will also meet regularly with a weight management Medical Provider and a Registered/Licensed Dietician.  This is a self-pay service; we do not accept insurance for health coaching.    To Schedule a free Health  Q&A appointment to learn more,  call [phone].  ____________________________________________________________________  [facility]  Healthy Lifestyle Group    Healthy Lifestyle Group  This is a 60  "minute virtual coaching group for those who want to lead a healthier lifestyle. Come together to set goals and overcome barriers in a supportive group environment. We will address the four pillars of health--nutrition, exercise, sleep and emotional well-being.  This group is highly recommended for those who are participating in the 24 week Healthy Lifestyle Plan and our Health Coaching sessions.    WHEN: This group meets the first Friday of the month, 12:30 PM - 1:30 PM online, via a zoom meeting.      FACILITATOR: Led by National Board Certified Health and , Kasey Villanueva, [facility].    TO REGISTER: Please call the Call Center at [phone] to register. You will get an appointment to attend in [employer]. Fifteen minutes prior to the meeting, complete the e-check in and you will get the link to join the meeting.  There is no charge to attend this group and space is limited.      2023 and 2024 Meeting Topics and Dates:    November 3: Introduction to Mindfulness (Learn simple and effective mindfulness practices and how it can benefit you)    December 8: Let's Talk (guided discussion on our wins and challenges)    January 5: New Years Vision: Manifest your Best 2024! (Guided imagery,  journaling and discussion)    February 2: Let's Talk    March 1: 10 Percent Happier by Brent Azar (Book Bites; a guided discussion on the nuggets of wisdom from favorite wellness books; no need to read the book but highly encouraged)    April 5: Let's Talk    May 3: \"Essentialism; The Disciplined Pursuit of Less by Derick Hedrick (book bites discussion)    June 7: Let's Talk    July 5: NO MEETING, off for the 4th of July Holiday    August 2: The Blue Zones, Secrets for Living a Longer Life by Brent Baker (book bites discussion)      If you would like bariatric surgery specific support group info please let your care team know.         Thank you,   [facility] Comprehensive Weight Management Team                         "

## 2023-10-26 NOTE — NURSING NOTE
Is the patient currently in the state of [place]? YES    Visit mode:VIDEO    If the visit is dropped, the patient can be reconnected by: VIDEO VISIT: Text to cell phone:   Telephone Information:   Mobile [phone]       Will anyone else be joining the visit? NO  (If patient encounters technical issues they should call [phone]    How would you like to obtain your AVS? MyChart    Are changes needed to the allergy or medication list? No    Reason for visit: RECHECK    Fam WEBSTER

## 2023-10-26 NOTE — LETTER
"10/26/2023       RE: Boogie Villa  [address]  [facility] [address]     Dear Colleague,    Thank you for referring your patient, Boogie Villa, to the [facility] [place] at [facility]. Please see a copy of my visit note below.      Return Medical Weight Management Note     Boogie Villa  MRN:  [id number]  :  2000  LEANN:  10/26/2023    Dear Rita Ward,    I had the pleasure of seeing your patient Boogie Villa. He is a 23 year old male who I am continuing to see for treatment of obesity related to:        2023    12:01 PM   --   I have the following health issues associated with obesity High Blood Pressure   I have the following symptoms associated with obesity Fatigue       Assessment & Plan   Problem List Items Addressed This Visit          Digestive    Class 2 severe obesity due to excess calories with serious comorbidity and body mass index (BMI) of 39.0 to 39.9 in adult (H)     Tolerated saxenda up to 1.2mg. didn't find beneficial and wanted to stop medication. Struggles more with \"self control\" around food option choices rather than hunger. Discussed how this can be cravings related sometimes. Has started to make meaningful changes to diet since last seen- losing about 1lb a month. Less sugar beverages, more water, smaller portions, monitoring caloric intake. His currently personally set goal is to have an average of 2200 calories daily. Tries to stick to 2,000 during the week and more calories on the weekend. Not actually tracking but feels he is near goal often. Discussed trying to actually track this now to help with consistency and then determining next goal for further progress. Possible that we underestimate frequency of splurges if not tracking. Encouraged a check point with RD in 4 weeks to see what he learned from this and then consider caloric goals. Also discussed strategies to " further adjust food choices.     Also planning to increase activity. This is limited by time and energy. Currently doing some strength training once a week. Looking at . Discussed slow steps to start this process. Smaller increment goals like a 10min walk which can fit better in schedule.     Try tracking your calories for the day - daily   When looking for healthier options at restaurant- look for lower carbohydrate option   Increase activity as able   See dietitian in 4 weeks  See me in 3 months                INTERVAL HISTORY:  MWM back [id number] but declined meds and surgery and didn't follow up. New MWM 7/2023 ESRD on dialysis needing BMI <35 for kidney transplant. Started saxenda and encouraged to work with RD. Discussed very slow taper to avoid further kidney issues.    9/28/2023 Lauren Bloch MTM Pharmacist  - preferred to stop saxenda    Anti-obesity medication history    Current:   None     Past/Failed/contraindicated:   Hx of ADHD and didn't like side effects of ritalin so avoids stimulants   saxenda - felt was ineffective at 1.2mg     Recent diet changes:   Breakfast- small - [employer] sausage biscuit with hashbrowns - convenient and likes it   Meal- around dinner time- orders meal to his home- doesn't want to cook (doesn't like it, isn't good at it)     Avoids soda- trying to stick to water   Tries to stick to smaller meals   Doesn't like salad- too bitter     Personal calorie goal is around 2,000 during the week and 2200 as an average daily for the week     Recent exercise/activity changes: going to the gym     Recent stressors:   Has to work a lot on non-dialysis days     Recent sleep changes:   Sleep is good - 8 hours recently     CURRENT WEIGHT:   277 lbs 0 oz    Initial Weight (lbs): 282 lbs  Last Visits Weight: 125.6 kg (277 lb)  Cumulative weight loss (lbs): 5  Weight Loss Percentage: 1.77%            10/26/2023    12:47 PM   Changes and Difficulties   I have made the following  changes to my diet since my last visit: trying to eat less   With regards to my diet, I am still struggling with: eating healthy   I have made the following changes to my activity/exercise since my last visit: none   With regards to my activity/exercise, I am still struggling with: nothing         MEDICATIONS:   Current Outpatient Medications   Medication Sig Dispense Refill    amLODIPine (NORVASC) 5 MG tablet Take 1 tablet (5 mg) by mouth daily (Patient taking differently: Take 5 mg by mouth every morning) 90 tablet 3    atenolol (TENORMIN) 25 MG tablet Take 1.5 tablets (37.5 mg) by mouth daily (Patient taking differently: Take 37.5 mg by mouth every morning) 135 tablet 3    B Complex-C-Folic Acid (DIALYVITE 800) 0.8 MG TABS Take 1 tablet by mouth daily (Patient taking differently: Take 1 tablet by mouth every morning) 90 tablet 3    calcitRIOL (ROCALTROL) 0.25 MCG capsule Take 1 capsule (0.25 mcg) by mouth daily (Patient taking differently: Take 0.25 mcg by mouth three times a week) 90 capsule 3    Calcium Acetate 667 MG TABS Take 667 mg by mouth 3 times daily (with meals) (Patient taking differently: Take 4 mg by mouth 3 times daily (with meals)) 90 tablet 1    cetirizine (ZYRTEC) 10 MG tablet Take 10 mg by mouth daily as needed for allergies      Cholecalciferol (VITAMIN D) 50 MCG (2000 UT) CAPS Take 2,000 unit marking on an U-100 insulin syringe by mouth daily (Patient taking differently: Take 2,000 unit marking on an U-100 insulin syringe by mouth every morning) 90 capsule 11    fish oil-omega-3 fatty acids 1000 MG capsule Take 1 g by mouth daily      insulin pen needle (31G X 5 MM) 31G X 5 MM miscellaneous Use 1 pen needles daily or as directed. 100 each 1    methylphenidate (RITALIN) 20 MG tablet Take 20 mg by mouth as needed Prn      prednisoLONE acetate (PRED FORTE) 1 % ophthalmic suspension Place 1 drop into both eyes every 4 hours      Probiotic Product (PROBIOTIC-10 PO) Take 1 tablet by mouth every  "morning 1 tab capsule      difluprednate (DUREZOL) 0.05 % ophthalmic emulsion INSTILL 1 DROP INTO EACH EYE SIX TIMES DAILY FOR THE FIRST 24 HOURS. THEN 1 DROP 4 TIMES DAILY UNTIL FOLLOW UP      liraglutide - Weight Management (SAXENDA) 18 MG/3ML pen Week 1: 0.6 mg subcutaneous daily, Week 2: 1.2 mg daily, Week 3: 1.8 mg daily, Week 4: 2.4 mg daily, Week 5 & on: 3 mg daily (Patient not taking: Reported on 10/26/2023) 15 mL 3           10/26/2023    12:47 PM   Weight Loss Medication History Reviewed With Patient   Which weight loss medications are you currently taking on a regular basis? None   If you are not taking a weight loss medication that was prescribed to you, please indicate why: Other   Are you having any side effects from the weight loss medication that we have prescribed you? No       Admission on 08/24/2023, Discharged on 08/24/2023   Component Date Value Ref Range Status    WBC Count 08/24/2023 5.9  4.0 - 11.0 10e3/uL Final    RBC Count 08/24/2023 3.97 (L)  4.40 - 5.90 10e6/uL Final    Hemoglobin 08/24/2023 12.3 (L)  13.3 - 17.7 g/dL Final    Hematocrit 08/24/2023 38.6 (L)  40.0 - 53.0 % Final    MCV 08/24/2023 97  78 - 100 fL Final    MCH 08/24/2023 31.0  26.5 - 33.0 pg Final    MCHC 08/24/2023 31.9  31.5 - 36.5 g/dL Final    RDW 08/24/2023 14.4  10.0 - 15.0 % Final    Platelet Count 08/24/2023 238  150 - 450 10e3/uL Final    INR 08/24/2023 0.98  0.85 - 1.15 Final    Hold Specimen 08/24/2023 JIC   Final           12/18/2020     2:41 PM 10/17/2023     3:53 PM   MORGAN Score (Last Two)   MORGAN Raw Score 37 29    29   Activation Score 79.2 52.9    52.9   MORGAN Level 4 2    2         PHYSICAL EXAM:  Objective    Ht 1.803 m (5' 11\")   Wt 125.6 kg (277 lb)   BMI 38.63 kg/m      Vitals - Patient Reported  Pain Score: No Pain (0)        GENERAL: Healthy, alert and no distress  EYES: Eyes grossly normal to inspection.  No discharge or erythema, or obvious scleral/conjunctival abnormalities.  RESP: No audible wheeze, " cough, or visible cyanosis.  No visible retractions or increased work of breathing.    SKIN: Visible skin clear. No significant rash, abnormal pigmentation or lesions.  NEURO: Cranial nerves grossly intact.  Mentation and speech appropriate for age.  PSYCH: Mentation appears normal, affect normal/bright, judgement and insight intact, normal speech and appearance well-groomed.        Sincerely,    Yessenia Heller NP      34 minutes spent by me on the date of the encounter doing chart review, history and exam, documentation and further activities per the note    Virtual Visit Details    Type of service:  Video Visit     Originating Location (pt. Location): Home    Distant Location (provider location):  Off-site  Platform used for Video Visit: [employer]

## 2023-10-27 ENCOUNTER — TELEPHONE (OUTPATIENT)
Dept: ENDOCRINOLOGY | Facility: CLINIC | Age: 23
End: 2023-10-27
Payer: COMMERCIAL

## 2023-10-30 NOTE — PROGRESS NOTES
Per Dr. 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    Fliqqt message sent asking if it was ok to move forward with scheduling side branch ligation surgery and instructing pt to call with any questions.    MALCOLM ZIMMERMAN RN on 10/30/2023 at 2:21 PM  Dialysis Access Care Coordinator  Phone: [phone]  Pool: P_Dialysis_Access_Nurse

## 2023-11-02 NOTE — PROGRESS NOTES
Per pt response, request sent to schedule RUE radiocephalic AVF revision surgery for side branch ligation with Dr. Martinez.    MALCOLM ZIMMERMAN RN on 11/2/2023 at 3:57 PM  Dialysis Access Care Coordinator  Phone: [phone]  Pool: P_Dialysis_Access_Nurse

## 2023-11-07 ENCOUNTER — PREP FOR PROCEDURE (OUTPATIENT)
Dept: TRANSPLANT | Facility: CLINIC | Age: 23
End: 2023-11-07
Payer: COMMERCIAL

## 2023-11-07 ENCOUNTER — HOSPITAL ENCOUNTER (OUTPATIENT)
Facility: CLINIC | Age: 23
End: 2023-11-07
Attending: SURGERY | Admitting: SURGERY
Payer: COMMERCIAL

## 2023-11-07 DIAGNOSIS — N18.6 ESRD (END STAGE RENAL DISEASE) (H): Primary | ICD-10-CM

## 2023-11-08 ENCOUNTER — PATIENT OUTREACH (OUTPATIENT)
Dept: NEPHROLOGY | Facility: CLINIC | Age: 23
End: 2023-11-08
Payer: COMMERCIAL

## 2023-11-08 ENCOUNTER — TELEPHONE (OUTPATIENT)
Dept: TRANSPLANT | Facility: CLINIC | Age: 23
End: 2023-11-08
Payer: COMMERCIAL

## 2023-11-08 ENCOUNTER — COMMITTEE REVIEW (OUTPATIENT)
Dept: TRANSPLANT | Facility: CLINIC | Age: 23
End: 2023-11-08
Payer: COMMERCIAL

## 2023-11-08 NOTE — TELEPHONE ENCOUNTER
Patient Contacted     Appointment type: FISTULA FOLLOW-UP  Provider: YOSEPH  Return date: 1/2/24  Specialty phone number: [phone]  Additional appointment(s) needed: NA  Additonal Notes: NA

## 2023-11-08 NOTE — COMMITTEE REVIEW
Abdominal Committee Review Note     Evaluation Date: 12/30/2020  Committee Review Date: 11/8/2023    Organ being evaluated for: Kidney    Transplant Phase: Waitlist  Transplant Status: Inactive    Transplant Coordinator: Trisha Blankenship  Transplant Surgeon:       Referring Physician: Michael Diaz    Primary Diagnosis: Other, Specify - Posterior Urethral Valves  Secondary Diagnosis:     Committee Review Members:  Cardiology Yeny Quick, APRN CNP   Nephrology Zev Ames, APRN CNP, Abhilash Raza MD   Nutrition Baylee Roberson, HARLEEN   Pharmacist Cris Meza [facility]    - Clinical Trisha Oleary, [facility]   Transplant SHAKIRA ARECHIGA, RN, Kasey Mckay, RN, Leah Mcconnell, RN, Ivis Monzon, BURT, Ivis Marie, RN, Marine San, MAKAYLA, Hazel Peterson, RN, Alexandra Grijalva, RN   Transplant Surgery Nita Martinez MD, MD       Transplant Eligibility: Irreversible chronic kidney disease treated w/dialysis or expected need for dialysis    Committee Review Decision: Make Active    Committee Chair Nita Martinez MD verbally attested to the committee's decision.    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 Currently voiding volitionally.  No recent UTI. Prior UDS showed normal pressures during filling, good emptying of the bladder with some detrusor contraction augmented by Valsalva maneuvers, and no evidence of reflux. Please see Dr. Morton's note from 12/14/2020 regarding perioperative recommendations.     -Cards- no known risks, EKG ECHO normal in 2021     -Obesity- BMI- 38.9 weight OK per Dr Martinez and pt follows with weight loss management     -Bilateral uveitis- no evidence of systemic autoimmune/inflammatory diseaes at last rheum visit in 7/2023. Opthamology and has an appt next week.     -Health maintenance- dental visit completed     -Pt has voucher in paired exchange  program    Committee determined that pt is cleared for active status on the kidney transplant waitlist.

## 2023-11-08 NOTE — TELEPHONE ENCOUNTER
Dialysis Access Care Coordination: General Outreach    REASON FOR CALL:     REASON FOR CALL: Clinic Care Coordination - Chart Review (Dialysis Access)                                  SITUATION/BACKROUND:     Fistula revision surgery is scheduled for 12/14.    Neph Tracking Flowsheet Last Filled Values       CKD Education Status Complete    CKD Education Type Kidney Smart Modality Education; Kidney Smart CKD Basics    Preferred Modality Hemodialysis    Final Modality Hemodialysis  [facility]    Patient's Referral Dates Auto Populate Patient's Referral Dates    Dietician Referral 3/29/21    Journey Referral 2/1/23    Vein Mapping/US Order 3/13/23    Vein Mapping/US  04/11/23    Access Surgeon Referral Status  Referred    Dialysis Access Referral --  GFR 6 when CVC placed to initiate dialysis. Had dialysis access referral for fistula placement after he had already started dialysis.    Access Surgical Consult 04/25/23  Fistula consult with Dr. Martinez    Diaylsis Access Type AV Fistula  radiocephalic    Dialysis Access Site RLA    Dialysis Access Surgery 08/24/23    Dialysis Access Surgeon Dr. Martinez    Dialysis Access Revision Date 12/14/23  side branch ligations    Dialysis Access Maturation --  10/17/23 US indicates side branch needing ligation.  Request sent to schedule surgery with Dr. Martinez on 11/2/23    Transplant Evaluation Referral 10/22/20    Transplant Status  --  GFR 14 at time of transplant referral.    Initiation of Dialysis Outpatient          Dialysis History        Start End Type Center Comments    2/17/2023  In-center Hemodialysis [facility] DIALYSIS (ESRD) [facility] Nephrologist: Dr. Zarate with [facility]                    ASSESSMENT:       Dialysis Access Assessments:  No assessment indicated    PLAN:     Request sent to schedule post-op follow up appointment.    Ivis Springer RN

## 2023-11-09 ENCOUNTER — TELEPHONE (OUTPATIENT)
Dept: TRANSPLANT | Facility: CLINIC | Age: 23
End: 2023-11-09
Payer: COMMERCIAL

## 2023-11-09 ENCOUNTER — DOCUMENTATION ONLY (OUTPATIENT)
Dept: TRANSPLANT | Facility: CLINIC | Age: 23
End: 2023-11-09
Payer: COMMERCIAL

## 2023-11-09 DIAGNOSIS — Z76.82 ORGAN TRANSPLANT CANDIDATE: ICD-10-CM

## 2023-11-09 DIAGNOSIS — N18.6 ESRD (END STAGE RENAL DISEASE) (H): Primary | ICD-10-CM

## 2023-11-09 NOTE — TELEPHONE ENCOUNTER
Called patient to inform them of status change to ACTIVE  on 23. Status change letter and PRA orders to be mailed. Patient is in agreement with listing ACTIVE status.      Discussed:   - Pt is eligible for  donor offers and pt can receive calls 24 hours a day, 7 days a week, and on call staff need to be able to reach pt or one of emergency contacts within 30 minutes or they might skip over to next recipient on list.   -Please make sure your phone is charged at all times and your voicemail is not full   -Your transplant on call coordinator will give you directions about when and where you will need to come to the hospital for transplant  -The transplant coordinator will call you to discuss your current health status, the offer, and plans to move forward.  Some organ offer calls will require you to come to the hospital immediately; some may not be as time sensitive.  It may be possible for you to come to the hospital and, for various reasons, the transplant could be cancelled.  This is often called a  dry run .  - Reviewed the right to accept or decline offer, without penalty  - Expected length of surgical procedure is about 3-4 hours, expected inpatient length of stay post transplant is 3-4 days, and potential to stay locally post transplant. Offered resources for lodging in the area  - Verified contact information as documented in Epic. Confirmed emergency contact information  -Instructed patient about importance of having PRAs drawn every 3 months via: ([facility]). Encouraged them to set reminder in their preferred calendar to stay on top of their quarterly labs  -Reminded pt to stay up on health maintenance and to call with any updates on their health status, insurance or contact information.   -Reminded pt to inform RNCC as soon as possible if they test positive for COVID.      -Pt was pre-emptively listed in [facility], listing changed to max donor age of 35, KDPI 40. Per pt request, would like to  proceed with living donor offer.     -Informed donor team that pt has been approved        -Provided name and contact information for additional questions or concerns.  Pt expressed excellent understanding of all and was in good agreement with the plan.

## 2023-11-09 NOTE — TELEPHONE ENCOUNTER
Called pt regarding committee this afternoon. Pt was cleared for active listing on the kidney waitlist. Pt would like to think about whether or not he would like to be active on the  donor list. Will follow up once insurance clearance is acquired. Will also inform donor team that pt is ready to be active in PEP. Pt verbalized understanding of information and has no further questions. Encouraged to reach out if questions arise.

## 2023-11-13 ENCOUNTER — TELEPHONE (OUTPATIENT)
Dept: TRANSPLANT | Facility: CLINIC | Age: 23
End: 2023-11-13
Payer: COMMERCIAL

## 2023-11-13 NOTE — TELEPHONE ENCOUNTER
Called pt to update he is active in NKR for living donor. Pt verbalized understanding of information and has no further questions. Encouraged to reach out if questions arise.

## 2023-11-20 ENCOUNTER — ORGAN (OUTPATIENT)
Dept: TRANSPLANT | Facility: CLINIC | Age: 23
End: 2023-11-20
Payer: COMMERCIAL

## 2023-11-20 ENCOUNTER — TELEPHONE (OUTPATIENT)
Dept: TRANSPLANT | Facility: CLINIC | Age: 23
End: 2023-11-20
Payer: COMMERCIAL

## 2023-11-21 DIAGNOSIS — R73.03 PRE-DIABETES: ICD-10-CM

## 2023-11-21 DIAGNOSIS — N18.6 ESRD (END STAGE RENAL DISEASE) (H): ICD-10-CM

## 2023-11-21 DIAGNOSIS — Z76.82 AWAITING ORGAN TRANSPLANT: Primary | ICD-10-CM

## 2023-11-27 DIAGNOSIS — Z76.82 ORGAN TRANSPLANT CANDIDATE: ICD-10-CM

## 2023-11-27 DIAGNOSIS — N18.6 ESRD (END STAGE RENAL DISEASE) (H): Primary | ICD-10-CM

## 2023-11-29 ENCOUNTER — PATIENT OUTREACH (OUTPATIENT)
Dept: NEPHROLOGY | Facility: CLINIC | Age: 23
End: 2023-11-29
Payer: COMMERCIAL

## 2023-11-30 ENCOUNTER — TELEPHONE (OUTPATIENT)
Dept: TRANSPLANT | Facility: CLINIC | Age: 23
End: 2023-11-30
Payer: COMMERCIAL

## 2023-11-30 NOTE — TELEPHONE ENCOUNTER
Please call Dr. Kelin Santiago Ophthalmology  [facility] wanted to discuss patient.   PH# [phone]

## 2023-11-30 NOTE — TELEPHONE ENCOUNTER
"Returned call to Dr. Santiago. She is not available. Provided direct line for return call.     11/30/23 addendum: Received call back from Dr. Santiago. She has recently been seeing the pt and treating him with steroid drops for eye inflammation. 50/50 chance that this is isolated eye inflammation. Possibility that it is BRANDON which is inflammation of eye and kidney. Cannot do UA or urine Beta 2 microglobulin test as pt is on HD and doesn't make urine. Primary nephrologist felt that biopsy wouldn't be possible due to scar tissue on kidney. Treatment is usually steroids or MMF. She feels this should not exclude pt from scheduled transplant. Will review with providers.     12/1/23 addendum: Reviewed with Dr. Raza: \"His native kidney is going to just show scar tissue and wouldn't recommend a biopsy.  Okay to move forward with a transplant, I think.  Seems treatment with immunosuppression should help anyway.\"   "

## 2023-12-13 ENCOUNTER — TEAM CONFERENCE (OUTPATIENT)
Dept: TRANSPLANT | Facility: CLINIC | Age: 23
End: 2023-12-13
Payer: COMMERCIAL

## 2023-12-13 NOTE — TELEPHONE ENCOUNTER
"Surgical presentation at [facility] as pt is scheduled for LDKT through [facility] on 12/28/23 with Dr. 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 Currently voiding volitionally.  No recent UTI. Prior UDS showed normal pressures during filling, good emptying of the bladder with some detrusor contraction augmented by Valsalva maneuvers, and no evidence of reflux. Please see Dr. Morton's note from 12/14/2020 regarding perioperative recommendations.      \"Boogie would be a good candidate for renal transplant without any further changes to his bladder management.  He can continue with spontaneous voiding without any catheterization.  The only thing I would recommend is to repeat the urodynamics about 3 to 6 months after the kidney transplant to make sure that nothing has changed.  He agrees to this.     While he does not use the Mitrofanoff any longer, it would be ideal to place a catheter in the Mitrofanoff at the time of the transplant so as not to accidentally injure the Mitrofanoff which could lead to postoperative complication such as abscess or fistula.\"        -Cards- no known risks, EKG ECHO normal in 2021      -Obesity- BMI- 38.9 weight OK per Dr Martinez and pt follows with weight loss management      -Bilateral uveitis- no evidence of systemic autoimmune/inflammatory diseaes at last rheum visit in 7/2023. 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 Can't due biopsy of native kidney due to scaring. Treatment would be immunosuppression. In support of pt proceeding with transplant. Reviewed with Dr. Raza and Dr. Michelle abreuay to proceed     -Health maintenance- dental visit completed     Pt is acceptable to proceed with scheduled transplant pending pre op " appts.

## 2023-12-18 LAB
ABO/RH(D): NORMAL
ANTIBODY SCREEN: NEGATIVE
SPECIMEN EXPIRATION DATE: NORMAL

## 2023-12-19 ENCOUNTER — LAB (OUTPATIENT)
Dept: LAB | Facility: CLINIC | Age: 23
End: 2023-12-19
Attending: SURGERY
Payer: COMMERCIAL

## 2023-12-19 ENCOUNTER — OFFICE VISIT (OUTPATIENT)
Dept: TRANSPLANT | Facility: CLINIC | Age: 23
End: 2023-12-19
Attending: SURGERY
Payer: COMMERCIAL

## 2023-12-19 VITALS
HEIGHT: 71 IN | OXYGEN SATURATION: 96 % | HEART RATE: 67 BPM | DIASTOLIC BLOOD PRESSURE: 85 MMHG | SYSTOLIC BLOOD PRESSURE: 131 MMHG | WEIGHT: 279.4 LBS | BODY MASS INDEX: 39.11 KG/M2

## 2023-12-19 DIAGNOSIS — R73.03 PRE-DIABETES: ICD-10-CM

## 2023-12-19 DIAGNOSIS — Z11.59 SCREENING FOR VIRAL DISEASE: ICD-10-CM

## 2023-12-19 DIAGNOSIS — N18.5 CKD (CHRONIC KIDNEY DISEASE) STAGE 5, GFR LESS THAN 15 ML/MIN (H): ICD-10-CM

## 2023-12-19 DIAGNOSIS — N18.6 ESRD (END STAGE RENAL DISEASE) (H): ICD-10-CM

## 2023-12-19 DIAGNOSIS — Z76.82 AWAITING ORGAN TRANSPLANT: ICD-10-CM

## 2023-12-19 DIAGNOSIS — Z11.1 SCREENING FOR TUBERCULOSIS: ICD-10-CM

## 2023-12-19 DIAGNOSIS — Z76.82 AWAITING ORGAN TRANSPLANT: Primary | ICD-10-CM

## 2023-12-19 DIAGNOSIS — Z76.82 ORGAN TRANSPLANT CANDIDATE: ICD-10-CM

## 2023-12-19 LAB
ALBUMIN SERPL BCG-MCNC: 4.4 G/DL (ref 3.5–5.2)
ALBUMIN UR-MCNC: 100 MG/DL
ALP SERPL-CCNC: 67 U/L (ref 40–150)
ALT SERPL W P-5'-P-CCNC: 41 U/L (ref 0–70)
ANION GAP SERPL CALCULATED.3IONS-SCNC: 11 MMOL/L (ref 7–15)
APPEARANCE UR: CLEAR
AST SERPL W P-5'-P-CCNC: 27 U/L (ref 0–45)
BILIRUB SERPL-MCNC: 0.5 MG/DL
BILIRUB UR QL STRIP: NEGATIVE
BUN SERPL-MCNC: 21.6 MG/DL (ref 6–20)
CALCIUM SERPL-MCNC: 9.5 MG/DL (ref 8.6–10)
CHLORIDE SERPL-SCNC: 100 MMOL/L (ref 98–107)
COLOR UR AUTO: ABNORMAL
CREAT SERPL-MCNC: 7.12 MG/DL (ref 0.67–1.17)
CREAT UR-MCNC: 29.9 MG/DL
DEPRECATED HCO3 PLAS-SCNC: 28 MMOL/L (ref 22–29)
EGFRCR SERPLBLD CKD-EPI 2021: 10 ML/MIN/1.73M2
ERYTHROCYTE [DISTWIDTH] IN BLOOD BY AUTOMATED COUNT: 13.3 % (ref 10–15)
FERRITIN SERPL-MCNC: 826 NG/ML (ref 31–409)
GLUCOSE SERPL-MCNC: 94 MG/DL (ref 70–99)
GLUCOSE UR STRIP-MCNC: 70 MG/DL
HBA1C MFR BLD: 5.2 %
HCT VFR BLD AUTO: 36.3 % (ref 40–53)
HGB BLD-MCNC: 12.5 G/DL (ref 13.3–17.7)
HGB UR QL STRIP: ABNORMAL
INR PPP: 1.02 (ref 0.85–1.15)
IRON BINDING CAPACITY (ROCHE): 250 UG/DL (ref 240–430)
IRON SATN MFR SERPL: 42 % (ref 15–46)
IRON SERPL-MCNC: 104 UG/DL (ref 61–157)
KETONES UR STRIP-MCNC: NEGATIVE MG/DL
LEUKOCYTE ESTERASE UR QL STRIP: NEGATIVE
MCH RBC QN AUTO: 32.1 PG (ref 26.5–33)
MCHC RBC AUTO-ENTMCNC: 34.4 G/DL (ref 31.5–36.5)
MCV RBC AUTO: 93 FL (ref 78–100)
MICROALBUMIN UR-MCNC: 665 MG/L
MICROALBUMIN/CREAT UR: 2224.08 MG/G CR (ref 0–17)
NITRATE UR QL: NEGATIVE
PH UR STRIP: 7.5 [PH] (ref 5–7)
PHOSPHATE SERPL-MCNC: 5.1 MG/DL (ref 2.5–4.5)
PLATELET # BLD AUTO: 241 10E3/UL (ref 150–450)
POTASSIUM SERPL-SCNC: 3.5 MMOL/L (ref 3.4–5.3)
PROT SERPL-MCNC: 7.6 G/DL (ref 6.4–8.3)
PTH-INTACT SERPL-MCNC: 445 PG/ML (ref 15–65)
RBC # BLD AUTO: 3.9 10E6/UL (ref 4.4–5.9)
RBC URINE: 0 /HPF
SODIUM SERPL-SCNC: 139 MMOL/L (ref 135–145)
SP GR UR STRIP: 1 (ref 1–1.03)
SQUAMOUS EPITHELIAL: <1 /HPF
UROBILINOGEN UR STRIP-MCNC: NORMAL MG/DL
VIT D+METAB SERPL-MCNC: 10 NG/ML (ref 20–50)
WBC # BLD AUTO: 6.1 10E3/UL (ref 4–11)
WBC URINE: 1 /HPF

## 2023-12-19 PROCEDURE — 86825 HLA X-MATH NON-CYTOTOXIC: CPT | Performed by: SURGERY

## 2023-12-19 PROCEDURE — 86833 HLA CLASS II HIGH DEFIN QUAL: CPT | Performed by: SURGERY

## 2023-12-19 PROCEDURE — 80053 COMPREHEN METABOLIC PANEL: CPT | Performed by: PATHOLOGY

## 2023-12-19 PROCEDURE — 36415 COLL VENOUS BLD VENIPUNCTURE: CPT | Performed by: PATHOLOGY

## 2023-12-19 PROCEDURE — 86832 HLA CLASS I HIGH DEFIN QUAL: CPT | Performed by: SURGERY

## 2023-12-19 PROCEDURE — 86644 CMV ANTIBODY: CPT | Performed by: SURGERY

## 2023-12-19 PROCEDURE — 86706 HEP B SURFACE ANTIBODY: CPT | Performed by: PHYSICIAN ASSISTANT

## 2023-12-19 PROCEDURE — 84999 UNLISTED CHEMISTRY PROCEDURE: CPT | Performed by: SURGERY

## 2023-12-19 PROCEDURE — 86316 IMMUNOASSAY TUMOR OTHER: CPT | Performed by: SURGERY

## 2023-12-19 PROCEDURE — 86704 HEP B CORE ANTIBODY TOTAL: CPT | Performed by: PHYSICIAN ASSISTANT

## 2023-12-19 PROCEDURE — 83540 ASSAY OF IRON: CPT | Performed by: PATHOLOGY

## 2023-12-19 PROCEDURE — 84100 ASSAY OF PHOSPHORUS: CPT | Performed by: PATHOLOGY

## 2023-12-19 PROCEDURE — 83036 HEMOGLOBIN GLYCOSYLATED A1C: CPT | Performed by: SURGERY

## 2023-12-19 PROCEDURE — 82306 VITAMIN D 25 HYDROXY: CPT | Performed by: SURGERY

## 2023-12-19 PROCEDURE — 86900 BLOOD TYPING SEROLOGIC ABO: CPT | Performed by: SURGERY

## 2023-12-19 PROCEDURE — 86645 CMV ANTIBODY IGM: CPT | Performed by: SURGERY

## 2023-12-19 PROCEDURE — 86665 EPSTEIN-BARR CAPSID VCA: CPT | Performed by: SURGERY

## 2023-12-19 PROCEDURE — 87340 HEPATITIS B SURFACE AG IA: CPT | Performed by: PHYSICIAN ASSISTANT

## 2023-12-19 PROCEDURE — 99000 SPECIMEN HANDLING OFFICE-LAB: CPT | Performed by: PATHOLOGY

## 2023-12-19 PROCEDURE — 99213 OFFICE O/P EST LOW 20 MIN: CPT | Performed by: NURSE PRACTITIONER

## 2023-12-19 PROCEDURE — 85610 PROTHROMBIN TIME: CPT | Performed by: PATHOLOGY

## 2023-12-19 PROCEDURE — 83550 IRON BINDING TEST: CPT | Performed by: PATHOLOGY

## 2023-12-19 PROCEDURE — 81001 URINALYSIS AUTO W/SCOPE: CPT | Performed by: PATHOLOGY

## 2023-12-19 PROCEDURE — 82728 ASSAY OF FERRITIN: CPT | Performed by: PATHOLOGY

## 2023-12-19 PROCEDURE — 85027 COMPLETE CBC AUTOMATED: CPT | Performed by: PATHOLOGY

## 2023-12-19 PROCEDURE — 82043 UR ALBUMIN QUANTITATIVE: CPT | Performed by: PHYSICIAN ASSISTANT

## 2023-12-19 PROCEDURE — 99207 PR NO BILLABLE SERVICE THIS VISIT: CPT | Mod: 57 | Performed by: SURGERY

## 2023-12-19 PROCEDURE — 93000 ELECTROCARDIOGRAM COMPLETE: CPT | Performed by: INTERNAL MEDICINE

## 2023-12-19 PROCEDURE — 86831 HLA CLASS II PHENOTYPE QUAL: CPT | Performed by: SURGERY

## 2023-12-19 PROCEDURE — 86481 TB AG RESPONSE T-CELL SUSP: CPT | Performed by: PHYSICIAN ASSISTANT

## 2023-12-19 PROCEDURE — 87086 URINE CULTURE/COLONY COUNT: CPT | Performed by: SURGERY

## 2023-12-19 PROCEDURE — 83970 ASSAY OF PARATHORMONE: CPT | Performed by: PATHOLOGY

## 2023-12-19 PROCEDURE — 99214 OFFICE O/P EST MOD 30 MIN: CPT | Performed by: NURSE PRACTITIONER

## 2023-12-19 NOTE — LETTER
12/19/2023         RE: Boogie Villa  [address]  [facility] [address]        Dear Colleague,    Thank you for referring your patient, Boogie Villa, to the [facility]. Please see a copy of my visit note below.    Transplant Surgery H&P:                           HPI:      Mr. Villa is a 23 year old male who comes to clinic today for preop prior to planned living donor kidney transplantation. The patient was previously reviewed by the multidisciplinary selection committee and found to be medically and psychosocially appropriate for kidney transplantation. Mr. Villa has End stage renal failure due to  Posterior Urethral Valves .     The patient is on dialysis.      If on dialysis, modality: HD, via CVC  Dialysis days: Monday, Wednesday, Friday                 YES  NO   Chronic anticoagulation  []      [x] Indication:   Recurrent infections  []      [x]  Type:                  Bladder dysfunction  []      [x] Cause:  Claudication   []      [x] Distance:    Previous Amputation  []      [x] Cause:       Health events since transplant evaluation: None    Special considerations:  PONV: no  Temple: No    MEDICAL HISTORY:      Patient Active Problem List    Diagnosis Date Noted    Secondary renal hyperparathyroidism (H24) 10/19/2023     Priority: Medium    Vitamin D deficiency 10/19/2023     Priority: Medium    ADHD (attention deficit hyperactivity disorder) 12/03/2020     Priority: Medium     Overview:   Diagnosis: 4/2009  Medication History: Concerta, Methylphenidate, Methylin ER    Current Medication started: 3/2011  Last dosage change: 3/2011  Last ADHD   Medication Follow-Up Visit: 3/2011      Neurogenic bladder 12/03/2020     Priority: Medium    End stage kidney disease (H) 06/02/2019     Priority: Medium    Class 2 severe obesity due to excess calories with serious comorbidity and body mass index (BMI) of 39.0 to 39.9 in adult (H) 12/15/2017     Priority: Medium      Medical weight management start 12/21/2020  High weight- 300 10/2020  Last visit 282   Needs BMI less than 38 to be listed for kidney transplant, needs BMI <35 for transplant       Anxiety 12/11/2017     Priority: Medium    Sensorineural hearing loss, asymmetrical 12/26/2013     Priority: Medium    Lymphangioma 03/19/2013     Priority: Medium     Left upper back      Overview:   Cutaneous lymphangioma      Allergic rhinitis due to pollen 03/04/2011     Priority: Medium     Overview:   Ragweed, grass      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     Priority: Medium    Iron deficiency 01/19/2011     Priority: Medium    Hypertension 01/19/2011     Priority: Medium     Overview:   Goal BP <102/61        Past Medical History:   Diagnosis Date    ADD (attention deficit disorder)     Allergic rhinitis     ESRD (end stage renal disease) on dialysis (H)     Hypertension 2000    Mitrofanoff appendicovesicostomy present (H)     Obesity     Posterior urethral valves     Secondary renal hyperparathyroidism (H24)     Vitamin D deficiency      Past Surgical History:   Procedure Laterality Date    ABDOMEN SURGERY  2003    Bilateral urereral tapering and re-implantation    ABDOMEN SURGERY  2008    Mitrofanoff    ablation of posterior urethral valves  2000    APPENDECTOMY  2008    CREATE FISTULA ARTERIOVENOUS UPPER EXTREMITY Left 5/25/2023    Procedure: LEFT Brachial Basilic ARTERIOVENOUS FISTULA CREATION SURGERY WITH INTRAOPERATIVE ULTRASOUND .;  Surgeon: Nita Martinez MD;  Location: [facility] OR    CREATE GRAFT LOOP ARTERIOVENOUS UPPER EXTREMITY Right 8/24/2023    Procedure: Right RadioCephalic AV Fistula and Branch Ligation x3;  Surgeon: Nita Martinez MD;  Location: [facility] OR    IR CVC TUNNEL PLACEMENT > 5 YRS OF AGE  2/16/2023    ureteral reimplantation with tapering  2003     Current Outpatient Medications   Medication Sig Dispense Refill    amLODIPine (NORVASC) 5 MG tablet Take 1 tablet (5 mg) by mouth daily  (Patient taking differently: Take 5 mg by mouth every morning) 90 tablet 3    atenolol (TENORMIN) 25 MG tablet Take 1.5 tablets (37.5 mg) by mouth daily (Patient taking differently: Take 37.5 mg by mouth every morning) 135 tablet 3    B Complex-C-Folic Acid (DIALYVITE 800) 0.8 MG TABS Take 1 tablet by mouth daily (Patient taking differently: Take 1 tablet by mouth every morning) 90 tablet 3    calcitRIOL (ROCALTROL) 0.25 MCG capsule Take 1 capsule (0.25 mcg) by mouth daily (Patient taking differently: Take 0.25 mcg by mouth three times a week) 90 capsule 3    Calcium Acetate 667 MG TABS Take 667 mg by mouth 3 times daily (with meals) (Patient taking differently: Take 4 mg by mouth 3 times daily (with meals)) 90 tablet 1    cetirizine (ZYRTEC) 10 MG tablet Take 10 mg by mouth daily as needed for allergies      methylphenidate (RITALIN) 20 MG tablet Take 20 mg by mouth as needed Prn      Probiotic Product (PROBIOTIC-10 PO) Take 1 tablet by mouth every morning 1 tab capsule      Cholecalciferol (VITAMIN D) 50 MCG (2000 UT) CAPS Take 2,000 unit marking on an U-100 insulin syringe by mouth daily (Patient not taking: Reported on 12/19/2023) 90 capsule 11    difluprednate (DUREZOL) 0.05 % ophthalmic emulsion INSTILL 1 DROP INTO EACH EYE SIX TIMES DAILY FOR THE FIRST 24 HOURS. THEN 1 DROP 4 TIMES DAILY UNTIL FOLLOW UP      fish oil-omega-3 fatty acids 1000 MG capsule Take 1 g by mouth daily (Patient not taking: Reported on 12/19/2023)      prednisoLONE acetate (PRED FORTE) 1 % ophthalmic suspension Place 1 drop into both eyes every 4 hours       OTC products: None, except as noted above  Allergies   Allergen Reactions    Banana Itching     Raw banana; itchy mouth      Dust Mites      Runny nose and watery eyes    Mold      Runny nose and watery eyes    Nsaids Other (See Comments)     CKD    Other [Seasonal Allergies]      Grass, Ragweed - gets runny nose and watery eyes    Sulfa Antibiotics      PN: LW Reaction: GI Upset       Social History     Tobacco Use    Smoking status: Never     Passive exposure: Never    Smokeless tobacco: Never   Substance Use Topics    Alcohol use: Never     OR  Social History     Socioeconomic History    Marital status: Single     Spouse name: Not on file    Number of children: 0    Years of education: Not on file    Highest education level: Not on file   Occupational History    Occupation:      Comment: [employer]   Tobacco Use    Smoking status: Never     Passive exposure: Never    Smokeless tobacco: Never   Vaping Use    Vaping Use: Never used   Substance and Sexual Activity    Alcohol use: Never    Drug use: Never    Sexual activity: Not on file   Other Topics Concern    Parent/sibling w/ CABG, MI or angioplasty before 65F 55M? Not Asked   Social History Narrative    Not on file     Social Determinants of Health     Financial Resource Strain: Not on file   Food Insecurity: Not on file   Transportation Needs: Not on file   Physical Activity: Not on file   Stress: Not on file   Social Connections: Not on file   Interpersonal Safety: Not on file   Housing Stability: Not on file     History   Drug Use Unknown     Family History   Problem Relation Age of Onset    No Known Problems Mother     No Known Problems Father     Anesthesia Reaction No family hx of     Thrombosis No family hx of        REVIEW OF SYSTEMS:        CONSTITUTIONAL: NEGATIVE for fever, chills, change in weight  INTEGUMENTARY/SKIN: NEGATIVE for worrisome rashes, moles or lesions  EYES: POSITIVE for Anterior uveitis   ENT/MOUTH: NEGATIVE for ear, mouth and throat problems  RESP: NEGATIVE for significant cough or SOB  CV: NEGATIVE for chest pain, palpitations or peripheral edema  GI: NEGATIVE for nausea, abdominal pain, heartburn, or change in bowel habits  : NEGATIVE for frequency, dysuria, or hematuria  MUSCULOSKELETAL: NEGATIVE for significant arthralgias or myalgia  NEURO: NEGATIVE for weakness, dizziness or  paresthesias  ENDOCRINE: NEGATIVE for temperature intolerance, skin/hair changes  HEME: NEGATIVE for bleeding problems  PSYCHIATRIC: NEGATIVE for changes in mood or affect    EXAM:                       Pulse:  [67] 67  BP: (131)/(85) 131/85  SpO2:  [96 %] 96 %    GENERAL APPEARANCE: healthy, alert and no distress     EYES: EOMI,  PERRL     HENT: ear canals and TM's normal and nose and mouth without ulcers or lesions     NECK: no adenopathy, no asymmetry, masses, or scars and thyroid normal to palpation     RESP: lungs clear to auscultation - no rales, rhonchi or wheezes     CV: regular rates and rhythm, normal S1 S2, no S3 or S4 and no murmur, click or rub     ABDOMEN:  soft, nontender, no HSM or masses and bowel sounds normal     MS: extremities normal- no gross deformities noted, no evidence of inflammation in joints, FROM in all extremities.     SKIN: no suspicious lesions or rashes     NEURO: Normal strength and tone, sensory exam grossly normal, mentation intact and speech normal     PSYCH: mentation appears normal. and affect normal/bright     LYMPHATICS: No cervical adenopathy      DIAGNOSTICS:                EKG: appears normal, NSR, normal axis, normal intervals, no acute ST/T changes c/w ischemia, no LVH by voltage criteria, unchanged from previous tracings  Chest XRay  Labs Resulted Today:   Results for orders placed or performed in visit on 12/19/23   EKG 12-lead complete w/read - Clinics     Status: None (Preliminary result)   Result Value Ref Range    Systolic Blood Pressure  mmHg    Diastolic Blood Pressure  mmHg    Ventricular Rate 61 BPM    Atrial Rate 61 BPM    NJ Interval 144 ms    QRS Duration 84 ms     ms    QTc 412 ms    P Axis 33 degrees    R AXIS 29 degrees    T Axis 18 degrees    Interpretation ECG       Sinus rhythm  Normal ECG  When compared with ECG of 29-MAR-2021 12:08,  No significant change was found     Results for orders placed or performed in visit on 12/19/23   CBC with  platelets     Status: Abnormal   Result Value Ref Range    WBC Count 6.1 4.0 - 11.0 10e3/uL    RBC Count 3.90 (L) 4.40 - 5.90 10e6/uL    Hemoglobin 12.5 (L) 13.3 - 17.7 g/dL    Hematocrit 36.3 (L) 40.0 - 53.0 %    MCV 93 78 - 100 fL    MCH 32.1 26.5 - 33.0 pg    MCHC 34.4 31.5 - 36.5 g/dL    RDW 13.3 10.0 - 15.0 %    Platelet Count 241 150 - 450 10e3/uL   Comprehensive metabolic panel     Status: Abnormal   Result Value Ref Range    Sodium 139 135 - 145 mmol/L    Potassium 3.5 3.4 - 5.3 mmol/L    Carbon Dioxide (CO2) 28 22 - 29 mmol/L    Anion Gap 11 7 - 15 mmol/L    Urea Nitrogen 21.6 (H) 6.0 - 20.0 mg/dL    Creatinine 7.12 (H) 0.67 - 1.17 mg/dL    GFR Estimate 10 (L) >60 mL/min/1.73m2    Calcium 9.5 8.6 - 10.0 mg/dL    Chloride 100 98 - 107 mmol/L    Glucose 94 70 - 99 mg/dL    Alkaline Phosphatase 67 40 - 150 U/L    AST 27 0 - 45 U/L    ALT 41 0 - 70 U/L    Protein Total 7.6 6.4 - 8.3 g/dL    Albumin 4.4 3.5 - 5.2 g/dL    Bilirubin Total 0.5 <=1.2 mg/dL   Ferritin     Status: Abnormal   Result Value Ref Range    Ferritin 826 (H) 31 - 409 ng/mL   INR     Status: Normal   Result Value Ref Range    INR 1.02 0.85 - 1.15   Iron and iron binding capacity     Status: Normal   Result Value Ref Range    Iron 104 61 - 157 ug/dL    Iron Binding Capacity 250 240 - 430 ug/dL    Iron Sat Index 42 15 - 46 %   Parathyroid Hormone Intact     Status: Abnormal   Result Value Ref Range    Parathyroid Hormone Intact 445 (H) 15 - 65 pg/mL    Narrative    This result was obtained with the Roche Elecsys PTH STAT assay.   This reference range differs from PTH assays used in other [facility] laboratories.   Routine UA with microscopic     Status: Abnormal   Result Value Ref Range    Color Urine Straw Colorless, Straw, Light Yellow, Yellow    Appearance Urine Clear Clear    Glucose Urine 70 (A) Negative mg/dL    Bilirubin Urine Negative Negative    Ketones Urine Negative Negative mg/dL    Specific Gravity Urine 1.003 1.003 - 1.035     Blood Urine Trace (A) Negative    pH Urine 7.5 (H) 5.0 - 7.0    Protein Albumin Urine 100 (A) Negative mg/dL    Urobilinogen Urine Normal Normal, 2.0 mg/dL    Nitrite Urine Negative Negative    Leukocyte Esterase Urine Negative Negative    RBC Urine 0 <=2 /HPF    WBC Urine 1 <=5 /HPF    Squamous Epithelials Urine <1 <=1 /HPF   Phosphorus     Status: Abnormal   Result Value Ref Range    Phosphorus 5.1 (H) 2.5 - 4.5 mg/dL   Quantiferon-TB Gold Plus     Status: None (In process)    Specimen: Arm, Left; Blood    Narrative    The following orders were created for panel order Quantiferon-TB Gold Plus.  Procedure                               Abnormality         Status                     ---------                               -----------         ------                     Quantiferon TB Gold Plus...[[id number]]                      In process                 Quantiferon TB Gold Plus...[[id number]]                      In process                 Quantiferon TB Gold Plus...[[id number]]                      In process                 Quantiferon TB Gold Plus...[[id number]]                      In process                   Please view results for these tests on the individual orders.   PRA Single Antigen IgG Antibody ([id number])     Status: None (In process)    Narrative    The following orders were created for panel order PRA Single Antigen IgG Antibody ([id number]).  Procedure                               Abnormality         Status                     ---------                               -----------         ------                     PRA Single Antigen IgG A...[[id number]]                      In process                   Please view results for these tests on the individual orders.   ABO/Rh type and screen     Status: None (In process)    Narrative    The following orders were created for panel order ABO/Rh type and screen.  Procedure                               Abnormality         Status                     ---------                                -----------         ------                     Adult Type and Screen[[id number]]                            In process                   Please view results for these tests on the individual orders.   HLA Final Crossmatch Recipient     Status: None (In process)    Narrative    The following orders were created for panel order HLA Final Crossmatch Recipient.  Procedure                               Abnormality         Status                     ---------                               -----------         ------                     HLA Final Crossmatch Rec...[[id number]]                      In process                   Please view results for these tests on the individual orders.     Labs Drawn and in Process:   Unresulted Labs Ordered in the Past 30 Days of this Admission       Date and Time Order Name Status Description    12/19/2023  9:35 AM TYPE AND SCREEN, ADULT In process     12/19/2023  9:35 AM QUANTIFERON TB GOLD PLUS PURPLE TUBE In process     12/19/2023  9:35 AM QUANTIFERON TB GOLD PLUS YELLOW TUBE In process     12/19/2023  9:35 AM QUANTIFERON TB GOLD PLUS GREEN TUBE In process     12/19/2023  9:35 AM QUANTIFERON TB GOLD PLUS GREY TUBE In process     12/19/2023  9:35 AM VITAMIN D DEFICIENCY SCREENING In process     12/19/2023  9:35 AM URINE CULTURE In process     12/19/2023  9:35 AM HEMOGLOBIN A1C In process     12/19/2023  9:35 AM EBV CAPSID ANTIBODY IGM In process     12/19/2023  9:35 AM EBV CAPSID ANTIBODY IGG In process     12/19/2023  9:35 AM CMV ANTIBODY IGM In process     12/19/2023  9:35 AM CMV ANTIBODY IGG In process     12/19/2023  9:35 AM HEPATITIS B SURFACE ANTIGEN In process     12/19/2023  9:35 AM HEPATITIS B SURFACE ANTIBODY In process     12/19/2023  9:35 AM HEPATITIS B CORE ANTIBODY In process     12/19/2023  9:35 AM ALBUMIN RANDOM URINE QUANTITATIVE In process           Recent Labs   Lab Test 12/19/23  0953 08/24/23  1042 05/25/23  1005   HGB 12.5* 12.3* 10.2*   PLT  241 238  --    INR 1.02 0.98  --      --  140   POTASSIUM 3.5  --  4.5   CR 7.12*  --  7.61*     UNOS cPRA   Date Value Ref Range Status   03/29/2021 0  Final     UNOS CPRA   Date Value Ref Range Status   10/19/2023 0  Final     O    ASSESSMENT:                 Mr. Villa is a 23 year old male who is appropriate for elective living donor kidney transplantation with the following pertinent issues:    1. Labs reviewed. Findings requiring additional evaluation before surgery: No  2. EKG (12/19/2023): appears normal, NSR  3. ECHO (3/29/2021); Normal ejection fraction and no significant valvular dysfunction  Interpretation Summary  Global and regional left ventricular function is normal with an EF of 60-65%.  Global right ventricular function is normal.  No significant valvular abnormalities were noted.  Previous study not available for comparison.  4. ABO= O  5. Paired Exchange case: Yes  6. Outstanding issues: Final ABO and cross match     PLAN:                 1. Consent: Done  2. Outstanding issues: Final ABO and cross match    Signed Electronically by: Ivis Monzon NP         Again, thank you for allowing me to participate in the care of your patient.        Sincerely,        Ivis Monzon NP

## 2023-12-19 NOTE — PROGRESS NOTES
Transplant Surgery H&P:                           HPI:      Mr. Villa is a 23 year old male who comes to clinic today for preop prior to planned living donor kidney transplantation. The patient was previously reviewed by the multidisciplinary selection committee and found to be medically and psychosocially appropriate for kidney transplantation. Mr. Villa has End stage renal failure due to  Posterior Urethral Valves .     The patient is on dialysis.      If on dialysis, modality: HD, via CVC  Dialysis days: Monday, Wednesday, Friday                 YES  NO   Chronic anticoagulation  []      [x] Indication:   Recurrent infections  []      [x]  Type:                  Bladder dysfunction  []      [x] Cause:  Claudication   []      [x] Distance:    Previous Amputation  []      [x] Cause:       Health events since transplant evaluation: None    Special considerations:  PONV: no  Pentecostal: No    MEDICAL HISTORY:      Patient Active Problem List    Diagnosis Date Noted    Secondary renal hyperparathyroidism (H24) 10/19/2023     Priority: Medium    Vitamin D deficiency 10/19/2023     Priority: Medium    ADHD (attention deficit hyperactivity disorder) 12/03/2020     Priority: Medium     Overview:   Diagnosis: 4/2009  Medication History: Concerta, Methylphenidate, Methylin ER    Current Medication started: 3/2011  Last dosage change: 3/2011  Last ADHD   Medication Follow-Up Visit: 3/2011      Neurogenic bladder 12/03/2020     Priority: Medium    End stage kidney disease (H) 06/02/2019     Priority: Medium    Class 2 severe obesity due to excess calories with serious comorbidity and body mass index (BMI) of 39.0 to 39.9 in adult (H) 12/15/2017     Priority: Medium     Medical weight management start 12/21/2020  High weight- 300 10/2020  Last visit 282   Needs BMI less than 38 to be listed for kidney transplant, needs BMI <35 for transplant       Anxiety 12/11/2017     Priority: Medium    Sensorineural hearing  loss, asymmetrical 12/26/2013     Priority: Medium    Lymphangioma 03/19/2013     Priority: Medium     Left upper back      Overview:   Cutaneous lymphangioma      Allergic rhinitis due to pollen 03/04/2011     Priority: Medium     Overview:   Ragweed, grass      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     Priority: Medium    Iron deficiency 01/19/2011     Priority: Medium    Hypertension 01/19/2011     Priority: Medium     Overview:   Goal BP <102/61        Past Medical History:   Diagnosis Date    ADD (attention deficit disorder)     Allergic rhinitis     ESRD (end stage renal disease) on dialysis (H)     Hypertension 2000    Mitrofanoff appendicovesicostomy present (H)     Obesity     Posterior urethral valves     Secondary renal hyperparathyroidism (H24)     Vitamin D deficiency      Past Surgical History:   Procedure Laterality Date    ABDOMEN SURGERY  2003    Bilateral urereral tapering and re-implantation    ABDOMEN SURGERY  2008    Mitrofanoff    ablation of posterior urethral valves  2000    APPENDECTOMY  2008    CREATE FISTULA ARTERIOVENOUS UPPER EXTREMITY Left 5/25/2023    Procedure: LEFT Brachial Basilic ARTERIOVENOUS FISTULA CREATION SURGERY WITH INTRAOPERATIVE ULTRASOUND .;  Surgeon: Nita Martinez MD;  Location: [facility] OR    CREATE GRAFT LOOP ARTERIOVENOUS UPPER EXTREMITY Right 8/24/2023    Procedure: Right RadioCephalic AV Fistula and Branch Ligation x3;  Surgeon: Nita Martinez MD;  Location: [facility] OR    IR CVC TUNNEL PLACEMENT > 5 YRS OF AGE  2/16/2023    ureteral reimplantation with tapering  2003     Current Outpatient Medications   Medication Sig Dispense Refill    amLODIPine (NORVASC) 5 MG tablet Take 1 tablet (5 mg) by mouth daily (Patient taking differently: Take 5 mg by mouth every morning) 90 tablet 3    atenolol (TENORMIN) 25 MG tablet Take 1.5 tablets (37.5 mg) by mouth daily (Patient taking differently: Take 37.5 mg by mouth every morning) 135 tablet 3    B  Complex-C-Folic Acid (DIALYVITE 800) 0.8 MG TABS Take 1 tablet by mouth daily (Patient taking differently: Take 1 tablet by mouth every morning) 90 tablet 3    calcitRIOL (ROCALTROL) 0.25 MCG capsule Take 1 capsule (0.25 mcg) by mouth daily (Patient taking differently: Take 0.25 mcg by mouth three times a week) 90 capsule 3    Calcium Acetate 667 MG TABS Take 667 mg by mouth 3 times daily (with meals) (Patient taking differently: Take 4 mg by mouth 3 times daily (with meals)) 90 tablet 1    cetirizine (ZYRTEC) 10 MG tablet Take 10 mg by mouth daily as needed for allergies      methylphenidate (RITALIN) 20 MG tablet Take 20 mg by mouth as needed Prn      Probiotic Product (PROBIOTIC-10 PO) Take 1 tablet by mouth every morning 1 tab capsule      Cholecalciferol (VITAMIN D) 50 MCG (2000 UT) CAPS Take 2,000 unit marking on an U-100 insulin syringe by mouth daily (Patient not taking: Reported on 12/19/2023) 90 capsule 11    difluprednate (DUREZOL) 0.05 % ophthalmic emulsion INSTILL 1 DROP INTO EACH EYE SIX TIMES DAILY FOR THE FIRST 24 HOURS. THEN 1 DROP 4 TIMES DAILY UNTIL FOLLOW UP      fish oil-omega-3 fatty acids 1000 MG capsule Take 1 g by mouth daily (Patient not taking: Reported on 12/19/2023)      prednisoLONE acetate (PRED FORTE) 1 % ophthalmic suspension Place 1 drop into both eyes every 4 hours       OTC products: None, except as noted above  Allergies   Allergen Reactions    Banana Itching     Raw banana; itchy mouth      Dust Mites      Runny nose and watery eyes    Mold      Runny nose and watery eyes    Nsaids Other (See Comments)     CKD    Other [Seasonal Allergies]      Grass, Ragweed - gets runny nose and watery eyes    Sulfa Antibiotics      PN: LW Reaction: GI Upset      Social History     Tobacco Use    Smoking status: Never     Passive exposure: Never    Smokeless tobacco: Never   Substance Use Topics    Alcohol use: Never     OR  Social History     Socioeconomic History    Marital status: Single      Spouse name: Not on file    Number of children: 0    Years of education: Not on file    Highest education level: Not on file   Occupational History    Occupation:      Comment: [employer]   Tobacco Use    Smoking status: Never     Passive exposure: Never    Smokeless tobacco: Never   Vaping Use    Vaping Use: Never used   Substance and Sexual Activity    Alcohol use: Never    Drug use: Never    Sexual activity: Not on file   Other Topics Concern    Parent/sibling w/ CABG, MI or angioplasty before 65F 55M? Not Asked   Social History Narrative    Not on file     Social Determinants of Health     Financial Resource Strain: Not on file   Food Insecurity: Not on file   Transportation Needs: Not on file   Physical Activity: Not on file   Stress: Not on file   Social Connections: Not on file   Interpersonal Safety: Not on file   Housing Stability: Not on file     History   Drug Use Unknown     Family History   Problem Relation Age of Onset    No Known Problems Mother     No Known Problems Father     Anesthesia Reaction No family hx of     Thrombosis No family hx of        REVIEW OF SYSTEMS:        CONSTITUTIONAL: NEGATIVE for fever, chills, change in weight  INTEGUMENTARY/SKIN: NEGATIVE for worrisome rashes, moles or lesions  EYES: POSITIVE for Anterior uveitis   ENT/MOUTH: NEGATIVE for ear, mouth and throat problems  RESP: NEGATIVE for significant cough or SOB  CV: NEGATIVE for chest pain, palpitations or peripheral edema  GI: NEGATIVE for nausea, abdominal pain, heartburn, or change in bowel habits  : NEGATIVE for frequency, dysuria, or hematuria  MUSCULOSKELETAL: NEGATIVE for significant arthralgias or myalgia  NEURO: NEGATIVE for weakness, dizziness or paresthesias  ENDOCRINE: NEGATIVE for temperature intolerance, skin/hair changes  HEME: NEGATIVE for bleeding problems  PSYCHIATRIC: NEGATIVE for changes in mood or affect    EXAM:                       Pulse:  [67] 67  BP: (131)/(85) 131/85  SpO2:   [96 %] 96 %    GENERAL APPEARANCE: healthy, alert and no distress     EYES: EOMI,  PERRL     HENT: ear canals and TM's normal and nose and mouth without ulcers or lesions     NECK: no adenopathy, no asymmetry, masses, or scars and thyroid normal to palpation     RESP: lungs clear to auscultation - no rales, rhonchi or wheezes     CV: regular rates and rhythm, normal S1 S2, no S3 or S4 and no murmur, click or rub     ABDOMEN:  soft, nontender, no HSM or masses and bowel sounds normal     MS: extremities normal- no gross deformities noted, no evidence of inflammation in joints, FROM in all extremities.     SKIN: no suspicious lesions or rashes     NEURO: Normal strength and tone, sensory exam grossly normal, mentation intact and speech normal     PSYCH: mentation appears normal. and affect normal/bright     LYMPHATICS: No cervical adenopathy      DIAGNOSTICS:                EKG: appears normal, NSR, normal axis, normal intervals, no acute ST/T changes c/w ischemia, no LVH by voltage criteria, unchanged from previous tracings  Chest XRay  Labs Resulted Today:   Results for orders placed or performed in visit on 12/19/23   EKG 12-lead complete w/read - Clinics     Status: None (Preliminary result)   Result Value Ref Range    Systolic Blood Pressure  mmHg    Diastolic Blood Pressure  mmHg    Ventricular Rate 61 BPM    Atrial Rate 61 BPM    WY Interval 144 ms    QRS Duration 84 ms     ms    QTc 412 ms    P Axis 33 degrees    R AXIS 29 degrees    T Axis 18 degrees    Interpretation ECG       Sinus rhythm  Normal ECG  When compared with ECG of 29-MAR-2021 12:08,  No significant change was found     Results for orders placed or performed in visit on 12/19/23   CBC with platelets     Status: Abnormal   Result Value Ref Range    WBC Count 6.1 4.0 - 11.0 10e3/uL    RBC Count 3.90 (L) 4.40 - 5.90 10e6/uL    Hemoglobin 12.5 (L) 13.3 - 17.7 g/dL    Hematocrit 36.3 (L) 40.0 - 53.0 %    MCV 93 78 - 100 fL    MCH 32.1 26.5 -  33.0 pg    MCHC 34.4 31.5 - 36.5 g/dL    RDW 13.3 10.0 - 15.0 %    Platelet Count 241 150 - 450 10e3/uL   Comprehensive metabolic panel     Status: Abnormal   Result Value Ref Range    Sodium 139 135 - 145 mmol/L    Potassium 3.5 3.4 - 5.3 mmol/L    Carbon Dioxide (CO2) 28 22 - 29 mmol/L    Anion Gap 11 7 - 15 mmol/L    Urea Nitrogen 21.6 (H) 6.0 - 20.0 mg/dL    Creatinine 7.12 (H) 0.67 - 1.17 mg/dL    GFR Estimate 10 (L) >60 mL/min/1.73m2    Calcium 9.5 8.6 - 10.0 mg/dL    Chloride 100 98 - 107 mmol/L    Glucose 94 70 - 99 mg/dL    Alkaline Phosphatase 67 40 - 150 U/L    AST 27 0 - 45 U/L    ALT 41 0 - 70 U/L    Protein Total 7.6 6.4 - 8.3 g/dL    Albumin 4.4 3.5 - 5.2 g/dL    Bilirubin Total 0.5 <=1.2 mg/dL   Ferritin     Status: Abnormal   Result Value Ref Range    Ferritin 826 (H) 31 - 409 ng/mL   INR     Status: Normal   Result Value Ref Range    INR 1.02 0.85 - 1.15   Iron and iron binding capacity     Status: Normal   Result Value Ref Range    Iron 104 61 - 157 ug/dL    Iron Binding Capacity 250 240 - 430 ug/dL    Iron Sat Index 42 15 - 46 %   Parathyroid Hormone Intact     Status: Abnormal   Result Value Ref Range    Parathyroid Hormone Intact 445 (H) 15 - 65 pg/mL    Narrative    This result was obtained with the Roche Elecsys PTH STAT assay.   This reference range differs from PTH assays used in other [facility] laboratories.   Routine UA with microscopic     Status: Abnormal   Result Value Ref Range    Color Urine Straw Colorless, Straw, Light Yellow, Yellow    Appearance Urine Clear Clear    Glucose Urine 70 (A) Negative mg/dL    Bilirubin Urine Negative Negative    Ketones Urine Negative Negative mg/dL    Specific Gravity Urine 1.003 1.003 - 1.035    Blood Urine Trace (A) Negative    pH Urine 7.5 (H) 5.0 - 7.0    Protein Albumin Urine 100 (A) Negative mg/dL    Urobilinogen Urine Normal Normal, 2.0 mg/dL    Nitrite Urine Negative Negative    Leukocyte Esterase Urine Negative Negative    RBC Urine  0 <=2 /HPF    WBC Urine 1 <=5 /HPF    Squamous Epithelials Urine <1 <=1 /HPF   Phosphorus     Status: Abnormal   Result Value Ref Range    Phosphorus 5.1 (H) 2.5 - 4.5 mg/dL   Quantiferon-TB Gold Plus     Status: None (In process)    Specimen: Arm, Left; Blood    Narrative    The following orders were created for panel order Quantiferon-TB Gold Plus.  Procedure                               Abnormality         Status                     ---------                               -----------         ------                     Quantiferon TB Gold Plus...[[id number]]                      In process                 Quantiferon TB Gold Plus...[[id number]]                      In process                 Quantiferon TB Gold Plus...[[id number]]                      In process                 Quantiferon TB Gold Plus...[[id number]]                      In process                   Please view results for these tests on the individual orders.   PRA Single Antigen IgG Antibody ([id number])     Status: None (In process)    Narrative    The following orders were created for panel order PRA Single Antigen IgG Antibody ([id number]).  Procedure                               Abnormality         Status                     ---------                               -----------         ------                     PRA Single Antigen IgG A...[[id number]]                      In process                   Please view results for these tests on the individual orders.   ABO/Rh type and screen     Status: None (In process)    Narrative    The following orders were created for panel order ABO/Rh type and screen.  Procedure                               Abnormality         Status                     ---------                               -----------         ------                     Adult Type and Screen[[id number]]                            In process                   Please view results for these tests on the individual orders.   HLA Final Crossmatch  Recipient     Status: None (In process)    Narrative    The following orders were created for panel order HLA Final Crossmatch Recipient.  Procedure                               Abnormality         Status                     ---------                               -----------         ------                     HLA Final Crossmatch Rec...[[id number]]                      In process                   Please view results for these tests on the individual orders.     Labs Drawn and in Process:   Unresulted Labs Ordered in the Past 30 Days of this Admission       Date and Time Order Name Status Description    12/19/2023  9:35 AM TYPE AND SCREEN, ADULT In process     12/19/2023  9:35 AM QUANTIFERON TB GOLD PLUS PURPLE TUBE In process     12/19/2023  9:35 AM QUANTIFERON TB GOLD PLUS YELLOW TUBE In process     12/19/2023  9:35 AM QUANTIFERON TB GOLD PLUS GREEN TUBE In process     12/19/2023  9:35 AM QUANTIFERON TB GOLD PLUS GREY TUBE In process     12/19/2023  9:35 AM VITAMIN D DEFICIENCY SCREENING In process     12/19/2023  9:35 AM URINE CULTURE In process     12/19/2023  9:35 AM HEMOGLOBIN A1C In process     12/19/2023  9:35 AM EBV CAPSID ANTIBODY IGM In process     12/19/2023  9:35 AM EBV CAPSID ANTIBODY IGG In process     12/19/2023  9:35 AM CMV ANTIBODY IGM In process     12/19/2023  9:35 AM CMV ANTIBODY IGG In process     12/19/2023  9:35 AM HEPATITIS B SURFACE ANTIGEN In process     12/19/2023  9:35 AM HEPATITIS B SURFACE ANTIBODY In process     12/19/2023  9:35 AM HEPATITIS B CORE ANTIBODY In process     12/19/2023  9:35 AM ALBUMIN RANDOM URINE QUANTITATIVE In process           Recent Labs   Lab Test 12/19/23  0953 08/24/23  1042 05/25/23  1005   HGB 12.5* 12.3* 10.2*    238  --    INR 1.02 0.98  --      --  140   POTASSIUM 3.5  --  4.5   CR 7.12*  --  7.61*     UNOS cPRA   Date Value Ref Range Status   03/29/2021 0  Final     UNOS CPRA   Date Value Ref Range Status   10/19/2023 0  Final      O    ASSESSMENT:                 Mr. Villa is a 23 year old male who is appropriate for elective living donor kidney transplantation with the following pertinent issues:    1. Labs reviewed. Findings requiring additional evaluation before surgery: No  2. EKG (12/19/2023): appears normal, NSR  3. ECHO (3/29/2021); Normal ejection fraction and no significant valvular dysfunction  Interpretation Summary  Global and regional left ventricular function is normal with an EF of 60-65%.  Global right ventricular function is normal.  No significant valvular abnormalities were noted.  Previous study not available for comparison.  4. ABO= O  5. Paired Exchange case: Yes  6. Outstanding issues: Final ABO and cross match     PLAN:                 1. Consent: Done  2. Outstanding issues: Final ABO and cross match    Signed Electronically by: Ivis Monzon NP

## 2023-12-19 NOTE — NURSING NOTE
"Chief Complaint   Patient presents with    Pre-Op Exam     Preop kidney txp. DOS 12/28/23       /85 (BP Location: Left arm, Patient Position: Sitting, Cuff Size: Adult Large)   Pulse 67   Ht 1.803 m (5' 11\")   Wt 126.7 kg (279 lb 6.4 oz)   SpO2 96%   BMI 38.97 kg/m      IDA ZHU RN on 12/19/2023 at 10:26 AM    "

## 2023-12-19 NOTE — LETTER
2023         RE: Boogie Villa  [address]  [facility] [address]        Dear Colleague,    Thank you for referring your patient, Boogie Villa, to the [facility]. Please see a copy of my visit note below.    Transplant Surgery Consult Note     Medical record number: [id number]  YOB: 2000,   Consult requested  for evaluation of kidney transplant candidacy.    Assessment and Recommendations:Mr. Villa appears to be a excellent candidate for kidney transplantation and has a good understanding of the risks and benefits of this approach to the management of renal failure. The following issues should be addressed prior to finalizing his transplant candidacy:     Transplant order: Mr. Villa has End stage renal failure due to obstructive nephropathy whose condition is not expected to resolve, is expected to progress, and is expected to continue to develop related comorbid conditions.    Risks and benefits discussed.   Labs and radiologic studies reviewed  Consent obtained  Suitable to move forward with transplant as scheduled    The majority of our visit was spent in counselling, discussing the medical and surgical risks of kidney transplantation. We discussed approximate wait time and how that is influenced by issues such as blood type and sensitization (PRA) and access to a living donor. I contrasted potential waiting time for living vs  donor kidneys from  normal (0-85%) or higher (%) kidney donor profile index (KDPI) donors and their associated outcomes. I would not recommend this individual to consider kidneys from high KDPI donors. The reason for this decision is best summarized as: improved long term graft survival. Potential surgical complications of kidney transplantation include bleeding, superficial or deep wound complications (infection, hernia, lymphocele), ureteral anastomotic failure (leak or stenosis), graft thrombosis, need for  reoperation and other issues such as cardiac complications, pneumonia, deep venous thrombosis, pulmonary embolism, post transplant diabetes and death. The potential for recurrent disease or need for retransplantation was also addressed. We discussed the possible need for ureteral stent (and subsequent removal), and the utility of protocol biopsy and laboratory studies to evaluate for rejection or recurrent disease. We discussed the risk of graft rejection, our center's average graft and patient survival rates, immunosuppression protocols, as well as the potential opportunity to participate in clinical trials.  We also discussed the average length of stay, recovery process, and posttransplant lab and monitoring protocol.  I emphasized the need for strict immunosuppression medication adherence and the potential for complications of immunosuppression such as skin cancer or lymphoma, as well as a very low but not zero risk of donor-derived disease transmission risks (infection, cancer). Mr. Villa asked good questions and his candidacy will be reviewed at our Multidisciplinary Selection Committee. Thank you for the opportunity to participate in Mr. Villa's care.      Total time: 60 minutes        Nita Martinez MD FACS  Associate Professor of Surgery  Director, Living Kidney Donor Program.    ---------------------------------------------------------------------------------------------------    HPI: Mr. Villa has End stage renal failure due to obstructive nephropathy. The patient is non-diabetic.       The patient is on dialysis.    Has potential kidney donors:  Yes .  Interested in participation in paired exchange if a donor is willing: Yes     The patient has the following pertinent history:       No    Yes  Dialysis:    []      [x] via:       Blood Transfusion                  []      []  Number of units:   Most recently:  Pregnancy:    [x]      [] Number:       Previous Transplant:  [x]       [] Details:    Cancer    [x]      [] Comment:   Kidney stones   [x]      [] Comment:      Recurrent infections  [x]      []  Type:                  Bladder dysfunction  [x]      [] Cause:    Claudication   [x]      [] Distance:    Previous Amputation  [x]      [] Cause:     Chronic anticoagulation  [x]      [] Indication:   Religion  [x]      []      Past Medical History:   Diagnosis Date     ADD (attention deficit disorder)      Allergic rhinitis      ESRD (end stage renal disease) on dialysis (H)      Hypertension 2000     Mitrofanoff appendicovesicostomy present (H)      Obesity      Posterior urethral valves      Secondary renal hyperparathyroidism (H24)      Vitamin D deficiency      Past Surgical History:   Procedure Laterality Date     ABDOMEN SURGERY  2003    Bilateral urereral tapering and re-implantation     ABDOMEN SURGERY  2008    Mitrofanoff     ablation of posterior urethral valves  2000     APPENDECTOMY  2008     BENCH KIDNEY  12/28/2023    Procedure: Bench kidney;  Surgeon: Nita Martinez MD;  Location: [facility] OR     CREATE FISTULA ARTERIOVENOUS UPPER EXTREMITY Left 5/25/2023    Procedure: LEFT Brachial Basilic ARTERIOVENOUS FISTULA CREATION SURGERY WITH INTRAOPERATIVE ULTRASOUND .;  Surgeon: Nita Martinez MD;  Location: [facility] OR     CREATE GRAFT LOOP ARTERIOVENOUS UPPER EXTREMITY Right 8/24/2023    Procedure: Right RadioCephalic AV Fistula and Branch Ligation x3;  Surgeon: Nita Martinez MD;  Location: [facility] OR     IR CVC TUNNEL PLACEMENT > 5 YRS OF AGE  2/16/2023     IR RENAL BIOPSY RIGHT  1/2/2024     ureteral reimplantation with tapering  2003     Family History   Problem Relation Age of Onset     No Known Problems Mother      No Known Problems Father      Anesthesia Reaction No family hx of      Thrombosis No family hx of      Social History     Socioeconomic History     Marital status: Single     Spouse name: Not on file     Number of children: 0     Years of education:  Not on file     Highest education level: Not on file   Occupational History     Occupation:      Comment: [employer]   Tobacco Use     Smoking status: Never     Passive exposure: Never     Smokeless tobacco: Never   Vaping Use     Vaping Use: Never used   Substance and Sexual Activity     Alcohol use: Never     Drug use: Not Currently     Types: Marijuana     Comment: none for 4-5 months     Sexual activity: Not on file   Other Topics Concern     Parent/sibling w/ CABG, MI or angioplasty before 65F 55M? Not Asked   Social History Narrative     Not on file     Social Determinants of Health     Financial Resource Strain: Not on file   Food Insecurity: Not on file   Transportation Needs: Not on file   Physical Activity: Not on file   Stress: Not on file   Social Connections: Not on file   Interpersonal Safety: Not on file   Housing Stability: Not on file       ROS:   CONSTITUTIONAL:  No fevers or chills  EYES: negative for icterus  ENT:  negative for hearing loss, tinnitus and sore throat  RESPIRATORY:  negative for cough, sputum, dyspnea  CARDIOVASCULAR:  negative for chest pain Fatigue  GASTROINTESTINAL:  negative for nausea, vomiting, diarrhea or constipation  GENITOURINARY:  negative for incontinence, dysuria, bladder emptying problems  HEME:  No easy bruising  INTEGUMENT:  negative for rash and pruritus  NEURO:  Negative for headache, seizure disorder  Allergies:   Allergies   Allergen Reactions     Banana Itching     Raw banana; itchy mouth       Dust Mites      Runny nose and watery eyes     Mold      Runny nose and watery eyes     Nsaids Other (See Comments)     CKD     Other [Seasonal Allergies]      Grass, Ragweed - gets runny nose and watery eyes     Sulfa Antibiotics      PN: LW Reaction: GI Upset as an infant     Medications:  Prescription Medications as of 1/3/2024         Rx Number Disp Refills Start End Last Dispensed Date Next Fill Date Owning Pharmacy    amLODIPine (NORVASC) 5 MG  tablet  90 tablet 3 11/21/2022 --   [facility] [address]    Sig: Take 1 tablet (5 mg) by mouth daily    Class: E-Prescribe    Route: Oral    atenolol (TENORMIN) 25 MG tablet  135 tablet 3 1/3/2023 --   [facility] [address]    Sig: Take 1.5 tablets (37.5 mg) by mouth daily    Class: E-Prescribe    Route: Oral    B Complex-C-Folic Acid (DIALYVITE 800) 0.8 MG TABS  90 tablet 3 2/17/2023 2/17/2024   [facility] [address]    Sig: Take 1 tablet by mouth daily    Class: E-Prescribe    Route: Oral    calcitRIOL (ROCALTROL) 0.25 MCG capsule  90 capsule 3 11/21/2022 --   [facility] [address]    Sig: Take 1 capsule (0.25 mcg) by mouth daily    Class: E-Prescribe    Route: Oral    Calcium Acetate 667 MG TABS  90 tablet 1 2/9/2023 --   [facility] - [address]    Sig: Take 667 mg by mouth 3 times daily (with meals)    Class: E-Prescribe    Route: Oral    cetirizine (ZYRTEC) 10 MG tablet  -- --  --       Sig: Take 10 mg by mouth daily as needed for allergies    Class: Historical    Route: Oral    Cholecalciferol (VITAMIN D) 50 MCG (2000 UT) CAPS  90 capsule 11 11/1/2021 --   [facility] - [address]    Sig: Take 2,000 unit marking on an U-100 insulin syringe by mouth daily    Class: E-Prescribe    Route: Oral    difluprednate (DUREZOL) 0.05 % ophthalmic emulsion  -- -- 4/4/2023 --       Sig: INSTILL 1 DROP INTO EACH EYE SIX TIMES DAILY FOR THE FIRST 24 HOURS. THEN 1 DROP 4 TIMES DAILY UNTIL FOLLOW UP    Class: Historical    latanoprost (XALATAN) 0.005 % ophthalmic solution  -- --  --       Sig: Place 1 drop into both eyes at bedtime    Class: Historical    Route: Both Eyes    methylphenidate (RITALIN) 20 MG tablet  -- -- 2/16/2020 --   [facility] - [place], [place] - [address] [facility]    Sig: Take 20 mg by mouth as needed Prn    Class: Historical    Earliest Fill Date: 2/16/2020    Route: Oral    Probiotic Product (PROBIOTIC-10 PO)  -- --  --       Sig: Take 1 tablet by mouth every morning 1 tab capsule    Class: Historical    Route: Oral          Hospital Medications as of 1/3/2024         Dose Frequency Start End    acetaminophen (TYLENOL) tablet 650 mg (Completed) 650 mg ONCE 1/3/2024 1/3/2024    Admin Instructions: 30 minutes prior to anti-rejection/antibody therapy.  Maximum acetaminophen dose from all sources = 75 mg/kg/day not to exceed 4 grams/day.    Class: E-Prescribe    Route: Oral    acetaminophen (TYLENOL) tablet 650 mg (Completed) 650 mg ONCE 1/2/2024 1/2/2024    Admin Instructions: 30 minutes prior to anti-rejection/antibody therapy.  Maximum acetaminophen dose from all sources = 75 mg/kg/day not to exceed 4 grams/day.    Class: E-Prescribe    Route: Oral    acetaminophen (TYLENOL) tablet 650 mg 650 mg EVERY 4 HOURS PRN 12/31/2023 --    Admin Instructions: May give first dose 4 hours after last scheduled dose of acetaminophen (TYLENOL).  Maximum acetaminophen dose from all sources = 75 mg/kg/day not to exceed 4 grams/day.    Class: E-Prescribe    Route: Oral    amLODIPine (NORVASC) tablet 10 mg 10 mg DAILY 1/2/2024 --    Class: E-Prescribe    Notes to Pharmacy: PTA Sig:Take 1 tablet (5 mg) by mouth daily  Patient taking differently: Take 5 mg by mouth every morning      Route: Oral    anti-thymocyte globulin (THYMOGLOBULIN - Rabbit) 200 mg in sodium chloride 0.9 % 590 mL intermittent infusion 200 mg ONCE 1/3/2024 --    Admin Instructions: For CENTRAL catheter.  Infuse first dose over 6 hours, and subsequent doses over 4-6 hours through an in-line 0.2 micron filter.    Class: E-Prescribe    Route: Intravenous Central line    anti-thymocyte globulin (THYMOGLOBULIN - Rabbit) 200 mg in sodium chloride 0.9 % 590 mL  intermittent infusion (Completed) 200 mg ONCE 1/2/2024 1/2/2024    Admin Instructions: For CENTRAL catheter.  Infuse first dose over 6 hours, and subsequent doses over 4-6 hours through an in-line 0.2 micron filter.    Class: E-Prescribe    Route: Intravenous Central line    aspirin EC tablet 81 mg ([Held by provider] since 1/2/2024 12:25 PM) 81 mg DAILY 12/29/2023 --    Admin Instructions: DO NOT CRUSH.    Class: E-Prescribe    Route: Oral    atorvastatin (LIPITOR) tablet 10 mg 10 mg DAILY 12/29/2023 --    Admin Instructions: Begin POD#1    Class: E-Prescribe    Route: Oral    bisacodyl (DULCOLAX) suppository 10 mg 10 mg DAILY PRN 12/28/2023 --    Admin Instructions: Hold for loose stools.    Class: E-Prescribe    Route: Rectal    calcium carbonate (TUMS) chewable tablet 500 mg 500 mg DAILY PRN 1/2/2024 --    Class: E-Prescribe    Route: Oral    difluprednate (DUREZOL) 0.05 % ophthalmic emulsion 1 drop 1 drop 4 TIMES DAILY 12/29/2023 --    Class: E-Prescribe    Notes to Pharmacy: Pt was on prednisolone 1% and changed to this formulation by ophthalmology    Route: Both Eyes    diphenhydrAMINE (BENADRYL) capsule 25-50 mg (Completed) 25-50 mg ONCE 1/3/2024 1/3/2024    Admin Instructions: 30 minutes prior to anti-rejection/antibody therapy.    Class: E-Prescribe    Route: Oral    Linked Group 1: See Hyperspace for full Linked Orders Report.        diphenhydrAMINE (BENADRYL) capsule 25-50 mg (Completed) 25-50 mg ONCE 1/2/2024 1/2/2024    Admin Instructions: 30 minutes prior to anti-rejection/antibody therapy.    Class: E-Prescribe    Route: Oral    Linked Group 2: See Hyperspace for full Linked Orders Report.        diphenhydrAMINE (BENADRYL) solution 25-50 mg (Completed) 25-50 mg ONCE 1/3/2024 1/3/2024    Admin Instructions: 30 minutes prior to anti-rejection/antibody therapy.    Class: E-Prescribe    Route: Per NG tube    Linked Group 1: See Hyperspace for full Linked Orders Report.        diphenhydrAMINE (BENADRYL)  solution 25-50 mg (Completed) 25-50 mg ONCE 1/2/2024 1/2/2024    Admin Instructions: 30 minutes prior to anti-rejection/antibody therapy.    Class: E-Prescribe    Route: Per NG tube    Linked Group 2: See [facility] for full Linked Orders Report.        HYDROmorphone (DILAUDID) tablet 2 mg 2 mg EVERY 4 HOURS PRN 1/2/2024 --    Route: Oral    HYDROmorphone (DILAUDID) tablet 4 mg 4 mg EVERY 4 HOURS PRN 1/2/2024 --    Route: Oral    labetalol (NORMODYNE/TRANDATE) injection 20 mg 20 mg EVERY 6 HOURS PRN 12/30/2023 --    Admin Instructions: Administer for SBP > 160  HOLD for HR < 60  PROTECT FROM LIGHT.    Class: E-Prescribe    Route: Intravenous    latanoprost (XALATAN) 0.005 % ophthalmic solution 1 drop 1 drop AT BEDTIME 12/29/2023 --    Class: E-Prescribe    Notes to Pharmacy: PTA Sig:Place 1 drop into both eyes at bedtime      Route: Both Eyes    Lidocaine (LIDOCARE) 4 % Patch 2 patch 2 patch EVERY 24 HOURS 0800 12/29/2023 --    Admin Instructions: Apply patch(s) to around incision. To prevent lidocaine toxicity, patient should be patch free for 12 hrs daily. Patches may be cut to smaller size prior to removing release liner.  Reminder: Remove previous patch before applying new patch.  NEVER APPLY HEAT OVER PATCH which increases absorption and may lead to local anesthetic toxicity. Do not apply over area where liposomal bupivacaine was injected for 96 hours post injection.    Class: E-Prescribe    Route: Transdermal    magnesium hydroxide (MILK OF MAGNESIA) suspension 30 mL 30 mL DAILY PRN 12/28/2023 --    Admin Instructions: Shake well.  Hold for loose stools.    Class: E-Prescribe    Route: Oral    magnesium oxide (MAG-OX) tablet 400 mg ([Held by provider] since 1/2/2024 12:22 PM) 400 mg DAILY WITH LUNCH 12/31/2023 --    Admin Instructions: Starting on POD #2    Class: E-Prescribe    Route: Oral    methocarbamol (ROBAXIN) tablet 750 mg 750 mg 3 TIMES DAILY 12/29/2023 --    Admin Instructions: Hold for sedation.     Class: E-Prescribe    Route: Oral    methylphenidate (RITALIN) tablet 20 mg 20 mg DAILY PRN 12/29/2023 --    Notes to Pharmacy: YANNA Sig:Take 20 mg by mouth as needed Prn      Route: Oral    methylPREDNISolone sodium succinate (solu-MEDROL) 250 mg in sodium chloride 0.9 % 59 mL intermittent infusion (Completed) 250 mg ONCE 1/3/2024 1/3/2024    Admin Instructions: Administer 30 minutes prior to thymoglobulin dose  Doses greater than or equal to 250 mg administer over 15-30 minutes      Class: E-Prescribe    Route: Intravenous    methylPREDNISolone sodium succinate (solu-MEDROL) 500 mg in sodium chloride 0.9 % 114 mL intermittent infusion (Completed) 500 mg ONCE 1/2/2024 1/2/2024    Admin Instructions: Give 30 minutes prior to thymoglobulin.  Doses greater than or equal to 1000mg administer over 60 minutes  Doses greater than or equal to 500mg administer over 30-60 minutes  Doses greater than or equal to 250mg administer over 15-30 minutes  Doses less than or equal to 125mg administer over 5-15 minutes    Class: E-Prescribe    Route: Intravenous    mycophenolate (GENERIC EQUIVALENT) capsule 750 mg 750 mg 2 TIMES DAILY. 12/28/2023 --    Admin Instructions: Do not open capsule for use down feeding tube. Check if DRUG LEVEL is needed BEFORE administering dose.  This order specifically allows the use of GENERIC mycophenolate as an equivalent of CELLCEPT capsules.    When possible, take 1 to 2 hours apart from any product containing magnesium or aluminum.    Class: E-Prescribe    Route: Oral    naloxone (NARCAN) injection 0.2 mg 0.2 mg EVERY 2 MIN PRN 12/29/2023 --    Admin Instructions: Administer intravenous route when available and notify provider when administered.  For unintended sedation or respiratory depression if all of the below criteria are met:  ~ respiratory rate LESS than or EQUAL to 8.   ~SaO2 less than 92% and or/end-tidal CO2 is greater than 50.  ~ the patient is receiving an opioid, has unintended  sedations assessed as RASS (-3), and is currently not on mechanical ventilation.  RASS scale moderate (-3) is movement or eye opening to voice but no eye contact.    Patient Monitoring  Once the patient has demonstrated a response to the naloxone, continue to monitor respiratory rate, depth, oxygen saturation and end-tidal CO2 (if available) every 15 minutes x 2, then every 30 minutes x 2, then every 1 hour x 1 after each naloxone dose.  Consider transfer to ICU if patient respiratory parameters have not improved after 4 naloxone doses.    Class: E-Prescribe    Route: Intravenous    Linked Group 3: See Hyperspace for full Linked Orders Report.        naloxone (NARCAN) injection 0.2 mg 0.2 mg EVERY 2 MIN PRN 12/29/2023 --    Admin Instructions: Administer intramuscular if an intravenous route is not available and notify provider when administered.  For unintended sedation or respiratory depression if all of the below criteria are met:  ~ respiratory rate LESS than or EQUAL to 8.   ~SaO2 less than 92% and or/end-tidal CO2 is greater than 50.  ~ the patient is receiving an opioid, has unintended sedations assessed as RASS (-3), and is currently not on mechanical ventilation.  RASS scale moderate (-3) is movement or eye opening to voice but no eye contact.    Patient Monitoring  Once the patient has demonstrated a response to the naloxone, continue to monitor respiratory rate, depth, oxygen saturation and end-tidal CO2 (if available) every 15 minutes x 2, then every 30 minutes x 2, then every 1 hour x 1 after each naloxone dose.  Consider transfer to ICU if patient respiratory parameters have not improved after 4 naloxone doses.    Class: E-Prescribe    Route: Intramuscular    Linked Group 3: See Hyperspace for full Linked Orders Report.        naloxone (NARCAN) injection 0.4 mg 0.4 mg EVERY 2 MIN PRN 12/29/2023 --    Admin Instructions: Administer intravenous route when available and notify provider when  administered.  For unintended sedation or respiratory depression if all of the below criteria are met:  ~ respiratory rate LESS than or EQUAL to 8.  ~ SaO2 less than 92% and or/end-tidal CO2 is greater than 50.  ~ the patient is receiving an opioid, has unintended sedation assessed as RASS (-4) or (-5) and patient is currently not on mechanical ventilation.  RASS scale (-4) is deep sedation with no response to voice but movement or eye opening to physical stimulation.  RASS scale (-5) is unarousable.      Patient Monitoring  Once the patient has demonstrated a response to the naloxone, continue to monitor respiratory rate, depth, oxygen saturation and end-tidal CO2 (if available) every 15 minutes x 2, then every 30 minutes x 2, then every 1 hour x 1 after each naloxone dose.  Consider transfer to ICU if patient respiratory parameters have not improved after 4 naloxone doses.    Class: E-Prescribe    Route: Intravenous    Linked Group 3: See [facility] for [facility].        naloxone (NARCAN) injection 0.4 mg 0.4 mg EVERY 2 MIN PRN 12/29/2023 --    Admin Instructions: Administer intramuscular if an intravenous route is not available and notify provider when administered.  For unintended sedation or respiratory depression if all of the below criteria are met:  ~ respiratory rate LESS than or EQUAL to 8.  ~ SaO2 less than 92% and or/end-tidal CO2 is greater than 50.  ~ the patient is receiving an opioid, has unintended sedation assessed as RASS (-4) or (-5) and patient is currently not on mechanical ventilation.  RASS scale (-4) is deep sedation with no response to voice but movement or eye opening to physical stimulation.  RASS scale (-5) is unarousable.      Patient Monitoring  Once the patient has demonstrated a response to the naloxone, continue to monitor respiratory rate, depth, oxygen saturation and end-tidal CO2 (if available) every 15 minutes x 2, then every 30 minutes x 2, then every 1 hour x 1  after each naloxone dose.  Consider transfer to ICU if patient respiratory parameters have not improved after 4 naloxone doses.    Class: E-Prescribe    Route: Intramuscular    Linked Group 3: See Hyperspace for full Linked Orders Report.        ondansetron (ZOFRAN ODT) ODT tab 4 mg 4 mg EVERY 6 HOURS PRN 12/28/2023 --    Admin Instructions: This is Step 1 of nausea and vomiting management.  If nausea not resolved in 15 minutes, go to Step 2 prochlorperazine (COMPAZINE). Do not push through foil backing. Peel back foil and gently remove. Place on tongue immediately. Administration with liquid unnecessary  With dry hands, peel back foil backing and gently remove tablet. Do not push oral disintegrating tablet through foil backing. Administer immediately on tongue and oral disintegrating tablet dissolves in seconds, then swallow with saliva. Liquid not required.    Class: E-Prescribe    Route: Oral    Linked Group 4: See Hyperspace for full Linked Orders Report.        pantoprazole (PROTONIX) EC tablet 40 mg 40 mg EVERY MORNING BEFORE BREAKFAST 1/2/2024 --    Admin Instructions: DO NOT CRUSH.    Class: E-Prescribe    Route: Oral    polyethylene glycol (MIRALAX) Packet 17 g 17 g DAILY 12/29/2023 --    Admin Instructions: To prevent constipation. Mixed prescribed dose in 8 ounces of water, juice or soda. Administer daily starting at 0900 on POD 1. Hold for loose stools.  1 Packet = 17 grams. Mix each gram with at least 1/2 ounce (15 mL) of water -   8 ounces for 17 g dose, 4 ounces for 8.5 g dose, 2 ounces for 4 g dose.  Follow with the same volume of water.  Hold for loose stools unless being administered as part of a bowel prep regimen or bowel clean out.    Class: E-Prescribe    Route: Oral    prochlorperazine (COMPAZINE) tablet 10 mg 10 mg EVERY 6 HOURS PRN 12/28/2023 --    Admin Instructions: This is Step 2 of nausea and vomiting management.   If nausea not resolved in 15-30 minutes, Notify provider.    Class:  E-Prescribe    Route: Oral    Linked Group 5: See Hyperspace for full Linked Orders Report.        senna-docusate (SENOKOT-S/PERICOLACE) 8.6-50 MG per tablet 1 tablet 1 tablet 2 TIMES DAILY 12/28/2023 --    Admin Instructions: To prevent constipation. Hold for loose stools  Hold for loose stools.    Class: E-Prescribe    Route: Oral    sodium bicarbonate 150 mEq in sterile water (preservative free) 1,000 mL infusion  CONTINUOUS 1/3/2024 --    Admin Instructions: Vesicant in concentrations greater than 0.5 mEq/mL.    Class: E-Prescribe    Route: Intravenous    sodium bicarbonate tablet 1,300 mg 1,300 mg 2 TIMES DAILY 1/2/2024 --    Admin Instructions:      Class: E-Prescribe    Route: Oral    sodium chloride (PF) 0.9% PF flush 10 mL 10 mL EVERY 8 HOURS 12/28/2023 --    Admin Instructions: And Q1H PRN, to lock CVC - Open Ended (Tunneled and Non-Tunneled) dormant lumen.    Class: E-Prescribe    Route: Intracatheter    sodium chloride (PF) 0.9% PF flush 10-20 mL 10-20 mL EVERY 1 MIN PRN 12/28/2023 --    Admin Instructions: to flush CVC - Open Ended (Tunneled and Non-Tunneled). 10 mL post IV meds; 20 mL post blood draw.    Class: E-Prescribe    Route: Intracatheter    sulfamethoxazole-trimethoprim (BACTRIM) 400-80 MG per tablet 1 tablet 1 tablet THREE TIMES WEEKLY 1/4/2024 --    Admin Instructions: Dose adjusted per renal dosing policy.  Estimated CrCl = < 30 mL/min         Class: E-Prescribe    Route: Oral    tacrolimus (GENERIC) capsule 8 mg 8 mg 2 TIMES DAILY. 1/2/2024 --    Admin Instructions: Check if DRUG LEVEL is needed BEFORE administering dose.    Class: E-Prescribe    Route: Oral    valGANciclovir (VALCYTE) tablet 450 mg 450 mg EVERY OTHER DAY 1/3/2024 --    Admin Instructions: Dose adjusted per renal dosing policy.  Estimated CrCl = 25-39 mL/min.       Do Not Crush    Class: E-Prescribe    Route: Oral    Vitamin D3 (CHOLECALCIFEROL) tablet 50 mcg 50 mcg DAILY 12/30/2023 --    Admin Instructions: Note: 25 mcg =  1000 units    Class: E-Prescribe    Route: Oral          Exam:     There were no vitals taken for this visit.  Appearance: in no apparent distress.   Skin: normal  Eyes:  no redness or discharge.  Sclera anicteric  Head and Neck: Normal, no rashes or jaundice  Respiratory: easy respirations, no audible wheezing.  Abdomen: rounded, obese, and protuberant, No distention and Surgical scars consistent with history   Extremities: femoral 2+/2+, Edema, none  Neuro: without deficit   Psychiatric: Normal mood and affect    Diagnostics:   Recent Results (from the past 672 hour(s))   CBC with platelets    Collection Time: 12/19/23  9:53 AM   Result Value Ref Range    WBC Count 6.1 4.0 - 11.0 10e3/uL    RBC Count 3.90 (L) 4.40 - 5.90 10e6/uL    Hemoglobin 12.5 (L) 13.3 - 17.7 g/dL    Hematocrit 36.3 (L) 40.0 - 53.0 %    MCV 93 78 - 100 fL    MCH 32.1 26.5 - 33.0 pg    MCHC 34.4 31.5 - 36.5 g/dL    RDW 13.3 10.0 - 15.0 %    Platelet Count 241 150 - 450 10e3/uL   Hepatitis B core antibody    Collection Time: 12/19/23  9:53 AM   Result Value Ref Range    Hepatitis B Core Antibody Total Nonreactive Nonreactive   Hepatitis B surface antigen    Collection Time: 12/19/23  9:53 AM   Result Value Ref Range    Hepatitis B Surface Antigen Nonreactive Nonreactive   Comprehensive metabolic panel    Collection Time: 12/19/23  9:53 AM   Result Value Ref Range    Sodium 139 135 - 145 mmol/L    Potassium 3.5 3.4 - 5.3 mmol/L    Carbon Dioxide (CO2) 28 22 - 29 mmol/L    Anion Gap 11 7 - 15 mmol/L    Urea Nitrogen 21.6 (H) 6.0 - 20.0 mg/dL    Creatinine 7.12 (H) 0.67 - 1.17 mg/dL    GFR Estimate 10 (L) >60 mL/min/1.73m2    Calcium 9.5 8.6 - 10.0 mg/dL    Chloride 100 98 - 107 mmol/L    Glucose 94 70 - 99 mg/dL    Alkaline Phosphatase 67 40 - 150 U/L    AST 27 0 - 45 U/L    ALT 41 0 - 70 U/L    Protein Total 7.6 6.4 - 8.3 g/dL    Albumin 4.4 3.5 - 5.2 g/dL    Bilirubin Total 0.5 <=1.2 mg/dL   CMV Antibody IgG    Collection Time: 12/19/23  9:53 AM    Result Value Ref Range    CMV Dianne IgG Instrument Value 0.53 <0.60 U/mL    CMV Antibody IgG No detectable antibody. No detectable antibody.    CMV antibody IgM    Collection Time: 12/19/23  9:53 AM   Result Value Ref Range    CMV Dianne IgM Instrument Value <8.0 <30.0 AU/mL    CMV Antibody IgM Negative Negative   EBV Capsid Antibody IgG    Collection Time: 12/19/23  9:53 AM   Result Value Ref Range    EBV Capsid Dianne IgG Instrument Value 10.6 <18.0 U/mL    EBV Capsid Antibody IgG No detectable antibody. No detectable antibody.   EBV Capsid Antibody IgM    Collection Time: 12/19/23  9:53 AM   Result Value Ref Range    EBV Capsid Dianne IgM Instrument Value <10.0 <36.0 U/mL    EBV Capsid Antibody IgM No detectable antibody. No detectable antibody.   Ferritin    Collection Time: 12/19/23  9:53 AM   Result Value Ref Range    Ferritin 826 (H) 31 - 409 ng/mL   Hemoglobin A1c    Collection Time: 12/19/23  9:53 AM   Result Value Ref Range    Hemoglobin A1C 5.2 <5.7 %   INR    Collection Time: 12/19/23  9:53 AM   Result Value Ref Range    INR 1.02 0.85 - 1.15   Iron and iron binding capacity    Collection Time: 12/19/23  9:53 AM   Result Value Ref Range    Iron 104 61 - 157 ug/dL    Iron Binding Capacity 250 240 - 430 ug/dL    Iron Sat Index 42 15 - 46 %   Parathyroid Hormone Intact    Collection Time: 12/19/23  9:53 AM   Result Value Ref Range    Parathyroid Hormone Intact 445 (H) 15 - 65 pg/mL   Vitamin D Deficiency    Collection Time: 12/19/23  9:53 AM   Result Value Ref Range    Vitamin D, Total (25-Hydroxy) 10 (L) 20 - 50 ng/mL   Quantiferon TB Gold Plus Grey Tube    Collection Time: 12/19/23  9:53 AM    Specimen: Arm, Left; Blood   Result Value Ref Range    Quantiferon Nil Tube 0.02 IU/mL   Quantiferon TB Gold Plus Green Tube    Collection Time: 12/19/23  9:53 AM    Specimen: Arm, Left; Blood   Result Value Ref Range    Quantiferon TB1 Tube 0.04 IU/mL   Quantiferon TB Gold Plus Yellow Tube    Collection Time: 12/19/23  9:53  AM    Specimen: Arm, Left; Blood   Result Value Ref Range    Quantiferon TB2 Tube 0.04    Quantiferon TB Gold Plus Purple Tube    Collection Time: 12/19/23  9:53 AM    Specimen: Arm, Left; Blood   Result Value Ref Range    Quantiferon Mitogen 10.00 IU/mL   Adult Type and Screen    Collection Time: 12/19/23  9:53 AM   Result Value Ref Range    ABO/RH(D) O POS     Antibody Screen Negative Negative    SPECIMEN EXPIRATION DATE [id number]    Phosphorus    Collection Time: 12/19/23  9:53 AM   Result Value Ref Range    Phosphorus 5.1 (H) 2.5 - 4.5 mg/dL   Quantiferon TB Gold Plus    Collection Time: 12/19/23  9:53 AM    Specimen: Arm, Left; Blood   Result Value Ref Range    Quantiferon-TB Gold Plus Negative Negative    TB1 Ag minus Nil Value 0.02 IU/mL    TB2 Ag minus Nil Value 0.02 IU/mL    Mitogen minus Nil Result 9.98 IU/mL    Nil Result 0.02 IU/mL   Hepatitis B Surface Antibody    Collection Time: 12/19/23  9:53 AM   Result Value Ref Range    Hepatitis B Surface Antibody Reactive     Hepatitis B Surface Antibody Instrument Value 13.90 <8.5 m[IU]/mL   HLA Kavin Class I, Single Antigen    Collection Time: 12/19/23  9:53 AM   Result Value Ref Range    SA 1 TEST METHOD SA EDTA FCS     SA 1 CELL Class I     SA1 HI RISK KAVIN None     SA1 MOD RISK KAVIN None     SA 1  COMMENTS        HLA PRA Test performed by modified testing procedure that may also include pretreatment of serum. Pretreatment may be the addition of fetal calf serum, EDTA, and/or adsorption.  High-risk, MFI > 3,000.  Mod-risk, -3,000.   HLA Kavin Class II, Single Antigen    Collection Time: 12/19/23  9:53 AM   Result Value Ref Range    SA 2 TEST METHOD SA EDTA FCS     SA 2 CELL Class II     SA2 HI RISK KAVIN None     SA2 MOD RISK KAVIN None     SA 2 COMMENTS        HLA PRA Test performed by modified testing procedure that may also include pretreatment of serum. Pretreatment may be the addition of fetal calf serum, EDTA, and/or adsorption.  High-risk, MFI >  3,000.  Mod-risk, -3,000.   HLA Dianne, CPRA    Collection Time: 12/19/23  9:53 AM   Result Value Ref Range    PROTOCOL CUTOFF Plan A, 500 mfi cumulative     UNOS CPRA 0     UNACCEPTABLE ANTIGENS None    HLA Flow T/B Crossmatch Allo    Collection Time: 12/19/23  9:53 AM   Result Value Ref Range    Donor Allo ANAMIKA WOLFE     Crossmatch Date Allo 12/19/2023     Donor Cell Date Allo 12/18/2023     Cell Type Allo PBL     TestMethodallo FLOW     serum date allo T1 12/19/2023     result allo T1 Neg     Channel Shift Allo T1 -45     treatment allo T1 Pronase     serum date allo B1 12/19/2023     result allo B1 Neg     Channel Shift Allo B1 -50     treatment allo B1 Pronase     serum date allo T2 10/19/2023     result allo T2 Neg     Channel Shift Allo T2 -45     treatment allo T2 Pronase     serum date allo B2 10/19/2023     result allo B2 Neg     Channel Shift Allo B2 -55     treatment allo B2 Pronase     comment [email or web address]       No donor specific antibody by Flow Single Antigen Beads in serum dates tested.    Pos Cut off allo T >53     Pos Cut off allo B >69    HLA Flow T/B Crossmatch Auto    Collection Time: 12/19/23  9:53 AM   Result Value Ref Range    Donor auto DANITZA HOOPER     Crossmatch Date Auto 12/19/2023     Donor Cell Date Auto 12/19/2023     Cell Type auto PBL     TestMethodauto FLOW     serum date auto T1 12/19/2023     result auto T1 Neg     Channel Shift Auto T1 -62     treatment auto T1 Pronase     serum date auto B1 12/19/2023     result auto B1 Neg     Channel Shift Auto B1 -53     treatment auto B1 Pronase     serum date auto T2 10/19/2023     result auto T2 Neg     Channel Shift Auto T2 -61     treatment auto T2 Pronase     serum date auto B2 10/19/2023     result auto B2 Neg     Channel Shift Auto B2 -52     treatment auto B2 Pronase     Pos Cut off auto T >53     Pos Cut off auto B >69    HLA Donor Specific Antibody    Collection Time: 12/19/23  9:53 AM   Result Value Ref Range    Donor  Identification 12/28/2023     Organ Right Kidney     DSA Present NO     DSA Comments       Baseline pre transplant sera Flow Single Antigen Beads assays are intended for detection/identification of IgG anti-HLA antibodies. Mfi values may not accurately quantify donor-specific antibody levels in all instances.    DSA Test Method SA EDTA FCS    EKG 12-lead complete w/read - Clinics    Collection Time: 12/19/23 10:03 AM   Result Value Ref Range    Systolic Blood Pressure  mmHg    Diastolic Blood Pressure  mmHg    Ventricular Rate 61 BPM    Atrial Rate 61 BPM    ID Interval 144 ms    QRS Duration 84 ms     ms    QTc 412 ms    P Axis 33 degrees    R AXIS 29 degrees    T Axis 18 degrees    Interpretation ECG       Sinus rhythm  Normal ECG  When compared with ECG of 29-MAR-2021 12:08,  No significant change was found  Confirmed by MD JACQUES, JO ([id number]) on 12/20/2023 8:50:42 PM     Albumin Random Urine Quantitative with Creat Ratio    Collection Time: 12/19/23 10:07 AM   Result Value Ref Range    Creatinine Urine mg/dL 29.9 mg/dL    Albumin Urine mg/L 665.0 mg/L    Albumin Urine mg/g Cr 2,224.08 (H) 0.00 - 17.00 mg/g Cr   Routine UA with microscopic    Collection Time: 12/19/23 10:07 AM   Result Value Ref Range    Color Urine Straw Colorless, Straw, Light Yellow, Yellow    Appearance Urine Clear Clear    Glucose Urine 70 (A) Negative mg/dL    Bilirubin Urine Negative Negative    Ketones Urine Negative Negative mg/dL    Specific Gravity Urine 1.003 1.003 - 1.035    Blood Urine Trace (A) Negative    pH Urine 7.5 (H) 5.0 - 7.0    Protein Albumin Urine 100 (A) Negative mg/dL    Urobilinogen Urine Normal Normal, 2.0 mg/dL    Nitrite Urine Negative Negative    Leukocyte Esterase Urine Negative Negative    RBC Urine 0 <=2 /HPF    WBC Urine 1 <=5 /HPF    Squamous Epithelials Urine <1 <=1 /HPF   Urine Culture Aerobic Bacterial    Collection Time: 12/19/23 10:07 AM    Specimen: Urine, Midstream   Result Value Ref Range     Culture No Growth    Asymptomatic COVID-19 Virus (Coronavirus) by PCR Nose    Collection Time: 12/27/23  7:47 AM    Specimen: Nose; Swab   Result Value Ref Range    SARS CoV2 PCR Negative Negative   HIV Antigen Antibody Combo Cascade    Collection Time: 12/28/23 10:43 AM   Result Value Ref Range    HIV Antigen Antibody Combo Nonreactive Nonreactive   Hepatitis B core antibody    Collection Time: 12/28/23 10:43 AM   Result Value Ref Range    Hepatitis B Core Antibody Total Nonreactive Nonreactive   Hepatitis B Surface Antibody    Collection Time: 12/28/23 10:43 AM   Result Value Ref Range    Hepatitis B Surface Antibody Reactive     Hepatitis B Surface Antibody Instrument Value 12.30 <8.5 m[IU]/mL   Hepatitis B surface antigen    Collection Time: 12/28/23 10:43 AM   Result Value Ref Range    Hepatitis B Surface Antigen Nonreactive Nonreactive   Hepatitis C antibody    Collection Time: 12/28/23 10:43 AM   Result Value Ref Range    Hepatitis C Antibody Nonreactive Nonreactive   HBV HCV HIV by NAIN    Collection Time: 12/28/23 10:43 AM   Result Value Ref Range    HEPATITIS B BY NAIN Non-reactive     HCV by NAIN Non-reactive     HIV By Nain Non-reactive    Cytomegalovirus DNA by PCR, Quantitative    Collection Time: 12/28/23 10:43 AM    Specimen: Hand, Left; Blood   Result Value Ref Range    CMV DNA IU/mL Not Detected Not Detected IU/mL   Adult Type and Screen    Collection Time: 12/28/23 10:43 AM   Result Value Ref Range    ABO/RH(D) O POS     Antibody Screen Negative Negative    SPECIMEN EXPIRATION DATE [id number]    Potassium    Collection Time: 12/28/23 10:43 AM   Result Value Ref Range    Potassium 3.8 3.4 - 5.3 mmol/L   Prepare red blood cells (unit)    Collection Time: 12/28/23 12:12 PM   Result Value Ref Range    Blood Component Type Red Blood Cells     Product Code [id number]     Unit Status Released     Unit Number [id number]     CROSSMATCH Compatible     CODING SYSTEM [id number]     UNIT ABO/RH O+     UNIT TYPE  ISBT 5100    Prepare red blood cells (unit)    Collection Time: 12/28/23 12:12 PM   Result Value Ref Range    Blood Component Type Red Blood Cells     Product Code [id number]     Unit Status Released     Unit Number [id number]     CROSSMATCH Compatible     CODING SYSTEM [id number]     UNIT ABO/RH O+     UNIT TYPE ISBT 5100    Glucose by meter    Collection Time: 12/28/23 12:41 PM   Result Value Ref Range    GLUCOSE BY METER POCT 91 70 - 99 mg/dL   Venous Panel POCT    Collection Time: 12/28/23  3:54 PM   Result Value Ref Range    pH Venous POCT 7.42 7.32 - 7.43    pCO2 Venous POCT 41 40 - 50 mm Hg    pO2 Venous POCT 44 25 - 47 mm Hg    Bicarbonate Venous POCT 27 21 - 28 mmol/L    Sodium POCT 143 135 - 145 mmol/L    Potassium POCT 3.4 (L) 3.5 - 5.0 mmol/L    Hemoglobin POCT 10.5 (L) 13.3 - 17.7 g/dL    Glucose Whole Blood POCT 93 70 - 99 mg/dL    Base Excess/Deficit (+/-) POCT 2.0 (H) -8.1 - 1.9 mmol/L    Calcium, Ionized Whole Blood POCT 4.5 4.4 - 5.2 mg/dL    Lactic Acid POCT 0.6 <=2.0 mmol/L    FIO2 POCT 75.0 %   Venous Panel POCT    Collection Time: 12/28/23  6:12 PM   Result Value Ref Range    pH Venous POCT 7.39 7.32 - 7.43    pCO2 Venous POCT 42 40 - 50 mm Hg    pO2 Venous POCT 43 25 - 47 mm Hg    Bicarbonate Venous POCT 25 21 - 28 mmol/L    Sodium POCT 139 135 - 145 mmol/L    Potassium POCT 3.9 3.5 - 5.0 mmol/L    Hemoglobin POCT 10.8 (L) 13.3 - 17.7 g/dL    Glucose Whole Blood POCT 112 (H) 70 - 99 mg/dL    Base Excess/Deficit (+/-) POCT 0.2 -8.1 - 1.9 mmol/L    Calcium, Ionized Whole Blood POCT 4.6 4.4 - 5.2 mg/dL    Lactic Acid POCT 1.1 <=2.0 mmol/L    FIO2 POCT 50.0 %   CBC with platelets    Collection Time: 12/28/23  9:00 PM   Result Value Ref Range    WBC Count 10.7 4.0 - 11.0 10e3/uL    RBC Count 3.43 (L) 4.40 - 5.90 10e6/uL    Hemoglobin 11.2 (L) 13.3 - 17.7 g/dL    Hematocrit 32.3 (L) 40.0 - 53.0 %    MCV 94 78 - 100 fL    MCH 32.7 26.5 - 33.0 pg    MCHC 34.7 31.5 - 36.5 g/dL    RDW 13.2 10.0 - 15.0 %     Platelet Count 225 150 - 450 10e3/uL   Basic metabolic panel    Collection Time: 12/28/23  9:00 PM   Result Value Ref Range    Sodium 137 135 - 145 mmol/L    Potassium 3.8 3.4 - 5.3 mmol/L    Chloride 99 98 - 107 mmol/L    Carbon Dioxide (CO2) 23 22 - 29 mmol/L    Anion Gap 15 7 - 15 mmol/L    Urea Nitrogen 26.5 (H) 6.0 - 20.0 mg/dL    Creatinine 6.78 (H) 0.67 - 1.17 mg/dL    GFR Estimate 11 (L) >60 mL/min/1.73m2    Calcium 9.1 8.6 - 10.0 mg/dL    Glucose 139 (H) 70 - 99 mg/dL   Magnesium    Collection Time: 12/28/23  9:00 PM   Result Value Ref Range    Magnesium 2.0 1.7 - 2.3 mg/dL   Phosphorus    Collection Time: 12/28/23  9:00 PM   Result Value Ref Range    Phosphorus 3.1 2.5 - 4.5 mg/dL   Hemoglobin    Collection Time: 12/28/23 11:56 PM   Result Value Ref Range    Hemoglobin 11.2 (L) 13.3 - 17.7 g/dL   Potassium    Collection Time: 12/28/23 11:56 PM   Result Value Ref Range    Potassium 3.5 3.4 - 5.3 mmol/L   Potassium    Collection Time: 12/29/23  2:07 AM   Result Value Ref Range    Potassium 3.5 3.4 - 5.3 mmol/L   Hemoglobin    Collection Time: 12/29/23  2:07 AM   Result Value Ref Range    Hemoglobin 10.9 (L) 13.3 - 17.7 g/dL   Potassium    Collection Time: 12/29/23  5:54 AM   Result Value Ref Range    Potassium 3.6 3.4 - 5.3 mmol/L   Magnesium    Collection Time: 12/29/23  5:54 AM   Result Value Ref Range    Magnesium 1.9 1.7 - 2.3 mg/dL   Phosphorus    Collection Time: 12/29/23  5:54 AM   Result Value Ref Range    Phosphorus 4.5 2.5 - 4.5 mg/dL   Basic metabolic panel    Collection Time: 12/29/23  5:54 AM   Result Value Ref Range    Sodium 137 135 - 145 mmol/L    Potassium 3.6 3.4 - 5.3 mmol/L    Chloride 102 98 - 107 mmol/L    Carbon Dioxide (CO2) 22 22 - 29 mmol/L    Anion Gap 13 7 - 15 mmol/L    Urea Nitrogen 24.5 (H) 6.0 - 20.0 mg/dL    Creatinine 5.16 (H) 0.67 - 1.17 mg/dL    GFR Estimate 15 (L) >60 mL/min/1.73m2    Calcium 8.5 (L) 8.6 - 10.0 mg/dL    Glucose 136 (H) 70 - 99 mg/dL   CBC with  platelets and differential    Collection Time: 12/29/23  5:54 AM   Result Value Ref Range    WBC Count 10.1 4.0 - 11.0 10e3/uL    RBC Count 3.22 (L) 4.40 - 5.90 10e6/uL    Hemoglobin 10.5 (L) 13.3 - 17.7 g/dL    Hematocrit 32.0 (L) 40.0 - 53.0 %    MCV 99 78 - 100 fL    MCH 32.6 26.5 - 33.0 pg    MCHC 32.8 31.5 - 36.5 g/dL    RDW 12.9 10.0 - 15.0 %    Platelet Count 185 150 - 450 10e3/uL    % Neutrophils 90 %    % Lymphocytes 7 %    % Monocytes 3 %    % Eosinophils 0 %    % Basophils 0 %    % Immature Granulocytes 0 %    NRBCs per 100 WBC 0 <1 /100    Absolute Neutrophils 9.0 (H) 1.6 - 8.3 10e3/uL    Absolute Lymphocytes 0.7 (L) 0.8 - 5.3 10e3/uL    Absolute Monocytes 0.3 0.0 - 1.3 10e3/uL    Absolute Eosinophils 0.0 0.0 - 0.7 10e3/uL    Absolute Basophils 0.0 0.0 - 0.2 10e3/uL    Absolute Immature Granulocytes 0.0 <=0.4 10e3/uL    Absolute NRBCs 0.0 10e3/uL   Hemoglobin    Collection Time: 12/29/23  9:39 AM   Result Value Ref Range    Hemoglobin 10.4 (L) 13.3 - 17.7 g/dL   Potassium    Collection Time: 12/29/23  9:39 AM   Result Value Ref Range    Potassium 3.5 3.4 - 5.3 mmol/L   Hemoglobin    Collection Time: 12/29/23  1:57 PM   Result Value Ref Range    Hemoglobin 10.7 (L) 13.3 - 17.7 g/dL   Potassium    Collection Time: 12/29/23  1:57 PM   Result Value Ref Range    Potassium 3.6 3.4 - 5.3 mmol/L   Hemoglobin    Collection Time: 12/29/23  6:05 PM   Result Value Ref Range    Hemoglobin 10.6 (L) 13.3 - 17.7 g/dL   Potassium    Collection Time: 12/29/23  6:05 PM   Result Value Ref Range    Potassium 3.6 3.4 - 5.3 mmol/L   Magnesium    Collection Time: 12/30/23  5:48 AM   Result Value Ref Range    Magnesium 2.3 1.7 - 2.3 mg/dL   Phosphorus    Collection Time: 12/30/23  5:48 AM   Result Value Ref Range    Phosphorus 4.0 2.5 - 4.5 mg/dL   Basic metabolic panel    Collection Time: 12/30/23  5:48 AM   Result Value Ref Range    Sodium 141 135 - 145 mmol/L    Potassium 3.8 3.4 - 5.3 mmol/L    Chloride 106 98 - 107 mmol/L     Carbon Dioxide (CO2) 24 22 - 29 mmol/L    Anion Gap 11 7 - 15 mmol/L    Urea Nitrogen 25.1 (H) 6.0 - 20.0 mg/dL    Creatinine 2.67 (H) 0.67 - 1.17 mg/dL    GFR Estimate 33 (L) >60 mL/min/1.73m2    Calcium 9.4 8.6 - 10.0 mg/dL    Glucose 158 (H) 70 - 99 mg/dL   CBC with platelets and differential    Collection Time: 12/30/23  5:48 AM   Result Value Ref Range    WBC Count 9.9 4.0 - 11.0 10e3/uL    RBC Count 2.97 (L) 4.40 - 5.90 10e6/uL    Hemoglobin 9.7 (L) 13.3 - 17.7 g/dL    Hematocrit 28.9 (L) 40.0 - 53.0 %    MCV 97 78 - 100 fL    MCH 32.7 26.5 - 33.0 pg    MCHC 33.6 31.5 - 36.5 g/dL    RDW 12.9 10.0 - 15.0 %    Platelet Count 201 150 - 450 10e3/uL    % Neutrophils 81 %    % Lymphocytes 13 %    % Monocytes 6 %    % Eosinophils 0 %    % Basophils 0 %    % Immature Granulocytes 0 %    NRBCs per 100 WBC 0 <1 /100    Absolute Neutrophils 8.0 1.6 - 8.3 10e3/uL    Absolute Lymphocytes 1.3 0.8 - 5.3 10e3/uL    Absolute Monocytes 0.6 0.0 - 1.3 10e3/uL    Absolute Eosinophils 0.0 0.0 - 0.7 10e3/uL    Absolute Basophils 0.0 0.0 - 0.2 10e3/uL    Absolute Immature Granulocytes 0.0 <=0.4 10e3/uL    Absolute NRBCs 0.0 10e3/uL   Tacrolimus by Tandem Mass Spectrometry    Collection Time: 12/31/23  6:02 AM   Result Value Ref Range    Tacrolimus by Tandem Mass Spectrometry 1.5 (L) 5.0 - 15.0 ug/L    Tacrolimus Last Dose Date      Tacrolimus Last Dose Time     Magnesium    Collection Time: 12/31/23  6:02 AM   Result Value Ref Range    Magnesium 2.1 1.7 - 2.3 mg/dL   Phosphorus    Collection Time: 12/31/23  6:02 AM   Result Value Ref Range    Phosphorus 3.8 2.5 - 4.5 mg/dL   Basic metabolic panel    Collection Time: 12/31/23  6:02 AM   Result Value Ref Range    Sodium 140 135 - 145 mmol/L    Potassium 3.3 (L) 3.4 - 5.3 mmol/L    Chloride 106 98 - 107 mmol/L    Carbon Dioxide (CO2) 22 22 - 29 mmol/L    Anion Gap 12 7 - 15 mmol/L    Urea Nitrogen 40.9 (H) 6.0 - 20.0 mg/dL    Creatinine 3.01 (H) 0.67 - 1.17 mg/dL    GFR Estimate 29  (L) >60 mL/min/1.73m2    Calcium 9.3 8.6 - 10.0 mg/dL    Glucose 94 70 - 99 mg/dL   CBC with platelets and differential    Collection Time: 12/31/23  6:02 AM   Result Value Ref Range    WBC Count 8.9 4.0 - 11.0 10e3/uL    RBC Count 2.85 (L) 4.40 - 5.90 10e6/uL    Hemoglobin 9.3 (L) 13.3 - 17.7 g/dL    Hematocrit 28.4 (L) 40.0 - 53.0 %     78 - 100 fL    MCH 32.6 26.5 - 33.0 pg    MCHC 32.7 31.5 - 36.5 g/dL    RDW 13.0 10.0 - 15.0 %    Platelet Count 192 150 - 450 10e3/uL    % Neutrophils 72 %    % Lymphocytes 20 %    % Monocytes 6 %    % Eosinophils 1 %    % Basophils 0 %    % Immature Granulocytes 1 %    NRBCs per 100 WBC 0 <1 /100    Absolute Neutrophils 6.4 1.6 - 8.3 10e3/uL    Absolute Lymphocytes 1.8 0.8 - 5.3 10e3/uL    Absolute Monocytes 0.6 0.0 - 1.3 10e3/uL    Absolute Eosinophils 0.1 0.0 - 0.7 10e3/uL    Absolute Basophils 0.0 0.0 - 0.2 10e3/uL    Absolute Immature Granulocytes 0.1 <=0.4 10e3/uL    Absolute NRBCs 0.0 10e3/uL   Magnesium    Collection Time: 01/01/24  6:34 AM   Result Value Ref Range    Magnesium 2.0 1.7 - 2.3 mg/dL   Phosphorus    Collection Time: 01/01/24  6:34 AM   Result Value Ref Range    Phosphorus 4.1 2.5 - 4.5 mg/dL   Basic metabolic panel    Collection Time: 01/01/24  6:34 AM   Result Value Ref Range    Sodium 136 135 - 145 mmol/L    Potassium 3.2 (L) 3.4 - 5.3 mmol/L    Chloride 103 98 - 107 mmol/L    Carbon Dioxide (CO2) 18 (L) 22 - 29 mmol/L    Anion Gap 15 7 - 15 mmol/L    Urea Nitrogen 58.7 (H) 6.0 - 20.0 mg/dL    Creatinine 4.98 (H) 0.67 - 1.17 mg/dL    GFR Estimate 16 (L) >60 mL/min/1.73m2    Calcium 9.2 8.6 - 10.0 mg/dL    Glucose 103 (H) 70 - 99 mg/dL   CBC with platelets and differential    Collection Time: 01/01/24  6:34 AM   Result Value Ref Range    WBC Count 8.8 4.0 - 11.0 10e3/uL    RBC Count 2.66 (L) 4.40 - 5.90 10e6/uL    Hemoglobin 8.9 (L) 13.3 - 17.7 g/dL    Hematocrit 26.6 (L) 40.0 - 53.0 %     78 - 100 fL    MCH 33.5 (H) 26.5 - 33.0 pg    MCHC 33.5  31.5 - 36.5 g/dL    RDW 13.1 10.0 - 15.0 %    Platelet Count 185 150 - 450 10e3/uL    % Neutrophils 73 %    % Lymphocytes 18 %    % Monocytes 6 %    % Eosinophils 2 %    % Basophils 0 %    % Immature Granulocytes 1 %    NRBCs per 100 WBC 0 <1 /100    Absolute Neutrophils 6.5 1.6 - 8.3 10e3/uL    Absolute Lymphocytes 1.6 0.8 - 5.3 10e3/uL    Absolute Monocytes 0.6 0.0 - 1.3 10e3/uL    Absolute Eosinophils 0.2 0.0 - 0.7 10e3/uL    Absolute Basophils 0.0 0.0 - 0.2 10e3/uL    Absolute Immature Granulocytes 0.1 <=0.4 10e3/uL    Absolute NRBCs 0.0 10e3/uL   Tacrolimus by Tandem Mass Spectrometry    Collection Time: 01/02/24  5:54 AM   Result Value Ref Range    Tacrolimus by Tandem Mass Spectrometry 2.4 (L) 5.0 - 15.0 ug/L    Tacrolimus Last Dose Date      Tacrolimus Last Dose Time     Magnesium    Collection Time: 01/02/24  5:54 AM   Result Value Ref Range    Magnesium 2.5 (H) 1.7 - 2.3 mg/dL   Phosphorus    Collection Time: 01/02/24  5:54 AM   Result Value Ref Range    Phosphorus 5.0 (H) 2.5 - 4.5 mg/dL   Basic metabolic panel    Collection Time: 01/02/24  5:54 AM   Result Value Ref Range    Sodium 137 135 - 145 mmol/L    Potassium 4.3 3.4 - 5.3 mmol/L    Chloride 104 98 - 107 mmol/L    Carbon Dioxide (CO2) 18 (L) 22 - 29 mmol/L    Anion Gap 15 7 - 15 mmol/L    Urea Nitrogen 79.5 (H) 6.0 - 20.0 mg/dL    Creatinine 6.20 (H) 0.67 - 1.17 mg/dL    GFR Estimate 12 (L) >60 mL/min/1.73m2    Calcium 9.6 8.6 - 10.0 mg/dL    Glucose 131 (H) 70 - 99 mg/dL   CBC with platelets and differential    Collection Time: 01/02/24  5:54 AM   Result Value Ref Range    WBC Count 8.9 4.0 - 11.0 10e3/uL    RBC Count 2.91 (L) 4.40 - 5.90 10e6/uL    Hemoglobin 9.5 (L) 13.3 - 17.7 g/dL    Hematocrit 28.0 (L) 40.0 - 53.0 %    MCV 96 78 - 100 fL    MCH 32.6 26.5 - 33.0 pg    MCHC 33.9 31.5 - 36.5 g/dL    RDW 12.8 10.0 - 15.0 %    Platelet Count 229 150 - 450 10e3/uL    % Neutrophils 86 %    % Lymphocytes 10 %    % Monocytes 3 %    % Eosinophils 0 %     % Basophils 0 %    % Immature Granulocytes 1 %    NRBCs per 100 WBC 0 <1 /100    Absolute Neutrophils 7.8 1.6 - 8.3 10e3/uL    Absolute Lymphocytes 0.9 0.8 - 5.3 10e3/uL    Absolute Monocytes 0.2 0.0 - 1.3 10e3/uL    Absolute Eosinophils 0.0 0.0 - 0.7 10e3/uL    Absolute Basophils 0.0 0.0 - 0.2 10e3/uL    Absolute Immature Granulocytes 0.1 <=0.4 10e3/uL    Absolute NRBCs 0.0 10e3/uL   INR    Collection Time: 01/02/24  8:34 AM   Result Value Ref Range    INR 1.17 (H) 0.85 - 1.15   Magnesium    Collection Time: 01/03/24  6:34 AM   Result Value Ref Range    Magnesium 2.5 (H) 1.7 - 2.3 mg/dL   Phosphorus    Collection Time: 01/03/24  6:34 AM   Result Value Ref Range    Phosphorus 5.1 (H) 2.5 - 4.5 mg/dL   Basic metabolic panel    Collection Time: 01/03/24  6:34 AM   Result Value Ref Range    Sodium 139 135 - 145 mmol/L    Potassium 4.5 3.4 - 5.3 mmol/L    Chloride 108 (H) 98 - 107 mmol/L    Carbon Dioxide (CO2) 14 (L) 22 - 29 mmol/L    Anion Gap 17 (H) 7 - 15 mmol/L    Urea Nitrogen 83.5 (H) 6.0 - 20.0 mg/dL    Creatinine 6.35 (H) 0.67 - 1.17 mg/dL    GFR Estimate 12 (L) >60 mL/min/1.73m2    Calcium 9.5 8.6 - 10.0 mg/dL    Glucose 140 (H) 70 - 99 mg/dL   CBC with platelets and differential    Collection Time: 01/03/24  6:34 AM   Result Value Ref Range    WBC Count 9.3 4.0 - 11.0 10e3/uL    RBC Count 2.77 (L) 4.40 - 5.90 10e6/uL    Hemoglobin 9.2 (L) 13.3 - 17.7 g/dL    Hematocrit 27.6 (L) 40.0 - 53.0 %     78 - 100 fL    MCH 33.2 (H) 26.5 - 33.0 pg    MCHC 33.3 31.5 - 36.5 g/dL    RDW 13.2 10.0 - 15.0 %    Platelet Count 210 150 - 450 10e3/uL    % Neutrophils 95 %    % Lymphocytes 2 %    % Monocytes 2 %    % Eosinophils 0 %    % Basophils 0 %    % Immature Granulocytes 1 %    NRBCs per 100 WBC 0 <1 /100    Absolute Neutrophils 8.9 (H) 1.6 - 8.3 10e3/uL    Absolute Lymphocytes 0.2 (L) 0.8 - 5.3 10e3/uL    Absolute Monocytes 0.1 0.0 - 1.3 10e3/uL    Absolute Eosinophils 0.0 0.0 - 0.7 10e3/uL    Absolute  Basophils 0.0 0.0 - 0.2 10e3/uL    Absolute Immature Granulocytes 0.1 <=0.4 10e3/uL    Absolute NRBCs 0.0 10e3/uL     UNOS cPRA   Date Value Ref Range Status   03/29/2021 0  Final     UNOS CPRA   Date Value Ref Range Status   12/19/2023 0  Final        Again, thank you for allowing me to participate in the care of your patient.        Sincerely,        Nita Martinez MD, MD

## 2023-12-20 LAB
ATRIAL RATE - MUSE: 61 BPM
BACTERIA UR CULT: NO GROWTH
CMV IGG SERPL IA-ACNC: 0.53 U/ML
CMV IGG SERPL IA-ACNC: NORMAL
CMV IGM SERPL IA-ACNC: <8 AU/ML
CMV IGM SERPL IA-ACNC: NEGATIVE
DIASTOLIC BLOOD PRESSURE - MUSE: NORMAL MMHG
EBV VCA IGG SER IA-ACNC: 10.6 U/ML
EBV VCA IGG SER IA-ACNC: NORMAL
EBV VCA IGM SER IA-ACNC: <10 U/ML
EBV VCA IGM SER IA-ACNC: NORMAL
GAMMA INTERFERON BACKGROUND BLD IA-ACNC: 0.02 IU/ML
HBV CORE AB SERPL QL IA: NONREACTIVE
HBV SURFACE AB SERPL IA-ACNC: 13.9 M[IU]/ML
HBV SURFACE AB SERPL IA-ACNC: REACTIVE M[IU]/ML
HBV SURFACE AG SERPL QL IA: NONREACTIVE
INTERPRETATION ECG - MUSE: NORMAL
M TB IFN-G BLD-IMP: NEGATIVE
M TB IFN-G CD4+ BCKGRND COR BLD-ACNC: 9.98 IU/ML
MITOGEN IGNF BCKGRD COR BLD-ACNC: 0.02 IU/ML
MITOGEN IGNF BCKGRD COR BLD-ACNC: 0.02 IU/ML
P AXIS - MUSE: 33 DEGREES
PR INTERVAL - MUSE: 144 MS
QRS DURATION - MUSE: 84 MS
QT - MUSE: 410 MS
QTC - MUSE: 412 MS
QUANTIFERON MITOGEN: 10 IU/ML
QUANTIFERON NIL TUBE: 0.02 IU/ML
QUANTIFERON TB1 TUBE: 0.04 IU/ML
QUANTIFERON TB2 TUBE: 0.04
R AXIS - MUSE: 29 DEGREES
SYSTOLIC BLOOD PRESSURE - MUSE: NORMAL MMHG
T AXIS - MUSE: 18 DEGREES
VENTRICULAR RATE- MUSE: 61 BPM

## 2023-12-21 LAB
CELL TYPE ALLO: NORMAL
CELL TYPE AUTO: NORMAL
CHANNELSHIFTALLOB1: -50
CHANNELSHIFTALLOB2: -55
CHANNELSHIFTALLOT1: -45
CHANNELSHIFTALLOT2: -45
CHANNELSHIFTAUTOB1: -53
CHANNELSHIFTAUTOB2: -52
CHANNELSHIFTAUTOT1: -62
CHANNELSHIFTAUTOT2: -61
CROSSMATCHDATEALLO: NORMAL
CROSSMATCHDATEAUTO: NORMAL
DONOR ALLO: NORMAL
DONOR AUTO: NORMAL
DONORCELLDATE ALLO: NORMAL
DONORCELLDATE AUTO: NORMAL
POS CUT OFF ALLO B: >69
POS CUT OFF ALLO T: >53
POS CUT OFF AUTO B: >69
POS CUT OFF AUTO T: >53
PROTOCOL CUTOFF: NORMAL
RESULT ALLO B1: NORMAL
RESULT ALLO B2: NORMAL
RESULT ALLO T1: NORMAL
RESULT ALLO T2: NORMAL
RESULT AUTO B1: NORMAL
RESULT AUTO B2: NORMAL
RESULT AUTO T1: NORMAL
RESULT AUTO T2: NORMAL
SA 1 CELL: NORMAL
SA 1 TEST METHOD: NORMAL
SA 2 CELL: NORMAL
SA 2 TEST METHOD: NORMAL
SA1 HI RISK ABY: NORMAL
SA1 MOD RISK ABY: NORMAL
SA2 HI RISK ABY: NORMAL
SA2 MOD RISK ABY: NORMAL
SERUM DATE ALLO B1: NORMAL
SERUM DATE ALLO B2: NORMAL
SERUM DATE ALLO T1: NORMAL
SERUM DATE ALLO T2: NORMAL
SERUM DATE AUTO B1: NORMAL
SERUM DATE AUTO B2: NORMAL
SERUM DATE AUTO T1: NORMAL
SERUM DATE AUTO T2: NORMAL
TESTMETHODALLO: NORMAL
TESTMETHODAUTO: NORMAL
TREATMENT ALLO B1: NORMAL
TREATMENT ALLO B2: NORMAL
TREATMENT ALLO T1: NORMAL
TREATMENT ALLO T2: NORMAL
TREATMENT AUTO B1: NORMAL
TREATMENT AUTO B2: NORMAL
TREATMENT AUTO T1: NORMAL
TREATMENT AUTO T2: NORMAL
UNACCEPTABLE ANTIGENS: NORMAL
UNOS CPRA: 0
ZZZCOMMENT ALLOB2: NORMAL
ZZZSA 1  COMMENTS: NORMAL
ZZZSA 2 COMMENTS: NORMAL

## 2023-12-27 ENCOUNTER — LAB (OUTPATIENT)
Dept: LAB | Facility: CLINIC | Age: 23
End: 2023-12-27
Payer: COMMERCIAL

## 2023-12-27 ENCOUNTER — ANCILLARY PROCEDURE (OUTPATIENT)
Dept: GENERAL RADIOLOGY | Facility: CLINIC | Age: 23
End: 2023-12-27
Attending: SURGERY
Payer: COMMERCIAL

## 2023-12-27 ENCOUNTER — ANESTHESIA EVENT (OUTPATIENT)
Dept: SURGERY | Facility: CLINIC | Age: 23
DRG: 652 | End: 2023-12-27
Payer: COMMERCIAL

## 2023-12-27 ENCOUNTER — DOCUMENTATION ONLY (OUTPATIENT)
Dept: TRANSPLANT | Facility: CLINIC | Age: 23
End: 2023-12-27

## 2023-12-27 ENCOUNTER — TELEPHONE (OUTPATIENT)
Dept: TRANSPLANT | Facility: CLINIC | Age: 23
End: 2023-12-27

## 2023-12-27 DIAGNOSIS — N18.6 ESRD (END STAGE RENAL DISEASE) (H): ICD-10-CM

## 2023-12-27 DIAGNOSIS — R73.03 PRE-DIABETES: ICD-10-CM

## 2023-12-27 DIAGNOSIS — Z76.82 AWAITING ORGAN TRANSPLANT: ICD-10-CM

## 2023-12-27 DIAGNOSIS — Z76.82 ORGAN TRANSPLANT CANDIDATE: ICD-10-CM

## 2023-12-27 LAB — SARS-COV-2 RNA RESP QL NAA+PROBE: NEGATIVE

## 2023-12-27 PROCEDURE — 87635 SARS-COV-2 COVID-19 AMP PRB: CPT | Performed by: SURGERY

## 2023-12-27 PROCEDURE — 71046 X-RAY EXAM CHEST 2 VIEWS: CPT | Performed by: RADIOLOGY

## 2023-12-27 PROCEDURE — 99000 SPECIMEN HANDLING OFFICE-LAB: CPT | Performed by: PATHOLOGY

## 2023-12-27 ASSESSMENT — ENCOUNTER SYMPTOMS: SEIZURES: 0

## 2023-12-28 ENCOUNTER — APPOINTMENT (OUTPATIENT)
Dept: ULTRASOUND IMAGING | Facility: CLINIC | Age: 23
DRG: 652 | End: 2023-12-28
Attending: SURGERY
Payer: COMMERCIAL

## 2023-12-28 ENCOUNTER — DOCUMENTATION ONLY (OUTPATIENT)
Dept: TRANSPLANT | Facility: CLINIC | Age: 23
End: 2023-12-28
Payer: COMMERCIAL

## 2023-12-28 ENCOUNTER — ANESTHESIA (OUTPATIENT)
Dept: SURGERY | Facility: CLINIC | Age: 23
DRG: 652 | End: 2023-12-28
Payer: COMMERCIAL

## 2023-12-28 ENCOUNTER — HOSPITAL ENCOUNTER (INPATIENT)
Facility: CLINIC | Age: 23
LOS: 8 days | Discharge: HOME | DRG: 652 | End: 2024-01-05
Attending: SURGERY | Admitting: SURGERY
Payer: COMMERCIAL

## 2023-12-28 ENCOUNTER — APPOINTMENT (OUTPATIENT)
Dept: GENERAL RADIOLOGY | Facility: CLINIC | Age: 23
DRG: 652 | End: 2023-12-28
Attending: SURGERY
Payer: COMMERCIAL

## 2023-12-28 DIAGNOSIS — I12.9 HYPERTENSION, RENAL: ICD-10-CM

## 2023-12-28 DIAGNOSIS — Z94.0 KIDNEY REPLACED BY TRANSPLANT: Chronic | ICD-10-CM

## 2023-12-28 DIAGNOSIS — N18.6 END STAGE KIDNEY DISEASE (H): Primary | ICD-10-CM

## 2023-12-28 DIAGNOSIS — N18.5 CKD (CHRONIC KIDNEY DISEASE) STAGE 5, GFR LESS THAN 15 ML/MIN (H): ICD-10-CM

## 2023-12-28 LAB
ABO/RH(D): NORMAL
ANION GAP SERPL CALCULATED.3IONS-SCNC: 15 MMOL/L (ref 7–15)
ANTIBODY SCREEN: NEGATIVE
BASE EXCESS BLDV CALC-SCNC: 0.2 MMOL/L (ref -8.1–1.9)
BASE EXCESS BLDV CALC-SCNC: 2 MMOL/L (ref -8.1–1.9)
BLD PROD TYP BPU: NORMAL
BLD PROD TYP BPU: NORMAL
BLOOD COMPONENT TYPE: NORMAL
BLOOD COMPONENT TYPE: NORMAL
BUN SERPL-MCNC: 26.5 MG/DL (ref 6–20)
CA-I BLD-MCNC: 4.5 MG/DL (ref 4.4–5.2)
CA-I BLD-MCNC: 4.6 MG/DL (ref 4.4–5.2)
CALCIUM SERPL-MCNC: 9.1 MG/DL (ref 8.6–10)
CHLORIDE SERPL-SCNC: 99 MMOL/L (ref 98–107)
CMV DNA SPEC NAA+PROBE-ACNC: NOT DETECTED IU/ML
CODING SYSTEM: NORMAL
CODING SYSTEM: NORMAL
CREAT SERPL-MCNC: 6.78 MG/DL (ref 0.67–1.17)
CROSSMATCH: NORMAL
CROSSMATCH: NORMAL
DEPRECATED HCO3 PLAS-SCNC: 23 MMOL/L (ref 22–29)
EGFRCR SERPLBLD CKD-EPI 2021: 11 ML/MIN/1.73M2
ERYTHROCYTE [DISTWIDTH] IN BLOOD BY AUTOMATED COUNT: 13.2 % (ref 10–15)
GLUCOSE BLD-MCNC: 112 MG/DL (ref 70–99)
GLUCOSE BLD-MCNC: 93 MG/DL (ref 70–99)
GLUCOSE BLDC GLUCOMTR-MCNC: 91 MG/DL (ref 70–99)
GLUCOSE SERPL-MCNC: 139 MG/DL (ref 70–99)
HBV CORE AB SERPL QL IA: NONREACTIVE
HBV SURFACE AB SERPL IA-ACNC: 12.3 M[IU]/ML
HBV SURFACE AB SERPL IA-ACNC: REACTIVE M[IU]/ML
HBV SURFACE AG SERPL QL IA: NONREACTIVE
HCO3 BLDV-SCNC: 25 MMOL/L (ref 21–28)
HCO3 BLDV-SCNC: 27 MMOL/L (ref 21–28)
HCT VFR BLD AUTO: 32.3 % (ref 40–53)
HCV AB SERPL QL IA: NONREACTIVE
HGB BLD-MCNC: 10.5 G/DL (ref 13.3–17.7)
HGB BLD-MCNC: 10.8 G/DL (ref 13.3–17.7)
HGB BLD-MCNC: 11.2 G/DL (ref 13.3–17.7)
HIV 1+2 AB+HIV1 P24 AG SERPL QL IA: NONREACTIVE
LACTATE BLD-SCNC: 0.6 MMOL/L
LACTATE BLD-SCNC: 1.1 MMOL/L
MAGNESIUM SERPL-MCNC: 2 MG/DL (ref 1.7–2.3)
MCH RBC QN AUTO: 32.7 PG (ref 26.5–33)
MCHC RBC AUTO-ENTMCNC: 34.7 G/DL (ref 31.5–36.5)
MCV RBC AUTO: 94 FL (ref 78–100)
O2/TOTAL GAS SETTING VFR VENT: 50 %
O2/TOTAL GAS SETTING VFR VENT: 75 %
PCO2 BLDV: 41 MM HG (ref 40–50)
PCO2 BLDV: 42 MM HG (ref 40–50)
PH BLDV: 7.39 [PH] (ref 7.32–7.43)
PH BLDV: 7.42 [PH] (ref 7.32–7.43)
PHOSPHATE SERPL-MCNC: 3.1 MG/DL (ref 2.5–4.5)
PLATELET # BLD AUTO: 225 10E3/UL (ref 150–450)
PO2 BLDV: 43 MM HG (ref 25–47)
PO2 BLDV: 44 MM HG (ref 25–47)
POTASSIUM BLD-SCNC: 3.4 MMOL/L (ref 3.5–5)
POTASSIUM BLD-SCNC: 3.9 MMOL/L (ref 3.5–5)
POTASSIUM SERPL-SCNC: 3.8 MMOL/L (ref 3.4–5.3)
POTASSIUM SERPL-SCNC: 3.8 MMOL/L (ref 3.4–5.3)
RBC # BLD AUTO: 3.43 10E6/UL (ref 4.4–5.9)
SODIUM BLD-SCNC: 139 MMOL/L (ref 135–145)
SODIUM BLD-SCNC: 143 MMOL/L (ref 135–145)
SODIUM SERPL-SCNC: 137 MMOL/L (ref 135–145)
SPECIMEN EXPIRATION DATE: NORMAL
UNIT ABO/RH: NORMAL
UNIT ABO/RH: NORMAL
UNIT NUMBER: NORMAL
UNIT NUMBER: NORMAL
UNIT STATUS: NORMAL
UNIT STATUS: NORMAL
UNIT TYPE ISBT: 5100
UNIT TYPE ISBT: 5100
WBC # BLD AUTO: 10.7 10E3/UL (ref 4–11)

## 2023-12-28 PROCEDURE — 84132 ASSAY OF SERUM POTASSIUM: CPT | Performed by: SURGERY

## 2023-12-28 PROCEDURE — 86790 VIRUS ANTIBODY NOS: CPT | Performed by: NURSE PRACTITIONER

## 2023-12-28 PROCEDURE — 50325 PREP DONOR RENAL GRAFT: CPT | Mod: RT | Performed by: SURGERY

## 2023-12-28 PROCEDURE — 999N000248 HC STATISTIC IV INSERT WITH US BY RN

## 2023-12-28 PROCEDURE — 71045 X-RAY EXAM CHEST 1 VIEW: CPT | Mod: 26 | Performed by: RADIOLOGY

## 2023-12-28 PROCEDURE — 0TY00Z0 TRANSPLANTATION OF RIGHT KIDNEY, ALLOGENEIC, OPEN APPROACH: ICD-10-PCS | Performed by: SURGERY

## 2023-12-28 PROCEDURE — 85018 HEMOGLOBIN: CPT | Performed by: SURGERY

## 2023-12-28 PROCEDURE — 83735 ASSAY OF MAGNESIUM: CPT | Performed by: SURGERY

## 2023-12-28 PROCEDURE — 250N000009 HC RX 250: Performed by: STUDENT IN AN ORGANIZED HEALTH CARE EDUCATION/TRAINING PROGRAM

## 2023-12-28 PROCEDURE — 258N000003 HC RX IP 258 OP 636: Performed by: SURGERY

## 2023-12-28 PROCEDURE — 999N000141 HC STATISTIC PRE-PROCEDURE NURSING ASSESSMENT: Performed by: SURGERY

## 2023-12-28 PROCEDURE — 82803 BLOOD GASES ANY COMBINATION: CPT

## 2023-12-28 PROCEDURE — 250N000009 HC RX 250: Performed by: NURSE ANESTHETIST, CERTIFIED REGISTERED

## 2023-12-28 PROCEDURE — 87340 HEPATITIS B SURFACE AG IA: CPT | Performed by: NURSE PRACTITIONER

## 2023-12-28 PROCEDURE — 999N000065 XR CHEST PORT 1 VIEW

## 2023-12-28 PROCEDURE — 36591 DRAW BLOOD OFF VENOUS DEVICE: CPT | Performed by: SURGERY

## 2023-12-28 PROCEDURE — C2617 STENT, NON-COR, TEM W/O DEL: HCPCS | Performed by: SURGERY

## 2023-12-28 PROCEDURE — 120N000011 HC R&B TRANSPLANT UMMC

## 2023-12-28 PROCEDURE — 250N000011 HC RX IP 250 OP 636: Performed by: NURSE PRACTITIONER

## 2023-12-28 PROCEDURE — 360N000077 HC SURGERY LEVEL 4, PER MIN: Performed by: SURGERY

## 2023-12-28 PROCEDURE — 85027 COMPLETE CBC AUTOMATED: CPT | Performed by: SURGERY

## 2023-12-28 PROCEDURE — 86803 HEPATITIS C AB TEST: CPT | Performed by: NURSE PRACTITIONER

## 2023-12-28 PROCEDURE — 250N000011 HC RX IP 250 OP 636: Performed by: SURGERY

## 2023-12-28 PROCEDURE — 250N000011 HC RX IP 250 OP 636: Performed by: NURSE ANESTHETIST, CERTIFIED REGISTERED

## 2023-12-28 PROCEDURE — 86704 HEP B CORE ANTIBODY TOTAL: CPT | Performed by: NURSE PRACTITIONER

## 2023-12-28 PROCEDURE — 84295 ASSAY OF SERUM SODIUM: CPT

## 2023-12-28 PROCEDURE — 370N000017 HC ANESTHESIA TECHNICAL FEE, PER MIN: Performed by: SURGERY

## 2023-12-28 PROCEDURE — 36415 COLL VENOUS BLD VENIPUNCTURE: CPT | Performed by: NURSE PRACTITIONER

## 2023-12-28 PROCEDURE — 84295 ASSAY OF SERUM SODIUM: CPT | Performed by: SURGERY

## 2023-12-28 PROCEDURE — 86900 BLOOD TYPING SEROLOGIC ABO: CPT | Performed by: NURSE PRACTITIONER

## 2023-12-28 PROCEDURE — 50360 RNL ALTRNSPLJ W/O RCP NFRCT: CPT | Mod: RT | Performed by: SURGERY

## 2023-12-28 PROCEDURE — 272N000001 HC OR GENERAL SUPPLY STERILE: Performed by: SURGERY

## 2023-12-28 PROCEDURE — 76776 US EXAM K TRANSPL W/DOPPLER: CPT | Mod: 26 | Performed by: RADIOLOGY

## 2023-12-28 PROCEDURE — 87389 HIV-1 AG W/HIV-1&-2 AB AG IA: CPT | Performed by: NURSE PRACTITIONER

## 2023-12-28 PROCEDURE — 811N000001 HC ACQUISITION KIDNEY LIVING DONOR

## 2023-12-28 PROCEDURE — 87516 HEPATITIS B DNA AMP PROBE: CPT | Performed by: NURSE PRACTITIONER

## 2023-12-28 PROCEDURE — 86923 COMPATIBILITY TEST ELECTRIC: CPT | Performed by: NURSE PRACTITIONER

## 2023-12-28 PROCEDURE — 84132 ASSAY OF SERUM POTASSIUM: CPT | Performed by: STUDENT IN AN ORGANIZED HEALTH CARE EDUCATION/TRAINING PROGRAM

## 2023-12-28 PROCEDURE — 86706 HEP B SURFACE ANTIBODY: CPT | Performed by: NURSE PRACTITIONER

## 2023-12-28 PROCEDURE — 250N000009 HC RX 250: Performed by: SURGERY

## 2023-12-28 PROCEDURE — 250N000025 HC SEVOFLURANE, PER MIN: Performed by: SURGERY

## 2023-12-28 PROCEDURE — 76776 US EXAM K TRANSPL W/DOPPLER: CPT

## 2023-12-28 PROCEDURE — 84100 ASSAY OF PHOSPHORUS: CPT | Performed by: SURGERY

## 2023-12-28 PROCEDURE — 710N000011 HC RECOVERY PHASE 1, LEVEL 3, PER MIN: Performed by: SURGERY

## 2023-12-28 PROCEDURE — 258N000003 HC RX IP 258 OP 636: Performed by: NURSE PRACTITIONER

## 2023-12-28 PROCEDURE — 250N000011 HC RX IP 250 OP 636: Performed by: STUDENT IN AN ORGANIZED HEALTH CARE EDUCATION/TRAINING PROGRAM

## 2023-12-28 DEVICE — SOF-FLEX DOUBLE PIGTAIL URETERAL STENT SET
Type: IMPLANTABLE DEVICE | Site: URETER | Status: NON-FUNCTIONAL
Brand: SOF-FLEX
Removed: 2024-02-12

## 2023-12-28 RX ORDER — ASPIRIN 81 MG/1
81 TABLET ORAL DAILY
Status: DISCONTINUED | OUTPATIENT
Start: 2023-12-29 | End: 2024-01-05 | Stop reason: HOSPADM

## 2023-12-28 RX ORDER — SODIUM CHLORIDE, SODIUM LACTATE, POTASSIUM CHLORIDE, CALCIUM CHLORIDE 600; 310; 30; 20 MG/100ML; MG/100ML; MG/100ML; MG/100ML
INJECTION, SOLUTION INTRAVENOUS CONTINUOUS
Status: DISCONTINUED | OUTPATIENT
Start: 2023-12-28 | End: 2023-12-28

## 2023-12-28 RX ORDER — LIDOCAINE HYDROCHLORIDE 20 MG/ML
INJECTION, SOLUTION INFILTRATION; PERINEURAL PRN
Status: DISCONTINUED | OUTPATIENT
Start: 2023-12-28 | End: 2023-12-28

## 2023-12-28 RX ORDER — MAGNESIUM OXIDE 400 MG/1
400 TABLET ORAL
Status: DISCONTINUED | OUTPATIENT
Start: 2023-12-30 | End: 2023-12-30

## 2023-12-28 RX ORDER — HEPARIN SODIUM 1000 [USP'U]/ML
INJECTION, SOLUTION INTRAVENOUS; SUBCUTANEOUS PRN
Status: DISCONTINUED | OUTPATIENT
Start: 2023-12-28 | End: 2023-12-28

## 2023-12-28 RX ORDER — TACROLIMUS 1 MG/1
3 CAPSULE ORAL
Status: DISCONTINUED | OUTPATIENT
Start: 2023-12-28 | End: 2023-12-31

## 2023-12-28 RX ORDER — CEFUROXIME SODIUM 1.5 G/16ML
1.5 INJECTION, POWDER, FOR SOLUTION INTRAVENOUS SEE ADMIN INSTRUCTIONS
Status: DISCONTINUED | OUTPATIENT
Start: 2023-12-28 | End: 2023-12-28 | Stop reason: HOSPADM

## 2023-12-28 RX ORDER — OXYCODONE HYDROCHLORIDE 10 MG/1
10 TABLET ORAL EVERY 4 HOURS PRN
Status: DISCONTINUED | OUTPATIENT
Start: 2023-12-28 | End: 2023-12-29

## 2023-12-28 RX ORDER — ONDANSETRON 4 MG/1
4 TABLET, ORALLY DISINTEGRATING ORAL EVERY 30 MIN PRN
Status: DISCONTINUED | OUTPATIENT
Start: 2023-12-28 | End: 2023-12-28 | Stop reason: DRUGHIGH

## 2023-12-28 RX ORDER — HYDROMORPHONE HCL IN WATER/PF 6 MG/30 ML
0.2 PATIENT CONTROLLED ANALGESIA SYRINGE INTRAVENOUS EVERY 5 MIN PRN
Status: DISCONTINUED | OUTPATIENT
Start: 2023-12-28 | End: 2023-12-28

## 2023-12-28 RX ORDER — FUROSEMIDE 10 MG/ML
INJECTION INTRAMUSCULAR; INTRAVENOUS PRN
Status: DISCONTINUED | OUTPATIENT
Start: 2023-12-28 | End: 2023-12-28

## 2023-12-28 RX ORDER — SODIUM CHLORIDE 9 MG/ML
1000 INJECTION, SOLUTION INTRAVENOUS CONTINUOUS PRN
Status: DISCONTINUED | OUTPATIENT
Start: 2023-12-28 | End: 2023-12-29

## 2023-12-28 RX ORDER — MANNITOL 20 G/100ML
INJECTION, SOLUTION INTRAVENOUS PRN
Status: DISCONTINUED | OUTPATIENT
Start: 2023-12-28 | End: 2023-12-28

## 2023-12-28 RX ORDER — ONDANSETRON 4 MG/1
4 TABLET, ORALLY DISINTEGRATING ORAL EVERY 6 HOURS PRN
Status: DISCONTINUED | OUTPATIENT
Start: 2023-12-28 | End: 2024-01-05 | Stop reason: HOSPADM

## 2023-12-28 RX ORDER — FENTANYL CITRATE 50 UG/ML
25 INJECTION, SOLUTION INTRAMUSCULAR; INTRAVENOUS EVERY 5 MIN PRN
Status: DISCONTINUED | OUTPATIENT
Start: 2023-12-28 | End: 2023-12-28

## 2023-12-28 RX ORDER — ACETAMINOPHEN 325 MG/1
975 TABLET ORAL EVERY 8 HOURS
Qty: 27 TABLET | Refills: 0 | Status: COMPLETED | OUTPATIENT
Start: 2023-12-28 | End: 2023-12-31

## 2023-12-28 RX ORDER — CEFUROXIME SODIUM 1.5 G/16ML
1.5 INJECTION, POWDER, FOR SOLUTION INTRAVENOUS ONCE
Status: COMPLETED | OUTPATIENT
Start: 2023-12-28 | End: 2023-12-28

## 2023-12-28 RX ORDER — FUROSEMIDE 10 MG/ML
40 INJECTION INTRAMUSCULAR; INTRAVENOUS
Status: DISCONTINUED | OUTPATIENT
Start: 2023-12-29 | End: 2023-12-29

## 2023-12-28 RX ORDER — ATORVASTATIN CALCIUM 10 MG/1
10 TABLET, FILM COATED ORAL DAILY
Status: DISCONTINUED | OUTPATIENT
Start: 2023-12-29 | End: 2024-01-05 | Stop reason: HOSPADM

## 2023-12-28 RX ORDER — ONDANSETRON 2 MG/ML
4 INJECTION INTRAMUSCULAR; INTRAVENOUS EVERY 6 HOURS PRN
Status: DISCONTINUED | OUTPATIENT
Start: 2023-12-28 | End: 2023-12-30

## 2023-12-28 RX ORDER — FENTANYL CITRATE 50 UG/ML
INJECTION, SOLUTION INTRAMUSCULAR; INTRAVENOUS PRN
Status: DISCONTINUED | OUTPATIENT
Start: 2023-12-28 | End: 2023-12-28

## 2023-12-28 RX ORDER — OXYCODONE HYDROCHLORIDE 5 MG/1
5 TABLET ORAL EVERY 4 HOURS PRN
Status: DISCONTINUED | OUTPATIENT
Start: 2023-12-28 | End: 2023-12-29

## 2023-12-28 RX ORDER — SULFAMETHOXAZOLE AND TRIMETHOPRIM 400; 80 MG/1; MG/1
1 TABLET ORAL DAILY
Status: DISCONTINUED | OUTPATIENT
Start: 2023-12-29 | End: 2024-01-02

## 2023-12-28 RX ORDER — HYDROMORPHONE HCL IN WATER/PF 6 MG/30 ML
0.2 PATIENT CONTROLLED ANALGESIA SYRINGE INTRAVENOUS
Status: DISCONTINUED | OUTPATIENT
Start: 2023-12-28 | End: 2023-12-29

## 2023-12-28 RX ORDER — SODIUM CHLORIDE, SODIUM GLUCONATE, SODIUM ACETATE, POTASSIUM CHLORIDE AND MAGNESIUM CHLORIDE 526; 502; 368; 37; 30 MG/100ML; MG/100ML; MG/100ML; MG/100ML; MG/100ML
INJECTION, SOLUTION INTRAVENOUS CONTINUOUS PRN
Status: DISCONTINUED | OUTPATIENT
Start: 2023-12-28 | End: 2023-12-28

## 2023-12-28 RX ORDER — PROPOFOL 10 MG/ML
INJECTION, EMULSION INTRAVENOUS PRN
Status: DISCONTINUED | OUTPATIENT
Start: 2023-12-28 | End: 2023-12-28

## 2023-12-28 RX ORDER — AMOXICILLIN 250 MG
1 CAPSULE ORAL 2 TIMES DAILY
Status: DISCONTINUED | OUTPATIENT
Start: 2023-12-28 | End: 2024-01-05 | Stop reason: HOSPADM

## 2023-12-28 RX ORDER — METHOCARBAMOL 750 MG/1
750 TABLET, FILM COATED ORAL EVERY 6 HOURS PRN
Status: DISCONTINUED | OUTPATIENT
Start: 2023-12-28 | End: 2023-12-29

## 2023-12-28 RX ORDER — HYDROMORPHONE HCL IN WATER/PF 6 MG/30 ML
0.4 PATIENT CONTROLLED ANALGESIA SYRINGE INTRAVENOUS EVERY 5 MIN PRN
Status: DISCONTINUED | OUTPATIENT
Start: 2023-12-28 | End: 2023-12-28

## 2023-12-28 RX ORDER — LIDOCAINE 40 MG/G
CREAM TOPICAL
Status: DISCONTINUED | OUTPATIENT
Start: 2023-12-28 | End: 2023-12-28 | Stop reason: HOSPADM

## 2023-12-28 RX ORDER — HYDROMORPHONE HCL IN WATER/PF 6 MG/30 ML
0.4 PATIENT CONTROLLED ANALGESIA SYRINGE INTRAVENOUS
Status: DISCONTINUED | OUTPATIENT
Start: 2023-12-28 | End: 2023-12-29

## 2023-12-28 RX ORDER — ONDANSETRON 2 MG/ML
INJECTION INTRAMUSCULAR; INTRAVENOUS PRN
Status: DISCONTINUED | OUTPATIENT
Start: 2023-12-28 | End: 2023-12-28

## 2023-12-28 RX ORDER — VERAPAMIL HYDROCHLORIDE 2.5 MG/ML
INJECTION, SOLUTION INTRAVENOUS PRN
Status: DISCONTINUED | OUTPATIENT
Start: 2023-12-28 | End: 2023-12-28 | Stop reason: HOSPADM

## 2023-12-28 RX ORDER — BISACODYL 10 MG
10 SUPPOSITORY, RECTAL RECTAL DAILY PRN
Status: DISCONTINUED | OUTPATIENT
Start: 2023-12-28 | End: 2024-01-05 | Stop reason: HOSPADM

## 2023-12-28 RX ORDER — FENTANYL CITRATE 50 UG/ML
50 INJECTION, SOLUTION INTRAMUSCULAR; INTRAVENOUS EVERY 5 MIN PRN
Status: DISCONTINUED | OUTPATIENT
Start: 2023-12-28 | End: 2023-12-28

## 2023-12-28 RX ORDER — VALGANCICLOVIR 450 MG/1
450 TABLET, FILM COATED ORAL
Status: DISCONTINUED | OUTPATIENT
Start: 2023-12-28 | End: 2023-12-29

## 2023-12-28 RX ORDER — POLYETHYLENE GLYCOL 3350 17 G/17G
17 POWDER, FOR SOLUTION ORAL DAILY
Status: DISCONTINUED | OUTPATIENT
Start: 2023-12-29 | End: 2024-01-05 | Stop reason: HOSPADM

## 2023-12-28 RX ORDER — SODIUM CHLORIDE, SODIUM LACTATE, POTASSIUM CHLORIDE, CALCIUM CHLORIDE 600; 310; 30; 20 MG/100ML; MG/100ML; MG/100ML; MG/100ML
INJECTION, SOLUTION INTRAVENOUS CONTINUOUS
Status: DISCONTINUED | OUTPATIENT
Start: 2023-12-28 | End: 2023-12-28 | Stop reason: HOSPADM

## 2023-12-28 RX ORDER — PROCHLORPERAZINE MALEATE 5 MG
10 TABLET ORAL EVERY 6 HOURS PRN
Status: DISCONTINUED | OUTPATIENT
Start: 2023-12-28 | End: 2024-01-05 | Stop reason: HOSPADM

## 2023-12-28 RX ORDER — SODIUM CHLORIDE 450 MG/100ML
INJECTION, SOLUTION INTRAVENOUS CONTINUOUS PRN
Status: DISCONTINUED | OUTPATIENT
Start: 2023-12-28 | End: 2023-12-29

## 2023-12-28 RX ORDER — ACETAMINOPHEN 325 MG/1
650 TABLET ORAL EVERY 4 HOURS PRN
Status: DISCONTINUED | OUTPATIENT
Start: 2023-12-31 | End: 2024-01-05 | Stop reason: HOSPADM

## 2023-12-28 RX ORDER — MYCOPHENOLATE MOFETIL 250 MG/1
750 CAPSULE ORAL
Status: DISCONTINUED | OUTPATIENT
Start: 2023-12-28 | End: 2024-01-05 | Stop reason: HOSPADM

## 2023-12-28 RX ORDER — ONDANSETRON 2 MG/ML
4 INJECTION INTRAMUSCULAR; INTRAVENOUS EVERY 30 MIN PRN
Status: DISCONTINUED | OUTPATIENT
Start: 2023-12-28 | End: 2023-12-28 | Stop reason: DRUGHIGH

## 2023-12-28 RX ORDER — DEXTROSE, SODIUM CHLORIDE, SODIUM LACTATE, POTASSIUM CHLORIDE, AND CALCIUM CHLORIDE 5; .6; .31; .03; .02 G/100ML; G/100ML; G/100ML; G/100ML; G/100ML
INJECTION, SOLUTION INTRAVENOUS CONTINUOUS
Status: DISCONTINUED | OUTPATIENT
Start: 2023-12-28 | End: 2023-12-30

## 2023-12-28 RX ADMIN — CEFUROXIME 1.5 G: 1.5 INJECTION, POWDER, FOR SOLUTION INTRAVENOUS at 14:28

## 2023-12-28 RX ADMIN — FENTANYL CITRATE 25 MCG: 50 INJECTION INTRAMUSCULAR; INTRAVENOUS at 20:55

## 2023-12-28 RX ADMIN — SODIUM CHLORIDE: 9 INJECTION, SOLUTION INTRAVENOUS at 21:02

## 2023-12-28 RX ADMIN — HYDROMORPHONE HYDROCHLORIDE 0.2 MG: 0.2 INJECTION, SOLUTION INTRAMUSCULAR; INTRAVENOUS; SUBCUTANEOUS at 21:43

## 2023-12-28 RX ADMIN — Medication 20 MG: at 16:16

## 2023-12-28 RX ADMIN — Medication 30 MG: at 18:08

## 2023-12-28 RX ADMIN — SODIUM CHLORIDE 500 MG: 9 INJECTION, SOLUTION INTRAVENOUS at 15:02

## 2023-12-28 RX ADMIN — FENTANYL CITRATE 25 MCG: 50 INJECTION INTRAMUSCULAR; INTRAVENOUS at 20:32

## 2023-12-28 RX ADMIN — SODIUM CHLORIDE, SODIUM LACTATE, POTASSIUM CHLORIDE, CALCIUM CHLORIDE AND DEXTROSE MONOHYDRATE: 5; 600; 310; 30; 20 INJECTION, SOLUTION INTRAVENOUS at 20:50

## 2023-12-28 RX ADMIN — FENTANYL CITRATE 50 MCG: 50 INJECTION INTRAMUSCULAR; INTRAVENOUS at 18:54

## 2023-12-28 RX ADMIN — HYDROMORPHONE HYDROCHLORIDE 0.5 MG: 1 INJECTION, SOLUTION INTRAMUSCULAR; INTRAVENOUS; SUBCUTANEOUS at 19:37

## 2023-12-28 RX ADMIN — HYDROMORPHONE HYDROCHLORIDE 0.2 MG: 0.2 INJECTION, SOLUTION INTRAMUSCULAR; INTRAVENOUS; SUBCUTANEOUS at 22:16

## 2023-12-28 RX ADMIN — FENTANYL CITRATE 25 MCG: 50 INJECTION INTRAMUSCULAR; INTRAVENOUS at 21:07

## 2023-12-28 RX ADMIN — SODIUM CHLORIDE, SODIUM GLUCONATE, SODIUM ACETATE, POTASSIUM CHLORIDE AND MAGNESIUM CHLORIDE: 526; 502; 368; 37; 30 INJECTION, SOLUTION INTRAVENOUS at 14:14

## 2023-12-28 RX ADMIN — Medication 50 MG: at 14:22

## 2023-12-28 RX ADMIN — SODIUM CHLORIDE, SODIUM GLUCONATE, SODIUM ACETATE, POTASSIUM CHLORIDE AND MAGNESIUM CHLORIDE: 526; 502; 368; 37; 30 INJECTION, SOLUTION INTRAVENOUS at 15:06

## 2023-12-28 RX ADMIN — FENTANYL CITRATE 25 MCG: 50 INJECTION INTRAMUSCULAR; INTRAVENOUS at 21:30

## 2023-12-28 RX ADMIN — FENTANYL CITRATE 100 MCG: 50 INJECTION INTRAMUSCULAR; INTRAVENOUS at 14:22

## 2023-12-28 RX ADMIN — ONDANSETRON 4 MG: 2 INJECTION INTRAMUSCULAR; INTRAVENOUS at 19:59

## 2023-12-28 RX ADMIN — Medication 30 MG: at 15:06

## 2023-12-28 RX ADMIN — SODIUM CHLORIDE, SODIUM GLUCONATE, SODIUM ACETATE, POTASSIUM CHLORIDE AND MAGNESIUM CHLORIDE: 526; 502; 368; 37; 30 INJECTION, SOLUTION INTRAVENOUS at 17:06

## 2023-12-28 RX ADMIN — MANNITOL 25 G: 20 INJECTION, SOLUTION INTRAVENOUS at 17:52

## 2023-12-28 RX ADMIN — FENTANYL CITRATE 50 MCG: 50 INJECTION INTRAMUSCULAR; INTRAVENOUS at 15:52

## 2023-12-28 RX ADMIN — FENTANYL CITRATE 50 MCG: 50 INJECTION INTRAMUSCULAR; INTRAVENOUS at 20:13

## 2023-12-28 RX ADMIN — FENTANYL CITRATE 25 MCG: 50 INJECTION INTRAMUSCULAR; INTRAVENOUS at 21:16

## 2023-12-28 RX ADMIN — PROPOFOL 20 MG: 10 INJECTION, EMULSION INTRAVENOUS at 20:19

## 2023-12-28 RX ADMIN — Medication 20 MG: at 17:30

## 2023-12-28 RX ADMIN — SODIUM CHLORIDE 20 MG: 9 INJECTION, SOLUTION INTRAVENOUS at 15:14

## 2023-12-28 RX ADMIN — HEPARIN SODIUM 2000 UNITS: 1000 INJECTION INTRAVENOUS; SUBCUTANEOUS at 16:47

## 2023-12-28 RX ADMIN — SUGAMMADEX 200 MG: 100 INJECTION, SOLUTION INTRAVENOUS at 20:11

## 2023-12-28 RX ADMIN — LIDOCAINE HYDROCHLORIDE 100 MG: 20 INJECTION, SOLUTION INFILTRATION; PERINEURAL at 14:22

## 2023-12-28 RX ADMIN — FENTANYL CITRATE 25 MCG: 50 INJECTION INTRAMUSCULAR; INTRAVENOUS at 20:21

## 2023-12-28 RX ADMIN — PROPOFOL 200 MG: 10 INJECTION, EMULSION INTRAVENOUS at 14:22

## 2023-12-28 RX ADMIN — FUROSEMIDE 100 MG: 10 INJECTION, SOLUTION INTRAVENOUS at 17:52

## 2023-12-28 RX ADMIN — MYCOPHENOLATE MOFETIL 1000 MG: 500 INJECTION, POWDER, LYOPHILIZED, FOR SOLUTION INTRAVENOUS at 15:12

## 2023-12-28 ASSESSMENT — ACTIVITIES OF DAILY LIVING (ADL)
ADLS_ACUITY_SCORE: 20

## 2023-12-28 NOTE — ANESTHESIA PROCEDURE NOTES
Airway       Patient location during procedure: OR       Procedure Start/Stop Times: 12/28/2023 2:26 PM  Staff -        Anesthesiologist:  Dayo Thorpe MD       Resident/Fellow: Debi Rossi MD       Performed By: resident  Consent for Airway        Urgency: elective  Indications and Patient Condition       Indications for airway management: jewell-procedural       Induction type:intravenous       Mask difficulty assessment: 2 - vent by mask + OA or adjuvant +/- NMBA    Final Airway Details       Final airway type: endotracheal airway       Successful airway: ETT - single  Endotracheal Airway Details        ETT size (mm): 8.0       Cuffed: yes       Cuff volume (mL): 8       Successful intubation technique: direct laryngoscopy       DL Blade Type: MAC 4       Grade View of Cords: 2       Adjucts: stylet       Position: Right       Measured from: lips       Secured at (cm): 23       Bite block used: None    Post intubation assessment        Placement verified by: capnometry, equal breath sounds and chest rise        Number of attempts at approach: 1       Number of other approaches attempted: 0       Secured with: pink tape       Ease of procedure: easy       Dentition: Intact and Unchanged    Medication(s) Administered   Medication Administration Time: 12/28/2023 2:26 PM

## 2023-12-28 NOTE — ANESTHESIA PROCEDURE NOTES
Central Line/PA Catheter Placement    Pre-Procedure   Staff -        Anesthesiologist:  Dayo Thorpe MD       Resident/Fellow: Debi Rossi MD       Performed By: resident       Location: OR       Pre-Anesthestic Checklist: patient identified, IV checked, site marked, risks and benefits discussed, informed consent, monitors and equipment checked, pre-op evaluation and at physician/surgeon's request  Timeout:       Correct Patient: Yes        Correct Procedure: Yes        Correct Site: Yes        Correct Position: Yes        Correct Laterality: Yes   Line Placement:   This line was placed Post Induction    Procedure   Procedure: central line       Laterality: left       Insertion Site: internal jugular.       Patient Position: Trendelenburg  Sterile Prep        All elements of maximal sterile barrier technique followed       Patient Prep/Sterile Barriers: draped, hand hygiene, gloves , hat , mask , draped, gown, sterile gel and probe cover       Skin prep: Chloraprep  Insertion/Injection        Technique: ultrasound guided and Seldinger Technique        1. Ultrasound was used to evaluate the access site.       2. Vein evaluated via ultrasound for patency/adequacy.       3. Using real-time ultrasound the needle/catheter was observed entering the artery/vein.       Type: CVC       Catheter Size: 7 Fr       Catheter Length: 20       Number of Lumens: triple lumen       PA Catheter Type: None  Narrative         Secured by: suture       Biopatch dressing used.       Complications: None apparent,        blood aspirated from all lumens,        All lumens flushed: Yes       Verification method: Ultrasound

## 2023-12-29 ENCOUNTER — DOCUMENTATION ONLY (OUTPATIENT)
Dept: TRANSPLANT | Facility: CLINIC | Age: 23
End: 2023-12-29
Payer: COMMERCIAL

## 2023-12-29 ENCOUNTER — TELEPHONE (OUTPATIENT)
Dept: PHARMACY | Facility: CLINIC | Age: 23
End: 2023-12-29
Payer: COMMERCIAL

## 2023-12-29 DIAGNOSIS — Z79.899 ENCOUNTER FOR LONG-TERM CURRENT USE OF MEDICATION: ICD-10-CM

## 2023-12-29 DIAGNOSIS — Z98.890 OTHER SPECIFIED POSTPROCEDURAL STATES: ICD-10-CM

## 2023-12-29 DIAGNOSIS — Z48.298 AFTERCARE FOLLOWING ORGAN TRANSPLANT: ICD-10-CM

## 2023-12-29 DIAGNOSIS — Z20.828 CONTACT WITH AND (SUSPECTED) EXPOSURE TO OTHER VIRAL COMMUNICABLE DISEASES: ICD-10-CM

## 2023-12-29 DIAGNOSIS — Z94.0 KIDNEY REPLACED BY TRANSPLANT: Primary | ICD-10-CM

## 2023-12-29 PROBLEM — G89.18 ACUTE POST-OPERATIVE PAIN: Status: ACTIVE | Noted: 2023-12-29

## 2023-12-29 PROBLEM — N18.6 END STAGE KIDNEY DISEASE (H): Status: RESOLVED | Noted: 2019-06-02 | Resolved: 2023-12-29

## 2023-12-29 PROBLEM — D84.9 IMMUNOSUPPRESSED STATUS (H): Status: ACTIVE | Noted: 2023-12-29

## 2023-12-29 LAB
ANION GAP SERPL CALCULATED.3IONS-SCNC: 13 MMOL/L (ref 7–15)
BASOPHILS # BLD AUTO: 0 10E3/UL (ref 0–0.2)
BASOPHILS NFR BLD AUTO: 0 %
BUN SERPL-MCNC: 24.5 MG/DL (ref 6–20)
CALCIUM SERPL-MCNC: 8.5 MG/DL (ref 8.6–10)
CHLORIDE SERPL-SCNC: 102 MMOL/L (ref 98–107)
CREAT SERPL-MCNC: 5.16 MG/DL (ref 0.67–1.17)
DEPRECATED HCO3 PLAS-SCNC: 22 MMOL/L (ref 22–29)
DONOR IDENTIFICATION: NORMAL
DSA COMMENTS: NORMAL
DSA PRESENT: NO
DSA TEST METHOD: NORMAL
EGFRCR SERPLBLD CKD-EPI 2021: 15 ML/MIN/1.73M2
EOSINOPHIL # BLD AUTO: 0 10E3/UL (ref 0–0.7)
EOSINOPHIL NFR BLD AUTO: 0 %
ERYTHROCYTE [DISTWIDTH] IN BLOOD BY AUTOMATED COUNT: 12.9 % (ref 10–15)
GLUCOSE SERPL-MCNC: 136 MG/DL (ref 70–99)
HBV DNA SERPL QL NAA+PROBE: NORMAL
HCT VFR BLD AUTO: 32 % (ref 40–53)
HCV RNA SERPL QL NAA+PROBE: NORMAL
HGB BLD-MCNC: 10.4 G/DL (ref 13.3–17.7)
HGB BLD-MCNC: 10.5 G/DL (ref 13.3–17.7)
HGB BLD-MCNC: 10.6 G/DL (ref 13.3–17.7)
HGB BLD-MCNC: 10.7 G/DL (ref 13.3–17.7)
HGB BLD-MCNC: 10.9 G/DL (ref 13.3–17.7)
HGB BLD-MCNC: 11.2 G/DL (ref 13.3–17.7)
HIV1+2 RNA SERPL QL NAA+PROBE: NORMAL
IMM GRANULOCYTES # BLD: 0 10E3/UL
IMM GRANULOCYTES NFR BLD: 0 %
LYMPHOCYTES # BLD AUTO: 0.7 10E3/UL (ref 0.8–5.3)
LYMPHOCYTES NFR BLD AUTO: 7 %
MAGNESIUM SERPL-MCNC: 1.9 MG/DL (ref 1.7–2.3)
MCH RBC QN AUTO: 32.6 PG (ref 26.5–33)
MCHC RBC AUTO-ENTMCNC: 32.8 G/DL (ref 31.5–36.5)
MCV RBC AUTO: 99 FL (ref 78–100)
MONOCYTES # BLD AUTO: 0.3 10E3/UL (ref 0–1.3)
MONOCYTES NFR BLD AUTO: 3 %
NEUTROPHILS # BLD AUTO: 9 10E3/UL (ref 1.6–8.3)
NEUTROPHILS NFR BLD AUTO: 90 %
NRBC # BLD AUTO: 0 10E3/UL
NRBC BLD AUTO-RTO: 0 /100
ORGAN: NORMAL
PHOSPHATE SERPL-MCNC: 4.5 MG/DL (ref 2.5–4.5)
PLATELET # BLD AUTO: 185 10E3/UL (ref 150–450)
POTASSIUM SERPL-SCNC: 3.5 MMOL/L (ref 3.4–5.3)
POTASSIUM SERPL-SCNC: 3.6 MMOL/L (ref 3.4–5.3)
RBC # BLD AUTO: 3.22 10E6/UL (ref 4.4–5.9)
SODIUM SERPL-SCNC: 137 MMOL/L (ref 135–145)
WBC # BLD AUTO: 10.1 10E3/UL (ref 4–11)

## 2023-12-29 PROCEDURE — 250N000013 HC RX MED GY IP 250 OP 250 PS 637: Performed by: NURSE PRACTITIONER

## 2023-12-29 PROCEDURE — 250N000012 HC RX MED GY IP 250 OP 636 PS 637: Performed by: SURGERY

## 2023-12-29 PROCEDURE — 250N000013 HC RX MED GY IP 250 OP 250 PS 637: Performed by: SURGERY

## 2023-12-29 PROCEDURE — 83735 ASSAY OF MAGNESIUM: CPT | Performed by: SURGERY

## 2023-12-29 PROCEDURE — 85018 HEMOGLOBIN: CPT | Performed by: SURGERY

## 2023-12-29 PROCEDURE — 36591 DRAW BLOOD OFF VENOUS DEVICE: CPT | Performed by: SURGERY

## 2023-12-29 PROCEDURE — 36592 COLLECT BLOOD FROM PICC: CPT | Performed by: SURGERY

## 2023-12-29 PROCEDURE — 85025 COMPLETE CBC W/AUTO DIFF WBC: CPT | Performed by: SURGERY

## 2023-12-29 PROCEDURE — 99223 1ST HOSP IP/OBS HIGH 75: CPT | Mod: FS

## 2023-12-29 PROCEDURE — 84132 ASSAY OF SERUM POTASSIUM: CPT | Performed by: SURGERY

## 2023-12-29 PROCEDURE — 80048 BASIC METABOLIC PNL TOTAL CA: CPT | Performed by: SURGERY

## 2023-12-29 PROCEDURE — 258N000003 HC RX IP 258 OP 636: Performed by: SURGERY

## 2023-12-29 PROCEDURE — 99024 POSTOP FOLLOW-UP VISIT: CPT | Performed by: SURGERY

## 2023-12-29 PROCEDURE — 250N000011 HC RX IP 250 OP 636: Performed by: NURSE PRACTITIONER

## 2023-12-29 PROCEDURE — 258N000003 HC RX IP 258 OP 636: Performed by: NURSE PRACTITIONER

## 2023-12-29 PROCEDURE — 84100 ASSAY OF PHOSPHORUS: CPT | Performed by: SURGERY

## 2023-12-29 PROCEDURE — 36415 COLL VENOUS BLD VENIPUNCTURE: CPT | Performed by: SURGERY

## 2023-12-29 PROCEDURE — 258N000002 HC RX IP 258 OP 250: Performed by: SURGERY

## 2023-12-29 PROCEDURE — 120N000011 HC R&B TRANSPLANT UMMC

## 2023-12-29 PROCEDURE — 250N000011 HC RX IP 250 OP 636: Performed by: SURGERY

## 2023-12-29 RX ORDER — NALOXONE HYDROCHLORIDE 0.4 MG/ML
0.2 INJECTION, SOLUTION INTRAMUSCULAR; INTRAVENOUS; SUBCUTANEOUS
Status: DISCONTINUED | OUTPATIENT
Start: 2023-12-29 | End: 2024-01-05 | Stop reason: HOSPADM

## 2023-12-29 RX ORDER — OXYCODONE HYDROCHLORIDE 5 MG/1
5 TABLET ORAL EVERY 4 HOURS PRN
Status: DISCONTINUED | OUTPATIENT
Start: 2023-12-29 | End: 2023-12-29

## 2023-12-29 RX ORDER — HYDROMORPHONE HYDROCHLORIDE 2 MG/1
4 TABLET ORAL
Status: DISCONTINUED | OUTPATIENT
Start: 2023-12-29 | End: 2024-01-02

## 2023-12-29 RX ORDER — HEPARIN SODIUM (PORCINE) LOCK FLUSH IV SOLN 100 UNIT/ML 100 UNIT/ML
5 SOLUTION INTRAVENOUS
Status: CANCELLED | OUTPATIENT
Start: 2024-01-01

## 2023-12-29 RX ORDER — VALGANCICLOVIR 450 MG/1
450 TABLET, FILM COATED ORAL EVERY OTHER DAY
Status: DISCONTINUED | OUTPATIENT
Start: 2023-12-31 | End: 2023-12-30

## 2023-12-29 RX ORDER — NALOXONE HYDROCHLORIDE 0.4 MG/ML
0.4 INJECTION, SOLUTION INTRAMUSCULAR; INTRAVENOUS; SUBCUTANEOUS
Status: DISCONTINUED | OUTPATIENT
Start: 2023-12-29 | End: 2024-01-05 | Stop reason: HOSPADM

## 2023-12-29 RX ORDER — PREDNISOLONE ACETATE 10 MG/ML
1 SUSPENSION/ DROPS OPHTHALMIC 4 TIMES DAILY
Status: DISCONTINUED | OUTPATIENT
Start: 2023-12-29 | End: 2023-12-29 | Stop reason: ALTCHOICE

## 2023-12-29 RX ORDER — HYDROMORPHONE HYDROCHLORIDE 2 MG/1
2 TABLET ORAL
Status: DISCONTINUED | OUTPATIENT
Start: 2023-12-29 | End: 2024-01-02

## 2023-12-29 RX ORDER — LIDOCAINE 4 G/G
2 PATCH TOPICAL
Status: DISCONTINUED | OUTPATIENT
Start: 2023-12-29 | End: 2024-01-05 | Stop reason: HOSPADM

## 2023-12-29 RX ORDER — EPINEPHRINE 1 MG/ML
0.3 INJECTION, SOLUTION, CONCENTRATE INTRAVENOUS EVERY 5 MIN PRN
Status: CANCELLED | OUTPATIENT
Start: 2024-01-01

## 2023-12-29 RX ORDER — PREDNISONE 20 MG/1
60 TABLET ORAL DAILY
Status: COMPLETED | OUTPATIENT
Start: 2023-12-31 | End: 2024-01-01

## 2023-12-29 RX ORDER — DIPHENHYDRAMINE HYDROCHLORIDE 50 MG/ML
50 INJECTION INTRAMUSCULAR; INTRAVENOUS
Status: CANCELLED
Start: 2024-01-01

## 2023-12-29 RX ORDER — OXYCODONE HYDROCHLORIDE 10 MG/1
10 TABLET ORAL EVERY 4 HOURS PRN
Status: DISCONTINUED | OUTPATIENT
Start: 2023-12-29 | End: 2023-12-29

## 2023-12-29 RX ORDER — ALBUTEROL SULFATE 90 UG/1
1-2 AEROSOL, METERED RESPIRATORY (INHALATION)
Status: CANCELLED
Start: 2024-01-01

## 2023-12-29 RX ORDER — PREDNISONE 5 MG/1
5 TABLET ORAL DAILY
Status: DISCONTINUED | OUTPATIENT
Start: 2024-01-25 | End: 2024-01-01

## 2023-12-29 RX ORDER — PREDNISONE 10 MG/1
10 TABLET ORAL DAILY
Status: DISCONTINUED | OUTPATIENT
Start: 2024-01-18 | End: 2024-01-01

## 2023-12-29 RX ORDER — LATANOPROST 50 UG/ML
1 SOLUTION/ DROPS OPHTHALMIC AT BEDTIME
Status: DISCONTINUED | OUTPATIENT
Start: 2023-12-29 | End: 2024-01-05 | Stop reason: HOSPADM

## 2023-12-29 RX ORDER — METHOCARBAMOL 750 MG/1
750 TABLET, FILM COATED ORAL 3 TIMES DAILY
Status: DISCONTINUED | OUTPATIENT
Start: 2023-12-29 | End: 2024-01-05 | Stop reason: HOSPADM

## 2023-12-29 RX ORDER — LATANOPROST 50 UG/ML
1 SOLUTION/ DROPS OPHTHALMIC AT BEDTIME
COMMUNITY
End: 2024-02-24

## 2023-12-29 RX ORDER — PREDNISONE 20 MG/1
40 TABLET ORAL DAILY
Status: DISCONTINUED | OUTPATIENT
Start: 2024-01-02 | End: 2024-01-01

## 2023-12-29 RX ORDER — VITAMIN B COMPLEX
50 TABLET ORAL DAILY
Status: DISCONTINUED | OUTPATIENT
Start: 2023-12-30 | End: 2024-01-05 | Stop reason: HOSPADM

## 2023-12-29 RX ORDER — VITAMIN B COMPLEX
50 TABLET ORAL DAILY
Status: DISCONTINUED | OUTPATIENT
Start: 2023-12-29 | End: 2023-12-29

## 2023-12-29 RX ORDER — HEPARIN SODIUM,PORCINE 10 UNIT/ML
5-20 VIAL (ML) INTRAVENOUS DAILY PRN
Status: CANCELLED | OUTPATIENT
Start: 2024-01-01

## 2023-12-29 RX ORDER — METHYLPREDNISOLONE SODIUM SUCCINATE 125 MG/2ML
100 INJECTION, POWDER, LYOPHILIZED, FOR SOLUTION INTRAMUSCULAR; INTRAVENOUS ONCE
Status: COMPLETED | OUTPATIENT
Start: 2023-12-30 | End: 2023-12-30

## 2023-12-29 RX ORDER — DIFLUPREDNATE OPHTHALMIC 0.5 MG/ML
1 EMULSION OPHTHALMIC 4 TIMES DAILY
Status: DISCONTINUED | OUTPATIENT
Start: 2023-12-29 | End: 2024-01-05 | Stop reason: HOSPADM

## 2023-12-29 RX ORDER — PREDNISONE 20 MG/1
20 TABLET ORAL DAILY
Status: DISCONTINUED | OUTPATIENT
Start: 2024-01-04 | End: 2024-01-01

## 2023-12-29 RX ORDER — METHYLPREDNISOLONE SODIUM SUCCINATE 125 MG/2ML
125 INJECTION, POWDER, LYOPHILIZED, FOR SOLUTION INTRAMUSCULAR; INTRAVENOUS
Status: CANCELLED
Start: 2024-01-01

## 2023-12-29 RX ORDER — ALBUTEROL SULFATE 0.83 MG/ML
2.5 SOLUTION RESPIRATORY (INHALATION)
Status: CANCELLED | OUTPATIENT
Start: 2024-01-01

## 2023-12-29 RX ORDER — CHLORPROMAZINE HYDROCHLORIDE 10 MG/1
10 TABLET, FILM COATED ORAL ONCE
Status: COMPLETED | OUTPATIENT
Start: 2023-12-29 | End: 2023-12-29

## 2023-12-29 RX ORDER — METHYLPHENIDATE HYDROCHLORIDE 10 MG/1
20 TABLET ORAL DAILY PRN
Status: DISCONTINUED | OUTPATIENT
Start: 2023-12-29 | End: 2024-01-05 | Stop reason: HOSPADM

## 2023-12-29 RX ADMIN — TACROLIMUS 3 MG: 1 CAPSULE ORAL at 17:40

## 2023-12-29 RX ADMIN — DIFLUPREDNATE OPHTHALMIC 1 DROP: 0.5 EMULSION OPHTHALMIC at 11:46

## 2023-12-29 RX ADMIN — SODIUM CHLORIDE 1000 ML: 9 INJECTION, SOLUTION INTRAVENOUS at 07:04

## 2023-12-29 RX ADMIN — ACETAMINOPHEN 975 MG: 325 TABLET, FILM COATED ORAL at 14:26

## 2023-12-29 RX ADMIN — POLYETHYLENE GLYCOL 3350 17 G: 17 POWDER, FOR SOLUTION ORAL at 08:16

## 2023-12-29 RX ADMIN — ACETAMINOPHEN 975 MG: 325 TABLET, FILM COATED ORAL at 06:09

## 2023-12-29 RX ADMIN — TACROLIMUS 3 MG: 1 CAPSULE ORAL at 08:16

## 2023-12-29 RX ADMIN — METHOCARBAMOL 750 MG: 750 TABLET ORAL at 20:26

## 2023-12-29 RX ADMIN — DIFLUPREDNATE OPHTHALMIC 1 DROP: 0.5 EMULSION OPHTHALMIC at 20:26

## 2023-12-29 RX ADMIN — SODIUM CHLORIDE 1000 ML: 4.5 INJECTION, SOLUTION INTRAVENOUS at 04:54

## 2023-12-29 RX ADMIN — METHYLPREDNISOLONE SODIUM SUCCINATE 250 MG: 125 INJECTION INTRAMUSCULAR; INTRAVENOUS at 11:46

## 2023-12-29 RX ADMIN — DIFLUPREDNATE OPHTHALMIC 1 DROP: 0.5 EMULSION OPHTHALMIC at 15:31

## 2023-12-29 RX ADMIN — SENNOSIDES AND DOCUSATE SODIUM 1 TABLET: 8.6; 5 TABLET ORAL at 00:17

## 2023-12-29 RX ADMIN — OXYCODONE HYDROCHLORIDE 10 MG: 10 TABLET ORAL at 10:22

## 2023-12-29 RX ADMIN — ASPIRIN 81 MG: 81 TABLET, COATED ORAL at 08:16

## 2023-12-29 RX ADMIN — SODIUM CHLORIDE, SODIUM LACTATE, POTASSIUM CHLORIDE, CALCIUM CHLORIDE AND DEXTROSE MONOHYDRATE: 5; 600; 310; 30; 20 INJECTION, SOLUTION INTRAVENOUS at 15:49

## 2023-12-29 RX ADMIN — SODIUM CHLORIDE, SODIUM LACTATE, POTASSIUM CHLORIDE, CALCIUM CHLORIDE AND DEXTROSE MONOHYDRATE: 5; 600; 310; 30; 20 INJECTION, SOLUTION INTRAVENOUS at 06:16

## 2023-12-29 RX ADMIN — OXYCODONE HYDROCHLORIDE 5 MG: 5 TABLET ORAL at 01:55

## 2023-12-29 RX ADMIN — SODIUM CHLORIDE 1000 ML: 9 INJECTION, SOLUTION INTRAVENOUS at 01:58

## 2023-12-29 RX ADMIN — HYDROMORPHONE HYDROCHLORIDE 2 MG: 2 TABLET ORAL at 15:43

## 2023-12-29 RX ADMIN — ATORVASTATIN CALCIUM 10 MG: 10 TABLET, FILM COATED ORAL at 08:16

## 2023-12-29 RX ADMIN — LATANOPROST 1 DROP: 50 SOLUTION OPHTHALMIC at 22:13

## 2023-12-29 RX ADMIN — OXYCODONE HYDROCHLORIDE 5 MG: 5 TABLET ORAL at 00:30

## 2023-12-29 RX ADMIN — MYCOPHENOLATE MOFETIL 750 MG: 250 CAPSULE ORAL at 17:40

## 2023-12-29 RX ADMIN — SENNOSIDES AND DOCUSATE SODIUM 1 TABLET: 8.6; 5 TABLET ORAL at 20:26

## 2023-12-29 RX ADMIN — HYDROMORPHONE HYDROCHLORIDE 0.4 MG: 0.2 INJECTION, SOLUTION INTRAMUSCULAR; INTRAVENOUS; SUBCUTANEOUS at 04:55

## 2023-12-29 RX ADMIN — SULFAMETHOXAZOLE AND TRIMETHOPRIM 1 TABLET: 400; 80 TABLET ORAL at 11:13

## 2023-12-29 RX ADMIN — HYDROMORPHONE HYDROCHLORIDE 4 MG: 2 TABLET ORAL at 23:39

## 2023-12-29 RX ADMIN — LIDOCAINE 2 PATCH: 4 PATCH TOPICAL at 09:23

## 2023-12-29 RX ADMIN — METHOCARBAMOL 750 MG: 750 TABLET ORAL at 11:58

## 2023-12-29 RX ADMIN — ONDANSETRON 4 MG: 2 INJECTION INTRAMUSCULAR; INTRAVENOUS at 00:16

## 2023-12-29 RX ADMIN — ACETAMINOPHEN 975 MG: 325 TABLET, FILM COATED ORAL at 22:13

## 2023-12-29 RX ADMIN — TACROLIMUS 3 MG: 1 CAPSULE ORAL at 00:16

## 2023-12-29 RX ADMIN — ACETAMINOPHEN 975 MG: 325 TABLET, FILM COATED ORAL at 00:17

## 2023-12-29 RX ADMIN — MYCOPHENOLATE MOFETIL 750 MG: 250 CAPSULE ORAL at 08:15

## 2023-12-29 RX ADMIN — VALGANCICLOVIR 450 MG: 450 TABLET, FILM COATED ORAL at 00:16

## 2023-12-29 RX ADMIN — HYDROMORPHONE HYDROCHLORIDE 2 MG: 2 TABLET ORAL at 20:26

## 2023-12-29 RX ADMIN — SENNOSIDES AND DOCUSATE SODIUM 1 TABLET: 8.6; 5 TABLET ORAL at 08:16

## 2023-12-29 RX ADMIN — MYCOPHENOLATE MOFETIL 750 MG: 250 CAPSULE ORAL at 00:17

## 2023-12-29 RX ADMIN — SODIUM CHLORIDE, SODIUM LACTATE, POTASSIUM CHLORIDE, CALCIUM CHLORIDE AND DEXTROSE MONOHYDRATE: 5; 600; 310; 30; 20 INJECTION, SOLUTION INTRAVENOUS at 22:12

## 2023-12-29 RX ADMIN — OXYCODONE HYDROCHLORIDE 10 MG: 10 TABLET ORAL at 06:09

## 2023-12-29 ASSESSMENT — ACTIVITIES OF DAILY LIVING (ADL)
ADLS_ACUITY_SCORE: 20
DEPENDENT_IADLS:: INDEPENDENT
ADLS_ACUITY_SCORE: 20

## 2023-12-29 NOTE — ANESTHESIA POSTPROCEDURE EVALUATION
Patient: Boogie Villa    Procedure: Procedure(s):  Kidney Transplant Non-Directed Recipient, ureteral stent placement       Anesthesia Type:  General    Note:  Disposition: Inpatient   Postop Pain Control: Uneventful            Sign Out: Well controlled pain   PONV: No   Neuro/Psych: Uneventful            Sign Out: Acceptable/Baseline neuro status   Airway/Respiratory: Uneventful            Sign Out: Acceptable/Baseline resp. status   CV/Hemodynamics: Uneventful            Sign Out: Acceptable CV status; No obvious hypovolemia; No obvious fluid overload   Other NRE: NONE   DID A NON-ROUTINE EVENT OCCUR? No           Last vitals:  Vitals Value Taken Time   /85 12/28/23 2200   Temp 36.7  C (98.1  F) 12/28/23 2200   Pulse 80 12/28/23 2206   Resp 0 12/28/23 2206   SpO2 98 % 12/28/23 2206   Vitals shown include unfiled device data.    Electronically Signed By: Adam Babcock MD  December 28, 2023  10:07 PM

## 2023-12-29 NOTE — PHARMACY-ADMISSION MEDICATION HISTORY
"Pharmacist Admission Medication History    Admission medication history is complete. The information provided in this note is only as accurate as the sources available at the time of the update.    Information Source(s): Patient and Family member via in-person  Reviewed Surescripts dispense history    Pertinent Information:   Boogie is a good medication historian. He confirmed that he is taking 2 eye drops, difluprednate four times daily and the other one is just once a day which upon chart review is most likely latanoprost per ophthalmology note on 12/21/2023 at [facility].  Boogie has an allergy listed to \"Sulfa antibiotics.\" Per family in the room it was as a child that he would have GI upset. Patient was willing to try sulfamethoxazole/trimethoprim for PJP prophylaxis.    Changes made to PTA medication list:  Added:   Latanoprost (per dispense history and Ophthalmology note)  Deleted:   Fish oil (per patient not taking)  Prednisolone eye drops (per patient and Ophthalmology note)  Changed:   Atenolol 25mg tablet take 37.5mg by mouth daily CHANGED to take 25mg by mouth daily (per patient only taking one tablet daily)  Calcium acetate 667mg, 4 mg CHANGED to 4 tablets three times daily with meals (per patient only eats twice a day)    Medication Affordability: not assessed     Allergies reviewed with patient and updates made in EHR: yes    Medication History Completed By: Sydnee Tenorio, Pharm.D., BCPS, BCTXP  Pager [phone]   12/29/2023 9:34 AM    PTA Med List   Medication Sig Last Dose    amLODIPine (NORVASC) 5 MG tablet Take 1 tablet (5 mg) by mouth daily (Patient taking differently: Take 5 mg by mouth every morning) 12/27/2023    atenolol (TENORMIN) 25 MG tablet Take 1.5 tablets (37.5 mg) by mouth daily (Patient taking differently: Take 25 mg by mouth every morning) 12/28/2023 at 0630    B Complex-C-Folic Acid (DIALYVITE 800) 0.8 MG TABS Take 1 tablet by mouth daily (Patient taking differently: " Take 1 tablet by mouth every morning) 12/27/2023    calcitRIOL (ROCALTROL) 0.25 MCG capsule Take 1 capsule (0.25 mcg) by mouth daily (Patient taking differently: Take 0.25 mcg by mouth three times a week Monday - Wednesday - Friday at dialysis) 12/27/2023    Calcium Acetate 667 MG TABS Take 667 mg by mouth 3 times daily (with meals) (Patient taking differently: Take 4 tablets by mouth 3 times daily (with meals)) 12/26/2023    cetirizine (ZYRTEC) 10 MG tablet Take 10 mg by mouth daily as needed for allergies More than a month    latanoprost (XALATAN) 0.005 % ophthalmic solution Place 1 drop into both eyes at bedtime Past Week    methylphenidate (RITALIN) 20 MG tablet Take 20 mg by mouth as needed Prn Past Week    Probiotic Product (PROBIOTIC-10 PO) Take 1 tablet by mouth every morning 1 tab capsule 12/26/2023

## 2023-12-29 NOTE — PROGRESS NOTES
Care Management Follow Up    Length of Stay (days): 1    Expected Discharge Date: 12/31/2023     Concerns to be Addressed: all concerns addressed in this encounter     Patient plan of care discussed at interdisciplinary rounds: Yes    Anticipated Discharge Disposition: Home     Anticipated Discharge Services: None  Anticipated Discharge DME: None    Patient/family educated on Medicare website which has current facility and service quality ratings: no  Education Provided on the Discharge Plan: Yes  Patient/Family in Agreement with the Plan: yes    Referrals Placed by CM/SW: External Care Coordination  Private pay costs discussed: Not applicable    Additional Information:  Pt s/p kidney transplant.  Anticipate discharge Sunday 12/31.      Met with pt and his parents.  Introduced RNCC role.  Pt slept off and on during discussion.   Reviewed anticipated plan for discharge, transplant labs M/TH, ATC appointments and home care RN services.  Declines need for home care.  Per discussion with pt and his parents pt plans to stay with his parents.  Pt and his parents note no concerns or questions at this time.    ATC appointments requested.    Zunilda Pedersen RN BSN, PHN, ACM-RN  7A RN Care Coordinator  Phone: [phone]  Pager [phone]    To contact the weekend RNCC  [place] (0800 [phone] 1630) Saturday and Sunday    Units: 5A,5B,5C [phone]    Units: 6A, [phone]    Units 6B, 6C, 6D [phone]    Units: 7A, 7B, 7C, 7D, [phone]    [facility] [place] ([phone]) Saturday and Sunday  Units: 5 Ortho, 5MS & WB ED Pager: [phone]  Units: 6MS, 8A & 10 ICU  Pager [phone]    12/29/2023 12:29 PM

## 2023-12-29 NOTE — PHARMACY-TRANSPLANT NOTE
Adult Kidney Transplant Post Operative Note    23 year old male s/p living donor kidney transplant on 12/28/2023 for  congenital posterior urethral valves  .      Planned immunosuppression regimen per kidney transplant protocol:  INDUCTION:  Low intensity, PRA 0  12/28/2023 (POD 0): Basiliximab 20 mg IV x1, methylprednisolone 500 mg x1  12/29/2023 (POD 1): methylprednisolone 250 mg x1  12/30/2023 (POD 2): methylprednisolone 100 mg x1  12/31/2023 (POD 3): Steroid taper  1/1/2024 (POD 4): Basiliximab 20 mg IV x1     MAINTENANCE:   - Mycophenolate 750 mg BID  - Tacrolimus with goal trough levels of 8-10 mcg/L for first 6 months post-transplant     Opportunistic pathogen prophylaxis includes:  - PJP: trimethoprim/sulfamethoxazole (allergy discussed with patient, GI upset as an infant)  - CMV D+/R-: Valganciclovir for 6 months duration tentatively    Patient is not enrolled in medication study.    Pharmacy will monitor for medication interactions and immunosuppression levels in conjunction with the team. Medication therapy needs for discharge planning will continue to be addressed throughout the current admission via multidisciplinary rounds and order review.  Pharmacy will make recommendations as appropriate.  Sydnee Tenorio, Pharm.D., [facility], BCTXP  Pager [phone]

## 2023-12-29 NOTE — PROGRESS NOTES
Admitted/transferred from: PACU  Time of arrival on unit 2300  2 RN full  skin assessment completed by Naa CHIN  Skin assessment finding: issues found R AVF, R incision covered with abthera dressing, small purple scars on bilateral flanks    Interventions/actions: other none at this time, prophylactic mepilex in place      Will continue to monitor.

## 2023-12-29 NOTE — PLAN OF CARE
Goal Outcome Evaluation:      Plan of Care Reviewed With: patient, parent    Overall Patient Progress: improvingOverall Patient Progress: improving    Outcome Evaluation: Plan to discharge to parents home

## 2023-12-29 NOTE — PROGRESS NOTES
"CLINICAL NUTRITION SERVICES - ASSESSMENT NOTE     Nutrition Prescription    RECOMMENDATIONS FOR MDs/PROVIDERS TO ORDER:  None at this time    Malnutrition Status:    Patient does not meet two of the established criteria necessary for diagnosing malnutrition    Recommendations already ordered by Registered Dietitian (RD):  Chocolate Ensure Max TID    Future/Additional Recommendations:  Minimize diet restrictions as able d/t high calorie/protein needs post-transplant.  Oral supplements as needed to help meet nutritional needs.     High protein food choices with meals to help meet high needs post-transplant over the next 6-8 weeks.     Heart-healthy diet (low saturated fat, low sodium, high fiber) and food safety precautions long term due to immunosuppression regimen post-transplant       REASON FOR ASSESSMENT  Boogie ELSY Villa is a/an 23 year old male assessed by the dietitian for Provider Order - Post Op Kidney Transplant- Assess and educate post SOT    CLINICAL HISTORY  Pt w/ PMH of ESRD, HTN, and vitamin D deficiency now s/p kidney transplant on 12/28/23.    NUTRITION HISTORY  Pt reports he has poor appetite currently, but was able to have some toast and an english muffin this morning. Usual intake PTA was adequately sized meals BID. Reports he consumed fast food often. Lost 15 lbs in the past year intentionally for transplant.     Patient s discharge needs assessed and discharge planning has been conducted with the multidisciplinary transplant care team including physicians, pharmacy, social work and transplant coordinator.    GI: Pt denied N/V    CURRENT NUTRITION ORDERS  Diet: Regular  Intake/Tolerance: 1x 75% intake documented in flowsheets    LABS  BUN 24.5 (H), Cr 5.16 (H)  Na 137 (WNL)  K 3.6, Mg 1.9, Phos 4.5 (WNL)  Glucose 136 (H)    MEDICATIONS  Mycophenolate  Miralax  Senokot  Bactrim  Tacrolimus  D5 @ 100 mL/hr -> provides additional 408 kcals/day    ANTHROPOMETRICS  Height: 180.3 cm (5' 11\")  Most " Recent Weight: 125 kg (275 lb 9.2 oz)    IBW: 78.2 kg  BMI: Obesity Grade II BMI 35-39.9  Weight History:   Wt Readings from Last 15 Encounters:   12/28/23 125 kg (275 lb 9.2 oz)   12/19/23 126.7 kg (279 lb 6.4 oz)   10/26/23 125.6 kg (277 lb)   10/19/23 125.6 kg (277 lb)   09/28/23 126.6 kg (279 lb)   09/19/23 128 kg (282 lb 1.6 oz)   08/24/23 129 kg (284 lb 6.3 oz)   07/05/23 129.7 kg (286 lb)   06/20/23 129.8 kg (286 lb 1.6 oz)   05/25/23 131.5 kg (289 lb 14.5 oz)   04/25/23 131 kg (288 lb 14.4 oz)   02/07/23 132.5 kg (292 lb)   11/15/22 124.7 kg (275 lb)   09/27/22 124.7 kg (275 lb)   07/19/21 122.5 kg (270 lb)   5% wt loss in 7 months- pt reports wt loss was intentional    Dosing Weight: 90 kg (adjusted based on most recent wt of 125 kg and IBW of 78.2 kg)    ASSESSED NUTRITION NEEDS  Estimated Energy Needs: 2250 - 2700 kcals/day (25 - 30 kcals/kg)  Justification: Obese and Post-op  Estimated Protein Needs: 115 - 180 grams protein/day (1.3 - 2 grams of pro/kg)  Justification: Increased needs and Post-op  Estimated Fluid Needs: UOP + 500 mL/day  Justification: Maintenance or Per provider pending fluid status    PHYSICAL FINDINGS  See malnutrition section below.     MALNUTRITION  % Intake: No decreased intake noted  % Weight Loss: Weight loss does not meet criteria  Subcutaneous Fat Loss: None observed  Muscle Loss: None observed  Fluid Accumulation/Edema: None noted  Malnutrition Diagnosis: Patient does not meet two of the established criteria necessary for diagnosing malnutrition    NUTRITION DIAGNOSIS  Increased nutrient needs (protein) related to s/p kidney transplant as evidenced by assessed protein needs at 1.3-2.0 g/kg.      INTERVENTIONS  Implementation  Nutrition Education: Post-txp nutrition education. Discussed increased protein needs, food safety and long-term heart healthy nutrition guidelines. Provided handouts: Guide to your diet after transplant and Food safety booklet.   Encouraged protein at  each meal/snack  Medical food supplement therapy - Ensure Max TID    Goals  Patient to consume % of nutritionally adequate meal trays TID, or the equivalent with supplements/snacks.     Monitoring/Evaluation  Progress toward goals will be monitored and evaluated per protocol.    Once discharged, place outpatient nutrition consult via the transplant team if nutrition concerns arise.     TOMAS Cantu, RD, LD  7A/7B (beds 219-229) RD pager: [phone]  Weekend/Holiday RD pager: [phone]

## 2023-12-29 NOTE — DISCHARGE SUMMARY
[facility]    Discharge Summary  Transplant Surgery    Date of Admission:  12/28/2023  Date of Discharge:  1/5/2024  Discharging Provider: Maya Lucas PA-C/Dr. Castillo    Attestation: I saw and examined the patient with Maya Lucas PA-C, and the transplant team. I independently reviewed all pertinent laboratory and imaging information and made independent management decisions including immunosuppression adjustment. I agree with the findings and plan as documented in this note.  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 S/p living donor kidney transplant with ureteral stent and venous reconstruction on 12/28/23.     Hospital Course   Kidney transplant: SCr edenilson 2.67. Cr then peaked at 6.35, biopsy concerning for Banff 1B rejection: g1 ptc3 C4d0. Medium eplet mismatch. DSA returned negative. Currently planning for thymo as below. Cr improved to 3.7 on the day of discharge.   -Good UOP. Did make urine PTA.  -Post-op US: patent vessels. Repeat US 12/31, normal, patent. Repeat US 1/2 with normal transplant, Improved velocities at RA anastamosis, RI 1.  -ASA 81 mg daily for thrombosis  prophylaxis in light of reconstruction.      ACR per preliminary path:  -concern for rejection in setting of low tacrolimus level and Low induction protocol.   -Kidney biopsy (1/2): Acute cell-mediated rejection, Banff class IB, with diffuse interstitial inflammation, severe tubulitis, and no arteritis  -DSA 1/2 returned negative     Treatment for ACR:  -Solumedrol IV  1/1 while awaiting biopsy, then 500mg on 1/2 with first dose Thymo, 250mg 1/3, 100 mg 1/4, 80 mg 1/5 then pred taper per nephrology (40 mg BID x 2 additional days, then 40 mg daily x 3 days, then 20 mg daily x 3 days). Will plan to stop steroids and not continue on prednisone 5 mg due to early rejection.  -Thymo 200mg 1/2, 1/3, and 1/4. An additional 150 mg given 1/5 to complete 6mg/kg based on actual body weight     Immunosuppressed status secondary to medications: PRA 0  Induction:  Basiliximab: 12/28 & 1/1/24  Steroids:  Adjusted due to switch to thymo  MMF: 750mg BID  Tacro: 8 mg BID. Goal level 8-10. Level every other day. Will add fluconazole for tac booster given consistently low levels.  -Infectious prophylaxis with Bactrim indefinitely and valganciclovir x6 months.    Transplant coordinator Nina Hu  [phone]  Donor type:  Live  DSA at time of transplant:  NO  Ureteral stent: YES  CMV:  Donor + / Recipient -  EBV:  Donor + / Recipient -     Neuro:  Acute post-op pain: Resolving by the time of discharge.  -Switch to dilaudid 2-4mg q4H PRN with improved pain, will not continue on discharge.  -Acetaminophen PRN  -Lidoderm patches  -Robaxin TID PRN     Hematology:   Anemia of chronic disease: Hgb 8-10, stable.     Cardiorespiratory:   HTN: PTA on Norvasc 5 mg daily and Atenolol 37.5 mg daily.   Started Norvasc 10mg daily and coreg 6.25 mg BID     GI/Nutrition:   Diet: Regular  Bowel regimen: Senna, PEG  Heartburn: on Na bicarb-effective PTA, start PPI     Endocrine:   Mild Steroid induced hyperglycemia: no insulin requirement.     Fluid/Electrolytes:   Hypomagnesemia ppx: HOLD, level >2  Hypokalemia: Replete after supplementation  Low bicarb:  sodium bicarb 1300mg BID     :   Hx of  Ureteral Reimplantation, s/p Mitrofanoff: Patient reports not using for Mitrofanoff in a couple of years and voids normally on his own.  Mitrofanoff is nonfunctional at this time   -Silveira removed POD 3. Voiding without  retention.     Significant Results and Procedures   12/28/23  Procedure:      Kidney Transplant Non-Directed Recipient, N/A - Abdomen  Surgeon:             * Nita Martinez MD - Primary     * Ralf Jc MD - Fellow - Assisting     * Daniel Schroeder MD - Fellow - Assisting    Findings:  Venous reconstruction with donor vein to create conduit from renal vein to external iliac     Code Status   Full    Primary Care Physician   Rita Ward    Physical Exam   Temp: 97.9  F (36.6  C) Temp src: Oral BP: (!) 184/113 Pulse: 81   Resp: 16 SpO2: 92 % O2 Device: None (Room air)    Vitals:    01/01/24 0119 01/02/24 0605 01/03/24 0350   Weight: 129.3 kg (285 lb) 127.4 kg (280 lb 14.4 oz) 129 kg (284 lb 8 oz)     Vital Signs with Ranges  Temp:  [97.8  F (36.6  C)-98.9  F (37.2  C)] 97.9  F (36.6  C)  Pulse:  [77-90] 81  Resp:  [16-18] 16  BP: (129-184)/() 184/113  SpO2:  [92 %-100 %] 92 %  I/O last 3 completed shifts:  In: 10 [I.V.:10]  Out: -     Constitutional: Awake, alert, cooperative, no apparent distress, and appears stated age.  Eyes: Lids and lashes normal, pupils equal, round, sclera clear, conjunctiva normal.  ENT: Normocephalic, without obvious abnormality, atraumatic  Respiratory: Nonlabored resps on RA  Cardiovascular: RRR  GI: Soft, non-distended, non-tender. Prevena removed, incision stapled, c/d/i  Genitourinary:  Voiding  Skin: No bruising or bleeding, normal skin color, texture  Neurologic: Awake, alert, oriented to name, place and time.    Neuropsychiatric: Calm, normal eye contact, alert, normal affect, oriented to self, place, time and situation, memory for past and recent events intact and thought process normal.    Time Spent on this Encounter   I, Maya Lucas PA-C, personally saw the patient today and spent greater than 30 minutes discharging this patient.    Discharge Disposition   Discharged to home  Condition at discharge: Stable    Consultations This Hospital Stay    NURSING TO CONSULT FOR VASCULAR ACCESS CARE IP CONSULT  SOT MEDICATION HISTORY IP PHARMACY CONSULT  SOCIAL WORK IP CONSULT  PHARMACY IP CONSULT  NUTRITION SERVICES ADULT IP CONSULT  NEPHROLOGY KIDNEY/PANCREAS TRANSPLANT ADULT IP CONSULT  CARE MANAGEMENT / SOCIAL WORK IP CONSULT  INTERVENTIONAL RADIOLOGY ADULT/PEDS IP CONSULT    Discharge Orders       Reason for your hospital stay    Patient underwent living donor kidney transplant with ureteral stent and venous reconstruction on 12/28/23 with Dr. Martinez, post op course complicated by Banff 1B rejection which was treated with thymo.     Activity    Your activity upon discharge: Walk at least four times a day, lift no greater than 10 pounds for 6-8 weeks from the time of surgery.  No driving while taking narcotics or 3 weeks after surgery.     [facility] Follow-up and recommended labs and tests    [facility] FOLLOW UP:     1. [facility]: Over the next 2 days you will be seen in the [facility] (ph. [phone], option 3). Your labs will be drawn at the beginning of your appointment. DO NOT take your medications prior to having labs drawn. Please bring all your medications with you from home to take after labs are drawn.     2. Follow up with Dr. Martinez in Transplant Clinic in 1-2 weeks.     3. Follow up with Transplant Nephrologist as scheduled.     4.  Ureteral stent removal in 4-6 weeks, to be scheduled by Transplant Coordinator. If a  does not contact you for this, please contact your transplant coordinator.     5. Follow up with your primary care provider in ~8 weeks. Patient to schedule.     Remember to always bring an updated medication list to all appointments.        Call your Transplant Coordinator ([phone]) with questions about Transplant Center appointment scheduling.     LABS:     CBC, BMP, magnesium, phosphorus, tacrolimus level to be drawn daily while in ATC, then every Monday and  Thursday by home health care nurse if arranged, or at an outpatient lab.     Monitor and record    Monitor blood pressure and weight daily.     When to contact your care team    WHEN TO CONTACT YOUR  COORDINATOR:     Transplant Coordinator [phone]     Notify your coordinator if you have pain over your kidney, increased redness or drainage from your incision, fever greater than 100F, decreased urine output or new or increased amount of blood in urine.     Notify your coordinator immediately if you are ever unable to take your immunosuppressive medications for any reason.     If you have URGENT concerns after office hours, please call the hospital switchboard at [phone] and ask to have the organ transplant nurse on-call paged. If you have a life-threatening emergency, go to the nearest emergency room.     Wound care and dressings    Instructions to care for your wound at home: If you have staples in place, they will be removed in 3 weeks after operation. Wash incision daily with soap and water. Do not soak or scrub.     IV access    **Ordering Provider MUST call/page Care Coordinator/ to discuss arranging this service**    You are going home with the following vascular access device: Hemodialysis.     Diet    Follow this diet upon discharge: Diet recommendations post-transplant: Heart healthy dietary habits long term (low saturated/trans fat, low sodium). High protein diet x 8 weeks. Practice food safety precautions.     Discharge Medications    Current Discharge Medication List        START taking these medications    Details   acetaminophen (TYLENOL) 325 MG tablet Take 1-2 tablets (325-650 mg) by mouth every 4 hours as needed (For optimal non-opioid multimodal pain management to improve pain control.)    Associated Diagnoses: Kidney replaced by transplant      aspirin 81 MG EC tablet Take 1 tablet (81 mg) by mouth daily  Qty: 30 tablet, Refills: 11    Associated Diagnoses: Kidney replaced by  transplant      atorvastatin (LIPITOR) 10 MG tablet Take 1 tablet (10 mg) by mouth daily  Qty: 30 tablet, Refills: 11    Associated Diagnoses: Kidney replaced by transplant      carvedilol (COREG) 6.25 MG tablet Take 1 tablet (6.25 mg) by mouth 2 times daily  Qty: 60 tablet, Refills: 11    Associated Diagnoses: Kidney replaced by transplant      fluconazole (DIFLUCAN) 100 MG tablet Take 1 tablet (100 mg) by mouth daily  Qty: 30 tablet, Refills: 11    Comments: Currently using as a tacrolimus booster  Associated Diagnoses: Kidney replaced by transplant      Lidocaine (LIDOCARE) 4 % Patch Place 1-2 patches onto the skin every 24 hours To prevent lidocaine toxicity, patient should be patch free for 12 hrs daily.  Qty: 10 patch, Refills: 0    Associated Diagnoses: Kidney replaced by transplant      methocarbamol (ROBAXIN) 750 MG tablet Take 1 tablet (750 mg) by mouth 3 times daily as needed for muscle spasms  Qty: 20 tablet, Refills: 0    Associated Diagnoses: Kidney replaced by transplant      mycophenolate (GENERIC EQUIVALENT) 250 MG capsule Take 3 capsules (750 mg) by mouth 2 times daily  Qty: 180 capsule, Refills: 11    Associated Diagnoses: Kidney replaced by transplant      pantoprazole (PROTONIX) 40 MG EC tablet Take 1 tablet (40 mg) by mouth every morning (before breakfast)  Qty: 30 tablet, Refills: 0    Associated Diagnoses: Kidney replaced by transplant      predniSONE (DELTASONE) 20 MG tablet Take 2 tablets (40 mg) by mouth 2 times daily for 2 days, THEN 2 tablets (40 mg) daily for 3 days, THEN 1 tablet (20 mg) daily for 3 days.  Qty: 17 tablet, Refills: 0    Associated Diagnoses: Kidney replaced by transplant      senna-docusate (SENOKOT-S/PERICOLACE) 8.6-50 MG tablet Take 1 tablet by mouth 2 times daily as needed for constipation  Qty: 60 tablet, Refills: 0    Associated Diagnoses: Kidney replaced by transplant      sodium bicarbonate 650 MG tablet Take 2 tablets (1,300 mg) by mouth 2 times daily  Qty: 120  tablet, Refills: 1    Associated Diagnoses: Kidney replaced by transplant      sulfamethoxazole-trimethoprim (BACTRIM) 400-80 MG tablet Take 1 tablet by mouth daily  Qty: 30 tablet, Refills: 11    Associated Diagnoses: Kidney replaced by transplant      !! tacrolimus (GENERIC) 1 MG capsule Take with 5 mg capsules for a total of 8 mg by mouth twice daily  Qty: 180 capsule, Refills: 11    Associated Diagnoses: End stage kidney disease (H)      !! tacrolimus (GENERIC) 5 MG capsule Take 1 capsule (5 mg) by mouth 2 times daily Take with 1 mg capsules for a total of 8 mg by mouth twice daily  Qty: 60 capsule, Refills: 11    Associated Diagnoses: Kidney replaced by transplant      valGANciclovir (VALCYTE) 450 MG tablet Take 1 tablet (450 mg) by mouth daily Increase dose to 900 mg by mouth daily as directed by transplant team (with improving kidney function)  Qty: 60 tablet, Refills: 5    Associated Diagnoses: Kidney replaced by transplant      Vitamin D3 (CHOLECALCIFEROL) 25 mcg (1000 units) tablet Take 2 tablets (50 mcg) by mouth daily  Qty: 60 tablet, Refills: 11    Associated Diagnoses: Kidney replaced by transplant       !! - Potential duplicate medications found. Please discuss with provider.        CONTINUE these medications which have CHANGED    Details   amLODIPine (NORVASC) 10 MG tablet Take 1 tablet (10 mg) by mouth daily  Qty: 30 tablet, Refills: 11    Associated Diagnoses: CKD (chronic kidney disease) stage 5, GFR less than 15 ml/min (H); Hypertension, renal           CONTINUE these medications which have NOT CHANGED    Details   cetirizine (ZYRTEC) 10 MG tablet Take 10 mg by mouth daily as needed for allergies      latanoprost (XALATAN) 0.005 % ophthalmic solution Place 1 drop into both eyes at bedtime      methylphenidate (RITALIN) 20 MG tablet Take 20 mg by mouth as needed Prn      Probiotic Product (PROBIOTIC-10 PO) Take 1 tablet by mouth every morning 1 tab capsule      difluprednate (DUREZOL) 0.05 %  ophthalmic emulsion INSTILL 1 DROP INTO EACH EYE SIX TIMES DAILY FOR THE FIRST 24 HOURS. THEN 1 DROP 4 TIMES DAILY UNTIL FOLLOW UP           STOP taking these medications       atenolol (TENORMIN) 25 MG tablet Comments:   Reason for Stopping:         B Complex-C-Folic Acid (DIALYVITE 800) 0.8 MG TABS Comments:   Reason for Stopping:         calcitRIOL (ROCALTROL) 0.25 MCG capsule Comments:   Reason for Stopping:         Calcium Acetate 667 MG TABS Comments:   Reason for Stopping:         Cholecalciferol (VITAMIN D) 50 MCG (2000 UT) CAPS Comments:   Reason for Stopping:             Allergies   Allergies   Allergen Reactions    Banana Itching     Raw banana; itchy mouth      Dust Mites      Runny nose and watery eyes    Mold      Runny nose and watery eyes    Nsaids Other (See Comments)     CKD    Other [Seasonal Allergies]      Grass, Ragweed - gets runny nose and watery eyes    Sulfa Antibiotics      PN: LW Reaction: GI Upset as an infant  12/28 Admission: Tolerated Bactrim     Data   Most Recent 3 Creatinines:  Recent Labs   Lab Test 01/05/24  0621 01/04/24  0556 01/03/24  0634   CR 3.65* 5.01* 6.35*

## 2023-12-29 NOTE — CONSULTS
Care Management Initial Consult    General Information  Assessment completed with: Patient, Parents,    Type of CM/SW Visit: Initial Assessment    Primary Care Provider verified and updated as needed: No   Readmission within the last 30 days: no previous admission in last 30 days      Reason for Consult: other (see comments) (Post transplant assessment)  Advance Care Planning: Advance Care Planning Reviewed: no concerns identified          Communication Assessment  Patient's communication style: spoken language English    Hearing Difficulty or Deaf: no   Wear Glasses or Blind: yes    Cognitive  Cognitive/Neuro/Behavioral: WDL  Level of Consciousness: alert  Arousal Level: opens eyes spontaneously  Orientation: oriented x 4  Mood/Behavior: calm, cooperative     Speech: clear, logical    Living Environment:   People in home: alone     Current living Arrangements: house      Able to return to prior arrangements: yes       Family/Social Support:  Care provided by: self  Provides care for: no one  Marital Status: Single  Parent(s), Other (specify) (friends)          Description of Support System: Supportive, Involved    Support Assessment: Adequate social supports, Adequate family and caregiver support    Current Resources:   Patient receiving home care services: No     Community Resources: OP Mental Health  Equipment currently used at home: none  Supplies currently used at home: None    Employment/Financial:  Employment Status: employed full-time          Financial Concerns: none           Does the patient's insurance plan have a 3 day qualifying hospital stay waiver?  N/A  Lifestyle & Psychosocial Needs:  Social Determinants of Health     Food Insecurity: Not on file   Depression: Not at risk (10/26/2023)    PHQ-2     PHQ-2 Score: 0   Housing Stability: Not on file   Tobacco Use: Low Risk  (12/28/2023)    Patient History     Smoking Tobacco Use: Never     Smokeless Tobacco Use: Never     Passive  Exposure: Never   Financial Resource Strain: Not on file   Alcohol Use: Not At Risk (4/19/2021)    AUDIT-C     Frequency of Alcohol Consumption: Never     Average Number of Drinks: Not asked     Frequency of Binge Drinking: Never   Transportation Needs: Not on file   Physical Activity: Not on file   Interpersonal Safety: Not on file   Stress: Not on file   Social Connections: Not on file       Functional Status:  Prior to admission patient needed assistance:   Dependent ADLs:: Independent  Dependent IADLs:: Independent  Assesssment of Functional Status: At functional baseline    Mental Health Status:  Mental Health Status: No Current Concerns  Mental Health Management: Individual Therapy    Chemical Dependency Status:  Chemical Dependency Status: No Current Concerns             Values/Beliefs:  Spiritual, Cultural Beliefs, Caodaism Practices, Values that affect care: no               Additional Information:  Patient underwent LRN transplant.  Met with patient to update psychosocial assessment and provide brief education about SW role while inpatient, as well as expectations/requirements and follow up needs post-transplant. SW also provided education about need for compliance with transplant medications, and explained ESRD Medicare benefits and medication coverage under Medicare part B.  Medicare 2728 forms completed and signed by patient.      Patient's post transplant caregivers: Pt identifies his parents, Darcy and Brent Villa as his primary supports post transplant. He will be staying with them during the recovery period at their home, in [place], [place].    Financial concerns/resources provided: Pt expressed no financial concerns at this time.    Insurance and Medication: Pt is covered by [facility] insurance through his father's employer. He reports taking medication as prescribed. Pt and parents express they are able to afford pt's medications at this time.    HEALTH BENEFIT: [facility]    ID# [id number]  GRP# [id number] (EFFECTIVE 01/01/2023)      PHARMACY BENEFIT: PAID/[employer]    PROCESSING INFO: ID# [id number] GRP# [id number]# [id number] BIN# [id number] (EFFECTIVE 01/01/2022 ).    DEDUCTIBLE $0 & MAX OUT-OF-POCKET $6,650    COPAY STRUCTURE:    $ 12 FOR GENERIC    $ 50 FOR BRAND    $ 90 FOR NON-FORMULARY MEDICATIONS      TEST CLAIM SPECIALTY #28    MYCOPHENOLATE 250mg (#240/30DS)--- $0    PROGRAF 1mg (#120/30DS)--- $0    TACROLIMUS 1mg (#120/30DS)--- $0    CYCLOSPORINE 100mg (#60/30DS)--- $0    VALGANCICLOVIR 450mg (#60/30DS)--- $0      Mental and chemical health services needed: Pt denied any current mental or chemical health concerns at this time. He is currently established with a therapist who he sees outpatient once every two weeks.     Education provided by SW: Social Work role inpatient setting and psychosocial support    Assessment and recommendations and plan:  SW met with patient at bedside. Pt was alert and fully oriented. His parents, Darcy and Brent, were also present during visit. Pt identifies his parents as his primary supports post transplant. His parents live in [place], [place], and he will be staying with them during his recovery period. Once able, he will return to his home in [place], [place]. SW will continue to follow for psychosocial support, resources and advocate on behalf of the patient.       PAYAL Qureshi, [facility] Outpatient Kidney/Pancreas/ [facility] Transplant Program  Ph: [phone]

## 2023-12-29 NOTE — OP NOTE
Transplant Surgery  Operative Note     Procedure date:  12/28/23    Preoperative diagnosis:  End Stage renal failure due to obstructive nephropathy    Postoperative diagnosis:  Same    Procedure:  1. Right kidney transplant,  Living, Right  iliac fossa, with venous reconstruction. A J-J ureteral stent was placed.  2. Kidney allograft preparation on Back Table  3    Surgeon:  SALIMA HAMEED    Fellow/Assistant:  Daniel Schroeder, fellow There was no qualified resident to assist with this procedure.    Anesthesia:  General    Specimen:  None    Drains:  none    Urine output:      Estimated blood loss:  50    Fluids administered:       Indication: The patient has End Stage renal failure due to obstructive nephropathy and received an organ offer for a Living kidney allograft. After discussing the risks and benefits of proceeding, the patient agreed to proceed with surgery and provided informed consent.  Findings: Integrity of recipient artery: Normal  Intraoperative Events: renal vein segment that was too short to allow for proper anastomosis and positioning so venous extension graft was placed    Final ABO/Crossmatch verification: After the donor organ arrived to the operating room and prior to anastomosis, I participated in the transplant pre-verification upon organ receipt timeout by visually verifying the donor ID, organ and laterality, donor blood type, recipient unique identifier, recipient blood type, and that the donor and recipient are blood type compatible. The crossmatch was done prospectively; the T cell flow crossmatch result was negative and B cell flow crossmatch result was negative prior to anastomosis.  The patient received Basiliximab, Cellcept, and Solumedrol on induction.    Donor Organ Information:   Donor UNOS ID:  [id number]    Donor arterial clamp on:      Total ischemic time:       Cold ischemic time:       Warm ischemic time:  53 min    Preservation fluid:  UW      Back Table Details:    Procedure:  Bench preparation of the kidney allograft for transplantation venous reconstruction    Surgeon:  SALIMA HAMEED    Faculty Co-Surgeon:  SALIMA HAMEED    Fellow/Assistant:  Daniel dyson    Donor arrival to recipient room:  12/28/2023  2:50 PM    Graft injury:  No    Graft biopsy:  no    Organ received on:  Ice    Pump resistance:      Pump flow:      Arterial anatomy:  Single    Donor arterial quality:  Normal    Venous anatomy:  Single    Ureteral anatomy:  Single    Any reconstruction:  Yes    Artery:  None    Vein:  Venous extension graft from banked donor vein     Complications:  venous extension graft anastomosed to renal vein using banked donor vein .    Findings: as above      None.    Back Table Preparation:  The donor kidney was received and inspected. It had been flushed with heparinized. The graft was prepared on the back table by removing perinephric fat and ligating venous tributaries and lymphatics. The ureter was also cleaned of excess tissue. Reconstruction was performed as detailed above using banked donor vein to create a venous conduit. The kidney was stored in iced cold preservation solution until ready for transplantation. Faculty was present for the critical portions of the procedure.    Operative Procedure:   Arterial anastomosis start:  12/28/2023  5:33 PM    Arterial unclamp:  12/28/2023  6:25 PM    Extra vessels used:    UNOS ID Type Placement Comments   [id number]  Vein            The patient was brought to the operating room, placed in a supine position, and a time out was performed. Sequential compression devices were placed on both lower extremities and general endotracheal anesthesia was induced.  The patient was given IV antibiotics and a Silveira catheter. A central line was placed by Anesthesia service. The abdomen was then shaved, prepped, and draped in the usual sterile fashion.  An incision was made in the right lower quadrant and carried down through  the subcutaneous tissue and the abdominal wall fascia. If encountered, the epigastric vessels were ligated in continuity, divided and secured with surgical clips. The right iliac artery and vein were exposed. The retractor system was placed and the lymphatics overlying the vessels were serially ligated and divided.     The patient was heparinized. We applied atraumatic vascular clamps and the donor kidney was brought to the operative field. We made a venotomy and the renal vein conduit that had been reconstructed on the backtable was anastomosed to the recipient right External Iliac vein in an end-to-side fashion. An arteriotomy was made and the donor renal artery was anastomosed to the recipient right external iliac artery  in an end to side fashion. The patient was simultaneously loaded with IV mannitol, Lasix and volume. The renal artery was protected and the clamps were removed. After several cardiac cycles, we opened the renal artery and the kidney had Good reperfusion and was soft, without edema, and without congestion.    The transplant ureter was managed by creating a Liche (anterior multistitch) anastomosis with absorbable suture. A stent was placed across the anastomosis. The kidney made good urine prior to implantation.    Hemostasis was obtained, the anastomoses inspected, and the kidney placed in the iliac fossa. After placement, the vessel lay was inspected and found to be acceptable. The kidney position was Retroperitoneal. The field was irrigated with antibiotic solution. No drain was placed. The retractor was removed and the abdominal wall fascia reapproximated. Subcutaneous tissues were irrigated and hemostasis obtained.  The skin was reapproximated with staples and provena was applied.   All needle, sponge and instrument counts were correct x 2. The patient was awakened, extubated, and transferred to PACU for post-op monitoring. Faculty was present for key portions of the procedure.    Physician  Attestation   I was present for the key portions of the procedure and I was immediately available for the entire procedure between opening and closing.    Nita Martinez MD, MD  Date of Service (when I saw the patient): 12/28/2023  The transplant fellow, Daniel Schroeder MD, was present and assisted with all aspects of the operation described above from opening to closing.  There was no suitable surgical resident available to assist in this procedure.

## 2023-12-29 NOTE — PLAN OF CARE
"BP (!) 142/95 (BP Location: Left arm)   Pulse 78   Temp 97.8  F (36.6  C) (Oral)   Resp 13   Ht 1.803 m (5' 11\")   Wt 125 kg (275 lb 9.2 oz)   SpO2 98%   BMI 38.43 kg/m      Shift: 8222-6797  VS: VSS on 2 LPM (no O2 needs when awake), afebrile  Neuro: AOx4  BG: POD 1 and 2,   Labs: K stable at 3.5 and Hgb 10.9 (K and Hgb Q4)  Respiratory: diminished lung sounds, shallow breaths d/t pain  Pain/Nausea/PRN: zofran given x1, oxy 5 given with little relief so additional 5mg given, oxy 10mg x1, dilaudid 0.4mg before skin check. Pain between 3-6/10  Diet: regular  LDA: internal jugular infusing D5LR @ 100, replacements 1 and 2 y sited           PIV SL           L AVF           L HD line  GI/: steward with orangeish color, great UO (between 800-300ml/hr), not passing gas  Skin: RLQ covered with abthera dressing, small scars bilaterally on flanks  Mobility: not OOB this shift  Plan: continue POC, pain management to promote ambulation; med/lab book started    Handoff given to following RN.                          "

## 2023-12-29 NOTE — ANESTHESIA CARE TRANSFER NOTE
Patient: Boogie Villa    Procedure: Procedure(s):  Kidney Transplant Non-Directed Recipient, ureteral stent placement       Diagnosis: ESRD (end stage renal disease) (H) [N18.6]  Diagnosis Additional Information: No value filed.    Anesthesia Type:   General     Note:    Oropharynx: spontaneously breathing  Level of Consciousness: awake and drowsy  Oxygen Supplementation: nasal cannula    Independent Airway: airway patency satisfactory and stable  Dentition: dentition unchanged  Vital Signs Stable: post-procedure vital signs reviewed and stable  Report to RN Given: handoff report given  Patient transferred to: PACU    Handoff Report: Identifed the Patient, Identified the Reponsible Provider, Reviewed the pertinent medical history, Discussed the surgical course, Reviewed Intra-OP anesthesia mangement and issues during anesthesia, Set expectations for post-procedure period and Allowed opportunity for questions and acknowledgement of understanding      Vitals:  Vitals Value Taken Time   /75 12/28/23 2045   Temp     Pulse 90 12/28/23 2053   Resp 3 12/28/23 2053   SpO2 98 % 12/28/23 2053   Vitals shown include unfiled device data.    Electronically Signed By: CHRISTIN Calvillo CRNA  December 28, 2023  8:55 PM

## 2023-12-29 NOTE — PROGRESS NOTES
Patient removed from OS waitlist after living donor kidney transplant. OS ID [id number] .    Donor Has Risk Criteria for Transmission of HIV/HCV/HBV: no  Recipient Notified of Risk Criteria: no

## 2023-12-29 NOTE — PROGRESS NOTES
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   Barriers to post transplant care: None

## 2023-12-29 NOTE — OR NURSING
Dr. Pantoja with transplant surgery tet paged to notify post-op CXR and US complete, urine output first hour 300mL, VSS.

## 2023-12-29 NOTE — TELEPHONE ENCOUNTER
A pharmacist spent 30 minutes providing medication teaching with Boogie Villa Daryc (mother) and Brent (father) for discharge with a focus on new medications/dose changes.  The discharge medication list was reviewed with the patient/family and the following points were discussed, as applicable: Name, description, purpose, dose/strength, duration of medications, common side effects, food/medications to avoid, action to be taken if dose is missed, when to call MD, and how to obtain refills.  The patient will be responsible for managing medications. Additionally, the following transplant related education was covered: Purpose of medication card, Medication videos, Timing of medications and day of lab draw considerations , Prescription Insurance , and Discharge process for receiving meds   Patient will  transplant supplies including 7 day pill organizer, thermometer, and BP monitor at the discharge pharmacy along with medications.  Patient will use local pharmacy or other mail order for outpatient medications.   Clinical Pharmacy Consult:                                                      Transplant Specific:   Date of Transplant: 12/28/23  Type of Transplant: kidney  First Transplant: yes    Immunosuppression Regimen   TAC 3mg qAM & 3mg qPM, Prednisone taper, and MMF 750mg qAM & 750mg qPM  Patient specific goal: 8-10    Scr:   Lab Results   Component Value Date    CR 5.16 12/29/2023    CR 4.48 03/29/2021     Side effects: no side effects    Prophylactic Medications  PJP Prophylactic: Bactrim SS daily  Scheduled Discontinue Date: Lifelong     Antifungal: Not needed thus far  Scheduled Discontinue Date: N/A     CMV Prophylactic: CrCl 25 to 39 mL/minute: Valcyte 450 mg every other day   Scheduled Discontinue Date: 6 months Anticipated date 6/29/24    Acid Reducer: Not needed thus far  Scheduled Reviewed Date: N/A    Vascular Prophylactic: Aspirin 81 mg PO daily  Scheduled Discontinue Date:  TBD    Med  rec/DUR performed: yes  Med Rec Discrepancies: no    Reminders:    Bring to first clinic appt: med box, med card, bp monitor, all medications being taken, and lab book.  2.   MTM pharmacist visit on first clinic appt and if ok, again in 3 to 4 months during follow up appt.  3.   Avoid Grapefruit and Grapefruit juice.   4.   Avoid herbal supplements. If wish to take other medications or supplements, call your coordinator.   5.   Keep lab appts.   6.   Can use apps on phone like [employer] to help manage medication lists and reminders.   7.   Make sure you are protecting your skin by wearing long sleeves and applying sunscreen to exposed skin, for any significant time in the sun.     Transplant Coordinator is Nina Joseph [facility]

## 2023-12-29 NOTE — PROGRESS NOTES
"/74 (BP Location: Left arm)   Pulse 74   Temp 97.9  F (36.6  C) (Oral)   Resp 16   Ht 1.803 m (5' 11\")   Wt 125 kg (275 lb 9.2 oz)   SpO2 96%   BMI 38.43 kg/m      [id number]  Slightly hypertensive, OVSS on RA. A+Ox4. Family at bedside, supportive. Scheduled robaxin/tylenol and prn dilaudid given. Lidocaine patches in place. Hemoglobin 10.7, potassium 3.6 and creatinine 3.89 trending down. Denies nausea, pt not passing flatus. LBM 12/28. , POD #1 and 2. Regular diet. PIV SL. R AVF and HD line. Internal jugular infusing D5LR @100ml/hr, 2 lumens SL. Silveira in place, adequate UOP.  RLQ incision covered with abthera dressing. Med card and lab book up to date. Pt spoke with specialty pharmacy today. Pt stood at edge of bed x1 and ambulated in halls x1. IS at bedside. Will continue to monitor and notify team with changes.   "

## 2023-12-29 NOTE — PROGRESS NOTES
Transplant Surgery  Inpatient Daily Progress Note  12/29/2023    Assessment & Plan: 22yo with history of ESRD secondary to posterior ureteral valves s/p Mitrofanoff, HTN, hearing loss, ADD, and anxiety. S/p living donor kidney transplant with ureteral stent and venous reconstruction on 12/28/23.    Graft function: POD #1   Kidney: Cr 6.8->5.2, good UOP. Post-op US: patent vessels.    Immunosuppressed status secondary to medications: PRA 0  Basiliximab: 12/28 & 1/1/24  Steroids:  Five week taper per protocol.  MMF: 750mg BID  Tacro: Goal level 8-10    Neuro:  Acute post-op pain: Rates at 4/10 but reluctant to move.   -Switch to dilaudid 2-4mg q3H PRN  -Acetaminophen scheduled  -Lidoderm patches  -Robaxin TID scheduled    Hematology:   Anemia of chronic disease: Hgb 10.5, stable.    Cardiorespiratory:   HTN: Controlled off meds. Monitor.    GI/Nutrition:   Diet: Regular  Bowel regimen: Senna, PEG    Endocrine: No acute issues.     Fluid/Electrolytes: MIVF: Straight rate    : Silveira to remain due to new surgical anastomosis x3 days.    Infectious disease: Afebrile    Prophylaxis: DVT, fall, GI, viral (Valcyte), pneumocystis (TMP/sulfa)    Disposition: 7A     SUNIL/Fellow/Resident Provider: Milvia Zavala NP [id number]    Faculty: Nita Martinez MD   _________________________________________________________________    Interval History: History from patient and/or EMR  Overnight events: Incisional pain 4/10 at rest. Reluctant to get OOB.     ROS:   A 10-point review of systems was negative except as noted above.    Meds:   acetaminophen  975 mg Oral Q8H    aspirin  81 mg Oral Daily    atorvastatin  10 mg Oral Daily    [START ON 12/30/2023] magnesium oxide  400 mg Oral Daily with lunch    mycophenolate  750 mg Oral BID IS    polyethylene glycol  17 g Oral Daily    senna-docusate  1 tablet Oral BID    sodium chloride (PF)  10 mL Intracatheter Q8H    sulfamethoxazole-trimethoprim  1 tablet Oral Daily    tacrolimus  3 mg Oral  "BID IS    valGANciclovir  450 mg Oral Once per day on Monday Thursday       Physical Exam:     Admit Weight: 125 kg (275 lb 9.2 oz)    Current vitals:   /77 (BP Location: Left arm)   Pulse 84   Temp 98.3  F (36.8  C) (Oral)   Resp 25   Ht 1.803 m (5' 11\")   Wt 125 kg (275 lb 9.2 oz)   SpO2 94%   BMI 38.43 kg/m      Vital sign ranges:    Temp:  [97.6  F (36.4  C)-98.6  F (37  C)] 98.3  F (36.8  C)  Pulse:  [76-92] 84  Resp:  [11-27] 25  BP: (113-152)/(73-97) 130/77  SpO2:  [88 %-99 %] 94 %    General Appearance: in no apparent distress.   Skin: Warm, perfused  Heart: NSR  Lungs: Unlabored, RA  Abdomen: The abdomen is Soft, incision covered w/ topical VAC  : steward is present.  Urine is myles.  Extremities: edema: None, strength 5/5  Neurologic: alert and oriented x4. Tremor absent.  Access: L internal jugular CVC, right tunneled line    Data:   CMP  Recent Labs   Lab 12/29/23  0554 12/29/23  0207 12/28/23  2356 12/28/23  2100 12/28/23  1812 12/28/23  1554     --   --  137 139 143   POTASSIUM 3.6  3.6 3.5   < > 3.8 3.9 3.4*   CHLORIDE 102  --   --  99  --   --    CO2 22  --   --  23  --   --    *  --   --  139* 112* 93   BUN 24.5*  --   --  26.5*  --   --    CR 5.16*  --   --  6.78*  --   --    GFRESTIMATED 15*  --   --  11*  --   --    NABIL 8.5*  --   --  9.1  --   --    ICAW  --   --   --   --  4.6 4.5   MAG 1.9  --   --  2.0  --   --    PHOS 4.5  --   --  3.1  --   --     < > = values in this interval not displayed.     CBC  Recent Labs   Lab 12/29/23  0554 12/29/23  0207 12/28/23  2356 12/28/23  2100   HGB 10.5* 10.9*   < > 11.2*   WBC 10.1  --   --  10.7     --   --  225    < > = values in this interval not displayed.      "

## 2023-12-29 NOTE — PROGRESS NOTES
"  Transplant Surgery Progress Note  Surgery Cross-Cover  Post Op Check    12/29/2023    Boogie Villa is a 23 year old male POD#0 s/p Procedure(s):  Kidney Transplant Non-Directed Recipient, ureteral stent placement for Pre-Op Diagnosis Codes:     * ESRD (end stage renal disease) (H) [N18.6]    Pt reports their pain is controlled with current regimen. Denies nausea, chest pain, shortness of breath, or dizziness. Patient is not yet passing flatus or having bowel movements and Is voiding via urinary catheter. UOP 850cc in last hour.    BP (!) 149/93   Pulse 85   Temp 97.8  F (36.6  C) (Oral)   Resp 14   Ht 1.803 m (5' 11\")   Wt 125 kg (275 lb 9.2 oz)   SpO2 98%   BMI 38.43 kg/m      Gen: A&O x4, NAD   Chest: RR, breathing non-labored on nasal cannula at 0-2 liters per minute   Abdomen: soft, non-distended, appropriately tender to palpation  Incision: clean, dry, intact covered with Prevena vac dressing  Extremities: warm and well perfused  Devices: Silveira    A/P: No acute post-op issues. Trend q4h HGB, K. Continue plan of care. Please call with any questions.    Paloma Zhang MD    "

## 2023-12-29 NOTE — CONSULTS
[facility]  Transplant Nephrology Consult  Date of Admission:  12/28/2023  Today's Date: 12/29/2023  Requesting physician: Nita Martinez MD    Recommendations:  - Agree with low intensity induction.  - No indication for dialysis.  - Resume vitamin D 2000 units daily.    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   - Baseline Creatinine: ~ TBD.   - Proteinuria: Not checked post transplant   - Date DSA Last Checked: Dec/2023      Latest DSA: No DSA at time of transplant   - BK Viremia: Not checked post transplant   - Kidney Tx Biopsy: No   - Transplant Ureteral Stent: Yes      # Immunosuppression: Tacrolimus immediate release (goal 8-10), Mycophenolate mofetil (dose 750 mg every 12 hours), and Methylprednisolone (dose taper)   - Patient is in an immunosuppressed state and will continue to monitor for efficacy and toxicity of immunosuppression medications.   - Induction with Recent Transplant:  Low Intensity Protocol   - Changes: No    # Infection Prophylaxis:   - PJP: Sulfa/TMP (Bactrim)  - CMV: Valganciclovir (Valcyte), CMV IgG Ab Discordance (D+/R-)    # Hypertension: Controlled;  Goal BP: < 150/90   - Volume status: Euvolemic  EDW ~ 125.7 kg   - Changes: Not at this time    Prior to admission antihypertensives: amlodipine 5 mg daily and atenolol 37.5 mg daily      # Anemia in Chronic Renal Disease: Hgb: Trend down      TOM: No   - Iron studies: Replete    # Mineral Bone Disorder:    - Secondary renal hyperparathyroidism; PTH level: Moderately elevated (301-600 pg/ml)        On treatment: None  - Vitamin D; level: Low        On supplement:  on 2000 units daily PTA  - Calcium; level: Low  (trend for now)      On supplement: No  - Phosphorus; level: Normal        On supplement: No    # Electrolytes:   - Potassium; level: Normal        On supplement: No  - Magnesium; level: Normal        On supplement: No, magnesium oxide 400 mg PO daily starts  tomorrow  - Bicarbonate; level: Normal        On supplement: No  - Sodium; level: Normal      # Bilateral uveitis: no evidence of systemic autoimmune/inflammatory disease at last rheumatology.    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    Seen and discussed with Dr. Barrera.    Marc Yepez, CHRISTIN CNP  Pager: [phone]          Physician Attestation     I saw and evaluated Boogie Villa as part of a shared APRN/PA visit.     I personally reviewed the vital signs, medications, labs, and imaging.    I personally performed the substantive portion of the medical decision making for this visit - please see the SUNIL's documentation for full details.    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 Low risk induction    Imelda Barrera MD  Date of Service (when I saw the patient): 12/29/23    REASON FOR CONSULT   Kidney Transplant    History of Present Illness   Boogie Villa is a 23 year old male with ESRD from posterior valves s/p reimplantation and Mitrofanoff who has been on dialysis since 2/2023 and is now s/p LDKT with stent yesterday 12/28/23.  PMH also significant obesity and HTN. The final crossmatch is negative and most recent PRA 0%.  Induction was with basiliximab.  The serum creatinine today of 5.16 is down from 6.78 yesterday and urine output has been ~ 4.7 L over the last three shifts.     SBP 120s - 150s overnight. Afebrile. No chest pain or shortness of breath. No leg swelling. No nausea or vomiting.  No BM yet since surgery.  Not passing gas yet.    Review of Systems    The 10  point Review of Systems is negative other than noted in the HPI or here.     Past Medical History    I have reviewed this patient's medical history and updated it with pertinent information if needed.   Past Medical History:   Diagnosis Date    ADD (attention deficit disorder)     Allergic rhinitis     ESRD (end stage renal disease) on dialysis (H)     Hypertension 2000    Mitrofanoff appendicovesicostomy present (H)     Obesity     Posterior urethral valves     Secondary renal hyperparathyroidism (H24)     Vitamin D deficiency        Past Surgical History   I have reviewed this patient's surgical history and updated it with pertinent information if needed.  Past Surgical History:   Procedure Laterality Date    ABDOMEN SURGERY  2003    Bilateral urereral tapering and re-implantation    ABDOMEN SURGERY  2008    Mitrofanoff    ablation of posterior urethral valves  2000    APPENDECTOMY  2008    BENCH KIDNEY  12/28/2023    Procedure: Bench kidney;  Surgeon: Nita Martinez MD;  Location: [facility] OR    CREATE FISTULA ARTERIOVENOUS UPPER EXTREMITY Left 5/25/2023    Procedure: LEFT Brachial Basilic ARTERIOVENOUS FISTULA CREATION SURGERY WITH INTRAOPERATIVE ULTRASOUND .;  Surgeon: Nita Martinez MD;  Location: [facility] OR    CREATE GRAFT LOOP ARTERIOVENOUS UPPER EXTREMITY Right 8/24/2023    Procedure: Right RadioCephalic AV Fistula and Branch Ligation x3;  Surgeon: Nita Martinez MD;  Location: [facility] OR    IR CVC TUNNEL PLACEMENT > 5 YRS OF AGE  2/16/2023    ureteral reimplantation with tapering  2003       Family History   I have reviewed this patient's family history and updated it with pertinent information if needed.   Family History   Problem Relation Age of Onset    No Known Problems Mother     No Known Problems Father     Anesthesia Reaction No family hx of     Thrombosis No family hx of        Social History   I have reviewed this patient's social history and updated it with pertinent information if  "needed. Boogie Villa  reports that he has never smoked. He has never been exposed to tobacco smoke. He has never used smokeless tobacco. He reports that he does not currently use drugs after having used the following drugs: Marijuana. He reports that he does not drink alcohol.    Allergies   Allergies   Allergen Reactions    Banana Itching     Raw banana; itchy mouth      Dust Mites      Runny nose and watery eyes    Mold      Runny nose and watery eyes    Nsaids Other (See Comments)     CKD    Other [Seasonal Allergies]      Grass, Ragweed - gets runny nose and watery eyes    Sulfa Antibiotics      PN: LW Reaction: GI Upset     Prior to Admission Medications    acetaminophen  975 mg Oral Q8H    aspirin  81 mg Oral Daily    atorvastatin  10 mg Oral Daily    [START ON 2024] basiliximab (SIMULECT) 20 mg in sodium chloride 0.9 % 50 mL infusion  20 mg Intravenous Once    [START ON 2023] lidocaine  2 patch Transdermal Q24H    [START ON 2023] magnesium oxide  400 mg Oral Daily with lunch    methylPREDNISolone  250 mg Intravenous Once    [START ON 2023] methylPREDNISolone  100 mg Intravenous Once    mycophenolate  750 mg Oral BID IS    polyethylene glycol  17 g Oral Daily    senna-docusate  1 tablet Oral BID    sodium chloride (PF)  10 mL Intracatheter Q8H    sulfamethoxazole-trimethoprim  1 tablet Oral Daily    tacrolimus  3 mg Oral BID IS    [START ON 2023] valGANciclovir  450 mg Oral Every Other Day      dextrose 5% in lactated ringers 100 mL/hr at 23 0616    sodium chloride Stopped (23 0702)    sodium chloride Stopped (23 0800)       Physical Exam   Temp  Av.1  F (36.7  C)  Min: 97.6  F (36.4  C)  Max: 98.6  F (37  C)      Pulse  Av.2  Min: 67  Max: 92 Resp  Av.6  Min: 11  Max: 27  SpO2  Av.7 %  Min: 88 %  Max: 99 %    CVP (mmHg): 11 mmHgBP 121/72 (BP Location: Left arm)   Pulse 74   Temp 98.2  F (36.8  C) (Oral)   Resp 16   Ht 1.803 m (5' 11\")  "  Wt 125 kg (275 lb 9.2 oz)   SpO2 98%   BMI 38.43 kg/m     Date 12/29/23 0700 - 12/30/23 0659   Shift 2422-7105 1978-8202 1718-3320 24 Hour Total   INTAKE   Shift Total(mL/kg)       OUTPUT   Urine 325   325   Shift Total(mL/kg) 325(2.6)   325(2.6)   Weight (kg) 125 125 125 125      Admit Weight: 125 kg (275 lb 9.2 oz)     GENERAL APPEARANCE: alert and mild distress  HENT: mouth without ulcers or lesions  RESP: lungs clear to auscultation - no rales, rhonchi or wheezes  CV: regular rhythm, normal rate, no rub, no murmur  EDEMA: no LE edema bilaterally  ABDOMEN: soft, nondistended, appropriately tender  MS: extremities normal - no gross deformities noted, no evidence of inflammation in joints, no muscle tenderness  SKIN: no rash  PSYCH: fatigued  DIALYSIS ACCESS:  Right IJ tunneled catheter    Data   CMP  Recent Labs   Lab 12/29/23  0554 12/29/23  0207 12/28/23 2356 12/28/23  2100 12/28/23  1812 12/28/23  1554     --   --  137 139 143   POTASSIUM 3.6  3.6 3.5 3.5 3.8 3.9 3.4*   CHLORIDE 102  --   --  99  --   --    CO2 22  --   --  23  --   --    ANIONGAP 13  --   --  15  --   --    *  --   --  139* 112* 93   BUN 24.5*  --   --  26.5*  --   --    CR 5.16*  --   --  6.78*  --   --    GFRESTIMATED 15*  --   --  11*  --   --    NABIL 8.5*  --   --  9.1  --   --    MAG 1.9  --   --  2.0  --   --    PHOS 4.5  --   --  3.1  --   --      CBC  Recent Labs   Lab 12/29/23  0554 12/29/23  0207 12/28/23 2356 12/28/23  2100   HGB 10.5* 10.9* 11.2* 11.2*   WBC 10.1  --   --  10.7   RBC 3.22*  --   --  3.43*   HCT 32.0*  --   --  32.3*   MCV 99  --   --  94   MCH 32.6  --   --  32.7   MCHC 32.8  --   --  34.7   RDW 12.9  --   --  13.2     --   --  225     INRNo lab results found in last 7 days.  ABG  Recent Labs   Lab 12/28/23  1812 12/28/23  1554   O2PER 50.0 75.0      Urine Studies  Recent Labs   Lab Test 12/19/23  1007 03/29/21  1404 12/03/20  1525 10/19/20  1645 05/13/19  0728   COLOR Straw Straw Yellow  Yellow Straw   APPEARANCE Clear Clear Clear Clear Clear   URINEGLC 70* 50* 100* 50 mg/dL* 50*   URINEBILI Negative Negative Negative Negative Negative   URINEKETONE Negative Negative Negative Negative Negative   SG 1.003 1.008 1.025 1.005 1.008   UBLD Trace* Negative Small* Small* Negative   URINEPH 7.5* 6.0 7.0 6.0 7.0   PROTEIN 100* >499* >300* >=500 mg/dL* >499*   UROBILINOGEN  --   --  0.2 <2.0 E.U./dL  --    NITRITE Negative Negative Negative Negative Negative   LEUKEST Negative Negative Negative Negative Negative   RBCU 0 0  --  0-2 <1   WBCU 1 <1  --  0-5 <1     Recent Labs   Lab Test 02/26/20  1530 11/25/19  0904 05/13/19  0728 12/04/18  1056 06/08/18  1310 02/10/17  1004 08/26/16  1150 02/19/16  1000   UTPG 4.31* 4.92* 4.31* 4.59* 6.69* 2.78* 2.45* 2.17*     PTH  Recent Labs   Lab Test 12/19/23  0953 07/16/21  1650 12/16/20  1611 12/16/20  0000 10/19/20  1614 02/26/20  1520 11/25/19  0859 05/13/19  0712 12/04/18  1054 06/08/18  1256 01/16/18  1102 08/09/17  1626 02/10/17  1002 08/26/16  1245 02/19/16  0955   PTHI 445* 308* 94* 94 131* 201* 154* 110* 177* 96* 133* 66 39 47 42     Iron Studies  Recent Labs   Lab Test 12/19/23  0953 02/26/20  1520 05/13/19  0712 06/08/18  1256 01/16/18  1102 08/09/17  1626 02/10/17  1002   IRON 104 66 71 87 116 93 54    251 269 257 290 284 297   IRONSAT 42 26 26 34 40 33 18   DAVID 826* 133 109 82 86 100 113          IMAGING:  All imaging studies reviewed by me.

## 2023-12-29 NOTE — BRIEF OP NOTE
[facility]    Brief Operative Note    Pre-operative diagnosis: ESRD (end stage renal disease) (H) [N18.6]  Post-operative diagnosis Same as pre-operative diagnosis    Procedure: Kidney Transplant Non-Directed Recipient, N/A - Abdomen    Surgeon: Surgeon(s) and Role:     * Nita Martinez MD - Primary     * Ralf Jc MD - Fellow - Assisting     * Daniel Schroeder MD - Fellow - Assisting  Anesthesia: General   Estimated Blood Loss: Less than 50 ml    Drains: None  Specimens: * No specimens in log *  Findings:   Venous reconstruction with donor vein to create conduit from renal vein to external iliac  .  Complications: None.  Implants:   Implant Name Type Inv. Item Serial No.  Lot No. LRB No. Used Action   vessel   2 OF 3  [id number] N/A 1 Implanted   SET STNT 12CM 6FR SFLX SM 2 PGTL CRV CDE URET SS PU NYL - SNA Stent SET STNT 12CM 6FR SFLX SM 2 PGTL CRV CDE URET SS PU NYL NA [employer] [id number] N/A 1 Implanted

## 2023-12-29 NOTE — PLAN OF CARE
Goal Outcome Evaluation:      Plan of Care Reviewed With: patient, family    Overall Patient Progress: no changeOverall Patient Progress: no change    Outcome Evaluation: See RD note 12/29    TOMAS Cantu, RD, LD  7A/7B (beds 219-229) RD pager: [phone]  Weekend/Holiday RD pager: [phone]

## 2023-12-30 LAB
ANION GAP SERPL CALCULATED.3IONS-SCNC: 11 MMOL/L (ref 7–15)
BASOPHILS # BLD AUTO: 0 10E3/UL (ref 0–0.2)
BASOPHILS NFR BLD AUTO: 0 %
BUN SERPL-MCNC: 25.1 MG/DL (ref 6–20)
CALCIUM SERPL-MCNC: 9.4 MG/DL (ref 8.6–10)
CHLORIDE SERPL-SCNC: 106 MMOL/L (ref 98–107)
CREAT SERPL-MCNC: 2.67 MG/DL (ref 0.67–1.17)
DEPRECATED HCO3 PLAS-SCNC: 24 MMOL/L (ref 22–29)
EGFRCR SERPLBLD CKD-EPI 2021: 33 ML/MIN/1.73M2
EOSINOPHIL # BLD AUTO: 0 10E3/UL (ref 0–0.7)
EOSINOPHIL NFR BLD AUTO: 0 %
ERYTHROCYTE [DISTWIDTH] IN BLOOD BY AUTOMATED COUNT: 12.9 % (ref 10–15)
GLUCOSE SERPL-MCNC: 158 MG/DL (ref 70–99)
HCT VFR BLD AUTO: 28.9 % (ref 40–53)
HGB BLD-MCNC: 9.7 G/DL (ref 13.3–17.7)
IMM GRANULOCYTES # BLD: 0 10E3/UL
IMM GRANULOCYTES NFR BLD: 0 %
LYMPHOCYTES # BLD AUTO: 1.3 10E3/UL (ref 0.8–5.3)
LYMPHOCYTES NFR BLD AUTO: 13 %
MAGNESIUM SERPL-MCNC: 2.3 MG/DL (ref 1.7–2.3)
MCH RBC QN AUTO: 32.7 PG (ref 26.5–33)
MCHC RBC AUTO-ENTMCNC: 33.6 G/DL (ref 31.5–36.5)
MCV RBC AUTO: 97 FL (ref 78–100)
MONOCYTES # BLD AUTO: 0.6 10E3/UL (ref 0–1.3)
MONOCYTES NFR BLD AUTO: 6 %
NEUTROPHILS # BLD AUTO: 8 10E3/UL (ref 1.6–8.3)
NEUTROPHILS NFR BLD AUTO: 81 %
NRBC # BLD AUTO: 0 10E3/UL
NRBC BLD AUTO-RTO: 0 /100
PHOSPHATE SERPL-MCNC: 4 MG/DL (ref 2.5–4.5)
PLATELET # BLD AUTO: 201 10E3/UL (ref 150–450)
POTASSIUM SERPL-SCNC: 3.8 MMOL/L (ref 3.4–5.3)
RBC # BLD AUTO: 2.97 10E6/UL (ref 4.4–5.9)
SODIUM SERPL-SCNC: 141 MMOL/L (ref 135–145)
WBC # BLD AUTO: 9.9 10E3/UL (ref 4–11)

## 2023-12-30 PROCEDURE — 250N000013 HC RX MED GY IP 250 OP 250 PS 637: Performed by: PHYSICIAN ASSISTANT

## 2023-12-30 PROCEDURE — 36592 COLLECT BLOOD FROM PICC: CPT | Performed by: SURGERY

## 2023-12-30 PROCEDURE — 250N000011 HC RX IP 250 OP 636

## 2023-12-30 PROCEDURE — 250N000011 HC RX IP 250 OP 636: Performed by: SURGERY

## 2023-12-30 PROCEDURE — 250N000013 HC RX MED GY IP 250 OP 250 PS 637: Performed by: NURSE PRACTITIONER

## 2023-12-30 PROCEDURE — 250N000011 HC RX IP 250 OP 636: Performed by: NURSE PRACTITIONER

## 2023-12-30 PROCEDURE — 84100 ASSAY OF PHOSPHORUS: CPT | Performed by: SURGERY

## 2023-12-30 PROCEDURE — 83735 ASSAY OF MAGNESIUM: CPT | Performed by: SURGERY

## 2023-12-30 PROCEDURE — 250N000012 HC RX MED GY IP 250 OP 636 PS 637: Performed by: SURGERY

## 2023-12-30 PROCEDURE — 99233 SBSQ HOSP IP/OBS HIGH 50: CPT | Mod: FS | Performed by: NURSE PRACTITIONER

## 2023-12-30 PROCEDURE — 120N000011 HC R&B TRANSPLANT UMMC

## 2023-12-30 PROCEDURE — 85025 COMPLETE CBC W/AUTO DIFF WBC: CPT | Performed by: SURGERY

## 2023-12-30 PROCEDURE — 80048 BASIC METABOLIC PNL TOTAL CA: CPT | Performed by: SURGERY

## 2023-12-30 PROCEDURE — 250N000013 HC RX MED GY IP 250 OP 250 PS 637: Performed by: SURGERY

## 2023-12-30 RX ORDER — AMLODIPINE BESYLATE 5 MG/1
5 TABLET ORAL DAILY
Status: DISCONTINUED | OUTPATIENT
Start: 2023-12-30 | End: 2023-12-30

## 2023-12-30 RX ORDER — LABETALOL HYDROCHLORIDE 5 MG/ML
20 INJECTION, SOLUTION INTRAVENOUS EVERY 6 HOURS PRN
Status: DISCONTINUED | OUTPATIENT
Start: 2023-12-30 | End: 2024-01-05 | Stop reason: HOSPADM

## 2023-12-30 RX ORDER — MAGNESIUM OXIDE 400 MG/1
400 TABLET ORAL
Status: DISCONTINUED | OUTPATIENT
Start: 2023-12-31 | End: 2024-01-05 | Stop reason: HOSPADM

## 2023-12-30 RX ORDER — VALGANCICLOVIR 450 MG/1
450 TABLET, FILM COATED ORAL DAILY
Status: DISCONTINUED | OUTPATIENT
Start: 2023-12-31 | End: 2024-01-01

## 2023-12-30 RX ORDER — AMLODIPINE BESYLATE 5 MG/1
5 TABLET ORAL DAILY
Status: DISCONTINUED | OUTPATIENT
Start: 2023-12-31 | End: 2024-01-01

## 2023-12-30 RX ADMIN — SENNOSIDES AND DOCUSATE SODIUM 1 TABLET: 8.6; 5 TABLET ORAL at 08:24

## 2023-12-30 RX ADMIN — ACETAMINOPHEN 975 MG: 325 TABLET, FILM COATED ORAL at 14:07

## 2023-12-30 RX ADMIN — DIFLUPREDNATE OPHTHALMIC 1 DROP: 0.5 EMULSION OPHTHALMIC at 08:23

## 2023-12-30 RX ADMIN — TACROLIMUS 3 MG: 1 CAPSULE ORAL at 08:24

## 2023-12-30 RX ADMIN — ATORVASTATIN CALCIUM 10 MG: 10 TABLET, FILM COATED ORAL at 08:24

## 2023-12-30 RX ADMIN — ASPIRIN 81 MG: 81 TABLET, COATED ORAL at 08:24

## 2023-12-30 RX ADMIN — LABETALOL HYDROCHLORIDE 20 MG: 5 INJECTION, SOLUTION INTRAVENOUS at 22:45

## 2023-12-30 RX ADMIN — LIDOCAINE 2 PATCH: 4 PATCH TOPICAL at 08:24

## 2023-12-30 RX ADMIN — METHOCARBAMOL 750 MG: 750 TABLET ORAL at 08:25

## 2023-12-30 RX ADMIN — SENNOSIDES AND DOCUSATE SODIUM 1 TABLET: 8.6; 5 TABLET ORAL at 20:06

## 2023-12-30 RX ADMIN — ACETAMINOPHEN 975 MG: 325 TABLET, FILM COATED ORAL at 05:50

## 2023-12-30 RX ADMIN — DIFLUPREDNATE OPHTHALMIC 1 DROP: 0.5 EMULSION OPHTHALMIC at 20:06

## 2023-12-30 RX ADMIN — SODIUM CHLORIDE, SODIUM LACTATE, POTASSIUM CHLORIDE, CALCIUM CHLORIDE AND DEXTROSE MONOHYDRATE: 5; 600; 310; 30; 20 INJECTION, SOLUTION INTRAVENOUS at 05:49

## 2023-12-30 RX ADMIN — AMLODIPINE BESYLATE 5 MG: 5 TABLET ORAL at 14:34

## 2023-12-30 RX ADMIN — MYCOPHENOLATE MOFETIL 750 MG: 250 CAPSULE ORAL at 17:50

## 2023-12-30 RX ADMIN — TACROLIMUS 3 MG: 1 CAPSULE ORAL at 17:50

## 2023-12-30 RX ADMIN — SULFAMETHOXAZOLE AND TRIMETHOPRIM 1 TABLET: 400; 80 TABLET ORAL at 08:24

## 2023-12-30 RX ADMIN — DIFLUPREDNATE OPHTHALMIC 1 DROP: 0.5 EMULSION OPHTHALMIC at 11:50

## 2023-12-30 RX ADMIN — ACETAMINOPHEN 975 MG: 325 TABLET, FILM COATED ORAL at 21:45

## 2023-12-30 RX ADMIN — METHYLPREDNISOLONE SODIUM SUCCINATE 100 MG: 125 INJECTION, POWDER, FOR SOLUTION INTRAMUSCULAR; INTRAVENOUS at 08:24

## 2023-12-30 RX ADMIN — DIFLUPREDNATE OPHTHALMIC 1 DROP: 0.5 EMULSION OPHTHALMIC at 15:53

## 2023-12-30 RX ADMIN — Medication 50 MCG: at 08:24

## 2023-12-30 RX ADMIN — METHOCARBAMOL 750 MG: 750 TABLET ORAL at 20:06

## 2023-12-30 RX ADMIN — LATANOPROST 1 DROP: 50 SOLUTION OPHTHALMIC at 21:45

## 2023-12-30 RX ADMIN — POLYETHYLENE GLYCOL 3350 17 G: 17 POWDER, FOR SOLUTION ORAL at 10:19

## 2023-12-30 RX ADMIN — METHOCARBAMOL 750 MG: 750 TABLET ORAL at 14:07

## 2023-12-30 RX ADMIN — MYCOPHENOLATE MOFETIL 750 MG: 250 CAPSULE ORAL at 08:23

## 2023-12-30 ASSESSMENT — ACTIVITIES OF DAILY LIVING (ADL)
ADLS_ACUITY_SCORE: 20
ADLS_ACUITY_SCORE: 24
ADLS_ACUITY_SCORE: 20
ADLS_ACUITY_SCORE: 24
ADLS_ACUITY_SCORE: 24
ADLS_ACUITY_SCORE: 20
ADLS_ACUITY_SCORE: 20
ADLS_ACUITY_SCORE: 24
ADLS_ACUITY_SCORE: 20

## 2023-12-30 NOTE — PLAN OF CARE
"Goal Outcome Evaluation:  /87 (BP Location: Left arm)   Pulse 62   Temp 97.8  F (36.6  C) (Oral)   Resp 16   Ht 1.803 m (5' 11\")   Wt 125 kg (275 lb 9.2 oz)   SpO2 95%   BMI 38.43 kg/m      Shift: 3027-0261  VS: HTN (150s at 0600, paged on call), all other vitals stable, room air, afebrile  Neuro: Alert and oriented x4   BG: None   Labs: Awaiting AM labs   Pain/Nausea: Denies nausea, pain managed by scheduled tylenol, prn dilaudid x1   Diet: Regular   IV Access: Triple lumen internal jugular, R AV fistula, HD line, PIV x1   Infusion(s): D5LR at 100  GI/: Silveira in place, adequate output, no BM yet  Skin: Abdominal incision with abthera dressing   Mobility: SBA- Assist x1   Plan: Continue with POC and notify team with any changes   "

## 2023-12-30 NOTE — PROGRESS NOTES
"BP (!) 160/100 (BP Location: Left arm)   Pulse 81   Temp 97.7  F (36.5  C) (Oral)   Resp 17   Ht 1.803 m (5' 11\")   Wt 125 kg (275 lb 9.2 oz)   SpO2 92%   BMI 38.43 kg/m        4893-3205  Hypertensive (scheduled BP peewee given), OVSS on RA. A+Ox4. No blood sugar checks. Creatinine trending down, 2.67 with morning labs. Denies nausea, LBM 12/28. Senna and miralax given, pt declined prn milk of mag. Abdominal pain treated with scheduled robaxin/tylenol and lidocaine patches. Regular diet. Internal jugular SL, R AV fistula, HD line and PIV SL. Silveira in place, adequate UOP. Abdominal incision with abthera dressing, prevena wound vac. Up SBA/1, pt up in chair and ambulated in halls. Med card and lab book up to date. Pt spoke with specialty pharmacy 12/29. Transplant education business card given. Possible discharge tomorrow. Will continue to monitor and notify team with changes.   "

## 2023-12-30 NOTE — PROGRESS NOTES
Transplant Surgery  Inpatient Daily Progress Note  12/30/2023    Assessment & Plan: 22yo with history of ESRD secondary to posterior ureteral valves s/p Mitrofanoff, HTN, hearing loss, ADD, and anxiety. S/p living donor kidney transplant with ureteral stent and venous reconstruction on 12/28/23.    Graft function: POD #2   Kidney: Cr 6.8->5.2->2.7, good UOP. Post-op US: patent vessels. ASA 81 mg daily for reconstruction.     Immunosuppressed status secondary to medications: PRA 0  Basiliximab: 12/28 & 1/1/24  Steroids:  Five week taper per protocol.  MMF: 750mg BID  Tacro: 3 mg BID. Goal level 8-10    Neuro:  Acute post-op pain:   -Switch to dilaudid 2-4mg q3H PRN yesterday with improved pain  -Acetaminophen scheduled  -Lidoderm patches  -Robaxin TID scheduled    Hematology:   Anemia of chronic disease: Hgb 9-10, stable.    Cardiorespiratory:   HTN: PTA on Norvasc 5 mg daily and Atenolol 37.5 mg daily. -150s, will discuss initiating antihypertensive with nephrology.     GI/Nutrition:   Diet: Regular  Bowel regimen: Senna, PEG    Endocrine:   Steroid induced hyperglycemia: 's, no insulin requirement.    Fluid/Electrolytes:   MIVF: Straight rate, discontinued with adequate oral intake.   Hypomagnesemia ppx: Mag ox 400 mg daily.     : Silveira to remain due to new surgical anastomosis x3 days.    Infectious disease: Afebrile    Prophylaxis: DVT, fall, GI, viral (Valcyte), pneumocystis (TMP/sulfa)    Disposition: 7A     SUNIL/Fellow/Resident Provider: Radha Ortega PA-C     Faculty: Nita Martinez MD   _________________________________________________________________    Interval History: History from patient and/or EMR  Overnight events: Pain improved. Positive flatus, no BM yet.     ROS:   A 10-point review of systems was negative except as noted above.    Meds:   acetaminophen  975 mg Oral Q8H    aspirin  81 mg Oral Daily    atorvastatin  10 mg Oral Daily    [START ON 1/1/2024] basiliximab (SIMULECT)  "20 mg in sodium chloride 0.9 % 50 mL infusion  20 mg Intravenous Once    difluprednate  1 drop Both Eyes 4x Daily    latanoprost  1 drop Both Eyes At Bedtime    lidocaine  2 patch Transdermal Q24H    [START ON 12/31/2023] magnesium oxide  400 mg Oral Daily with lunch    methocarbamol  750 mg Oral TID    mycophenolate  750 mg Oral BID IS    polyethylene glycol  17 g Oral Daily    [START ON 12/31/2023] predniSONE  60 mg Oral Daily    Followed by    [START ON 1/2/2024] predniSONE  40 mg Oral Daily    Followed by    [START ON 1/4/2024] predniSONE  20 mg Oral Daily    Followed by    [START ON 1/11/2024] predniSONE  15 mg Oral Daily    Followed by    [START ON 1/18/2024] predniSONE  10 mg Oral Daily    Followed by    [START ON 1/25/2024] predniSONE  5 mg Oral Daily    senna-docusate  1 tablet Oral BID    sodium chloride (PF)  10 mL Intracatheter Q8H    sulfamethoxazole-trimethoprim  1 tablet Oral Daily    tacrolimus  3 mg Oral BID IS    [START ON 12/31/2023] valGANciclovir  450 mg Oral Every Other Day    cholecalciferol  50 mcg Oral Daily       Physical Exam:     Admit Weight: 125 kg (275 lb 9.2 oz)    Current vitals:   BP (!) 160/100 (BP Location: Left arm)   Pulse 81   Temp 97.7  F (36.5  C) (Oral)   Resp 17   Ht 1.803 m (5' 11\")   Wt 125 kg (275 lb 9.2 oz)   SpO2 92%   BMI 38.43 kg/m      Vital sign ranges:    Temp:  [97.7  F (36.5  C)-98.3  F (36.8  C)] 97.7  F (36.5  C)  Pulse:  [62-81] 81  Resp:  [16-17] 17  BP: (130-160)/() 160/100  SpO2:  [92 %-97 %] 92 %    General Appearance: in no apparent distress.   Skin: Warm, perfused  Heart: NSR  Lungs: Unlabored, RA  Abdomen: The abdomen is Soft, incision covered w/ topical VAC  : steward is present.  Urine is myles.  Extremities: edema: None, strength 5/5  Neurologic: alert and oriented x4. Tremor absent.  Access: L internal jugular CVC, right tunneled line    Data:   CMP  Recent Labs   Lab 12/30/23  0548 12/29/23  1805 12/29/23  0939 12/29/23  0554 " 12/28/23  2100 12/28/23  1812 12/28/23  1554     --   --  137   < > 139 143   POTASSIUM 3.8 3.6   < > 3.6  3.6   < > 3.9 3.4*   CHLORIDE 106  --   --  102   < >  --   --    CO2 24  --   --  22   < >  --   --    *  --   --  136*   < > 112* 93   BUN 25.1*  --   --  24.5*   < >  --   --    CR 2.67*  --   --  5.16*   < >  --   --    GFRESTIMATED 33*  --   --  15*   < >  --   --    NABIL 9.4  --   --  8.5*   < >  --   --    ICAW  --   --   --   --   --  4.6 4.5   MAG 2.3  --   --  1.9   < >  --   --    PHOS 4.0  --   --  4.5   < >  --   --     < > = values in this interval not displayed.     CBC  Recent Labs   Lab 12/30/23  0548 12/29/23  1805 12/29/23  0939 12/29/23  0554   HGB 9.7* 10.6*   < > 10.5*   WBC 9.9  --   --  10.1     --   --  185    < > = values in this interval not displayed.

## 2023-12-30 NOTE — PROGRESS NOTES
[facility]   Transplant Nephrology Progress Note  Date of Admission:  12/28/2023  Today's Date: 12/30/2023    Recommendations:  - -Agree with low intensity induction.  -no acute indications for dialysis  -if BP greater than 160 would start amlodipine 5mg     Assessment & Plan   # LDKT: Trend down   - Baseline Creatinine: ~ TBD   - Proteinuria: Not checked post transplant   - Date DSA Last Checked: Dec/2023      Latest DSA: No DSA at time of transplant   - BK Viremia: No   - Kidney Tx Biopsy: No   - Transplant Ureteral Stent: Yes      # Immunosuppression: Tacrolimus immediate release (goal 8-10), Mycophenolate mofetil (dose 750 mg every 12 hours), and Methylprednisolone (dose taper)              - Patient is in an immunosuppressed state and will continue to monitor for efficacy and toxicity of immunosuppression medications.              - Induction with Recent Transplant:  Low Intensity Protocol              - Changes: No     # Infection Prophylaxis:   - PJP: Sulfa/TMP (Bactrim)  - CMV: Valganciclovir (Valcyte), CMV IgG Ab Discordance (D+/R-)    # Hypertension: Borderline control;  Goal BP: < 140/90   - Volume status: Euvolemic  EDW ~ 125.7kg   - Changes: Yes - if BP greater than 160 would start amlodipine 5mg     # Anemia in Chronic Renal Disease: Hgb: Trend down      TOM: No   - Iron studies: Not checked recently    # Mineral Bone Disorder:   - Secondary renal hyperparathyroidism; PTH level: Not checked recently        On treatment: None  - Vitamin D; level: Low        On supplement: Yes  - Calcium; level: Low        On supplement: No  - Phosphorus; level: Normal        On supplement: No    # Electrolytes:   - Potassium; level: Normal        On supplement: No  - Magnesium; level: Normal        On supplement: Yes  - Bicarbonate; level: Normal        On supplement: No  - Sodium; level: Normal    # Bilateral uveitis: no evidence of systemic autoimmune/inflammatory disease  at last rheumatology.     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    Seen and discussed with Dr. Ry Monzon NP   Pager: [phone]    Physician Attestation     I saw and evaluated Boogie Villa as part of a shared APRN/PA visit.     I personally reviewed the vital signs, medications, and labs.    I personally performed the substantive portion of the medical decision making for this visit - please see the SUNIL's documentation for full details.    Key management decisions made by me and carried out under my direction: No acute indications for dialysis.  Agree with plan for low intensity induction.  Consider starting amlodipine if BP remains above 160s systolic.    Abhilash Raza MD  Date of Service (when I saw the patient): 12/30/23    Interval History   Mr. Villa's creatinine is 2.67 (12/30 0548); Trend down.  Good urine output.  Other significant labs/tests/vitals: VSS  No events overnight.  no chest pain or shortness of breath.  no leg swelling.  no nausea and vomiting.  Bowel movements are soft.  no fever, sweats or chills.    Review of Systems   4 point ROS was obtained and negative except as noted in the Interval History.    MEDICATIONS:   acetaminophen  975 mg Oral Q8H    aspirin  81 mg Oral Daily    atorvastatin  10 mg Oral Daily    [START ON 1/1/2024] basiliximab (SIMULECT) 20 mg in sodium chloride 0.9 % 50 mL infusion  20 mg Intravenous Once    difluprednate  1 drop Both Eyes 4x Daily    latanoprost  1 drop Both Eyes At Bedtime    lidocaine  2 patch Transdermal Q24H    [START ON 12/31/2023] magnesium oxide  400 mg Oral Daily with lunch    methocarbamol  750 mg Oral TID    mycophenolate  750 mg Oral BID  "IS    polyethylene glycol  17 g Oral Daily    [START ON 2023] predniSONE  60 mg Oral Daily    Followed by    [START ON 2024] predniSONE  40 mg Oral Daily    Followed by    [START ON 2024] predniSONE  20 mg Oral Daily    Followed by    [START ON 2024] predniSONE  15 mg Oral Daily    Followed by    [START ON 2024] predniSONE  10 mg Oral Daily    Followed by    [START ON 2024] predniSONE  5 mg Oral Daily    senna-docusate  1 tablet Oral BID    sodium chloride (PF)  10 mL Intracatheter Q8H    sulfamethoxazole-trimethoprim  1 tablet Oral Daily    tacrolimus  3 mg Oral BID IS    [START ON 2023] valGANciclovir  450 mg Oral Every Other Day    cholecalciferol  50 mcg Oral Daily         Physical Exam   Temp  Av  F (36.7  C)  Min: 97.6  F (36.4  C)  Max: 98.6  F (37  C)      Pulse  Av.4  Min: 62  Max: 92 Resp  Av.5  Min: 11  Max: 27  SpO2  Av.2 %  Min: 88 %  Max: 99 %    CVP (mmHg): 11 mmHgBP (!) 160/100 (BP Location: Left arm)   Pulse 81   Temp 97.7  F (36.5  C) (Oral)   Resp 17   Ht 1.803 m (5' 11\")   Wt 125 kg (275 lb 9.2 oz)   SpO2 92%   BMI 38.43 kg/m     Date 23 0700 - 23 0659   Shift 9666-2027 7780-5710 5779-4759 24 Hour Total   INTAKE   P.O. 120   120   Shift Total(mL/kg) 120(0.96)   120(0.96)   OUTPUT   Urine 400   400   Shift Total(mL/kg) 400(3.2)   400(3.2)   Weight (kg) 125 125 125 125      Admit Weight: 125 kg (275 lb 9.2 oz)     GENERAL APPEARANCE: alert and no distress  HENT: mouth without ulcers or lesions  RESP: lungs clear to auscultation - no rales, rhonchi or wheezes  CV: regular rhythm, normal rate, no rub, no murmur  EDEMA: no LE edema bilaterally  ABDOMEN: soft, nondistended, nontender, bowel sounds normal  MS: extremities normal - no gross deformities noted, no evidence of inflammation in joints, no muscle tenderness  SKIN: no rash    Data   All labs reviewed by me.  CMP  Recent Labs   Lab 23  0548 23  1805 " 12/29/23  1357 12/29/23  0939 12/29/23  0554 12/28/23  2356 12/28/23  2100 12/28/23  1812     --   --   --  137  --  137 139   POTASSIUM 3.8 3.6 3.6 3.5 3.6  3.6   < > 3.8 3.9   CHLORIDE 106  --   --   --  102  --  99  --    CO2 24  --   --   --  22  --  23  --    ANIONGAP 11  --   --   --  13  --  15  --    *  --   --   --  136*  --  139* 112*   BUN 25.1*  --   --   --  24.5*  --  26.5*  --    CR 2.67*  --   --   --  5.16*  --  6.78*  --    GFRESTIMATED 33*  --   --   --  15*  --  11*  --    NABIL 9.4  --   --   --  8.5*  --  9.1  --    MAG 2.3  --   --   --  1.9  --  2.0  --    PHOS 4.0  --   --   --  4.5  --  3.1  --     < > = values in this interval not displayed.     CBC  Recent Labs   Lab 12/30/23  0548 12/29/23  1805 12/29/23  1357 12/29/23  0939 12/29/23  0554 12/28/23 2356 12/28/23  2100   HGB 9.7* 10.6* 10.7* 10.4* 10.5*   < > 11.2*   WBC 9.9  --   --   --  10.1  --  10.7   RBC 2.97*  --   --   --  3.22*  --  3.43*   HCT 28.9*  --   --   --  32.0*  --  32.3*   MCV 97  --   --   --  99  --  94   MCH 32.7  --   --   --  32.6  --  32.7   MCHC 33.6  --   --   --  32.8  --  34.7   RDW 12.9  --   --   --  12.9  --  13.2     --   --   --  185  --  225    < > = values in this interval not displayed.     INRNo lab results found in last 7 days.  ABG  Recent Labs   Lab 12/28/23  1812 12/28/23  1554   O2PER 50.0 75.0      Urine Studies  Recent Labs   Lab Test 12/19/23  1007 03/29/21  1404 12/03/20  1525 10/19/20  1645 05/13/19  0728   COLOR Straw Straw Yellow Yellow Straw   APPEARANCE Clear Clear Clear Clear Clear   URINEGLC 70* 50* 100* 50 mg/dL* 50*   URINEBILI Negative Negative Negative Negative Negative   URINEKETONE Negative Negative Negative Negative Negative   SG 1.003 1.008 1.025 1.005 1.008   UBLD Trace* Negative Small* Small* Negative   URINEPH 7.5* 6.0 7.0 6.0 7.0   PROTEIN 100* >499* >300* >=500 mg/dL* >499*   UROBILINOGEN  --   --  0.2 <2.0 E.U./dL  --    NITRITE Negative Negative  Negative Negative Negative   LEUKEST Negative Negative Negative Negative Negative   RBCU 0 0  --  0-2 <1   WBCU 1 <1  --  0-5 <1     Recent Labs   Lab Test 02/26/20  1530 11/25/19  0904 05/13/19  0728 12/04/18  1056 06/08/18  1310 02/10/17  1004 08/26/16  1150 02/19/16  1000   UTPG 4.31* 4.92* 4.31* 4.59* 6.69* 2.78* 2.45* 2.17*     PTH  Recent Labs   Lab Test 12/19/23  0953 07/16/21  1650 12/16/20  1611 12/16/20  0000 10/19/20  1614 02/26/20  1520 11/25/19  0859 05/13/19  0712 12/04/18  1054 06/08/18  1256 01/16/18  1102 08/09/17  1626 02/10/17  1002 08/26/16  1245 02/19/16  0955   PTHI 445* 308* 94* 94 131* 201* 154* 110* 177* 96* 133* 66 39 47 42     Iron Studies  Recent Labs   Lab Test 12/19/23  0953 02/26/20  1520 05/13/19  0712 06/08/18  1256 01/16/18  1102 08/09/17  1626 02/10/17  1002   IRON 104 66 71 87 116 93 54    251 269 257 290 284 297   IRONSAT 42 26 26 34 40 33 18   DAVID 826* 133 109 82 86 100 113       IMAGING:  All imaging studies reviewed by me.

## 2023-12-31 ENCOUNTER — APPOINTMENT (OUTPATIENT)
Dept: ULTRASOUND IMAGING | Facility: CLINIC | Age: 23
DRG: 652 | End: 2023-12-31
Attending: PHYSICIAN ASSISTANT
Payer: COMMERCIAL

## 2023-12-31 LAB
ANION GAP SERPL CALCULATED.3IONS-SCNC: 12 MMOL/L (ref 7–15)
BASOPHILS # BLD AUTO: 0 10E3/UL (ref 0–0.2)
BASOPHILS NFR BLD AUTO: 0 %
BUN SERPL-MCNC: 40.9 MG/DL (ref 6–20)
CALCIUM SERPL-MCNC: 9.3 MG/DL (ref 8.6–10)
CHLORIDE SERPL-SCNC: 106 MMOL/L (ref 98–107)
CREAT SERPL-MCNC: 3.01 MG/DL (ref 0.67–1.17)
DEPRECATED HCO3 PLAS-SCNC: 22 MMOL/L (ref 22–29)
EGFRCR SERPLBLD CKD-EPI 2021: 29 ML/MIN/1.73M2
EOSINOPHIL # BLD AUTO: 0.1 10E3/UL (ref 0–0.7)
EOSINOPHIL NFR BLD AUTO: 1 %
ERYTHROCYTE [DISTWIDTH] IN BLOOD BY AUTOMATED COUNT: 13 % (ref 10–15)
GLUCOSE SERPL-MCNC: 94 MG/DL (ref 70–99)
HCT VFR BLD AUTO: 28.4 % (ref 40–53)
HGB BLD-MCNC: 9.3 G/DL (ref 13.3–17.7)
IMM GRANULOCYTES # BLD: 0.1 10E3/UL
IMM GRANULOCYTES NFR BLD: 1 %
LYMPHOCYTES # BLD AUTO: 1.8 10E3/UL (ref 0.8–5.3)
LYMPHOCYTES NFR BLD AUTO: 20 %
MAGNESIUM SERPL-MCNC: 2.1 MG/DL (ref 1.7–2.3)
MCH RBC QN AUTO: 32.6 PG (ref 26.5–33)
MCHC RBC AUTO-ENTMCNC: 32.7 G/DL (ref 31.5–36.5)
MCV RBC AUTO: 100 FL (ref 78–100)
MONOCYTES # BLD AUTO: 0.6 10E3/UL (ref 0–1.3)
MONOCYTES NFR BLD AUTO: 6 %
NEUTROPHILS # BLD AUTO: 6.4 10E3/UL (ref 1.6–8.3)
NEUTROPHILS NFR BLD AUTO: 72 %
NRBC # BLD AUTO: 0 10E3/UL
NRBC BLD AUTO-RTO: 0 /100
PHOSPHATE SERPL-MCNC: 3.8 MG/DL (ref 2.5–4.5)
PLATELET # BLD AUTO: 192 10E3/UL (ref 150–450)
POTASSIUM SERPL-SCNC: 3.3 MMOL/L (ref 3.4–5.3)
RBC # BLD AUTO: 2.85 10E6/UL (ref 4.4–5.9)
SODIUM SERPL-SCNC: 140 MMOL/L (ref 135–145)
TACROLIMUS BLD-MCNC: 1.5 UG/L (ref 5–15)
TME LAST DOSE: ABNORMAL H
TME LAST DOSE: ABNORMAL H
WBC # BLD AUTO: 8.9 10E3/UL (ref 4–11)

## 2023-12-31 PROCEDURE — 250N000012 HC RX MED GY IP 250 OP 636 PS 637: Performed by: PHYSICIAN ASSISTANT

## 2023-12-31 PROCEDURE — 250N000012 HC RX MED GY IP 250 OP 636 PS 637: Performed by: SURGERY

## 2023-12-31 PROCEDURE — 250N000013 HC RX MED GY IP 250 OP 250 PS 637: Performed by: SURGERY

## 2023-12-31 PROCEDURE — 250N000011 HC RX IP 250 OP 636

## 2023-12-31 PROCEDURE — 85025 COMPLETE CBC W/AUTO DIFF WBC: CPT | Performed by: SURGERY

## 2023-12-31 PROCEDURE — 84100 ASSAY OF PHOSPHORUS: CPT | Performed by: SURGERY

## 2023-12-31 PROCEDURE — 250N000013 HC RX MED GY IP 250 OP 250 PS 637: Performed by: PHYSICIAN ASSISTANT

## 2023-12-31 PROCEDURE — 36592 COLLECT BLOOD FROM PICC: CPT | Performed by: SURGERY

## 2023-12-31 PROCEDURE — 258N000003 HC RX IP 258 OP 636: Performed by: PHYSICIAN ASSISTANT

## 2023-12-31 PROCEDURE — 250N000013 HC RX MED GY IP 250 OP 250 PS 637

## 2023-12-31 PROCEDURE — 99233 SBSQ HOSP IP/OBS HIGH 50: CPT | Mod: FS | Performed by: NURSE PRACTITIONER

## 2023-12-31 PROCEDURE — 83735 ASSAY OF MAGNESIUM: CPT | Performed by: SURGERY

## 2023-12-31 PROCEDURE — 80197 ASSAY OF TACROLIMUS: CPT | Performed by: SURGERY

## 2023-12-31 PROCEDURE — 76776 US EXAM K TRANSPL W/DOPPLER: CPT

## 2023-12-31 PROCEDURE — 250N000012 HC RX MED GY IP 250 OP 636 PS 637: Performed by: NURSE PRACTITIONER

## 2023-12-31 PROCEDURE — 76776 US EXAM K TRANSPL W/DOPPLER: CPT | Mod: 26 | Performed by: RADIOLOGY

## 2023-12-31 PROCEDURE — 80048 BASIC METABOLIC PNL TOTAL CA: CPT | Performed by: SURGERY

## 2023-12-31 PROCEDURE — 250N000013 HC RX MED GY IP 250 OP 250 PS 637: Performed by: NURSE PRACTITIONER

## 2023-12-31 PROCEDURE — 120N000011 HC R&B TRANSPLANT UMMC

## 2023-12-31 RX ORDER — POTASSIUM CHLORIDE 750 MG/1
20 TABLET, EXTENDED RELEASE ORAL ONCE
Status: COMPLETED | OUTPATIENT
Start: 2023-12-31 | End: 2023-12-31

## 2023-12-31 RX ADMIN — LIDOCAINE 2 PATCH: 4 PATCH TOPICAL at 11:16

## 2023-12-31 RX ADMIN — PREDNISONE 60 MG: 20 TABLET ORAL at 07:50

## 2023-12-31 RX ADMIN — DIFLUPREDNATE OPHTHALMIC 1 DROP: 0.5 EMULSION OPHTHALMIC at 16:22

## 2023-12-31 RX ADMIN — ATORVASTATIN CALCIUM 10 MG: 10 TABLET, FILM COATED ORAL at 07:51

## 2023-12-31 RX ADMIN — DIFLUPREDNATE OPHTHALMIC 1 DROP: 0.5 EMULSION OPHTHALMIC at 08:02

## 2023-12-31 RX ADMIN — SULFAMETHOXAZOLE AND TRIMETHOPRIM 1 TABLET: 400; 80 TABLET ORAL at 07:51

## 2023-12-31 RX ADMIN — AMLODIPINE BESYLATE 5 MG: 5 TABLET ORAL at 07:51

## 2023-12-31 RX ADMIN — LATANOPROST 1 DROP: 50 SOLUTION OPHTHALMIC at 21:57

## 2023-12-31 RX ADMIN — ACETAMINOPHEN 975 MG: 325 TABLET, FILM COATED ORAL at 14:16

## 2023-12-31 RX ADMIN — DIFLUPREDNATE OPHTHALMIC 1 DROP: 0.5 EMULSION OPHTHALMIC at 11:19

## 2023-12-31 RX ADMIN — POLYETHYLENE GLYCOL 3350 17 G: 17 POWDER, FOR SOLUTION ORAL at 07:52

## 2023-12-31 RX ADMIN — ASPIRIN 81 MG: 81 TABLET, COATED ORAL at 07:50

## 2023-12-31 RX ADMIN — TACROLIMUS 3 MG: 1 CAPSULE ORAL at 08:02

## 2023-12-31 RX ADMIN — DIFLUPREDNATE OPHTHALMIC 1 DROP: 0.5 EMULSION OPHTHALMIC at 21:30

## 2023-12-31 RX ADMIN — MAGNESIUM OXIDE TAB 400 MG (241.3 MG ELEMENTAL MG) 400 MG: 400 (241.3 MG) TAB at 11:05

## 2023-12-31 RX ADMIN — SENNOSIDES AND DOCUSATE SODIUM 1 TABLET: 8.6; 5 TABLET ORAL at 21:30

## 2023-12-31 RX ADMIN — HYDROMORPHONE HYDROCHLORIDE 2 MG: 2 TABLET ORAL at 11:06

## 2023-12-31 RX ADMIN — METHOCARBAMOL 750 MG: 750 TABLET ORAL at 07:50

## 2023-12-31 RX ADMIN — SODIUM CHLORIDE 1000 ML: 9 INJECTION, SOLUTION INTRAVENOUS at 11:09

## 2023-12-31 RX ADMIN — LABETALOL HYDROCHLORIDE 20 MG: 5 INJECTION, SOLUTION INTRAVENOUS at 06:29

## 2023-12-31 RX ADMIN — Medication 50 MCG: at 07:50

## 2023-12-31 RX ADMIN — TACROLIMUS 6 MG: 5 CAPSULE ORAL at 18:20

## 2023-12-31 RX ADMIN — METHOCARBAMOL 750 MG: 750 TABLET ORAL at 14:16

## 2023-12-31 RX ADMIN — POTASSIUM CHLORIDE 20 MEQ: 750 TABLET, EXTENDED RELEASE ORAL at 11:06

## 2023-12-31 RX ADMIN — MYCOPHENOLATE MOFETIL 750 MG: 250 CAPSULE ORAL at 07:51

## 2023-12-31 RX ADMIN — MYCOPHENOLATE MOFETIL 750 MG: 250 CAPSULE ORAL at 18:20

## 2023-12-31 RX ADMIN — METHOCARBAMOL 750 MG: 750 TABLET ORAL at 21:30

## 2023-12-31 RX ADMIN — VALGANCICLOVIR HYDROCHLORIDE 450 MG: 450 TABLET ORAL at 07:52

## 2023-12-31 RX ADMIN — ACETAMINOPHEN 975 MG: 325 TABLET, FILM COATED ORAL at 06:21

## 2023-12-31 RX ADMIN — SENNOSIDES AND DOCUSATE SODIUM 1 TABLET: 8.6; 5 TABLET ORAL at 07:50

## 2023-12-31 ASSESSMENT — ACTIVITIES OF DAILY LIVING (ADL)
ADLS_ACUITY_SCORE: 24
ADLS_ACUITY_SCORE: 24
ADLS_ACUITY_SCORE: 25
ADLS_ACUITY_SCORE: 25
ADLS_ACUITY_SCORE: 24
ADLS_ACUITY_SCORE: 25
ADLS_ACUITY_SCORE: 24
ADLS_ACUITY_SCORE: 25

## 2023-12-31 NOTE — PLAN OF CARE
"Goal Outcome Evaluation:  BP (!) 150/96 (BP Location: Left arm)   Pulse 87   Temp 97.7  F (36.5  C) (Oral)   Resp 16   Ht 1.803 m (5' 11\")   Wt 125 kg (275 lb 9.2 oz)   SpO2 96%   BMI 38.43 kg/m      Shift: 7290-2300  VS: HTN, all other vitals stable, room air, afebrile  Neuro: Alert and oriented x4   BG: None   Labs: Awaiting AM labs   Pain/Nausea: Denies nausea, pain managed by scheduled tylenol, scheduled robaxin   PRN: Labetalol x2   Diet: Regular   IV Access: Triple lumen internal jugular, R AV fistula, HD line, PIV x1   GI/: Silveira removed at 0600, adequate output, no BM yet, passing gas   Skin: Abdominal incision with abthera dressing, prevena wound vac   Mobility: SBA  Plan: Continue with POC and notify team with any changes. Possible discharge 12/31   "

## 2023-12-31 NOTE — PROGRESS NOTES
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 S/p living donor kidney transplant with ureteral stent and venous reconstruction on 12/28/23.    Graft function: POD #3   Kidney: Cr 6.8->5.2->2.7->3.0, good UO, 2.9L over last 24 hours. Post-op US: patent vessels. ASA 81 mg daily for reconstruction.   -Obtain US today  -1L NS bolus   -Silveira removed POD 3 without retention     Immunosuppressed status secondary to medications: PRA 0  Basiliximab: 12/28 & 1/1/24  Steroids:  Five week taper per protocol.  MMF: 750mg BID  Tacro: 6 mg BID. Goal level 8-10    Neuro:  Acute post-op pain:   -Switch to dilaudid 2-4mg q3H PRN with improved pain  -Acetaminophen scheduled  -Lidoderm patches  -Robaxin TID scheduled    Hematology:   Anemia of chronic disease: Hgb 9-10, stable.    Cardiorespiratory:   HTN: PTA on Norvasc 5 mg daily and Atenolol 37.5 mg daily. -150s, restarted Norvasc 5 on 12/30.     GI/Nutrition:   Diet: Regular  Bowel regimen: Senna, PEG    Endocrine:   Steroid induced hyperglycemia: no insulin requirement.    Fluid/Electrolytes:   MIVF: Straight rate, discontinued with adequate oral intake.   Hypomagnesemia ppx: Mag ox 400 mg daily.   Hypokalemia: replace Kdur 20 mEq    : Silveira to remained due to new surgical anastomosis until POD 3.    Infectious disease: Afebrile. WBC WNL    Prophylaxis: DVT-mechanical, fall, GI, viral (Valcyte), pneumocystis (TMP/sulfa)    Disposition: 7A     SUNIL/Fellow/Resident Provider: Radha Ortega PA-C     Faculty: Nita Martinez MD   _________________________________________________________________    Interval History: History from patient and/or EMR  Overnight events: Pain improved. Positive flatus and bowel function.     ROS:   A 10-point review of systems was negative except as noted above.    Meds:   acetaminophen  975  "mg Oral Q8H    amLODIPine  5 mg Oral Daily    aspirin  81 mg Oral Daily    atorvastatin  10 mg Oral Daily    [START ON 1/1/2024] basiliximab (SIMULECT) 20 mg in sodium chloride 0.9 % 50 mL infusion  20 mg Intravenous Once    difluprednate  1 drop Both Eyes 4x Daily    latanoprost  1 drop Both Eyes At Bedtime    lidocaine  2 patch Transdermal Q24H    magnesium oxide  400 mg Oral Daily with lunch    methocarbamol  750 mg Oral TID    mycophenolate  750 mg Oral BID IS    polyethylene glycol  17 g Oral Daily    predniSONE  60 mg Oral Daily    Followed by    [START ON 1/2/2024] predniSONE  40 mg Oral Daily    Followed by    [START ON 1/4/2024] predniSONE  20 mg Oral Daily    Followed by    [START ON 1/11/2024] predniSONE  15 mg Oral Daily    Followed by    [START ON 1/18/2024] predniSONE  10 mg Oral Daily    Followed by    [START ON 1/25/2024] predniSONE  5 mg Oral Daily    senna-docusate  1 tablet Oral BID    sodium chloride (PF)  10 mL Intracatheter Q8H    sulfamethoxazole-trimethoprim  1 tablet Oral Daily    tacrolimus  6 mg Oral BID IS    valGANciclovir  450 mg Oral Daily    cholecalciferol  50 mcg Oral Daily       Physical Exam:     Admit Weight: 125 kg (275 lb 9.2 oz)    Current vitals:   BP (!) 144/86 (BP Location: Left arm)   Pulse 91   Temp 99  F (37.2  C) (Oral)   Resp 16   Ht 1.803 m (5' 11\")   Wt 128.5 kg (283 lb 4.8 oz)   SpO2 95%   BMI 39.51 kg/m      Vital sign ranges:    Temp:  [97.7  F (36.5  C)-99  F (37.2  C)] 99  F (37.2  C)  Pulse:  [74-98] 91  Resp:  [14-16] 16  BP: (144-166)/() 144/86  SpO2:  [94 %-99 %] 95 %    General Appearance: in no apparent distress.   Skin: Warm, perfused  Heart: NSR  Lungs: Unlabored, RA  Abdomen: The abdomen is Soft, incision covered w/ topical VAC  : steward is not present.    Extremities: edema: None,   Neurologic: alert and oriented x4. Tremor absent.  Access: L internal jugular CVC, right tunneled line    Data:   CMP  Recent Labs   Lab 12/31/23  0602 " 12/30/23  0548 12/28/23  2100 12/28/23  1812 12/28/23  1554    141   < > 139 143   POTASSIUM 3.3* 3.8   < > 3.9 3.4*   CHLORIDE 106 106   < >  --   --    CO2 22 24   < >  --   --    GLC 94 158*   < > 112* 93   BUN 40.9* 25.1*   < >  --   --    CR 3.01* 2.67*   < >  --   --    GFRESTIMATED 29* 33*   < >  --   --    NABIL 9.3 9.4   < >  --   --    ICAW  --   --   --  4.6 4.5   MAG 2.1 2.3   < >  --   --    PHOS 3.8 4.0   < >  --   --     < > = values in this interval not displayed.     CBC  Recent Labs   Lab 12/31/23  0602 12/30/23  0548   HGB 9.3* 9.7*   WBC 8.9 9.9    201

## 2023-12-31 NOTE — PROGRESS NOTES
[facility]   Transplant Nephrology Progress Note  Date of Admission:  12/28/2023  Today's Date: 12/31/2023    Recommendations:  - No acute indications for dialysis  - Agree with low intensity induction.  - Recommend kidney transplant ultrasound  - recommend starting amlodipine 5mg daily  - Recommend replacing K+    Assessment & Plan   # LDKT: Trend up. Recommend kidney ultrasound   - Baseline Creatinine: ~ TBD   - Proteinuria: Not checked post transplant   - Date DSA Last Checked: Dec/2023      Latest DSA: No DSA at time of transplant   - BK Viremia: No   - Kidney Tx Biopsy: No   - Transplant Ureteral Stent: Yes    # Immunosuppression: Tacrolimus immediate release (goal 8-10), Mycophenolate mofetil (dose 750 mg every 12 hours), and Methylprednisolone (dose taper)              - Patient is in an immunosuppressed state and will continue to monitor for efficacy and toxicity of immunosuppression medications.              - Induction with Recent Transplant:  Low Intensity Protocol              - Changes: No     # Infection Prophylaxis:   - PJP: Sulfa/TMP (Bactrim)  - CMV: Valganciclovir (Valcyte), CMV IgG Ab Discordance (D+/R-)    # Hypertension: Borderline control;  Goal BP: < 140/90   - Volume status: Euvolemic  EDW ~ 125.7kg   - Changes: Yes - recommend starting amlodipine 5mg daily    # Anemia in Chronic Renal Disease: Hgb: Trend down      TOM: No   - Iron studies: Not checked recently    # Mineral Bone Disorder:   - Secondary renal hyperparathyroidism; PTH level: Not checked recently        On treatment: None  - Vitamin D; level: Low        On supplement: Yes  - Calcium; level: Normal        On supplement: No  - Phosphorus; level: Normal        On supplement: No    # Electrolytes:   - Potassium; level: Low        On supplement: No  - Magnesium; level: Normal        On supplement: Yes  - Bicarbonate; level: Normal        On supplement: No    # Bilateral uveitis: no  evidence of systemic autoimmune/inflammatory disease at last rheumatology.     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    Seen and discussed with Dr. Ry Monzon NP   Pager: [phone]    Physician Attestation     I saw and evaluated Boogie Villa as part of a shared APRN/PA visit.     I personally reviewed the vital signs, medications, and labs.    I personally performed the substantive portion of the medical decision making for this visit - please see the SUNIL's documentation for full details.    Key management decisions made by me and carried out under my direction: No acute indications for dialysis.  Would continue on present immunosuppression.  Recommend obtaining kidney transplant ultrasound.  Would start amlodipine 5 mg daily.    Abhilash Raza MD  Date of Service (when I saw the patient): 12/31/23    Interval History   Mr. Villa's creatinine is 3.01 (12/31 0602); Trend up.  Good urine output.  Other significant labs/tests/vitals: VSS, low K+  no events overnight.  no chest pain or shortness of breath.  no leg swelling.  no nausea and vomiting.  Bowel movements are none.  no fever, sweats or chills.    Review of Systems   4 point ROS was obtained and negative except as noted in the Interval History.    MEDICATIONS:   acetaminophen  975 mg Oral Q8H    amLODIPine  5 mg Oral Daily    aspirin  81 mg Oral Daily    atorvastatin  10 mg Oral Daily    [START ON 1/1/2024] basiliximab (SIMULECT) 20 mg in sodium chloride 0.9 % 50 mL infusion  20 mg Intravenous Once    difluprednate  1 drop Both Eyes 4x Daily    latanoprost  1 drop Both Eyes At Bedtime    lidocaine  2 patch Transdermal Q24H    magnesium oxide  400 mg  "Oral Daily with lunch    methocarbamol  750 mg Oral TID    mycophenolate  750 mg Oral BID IS    polyethylene glycol  17 g Oral Daily    predniSONE  60 mg Oral Daily    Followed by    [START ON 2024] predniSONE  40 mg Oral Daily    Followed by    [START ON 2024] predniSONE  20 mg Oral Daily    Followed by    [START ON 2024] predniSONE  15 mg Oral Daily    Followed by    [START ON 2024] predniSONE  10 mg Oral Daily    Followed by    [START ON 2024] predniSONE  5 mg Oral Daily    senna-docusate  1 tablet Oral BID    sodium chloride (PF)  10 mL Intracatheter Q8H    sulfamethoxazole-trimethoprim  1 tablet Oral Daily    tacrolimus  3 mg Oral BID IS    valGANciclovir  450 mg Oral Daily    cholecalciferol  50 mcg Oral Daily         Physical Exam   Temp  Av  F (36.7  C)  Min: 97.6  F (36.4  C)  Max: 98.6  F (37  C)      Pulse  Av.4  Min: 62  Max: 92 Resp  Av.5  Min: 11  Max: 27  SpO2  Av.2 %  Min: 88 %  Max: 99 %    CVP (mmHg): 11 mmHgBP (!) 144/86 (BP Location: Left arm)   Pulse 91   Temp 99  F (37.2  C) (Oral)   Resp 16   Ht 1.803 m (5' 11\")   Wt 128.5 kg (283 lb 4.8 oz)   SpO2 95%   BMI 39.51 kg/m     Date 23 0700 - 23 0659   Shift 0155-9108 3529-2527 7162-0921 24 Hour Total   INTAKE   P.O. 120   120   Shift Total(mL/kg) 120(0.96)   120(0.96)   OUTPUT   Urine 400   400   Shift Total(mL/kg) 400(3.2)   400(3.2)   Weight (kg) 125 125 125 125      Admit Weight: 125 kg (275 lb 9.2 oz)     GENERAL APPEARANCE: alert and no distress  HENT: mouth without ulcers or lesions  RESP: lungs clear to auscultation - no rales, rhonchi or wheezes  CV: regular rhythm, normal rate, no rub, no murmur  EDEMA: no LE edema bilaterally  ABDOMEN: soft, nondistended, nontender, bowel sounds normal  MS: extremities normal - no gross deformities noted, no evidence of inflammation in joints, no muscle tenderness  SKIN: no rash    Data   All labs reviewed by me.  CMP  Recent Labs   Lab " 12/31/23  0602 12/30/23  0548 12/29/23  1805 12/29/23  1357 12/29/23  0939 12/29/23  0554 12/28/23 2356 12/28/23  2100    141  --   --   --  137  --  137   POTASSIUM 3.3* 3.8 3.6 3.6   < > 3.6  3.6   < > 3.8   CHLORIDE 106 106  --   --   --  102  --  99   CO2 22 24  --   --   --  22  --  23   ANIONGAP 12 11  --   --   --  13  --  15   GLC 94 158*  --   --   --  136*  --  139*   BUN 40.9* 25.1*  --   --   --  24.5*  --  26.5*   CR 3.01* 2.67*  --   --   --  5.16*  --  6.78*   GFRESTIMATED 29* 33*  --   --   --  15*  --  11*   NABIL 9.3 9.4  --   --   --  8.5*  --  9.1   MAG 2.1 2.3  --   --   --  1.9  --  2.0   PHOS 3.8 4.0  --   --   --  4.5  --  3.1    < > = values in this interval not displayed.     CBC  Recent Labs   Lab 12/31/23  0602 12/30/23  0548 12/29/23  1805 12/29/23  1357 12/29/23  0939 12/29/23  0554 12/28/23 2356 12/28/23  2100   HGB 9.3* 9.7* 10.6* 10.7*   < > 10.5*   < > 11.2*   WBC 8.9 9.9  --   --   --  10.1  --  10.7   RBC 2.85* 2.97*  --   --   --  3.22*  --  3.43*   HCT 28.4* 28.9*  --   --   --  32.0*  --  32.3*    97  --   --   --  99  --  94   MCH 32.6 32.7  --   --   --  32.6  --  32.7   MCHC 32.7 33.6  --   --   --  32.8  --  34.7   RDW 13.0 12.9  --   --   --  12.9  --  13.2    201  --   --   --  185  --  225    < > = values in this interval not displayed.     INRNo lab results found in last 7 days.  ABG  Recent Labs   Lab 12/28/23  1812 12/28/23  1554   O2PER 50.0 75.0      Urine Studies  Recent Labs   Lab Test 12/19/23  1007 03/29/21  1404 12/03/20  1525 10/19/20  1645 05/13/19  0728   COLOR Straw Straw Yellow Yellow Straw   APPEARANCE Clear Clear Clear Clear Clear   URINEGLC 70* 50* 100* 50 mg/dL* 50*   URINEBILI Negative Negative Negative Negative Negative   URINEKETONE Negative Negative Negative Negative Negative   SG 1.003 1.008 1.025 1.005 1.008   UBLD Trace* Negative Small* Small* Negative   URINEPH 7.5* 6.0 7.0 6.0 7.0   PROTEIN 100* >499* >300* >=500 mg/dL*  >499*   UROBILINOGEN  --   --  0.2 <2.0 E.U./dL  --    NITRITE Negative Negative Negative Negative Negative   LEUKEST Negative Negative Negative Negative Negative   RBCU 0 0  --  0-2 <1   WBCU 1 <1  --  0-5 <1     Recent Labs   Lab Test 02/26/20  1530 11/25/19  0904 05/13/19  0728 12/04/18  1056 06/08/18  1310 02/10/17  1004 08/26/16  1150 02/19/16  1000   UTPG 4.31* 4.92* 4.31* 4.59* 6.69* 2.78* 2.45* 2.17*     PTH  Recent Labs   Lab Test 12/19/23  0953 07/16/21  1650 12/16/20  1611 12/16/20  0000 10/19/20  1614 02/26/20  1520 11/25/19  0859 05/13/19  0712 12/04/18  1054 06/08/18  1256 01/16/18  1102 08/09/17  1626 02/10/17  1002 08/26/16  1245 02/19/16  0955   PTHI 445* 308* 94* 94 131* 201* 154* 110* 177* 96* 133* 66 39 47 42     Iron Studies  Recent Labs   Lab Test 12/19/23  0953 02/26/20  1520 05/13/19  0712 06/08/18  1256 01/16/18  1102 08/09/17  1626 02/10/17  1002   IRON 104 66 71 87 116 93 54    251 269 257 290 284 297   IRONSAT 42 26 26 34 40 33 18   DAVID 826* 133 109 82 86 100 113       IMAGING:  All imaging studies reviewed by me.

## 2024-01-01 LAB
ANION GAP SERPL CALCULATED.3IONS-SCNC: 15 MMOL/L (ref 7–15)
BASOPHILS # BLD AUTO: 0 10E3/UL (ref 0–0.2)
BASOPHILS NFR BLD AUTO: 0 %
BUN SERPL-MCNC: 58.7 MG/DL (ref 6–20)
CALCIUM SERPL-MCNC: 9.2 MG/DL (ref 8.6–10)
CHLORIDE SERPL-SCNC: 103 MMOL/L (ref 98–107)
CREAT SERPL-MCNC: 4.98 MG/DL (ref 0.67–1.17)
DEPRECATED HCO3 PLAS-SCNC: 18 MMOL/L (ref 22–29)
EGFRCR SERPLBLD CKD-EPI 2021: 16 ML/MIN/1.73M2
EOSINOPHIL # BLD AUTO: 0.2 10E3/UL (ref 0–0.7)
EOSINOPHIL NFR BLD AUTO: 2 %
ERYTHROCYTE [DISTWIDTH] IN BLOOD BY AUTOMATED COUNT: 13.1 % (ref 10–15)
GLUCOSE SERPL-MCNC: 103 MG/DL (ref 70–99)
HCT VFR BLD AUTO: 26.6 % (ref 40–53)
HGB BLD-MCNC: 8.9 G/DL (ref 13.3–17.7)
IMM GRANULOCYTES # BLD: 0.1 10E3/UL
IMM GRANULOCYTES NFR BLD: 1 %
LYMPHOCYTES # BLD AUTO: 1.6 10E3/UL (ref 0.8–5.3)
LYMPHOCYTES NFR BLD AUTO: 18 %
MAGNESIUM SERPL-MCNC: 2 MG/DL (ref 1.7–2.3)
MCH RBC QN AUTO: 33.5 PG (ref 26.5–33)
MCHC RBC AUTO-ENTMCNC: 33.5 G/DL (ref 31.5–36.5)
MCV RBC AUTO: 100 FL (ref 78–100)
MONOCYTES # BLD AUTO: 0.6 10E3/UL (ref 0–1.3)
MONOCYTES NFR BLD AUTO: 6 %
NEUTROPHILS # BLD AUTO: 6.5 10E3/UL (ref 1.6–8.3)
NEUTROPHILS NFR BLD AUTO: 73 %
NRBC # BLD AUTO: 0 10E3/UL
NRBC BLD AUTO-RTO: 0 /100
PHOSPHATE SERPL-MCNC: 4.1 MG/DL (ref 2.5–4.5)
PLATELET # BLD AUTO: 185 10E3/UL (ref 150–450)
POTASSIUM SERPL-SCNC: 3.2 MMOL/L (ref 3.4–5.3)
RBC # BLD AUTO: 2.66 10E6/UL (ref 4.4–5.9)
SODIUM SERPL-SCNC: 136 MMOL/L (ref 135–145)
WBC # BLD AUTO: 8.8 10E3/UL (ref 4–11)

## 2024-01-01 PROCEDURE — 99233 SBSQ HOSP IP/OBS HIGH 50: CPT | Mod: 24 | Performed by: INTERNAL MEDICINE

## 2024-01-01 PROCEDURE — 250N000012 HC RX MED GY IP 250 OP 636 PS 637: Performed by: SURGERY

## 2024-01-01 PROCEDURE — 250N000013 HC RX MED GY IP 250 OP 250 PS 637: Performed by: NURSE PRACTITIONER

## 2024-01-01 PROCEDURE — 36592 COLLECT BLOOD FROM PICC: CPT | Performed by: SURGERY

## 2024-01-01 PROCEDURE — 250N000011 HC RX IP 250 OP 636: Mod: JZ | Performed by: NURSE PRACTITIONER

## 2024-01-01 PROCEDURE — 250N000013 HC RX MED GY IP 250 OP 250 PS 637: Performed by: SURGERY

## 2024-01-01 PROCEDURE — 258N000003 HC RX IP 258 OP 636: Performed by: NURSE PRACTITIONER

## 2024-01-01 PROCEDURE — 250N000011 HC RX IP 250 OP 636: Performed by: SURGERY

## 2024-01-01 PROCEDURE — 250N000012 HC RX MED GY IP 250 OP 636 PS 637: Performed by: PHYSICIAN ASSISTANT

## 2024-01-01 PROCEDURE — 250N000013 HC RX MED GY IP 250 OP 250 PS 637: Performed by: PHYSICIAN ASSISTANT

## 2024-01-01 PROCEDURE — 250N000012 HC RX MED GY IP 250 OP 636 PS 637: Performed by: NURSE PRACTITIONER

## 2024-01-01 PROCEDURE — 250N000013 HC RX MED GY IP 250 OP 250 PS 637

## 2024-01-01 PROCEDURE — 120N000011 HC R&B TRANSPLANT UMMC

## 2024-01-01 PROCEDURE — 250N000011 HC RX IP 250 OP 636: Performed by: NURSE PRACTITIONER

## 2024-01-01 PROCEDURE — 83735 ASSAY OF MAGNESIUM: CPT | Performed by: SURGERY

## 2024-01-01 PROCEDURE — 80048 BASIC METABOLIC PNL TOTAL CA: CPT | Performed by: SURGERY

## 2024-01-01 PROCEDURE — 85004 AUTOMATED DIFF WBC COUNT: CPT | Performed by: SURGERY

## 2024-01-01 PROCEDURE — 84100 ASSAY OF PHOSPHORUS: CPT | Performed by: SURGERY

## 2024-01-01 RX ORDER — AMLODIPINE BESYLATE 5 MG/1
5 TABLET ORAL ONCE
Status: COMPLETED | OUTPATIENT
Start: 2024-01-01 | End: 2024-01-01

## 2024-01-01 RX ORDER — AMLODIPINE BESYLATE 10 MG/1
10 TABLET ORAL DAILY
Status: DISCONTINUED | OUTPATIENT
Start: 2024-01-02 | End: 2024-01-05 | Stop reason: HOSPADM

## 2024-01-01 RX ORDER — SODIUM BICARBONATE 650 MG/1
650 TABLET ORAL 2 TIMES DAILY
Status: DISCONTINUED | OUTPATIENT
Start: 2024-01-01 | End: 2024-01-02

## 2024-01-01 RX ORDER — POTASSIUM CHLORIDE 750 MG/1
40 TABLET, EXTENDED RELEASE ORAL ONCE
Status: COMPLETED | OUTPATIENT
Start: 2024-01-01 | End: 2024-01-01

## 2024-01-01 RX ORDER — VALGANCICLOVIR 450 MG/1
450 TABLET, FILM COATED ORAL EVERY OTHER DAY
Status: DISCONTINUED | OUTPATIENT
Start: 2024-01-03 | End: 2024-01-05

## 2024-01-01 RX ORDER — SODIUM CHLORIDE 9 MG/ML
INJECTION, SOLUTION INTRAVENOUS CONTINUOUS
Status: DISCONTINUED | OUTPATIENT
Start: 2024-01-01 | End: 2024-01-02

## 2024-01-01 RX ADMIN — PROCHLORPERAZINE MALEATE 10 MG: 5 TABLET ORAL at 08:10

## 2024-01-01 RX ADMIN — DIFLUPREDNATE OPHTHALMIC 1 DROP: 0.5 EMULSION OPHTHALMIC at 20:52

## 2024-01-01 RX ADMIN — ONDANSETRON 4 MG: 4 TABLET, ORALLY DISINTEGRATING ORAL at 04:49

## 2024-01-01 RX ADMIN — SENNOSIDES AND DOCUSATE SODIUM 1 TABLET: 8.6; 5 TABLET ORAL at 08:06

## 2024-01-01 RX ADMIN — METHOCARBAMOL 750 MG: 750 TABLET ORAL at 13:54

## 2024-01-01 RX ADMIN — SODIUM CHLORIDE, PRESERVATIVE FREE: 5 INJECTION INTRAVENOUS at 22:55

## 2024-01-01 RX ADMIN — DIFLUPREDNATE OPHTHALMIC 1 DROP: 0.5 EMULSION OPHTHALMIC at 11:56

## 2024-01-01 RX ADMIN — SODIUM CHLORIDE 500 MG: 9 INJECTION, SOLUTION INTRAVENOUS at 11:55

## 2024-01-01 RX ADMIN — Medication 50 MCG: at 08:05

## 2024-01-01 RX ADMIN — POTASSIUM CHLORIDE 40 MEQ: 750 TABLET, EXTENDED RELEASE ORAL at 10:32

## 2024-01-01 RX ADMIN — PROCHLORPERAZINE MALEATE 10 MG: 5 TABLET ORAL at 23:05

## 2024-01-01 RX ADMIN — ASPIRIN 81 MG: 81 TABLET, COATED ORAL at 08:05

## 2024-01-01 RX ADMIN — LIDOCAINE 2 PATCH: 4 PATCH TOPICAL at 08:15

## 2024-01-01 RX ADMIN — LATANOPROST 1 DROP: 50 SOLUTION OPHTHALMIC at 22:54

## 2024-01-01 RX ADMIN — AMLODIPINE BESYLATE 5 MG: 5 TABLET ORAL at 08:05

## 2024-01-01 RX ADMIN — SODIUM CHLORIDE 20 MG: 9 INJECTION, SOLUTION INTRAVENOUS at 10:31

## 2024-01-01 RX ADMIN — SULFAMETHOXAZOLE AND TRIMETHOPRIM 1 TABLET: 400; 80 TABLET ORAL at 08:05

## 2024-01-01 RX ADMIN — POLYETHYLENE GLYCOL 3350 17 G: 17 POWDER, FOR SOLUTION ORAL at 08:05

## 2024-01-01 RX ADMIN — HYDROMORPHONE HYDROCHLORIDE 2 MG: 2 TABLET ORAL at 05:28

## 2024-01-01 RX ADMIN — AMLODIPINE BESYLATE 5 MG: 5 TABLET ORAL at 14:35

## 2024-01-01 RX ADMIN — MYCOPHENOLATE MOFETIL 750 MG: 250 CAPSULE ORAL at 18:27

## 2024-01-01 RX ADMIN — TACROLIMUS 6 MG: 5 CAPSULE ORAL at 08:05

## 2024-01-01 RX ADMIN — DIFLUPREDNATE OPHTHALMIC 1 DROP: 0.5 EMULSION OPHTHALMIC at 08:15

## 2024-01-01 RX ADMIN — MYCOPHENOLATE MOFETIL 750 MG: 250 CAPSULE ORAL at 08:05

## 2024-01-01 RX ADMIN — MAGNESIUM OXIDE TAB 400 MG (241.3 MG ELEMENTAL MG) 400 MG: 400 (241.3 MG) TAB at 11:56

## 2024-01-01 RX ADMIN — METHOCARBAMOL 750 MG: 750 TABLET ORAL at 20:51

## 2024-01-01 RX ADMIN — DIFLUPREDNATE OPHTHALMIC 1 DROP: 0.5 EMULSION OPHTHALMIC at 18:28

## 2024-01-01 RX ADMIN — TACROLIMUS 6 MG: 5 CAPSULE ORAL at 18:27

## 2024-01-01 RX ADMIN — ATORVASTATIN CALCIUM 10 MG: 10 TABLET, FILM COATED ORAL at 08:06

## 2024-01-01 RX ADMIN — PREDNISONE 60 MG: 20 TABLET ORAL at 08:06

## 2024-01-01 RX ADMIN — VALGANCICLOVIR HYDROCHLORIDE 450 MG: 450 TABLET ORAL at 08:05

## 2024-01-01 RX ADMIN — METHOCARBAMOL 750 MG: 750 TABLET ORAL at 08:05

## 2024-01-01 RX ADMIN — SODIUM BICARBONATE 650 MG TABLET 650 MG: at 20:51

## 2024-01-01 RX ADMIN — HYDROMORPHONE HYDROCHLORIDE 2 MG: 2 TABLET ORAL at 14:35

## 2024-01-01 ASSESSMENT — ACTIVITIES OF DAILY LIVING (ADL)
ADLS_ACUITY_SCORE: 25

## 2024-01-01 NOTE — PROGRESS NOTES
Transplant Surgery  Inpatient Daily Progress Note  01/01/2024    Assessment & Plan: Boogie iVlla is a 22yo with history of ESRD secondary to posterior ureteral valves s/p Mitrofanoff, HTN, hearing loss, ADD, and anxiety. S/p living donor kidney transplant with ureteral stent and venous reconstruction on 12/28/23.    Graft function: POD #4   Kidney: SCr edenilson 2.67. Uptrending to 5 today.   -Good UOP, 3.9L over last 24 hours. Post-op US: patent vessels. ASA 81 mg daily for reconstruction.   - US today: Normal US with patent doppler  - presumed ACR:  concern for rejection in setting of low tacrolimus level, on Low induction protocol.Plan for kidney biopsy tomorrow,  Will give 500mg Solumedrol IV x1 today.    Immunosuppressed status secondary to medications: PRA 0  Basiliximab: 12/28 & 1/1/24  Steroids:  Five week taper per protocol.  MMF: 750mg BID  Tacro: 6 mg BID. Goal level 8-10    Neuro:  Acute post-op pain:   -Switch to dilaudid 2-4mg q3H PRN with improved pain  -Acetaminophen scheduled  -Lidoderm patches  -Robaxin TID scheduled    Hematology:   Anemia of chronic disease: Hgb 9-10, stable.    Cardiorespiratory:   HTN: PTA on Norvasc 5 mg daily and Atenolol 37.5 mg daily. -150s, restarted Norvasc 5 on 12/30. If BP remain high, consider increase to 10mg daily.   -Continue CDB/IS    GI/Nutrition:   Diet: Regular  Bowel regimen: Senna, PEG    Endocrine:   Mild Steroid induced hyperglycemia: no insulin requirement.    Fluid/Electrolytes:   MIVF: Straight rate, discontinued with adequate oral intake.   Hypomagnesemia ppx: Mag ox 400 mg daily.   Hypokalemia: Replace Kdur 40mEq  Low bicarb: start sodium bicarb 650mg BID    :   Hx of  Ureteral Reimplantation, s/p Mitrofanoff: Patient reports not using for Mitrofanoff in a couple of years and voids normally on his own.  Mitrofanoff is nonfunctional at this time   -Silveira removed POD 3. Voiding without retention.    Infectious disease: Afebrile. WBC  "WNL    Prophylaxis: DVT-mechanical, fall, GI, viral (Valcyte), pneumocystis (TMP/sulfa)    Disposition: 7A     SUNIL/Fellow/Resident Provider: Rachael Herron NP      Faculty: Nita Martinez MD   _________________________________________________________________    Interval History: History from patient   Overnight events: Pain improved. Tolerating regular diet. Ambulating. Reporting voiding without difficulties. Neg PVR.     ROS:   A 10-point review of systems was negative except as noted above.    Meds:   amLODIPine  5 mg Oral Daily    aspirin  81 mg Oral Daily    atorvastatin  10 mg Oral Daily    basiliximab (SIMULECT) 20 mg in sodium chloride 0.9 % 50 mL infusion  20 mg Intravenous Once    difluprednate  1 drop Both Eyes 4x Daily    latanoprost  1 drop Both Eyes At Bedtime    lidocaine  2 patch Transdermal Q24H    magnesium oxide  400 mg Oral Daily with lunch    methocarbamol  750 mg Oral TID    methylPREDNISolone  500 mg Intravenous Once    mycophenolate  750 mg Oral BID IS    polyethylene glycol  17 g Oral Daily    [START ON 1/2/2024] predniSONE  40 mg Oral Daily    Followed by    [START ON 1/4/2024] predniSONE  20 mg Oral Daily    Followed by    [START ON 1/11/2024] predniSONE  15 mg Oral Daily    Followed by    [START ON 1/18/2024] predniSONE  10 mg Oral Daily    Followed by    [START ON 1/25/2024] predniSONE  5 mg Oral Daily    senna-docusate  1 tablet Oral BID    sodium chloride (PF)  10 mL Intracatheter Q8H    sulfamethoxazole-trimethoprim  1 tablet Oral Daily    tacrolimus  6 mg Oral BID IS    [START ON 1/3/2024] valGANciclovir  450 mg Oral Every Other Day    cholecalciferol  50 mcg Oral Daily       Physical Exam:     Admit Weight: 125 kg (275 lb 9.2 oz)    Current vitals:   BP (!) 140/91 (BP Location: Left arm)   Pulse 87   Temp 98.4  F (36.9  C) (Oral)   Resp 16   Ht 1.803 m (5' 11\")   Wt 129.3 kg (285 lb)   SpO2 96%   BMI 39.75 kg/m      Vital sign ranges:    Temp:  [97.8  F (36.6  C)-98.4 "  F (36.9  C)] 98.4  F (36.9  C)  Pulse:  [82-87] 87  Resp:  [16] 16  BP: (136-155)/() 140/91  SpO2:  [94 %-98 %] 96 %    General Appearance: in no apparent distress.   Skin: Warm, perfused  Heart: NSR  Lungs: Unlabored, RA  Abdomen: The abdomen is soft, NTTP, incision covered w/  VAC (non functioning)  : voiding   Extremities: edema: None    Neurologic: alert and oriented x4. Tremor absent.  Access: L internal jugular CVC, right tunneled line    Data:   CMP  Recent Labs   Lab 01/01/24  0634 12/31/23  0602 12/28/23  2100 12/28/23  1812 12/28/23  1554    140   < > 139 143   POTASSIUM 3.2* 3.3*   < > 3.9 3.4*   CHLORIDE 103 106   < >  --   --    CO2 18* 22   < >  --   --    * 94   < > 112* 93   BUN 58.7* 40.9*   < >  --   --    CR 4.98* 3.01*   < >  --   --    GFRESTIMATED 16* 29*   < >  --   --    NABIL 9.2 9.3   < >  --   --    ICAW  --   --   --  4.6 4.5   MAG 2.0 2.1   < >  --   --    PHOS 4.1 3.8   < >  --   --     < > = values in this interval not displayed.     CBC  Recent Labs   Lab 01/01/24  0634 12/31/23  0602   HGB 8.9* 9.3*   WBC 8.8 8.9    192

## 2024-01-01 NOTE — PROGRESS NOTES
"BP (!) 153/100 (BP Location: Left arm)   Pulse 83   Temp 97.8  F (36.6  C) (Oral)   Resp 16   Ht 1.803 m (5' 11\")   Wt 128.5 kg (283 lb 4.8 oz)   SpO2 98%   BMI 39.51 kg/m      SHIFT: 9787-6360  ISOLATION: NA  VITALS: Hypertensive on Ra,afebrile  BG: NA  Recent Labs   Lab 12/31/23  0602 12/30/23  0548 12/29/23  0554 12/28/23  2100 12/28/23  1812 12/28/23  1554   GLC 94 158* 136* 139* 112* 93   NEURO: Aox4  Diet: Advance Diet as Tolerated: Regular Diet Adult  Snacks/Supplements Adult: Ensure Max Protein (bariatric); With Meals    PAIN: 2/10 pain scheduled Robaxin  RESPIRATORY: WDL  CARDIAC: WDL  : 800 Voided within hat (pink)  GI: No BM within shift last BM 12/31/23  TUBES: wound vac to abdominal incision, plan is to removed tomorrow by team  MIVF/GTT/ABX: NA, Left Triple Lumen internal jugular, Right AV fistula  ASSIST: SBA  SAFETY: WDL  SKIN: Right lower abdominal incision to wound vac  LABS: NA  Recent Labs   Lab Test 12/31/23  0602 12/30/23  0548 12/29/23  1805 12/29/23  0939 12/29/23  0554   POTASSIUM 3.3* 3.8 3.6   < > 3.6  3.6   PHOS 3.8 4.0  --   --  4.5   MAG 2.1 2.3  --   --  1.9   HGB 9.3* 9.7* 10.6*   < > 10.5*    < > = values in this interval not displayed.   PLAN: Continue w/POC & Notify MD of any changes    "

## 2024-01-01 NOTE — PROGRESS NOTES
[facility]   Transplant Nephrology Progress Note  Date of Admission:  12/28/2023  Today's Date: 01/01/2024    Recommendations:  - No acute indications for dialysis  - Would make no changes in immunosuppression at this time.  - Recommend kidney transplant biopsy.  - Would give one dose of Solumedrol 500 mg IV today for presumptive acute rejection.  - Recommend replacing K+    Assessment & Plan   # LDKT: Trend up in creatinine.  Good urine output.  No acute indications for dialysis.  Mostly unremarkable kidney transplant ultrasound outside of elevated renal artery velocities, which isn't surprising.  Would recommend kidney transplant biopsy to rule out acute rejection.   - Baseline Creatinine: ~ TBD   - Proteinuria: Not checked post transplant   - Date DSA Last Checked: Dec/2023      Latest DSA: No DSA at time of transplant   - BK Viremia: Not checked post transplant   - Kidney Tx Biopsy: No   - Transplant Ureteral Stent: Yes    # Immunosuppression: Tacrolimus immediate release (goal 8-10), Mycophenolate mofetil (dose 750 mg every 12 hours), and Prednisone (dose taper)   - Induction with Recent Transplant:  Low Intensity Protocol    - Continue with intensive monitoring of immunosuppression for efficacy and toxicity.   - Changes: Yes - With increased creatinine and potential rejection, would recommend one dose of Solumedrol 500 mg IV until biopsy can be done tomorrow.     # Infection Prophylaxis:   - PJP: Sulfa/TMP (Bactrim)  - CMV: Valganciclovir (Valcyte); Patient is CMV IgG Ab discordant (D+/R-) and will continue on Valcyte x 6 months, then check CMV PCR monthly until 12 months post transplant.     # Hypertension: Borderline control;  Goal BP: < 150/90   - Volume status: Euvolemic  EDW ~ 125.5 kg   - Changes: Not at this time    # Anemia in Chronic Renal Disease: Hgb: Trend down      TOM: No   - Iron studies: Replete    # Mineral Bone Disorder:   - Secondary renal  hyperparathyroidism; PTH level: Moderately elevated (301-600 pg/ml)        On treatment: None  - Vitamin D; level: Low        On supplement: Yes  - Calcium; level: Normal        On supplement: No  - Phosphorus; level: Normal        On supplement: No    # Electrolytes:   - Potassium; level: Low        On supplement: No; Recommend replacing potassium.  - Magnesium; level: Normal        On supplement: Yes  - Bicarbonate; level: Normal        On supplement: No    # Obesity, Class II (BMI = 39.7): Stable weight.   - Once patient heals and recovers from surgery, would recommend working on weight loss for overall health by increasing exercise and watching caloric intake.    # H/o Ureteral Reimplantation, s/p Mitrofanoff: Patient reports not using for Mitrofanoff in a couple of years and voids normally on his own.  Mitrofanoff is nonfunctional at this time.    # H/o Bilateral Uveitis: No evidence of systemic autoimmune/inflammatory disease at last Rheumatology.     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    Abhilash Raza MD  Transplant Nephrology  Contact information available via [facility] Paging/Directory    Interval History   Mr. Villa's creatinine is 4.98 (01/01 0634); Increased.  Good urine output.  Other significant labs/tests/vitals: Stable electrolytes.  Stable to trend down in hemoglobin.  No new events overnight.  No chest pain or shortness of breath.  No leg swelling.  No nausea and vomiting.  Bowel movements are loose.  No fever, sweats or chills.    Review of Systems   4 point ROS was obtained and negative except as noted in the Interval History.    MEDICATIONS:   amLODIPine  5 mg Oral Daily    aspirin  81 mg Oral Daily     "atorvastatin  10 mg Oral Daily    difluprednate  1 drop Both Eyes 4x Daily    latanoprost  1 drop Both Eyes At Bedtime    lidocaine  2 patch Transdermal Q24H    magnesium oxide  400 mg Oral Daily with lunch    methocarbamol  750 mg Oral TID    methylPREDNISolone  500 mg Intravenous Once    mycophenolate  750 mg Oral BID IS    polyethylene glycol  17 g Oral Daily    [START ON 2024] predniSONE  40 mg Oral Daily    Followed by    [START ON 2024] predniSONE  20 mg Oral Daily    Followed by    [START ON 2024] predniSONE  15 mg Oral Daily    Followed by    [START ON 2024] predniSONE  10 mg Oral Daily    Followed by    [START ON 2024] predniSONE  5 mg Oral Daily    senna-docusate  1 tablet Oral BID    sodium chloride (PF)  10 mL Intracatheter Q8H    sulfamethoxazole-trimethoprim  1 tablet Oral Daily    tacrolimus  6 mg Oral BID IS    [START ON 1/3/2024] valGANciclovir  450 mg Oral Every Other Day    cholecalciferol  50 mcg Oral Daily      sodium chloride         Physical Exam   Temp  Av  F (36.7  C)  Min: 97.6  F (36.4  C)  Max: 98.6  F (37  C)      Pulse  Av.4  Min: 62  Max: 92 Resp  Av.5  Min: 11  Max: 27  SpO2  Av.2 %  Min: 88 %  Max: 99 %    CVP (mmHg): 11 mmHgBP (!) 140/91 (BP Location: Left arm)   Pulse 87   Temp 98.4  F (36.9  C) (Oral)   Resp 16   Ht 1.803 m (5' 11\")   Wt 129.3 kg (285 lb)   SpO2 96%   BMI 39.75 kg/m     Date 23 0700 - 23 0659   Shift 2890-6553 0917-1842 8245-7920 24 Hour Total   INTAKE   P.O. 120   120   Shift Total(mL/kg) 120(0.96)   120(0.96)   OUTPUT   Urine 400   400   Shift Total(mL/kg) 400(3.2)   400(3.2)   Weight (kg) 125 125 125 125      Admit Weight: 125 kg (275 lb 9.2 oz)     GENERAL APPEARANCE: alert and no distress  HENT: mouth without ulcers or lesions  RESP: lungs clear to auscultation - no rales, rhonchi or wheezes  CV: regular rhythm, normal rate, no rub, no murmur  EDEMA: none to trace LE edema bilaterally  ABDOMEN: " soft, nondistended, nontender, bowel sounds normal, obese  MS: extremities normal - no gross deformities noted, no evidence of inflammation in joints, no muscle tenderness  SKIN: no rash  TX KIDNEY: mild TTP  DIALYSIS ACCESS:  Right IJ tunneled catheter    Data   All labs reviewed by me.  CMP  Recent Labs   Lab 01/01/24  0634 12/31/23  0602 12/30/23  0548 12/29/23  1805 12/29/23  0939 12/29/23  0554    140 141  --   --  137   POTASSIUM 3.2* 3.3* 3.8 3.6   < > 3.6  3.6   CHLORIDE 103 106 106  --   --  102   CO2 18* 22 24  --   --  22   ANIONGAP 15 12 11  --   --  13   * 94 158*  --   --  136*   BUN 58.7* 40.9* 25.1*  --   --  24.5*   CR 4.98* 3.01* 2.67*  --   --  5.16*   GFRESTIMATED 16* 29* 33*  --   --  15*   NABIL 9.2 9.3 9.4  --   --  8.5*   MAG 2.0 2.1 2.3  --   --  1.9   PHOS 4.1 3.8 4.0  --   --  4.5    < > = values in this interval not displayed.     CBC  Recent Labs   Lab 01/01/24  0634 12/31/23  0602 12/30/23  0548 12/29/23  1805 12/29/23  0939 12/29/23  0554   HGB 8.9* 9.3* 9.7* 10.6*   < > 10.5*   WBC 8.8 8.9 9.9  --   --  10.1   RBC 2.66* 2.85* 2.97*  --   --  3.22*   HCT 26.6* 28.4* 28.9*  --   --  32.0*    100 97  --   --  99   MCH 33.5* 32.6 32.7  --   --  32.6   MCHC 33.5 32.7 33.6  --   --  32.8   RDW 13.1 13.0 12.9  --   --  12.9    192 201  --   --  185    < > = values in this interval not displayed.     INRNo lab results found in last 7 days.  ABG  Recent Labs   Lab 12/28/23  1812 12/28/23  1554   O2PER 50.0 75.0      Urine Studies  Recent Labs   Lab Test 12/19/23  1007 03/29/21  1404 12/03/20  1525 10/19/20  1645 05/13/19  0728   COLOR Straw Straw Yellow Yellow Straw   APPEARANCE Clear Clear Clear Clear Clear   URINEGLC 70* 50* 100* 50 mg/dL* 50*   URINEBILI Negative Negative Negative Negative Negative   URINEKETONE Negative Negative Negative Negative Negative   SG 1.003 1.008 1.025 1.005 1.008   UBLD Trace* Negative Small* Small* Negative   URINEPH 7.5* 6.0 7.0 6.0  7.0   PROTEIN 100* >499* >300* >=500 mg/dL* >499*   UROBILINOGEN  --   --  0.2 <2.0 E.U./dL  --    NITRITE Negative Negative Negative Negative Negative   LEUKEST Negative Negative Negative Negative Negative   RBCU 0 0  --  0-2 <1   WBCU 1 <1  --  0-5 <1     Recent Labs   Lab Test 02/26/20  1530 11/25/19  0904 05/13/19  0728 12/04/18  1056 06/08/18  1310 02/10/17  1004 08/26/16  1150 02/19/16  1000   UTPG 4.31* 4.92* 4.31* 4.59* 6.69* 2.78* 2.45* 2.17*     PTH  Recent Labs   Lab Test 12/19/23  0953 07/16/21  1650 12/16/20  1611 12/16/20  0000 10/19/20  1614 02/26/20  1520 11/25/19  0859 05/13/19  0712 12/04/18  1054 06/08/18  1256 01/16/18  1102 08/09/17  1626 02/10/17  1002 08/26/16  1245 02/19/16  0955   PTHI 445* 308* 94* 94 131* 201* 154* 110* 177* 96* 133* 66 39 47 42     Iron Studies  Recent Labs   Lab Test 12/19/23  0953 02/26/20  1520 05/13/19  0712 06/08/18  1256 01/16/18  1102 08/09/17  1626 02/10/17  1002   IRON 104 66 71 87 116 93 54    251 269 257 290 284 297   IRONSAT 42 26 26 34 40 33 18   DAVID 826* 133 109 82 86 100 113       IMAGING:  All imaging studies reviewed by me.

## 2024-01-01 NOTE — PLAN OF CARE
"BP (!) 146/91 (BP Location: Left arm)   Pulse 83   Temp 97.9  F (36.6  C) (Oral)   Resp 16   Ht 1.803 m (5' 11\")   Wt 128.5 kg (283 lb 4.8 oz)   SpO2 98%   BMI 39.51 kg/m      Shift: 4687-9173  Isolation Status: none  VS: stable on room air, afebrile  Neuro: Aox4  Behaviors: calm, cooperative  BG: none  Labs: creat increased to 3.01  Respiratory: WDL  Cardiac: WDL  Pain/Nausea: denies nausea, mild abd pain  PRN: PO dilaudid x1  Diet: regualr  IV Access: left internal jugular triple lumen, left PIV, right AV fistula  Infusion(s): 1L NS bolus  Lines/Drains: wound vac to abd incision - to be removed tomorrow morning by team  GI/: voiding spontaneously without difficulty, BM x2  Skin: right lower quad incision with wound vac  Mobility: SBA  Plan: discharge home tomorrow       "

## 2024-01-01 NOTE — PLAN OF CARE
"[id number]  BP (!) 155/102 (BP Location: Left arm)   Pulse 84   Temp 97.9  F (36.6  C) (Oral)   Resp 16   Ht 1.803 m (5' 11\")   Wt 129.3 kg (285 lb)   SpO2 95%   BMI 39.75 kg/m       VS- slightly hypertensive but under parameters,  on RA, afebrile   BG- none   Labs- pending AM collection   Pain/Nausea/PRN'S- pain managed with scheduled  Robaxin and PRN oral Dilaudid. PRN Zofran x1 for nausea.  Diet- regular   LDA- PIV SL, LIJ, R. AV fistula   Gtt/IVF- none   GI/- voiding 1120 ml output urine red/pink in color, two BM's  overnight   Skin- abdominal incision w/ wound vac in place   Activity- SBA   Plan- wound vac removal today, continue  with POC.   "

## 2024-01-02 ENCOUNTER — APPOINTMENT (OUTPATIENT)
Dept: INTERVENTIONAL RADIOLOGY/VASCULAR | Facility: CLINIC | Age: 24
DRG: 652 | End: 2024-01-02
Attending: NURSE PRACTITIONER
Payer: COMMERCIAL

## 2024-01-02 ENCOUNTER — APPOINTMENT (OUTPATIENT)
Dept: ULTRASOUND IMAGING | Facility: CLINIC | Age: 24
DRG: 652 | End: 2024-01-02
Attending: NURSE PRACTITIONER
Payer: COMMERCIAL

## 2024-01-02 LAB
ANION GAP SERPL CALCULATED.3IONS-SCNC: 15 MMOL/L (ref 7–15)
BASOPHILS # BLD AUTO: 0 10E3/UL (ref 0–0.2)
BASOPHILS NFR BLD AUTO: 0 %
BUN SERPL-MCNC: 79.5 MG/DL (ref 6–20)
CALCIUM SERPL-MCNC: 9.6 MG/DL (ref 8.6–10)
CHLORIDE SERPL-SCNC: 104 MMOL/L (ref 98–107)
CREAT SERPL-MCNC: 6.2 MG/DL (ref 0.67–1.17)
DEPRECATED HCO3 PLAS-SCNC: 18 MMOL/L (ref 22–29)
EGFRCR SERPLBLD CKD-EPI 2021: 12 ML/MIN/1.73M2
EOSINOPHIL # BLD AUTO: 0 10E3/UL (ref 0–0.7)
EOSINOPHIL NFR BLD AUTO: 0 %
ERYTHROCYTE [DISTWIDTH] IN BLOOD BY AUTOMATED COUNT: 12.8 % (ref 10–15)
GLUCOSE SERPL-MCNC: 131 MG/DL (ref 70–99)
HCT VFR BLD AUTO: 28 % (ref 40–53)
HGB BLD-MCNC: 9.5 G/DL (ref 13.3–17.7)
IMM GRANULOCYTES # BLD: 0.1 10E3/UL
IMM GRANULOCYTES NFR BLD: 1 %
INR PPP: 1.17 (ref 0.85–1.15)
LYMPHOCYTES # BLD AUTO: 0.9 10E3/UL (ref 0.8–5.3)
LYMPHOCYTES NFR BLD AUTO: 10 %
MAGNESIUM SERPL-MCNC: 2.5 MG/DL (ref 1.7–2.3)
MCH RBC QN AUTO: 32.6 PG (ref 26.5–33)
MCHC RBC AUTO-ENTMCNC: 33.9 G/DL (ref 31.5–36.5)
MCV RBC AUTO: 96 FL (ref 78–100)
MONOCYTES # BLD AUTO: 0.2 10E3/UL (ref 0–1.3)
MONOCYTES NFR BLD AUTO: 3 %
NEUTROPHILS # BLD AUTO: 7.8 10E3/UL (ref 1.6–8.3)
NEUTROPHILS NFR BLD AUTO: 86 %
NRBC # BLD AUTO: 0 10E3/UL
NRBC BLD AUTO-RTO: 0 /100
PHOSPHATE SERPL-MCNC: 5 MG/DL (ref 2.5–4.5)
PLATELET # BLD AUTO: 229 10E3/UL (ref 150–450)
POTASSIUM SERPL-SCNC: 4.3 MMOL/L (ref 3.4–5.3)
RBC # BLD AUTO: 2.91 10E6/UL (ref 4.4–5.9)
SODIUM SERPL-SCNC: 137 MMOL/L (ref 135–145)
TACROLIMUS BLD-MCNC: 2.4 UG/L (ref 5–15)
TME LAST DOSE: ABNORMAL H
TME LAST DOSE: ABNORMAL H
WBC # BLD AUTO: 8.9 10E3/UL (ref 4–11)

## 2024-01-02 PROCEDURE — 250N000011 HC RX IP 250 OP 636: Performed by: SURGERY

## 2024-01-02 PROCEDURE — 83735 ASSAY OF MAGNESIUM: CPT | Performed by: SURGERY

## 2024-01-02 PROCEDURE — 88346 IMFLUOR 1ST 1ANTB STAIN PX: CPT | Mod: 26 | Performed by: PATHOLOGY

## 2024-01-02 PROCEDURE — 250N000013 HC RX MED GY IP 250 OP 250 PS 637: Performed by: SURGERY

## 2024-01-02 PROCEDURE — 250N000011 HC RX IP 250 OP 636: Mod: JZ | Performed by: NURSE PRACTITIONER

## 2024-01-02 PROCEDURE — 88346 IMFLUOR 1ST 1ANTB STAIN PX: CPT | Mod: TC | Performed by: NURSE PRACTITIONER

## 2024-01-02 PROCEDURE — 88350 IMFLUOR EA ADDL 1ANTB STN PX: CPT | Mod: 26 | Performed by: PATHOLOGY

## 2024-01-02 PROCEDURE — 50200 RENAL BIOPSY PERQ: CPT | Mod: RT | Performed by: PHYSICIAN ASSISTANT

## 2024-01-02 PROCEDURE — 80048 BASIC METABOLIC PNL TOTAL CA: CPT | Performed by: SURGERY

## 2024-01-02 PROCEDURE — 84100 ASSAY OF PHOSPHORUS: CPT | Performed by: SURGERY

## 2024-01-02 PROCEDURE — 85610 PROTHROMBIN TIME: CPT | Performed by: NURSE PRACTITIONER

## 2024-01-02 PROCEDURE — 99152 MOD SED SAME PHYS/QHP 5/>YRS: CPT | Performed by: PHYSICIAN ASSISTANT

## 2024-01-02 PROCEDURE — 272N000505 HC NEEDLE CR5

## 2024-01-02 PROCEDURE — 258N000003 HC RX IP 258 OP 636: Performed by: SURGERY

## 2024-01-02 PROCEDURE — 250N000013 HC RX MED GY IP 250 OP 250 PS 637: Performed by: NURSE PRACTITIONER

## 2024-01-02 PROCEDURE — 36415 COLL VENOUS BLD VENIPUNCTURE: CPT | Performed by: SURGERY

## 2024-01-02 PROCEDURE — 250N000009 HC RX 250: Performed by: STUDENT IN AN ORGANIZED HEALTH CARE EDUCATION/TRAINING PROGRAM

## 2024-01-02 PROCEDURE — 86832 HLA CLASS I HIGH DEFIN QUAL: CPT | Performed by: NURSE PRACTITIONER

## 2024-01-02 PROCEDURE — 250N000013 HC RX MED GY IP 250 OP 250 PS 637

## 2024-01-02 PROCEDURE — 30243S1 TRANSFUSION OF NONAUTOLOGOUS GLOBULIN INTO CENTRAL VEIN, PERCUTANEOUS APPROACH: ICD-10-PCS | Performed by: SURGERY

## 2024-01-02 PROCEDURE — 99233 SBSQ HOSP IP/OBS HIGH 50: CPT | Mod: 24 | Performed by: INTERNAL MEDICINE

## 2024-01-02 PROCEDURE — 99152 MOD SED SAME PHYS/QHP 5/>YRS: CPT

## 2024-01-02 PROCEDURE — 85004 AUTOMATED DIFF WBC COUNT: CPT | Performed by: SURGERY

## 2024-01-02 PROCEDURE — 76776 US EXAM K TRANSPL W/DOPPLER: CPT

## 2024-01-02 PROCEDURE — 250N000011 HC RX IP 250 OP 636

## 2024-01-02 PROCEDURE — 250N000011 HC RX IP 250 OP 636: Performed by: STUDENT IN AN ORGANIZED HEALTH CARE EDUCATION/TRAINING PROGRAM

## 2024-01-02 PROCEDURE — 80197 ASSAY OF TACROLIMUS: CPT | Performed by: SURGERY

## 2024-01-02 PROCEDURE — 88313 SPECIAL STAINS GROUP 2: CPT | Mod: 26 | Performed by: PATHOLOGY

## 2024-01-02 PROCEDURE — 88305 TISSUE EXAM BY PATHOLOGIST: CPT | Mod: 26 | Performed by: PATHOLOGY

## 2024-01-02 PROCEDURE — 250N000012 HC RX MED GY IP 250 OP 636 PS 637: Performed by: NURSE PRACTITIONER

## 2024-01-02 PROCEDURE — 250N000012 HC RX MED GY IP 250 OP 636 PS 637: Performed by: PHYSICIAN ASSISTANT

## 2024-01-02 PROCEDURE — 76776 US EXAM K TRANSPL W/DOPPLER: CPT | Mod: 26 | Performed by: RADIOLOGY

## 2024-01-02 PROCEDURE — 258N000003 HC RX IP 258 OP 636: Performed by: NURSE PRACTITIONER

## 2024-01-02 PROCEDURE — 76942 ECHO GUIDE FOR BIOPSY: CPT | Mod: 26 | Performed by: PHYSICIAN ASSISTANT

## 2024-01-02 PROCEDURE — 86833 HLA CLASS II HIGH DEFIN QUAL: CPT | Performed by: NURSE PRACTITIONER

## 2024-01-02 PROCEDURE — 120N000011 HC R&B TRANSPLANT UMMC

## 2024-01-02 PROCEDURE — 0TB03ZX EXCISION OF RIGHT KIDNEY, PERCUTANEOUS APPROACH, DIAGNOSTIC: ICD-10-PCS | Performed by: PHYSICIAN ASSISTANT

## 2024-01-02 PROCEDURE — 250N000012 HC RX MED GY IP 250 OP 636 PS 637: Performed by: SURGERY

## 2024-01-02 PROCEDURE — 36415 COLL VENOUS BLD VENIPUNCTURE: CPT | Performed by: NURSE PRACTITIONER

## 2024-01-02 RX ORDER — ACETAMINOPHEN 325 MG/1
650 TABLET ORAL ONCE
Status: DISCONTINUED | OUTPATIENT
Start: 2024-01-02 | End: 2024-01-02

## 2024-01-02 RX ORDER — SODIUM BICARBONATE 650 MG/1
1300 TABLET ORAL 2 TIMES DAILY
Status: DISCONTINUED | OUTPATIENT
Start: 2024-01-02 | End: 2024-01-05 | Stop reason: HOSPADM

## 2024-01-02 RX ORDER — NALOXONE HYDROCHLORIDE 0.4 MG/ML
0.2 INJECTION, SOLUTION INTRAMUSCULAR; INTRAVENOUS; SUBCUTANEOUS
Status: DISCONTINUED | OUTPATIENT
Start: 2024-01-02 | End: 2024-01-02 | Stop reason: HOSPADM

## 2024-01-02 RX ORDER — SULFAMETHOXAZOLE AND TRIMETHOPRIM 400; 80 MG/1; MG/1
1 TABLET ORAL
Status: DISCONTINUED | OUTPATIENT
Start: 2024-01-04 | End: 2024-01-05

## 2024-01-02 RX ORDER — ACETAMINOPHEN 325 MG/1
650 TABLET ORAL ONCE
Status: COMPLETED | OUTPATIENT
Start: 2024-01-02 | End: 2024-01-02

## 2024-01-02 RX ORDER — DIPHENHYDRAMINE HCL 12.5MG/5ML
25-50 LIQUID (ML) ORAL ONCE
Status: COMPLETED | OUTPATIENT
Start: 2024-01-02 | End: 2024-01-02

## 2024-01-02 RX ORDER — NALOXONE HYDROCHLORIDE 0.4 MG/ML
0.4 INJECTION, SOLUTION INTRAMUSCULAR; INTRAVENOUS; SUBCUTANEOUS
Status: DISCONTINUED | OUTPATIENT
Start: 2024-01-02 | End: 2024-01-02 | Stop reason: HOSPADM

## 2024-01-02 RX ORDER — HYDROMORPHONE HYDROCHLORIDE 2 MG/1
2 TABLET ORAL EVERY 4 HOURS PRN
Status: DISCONTINUED | OUTPATIENT
Start: 2024-01-02 | End: 2024-01-05 | Stop reason: HOSPADM

## 2024-01-02 RX ORDER — PANTOPRAZOLE SODIUM 40 MG/1
40 TABLET, DELAYED RELEASE ORAL
Status: DISCONTINUED | OUTPATIENT
Start: 2024-01-02 | End: 2024-01-05 | Stop reason: HOSPADM

## 2024-01-02 RX ORDER — FENTANYL CITRATE 50 UG/ML
25-50 INJECTION, SOLUTION INTRAMUSCULAR; INTRAVENOUS EVERY 5 MIN PRN
Status: DISCONTINUED | OUTPATIENT
Start: 2024-01-02 | End: 2024-01-02 | Stop reason: HOSPADM

## 2024-01-02 RX ORDER — DIPHENHYDRAMINE HCL 12.5MG/5ML
25-50 LIQUID (ML) ORAL ONCE
Status: DISCONTINUED | OUTPATIENT
Start: 2024-01-02 | End: 2024-01-02

## 2024-01-02 RX ORDER — DIPHENHYDRAMINE HCL 25 MG
25-50 CAPSULE ORAL ONCE
Status: DISCONTINUED | OUTPATIENT
Start: 2024-01-02 | End: 2024-01-02

## 2024-01-02 RX ORDER — DIPHENHYDRAMINE HCL 25 MG
25-50 CAPSULE ORAL ONCE
Status: COMPLETED | OUTPATIENT
Start: 2024-01-02 | End: 2024-01-02

## 2024-01-02 RX ORDER — HYDROMORPHONE HYDROCHLORIDE 2 MG/1
4 TABLET ORAL EVERY 4 HOURS PRN
Status: DISCONTINUED | OUTPATIENT
Start: 2024-01-02 | End: 2024-01-05 | Stop reason: HOSPADM

## 2024-01-02 RX ORDER — CALCIUM CARBONATE 500 MG/1
500 TABLET, CHEWABLE ORAL DAILY PRN
Status: DISCONTINUED | OUTPATIENT
Start: 2024-01-02 | End: 2024-01-05 | Stop reason: HOSPADM

## 2024-01-02 RX ORDER — FLUMAZENIL 0.1 MG/ML
0.2 INJECTION, SOLUTION INTRAVENOUS
Status: DISCONTINUED | OUTPATIENT
Start: 2024-01-02 | End: 2024-01-02 | Stop reason: HOSPADM

## 2024-01-02 RX ADMIN — ACETAMINOPHEN 650 MG: 325 TABLET, FILM COATED ORAL at 22:11

## 2024-01-02 RX ADMIN — MIDAZOLAM 1 MG: 1 INJECTION INTRAMUSCULAR; INTRAVENOUS at 11:25

## 2024-01-02 RX ADMIN — SULFAMETHOXAZOLE AND TRIMETHOPRIM 1 TABLET: 400; 80 TABLET ORAL at 08:29

## 2024-01-02 RX ADMIN — ANTI-THYMOCYTE GLOBULIN (RABBIT) 200 MG: 5 INJECTION, POWDER, LYOPHILIZED, FOR SOLUTION INTRAVENOUS at 17:45

## 2024-01-02 RX ADMIN — DIFLUPREDNATE OPHTHALMIC 1 DROP: 0.5 EMULSION OPHTHALMIC at 08:29

## 2024-01-02 RX ADMIN — Medication 50 MCG: at 08:29

## 2024-01-02 RX ADMIN — SODIUM CHLORIDE, PRESERVATIVE FREE: 5 INJECTION INTRAVENOUS at 08:52

## 2024-01-02 RX ADMIN — ASPIRIN 81 MG: 81 TABLET, COATED ORAL at 08:29

## 2024-01-02 RX ADMIN — CALCIUM CARBONATE (ANTACID) CHEW TAB 500 MG 500 MG: 500 CHEW TAB at 03:51

## 2024-01-02 RX ADMIN — LIDOCAINE HYDROCHLORIDE 8 ML: 10 INJECTION, SOLUTION EPIDURAL; INFILTRATION; INTRACAUDAL; PERINEURAL at 11:29

## 2024-01-02 RX ADMIN — LATANOPROST 1 DROP: 50 SOLUTION OPHTHALMIC at 21:56

## 2024-01-02 RX ADMIN — METHOCARBAMOL 750 MG: 750 TABLET ORAL at 16:53

## 2024-01-02 RX ADMIN — AMLODIPINE BESYLATE 10 MG: 10 TABLET ORAL at 08:29

## 2024-01-02 RX ADMIN — SODIUM BICARBONATE 650 MG TABLET 1300 MG: at 19:59

## 2024-01-02 RX ADMIN — SODIUM BICARBONATE 650 MG TABLET 650 MG: at 07:21

## 2024-01-02 RX ADMIN — METHOCARBAMOL 750 MG: 750 TABLET ORAL at 19:59

## 2024-01-02 RX ADMIN — ATORVASTATIN CALCIUM 10 MG: 10 TABLET, FILM COATED ORAL at 08:29

## 2024-01-02 RX ADMIN — ACETAMINOPHEN 650 MG: 325 TABLET, FILM COATED ORAL at 17:03

## 2024-01-02 RX ADMIN — TACROLIMUS 8 MG: 5 CAPSULE ORAL at 17:55

## 2024-01-02 RX ADMIN — PANTOPRAZOLE SODIUM 40 MG: 40 TABLET, DELAYED RELEASE ORAL at 08:51

## 2024-01-02 RX ADMIN — SODIUM CHLORIDE 500 MG: 9 INJECTION, SOLUTION INTRAVENOUS at 17:03

## 2024-01-02 RX ADMIN — FENTANYL CITRATE 25 MCG: 50 INJECTION, SOLUTION INTRAMUSCULAR; INTRAVENOUS at 11:42

## 2024-01-02 RX ADMIN — METHOCARBAMOL 750 MG: 750 TABLET ORAL at 08:29

## 2024-01-02 RX ADMIN — DIFLUPREDNATE OPHTHALMIC 1 DROP: 0.5 EMULSION OPHTHALMIC at 19:59

## 2024-01-02 RX ADMIN — LABETALOL HYDROCHLORIDE 20 MG: 5 INJECTION, SOLUTION INTRAVENOUS at 22:11

## 2024-01-02 RX ADMIN — SENNOSIDES AND DOCUSATE SODIUM 1 TABLET: 8.6; 5 TABLET ORAL at 08:29

## 2024-01-02 RX ADMIN — MIDAZOLAM 0.5 MG: 1 INJECTION INTRAMUSCULAR; INTRAVENOUS at 11:31

## 2024-01-02 RX ADMIN — FENTANYL CITRATE 25 MCG: 50 INJECTION, SOLUTION INTRAMUSCULAR; INTRAVENOUS at 11:19

## 2024-01-02 RX ADMIN — DIFLUPREDNATE OPHTHALMIC 1 DROP: 0.5 EMULSION OPHTHALMIC at 16:54

## 2024-01-02 RX ADMIN — LIDOCAINE 2 PATCH: 4 PATCH TOPICAL at 08:29

## 2024-01-02 RX ADMIN — FENTANYL CITRATE 50 MCG: 50 INJECTION, SOLUTION INTRAMUSCULAR; INTRAVENOUS at 11:25

## 2024-01-02 RX ADMIN — DIPHENHYDRAMINE HYDROCHLORIDE 50 MG: 25 CAPSULE ORAL at 17:04

## 2024-01-02 RX ADMIN — TACROLIMUS 6 MG: 5 CAPSULE ORAL at 08:29

## 2024-01-02 RX ADMIN — HYDROMORPHONE HYDROCHLORIDE 2 MG: 2 TABLET ORAL at 21:56

## 2024-01-02 RX ADMIN — MYCOPHENOLATE MOFETIL 750 MG: 250 CAPSULE ORAL at 17:55

## 2024-01-02 RX ADMIN — MYCOPHENOLATE MOFETIL 750 MG: 250 CAPSULE ORAL at 08:29

## 2024-01-02 ASSESSMENT — ACTIVITIES OF DAILY LIVING (ADL)
ADLS_ACUITY_SCORE: 24
ADLS_ACUITY_SCORE: 23
ADLS_ACUITY_SCORE: 24
ADLS_ACUITY_SCORE: 23
ADLS_ACUITY_SCORE: 24
ADLS_ACUITY_SCORE: 23

## 2024-01-02 NOTE — PLAN OF CARE
"BP (!) 137/94 (BP Location: Left arm)   Pulse 73   Temp 98.3  F (36.8  C) (Oral)   Resp 18   Ht 1.803 m (5' 11\")   Wt 129.3 kg (285 lb)   SpO2 97%   BMI 39.75 kg/m      Shift: 3643-1881  Isolation Status: none  VS: stable on room air, afebrile  Neuro: Aox4  Behaviors: calm, cooperative  BG: none  Labs: creat increased from 3.01 to 4.98  Respiratory: WDL  Cardiac: WDL  Pain/Nausea: denies nausea, mild abd pain  PRN: PO dilaudid x1  Diet: regualr  IV Access: left CVC triple lumen, left PIV, right AV fistula  Infusion(s): basiliximab and methylprednisolone   Lines/Drains: wound vac to abd incision  GI/: voiding spontaneously without difficulty, BM 1/1/24  Skin: right lower quad incision with wound vac  Mobility: SBA  Plan: renal biopsy tomorrow         "

## 2024-01-02 NOTE — PROGRESS NOTES
Transplant Surgery  Inpatient Daily Progress Note  01/02/2024    Assessment & Plan: Boogie Villa is a 22yo with history of ESRD secondary to posterior ureteral valves s/p Mitrofanoff, HTN, hearing loss, ADD, and anxiety. S/p living donor kidney transplant with ureteral stent and venous reconstruction on 12/28/23.    Graft function: POD #5   Kidney: SCr edenilson 2.67. Uptrending to 6.2 today.   -Good UOP, 4.2L over last 24 hours. Did make urine PTA.  - Post-op US: patent vessels. Repeat US 12/31, normal, patent. Repeat US today. Consider Renogram  -ASA 81 mg daily for reconstruction.     Presumed ACR:  concern for rejection in setting of low tacrolimus level, on Low induction protocol.   -DSA and kidney biopsy today (1/2)  -Received 500mg Solumedrol IV 1/1. Repeat today 1/2 + give Thymoglobulin 2mg/kg    Immunosuppressed status secondary to medications: PRA 0  Induction:  Basiliximab: 12/28 & 1/1/24  Steroids:  Five week taper per protocol.  MMF: 750mg BID  Tacro: 6 mg BID. Goal level 8-10    Neuro:  Acute post-op pain:   -Switch to dilaudid 2-4mg q3H PRN with improved pain-decrease to Q4 PRN  -Acetaminophen scheduled  -Lidoderm patches  -Robaxin TID scheduled    Hematology:   Anemia of chronic disease: Hgb 9-10, stable.    Cardiorespiratory:   HTN: PTA on Norvasc 5 mg daily and Atenolol 37.5 mg daily. SBP 140s,  continue Norvasc 10mg daily.   -Continue CDB/IS    GI/Nutrition:   Diet: NPO for biopsy then Regular  Bowel regimen: Senna, PEG  Heartburn: on Na bicarb-effective PTA, start PPI    Endocrine:   Mild Steroid induced hyperglycemia: no insulin requirement.    Fluid/Electrolytes:   MIVF: IVF while NPO then CIV with diet advancement.   Hypomagnesemia ppx: Mag ox 400 mg daily. -HOLD  Hypokalemia: Replete after supplementation  Low bicarb:  sodium bicarb 650mg BID-increase to 1300mg BID.    :   Hx of  Ureteral Reimplantation, s/p Mitrofanoff: Patient reports not using for Mitrofanoff in a couple of years and  "voids normally on his own.  Mitrofanoff is nonfunctional at this time   -Silveira removed POD 3. Voiding without retention.    Infectious disease: Afebrile. WBC WNL    Prophylaxis: DVT-mechanical, fall, GI, viral (Valcyte), pneumocystis (TMP/sulfa)    Disposition: 7A     SUNIL/Fellow/Resident Provider: Rachael Herron NP      Faculty: John Castillo MD    _________________________________________________________________    Interval History: History from patient   Overnight events: No pain. Tolerating regular diet. Ambulating. Reporting voiding without difficulties. No nausea. No fever, CP, SOB.      ROS:   A 10-point review of systems was negative except as noted above.    Meds:   amLODIPine  10 mg Oral Daily    aspirin  81 mg Oral Daily    atorvastatin  10 mg Oral Daily    difluprednate  1 drop Both Eyes 4x Daily    latanoprost  1 drop Both Eyes At Bedtime    lidocaine  2 patch Transdermal Q24H    [Held by provider] magnesium oxide  400 mg Oral Daily with lunch    methocarbamol  750 mg Oral TID    mycophenolate  750 mg Oral BID IS    pantoprazole  40 mg Oral QAM AC    polyethylene glycol  17 g Oral Daily    senna-docusate  1 tablet Oral BID    sodium bicarbonate  1,300 mg Oral BID    sodium chloride (PF)  10 mL Intracatheter Q8H    [START ON 1/4/2024] sulfamethoxazole-trimethoprim  1 tablet Oral Once per day on Tuesday Thursday Saturday    tacrolimus  8 mg Oral BID IS    [START ON 1/3/2024] valGANciclovir  450 mg Oral Every Other Day    cholecalciferol  50 mcg Oral Daily       Physical Exam:     Admit Weight: 125 kg (275 lb 9.2 oz)    Current vitals:   BP (!) 155/104 (BP Location: Left arm)   Pulse 80   Temp 98.5  F (36.9  C) (Oral)   Resp 18   Ht 1.803 m (5' 11\")   Wt 127.4 kg (280 lb 14.4 oz)   SpO2 97%   BMI 39.18 kg/m      Vital sign ranges:    Temp:  [97.8  F (36.6  C)-98.5  F (36.9  C)] 98.5  F (36.9  C)  Pulse:  [72-86] 80  Resp:  [10-30] 18  BP: (137-169)/(4-106) 155/104  SpO2:  [95 %-99 %] 97 " %    General Appearance: in no apparent distress.   Skin: Warm, perfused  Heart: RRR  Lungs: Unlabored, RA  Abdomen: The abdomen is soft, NTTP, incision covered w/  VAC   : voiding   Extremities: edema: None    Neurologic: alert and oriented x4. Tremor absent.  Access: L internal jugular CVC, right tunneled line    Data:   CMP  Recent Labs   Lab 01/02/24  0554 01/01/24  0634 12/28/23  2100 12/28/23  1812 12/28/23  1554    136   < > 139 143   POTASSIUM 4.3 3.2*   < > 3.9 3.4*   CHLORIDE 104 103   < >  --   --    CO2 18* 18*   < >  --   --    * 103*   < > 112* 93   BUN 79.5* 58.7*   < >  --   --    CR 6.20* 4.98*   < >  --   --    GFRESTIMATED 12* 16*   < >  --   --    NABIL 9.6 9.2   < >  --   --    ICAW  --   --   --  4.6 4.5   MAG 2.5* 2.0   < >  --   --    PHOS 5.0* 4.1   < >  --   --     < > = values in this interval not displayed.     CBC  Recent Labs   Lab 01/02/24  0554 01/01/24  0634   HGB 9.5* 8.9*   WBC 8.9 8.8    185

## 2024-01-02 NOTE — PLAN OF CARE
"BP (!) 146/96 (BP Location: Left arm)   Pulse 74   Temp 97.8  F (36.6  C) (Oral)   Resp 16   Ht 1.803 m (5' 11\")   Wt 129.3 kg (285 lb)   SpO2 95%   BMI 39.75 kg/m      Shift: 1666-3063  Isolation Status: N/A  VS: stable on RA, afebrile  Neuro: A&O x4  Behaviors: receptive to cares  BG: N/A  Labs/Imaging: Creatinine 4.98, BUN 58.7  Respiratory: intermittent expiratory wheezing, LS clear/diminished  Cardiac: slight hypertension 140-150s/90s, provider paged, no interventions at this time  Pain/Nausea: Denies pain. C/O mild-moderate intermittent nausea, PRN Compazine and Tums given  PRN: Compazine 10 mg PO and Tums for nausea/heart burn  Diet: NPO  IV Access: L PIV, L CVC IJ  Infusion(s): NS @ 100 mL/hr  Lines/Drains: wound vac  GI/: LBM 1/1. Voiding spontaneously, hat in toilet  Skin: R abdominal incision, wound vac on, UTV.  Mobility: SBA  Plan: Kidney biopsy on 1/2/24. Notify MD of any significant changes, continue plan of care.                         "

## 2024-01-02 NOTE — PRE-PROCEDURE
GENERAL PRE-PROCEDURE:   Procedure:  Right lower quadrant transplant kidney biopsy    Written consent obtained?: Yes    Risks and benefits: Risks, benefits and alternatives were discussed    Consent given by:  Patient  Patient states understanding of procedure being performed: Yes    Patient's understanding of procedure matches consent: Yes    Procedure consent matches procedure scheduled: Yes    Expected level of sedation:  Moderate  Appropriately NPO:  Yes  ASA Class:  1  Mallampati  :  Grade 2- soft palate, base of uvula, tonsillar pillars, and portion of posterior pharyngeal wall visible  Lungs:  Lungs clear with good breath sounds bilaterally  Heart:  Normal heart sounds with tachycardia  History & Physical reviewed:  History and physical reviewed and no updates needed  Statement of review:  I have reviewed the lab findings, diagnostic data, medications, and the plan for sedation

## 2024-01-02 NOTE — CONSULTS
"    Interventional Radiology  [facility] Consult Service Note  01/02/24   8:03 AM    Consult Requested: \"Kidney tranpslant biopsy\"    Recommendations/Plan:    Patient is on IR schedule 1/2/24 for a RLQ renal transplant biopsy.   Platelets WNLs for procedure. INR pending.  Orders entered for procedure, NPO status.  Consent will be done prior to procedure.     Please contact the IR charge RN at [phone] for estimated time of procedure.     Case and imaging discussed with IR attending, Dr. Weathers.  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 S/p living donor kidney transplant with ureteral stent and venous reconstruction on 12/28/23. Now with elevated creatinine and IR is consulted for transplant renal biopsy.    Diagnostic labs entered by: Rachael Herron NP    Pertinent Imaging Reviewed:     US renal transplant 12/31  IMPRESSION:   1. Normal grayscale appearance of the right lower quadrant  transplanted kidney and patent Doppler evaluation.  2. Interval elevated velocities at the renal artery anastomosis up to  317 cm/s with increased arcuate artery resistive indices,  indeterminate, may relate to anastomotic edema. Recommend attention on  short-interval follow-up.    Expected date of discharge:  TBD    Vitals:   BP (!) 138/90 (BP Location: Left arm)   Pulse 79   Temp 97.8  F (36.6  C) (Oral)   Resp 16   Ht 1.803 m (5' 11\")   Wt 127.4 kg (280 lb 14.4 oz)   SpO2 97%   BMI 39.18 kg/m      Pertinent Labs:   Lab Results   Component Value Date    WBC 8.9 01/02/2024    WBC 8.8 01/01/2024    WBC 8.9 12/31/2023    WBC 6.6 03/29/2021    WBC 7.1 10/19/2020    WBC 5.3 08/18/2020    WBC 5.3 08/18/2020     Lab Results   Component Value Date    HGB 9.5 01/02/2024    HGB 8.9 01/01/2024    HGB 9.3 12/31/2023    HGB " 12.4 03/29/2021    HGB 12.9 12/16/2020    HGB 13.2 10/19/2020     Lab Results   Component Value Date     01/02/2024     01/01/2024     12/31/2023     03/29/2021     10/19/2020     08/18/2020     08/18/2020     Lab Results   Component Value Date    INR 1.02 12/19/2023    INR 1.06 03/29/2021    PTT 26 03/29/2021     Lab Results   Component Value Date    POTASSIUM 4.3 01/02/2024    POTASSIUM 3.9 12/28/2023    POTASSIUM 4.5 07/16/2021    POTASSIUM 4.0 03/29/2021        COVID-19 Antibody Results, Testing for Immunity           No data to display              COVID-19 PCR Results          12/27/2023    07:47   COVID-19 PCR Results   SARS CoV2 PCR Negative        CHRISTIN Pelletier CNP  Interventional Radiology  Pager: [phone]

## 2024-01-02 NOTE — IR NOTE
Patient Name: Boogie Villa  Medical Record Number: [id number]  Today's Date: 1/2/2024    Procedure: right renal transplant biopsy  Proceduralist: HENRIETTA Rosas PA-C  Pathology present: yes - 7 cores obtained  Brief completed: N/A    Procedure Start: 1127  Procedure end: 1150  Sedation medications administered: 1.5 mg of versed, 100 mcg of fentanyl  Sedation time: 31 minutes ([id number])    Report given to: Cherelle CHEN RN  : N/A    Other Notes: Pt arrived to IR room 04 from . Consent reviewed. Pt denies any questions or concerns regarding procedure. Pt positioned supine and monitored per protocol. Pt tolerated procedure without any noted complications. RLQ site cleansed and dressed per protocol. Specimens sent per order. Pt transferred back to .

## 2024-01-02 NOTE — PROVIDER NOTIFICATION
7A 4093- HILDA Villa- Pt's last BP was 146/96, per orders to let you know of DBP over 90.  Kell Plata

## 2024-01-02 NOTE — PROGRESS NOTES
[facility]   Transplant Nephrology Progress Note  Date of Admission:  12/28/2023  Today's Date: 01/02/2024    Recommendations:  - No acute indications for dialysis  - Would make no changes in immunosuppression at this time.  - Plan kidney transplant biopsy today.  - Would consider another  dose of Solumedrol 500 mg IV today for presumptive acute rejection, depending on time biopsy results will be out.  - Recommend renogram.    Assessment & Plan   # LDKT: Increased creatinine.  Very good urine output.  No acute indications for dialysis.  Mostly unremarkable kidney transplant ultrasound outside of elevated renal artery velocities, which isn't surprising.  No evidence of perinephric fluid.  Patient did have good urine output on dialysis prior to transplant.  Would recommend kidney transplant biopsy to rule out acute rejection.  Would also consider a renogram.   - Baseline Creatinine: ~ TBD   - Proteinuria: Not checked post transplant   - Date DSA Last Checked: Dec/2023      Latest DSA: No DSA at time of transplant   - BK Viremia: Not checked post transplant   - Kidney Tx Biopsy: No   - Transplant Ureteral Stent: Yes    # Immunosuppression: Tacrolimus immediate release (goal 8-10), Mycophenolate mofetil (dose 750 mg every 12 hours), and Prednisone (dose taper)   - Induction with Recent Transplant:  Low Intensity Protocol    - Continue with intensive monitoring of immunosuppression for efficacy and toxicity.   - Changes: Yes - With increased creatinine and potential rejection, would recommend one dose of Solumedrol 500 mg IV until biopsy can be done tomorrow.     # Infection Prophylaxis:   - PJP: Sulfa/TMP (Bactrim)  - CMV: Valganciclovir (Valcyte); Patient is CMV IgG Ab discordant (D+/R-) and will continue on Valcyte x 6 months, then check CMV PCR monthly until 12 months post transplant.     # Hypertension: Controlled;  Goal BP: < 150/90   - Volume status: Euvolemic  EDW ~  125.5 kg   - Changes: Not at this time    # Anemia in Chronic Renal Disease: Hgb: Stable      TOM: No   - Iron studies: Replete    # Mineral Bone Disorder:   - Secondary renal hyperparathyroidism; PTH level: Moderately elevated (301-600 pg/ml)        On treatment: None  - Vitamin D; level: Low        On supplement: Yes  - Calcium; level: Normal        On supplement: No  - Phosphorus; level: High        On supplement: No    # Electrolytes:   - Potassium; level: Normal        On supplement: No; Recommend replacing potassium.  - Magnesium; level: Normal        On supplement: Yes  - Bicarbonate; level: Stable low        On supplement: No    # Obesity, Class II (BMI = 39.7): Stable weight.   - Once patient heals and recovers from surgery, would recommend working on weight loss for overall health by increasing exercise and watching caloric intake.    # H/o Ureteral Reimplantation, s/p Mitrofanoff: Patient reports not using for Mitrofanoff in a couple of years and voids normally on his own.  Mitrofanoff is nonfunctional at this time.    # H/o Bilateral Uveitis: No evidence of systemic autoimmune/inflammatory disease at last Rheumatology.     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    Abhilash Raza MD  Transplant Nephrology  Contact information available via [facility] Paging/Directory    Interval History   Mr. Villa's creatinine is 6.20 (01/02 0554); Increased.  Very good urine output.  Other significant labs/tests/vitals: Stable electrolytes.  Stable hemoglobin.  No new events overnight.  No chest pain or shortness of breath.  No leg swelling.  Slight nausea, but no vomiting.  Bowel movements are none today, but some yesterday.  No  "fever, sweats or chills.    Review of Systems   4 point ROS was obtained and negative except as noted in the Interval History.    MEDICATIONS:   amLODIPine  10 mg Oral Daily    aspirin  81 mg Oral Daily    atorvastatin  10 mg Oral Daily    difluprednate  1 drop Both Eyes 4x Daily    latanoprost  1 drop Both Eyes At Bedtime    lidocaine  2 patch Transdermal Q24H    magnesium oxide  400 mg Oral Daily with lunch    methocarbamol  750 mg Oral TID    mycophenolate  750 mg Oral BID IS    pantoprazole  40 mg Oral QAM AC    polyethylene glycol  17 g Oral Daily    senna-docusate  1 tablet Oral BID    sodium bicarbonate  1,300 mg Oral BID    sodium chloride (PF)  10 mL Intracatheter Q8H    sulfamethoxazole-trimethoprim  1 tablet Oral Daily    tacrolimus  6 mg Oral BID IS    [START ON 1/3/2024] valGANciclovir  450 mg Oral Every Other Day    cholecalciferol  50 mcg Oral Daily      sodium chloride 100 mL/hr at 24 0852       Physical Exam   Temp  Av  F (36.7  C)  Min: 97.6  F (36.4  C)  Max: 98.6  F (37  C)      Pulse  Av.4  Min: 62  Max: 92 Resp  Av.5  Min: 11  Max: 27  SpO2  Av.2 %  Min: 88 %  Max: 99 %    CVP (mmHg): 11 mmHgBP (!) 138/90 (BP Location: Left arm)   Pulse 79   Temp 97.8  F (36.6  C) (Oral)   Resp 16   Ht 1.803 m (5' 11\")   Wt 127.4 kg (280 lb 14.4 oz)   SpO2 97%   BMI 39.18 kg/m     Date 23 07 - 23 0659   Shift 0045-7170 4172-0503 6522-5610 24 Hour Total   INTAKE   P.O. 120   120   Shift Total(mL/kg) 120(0.96)   120(0.96)   OUTPUT   Urine 400   400   Shift Total(mL/kg) 400(3.2)   400(3.2)   Weight (kg) 125 125 125 125      Admit Weight: 125 kg (275 lb 9.2 oz)     GENERAL APPEARANCE: alert and no distress  HENT: mouth without ulcers or lesions  RESP: lungs clear to auscultation - no rales, rhonchi or wheezes  CV: regular rhythm, normal rate, no rub, no murmur  EDEMA: none to trace LE edema bilaterally  ABDOMEN: soft, nondistended, nontender, bowel sounds normal, " obese  MS: extremities normal - no gross deformities noted, no evidence of inflammation in joints, no muscle tenderness  SKIN: no rash  TX KIDNEY: mild TTP, wound vac in place  DIALYSIS ACCESS:  Right IJ tunneled catheter    Data   All labs reviewed by me.  CMP  Recent Labs   Lab 01/02/24  0554 01/01/24  0634 12/31/23  0602 12/30/23  0548    136 140 141   POTASSIUM 4.3 3.2* 3.3* 3.8   CHLORIDE 104 103 106 106   CO2 18* 18* 22 24   ANIONGAP 15 15 12 11   * 103* 94 158*   BUN 79.5* 58.7* 40.9* 25.1*   CR 6.20* 4.98* 3.01* 2.67*   GFRESTIMATED 12* 16* 29* 33*   NABIL 9.6 9.2 9.3 9.4   MAG 2.5* 2.0 2.1 2.3   PHOS 5.0* 4.1 3.8 4.0     CBC  Recent Labs   Lab 01/02/24  0554 01/01/24  0634 12/31/23  0602 12/30/23  0548   HGB 9.5* 8.9* 9.3* 9.7*   WBC 8.9 8.8 8.9 9.9   RBC 2.91* 2.66* 2.85* 2.97*   HCT 28.0* 26.6* 28.4* 28.9*   MCV 96 100 100 97   MCH 32.6 33.5* 32.6 32.7   MCHC 33.9 33.5 32.7 33.6   RDW 12.8 13.1 13.0 12.9    185 192 201     INR  Recent Labs   Lab 01/02/24  0834   INR 1.17*     ABG  Recent Labs   Lab 12/28/23  1812 12/28/23  1554   O2PER 50.0 75.0      Urine Studies  Recent Labs   Lab Test 12/19/23  1007 03/29/21  1404 12/03/20  1525 10/19/20  1645 05/13/19  0728   COLOR Straw Straw Yellow Yellow Straw   APPEARANCE Clear Clear Clear Clear Clear   URINEGLC 70* 50* 100* 50 mg/dL* 50*   URINEBILI Negative Negative Negative Negative Negative   URINEKETONE Negative Negative Negative Negative Negative   SG 1.003 1.008 1.025 1.005 1.008   UBLD Trace* Negative Small* Small* Negative   URINEPH 7.5* 6.0 7.0 6.0 7.0   PROTEIN 100* >499* >300* >=500 mg/dL* >499*   UROBILINOGEN  --   --  0.2 <2.0 E.U./dL  --    NITRITE Negative Negative Negative Negative Negative   LEUKEST Negative Negative Negative Negative Negative   RBCU 0 0  --  0-2 <1   WBCU 1 <1  --  0-5 <1     Recent Labs   Lab Test 02/26/20  1530 11/25/19  0904 05/13/19  0728 12/04/18  1056 06/08/18  1310 02/10/17  1004 08/26/16  1150  02/19/16  1000   UTPG 4.31* 4.92* 4.31* 4.59* 6.69* 2.78* 2.45* 2.17*     PTH  Recent Labs   Lab Test 12/19/23  0953 07/16/21  1650 12/16/20  1611 12/16/20  0000 10/19/20  1614 02/26/20  1520 11/25/19  0859 05/13/19  0712 12/04/18  1054 06/08/18  1256 01/16/18  1102 08/09/17  1626 02/10/17  1002 08/26/16  1245 02/19/16  0955   PTHI 445* 308* 94* 94 131* 201* 154* 110* 177* 96* 133* 66 39 47 42     Iron Studies  Recent Labs   Lab Test 12/19/23  0953 02/26/20  1520 05/13/19  0712 06/08/18  1256 01/16/18  1102 08/09/17  1626 02/10/17  1002   IRON 104 66 71 87 116 93 54    251 269 257 290 284 297   IRONSAT 42 26 26 34 40 33 18   DAVID 826* 133 109 82 86 100 113       IMAGING:  All imaging studies reviewed by me.

## 2024-01-02 NOTE — PROCEDURES
[facility]    Procedure: IR Procedure Note    Date/Time: 1/2/2024 11:54 AM    Performed by: Jose C Rosas PA-C  Authorized by: Jose C Rosas PA-C      UNIVERSAL PROTOCOL   Site Marked: NA  Prior Images Obtained and Reviewed:  Yes  Required items: Required blood products, implants, devices and special equipment available    Patient identity confirmed:  Verbally with patient, arm band, provided demographic data and hospital-assigned identification number  Patient was reevaluated immediately before administering moderate or deep sedation or anesthesia  Confirmation Checklist:  Patient's identity using two indicators, relevant allergies, procedure was appropriate and matched the consent or emergent situation and correct equipment/implants were available  Time out: Immediately prior to the procedure a time out was called    Universal Protocol: the Joint Commission Universal Protocol was followed    Preparation: Patient was prepped and draped in usual sterile fashion       ANESTHESIA    Anesthesia:  Local infiltration  Local Anesthetic:  Lidocaine 1% without epinephrine      SEDATION  Patient Sedated: Yes    Vital signs: Vital signs monitored during sedation    See dictated procedure note for full details.  Findings: Sedation medications administered: 1.5 mg of versed, 100 mcg of fentanyl  Sedation time: 30 minutes      Specimens: core needle biopsy specimens sent for pathological analysis    Complications: None    Condition: Stable      PROCEDURE  Describe Procedure: Boogie Villa  [id number]    Completed ultrasound guided RLQ transplant kidney biopsy.  A total of seven passes yielded tissue cores collected for further pathological evaluation.  Microscopy support present, reported scant glomeruli.  No immediate complications.  Dx: s/p transplant.  Ralph.    Sedation medications administered: 1.5 mg of versed, 100 mcg of fentanyl  Sedation time:  30 minutes    Patient Tolerance:  Patient tolerated the procedure well with no immediate complications  Length of time physician/provider present for 1:1 monitoring during sedation: 30

## 2024-01-02 NOTE — PROVIDER NOTIFICATION
"7A 8492- MARGO Villa.  Pt states having \"not quite nausea but not quite acid reflux\", he is wondering if he can get sodium bicarbonate or tums for this.  Kell Plata   "

## 2024-01-02 NOTE — PLAN OF CARE
"BP (!) 147/99   Pulse 83   Temp 98.5  F (36.9  C) (Oral)   Resp 18   Ht 1.803 m (5' 11\")   Wt 127.4 kg (280 lb 14.4 oz)   SpO2 99%   BMI 39.18 kg/m        Shift: 8233-4477  Isolation Status: none  VS: stable on room air, afebrile  Neuro: Aox4  Behaviors: calm, cooperative  BG: none  Labs: creat increased to 6.20  Respiratory: WDL  Cardiac: WDL  Pain/Nausea: denies nausea but complained of heart burn, mild abd pain  PRN: none needed   Diet: regular  IV Access: left CVC triple lumen, left PIV, right AV fistula  Infusion(s): NS @100mL/hr  Lines/Drains: wound vac to abd incision  GI/: voiding spontaneously without difficulty, BM 1/1/24  Skin: right lower quad incision with wound vac  Mobility: SBA  Events: Renal biopsy performed in RLQ  Plan: awaiting biopsy results. Thymo ordered preemptively - awaiting arrival from pharmacy       "

## 2024-01-03 LAB
ANION GAP SERPL CALCULATED.3IONS-SCNC: 17 MMOL/L (ref 7–15)
BASOPHILS # BLD AUTO: 0 10E3/UL (ref 0–0.2)
BASOPHILS NFR BLD AUTO: 0 %
BUN SERPL-MCNC: 83.5 MG/DL (ref 6–20)
CALCIUM SERPL-MCNC: 9.5 MG/DL (ref 8.6–10)
CHLORIDE SERPL-SCNC: 108 MMOL/L (ref 98–107)
CREAT SERPL-MCNC: 6.35 MG/DL (ref 0.67–1.17)
DEPRECATED HCO3 PLAS-SCNC: 14 MMOL/L (ref 22–29)
EGFRCR SERPLBLD CKD-EPI 2021: 12 ML/MIN/1.73M2
EOSINOPHIL # BLD AUTO: 0 10E3/UL (ref 0–0.7)
EOSINOPHIL NFR BLD AUTO: 0 %
ERYTHROCYTE [DISTWIDTH] IN BLOOD BY AUTOMATED COUNT: 13.2 % (ref 10–15)
GLUCOSE SERPL-MCNC: 140 MG/DL (ref 70–99)
HCT VFR BLD AUTO: 27.6 % (ref 40–53)
HGB BLD-MCNC: 9.2 G/DL (ref 13.3–17.7)
IMM GRANULOCYTES # BLD: 0.1 10E3/UL
IMM GRANULOCYTES NFR BLD: 1 %
LYMPHOCYTES # BLD AUTO: 0.2 10E3/UL (ref 0.8–5.3)
LYMPHOCYTES NFR BLD AUTO: 2 %
MAGNESIUM SERPL-MCNC: 2.5 MG/DL (ref 1.7–2.3)
MCH RBC QN AUTO: 33.2 PG (ref 26.5–33)
MCHC RBC AUTO-ENTMCNC: 33.3 G/DL (ref 31.5–36.5)
MCV RBC AUTO: 100 FL (ref 78–100)
MONOCYTES # BLD AUTO: 0.1 10E3/UL (ref 0–1.3)
MONOCYTES NFR BLD AUTO: 2 %
NEUTROPHILS # BLD AUTO: 8.9 10E3/UL (ref 1.6–8.3)
NEUTROPHILS NFR BLD AUTO: 95 %
NRBC # BLD AUTO: 0 10E3/UL
NRBC BLD AUTO-RTO: 0 /100
PHOSPHATE SERPL-MCNC: 5.1 MG/DL (ref 2.5–4.5)
PLATELET # BLD AUTO: 210 10E3/UL (ref 150–450)
POTASSIUM SERPL-SCNC: 4.5 MMOL/L (ref 3.4–5.3)
RBC # BLD AUTO: 2.77 10E6/UL (ref 4.4–5.9)
SODIUM SERPL-SCNC: 139 MMOL/L (ref 135–145)
WBC # BLD AUTO: 9.3 10E3/UL (ref 4–11)

## 2024-01-03 PROCEDURE — 250N000013 HC RX MED GY IP 250 OP 250 PS 637: Performed by: NURSE PRACTITIONER

## 2024-01-03 PROCEDURE — 250N000009 HC RX 250: Performed by: SURGERY

## 2024-01-03 PROCEDURE — 99233 SBSQ HOSP IP/OBS HIGH 50: CPT | Performed by: INTERNAL MEDICINE

## 2024-01-03 PROCEDURE — 120N000011 HC R&B TRANSPLANT UMMC

## 2024-01-03 PROCEDURE — 250N000012 HC RX MED GY IP 250 OP 636 PS 637: Performed by: SURGERY

## 2024-01-03 PROCEDURE — 85025 COMPLETE CBC W/AUTO DIFF WBC: CPT | Performed by: SURGERY

## 2024-01-03 PROCEDURE — 250N000011 HC RX IP 250 OP 636: Mod: JZ | Performed by: NURSE PRACTITIONER

## 2024-01-03 PROCEDURE — 36592 COLLECT BLOOD FROM PICC: CPT | Performed by: SURGERY

## 2024-01-03 PROCEDURE — 80048 BASIC METABOLIC PNL TOTAL CA: CPT | Performed by: SURGERY

## 2024-01-03 PROCEDURE — 250N000012 HC RX MED GY IP 250 OP 636 PS 637: Performed by: NURSE PRACTITIONER

## 2024-01-03 PROCEDURE — 83735 ASSAY OF MAGNESIUM: CPT | Performed by: SURGERY

## 2024-01-03 PROCEDURE — 250N000013 HC RX MED GY IP 250 OP 250 PS 637: Performed by: SURGERY

## 2024-01-03 PROCEDURE — 84100 ASSAY OF PHOSPHORUS: CPT | Performed by: SURGERY

## 2024-01-03 PROCEDURE — 250N000011 HC RX IP 250 OP 636

## 2024-01-03 PROCEDURE — 258N000003 HC RX IP 258 OP 636: Performed by: NURSE PRACTITIONER

## 2024-01-03 RX ORDER — ACETAMINOPHEN 325 MG/1
650 TABLET ORAL ONCE
Qty: 2 TABLET | Refills: 0 | Status: DISCONTINUED | OUTPATIENT
Start: 2024-01-03 | End: 2024-01-03

## 2024-01-03 RX ORDER — DIPHENHYDRAMINE HCL 12.5MG/5ML
25-50 LIQUID (ML) ORAL ONCE
Qty: 20 ML | Refills: 0 | Status: COMPLETED | OUTPATIENT
Start: 2024-01-03 | End: 2024-01-03

## 2024-01-03 RX ORDER — ACETAMINOPHEN 325 MG/1
650 TABLET ORAL ONCE
Status: COMPLETED | OUTPATIENT
Start: 2024-01-03 | End: 2024-01-03

## 2024-01-03 RX ORDER — DIPHENHYDRAMINE HCL 25 MG
25-50 CAPSULE ORAL ONCE
Qty: 2 CAPSULE | Refills: 0 | Status: COMPLETED | OUTPATIENT
Start: 2024-01-03 | End: 2024-01-03

## 2024-01-03 RX ADMIN — VALGANCICLOVIR 450 MG: 450 TABLET, FILM COATED ORAL at 08:48

## 2024-01-03 RX ADMIN — SODIUM BICARBONATE 650 MG TABLET 1300 MG: at 08:48

## 2024-01-03 RX ADMIN — DIPHENHYDRAMINE HYDROCHLORIDE 50 MG: 25 CAPSULE ORAL at 14:31

## 2024-01-03 RX ADMIN — SODIUM BICARBONATE: 84 INJECTION, SOLUTION INTRAVENOUS at 21:49

## 2024-01-03 RX ADMIN — PANTOPRAZOLE SODIUM 40 MG: 40 TABLET, DELAYED RELEASE ORAL at 08:49

## 2024-01-03 RX ADMIN — DIFLUPREDNATE OPHTHALMIC 1 DROP: 0.5 EMULSION OPHTHALMIC at 15:31

## 2024-01-03 RX ADMIN — TACROLIMUS 8 MG: 5 CAPSULE ORAL at 08:48

## 2024-01-03 RX ADMIN — DIFLUPREDNATE OPHTHALMIC 1 DROP: 0.5 EMULSION OPHTHALMIC at 08:57

## 2024-01-03 RX ADMIN — METHOCARBAMOL 750 MG: 750 TABLET ORAL at 19:50

## 2024-01-03 RX ADMIN — MYCOPHENOLATE MOFETIL 750 MG: 250 CAPSULE ORAL at 18:10

## 2024-01-03 RX ADMIN — LATANOPROST 1 DROP: 50 SOLUTION OPHTHALMIC at 21:46

## 2024-01-03 RX ADMIN — ACETAMINOPHEN 650 MG: 325 TABLET, FILM COATED ORAL at 14:31

## 2024-01-03 RX ADMIN — DIFLUPREDNATE OPHTHALMIC 1 DROP: 0.5 EMULSION OPHTHALMIC at 19:50

## 2024-01-03 RX ADMIN — SODIUM BICARBONATE 650 MG TABLET 1300 MG: at 19:50

## 2024-01-03 RX ADMIN — SENNOSIDES AND DOCUSATE SODIUM 1 TABLET: 8.6; 5 TABLET ORAL at 08:48

## 2024-01-03 RX ADMIN — ATORVASTATIN CALCIUM 10 MG: 10 TABLET, FILM COATED ORAL at 08:49

## 2024-01-03 RX ADMIN — TACROLIMUS 8 MG: 5 CAPSULE ORAL at 18:09

## 2024-01-03 RX ADMIN — SENNOSIDES AND DOCUSATE SODIUM 1 TABLET: 8.6; 5 TABLET ORAL at 19:50

## 2024-01-03 RX ADMIN — LABETALOL HYDROCHLORIDE 20 MG: 5 INJECTION, SOLUTION INTRAVENOUS at 20:05

## 2024-01-03 RX ADMIN — Medication 50 MCG: at 08:48

## 2024-01-03 RX ADMIN — SODIUM BICARBONATE: 84 INJECTION, SOLUTION INTRAVENOUS at 13:00

## 2024-01-03 RX ADMIN — METHOCARBAMOL 750 MG: 750 TABLET ORAL at 08:48

## 2024-01-03 RX ADMIN — METHOCARBAMOL 750 MG: 750 TABLET ORAL at 14:31

## 2024-01-03 RX ADMIN — AMLODIPINE BESYLATE 10 MG: 10 TABLET ORAL at 08:49

## 2024-01-03 RX ADMIN — MYCOPHENOLATE MOFETIL 750 MG: 250 CAPSULE ORAL at 08:48

## 2024-01-03 RX ADMIN — ANTI-THYMOCYTE GLOBULIN (RABBIT) 200 MG: 5 INJECTION, POWDER, LYOPHILIZED, FOR SOLUTION INTRAVENOUS at 15:09

## 2024-01-03 RX ADMIN — SODIUM CHLORIDE 250 MG: 9 INJECTION, SOLUTION INTRAVENOUS at 14:32

## 2024-01-03 RX ADMIN — DIFLUPREDNATE OPHTHALMIC 1 DROP: 0.5 EMULSION OPHTHALMIC at 13:00

## 2024-01-03 ASSESSMENT — ACTIVITIES OF DAILY LIVING (ADL)
ADLS_ACUITY_SCORE: 27
ADLS_ACUITY_SCORE: 22
ADLS_ACUITY_SCORE: 25
ADLS_ACUITY_SCORE: 25
ADLS_ACUITY_SCORE: 22
ADLS_ACUITY_SCORE: 27
ADLS_ACUITY_SCORE: 27
ADLS_ACUITY_SCORE: 22
ADLS_ACUITY_SCORE: 27

## 2024-01-03 NOTE — PLAN OF CARE
Goal Outcome Evaluation:    Hypertensive (140s-60s/90-100s), PRN labetalol given for BP of 166/103. Thymo dose running, patient experienced rigors, infusion rate reduced with resolution of symptoms. PRN Tylenol and PO Dilaudid each given x1 for body aches. Biopsy site dressing clean, dry and intact. Bedside ultrasound performed. Good appetite on regular diet. Up and walking halls independently. Voiding frothy urine, not saving. One BM this shift. Family at bedside supportive of patient.

## 2024-01-03 NOTE — PLAN OF CARE
Pt is alert and oriented x4, AVSS, afebrile on RA. Pt denies pain, nausea, numbness and tingling. Voided once overnight was reminded by writer to save but pt stated that he forgot. Wound vac WDL on RLQ. TRILL and bruit present on Left upper arm. Call light within reach, no unmet need at this time.

## 2024-01-03 NOTE — PLAN OF CARE
"BP (!) 165/106   Pulse 88   Temp 97.9  F (36.6  C) (Oral)   Resp 18   Ht 1.803 m (5' 11\")   Wt 129 kg (284 lb 8 oz)   SpO2 92%   BMI 39.68 kg/m       [id number]  BP hypertensive (140's-160's). Has PRN available if >160. All other VSS on RA. Alert and oriented x4; calls appropriately. Up ad brittany. Denies pain and nausea. RLQ incision- wound vac intact and in place. Incision appears to be healing well. Regular diet; fair appetite and PO intake. Right chest dialysis cath. Left CVC infusing with Bicarb (MIV) @ 100 ml/hr. Mother by bedside- good support system and involved in care plan. BM today. Patient continues to have LOTs of urine output; saves most in hat. However creatinine continues to be trending upward. He is receiving another dose of thymo today (2 out of 3); running it very slow due to reaction last night from first thymo dose given. Left PIV saline locked. Continue plan of care, please notify MD with any changes.                "

## 2024-01-03 NOTE — PROGRESS NOTES
Transplant Surgery Consult Note     Medical record number: [id number]  YOB: 2000,   Consult requested  for evaluation of kidney transplant candidacy.    Assessment and Recommendations:Mr. Villa appears to be a excellent candidate for kidney transplantation and has a good understanding of the risks and benefits of this approach to the management of renal failure. The following issues should be addressed prior to finalizing his transplant candidacy:     Transplant order: Mr. Villa has End stage renal failure due to obstructive nephropathy whose condition is not expected to resolve, is expected to progress, and is expected to continue to develop related comorbid conditions.    Risks and benefits discussed.   Labs and radiologic studies reviewed  Consent obtained  Suitable to move forward with transplant as scheduled    The majority of our visit was spent in counselling, discussing the medical and surgical risks of kidney transplantation. We discussed approximate wait time and how that is influenced by issues such as blood type and sensitization (PRA) and access to a living donor. I contrasted potential waiting time for living vs  donor kidneys from  normal (0-85%) or higher (%) kidney donor profile index (KDPI) donors and their associated outcomes. I would not recommend this individual to consider kidneys from high KDPI donors. The reason for this decision is best summarized as: improved long term graft survival. Potential surgical complications of kidney transplantation include bleeding, superficial or deep wound complications (infection, hernia, lymphocele), ureteral anastomotic failure (leak or stenosis), graft thrombosis, need for reoperation and other issues such as cardiac complications, pneumonia, deep venous thrombosis, pulmonary embolism, post transplant diabetes and death. The potential for recurrent disease or need for retransplantation was also addressed. We discussed the  possible need for ureteral stent (and subsequent removal), and the utility of protocol biopsy and laboratory studies to evaluate for rejection or recurrent disease. We discussed the risk of graft rejection, our center's average graft and patient survival rates, immunosuppression protocols, as well as the potential opportunity to participate in clinical trials.  We also discussed the average length of stay, recovery process, and posttransplant lab and monitoring protocol.  I emphasized the need for strict immunosuppression medication adherence and the potential for complications of immunosuppression such as skin cancer or lymphoma, as well as a very low but not zero risk of donor-derived disease transmission risks (infection, cancer). Mr. Villa asked good questions and his candidacy will be reviewed at our Multidisciplinary Selection Committee. Thank you for the opportunity to participate in Mr. Villa's care.      Total time: 60 minutes        Nita Martinez MD FACS  Associate Professor of Surgery  Director, Living Kidney Donor Program.    ---------------------------------------------------------------------------------------------------    HPI: Mr. Villa has End stage renal failure due to obstructive nephropathy. The patient is non-diabetic.       The patient is on dialysis.    Has potential kidney donors:  Yes .  Interested in participation in paired exchange if a donor is willing: Yes     The patient has the following pertinent history:       No    Yes  Dialysis:    []      [x] via:       Blood Transfusion                  []      []  Number of units:   Most recently:  Pregnancy:    [x]      [] Number:       Previous Transplant:  [x]      [] Details:    Cancer    [x]      [] Comment:   Kidney stones   [x]      [] Comment:      Recurrent infections  [x]      []  Type:                  Bladder dysfunction  [x]      [] Cause:    Claudication   [x]      [] Distance:    Previous Amputation  [x]       [] Cause:     Chronic anticoagulation  [x]      [] Indication:   Oriental orthodox  [x]      []      Past Medical History:   Diagnosis Date    ADD (attention deficit disorder)     Allergic rhinitis     ESRD (end stage renal disease) on dialysis (H)     Hypertension 2000    Mitrofanoff appendicovesicostomy present (H)     Obesity     Posterior urethral valves     Secondary renal hyperparathyroidism (H24)     Vitamin D deficiency      Past Surgical History:   Procedure Laterality Date    ABDOMEN SURGERY  2003    Bilateral urereral tapering and re-implantation    ABDOMEN SURGERY  2008    Mitrofanoff    ablation of posterior urethral valves  2000    APPENDECTOMY  2008    BENCH KIDNEY  12/28/2023    Procedure: Bench kidney;  Surgeon: Nita Martinez MD;  Location: [facility] OR    CREATE FISTULA ARTERIOVENOUS UPPER EXTREMITY Left 5/25/2023    Procedure: LEFT Brachial Basilic ARTERIOVENOUS FISTULA CREATION SURGERY WITH INTRAOPERATIVE ULTRASOUND .;  Surgeon: Nita Martinez MD;  Location: [facility] OR    CREATE GRAFT LOOP ARTERIOVENOUS UPPER EXTREMITY Right 8/24/2023    Procedure: Right RadioCephalic AV Fistula and Branch Ligation x3;  Surgeon: Nita Martinez MD;  Location: [facility] OR    IR CVC TUNNEL PLACEMENT > 5 YRS OF AGE  2/16/2023    IR RENAL BIOPSY RIGHT  1/2/2024    ureteral reimplantation with tapering  2003     Family History   Problem Relation Age of Onset    No Known Problems Mother     No Known Problems Father     Anesthesia Reaction No family hx of     Thrombosis No family hx of      Social History     Socioeconomic History    Marital status: Single     Spouse name: Not on file    Number of children: 0    Years of education: Not on file    Highest education level: Not on file   Occupational History    Occupation:      Comment: [employer]   Tobacco Use    Smoking status: Never     Passive exposure: Never    Smokeless tobacco: Never   Vaping Use    Vaping Use: Never used   Substance and  Sexual Activity    Alcohol use: Never    Drug use: Not Currently     Types: Marijuana     Comment: none for 4-5 months    Sexual activity: Not on file   Other Topics Concern    Parent/sibling w/ CABG, MI or angioplasty before 65F 55M? Not Asked   Social History Narrative    Not on file     Social Determinants of Health     Financial Resource Strain: Not on file   Food Insecurity: Not on file   Transportation Needs: Not on file   Physical Activity: Not on file   Stress: Not on file   Social Connections: Not on file   Interpersonal Safety: Not on file   Housing Stability: Not on file       ROS:   CONSTITUTIONAL:  No fevers or chills  EYES: negative for icterus  ENT:  negative for hearing loss, tinnitus and sore throat  RESPIRATORY:  negative for cough, sputum, dyspnea  CARDIOVASCULAR:  negative for chest pain Fatigue  GASTROINTESTINAL:  negative for nausea, vomiting, diarrhea or constipation  GENITOURINARY:  negative for incontinence, dysuria, bladder emptying problems  HEME:  No easy bruising  INTEGUMENT:  negative for rash and pruritus  NEURO:  Negative for headache, seizure disorder  Allergies:   Allergies   Allergen Reactions    Banana Itching     Raw banana; itchy mouth      Dust Mites      Runny nose and watery eyes    Mold      Runny nose and watery eyes    Nsaids Other (See Comments)     CKD    Other [Seasonal Allergies]      Grass, Ragweed - gets runny nose and watery eyes    Sulfa Antibiotics      PN: LW Reaction: GI Upset as an infant     Medications:  Prescription Medications as of 1/3/2024         Rx Number Disp Refills Start End Last Dispensed Date Next Fill Date Owning Pharmacy    amLODIPine (NORVASC) 5 MG tablet  90 tablet 3 11/21/2022 --   [facility] - [place], [place] - [address] [facility]    Sig: Take 1 tablet (5 mg) by mouth daily    Class: E-Prescribe    Route: Oral    atenolol (TENORMIN) 25 MG tablet  135 tablet 3 1/3/2023 --   [facility] - [place], [place] -  [address] [place] [address]    Sig: Take 1.5 tablets (37.5 mg) by mouth daily    Class: E-Prescribe    Route: Oral    B Complex-C-Folic Acid (DIALYVITE 800) 0.8 MG TABS  90 tablet 3 2/17/2023 2/17/2024   [facility] [address]    Sig: Take 1 tablet by mouth daily    Class: E-Prescribe    Route: Oral    calcitRIOL (ROCALTROL) 0.25 MCG capsule  90 capsule 3 11/21/2022 --   [facility] [address]    Sig: Take 1 capsule (0.25 mcg) by mouth daily    Class: E-Prescribe    Route: Oral    Calcium Acetate 667 MG TABS  90 tablet 1 2/9/2023 --   [facility] [address]    Sig: Take 667 mg by mouth 3 times daily (with meals)    Class: E-Prescribe    Route: Oral    cetirizine (ZYRTEC) 10 MG tablet  -- --  --       Sig: Take 10 mg by mouth daily as needed for allergies    Class: Historical    Route: Oral    Cholecalciferol (VITAMIN D) 50 MCG (2000 UT) CAPS  90 capsule 11 11/1/2021 --   [facility] - [address]    Sig: Take 2,000 unit marking on an U-100 insulin syringe by mouth daily    Class: E-Prescribe    Route: Oral    difluprednate (DUREZOL) 0.05 % ophthalmic emulsion  -- -- 4/4/2023 --       Sig: INSTILL 1 DROP INTO EACH EYE SIX TIMES DAILY FOR THE FIRST 24 HOURS. THEN 1 DROP 4 TIMES DAILY UNTIL FOLLOW UP    Class: Historical    latanoprost (XALATAN) 0.005 % ophthalmic solution  -- --  --       Sig: Place 1 drop into both eyes at bedtime    Class: Historical    Route: Both Eyes    methylphenidate (RITALIN) 20 MG tablet  -- -- 2/16/2020 --   [facility] [address]    Sig: Take 20 mg by mouth as needed Prn    Class: Historical    Earliest Fill Date: 2/16/2020    Route: Oral    Probiotic Product (PROBIOTIC-10 PO)  -- --  --       Sig: Take 1 tablet by mouth every morning 1 tab capsule    Class:  Historical    Route: Oral          Hospital Medications as of 1/3/2024         Dose Frequency Start End    acetaminophen (TYLENOL) tablet 650 mg (Completed) 650 mg ONCE 1/3/2024 1/3/2024    Admin Instructions: 30 minutes prior to anti-rejection/antibody therapy.  Maximum acetaminophen dose from all sources = 75 mg/kg/day not to exceed 4 grams/day.    Class: E-Prescribe    Route: Oral    acetaminophen (TYLENOL) tablet 650 mg (Completed) 650 mg ONCE 1/2/2024 1/2/2024    Admin Instructions: 30 minutes prior to anti-rejection/antibody therapy.  Maximum acetaminophen dose from all sources = 75 mg/kg/day not to exceed 4 grams/day.    Class: E-Prescribe    Route: Oral    acetaminophen (TYLENOL) tablet 650 mg 650 mg EVERY 4 HOURS PRN 12/31/2023 --    Admin Instructions: May give first dose 4 hours after last scheduled dose of acetaminophen (TYLENOL).  Maximum acetaminophen dose from all sources = 75 mg/kg/day not to exceed 4 grams/day.    Class: E-Prescribe    Route: Oral    amLODIPine (NORVASC) tablet 10 mg 10 mg DAILY 1/2/2024 --    Class: E-Prescribe    Notes to Pharmacy: PTA Sig:Take 1 tablet (5 mg) by mouth daily  Patient taking differently: Take 5 mg by mouth every morning      Route: Oral    anti-thymocyte globulin (THYMOGLOBULIN - Rabbit) 200 mg in sodium chloride 0.9 % 590 mL intermittent infusion 200 mg ONCE 1/3/2024 --    Admin Instructions: For CENTRAL catheter.  Infuse first dose over 6 hours, and subsequent doses over 4-6 hours through an in-line 0.2 micron filter.    Class: E-Prescribe    Route: Intravenous Central line    anti-thymocyte globulin (THYMOGLOBULIN - Rabbit) 200 mg in sodium chloride 0.9 % 590 mL intermittent infusion (Completed) 200 mg ONCE 1/2/2024 1/2/2024    Admin Instructions: For CENTRAL catheter.  Infuse first dose over 6 hours, and subsequent doses over 4-6 hours through an in-line 0.2 micron filter.    Class: E-Prescribe    Route: Intravenous Central line    aspirin EC tablet 81 mg  ([Held by provider] since 1/2/2024 12:25 PM) 81 mg DAILY 12/29/2023 --    Admin Instructions: DO NOT CRUSH.    Class: E-Prescribe    Route: Oral    atorvastatin (LIPITOR) tablet 10 mg 10 mg DAILY 12/29/2023 --    Admin Instructions: Begin POD#1    Class: E-Prescribe    Route: Oral    bisacodyl (DULCOLAX) suppository 10 mg 10 mg DAILY PRN 12/28/2023 --    Admin Instructions: Hold for loose stools.    Class: E-Prescribe    Route: Rectal    calcium carbonate (TUMS) chewable tablet 500 mg 500 mg DAILY PRN 1/2/2024 --    Class: E-Prescribe    Route: Oral    difluprednate (DUREZOL) 0.05 % ophthalmic emulsion 1 drop 1 drop 4 TIMES DAILY 12/29/2023 --    Class: E-Prescribe    Notes to Pharmacy: Pt was on prednisolone 1% and changed to this formulation by ophthalmology    Route: Both Eyes    diphenhydrAMINE (BENADRYL) capsule 25-50 mg (Completed) 25-50 mg ONCE 1/3/2024 1/3/2024    Admin Instructions: 30 minutes prior to anti-rejection/antibody therapy.    Class: E-Prescribe    Route: Oral    Linked Group 1: See Hyperspace for full Linked Orders Report.        diphenhydrAMINE (BENADRYL) capsule 25-50 mg (Completed) 25-50 mg ONCE 1/2/2024 1/2/2024    Admin Instructions: 30 minutes prior to anti-rejection/antibody therapy.    Class: E-Prescribe    Route: Oral    Linked Group 2: See Hyperspace for full Linked Orders Report.        diphenhydrAMINE (BENADRYL) solution 25-50 mg (Completed) 25-50 mg ONCE 1/3/2024 1/3/2024    Admin Instructions: 30 minutes prior to anti-rejection/antibody therapy.    Class: E-Prescribe    Route: Per NG tube    Linked Group 1: See Hyperspace for full Linked Orders Report.        diphenhydrAMINE (BENADRYL) solution 25-50 mg (Completed) 25-50 mg ONCE 1/2/2024 1/2/2024    Admin Instructions: 30 minutes prior to anti-rejection/antibody therapy.    Class: E-Prescribe    Route: Per NG tube    Linked Group 2: See Hyperspace for full Linked Orders Report.        HYDROmorphone (DILAUDID) tablet 2 mg 2 mg EVERY  4 HOURS PRN 1/2/2024 --    Route: Oral    HYDROmorphone (DILAUDID) tablet 4 mg 4 mg EVERY 4 HOURS PRN 1/2/2024 --    Route: Oral    labetalol (NORMODYNE/TRANDATE) injection 20 mg 20 mg EVERY 6 HOURS PRN 12/30/2023 --    Admin Instructions: Administer for SBP > 160  HOLD for HR < 60  PROTECT FROM LIGHT.    Class: E-Prescribe    Route: Intravenous    latanoprost (XALATAN) 0.005 % ophthalmic solution 1 drop 1 drop AT BEDTIME 12/29/2023 --    Class: E-Prescribe    Notes to Pharmacy: PTA Sig:Place 1 drop into both eyes at bedtime      Route: Both Eyes    Lidocaine (LIDOCARE) 4 % Patch 2 patch 2 patch EVERY 24 HOURS 0800 12/29/2023 --    Admin Instructions: Apply patch(s) to around incision. To prevent lidocaine toxicity, patient should be patch free for 12 hrs daily. Patches may be cut to smaller size prior to removing release liner.  Reminder: Remove previous patch before applying new patch.  NEVER APPLY HEAT OVER PATCH which increases absorption and may lead to local anesthetic toxicity. Do not apply over area where liposomal bupivacaine was injected for 96 hours post injection.    Class: E-Prescribe    Route: Transdermal    magnesium hydroxide (MILK OF MAGNESIA) suspension 30 mL 30 mL DAILY PRN 12/28/2023 --    Admin Instructions: Shake well.  Hold for loose stools.    Class: E-Prescribe    Route: Oral    magnesium oxide (MAG-OX) tablet 400 mg ([Held by provider] since 1/2/2024 12:22 PM) 400 mg DAILY WITH LUNCH 12/31/2023 --    Admin Instructions: Starting on POD #2    Class: E-Prescribe    Route: Oral    methocarbamol (ROBAXIN) tablet 750 mg 750 mg 3 TIMES DAILY 12/29/2023 --    Admin Instructions: Hold for sedation.    Class: E-Prescribe    Route: Oral    methylphenidate (RITALIN) tablet 20 mg 20 mg DAILY PRN 12/29/2023 --    Notes to Pharmacy: PTA Sig:Take 20 mg by mouth as needed Prn      Route: Oral    methylPREDNISolone sodium succinate (solu-MEDROL) 250 mg in sodium chloride 0.9 % 59 mL intermittent infusion  (Completed) 250 mg ONCE 1/3/2024 1/3/2024    Admin Instructions: Administer 30 minutes prior to thymoglobulin dose  Doses greater than or equal to 250 mg administer over 15-30 minutes      Class: E-Prescribe    Route: Intravenous    methylPREDNISolone sodium succinate (solu-MEDROL) 500 mg in sodium chloride 0.9 % 114 mL intermittent infusion (Completed) 500 mg ONCE 1/2/2024 1/2/2024    Admin Instructions: Give 30 minutes prior to thymoglobulin.  Doses greater than or equal to 1000mg administer over 60 minutes  Doses greater than or equal to 500mg administer over 30-60 minutes  Doses greater than or equal to 250mg administer over 15-30 minutes  Doses less than or equal to 125mg administer over 5-15 minutes    Class: E-Prescribe    Route: Intravenous    mycophenolate (GENERIC EQUIVALENT) capsule 750 mg 750 mg 2 TIMES DAILY. 12/28/2023 --    Admin Instructions: Do not open capsule for use down feeding tube. Check if DRUG LEVEL is needed BEFORE administering dose.  This order specifically allows the use of GENERIC mycophenolate as an equivalent of CELLCEPT capsules.    When possible, take 1 to 2 hours apart from any product containing magnesium or aluminum.    Class: E-Prescribe    Route: Oral    naloxone (NARCAN) injection 0.2 mg 0.2 mg EVERY 2 MIN PRN 12/29/2023 --    Admin Instructions: Administer intravenous route when available and notify provider when administered.  For unintended sedation or respiratory depression if all of the below criteria are met:  ~ respiratory rate LESS than or EQUAL to 8.   ~SaO2 less than 92% and or/end-tidal CO2 is greater than 50.  ~ the patient is receiving an opioid, has unintended sedations assessed as RASS (-3), and is currently not on mechanical ventilation.  RASS scale moderate (-3) is movement or eye opening to voice but no eye contact.    Patient Monitoring  Once the patient has demonstrated a response to the naloxone, continue to monitor respiratory rate, depth, oxygen  saturation and end-tidal CO2 (if available) every 15 minutes x 2, then every 30 minutes x 2, then every 1 hour x 1 after each naloxone dose.  Consider transfer to ICU if patient respiratory parameters have not improved after 4 naloxone doses.    Class: E-Prescribe    Route: Intravenous    Linked Group 3: See Hyperspace for full Linked Orders Report.        naloxone (NARCAN) injection 0.2 mg 0.2 mg EVERY 2 MIN PRN 12/29/2023 --    Admin Instructions: Administer intramuscular if an intravenous route is not available and notify provider when administered.  For unintended sedation or respiratory depression if all of the below criteria are met:  ~ respiratory rate LESS than or EQUAL to 8.   ~SaO2 less than 92% and or/end-tidal CO2 is greater than 50.  ~ the patient is receiving an opioid, has unintended sedations assessed as RASS (-3), and is currently not on mechanical ventilation.  RASS scale moderate (-3) is movement or eye opening to voice but no eye contact.    Patient Monitoring  Once the patient has demonstrated a response to the naloxone, continue to monitor respiratory rate, depth, oxygen saturation and end-tidal CO2 (if available) every 15 minutes x 2, then every 30 minutes x 2, then every 1 hour x 1 after each naloxone dose.  Consider transfer to ICU if patient respiratory parameters have not improved after 4 naloxone doses.    Class: E-Prescribe    Route: Intramuscular    Linked Group 3: See Hyperspace for full Linked Orders Report.        naloxone (NARCAN) injection 0.4 mg 0.4 mg EVERY 2 MIN PRN 12/29/2023 --    Admin Instructions: Administer intravenous route when available and notify provider when administered.  For unintended sedation or respiratory depression if all of the below criteria are met:  ~ respiratory rate LESS than or EQUAL to 8.  ~ SaO2 less than 92% and or/end-tidal CO2 is greater than 50.  ~ the patient is receiving an opioid, has unintended sedation assessed as RASS (-4) or (-5) and  patient is currently not on mechanical ventilation.  RASS scale (-4) is deep sedation with no response to voice but movement or eye opening to physical stimulation.  RASS scale (-5) is unarousable.      Patient Monitoring  Once the patient has demonstrated a response to the naloxone, continue to monitor respiratory rate, depth, oxygen saturation and end-tidal CO2 (if available) every 15 minutes x 2, then every 30 minutes x 2, then every 1 hour x 1 after each naloxone dose.  Consider transfer to ICU if patient respiratory parameters have not improved after 4 naloxone doses.    Class: E-Prescribe    Route: Intravenous    Linked Group 3: See Hyperspace for full Linked Orders Report.        naloxone (NARCAN) injection 0.4 mg 0.4 mg EVERY 2 MIN PRN 12/29/2023 --    Admin Instructions: Administer intramuscular if an intravenous route is not available and notify provider when administered.  For unintended sedation or respiratory depression if all of the below criteria are met:  ~ respiratory rate LESS than or EQUAL to 8.  ~ SaO2 less than 92% and or/end-tidal CO2 is greater than 50.  ~ the patient is receiving an opioid, has unintended sedation assessed as RASS (-4) or (-5) and patient is currently not on mechanical ventilation.  RASS scale (-4) is deep sedation with no response to voice but movement or eye opening to physical stimulation.  RASS scale (-5) is unarousable.      Patient Monitoring  Once the patient has demonstrated a response to the naloxone, continue to monitor respiratory rate, depth, oxygen saturation and end-tidal CO2 (if available) every 15 minutes x 2, then every 30 minutes x 2, then every 1 hour x 1 after each naloxone dose.  Consider transfer to ICU if patient respiratory parameters have not improved after 4 naloxone doses.    Class: E-Prescribe    Route: Intramuscular    Linked Group 3: See Hyperspace for full Linked Orders Report.        ondansetron (ZOFRAN ODT) ODT tab 4 mg 4 mg EVERY 6 HOURS PRN  12/28/2023 --    Admin Instructions: This is Step 1 of nausea and vomiting management.  If nausea not resolved in 15 minutes, go to Step 2 prochlorperazine (COMPAZINE). Do not push through foil backing. Peel back foil and gently remove. Place on tongue immediately. Administration with liquid unnecessary  With dry hands, peel back foil backing and gently remove tablet. Do not push oral disintegrating tablet through foil backing. Administer immediately on tongue and oral disintegrating tablet dissolves in seconds, then swallow with saliva. Liquid not required.    Class: E-Prescribe    Route: Oral    Linked Group 4: See Hyperspace for full Linked Orders Report.        pantoprazole (PROTONIX) EC tablet 40 mg 40 mg EVERY MORNING BEFORE BREAKFAST 1/2/2024 --    Admin Instructions: DO NOT CRUSH.    Class: E-Prescribe    Route: Oral    polyethylene glycol (MIRALAX) Packet 17 g 17 g DAILY 12/29/2023 --    Admin Instructions: To prevent constipation. Mixed prescribed dose in 8 ounces of water, juice or soda. Administer daily starting at 0900 on POD 1. Hold for loose stools.  1 Packet = 17 grams. Mix each gram with at least 1/2 ounce (15 mL) of water -   8 ounces for 17 g dose, 4 ounces for 8.5 g dose, 2 ounces for 4 g dose.  Follow with the same volume of water.  Hold for loose stools unless being administered as part of a bowel prep regimen or bowel clean out.    Class: E-Prescribe    Route: Oral    prochlorperazine (COMPAZINE) tablet 10 mg 10 mg EVERY 6 HOURS PRN 12/28/2023 --    Admin Instructions: This is Step 2 of nausea and vomiting management.   If nausea not resolved in 15-30 minutes, Notify provider.    Class: E-Prescribe    Route: Oral    Linked Group 5: See Hyperspace for full Linked Orders Report.        senna-docusate (SENOKOT-S/PERICOLACE) 8.6-50 MG per tablet 1 tablet 1 tablet 2 TIMES DAILY 12/28/2023 --    Admin Instructions: To prevent constipation. Hold for loose stools  Hold for loose stools.    Class:  E-Prescribe    Route: Oral    sodium bicarbonate 150 mEq in sterile water (preservative free) 1,000 mL infusion  CONTINUOUS 1/3/2024 --    Admin Instructions: Vesicant in concentrations greater than 0.5 mEq/mL.    Class: E-Prescribe    Route: Intravenous    sodium bicarbonate tablet 1,300 mg 1,300 mg 2 TIMES DAILY 1/2/2024 --    Admin Instructions:      Class: E-Prescribe    Route: Oral    sodium chloride (PF) 0.9% PF flush 10 mL 10 mL EVERY 8 HOURS 12/28/2023 --    Admin Instructions: And Q1H PRN, to lock CVC - Open Ended (Tunneled and Non-Tunneled) dormant lumen.    Class: E-Prescribe    Route: Intracatheter    sodium chloride (PF) 0.9% PF flush 10-20 mL 10-20 mL EVERY 1 MIN PRN 12/28/2023 --    Admin Instructions: to flush CVC - Open Ended (Tunneled and Non-Tunneled). 10 mL post IV meds; 20 mL post blood draw.    Class: E-Prescribe    Route: Intracatheter    sulfamethoxazole-trimethoprim (BACTRIM) 400-80 MG per tablet 1 tablet 1 tablet THREE TIMES WEEKLY 1/4/2024 --    Admin Instructions: Dose adjusted per renal dosing policy.  Estimated CrCl = < 30 mL/min         Class: E-Prescribe    Route: Oral    tacrolimus (GENERIC) capsule 8 mg 8 mg 2 TIMES DAILY. 1/2/2024 --    Admin Instructions: Check if DRUG LEVEL is needed BEFORE administering dose.    Class: E-Prescribe    Route: Oral    valGANciclovir (VALCYTE) tablet 450 mg 450 mg EVERY OTHER DAY 1/3/2024 --    Admin Instructions: Dose adjusted per renal dosing policy.  Estimated CrCl = 25-39 mL/min.       Do Not Crush    Class: E-Prescribe    Route: Oral    Vitamin D3 (CHOLECALCIFEROL) tablet 50 mcg 50 mcg DAILY 12/30/2023 --    Admin Instructions: Note: 25 mcg = 1000 units    Class: E-Prescribe    Route: Oral          Exam:     There were no vitals taken for this visit.  Appearance: in no apparent distress.   Skin: normal  Eyes:  no redness or discharge.  Sclera anicteric  Head and Neck: Normal, no rashes or jaundice  Respiratory: easy respirations, no audible  wheezing.  Abdomen: rounded, obese, and protuberant, No distention and Surgical scars consistent with history   Extremities: femoral 2+/2+, Edema, none  Neuro: without deficit   Psychiatric: Normal mood and affect    Diagnostics:   Recent Results (from the past 672 hour(s))   CBC with platelets    Collection Time: 12/19/23  9:53 AM   Result Value Ref Range    WBC Count 6.1 4.0 - 11.0 10e3/uL    RBC Count 3.90 (L) 4.40 - 5.90 10e6/uL    Hemoglobin 12.5 (L) 13.3 - 17.7 g/dL    Hematocrit 36.3 (L) 40.0 - 53.0 %    MCV 93 78 - 100 fL    MCH 32.1 26.5 - 33.0 pg    MCHC 34.4 31.5 - 36.5 g/dL    RDW 13.3 10.0 - 15.0 %    Platelet Count 241 150 - 450 10e3/uL   Hepatitis B core antibody    Collection Time: 12/19/23  9:53 AM   Result Value Ref Range    Hepatitis B Core Antibody Total Nonreactive Nonreactive   Hepatitis B surface antigen    Collection Time: 12/19/23  9:53 AM   Result Value Ref Range    Hepatitis B Surface Antigen Nonreactive Nonreactive   Comprehensive metabolic panel    Collection Time: 12/19/23  9:53 AM   Result Value Ref Range    Sodium 139 135 - 145 mmol/L    Potassium 3.5 3.4 - 5.3 mmol/L    Carbon Dioxide (CO2) 28 22 - 29 mmol/L    Anion Gap 11 7 - 15 mmol/L    Urea Nitrogen 21.6 (H) 6.0 - 20.0 mg/dL    Creatinine 7.12 (H) 0.67 - 1.17 mg/dL    GFR Estimate 10 (L) >60 mL/min/1.73m2    Calcium 9.5 8.6 - 10.0 mg/dL    Chloride 100 98 - 107 mmol/L    Glucose 94 70 - 99 mg/dL    Alkaline Phosphatase 67 40 - 150 U/L    AST 27 0 - 45 U/L    ALT 41 0 - 70 U/L    Protein Total 7.6 6.4 - 8.3 g/dL    Albumin 4.4 3.5 - 5.2 g/dL    Bilirubin Total 0.5 <=1.2 mg/dL   CMV Antibody IgG    Collection Time: 12/19/23  9:53 AM   Result Value Ref Range    CMV Dianne IgG Instrument Value 0.53 <0.60 U/mL    CMV Antibody IgG No detectable antibody. No detectable antibody.    CMV antibody IgM    Collection Time: 12/19/23  9:53 AM   Result Value Ref Range    CMV Dianne IgM Instrument Value <8.0 <30.0 AU/mL    CMV Antibody IgM Negative  Negative   EBV Capsid Antibody IgG    Collection Time: 12/19/23  9:53 AM   Result Value Ref Range    EBV Capsid Dianne IgG Instrument Value 10.6 <18.0 U/mL    EBV Capsid Antibody IgG No detectable antibody. No detectable antibody.   EBV Capsid Antibody IgM    Collection Time: 12/19/23  9:53 AM   Result Value Ref Range    EBV Capsid Dianne IgM Instrument Value <10.0 <36.0 U/mL    EBV Capsid Antibody IgM No detectable antibody. No detectable antibody.   Ferritin    Collection Time: 12/19/23  9:53 AM   Result Value Ref Range    Ferritin 826 (H) 31 - 409 ng/mL   Hemoglobin A1c    Collection Time: 12/19/23  9:53 AM   Result Value Ref Range    Hemoglobin A1C 5.2 <5.7 %   INR    Collection Time: 12/19/23  9:53 AM   Result Value Ref Range    INR 1.02 0.85 - 1.15   Iron and iron binding capacity    Collection Time: 12/19/23  9:53 AM   Result Value Ref Range    Iron 104 61 - 157 ug/dL    Iron Binding Capacity 250 240 - 430 ug/dL    Iron Sat Index 42 15 - 46 %   Parathyroid Hormone Intact    Collection Time: 12/19/23  9:53 AM   Result Value Ref Range    Parathyroid Hormone Intact 445 (H) 15 - 65 pg/mL   Vitamin D Deficiency    Collection Time: 12/19/23  9:53 AM   Result Value Ref Range    Vitamin D, Total (25-Hydroxy) 10 (L) 20 - 50 ng/mL   Quantiferon TB Gold Plus Grey Tube    Collection Time: 12/19/23  9:53 AM    Specimen: Arm, Left; Blood   Result Value Ref Range    Quantiferon Nil Tube 0.02 IU/mL   Quantiferon TB Gold Plus Green Tube    Collection Time: 12/19/23  9:53 AM    Specimen: Arm, Left; Blood   Result Value Ref Range    Quantiferon TB1 Tube 0.04 IU/mL   Quantiferon TB Gold Plus Yellow Tube    Collection Time: 12/19/23  9:53 AM    Specimen: Arm, Left; Blood   Result Value Ref Range    Quantiferon TB2 Tube 0.04    Quantiferon TB Gold Plus Purple Tube    Collection Time: 12/19/23  9:53 AM    Specimen: Arm, Left; Blood   Result Value Ref Range    Quantiferon Mitogen 10.00 IU/mL   Adult Type and Screen    Collection Time:  12/19/23  9:53 AM   Result Value Ref Range    ABO/RH(D) O POS     Antibody Screen Negative Negative    SPECIMEN EXPIRATION DATE [id number]    Phosphorus    Collection Time: 12/19/23  9:53 AM   Result Value Ref Range    Phosphorus 5.1 (H) 2.5 - 4.5 mg/dL   Quantiferon TB Gold Plus    Collection Time: 12/19/23  9:53 AM    Specimen: Arm, Left; Blood   Result Value Ref Range    Quantiferon-TB Gold Plus Negative Negative    TB1 Ag minus Nil Value 0.02 IU/mL    TB2 Ag minus Nil Value 0.02 IU/mL    Mitogen minus Nil Result 9.98 IU/mL    Nil Result 0.02 IU/mL   Hepatitis B Surface Antibody    Collection Time: 12/19/23  9:53 AM   Result Value Ref Range    Hepatitis B Surface Antibody Reactive     Hepatitis B Surface Antibody Instrument Value 13.90 <8.5 m[IU]/mL   HLA Kavin Class I, Single Antigen    Collection Time: 12/19/23  9:53 AM   Result Value Ref Range    SA 1 TEST METHOD SA EDTA FCS     SA 1 CELL Class I     SA1 HI RISK KAVIN None     SA1 MOD RISK KAVIN None     SA 1  COMMENTS        HLA PRA Test performed by modified testing procedure that may also include pretreatment of serum. Pretreatment may be the addition of fetal calf serum, EDTA, and/or adsorption.  High-risk, MFI > 3,000.  Mod-risk, -3,000.   HLA Kavin Class II, Single Antigen    Collection Time: 12/19/23  9:53 AM   Result Value Ref Range    SA 2 TEST METHOD SA EDTA FCS     SA 2 CELL Class II     SA2 HI RISK KAVIN None     SA2 MOD RISK KAVIN None     SA 2 COMMENTS        HLA PRA Test performed by modified testing procedure that may also include pretreatment of serum. Pretreatment may be the addition of fetal calf serum, EDTA, and/or adsorption.  High-risk, MFI > 3,000.  Mod-risk, -3,000.   HLA Kavin, CPRA    Collection Time: 12/19/23  9:53 AM   Result Value Ref Range    PROTOCOL CUTOFF Plan A, 500 mfi cumulative     UNOS CPRA 0     UNACCEPTABLE ANTIGENS None    HLA Flow T/B Crossmatch Allo    Collection Time: 12/19/23  9:53 AM   Result Value Ref Range     Donor Allo ANAMIKA WOLFE     Crossmatch Date Allo 12/19/2023     Donor Cell Date Allo 12/18/2023     Cell Type Allo PBL     TestMethodallo FLOW     serum date allo T1 12/19/2023     result allo T1 Neg     Channel Shift Allo T1 -45     treatment allo T1 Pronase     serum date allo B1 12/19/2023     result allo B1 Neg     Channel Shift Allo B1 -50     treatment allo B1 Pronase     serum date allo T2 10/19/2023     result allo T2 Neg     Channel Shift Allo T2 -45     treatment allo T2 Pronase     serum date allo B2 10/19/2023     result allo B2 Neg     Channel Shift Allo B2 -55     treatment allo B2 Pronase     comment [email or web address]       No donor specific antibody by Flow Single Antigen Beads in serum dates tested.    Pos Cut off allo T >53     Pos Cut off allo B >69    HLA Flow T/B Crossmatch Auto    Collection Time: 12/19/23  9:53 AM   Result Value Ref Range    Donor auto DANITZA HOOPER     Crossmatch Date Auto 12/19/2023     Donor Cell Date Auto 12/19/2023     Cell Type auto PBL     TestMethodauto FLOW     serum date auto T1 12/19/2023     result auto T1 Neg     Channel Shift Auto T1 -62     treatment auto T1 Pronase     serum date auto B1 12/19/2023     result auto B1 Neg     Channel Shift Auto B1 -53     treatment auto B1 Pronase     serum date auto T2 10/19/2023     result auto T2 Neg     Channel Shift Auto T2 -61     treatment auto T2 Pronase     serum date auto B2 10/19/2023     result auto B2 Neg     Channel Shift Auto B2 -52     treatment auto B2 Pronase     Pos Cut off auto T >53     Pos Cut off auto B >69    HLA Donor Specific Antibody    Collection Time: 12/19/23  9:53 AM   Result Value Ref Range    Donor Identification 12/28/2023     Organ Right Kidney     DSA Present NO     DSA Comments       Baseline pre transplant sera Flow Single Antigen Beads assays are intended for detection/identification of IgG anti-HLA antibodies. Mfi values may not accurately quantify donor-specific antibody levels in all  instances.    DSA Test Method SA EDTA FCS    EKG 12-lead complete w/read - Clinics    Collection Time: 12/19/23 10:03 AM   Result Value Ref Range    Systolic Blood Pressure  mmHg    Diastolic Blood Pressure  mmHg    Ventricular Rate 61 BPM    Atrial Rate 61 BPM    KS Interval 144 ms    QRS Duration 84 ms     ms    QTc 412 ms    P Axis 33 degrees    R AXIS 29 degrees    T Axis 18 degrees    Interpretation ECG       Sinus rhythm  Normal ECG  When compared with ECG of 29-MAR-2021 12:08,  No significant change was found  Confirmed by MD JACQUES, JO ([id number]) on 12/20/2023 8:50:42 PM     Albumin Random Urine Quantitative with Creat Ratio    Collection Time: 12/19/23 10:07 AM   Result Value Ref Range    Creatinine Urine mg/dL 29.9 mg/dL    Albumin Urine mg/L 665.0 mg/L    Albumin Urine mg/g Cr 2,224.08 (H) 0.00 - 17.00 mg/g Cr   Routine UA with microscopic    Collection Time: 12/19/23 10:07 AM   Result Value Ref Range    Color Urine Straw Colorless, Straw, Light Yellow, Yellow    Appearance Urine Clear Clear    Glucose Urine 70 (A) Negative mg/dL    Bilirubin Urine Negative Negative    Ketones Urine Negative Negative mg/dL    Specific Gravity Urine 1.003 1.003 - 1.035    Blood Urine Trace (A) Negative    pH Urine 7.5 (H) 5.0 - 7.0    Protein Albumin Urine 100 (A) Negative mg/dL    Urobilinogen Urine Normal Normal, 2.0 mg/dL    Nitrite Urine Negative Negative    Leukocyte Esterase Urine Negative Negative    RBC Urine 0 <=2 /HPF    WBC Urine 1 <=5 /HPF    Squamous Epithelials Urine <1 <=1 /HPF   Urine Culture Aerobic Bacterial    Collection Time: 12/19/23 10:07 AM    Specimen: Urine, Midstream   Result Value Ref Range    Culture No Growth    Asymptomatic COVID-19 Virus (Coronavirus) by PCR Nose    Collection Time: 12/27/23  7:47 AM    Specimen: Nose; Swab   Result Value Ref Range    SARS CoV2 PCR Negative Negative   HIV Antigen Antibody Combo Cascade    Collection Time: 12/28/23 10:43 AM   Result Value Ref Range     HIV Antigen Antibody Combo Nonreactive Nonreactive   Hepatitis B core antibody    Collection Time: 12/28/23 10:43 AM   Result Value Ref Range    Hepatitis B Core Antibody Total Nonreactive Nonreactive   Hepatitis B Surface Antibody    Collection Time: 12/28/23 10:43 AM   Result Value Ref Range    Hepatitis B Surface Antibody Reactive     Hepatitis B Surface Antibody Instrument Value 12.30 <8.5 m[IU]/mL   Hepatitis B surface antigen    Collection Time: 12/28/23 10:43 AM   Result Value Ref Range    Hepatitis B Surface Antigen Nonreactive Nonreactive   Hepatitis C antibody    Collection Time: 12/28/23 10:43 AM   Result Value Ref Range    Hepatitis C Antibody Nonreactive Nonreactive   HBV HCV HIV by NAIN    Collection Time: 12/28/23 10:43 AM   Result Value Ref Range    HEPATITIS B BY NAIN Non-reactive     HCV by NAIN Non-reactive     HIV By Nain Non-reactive    Cytomegalovirus DNA by PCR, Quantitative    Collection Time: 12/28/23 10:43 AM    Specimen: Hand, Left; Blood   Result Value Ref Range    CMV DNA IU/mL Not Detected Not Detected IU/mL   Adult Type and Screen    Collection Time: 12/28/23 10:43 AM   Result Value Ref Range    ABO/RH(D) O POS     Antibody Screen Negative Negative    SPECIMEN EXPIRATION DATE [id number]    Potassium    Collection Time: 12/28/23 10:43 AM   Result Value Ref Range    Potassium 3.8 3.4 - 5.3 mmol/L   Prepare red blood cells (unit)    Collection Time: 12/28/23 12:12 PM   Result Value Ref Range    Blood Component Type Red Blood Cells     Product Code R6997I95     Unit Status Released     Unit Number [id number]     CROSSMATCH Compatible     CODING SYSTEM [id number]     UNIT ABO/RH O+     UNIT TYPE ISBT 5100    Prepare red blood cells (unit)    Collection Time: 12/28/23 12:12 PM   Result Value Ref Range    Blood Component Type Red Blood Cells     Product Code [id number]     Unit Status Released     Unit Number [id number]     CROSSMATCH Compatible     CODING SYSTEM [id number]     UNIT ABO/RH O+      UNIT TYPE ISBT 5100    Glucose by meter    Collection Time: 12/28/23 12:41 PM   Result Value Ref Range    GLUCOSE BY METER POCT 91 70 - 99 mg/dL   Venous Panel POCT    Collection Time: 12/28/23  3:54 PM   Result Value Ref Range    pH Venous POCT 7.42 7.32 - 7.43    pCO2 Venous POCT 41 40 - 50 mm Hg    pO2 Venous POCT 44 25 - 47 mm Hg    Bicarbonate Venous POCT 27 21 - 28 mmol/L    Sodium POCT 143 135 - 145 mmol/L    Potassium POCT 3.4 (L) 3.5 - 5.0 mmol/L    Hemoglobin POCT 10.5 (L) 13.3 - 17.7 g/dL    Glucose Whole Blood POCT 93 70 - 99 mg/dL    Base Excess/Deficit (+/-) POCT 2.0 (H) -8.1 - 1.9 mmol/L    Calcium, Ionized Whole Blood POCT 4.5 4.4 - 5.2 mg/dL    Lactic Acid POCT 0.6 <=2.0 mmol/L    FIO2 POCT 75.0 %   Venous Panel POCT    Collection Time: 12/28/23  6:12 PM   Result Value Ref Range    pH Venous POCT 7.39 7.32 - 7.43    pCO2 Venous POCT 42 40 - 50 mm Hg    pO2 Venous POCT 43 25 - 47 mm Hg    Bicarbonate Venous POCT 25 21 - 28 mmol/L    Sodium POCT 139 135 - 145 mmol/L    Potassium POCT 3.9 3.5 - 5.0 mmol/L    Hemoglobin POCT 10.8 (L) 13.3 - 17.7 g/dL    Glucose Whole Blood POCT 112 (H) 70 - 99 mg/dL    Base Excess/Deficit (+/-) POCT 0.2 -8.1 - 1.9 mmol/L    Calcium, Ionized Whole Blood POCT 4.6 4.4 - 5.2 mg/dL    Lactic Acid POCT 1.1 <=2.0 mmol/L    FIO2 POCT 50.0 %   CBC with platelets    Collection Time: 12/28/23  9:00 PM   Result Value Ref Range    WBC Count 10.7 4.0 - 11.0 10e3/uL    RBC Count 3.43 (L) 4.40 - 5.90 10e6/uL    Hemoglobin 11.2 (L) 13.3 - 17.7 g/dL    Hematocrit 32.3 (L) 40.0 - 53.0 %    MCV 94 78 - 100 fL    MCH 32.7 26.5 - 33.0 pg    MCHC 34.7 31.5 - 36.5 g/dL    RDW 13.2 10.0 - 15.0 %    Platelet Count 225 150 - 450 10e3/uL   Basic metabolic panel    Collection Time: 12/28/23  9:00 PM   Result Value Ref Range    Sodium 137 135 - 145 mmol/L    Potassium 3.8 3.4 - 5.3 mmol/L    Chloride 99 98 - 107 mmol/L    Carbon Dioxide (CO2) 23 22 - 29 mmol/L    Anion Gap 15 7 - 15 mmol/L     Urea Nitrogen 26.5 (H) 6.0 - 20.0 mg/dL    Creatinine 6.78 (H) 0.67 - 1.17 mg/dL    GFR Estimate 11 (L) >60 mL/min/1.73m2    Calcium 9.1 8.6 - 10.0 mg/dL    Glucose 139 (H) 70 - 99 mg/dL   Magnesium    Collection Time: 12/28/23  9:00 PM   Result Value Ref Range    Magnesium 2.0 1.7 - 2.3 mg/dL   Phosphorus    Collection Time: 12/28/23  9:00 PM   Result Value Ref Range    Phosphorus 3.1 2.5 - 4.5 mg/dL   Hemoglobin    Collection Time: 12/28/23 11:56 PM   Result Value Ref Range    Hemoglobin 11.2 (L) 13.3 - 17.7 g/dL   Potassium    Collection Time: 12/28/23 11:56 PM   Result Value Ref Range    Potassium 3.5 3.4 - 5.3 mmol/L   Potassium    Collection Time: 12/29/23  2:07 AM   Result Value Ref Range    Potassium 3.5 3.4 - 5.3 mmol/L   Hemoglobin    Collection Time: 12/29/23  2:07 AM   Result Value Ref Range    Hemoglobin 10.9 (L) 13.3 - 17.7 g/dL   Potassium    Collection Time: 12/29/23  5:54 AM   Result Value Ref Range    Potassium 3.6 3.4 - 5.3 mmol/L   Magnesium    Collection Time: 12/29/23  5:54 AM   Result Value Ref Range    Magnesium 1.9 1.7 - 2.3 mg/dL   Phosphorus    Collection Time: 12/29/23  5:54 AM   Result Value Ref Range    Phosphorus 4.5 2.5 - 4.5 mg/dL   Basic metabolic panel    Collection Time: 12/29/23  5:54 AM   Result Value Ref Range    Sodium 137 135 - 145 mmol/L    Potassium 3.6 3.4 - 5.3 mmol/L    Chloride 102 98 - 107 mmol/L    Carbon Dioxide (CO2) 22 22 - 29 mmol/L    Anion Gap 13 7 - 15 mmol/L    Urea Nitrogen 24.5 (H) 6.0 - 20.0 mg/dL    Creatinine 5.16 (H) 0.67 - 1.17 mg/dL    GFR Estimate 15 (L) >60 mL/min/1.73m2    Calcium 8.5 (L) 8.6 - 10.0 mg/dL    Glucose 136 (H) 70 - 99 mg/dL   CBC with platelets and differential    Collection Time: 12/29/23  5:54 AM   Result Value Ref Range    WBC Count 10.1 4.0 - 11.0 10e3/uL    RBC Count 3.22 (L) 4.40 - 5.90 10e6/uL    Hemoglobin 10.5 (L) 13.3 - 17.7 g/dL    Hematocrit 32.0 (L) 40.0 - 53.0 %    MCV 99 78 - 100 fL    MCH 32.6 26.5 - 33.0 pg    MCHC  32.8 31.5 - 36.5 g/dL    RDW 12.9 10.0 - 15.0 %    Platelet Count 185 150 - 450 10e3/uL    % Neutrophils 90 %    % Lymphocytes 7 %    % Monocytes 3 %    % Eosinophils 0 %    % Basophils 0 %    % Immature Granulocytes 0 %    NRBCs per 100 WBC 0 <1 /100    Absolute Neutrophils 9.0 (H) 1.6 - 8.3 10e3/uL    Absolute Lymphocytes 0.7 (L) 0.8 - 5.3 10e3/uL    Absolute Monocytes 0.3 0.0 - 1.3 10e3/uL    Absolute Eosinophils 0.0 0.0 - 0.7 10e3/uL    Absolute Basophils 0.0 0.0 - 0.2 10e3/uL    Absolute Immature Granulocytes 0.0 <=0.4 10e3/uL    Absolute NRBCs 0.0 10e3/uL   Hemoglobin    Collection Time: 12/29/23  9:39 AM   Result Value Ref Range    Hemoglobin 10.4 (L) 13.3 - 17.7 g/dL   Potassium    Collection Time: 12/29/23  9:39 AM   Result Value Ref Range    Potassium 3.5 3.4 - 5.3 mmol/L   Hemoglobin    Collection Time: 12/29/23  1:57 PM   Result Value Ref Range    Hemoglobin 10.7 (L) 13.3 - 17.7 g/dL   Potassium    Collection Time: 12/29/23  1:57 PM   Result Value Ref Range    Potassium 3.6 3.4 - 5.3 mmol/L   Hemoglobin    Collection Time: 12/29/23  6:05 PM   Result Value Ref Range    Hemoglobin 10.6 (L) 13.3 - 17.7 g/dL   Potassium    Collection Time: 12/29/23  6:05 PM   Result Value Ref Range    Potassium 3.6 3.4 - 5.3 mmol/L   Magnesium    Collection Time: 12/30/23  5:48 AM   Result Value Ref Range    Magnesium 2.3 1.7 - 2.3 mg/dL   Phosphorus    Collection Time: 12/30/23  5:48 AM   Result Value Ref Range    Phosphorus 4.0 2.5 - 4.5 mg/dL   Basic metabolic panel    Collection Time: 12/30/23  5:48 AM   Result Value Ref Range    Sodium 141 135 - 145 mmol/L    Potassium 3.8 3.4 - 5.3 mmol/L    Chloride 106 98 - 107 mmol/L    Carbon Dioxide (CO2) 24 22 - 29 mmol/L    Anion Gap 11 7 - 15 mmol/L    Urea Nitrogen 25.1 (H) 6.0 - 20.0 mg/dL    Creatinine 2.67 (H) 0.67 - 1.17 mg/dL    GFR Estimate 33 (L) >60 mL/min/1.73m2    Calcium 9.4 8.6 - 10.0 mg/dL    Glucose 158 (H) 70 - 99 mg/dL   CBC with platelets and differential     Collection Time: 12/30/23  5:48 AM   Result Value Ref Range    WBC Count 9.9 4.0 - 11.0 10e3/uL    RBC Count 2.97 (L) 4.40 - 5.90 10e6/uL    Hemoglobin 9.7 (L) 13.3 - 17.7 g/dL    Hematocrit 28.9 (L) 40.0 - 53.0 %    MCV 97 78 - 100 fL    MCH 32.7 26.5 - 33.0 pg    MCHC 33.6 31.5 - 36.5 g/dL    RDW 12.9 10.0 - 15.0 %    Platelet Count 201 150 - 450 10e3/uL    % Neutrophils 81 %    % Lymphocytes 13 %    % Monocytes 6 %    % Eosinophils 0 %    % Basophils 0 %    % Immature Granulocytes 0 %    NRBCs per 100 WBC 0 <1 /100    Absolute Neutrophils 8.0 1.6 - 8.3 10e3/uL    Absolute Lymphocytes 1.3 0.8 - 5.3 10e3/uL    Absolute Monocytes 0.6 0.0 - 1.3 10e3/uL    Absolute Eosinophils 0.0 0.0 - 0.7 10e3/uL    Absolute Basophils 0.0 0.0 - 0.2 10e3/uL    Absolute Immature Granulocytes 0.0 <=0.4 10e3/uL    Absolute NRBCs 0.0 10e3/uL   Tacrolimus by Tandem Mass Spectrometry    Collection Time: 12/31/23  6:02 AM   Result Value Ref Range    Tacrolimus by Tandem Mass Spectrometry 1.5 (L) 5.0 - 15.0 ug/L    Tacrolimus Last Dose Date      Tacrolimus Last Dose Time     Magnesium    Collection Time: 12/31/23  6:02 AM   Result Value Ref Range    Magnesium 2.1 1.7 - 2.3 mg/dL   Phosphorus    Collection Time: 12/31/23  6:02 AM   Result Value Ref Range    Phosphorus 3.8 2.5 - 4.5 mg/dL   Basic metabolic panel    Collection Time: 12/31/23  6:02 AM   Result Value Ref Range    Sodium 140 135 - 145 mmol/L    Potassium 3.3 (L) 3.4 - 5.3 mmol/L    Chloride 106 98 - 107 mmol/L    Carbon Dioxide (CO2) 22 22 - 29 mmol/L    Anion Gap 12 7 - 15 mmol/L    Urea Nitrogen 40.9 (H) 6.0 - 20.0 mg/dL    Creatinine 3.01 (H) 0.67 - 1.17 mg/dL    GFR Estimate 29 (L) >60 mL/min/1.73m2    Calcium 9.3 8.6 - 10.0 mg/dL    Glucose 94 70 - 99 mg/dL   CBC with platelets and differential    Collection Time: 12/31/23  6:02 AM   Result Value Ref Range    WBC Count 8.9 4.0 - 11.0 10e3/uL    RBC Count 2.85 (L) 4.40 - 5.90 10e6/uL    Hemoglobin 9.3 (L) 13.3 - 17.7 g/dL     Hematocrit 28.4 (L) 40.0 - 53.0 %     78 - 100 fL    MCH 32.6 26.5 - 33.0 pg    MCHC 32.7 31.5 - 36.5 g/dL    RDW 13.0 10.0 - 15.0 %    Platelet Count 192 150 - 450 10e3/uL    % Neutrophils 72 %    % Lymphocytes 20 %    % Monocytes 6 %    % Eosinophils 1 %    % Basophils 0 %    % Immature Granulocytes 1 %    NRBCs per 100 WBC 0 <1 /100    Absolute Neutrophils 6.4 1.6 - 8.3 10e3/uL    Absolute Lymphocytes 1.8 0.8 - 5.3 10e3/uL    Absolute Monocytes 0.6 0.0 - 1.3 10e3/uL    Absolute Eosinophils 0.1 0.0 - 0.7 10e3/uL    Absolute Basophils 0.0 0.0 - 0.2 10e3/uL    Absolute Immature Granulocytes 0.1 <=0.4 10e3/uL    Absolute NRBCs 0.0 10e3/uL   Magnesium    Collection Time: 01/01/24  6:34 AM   Result Value Ref Range    Magnesium 2.0 1.7 - 2.3 mg/dL   Phosphorus    Collection Time: 01/01/24  6:34 AM   Result Value Ref Range    Phosphorus 4.1 2.5 - 4.5 mg/dL   Basic metabolic panel    Collection Time: 01/01/24  6:34 AM   Result Value Ref Range    Sodium 136 135 - 145 mmol/L    Potassium 3.2 (L) 3.4 - 5.3 mmol/L    Chloride 103 98 - 107 mmol/L    Carbon Dioxide (CO2) 18 (L) 22 - 29 mmol/L    Anion Gap 15 7 - 15 mmol/L    Urea Nitrogen 58.7 (H) 6.0 - 20.0 mg/dL    Creatinine 4.98 (H) 0.67 - 1.17 mg/dL    GFR Estimate 16 (L) >60 mL/min/1.73m2    Calcium 9.2 8.6 - 10.0 mg/dL    Glucose 103 (H) 70 - 99 mg/dL   CBC with platelets and differential    Collection Time: 01/01/24  6:34 AM   Result Value Ref Range    WBC Count 8.8 4.0 - 11.0 10e3/uL    RBC Count 2.66 (L) 4.40 - 5.90 10e6/uL    Hemoglobin 8.9 (L) 13.3 - 17.7 g/dL    Hematocrit 26.6 (L) 40.0 - 53.0 %     78 - 100 fL    MCH 33.5 (H) 26.5 - 33.0 pg    MCHC 33.5 31.5 - 36.5 g/dL    RDW 13.1 10.0 - 15.0 %    Platelet Count 185 150 - 450 10e3/uL    % Neutrophils 73 %    % Lymphocytes 18 %    % Monocytes 6 %    % Eosinophils 2 %    % Basophils 0 %    % Immature Granulocytes 1 %    NRBCs per 100 WBC 0 <1 /100    Absolute Neutrophils 6.5 1.6 - 8.3 10e3/uL     Absolute Lymphocytes 1.6 0.8 - 5.3 10e3/uL    Absolute Monocytes 0.6 0.0 - 1.3 10e3/uL    Absolute Eosinophils 0.2 0.0 - 0.7 10e3/uL    Absolute Basophils 0.0 0.0 - 0.2 10e3/uL    Absolute Immature Granulocytes 0.1 <=0.4 10e3/uL    Absolute NRBCs 0.0 10e3/uL   Tacrolimus by Tandem Mass Spectrometry    Collection Time: 01/02/24  5:54 AM   Result Value Ref Range    Tacrolimus by Tandem Mass Spectrometry 2.4 (L) 5.0 - 15.0 ug/L    Tacrolimus Last Dose Date      Tacrolimus Last Dose Time     Magnesium    Collection Time: 01/02/24  5:54 AM   Result Value Ref Range    Magnesium 2.5 (H) 1.7 - 2.3 mg/dL   Phosphorus    Collection Time: 01/02/24  5:54 AM   Result Value Ref Range    Phosphorus 5.0 (H) 2.5 - 4.5 mg/dL   Basic metabolic panel    Collection Time: 01/02/24  5:54 AM   Result Value Ref Range    Sodium 137 135 - 145 mmol/L    Potassium 4.3 3.4 - 5.3 mmol/L    Chloride 104 98 - 107 mmol/L    Carbon Dioxide (CO2) 18 (L) 22 - 29 mmol/L    Anion Gap 15 7 - 15 mmol/L    Urea Nitrogen 79.5 (H) 6.0 - 20.0 mg/dL    Creatinine 6.20 (H) 0.67 - 1.17 mg/dL    GFR Estimate 12 (L) >60 mL/min/1.73m2    Calcium 9.6 8.6 - 10.0 mg/dL    Glucose 131 (H) 70 - 99 mg/dL   CBC with platelets and differential    Collection Time: 01/02/24  5:54 AM   Result Value Ref Range    WBC Count 8.9 4.0 - 11.0 10e3/uL    RBC Count 2.91 (L) 4.40 - 5.90 10e6/uL    Hemoglobin 9.5 (L) 13.3 - 17.7 g/dL    Hematocrit 28.0 (L) 40.0 - 53.0 %    MCV 96 78 - 100 fL    MCH 32.6 26.5 - 33.0 pg    MCHC 33.9 31.5 - 36.5 g/dL    RDW 12.8 10.0 - 15.0 %    Platelet Count 229 150 - 450 10e3/uL    % Neutrophils 86 %    % Lymphocytes 10 %    % Monocytes 3 %    % Eosinophils 0 %    % Basophils 0 %    % Immature Granulocytes 1 %    NRBCs per 100 WBC 0 <1 /100    Absolute Neutrophils 7.8 1.6 - 8.3 10e3/uL    Absolute Lymphocytes 0.9 0.8 - 5.3 10e3/uL    Absolute Monocytes 0.2 0.0 - 1.3 10e3/uL    Absolute Eosinophils 0.0 0.0 - 0.7 10e3/uL    Absolute Basophils 0.0 0.0 - 0.2  10e3/uL    Absolute Immature Granulocytes 0.1 <=0.4 10e3/uL    Absolute NRBCs 0.0 10e3/uL   INR    Collection Time: 01/02/24  8:34 AM   Result Value Ref Range    INR 1.17 (H) 0.85 - 1.15   Magnesium    Collection Time: 01/03/24  6:34 AM   Result Value Ref Range    Magnesium 2.5 (H) 1.7 - 2.3 mg/dL   Phosphorus    Collection Time: 01/03/24  6:34 AM   Result Value Ref Range    Phosphorus 5.1 (H) 2.5 - 4.5 mg/dL   Basic metabolic panel    Collection Time: 01/03/24  6:34 AM   Result Value Ref Range    Sodium 139 135 - 145 mmol/L    Potassium 4.5 3.4 - 5.3 mmol/L    Chloride 108 (H) 98 - 107 mmol/L    Carbon Dioxide (CO2) 14 (L) 22 - 29 mmol/L    Anion Gap 17 (H) 7 - 15 mmol/L    Urea Nitrogen 83.5 (H) 6.0 - 20.0 mg/dL    Creatinine 6.35 (H) 0.67 - 1.17 mg/dL    GFR Estimate 12 (L) >60 mL/min/1.73m2    Calcium 9.5 8.6 - 10.0 mg/dL    Glucose 140 (H) 70 - 99 mg/dL   CBC with platelets and differential    Collection Time: 01/03/24  6:34 AM   Result Value Ref Range    WBC Count 9.3 4.0 - 11.0 10e3/uL    RBC Count 2.77 (L) 4.40 - 5.90 10e6/uL    Hemoglobin 9.2 (L) 13.3 - 17.7 g/dL    Hematocrit 27.6 (L) 40.0 - 53.0 %     78 - 100 fL    MCH 33.2 (H) 26.5 - 33.0 pg    MCHC 33.3 31.5 - 36.5 g/dL    RDW 13.2 10.0 - 15.0 %    Platelet Count 210 150 - 450 10e3/uL    % Neutrophils 95 %    % Lymphocytes 2 %    % Monocytes 2 %    % Eosinophils 0 %    % Basophils 0 %    % Immature Granulocytes 1 %    NRBCs per 100 WBC 0 <1 /100    Absolute Neutrophils 8.9 (H) 1.6 - 8.3 10e3/uL    Absolute Lymphocytes 0.2 (L) 0.8 - 5.3 10e3/uL    Absolute Monocytes 0.1 0.0 - 1.3 10e3/uL    Absolute Eosinophils 0.0 0.0 - 0.7 10e3/uL    Absolute Basophils 0.0 0.0 - 0.2 10e3/uL    Absolute Immature Granulocytes 0.1 <=0.4 10e3/uL    Absolute NRBCs 0.0 10e3/uL     UNOS cPRA   Date Value Ref Range Status   03/29/2021 0  Final     UNOS CPRA   Date Value Ref Range Status   12/19/2023 0  Final

## 2024-01-03 NOTE — PROGRESS NOTES
[facility]   Transplant Nephrology Progress Note  Date of Admission:  12/28/2023  Today's Date: 01/03/2024    Recommendations:  - No acute indications for dialysis  - Would make no changes in immunosuppression at this time.  - Would recommend treating acute cellular-mediated rejection with rATG 2 mg/kg x 3 doses for total 6 mg/kg.  - Recommend giving free water with sodium bicarbonate 150 meq IV at ~ 100 ml/hr.  Would also continue on oral sodium bicarbonate.    Assessment & Plan   # LDKT: Trend up, elevated creatinine.  Good urine output.  No acute indications for dialysis.  Kidney transplant biopsy 1/2 showed acute cellular-mediated rejection on preliminary reading.  Patient was started on rATG 2 mg/kg x 3 doses with first dose last night.   - Baseline Creatinine: ~ TBD   - Proteinuria: Not checked post transplant   - Date DSA Last Checked: Dec/2023      Latest DSA: No DSA at time of transplant   - BK Viremia: Not checked post transplant   - Kidney Tx Biopsy: No   - Transplant Ureteral Stent: Yes    # Immunosuppression: Tacrolimus immediate release (goal 8-10), Mycophenolate mofetil (dose 750 mg every 12 hours), and Prednisone (dose taper)   - Induction with Recent Transplant:  Low Intensity Protocol, but receiving rATG for total 6 mg/kg due to early acute rejection.   - Continue with intensive monitoring of immunosuppression for efficacy and toxicity.   - Changes: Yes - Now receiving rATG for acute rejection.     # Infection Prophylaxis:   - PJP: Sulfa/TMP (Bactrim)  - CMV: Valganciclovir (Valcyte); Patient is CMV IgG Ab discordant (D+/R-) and will continue on Valcyte x 6 months, then check CMV PCR monthly until 12 months post transplant.     # Hypertension: Controlled;  Goal BP: < 150/90   - Volume status: Euvolemic  EDW ~ 125.5 kg   - Changes: Not at this time    # Elevated Blood Glucose: Glucose generally running ~ 130-140s; Secondary to high dose steroids.   -  Management as per primary team.    # Anemia in Chronic Renal Disease: Hgb: Stable      TOM: No   - Iron studies: Replete    # Mineral Bone Disorder:   - Secondary renal hyperparathyroidism; PTH level: Moderately elevated (301-600 pg/ml)        On treatment: None  - Vitamin D; level: Low        On supplement: Yes  - Calcium; level: Normal        On supplement: No  - Phosphorus; level: High        On supplement: No    # Electrolytes:   - Potassium; level: Normal        On supplement: No; Recommend replacing potassium.  - Magnesium; level: High        On supplement: No; Oral magnesium supplement on hold.  - Bicarbonate; level: Low        On supplement: Yes; Would give free water with sodium bicarbonate 150 meq at ~ 100 ml/hr.    # Obesity, Class II (BMI = 39.7): Stable weight.   - Once patient heals and recovers from surgery, would recommend working on weight loss for overall health by increasing exercise and watching caloric intake.    # H/o Ureteral Reimplantation, s/p Mitrofanoff: Patient reports not using for Mitrofanoff in a couple of years and voids normally on his own.  Mitrofanoff is nonfunctional at this time.    # H/o Bilateral Uveitis: No evidence of systemic autoimmune/inflammatory disease at last Rheumatology.     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    Abhilash Raza MD  Transplant Nephrology  Contact information available via [facility] Paging/Directory    Interval History   Mr. Villa's creatinine is 6.35 (01/03 0634); Trend up.  Good urine output.  Other significant labs/tests/vitals: Decreased bicarbonate level.  Otherwise, stable electrolytes.  Stable hemoglobin.  Preliminary kidney transplant biopsy results  "showed acute cellular-rejection.  No new events overnight.  No chest pain or shortness of breath.  No leg swelling.  No nausea and vomiting.  Bowel movements are formed.  No fever, sweats or chills.    Review of Systems   4 point ROS was obtained and negative except as noted in the Interval History.    MEDICATIONS:   acetaminophen  650 mg Oral Once    amLODIPine  10 mg Oral Daily    anti-thymocyte globulin  200 mg Intravenous Central line Once    [Held by provider] aspirin  81 mg Oral Daily    atorvastatin  10 mg Oral Daily    difluprednate  1 drop Both Eyes 4x Daily    diphenhydrAMINE  25-50 mg Oral Once    Or    diphenhydrAMINE  25-50 mg Per NG tube Once    latanoprost  1 drop Both Eyes At Bedtime    lidocaine  2 patch Transdermal Q24H    [Held by provider] magnesium oxide  400 mg Oral Daily with lunch    methocarbamol  750 mg Oral TID    methylPREDNISolone  250 mg Intravenous Once    mycophenolate  750 mg Oral BID IS    pantoprazole  40 mg Oral QAM AC    polyethylene glycol  17 g Oral Daily    senna-docusate  1 tablet Oral BID    sodium bicarbonate  1,300 mg Oral BID    sodium chloride (PF)  10 mL Intracatheter Q8H    [START ON 2024] sulfamethoxazole-trimethoprim  1 tablet Oral Once per day on     tacrolimus  8 mg Oral BID IS    valGANciclovir  450 mg Oral Every Other Day    cholecalciferol  50 mcg Oral Daily      sodium bicarbonate 150 mEq in sterile water (preservative free) 1,000 mL infusion         Physical Exam   Temp  Av  F (36.7  C)  Min: 97.6  F (36.4  C)  Max: 98.6  F (37  C)      Pulse  Av.4  Min: 62  Max: 92 Resp  Av.5  Min: 11  Max: 27  SpO2  Av.2 %  Min: 88 %  Max: 99 %    CVP (mmHg): 11 mmHgBP (!) 145/99 (BP Location: Left arm)   Pulse 77   Temp 98.2  F (36.8  C) (Oral)   Resp 16   Ht 1.803 m (5' 11\")   Wt 129 kg (284 lb 8 oz)   SpO2 98%   BMI 39.68 kg/m     Date 23 0700 - 23 0659   Shift 0961-0661 5167-9595 9162-9582 24 Hour Total "   INTAKE   P.O. 120   120   Shift Total(mL/kg) 120(0.96)   120(0.96)   OUTPUT   Urine 400   400   Shift Total(mL/kg) 400(3.2)   400(3.2)   Weight (kg) 125 125 125 125      Admit Weight: 125 kg (275 lb 9.2 oz)     GENERAL APPEARANCE: alert and no distress  HENT: mouth without ulcers or lesions  RESP: lungs clear to auscultation - no rales, rhonchi or wheezes  CV: regular rhythm, normal rate, no rub, no murmur  EDEMA: no LE edema bilaterally  ABDOMEN: soft, nondistended, nontender, bowel sounds normal, obese  MS: extremities normal - no gross deformities noted, no evidence of inflammation in joints, no muscle tenderness  SKIN: no rash  TX KIDNEY: mild TTP, wound vac in place  DIALYSIS ACCESS:  Right IJ tunneled catheter    Data   All labs reviewed by me.  CMP  Recent Labs   Lab 01/03/24  0634 01/02/24  0554 01/01/24  0634 12/31/23  0602    137 136 140   POTASSIUM 4.5 4.3 3.2* 3.3*   CHLORIDE 108* 104 103 106   CO2 14* 18* 18* 22   ANIONGAP 17* 15 15 12   * 131* 103* 94   BUN 83.5* 79.5* 58.7* 40.9*   CR 6.35* 6.20* 4.98* 3.01*   GFRESTIMATED 12* 12* 16* 29*   NABIL 9.5 9.6 9.2 9.3   MAG 2.5* 2.5* 2.0 2.1   PHOS 5.1* 5.0* 4.1 3.8     CBC  Recent Labs   Lab 01/03/24  0634 01/02/24  0554 01/01/24  0634 12/31/23  0602   HGB 9.2* 9.5* 8.9* 9.3*   WBC 9.3 8.9 8.8 8.9   RBC 2.77* 2.91* 2.66* 2.85*   HCT 27.6* 28.0* 26.6* 28.4*    96 100 100   MCH 33.2* 32.6 33.5* 32.6   MCHC 33.3 33.9 33.5 32.7   RDW 13.2 12.8 13.1 13.0    229 185 192     INR  Recent Labs   Lab 01/02/24  0834   INR 1.17*     ABG  Recent Labs   Lab 12/28/23  1812 12/28/23  1554   O2PER 50.0 75.0      Urine Studies  Recent Labs   Lab Test 12/19/23  1007 03/29/21  1404 12/03/20  1525 10/19/20  1645 05/13/19  0728   COLOR Straw Straw Yellow Yellow Straw   APPEARANCE Clear Clear Clear Clear Clear   URINEGLC 70* 50* 100* 50 mg/dL* 50*   URINEBILI Negative Negative Negative Negative Negative   URINEKETONE Negative Negative Negative Negative  Negative   SG 1.003 1.008 1.025 1.005 1.008   UBLD Trace* Negative Small* Small* Negative   URINEPH 7.5* 6.0 7.0 6.0 7.0   PROTEIN 100* >499* >300* >=500 mg/dL* >499*   UROBILINOGEN  --   --  0.2 <2.0 E.U./dL  --    NITRITE Negative Negative Negative Negative Negative   LEUKEST Negative Negative Negative Negative Negative   RBCU 0 0  --  0-2 <1   WBCU 1 <1  --  0-5 <1     Recent Labs   Lab Test 02/26/20  1530 11/25/19  0904 05/13/19  0728 12/04/18  1056 06/08/18  1310 02/10/17  1004 08/26/16  1150 02/19/16  1000   UTPG 4.31* 4.92* 4.31* 4.59* 6.69* 2.78* 2.45* 2.17*     PTH  Recent Labs   Lab Test 12/19/23  0953 07/16/21  1650 12/16/20  1611 12/16/20  0000 10/19/20  1614 02/26/20  1520 11/25/19  0859 05/13/19  0712 12/04/18  1054 06/08/18  1256 01/16/18  1102 08/09/17  1626 02/10/17  1002 08/26/16  1245 02/19/16  0955   PTHI 445* 308* 94* 94 131* 201* 154* 110* 177* 96* 133* 66 39 47 42     Iron Studies  Recent Labs   Lab Test 12/19/23  0953 02/26/20  1520 05/13/19  0712 06/08/18  1256 01/16/18  1102 08/09/17  1626 02/10/17  1002   IRON 104 66 71 87 116 93 54    251 269 257 290 284 297   IRONSAT 42 26 26 34 40 33 18   DAVID 826* 133 109 82 86 100 113       IMAGING:  All imaging studies reviewed by me.

## 2024-01-03 NOTE — PROGRESS NOTES
Transplant Surgery  Inpatient Daily Progress Note  01/03/2024    Assessment & Plan: Boogie Villa is a 24yo with history of ESRD secondary to posterior ureteral valves s/p Mitrofanoff, HTN, hearing loss, ADD, and anxiety. S/p living donor kidney transplant with ureteral stent and venous reconstruction on 12/28/23.    Graft function: POD #6   Kidney: SCr edenilson 2.67. Uptrending to 6.35 today.   -Good UOP, ~2L over last 24 hours. Did make urine PTA.  - Post-op US: patent vessels. Repeat US 12/31, normal, patent. Repeat US 1/2 with normal transplant, Improved velocities at RA anastamosis, RI 1.  -ASA 81 mg daily for reconstruction.     ACR per preliminary path:  -concern for rejection in setting of low tacrolimus level, on Low induction protocol.   - kidney biopsy (1/2)-prelim results with 1B  -DSA 1/2 pending.     Treatment for ACR:  -Solumedrol IV 1/1 while awaiting biopsy, then 500mg on 1/2 with first dose Thymo, 250mg today.  -Thymo 200mg 1/2, 1/3, plan for 6mg/kg       Immunosuppressed status secondary to medications: PRA 0  Induction:  Basiliximab: 12/28 & 1/1/24  Steroids:  Five week taper per protocol.  MMF: 750mg BID  Tacro: 8 mg BID. Goal level 8-10. Level every other day.    Neuro:  Acute post-op pain:   -Switch to dilaudid 2-4mg q4H PRN with improved pain  -Acetaminophen PRN  -Lidoderm patches  -Robaxin TID scheduled    Hematology:   Anemia of chronic disease: Hgb 9-10, stable.    Cardiorespiratory:   HTN: PTA on Norvasc 5 mg daily and Atenolol 37.5 mg daily. -160s,  continue Norvasc 10mg daily.   -Continue CDB/IS    GI/Nutrition:   Diet: Regular  Bowel regimen: Senna, PEG  Heartburn: on Na bicarb-effective PTA, start PPI    Endocrine:   Mild Steroid induced hyperglycemia: no insulin requirement.    Fluid/Electrolytes:   Hypomagnesemia ppx: Mag ox 400 mg daily. -HOLD, level >2  Hypokalemia: Replete after supplementation  Low bicarb:  sodium bicarb 1300mg BID-start bicarb gtt    :   Hx of   Ureteral Reimplantation, s/p Mitrofanoff: Patient reports not using for Mitrofanoff in a couple of years and voids normally on his own.  Mitrofanoff is nonfunctional at this time   -Silveira removed POD 3. Voiding without retention.    Infectious disease: Afebrile. WBC WNL    Prophylaxis: DVT-mechanical, fall, GI, viral (Valcyte), pneumocystis (TMP/sulfa)    Disposition: 7A     SUNIL/Fellow/Resident Provider: Rachael Herron NP      Faculty: John Castillo MD    _________________________________________________________________    Interval History: History from patient     Overnight events: Thymo reaction overnight, had some rigors/body aches. Improved with infusion rate reduction.  tTolerating regular diet. Ambulating. Reporting voiding without difficulties. No nausea. No CP, SOB.  Continued heartburn.    ROS:   A 10-point review of systems was negative except as noted above.    Meds:   amLODIPine  10 mg Oral Daily    anti-thymocyte globulin  200 mg Intravenous Central line Once    [Held by provider] aspirin  81 mg Oral Daily    atorvastatin  10 mg Oral Daily    difluprednate  1 drop Both Eyes 4x Daily    latanoprost  1 drop Both Eyes At Bedtime    lidocaine  2 patch Transdermal Q24H    [Held by provider] magnesium oxide  400 mg Oral Daily with lunch    methocarbamol  750 mg Oral TID    mycophenolate  750 mg Oral BID IS    pantoprazole  40 mg Oral QAM AC    polyethylene glycol  17 g Oral Daily    senna-docusate  1 tablet Oral BID    sodium bicarbonate  1,300 mg Oral BID    sodium chloride (PF)  10 mL Intracatheter Q8H    [START ON 1/4/2024] sulfamethoxazole-trimethoprim  1 tablet Oral Once per day on Tuesday Thursday Saturday    tacrolimus  8 mg Oral BID IS    valGANciclovir  450 mg Oral Every Other Day    cholecalciferol  50 mcg Oral Daily       Physical Exam:     Admit Weight: 125 kg (275 lb 9.2 oz)    Current vitals:   BP (!) 148/96 (BP Location: Left arm)   Pulse 77   Temp 97.8  F (36.6  C) (Oral)   Resp 16  "  Ht 1.803 m (5' 11\")   Wt 129 kg (284 lb 8 oz)   SpO2 94%   BMI 39.68 kg/m      Vital sign ranges:    Temp:  [97.8  F (36.6  C)-98.8  F (37.1  C)] 97.8  F (36.6  C)  Pulse:  [68-99] 77  Resp:  [16-18] 16  BP: (136-166)/() 148/96  SpO2:  [92 %-100 %] 94 %    General Appearance: in no apparent distress.   Skin: Warm, perfused  Heart: RRR  Lungs: Unlabored, RA  Abdomen: The abdomen is soft, NTTP, incision covered w/  VAC   : voiding   Extremities: edema: None    Neurologic: alert and oriented x4. Tremor absent.  Access: L internal jugular CVC, right tunneled line    Data:   CMP  Recent Labs   Lab 01/03/24  0634 01/02/24  0554 12/28/23  2100 12/28/23  1812 12/28/23  1554    137   < > 139 143   POTASSIUM 4.5 4.3   < > 3.9 3.4*   CHLORIDE 108* 104   < >  --   --    CO2 14* 18*   < >  --   --    * 131*   < > 112* 93   BUN 83.5* 79.5*   < >  --   --    CR 6.35* 6.20*   < >  --   --    GFRESTIMATED 12* 12*   < >  --   --    NABIL 9.5 9.6   < >  --   --    ICAW  --   --   --  4.6 4.5   MAG 2.5* 2.5*   < >  --   --    PHOS 5.1* 5.0*   < >  --   --     < > = values in this interval not displayed.     CBC  Recent Labs   Lab 01/03/24  0634 01/02/24  0554   HGB 9.2* 9.5*   WBC 9.3 8.9    229      "

## 2024-01-04 LAB
ANION GAP SERPL CALCULATED.3IONS-SCNC: 17 MMOL/L (ref 7–15)
BASOPHILS # BLD AUTO: 0 10E3/UL (ref 0–0.2)
BASOPHILS NFR BLD AUTO: 0 %
BK VIRUS IGG ANTIBODY: ABNORMAL TITER
BUN SERPL-MCNC: 85.9 MG/DL (ref 6–20)
CALCIUM SERPL-MCNC: 9.1 MG/DL (ref 8.6–10)
CHLORIDE SERPL-SCNC: 107 MMOL/L (ref 98–107)
CREAT SERPL-MCNC: 5.01 MG/DL (ref 0.67–1.17)
DEPRECATED HCO3 PLAS-SCNC: 19 MMOL/L (ref 22–29)
DONOR IDENTIFICATION: NORMAL
DSA COMMENTS: NORMAL
DSA PRESENT: NO
DSA TEST METHOD: NORMAL
EGFRCR SERPLBLD CKD-EPI 2021: 16 ML/MIN/1.73M2
EOSINOPHIL # BLD AUTO: 0 10E3/UL (ref 0–0.7)
EOSINOPHIL NFR BLD AUTO: 0 %
ERYTHROCYTE [DISTWIDTH] IN BLOOD BY AUTOMATED COUNT: 13.2 % (ref 10–15)
GLUCOSE BLDC GLUCOMTR-MCNC: 181 MG/DL (ref 70–99)
GLUCOSE BLDC GLUCOMTR-MCNC: 199 MG/DL (ref 70–99)
GLUCOSE SERPL-MCNC: 181 MG/DL (ref 70–99)
HCT VFR BLD AUTO: 26.3 % (ref 40–53)
HGB BLD-MCNC: 9.3 G/DL (ref 13.3–17.7)
IMM GRANULOCYTES # BLD: 0.1 10E3/UL
IMM GRANULOCYTES NFR BLD: 2 %
LYMPHOCYTES # BLD AUTO: 0.3 10E3/UL (ref 0.8–5.3)
LYMPHOCYTES NFR BLD AUTO: 7 %
MAGNESIUM SERPL-MCNC: 2.5 MG/DL (ref 1.7–2.3)
MCH RBC QN AUTO: 33.7 PG (ref 26.5–33)
MCHC RBC AUTO-ENTMCNC: 35.4 G/DL (ref 31.5–36.5)
MCV RBC AUTO: 95 FL (ref 78–100)
MONOCYTES # BLD AUTO: 0.2 10E3/UL (ref 0–1.3)
MONOCYTES NFR BLD AUTO: 5 %
NEUTROPHILS # BLD AUTO: 4.3 10E3/UL (ref 1.6–8.3)
NEUTROPHILS NFR BLD AUTO: 86 %
NRBC # BLD AUTO: 0 10E3/UL
NRBC BLD AUTO-RTO: 0 /100
ORGAN: NORMAL
PATH REPORT.COMMENTS IMP SPEC: NORMAL
PATH REPORT.FINAL DX SPEC: NORMAL
PATH REPORT.GROSS SPEC: NORMAL
PATH REPORT.MICROSCOPIC SPEC OTHER STN: NORMAL
PATH REPORT.RELEVANT HX SPEC: NORMAL
PHOSPHATE SERPL-MCNC: 4.2 MG/DL (ref 2.5–4.5)
PHOTO IMAGE: NORMAL
PLATELET # BLD AUTO: 214 10E3/UL (ref 150–450)
POTASSIUM SERPL-SCNC: 3.5 MMOL/L (ref 3.4–5.3)
RBC # BLD AUTO: 2.76 10E6/UL (ref 4.4–5.9)
SA 1 CELL: NORMAL
SA 1 TEST METHOD: NORMAL
SA 2 CELL: NORMAL
SA 2 TEST METHOD: NORMAL
SA1 HI RISK ABY: NORMAL
SA1 MOD RISK ABY: NORMAL
SA2 HI RISK ABY: NORMAL
SA2 MOD RISK ABY: NORMAL
SODIUM SERPL-SCNC: 143 MMOL/L (ref 135–145)
TACROLIMUS BLD-MCNC: 4.4 UG/L (ref 5–15)
TME LAST DOSE: ABNORMAL H
TME LAST DOSE: ABNORMAL H
UNACCEPTABLE ANTIGENS: NORMAL
UNOS CPRA: 0
WBC # BLD AUTO: 4.9 10E3/UL (ref 4–11)
ZZZSA 1  COMMENTS: NORMAL
ZZZSA 2 COMMENTS: NORMAL

## 2024-01-04 PROCEDURE — 250N000013 HC RX MED GY IP 250 OP 250 PS 637: Performed by: NURSE PRACTITIONER

## 2024-01-04 PROCEDURE — 250N000011 HC RX IP 250 OP 636

## 2024-01-04 PROCEDURE — 250N000012 HC RX MED GY IP 250 OP 636 PS 637: Performed by: NURSE PRACTITIONER

## 2024-01-04 PROCEDURE — 120N000011 HC R&B TRANSPLANT UMMC

## 2024-01-04 PROCEDURE — 250N000013 HC RX MED GY IP 250 OP 250 PS 637: Performed by: SURGERY

## 2024-01-04 PROCEDURE — 250N000011 HC RX IP 250 OP 636: Mod: JZ | Performed by: PHYSICIAN ASSISTANT

## 2024-01-04 PROCEDURE — 36592 COLLECT BLOOD FROM PICC: CPT | Performed by: SURGERY

## 2024-01-04 PROCEDURE — 80197 ASSAY OF TACROLIMUS: CPT | Performed by: SURGERY

## 2024-01-04 PROCEDURE — 258N000003 HC RX IP 258 OP 636: Performed by: PHYSICIAN ASSISTANT

## 2024-01-04 PROCEDURE — 250N000013 HC RX MED GY IP 250 OP 250 PS 637: Performed by: PHYSICIAN ASSISTANT

## 2024-01-04 PROCEDURE — 83735 ASSAY OF MAGNESIUM: CPT | Performed by: SURGERY

## 2024-01-04 PROCEDURE — 250N000012 HC RX MED GY IP 250 OP 636 PS 637: Performed by: SURGERY

## 2024-01-04 PROCEDURE — 84100 ASSAY OF PHOSPHORUS: CPT | Performed by: SURGERY

## 2024-01-04 PROCEDURE — 85025 COMPLETE CBC W/AUTO DIFF WBC: CPT | Performed by: SURGERY

## 2024-01-04 PROCEDURE — 99233 SBSQ HOSP IP/OBS HIGH 50: CPT | Performed by: INTERNAL MEDICINE

## 2024-01-04 PROCEDURE — 80048 BASIC METABOLIC PNL TOTAL CA: CPT | Performed by: SURGERY

## 2024-01-04 PROCEDURE — 250N000009 HC RX 250: Performed by: SURGERY

## 2024-01-04 RX ORDER — POTASSIUM CHLORIDE 750 MG/1
40 TABLET, EXTENDED RELEASE ORAL ONCE
Status: COMPLETED | OUTPATIENT
Start: 2024-01-04 | End: 2024-01-04

## 2024-01-04 RX ORDER — FLUCONAZOLE 100 MG/1
100 TABLET ORAL DAILY
Status: DISCONTINUED | OUTPATIENT
Start: 2024-01-04 | End: 2024-01-05 | Stop reason: HOSPADM

## 2024-01-04 RX ORDER — ACETAMINOPHEN 325 MG/1
650 TABLET ORAL ONCE
Qty: 2 TABLET | Refills: 0 | Status: COMPLETED | OUTPATIENT
Start: 2024-01-04 | End: 2024-01-04

## 2024-01-04 RX ORDER — METHYLPREDNISOLONE SODIUM SUCCINATE 125 MG/2ML
100 INJECTION, POWDER, LYOPHILIZED, FOR SOLUTION INTRAMUSCULAR; INTRAVENOUS ONCE
Qty: 2 ML | Refills: 0 | Status: COMPLETED | OUTPATIENT
Start: 2024-01-04 | End: 2024-01-04

## 2024-01-04 RX ORDER — CARVEDILOL 6.25 MG/1
6.25 TABLET ORAL 2 TIMES DAILY
Status: DISCONTINUED | OUTPATIENT
Start: 2024-01-04 | End: 2024-01-05 | Stop reason: HOSPADM

## 2024-01-04 RX ORDER — DIPHENHYDRAMINE HCL 12.5MG/5ML
25-50 LIQUID (ML) ORAL ONCE
Qty: 20 ML | Refills: 0 | Status: COMPLETED | OUTPATIENT
Start: 2024-01-04 | End: 2024-01-04

## 2024-01-04 RX ORDER — DIPHENHYDRAMINE HCL 25 MG
25-50 CAPSULE ORAL ONCE
Qty: 2 CAPSULE | Refills: 0 | Status: COMPLETED | OUTPATIENT
Start: 2024-01-04 | End: 2024-01-04

## 2024-01-04 RX ADMIN — LABETALOL HYDROCHLORIDE 20 MG: 5 INJECTION, SOLUTION INTRAVENOUS at 05:51

## 2024-01-04 RX ADMIN — ATORVASTATIN CALCIUM 10 MG: 10 TABLET, FILM COATED ORAL at 07:35

## 2024-01-04 RX ADMIN — CARVEDILOL 6.25 MG: 6.25 TABLET, FILM COATED ORAL at 10:52

## 2024-01-04 RX ADMIN — METHYLPREDNISOLONE SODIUM SUCCINATE 100 MG: 125 INJECTION, POWDER, FOR SOLUTION INTRAMUSCULAR; INTRAVENOUS at 13:28

## 2024-01-04 RX ADMIN — TACROLIMUS 8 MG: 5 CAPSULE ORAL at 07:35

## 2024-01-04 RX ADMIN — AMLODIPINE BESYLATE 10 MG: 10 TABLET ORAL at 06:23

## 2024-01-04 RX ADMIN — PANTOPRAZOLE SODIUM 40 MG: 40 TABLET, DELAYED RELEASE ORAL at 07:35

## 2024-01-04 RX ADMIN — SODIUM BICARBONATE 650 MG TABLET 1300 MG: at 07:35

## 2024-01-04 RX ADMIN — DIFLUPREDNATE OPHTHALMIC 1 DROP: 0.5 EMULSION OPHTHALMIC at 20:06

## 2024-01-04 RX ADMIN — POTASSIUM CHLORIDE 40 MEQ: 750 TABLET, EXTENDED RELEASE ORAL at 10:52

## 2024-01-04 RX ADMIN — DIFLUPREDNATE OPHTHALMIC 1 DROP: 0.5 EMULSION OPHTHALMIC at 11:52

## 2024-01-04 RX ADMIN — DIPHENHYDRAMINE HYDROCHLORIDE 50 MG: 25 CAPSULE ORAL at 13:28

## 2024-01-04 RX ADMIN — LATANOPROST 1 DROP: 50 SOLUTION OPHTHALMIC at 22:24

## 2024-01-04 RX ADMIN — METHOCARBAMOL 750 MG: 750 TABLET ORAL at 07:35

## 2024-01-04 RX ADMIN — DIFLUPREDNATE OPHTHALMIC 1 DROP: 0.5 EMULSION OPHTHALMIC at 07:34

## 2024-01-04 RX ADMIN — METHOCARBAMOL 750 MG: 750 TABLET ORAL at 20:06

## 2024-01-04 RX ADMIN — METHOCARBAMOL 750 MG: 750 TABLET ORAL at 13:59

## 2024-01-04 RX ADMIN — CARVEDILOL 6.25 MG: 6.25 TABLET, FILM COATED ORAL at 20:06

## 2024-01-04 RX ADMIN — SODIUM BICARBONATE: 84 INJECTION, SOLUTION INTRAVENOUS at 06:34

## 2024-01-04 RX ADMIN — FLUCONAZOLE 100 MG: 100 TABLET ORAL at 11:52

## 2024-01-04 RX ADMIN — MYCOPHENOLATE MOFETIL 750 MG: 250 CAPSULE ORAL at 07:35

## 2024-01-04 RX ADMIN — ANTI-THYMOCYTE GLOBULIN (RABBIT) 200 MG: 5 INJECTION, POWDER, LYOPHILIZED, FOR SOLUTION INTRAVENOUS at 14:03

## 2024-01-04 RX ADMIN — ASPIRIN 81 MG: 81 TABLET, COATED ORAL at 07:35

## 2024-01-04 RX ADMIN — TACROLIMUS 8 MG: 5 CAPSULE ORAL at 17:35

## 2024-01-04 RX ADMIN — DIFLUPREDNATE OPHTHALMIC 1 DROP: 0.5 EMULSION OPHTHALMIC at 16:25

## 2024-01-04 RX ADMIN — SODIUM BICARBONATE 650 MG TABLET 1300 MG: at 20:06

## 2024-01-04 RX ADMIN — ACETAMINOPHEN 650 MG: 325 TABLET, FILM COATED ORAL at 13:28

## 2024-01-04 RX ADMIN — Medication 50 MCG: at 07:35

## 2024-01-04 RX ADMIN — MYCOPHENOLATE MOFETIL 750 MG: 250 CAPSULE ORAL at 17:34

## 2024-01-04 RX ADMIN — LIDOCAINE 2 PATCH: 4 PATCH TOPICAL at 07:36

## 2024-01-04 RX ADMIN — SULFAMETHOXAZOLE AND TRIMETHOPRIM 1 TABLET: 400; 80 TABLET ORAL at 07:35

## 2024-01-04 ASSESSMENT — ACTIVITIES OF DAILY LIVING (ADL)
ADLS_ACUITY_SCORE: 22

## 2024-01-04 NOTE — PLAN OF CARE
"Goal Outcome Evaluation:  BP (!) 153/99 (BP Location: Left arm)   Pulse 89   Temp 97.9  F (36.6  C) (Oral)   Resp 16   Ht 1.803 m (5' 11\")   Wt 129 kg (284 lb 8 oz)   SpO2 97%   BMI 39.68 kg/m      Shift: 5940-3759  VS: HTN, all other vitals stable, afebrile  Neuro: Alert and oriented x4   BG: None   Labs: Awaiting AM labs   Pain/Nausea: Denies pain or nausea   PRN: Labetalol x2  Diet: Regular   IV Access: PIV x1, R chest dialysis line, R AV fistula, triple lumen internal jugular   Infusion(s): Sodium bicarb at 100, pt received 2nd dose of thymo   GI/: Voiding adequately, LBM 1/3   Skin: Abdominal incision with wound vac   Mobility: Up ab brittany in room   Plan: Continue with POC and notify team with any changes   "

## 2024-01-04 NOTE — PROGRESS NOTES
Transplant Surgery  Inpatient Daily Progress Note  01/04/2024    Assessment & Plan: Boogie Villa is a 24yo with history of ESRD secondary to posterior ureteral valves s/p Mitrofanoff, HTN, hearing loss, ADD, and anxiety. S/p living donor kidney transplant with ureteral stent and venous reconstruction on 12/28/23 with Dr. Martinez.    Graft function: POD #7   Kidney: SCr edenilson 2.67. Cr peaked at 6.35, biopsy concerning for [place] 1B rejection: g1 ptc3 C4d0. Medium eplet mismatch. DSA should be back tomorrow, which will determine whether or not to pursue plasmapheresis. Currently planning for thymo as below. Cr improved to 5 today.   -Good UOP. Did make urine PTA.  - Post-op US: patent vessels. Repeat US 12/31, normal, patent. Repeat US 1/2 with normal transplant, Improved velocities at RA anastamosis, RI 1.  -ASA 81 mg daily for thrombosis  prophylaxis in light of reconstruction.     ACR per preliminary path:  -concern for rejection in setting of low tacrolimus level and Low induction protocol.   - kidney biopsy (1/2)-prelim results with 1B  -DSA 1/2 pending (should be back 1/5)    Treatment for ACR:  -Solumedrol IV 1/1 while awaiting biopsy, then 500mg on 1/2 with first dose Thymo, 250mg 1/3, 100 mg 1/4  -Thymo 200mg 1/2, 1/3, and 1/4. Will plan for an additional 150 mg to complete 6mg/kg based on actual body weight       Immunosuppressed status secondary to medications: PRA 0  Induction:  Basiliximab: 12/28 & 1/1/24  Steroids:  Five week taper per protocol.  MMF: 750mg BID  Tacro: 8 mg BID. Goal level 8-10. Level every other day. Will add fluconazole for tac booster given consistently low levels.    Neuro:  Acute post-op pain:   -Switch to dilaudid 2-4mg q4H PRN with improved pain  -Acetaminophen PRN  -Lidoderm patches  -Robaxin TID scheduled    Hematology:   Anemia of chronic disease: Hgb 9-10, stable.    Cardiorespiratory:   HTN: PTA on Norvasc 5 mg daily and Atenolol 37.5 mg daily. -160s,   continue Norvasc 10mg daily. Add coreg 6.25 mg BID  -Continue CDB/IS    GI/Nutrition:   Diet: Regular  Bowel regimen: Senna, PEG  Heartburn: on Na bicarb-effective PTA, start PPI    Endocrine:   Mild Steroid induced hyperglycemia: no insulin requirement.    Fluid/Electrolytes:   Hypomagnesemia ppx: Mag ox 400 mg daily. -HOLD, level >2  Hypokalemia: Replete after supplementation  Low bicarb:  sodium bicarb 1300mg BID    :   Hx of  Ureteral Reimplantation, s/p Mitrofanoff: Patient reports not using for Mitrofanoff in a couple of years and voids normally on his own.  Mitrofanoff is nonfunctional at this time   -Silveira removed POD 3. Voiding without retention.    Infectious disease: Afebrile. WBC WNL    Prophylaxis: DVT-mechanical, fall, GI, viral (Valcyte), pneumocystis (TMP/sulfa)    Disposition: 7A     SUNIL/Fellow/Resident Provider: Maya Lucas PA-C      Faculty: John Castillo MD    _________________________________________________________________    Interval History: History from patient     Overnight events: Tolerated second dose of thymo. Tolerating regular diet. Ambulating. Reporting voiding without difficulties. No nausea. No CP, SOB.  Continued heartburn.    ROS:   A 10-point review of systems was negative except as noted above.    Meds:   acetaminophen  650 mg Oral Once    amLODIPine  10 mg Oral Daily    anti-thymocyte globulin  200 mg Intravenous Central line Once    aspirin  81 mg Oral Daily    atorvastatin  10 mg Oral Daily    carvedilol  6.25 mg Oral BID    difluprednate  1 drop Both Eyes 4x Daily    diphenhydrAMINE  25-50 mg Oral Once    Or    diphenhydrAMINE  25-50 mg Per NG tube Once    fluconazole  100 mg Oral Daily    latanoprost  1 drop Both Eyes At Bedtime    lidocaine  2 patch Transdermal Q24H    [Held by provider] magnesium oxide  400 mg Oral Daily with lunch    methocarbamol  750 mg Oral TID    methylPREDNISolone  100 mg Intravenous Once    mycophenolate  750 mg Oral BID IS     "pantoprazole  40 mg Oral QAM AC    polyethylene glycol  17 g Oral Daily    senna-docusate  1 tablet Oral BID    sodium bicarbonate  1,300 mg Oral BID    sodium chloride (PF)  10 mL Intracatheter Q8H    sulfamethoxazole-trimethoprim  1 tablet Oral Once per day on Tuesday Thursday Saturday    tacrolimus  8 mg Oral BID IS    valGANciclovir  450 mg Oral Every Other Day    cholecalciferol  50 mcg Oral Daily       Physical Exam:     Admit Weight: 125 kg (275 lb 9.2 oz)    Current vitals:   BP (!) 147/90 (BP Location: Left arm)   Pulse 86   Temp 97.8  F (36.6  C) (Oral)   Resp 16   Ht 1.803 m (5' 11\")   Wt 129 kg (284 lb 8 oz)   SpO2 100%   BMI 39.68 kg/m      Vital sign ranges:    Temp:  [97.8  F (36.6  C)-98.2  F (36.8  C)] 97.8  F (36.6  C)  Pulse:  [76-91] 86  Resp:  [16-18] 16  BP: (147-166)/() 147/90  SpO2:  [92 %-100 %] 100 %    General Appearance: in no apparent distress.   Skin: Warm, perfused  Heart: RRR  Lungs: Unlabored, RA  Abdomen: The abdomen is soft, NTTP, incision covered w/  VAC   : voiding   Extremities: edema: None    Neurologic: alert and oriented x4. Tremor absent.  Access: L internal jugular CVC, right tunneled line    Data:   CMP  Recent Labs   Lab 01/04/24  0556 01/03/24  0634 12/28/23  2100 12/28/23  1812 12/28/23  1554    139   < > 139 143   POTASSIUM 3.5 4.5   < > 3.9 3.4*   CHLORIDE 107 108*   < >  --   --    CO2 19* 14*   < >  --   --    * 140*   < > 112* 93   BUN 85.9* 83.5*   < >  --   --    CR 5.01* 6.35*   < >  --   --    GFRESTIMATED 16* 12*   < >  --   --    NABIL 9.1 9.5   < >  --   --    ICAW  --   --   --  4.6 4.5   MAG 2.5* 2.5*   < >  --   --    PHOS 4.2 5.1*   < >  --   --     < > = values in this interval not displayed.     CBC  Recent Labs   Lab 01/04/24  0556 01/03/24  0634   HGB 9.3* 9.2*   WBC 4.9 9.3    210      "

## 2024-01-05 VITALS
HEIGHT: 71 IN | WEIGHT: 284.5 LBS | RESPIRATION RATE: 18 BRPM | TEMPERATURE: 97.5 F | OXYGEN SATURATION: 96 % | DIASTOLIC BLOOD PRESSURE: 92 MMHG | HEART RATE: 83 BPM | SYSTOLIC BLOOD PRESSURE: 150 MMHG | BODY MASS INDEX: 39.83 KG/M2

## 2024-01-05 DIAGNOSIS — Z94.0 KIDNEY TRANSPLANTED: Primary | ICD-10-CM

## 2024-01-05 PROBLEM — T86.11: Status: ACTIVE | Noted: 2024-01-05

## 2024-01-05 LAB
ANION GAP SERPL CALCULATED.3IONS-SCNC: 11 MMOL/L (ref 7–15)
BASOPHILS # BLD AUTO: 0 10E3/UL (ref 0–0.2)
BASOPHILS NFR BLD AUTO: 0 %
BUN SERPL-MCNC: 74 MG/DL (ref 6–20)
CALCIUM SERPL-MCNC: 8.3 MG/DL (ref 8.6–10)
CHLORIDE SERPL-SCNC: 110 MMOL/L (ref 98–107)
CREAT SERPL-MCNC: 3.65 MG/DL (ref 0.67–1.17)
DEPRECATED HCO3 PLAS-SCNC: 21 MMOL/L (ref 22–29)
EGFRCR SERPLBLD CKD-EPI 2021: 23 ML/MIN/1.73M2
EOSINOPHIL # BLD AUTO: 0 10E3/UL (ref 0–0.7)
EOSINOPHIL NFR BLD AUTO: 0 %
ERYTHROCYTE [DISTWIDTH] IN BLOOD BY AUTOMATED COUNT: 13.3 % (ref 10–15)
GLUCOSE SERPL-MCNC: 143 MG/DL (ref 70–99)
HCT VFR BLD AUTO: 25.6 % (ref 40–53)
HGB BLD-MCNC: 8.6 G/DL (ref 13.3–17.7)
IMM GRANULOCYTES # BLD: 0.1 10E3/UL
IMM GRANULOCYTES NFR BLD: 3 %
LYMPHOCYTES # BLD AUTO: 0.3 10E3/UL (ref 0.8–5.3)
LYMPHOCYTES NFR BLD AUTO: 10 %
MAGNESIUM SERPL-MCNC: 2.3 MG/DL (ref 1.7–2.3)
MCH RBC QN AUTO: 32.2 PG (ref 26.5–33)
MCHC RBC AUTO-ENTMCNC: 33.6 G/DL (ref 31.5–36.5)
MCV RBC AUTO: 96 FL (ref 78–100)
MONOCYTES # BLD AUTO: 0.2 10E3/UL (ref 0–1.3)
MONOCYTES NFR BLD AUTO: 5 %
NEUTROPHILS # BLD AUTO: 2.7 10E3/UL (ref 1.6–8.3)
NEUTROPHILS NFR BLD AUTO: 82 %
NRBC # BLD AUTO: 0 10E3/UL
NRBC BLD AUTO-RTO: 0 /100
PHOSPHATE SERPL-MCNC: 3.4 MG/DL (ref 2.5–4.5)
PLATELET # BLD AUTO: 196 10E3/UL (ref 150–450)
POTASSIUM SERPL-SCNC: 3.9 MMOL/L (ref 3.4–5.3)
RBC # BLD AUTO: 2.67 10E6/UL (ref 4.4–5.9)
SODIUM SERPL-SCNC: 142 MMOL/L (ref 135–145)
WBC # BLD AUTO: 3.3 10E3/UL (ref 4–11)

## 2024-01-05 PROCEDURE — 250N000011 HC RX IP 250 OP 636: Performed by: PHYSICIAN ASSISTANT

## 2024-01-05 PROCEDURE — 250N000013 HC RX MED GY IP 250 OP 250 PS 637: Performed by: PHYSICIAN ASSISTANT

## 2024-01-05 PROCEDURE — 250N000013 HC RX MED GY IP 250 OP 250 PS 637: Performed by: NURSE PRACTITIONER

## 2024-01-05 PROCEDURE — 250N000012 HC RX MED GY IP 250 OP 636 PS 637: Performed by: SURGERY

## 2024-01-05 PROCEDURE — 250N000013 HC RX MED GY IP 250 OP 250 PS 637: Performed by: SURGERY

## 2024-01-05 PROCEDURE — 258N000003 HC RX IP 258 OP 636: Performed by: PHYSICIAN ASSISTANT

## 2024-01-05 PROCEDURE — 85025 COMPLETE CBC W/AUTO DIFF WBC: CPT | Performed by: SURGERY

## 2024-01-05 PROCEDURE — 80048 BASIC METABOLIC PNL TOTAL CA: CPT | Performed by: SURGERY

## 2024-01-05 PROCEDURE — 84100 ASSAY OF PHOSPHORUS: CPT | Performed by: SURGERY

## 2024-01-05 PROCEDURE — 99233 SBSQ HOSP IP/OBS HIGH 50: CPT | Performed by: INTERNAL MEDICINE

## 2024-01-05 PROCEDURE — 250N000012 HC RX MED GY IP 250 OP 636 PS 637: Performed by: NURSE PRACTITIONER

## 2024-01-05 PROCEDURE — 36592 COLLECT BLOOD FROM PICC: CPT | Performed by: SURGERY

## 2024-01-05 PROCEDURE — 83735 ASSAY OF MAGNESIUM: CPT | Performed by: SURGERY

## 2024-01-05 RX ORDER — ASPIRIN 81 MG/1
81 TABLET ORAL DAILY
Qty: 30 TABLET | Refills: 11 | Status: ON HOLD | OUTPATIENT
Start: 2024-01-06 | End: 2024-03-01

## 2024-01-05 RX ORDER — PREDNISONE 5 MG/1
5 TABLET ORAL DAILY
Status: DISCONTINUED | OUTPATIENT
Start: 2024-01-11 | End: 2024-01-05 | Stop reason: HOSPADM

## 2024-01-05 RX ORDER — PANTOPRAZOLE SODIUM 40 MG/1
40 TABLET, DELAYED RELEASE ORAL
Qty: 30 TABLET | Refills: 0 | Status: SHIPPED | OUTPATIENT
Start: 2024-01-06 | End: 2024-02-07

## 2024-01-05 RX ORDER — ACETAMINOPHEN 325 MG/1
325-650 TABLET ORAL EVERY 4 HOURS PRN
Start: 2024-01-05

## 2024-01-05 RX ORDER — MYCOPHENOLATE MOFETIL 250 MG/1
750 CAPSULE ORAL 2 TIMES DAILY
Qty: 180 CAPSULE | Refills: 11 | Status: SHIPPED | OUTPATIENT
Start: 2024-01-05 | End: 2024-05-17

## 2024-01-05 RX ORDER — VALGANCICLOVIR 450 MG/1
450 TABLET, FILM COATED ORAL DAILY
Qty: 60 TABLET | Refills: 5 | Status: SHIPPED | OUTPATIENT
Start: 2024-01-06 | End: 2024-02-24

## 2024-01-05 RX ORDER — ATORVASTATIN CALCIUM 10 MG/1
10 TABLET, FILM COATED ORAL DAILY
Qty: 30 TABLET | Refills: 11 | Status: SHIPPED | OUTPATIENT
Start: 2024-01-06 | End: 2024-02-07

## 2024-01-05 RX ORDER — AMLODIPINE BESYLATE 10 MG/1
10 TABLET ORAL DAILY
Qty: 30 TABLET | Refills: 11 | Status: SHIPPED | OUTPATIENT
Start: 2024-01-05 | End: 2024-06-04

## 2024-01-05 RX ORDER — ACETAMINOPHEN 325 MG/1
650 TABLET ORAL ONCE
Qty: 2 TABLET | Refills: 0 | Status: COMPLETED | OUTPATIENT
Start: 2024-01-05 | End: 2024-01-05

## 2024-01-05 RX ORDER — AMOXICILLIN 250 MG
1 CAPSULE ORAL 2 TIMES DAILY PRN
Qty: 60 TABLET | Refills: 0 | Status: SHIPPED | OUTPATIENT
Start: 2024-01-05 | End: 2024-01-17

## 2024-01-05 RX ORDER — SULFAMETHOXAZOLE AND TRIMETHOPRIM 400; 80 MG/1; MG/1
1 TABLET ORAL DAILY
Qty: 30 TABLET | Refills: 11 | Status: ON HOLD | OUTPATIENT
Start: 2024-01-06 | End: 2024-03-01

## 2024-01-05 RX ORDER — TACROLIMUS 5 MG/1
10 CAPSULE ORAL
Status: DISCONTINUED | OUTPATIENT
Start: 2024-01-05 | End: 2024-01-05

## 2024-01-05 RX ORDER — VALGANCICLOVIR 450 MG/1
450 TABLET, FILM COATED ORAL DAILY
Status: DISCONTINUED | OUTPATIENT
Start: 2024-01-05 | End: 2024-01-05 | Stop reason: HOSPADM

## 2024-01-05 RX ORDER — PREDNISONE 10 MG/1
TABLET ORAL
Qty: 21 TABLET | Refills: 0 | Status: SHIPPED | OUTPATIENT
Start: 2024-01-06 | End: 2024-01-05

## 2024-01-05 RX ORDER — METHYLPREDNISOLONE SODIUM SUCCINATE 125 MG/2ML
80 INJECTION, POWDER, LYOPHILIZED, FOR SOLUTION INTRAMUSCULAR; INTRAVENOUS ONCE
Qty: 2 ML | Refills: 0 | Status: COMPLETED | OUTPATIENT
Start: 2024-01-05 | End: 2024-01-05

## 2024-01-05 RX ORDER — TACROLIMUS 5 MG/1
5 CAPSULE ORAL 2 TIMES DAILY
Qty: 60 CAPSULE | Refills: 11 | Status: SHIPPED | OUTPATIENT
Start: 2024-01-05 | End: 2024-01-07

## 2024-01-05 RX ORDER — LIDOCAINE 4 G/G
1-2 PATCH TOPICAL EVERY 24 HOURS
Qty: 10 PATCH | Refills: 0 | Status: SHIPPED | OUTPATIENT
Start: 2024-01-05 | End: 2024-01-17

## 2024-01-05 RX ORDER — PREDNISONE 20 MG/1
40 TABLET ORAL DAILY
Status: DISCONTINUED | OUTPATIENT
Start: 2024-01-07 | End: 2024-01-05 | Stop reason: HOSPADM

## 2024-01-05 RX ORDER — PREDNISONE 20 MG/1
20 TABLET ORAL DAILY
Status: DISCONTINUED | OUTPATIENT
Start: 2024-01-09 | End: 2024-01-05 | Stop reason: HOSPADM

## 2024-01-05 RX ORDER — METHOCARBAMOL 750 MG/1
750 TABLET, FILM COATED ORAL 3 TIMES DAILY PRN
Qty: 20 TABLET | Refills: 0 | Status: SHIPPED | OUTPATIENT
Start: 2024-01-05 | End: 2024-02-07

## 2024-01-05 RX ORDER — SODIUM BICARBONATE 650 MG/1
1300 TABLET ORAL 2 TIMES DAILY
Qty: 120 TABLET | Refills: 1 | Status: SHIPPED | OUTPATIENT
Start: 2024-01-05 | End: 2024-01-06

## 2024-01-05 RX ORDER — CARVEDILOL 6.25 MG/1
6.25 TABLET ORAL 2 TIMES DAILY
Qty: 60 TABLET | Refills: 11 | Status: SHIPPED | OUTPATIENT
Start: 2024-01-05 | End: 2024-01-17

## 2024-01-05 RX ORDER — PREDNISONE 20 MG/1
TABLET ORAL
Qty: 17 TABLET | Refills: 0 | Status: SHIPPED | OUTPATIENT
Start: 2024-01-06 | End: 2024-01-14

## 2024-01-05 RX ORDER — VITAMIN B COMPLEX
50 TABLET ORAL DAILY
Qty: 60 TABLET | Refills: 11 | Status: ON HOLD | OUTPATIENT
Start: 2024-01-06 | End: 2024-03-01

## 2024-01-05 RX ORDER — TACROLIMUS 1 MG/1
CAPSULE ORAL
Qty: 180 CAPSULE | Refills: 11 | Status: SHIPPED | OUTPATIENT
Start: 2024-01-05 | End: 2024-01-07

## 2024-01-05 RX ORDER — DIPHENHYDRAMINE HCL 25 MG
25-50 CAPSULE ORAL ONCE
Qty: 2 CAPSULE | Refills: 0 | Status: COMPLETED | OUTPATIENT
Start: 2024-01-05 | End: 2024-01-05

## 2024-01-05 RX ORDER — SULFAMETHOXAZOLE AND TRIMETHOPRIM 400; 80 MG/1; MG/1
1 TABLET ORAL DAILY
Status: DISCONTINUED | OUTPATIENT
Start: 2024-01-05 | End: 2024-01-05 | Stop reason: HOSPADM

## 2024-01-05 RX ORDER — FLUCONAZOLE 100 MG/1
100 TABLET ORAL DAILY
Qty: 30 TABLET | Refills: 11 | Status: SHIPPED | OUTPATIENT
Start: 2024-01-06 | End: 2024-02-04

## 2024-01-05 RX ORDER — DIPHENHYDRAMINE HCL 12.5MG/5ML
25-50 LIQUID (ML) ORAL ONCE
Qty: 20 ML | Refills: 0 | Status: COMPLETED | OUTPATIENT
Start: 2024-01-05 | End: 2024-01-05

## 2024-01-05 RX ORDER — PREDNISONE 20 MG/1
40 TABLET ORAL 2 TIMES DAILY
Status: DISCONTINUED | OUTPATIENT
Start: 2024-01-06 | End: 2024-01-05 | Stop reason: HOSPADM

## 2024-01-05 RX ADMIN — PANTOPRAZOLE SODIUM 40 MG: 40 TABLET, DELAYED RELEASE ORAL at 08:32

## 2024-01-05 RX ADMIN — CARVEDILOL 6.25 MG: 6.25 TABLET, FILM COATED ORAL at 08:32

## 2024-01-05 RX ADMIN — ATORVASTATIN CALCIUM 10 MG: 10 TABLET, FILM COATED ORAL at 08:32

## 2024-01-05 RX ADMIN — METHOCARBAMOL 750 MG: 750 TABLET ORAL at 08:32

## 2024-01-05 RX ADMIN — DIPHENHYDRAMINE HYDROCHLORIDE 50 MG: 25 CAPSULE ORAL at 10:02

## 2024-01-05 RX ADMIN — DIFLUPREDNATE OPHTHALMIC 1 DROP: 0.5 EMULSION OPHTHALMIC at 08:38

## 2024-01-05 RX ADMIN — VALGANCICLOVIR HYDROCHLORIDE 450 MG: 450 TABLET ORAL at 08:31

## 2024-01-05 RX ADMIN — TACROLIMUS 8 MG: 5 CAPSULE ORAL at 08:29

## 2024-01-05 RX ADMIN — SODIUM BICARBONATE 650 MG TABLET 1300 MG: at 08:32

## 2024-01-05 RX ADMIN — SULFAMETHOXAZOLE AND TRIMETHOPRIM 1 TABLET: 400; 80 TABLET ORAL at 08:30

## 2024-01-05 RX ADMIN — METHYLPREDNISOLONE SODIUM SUCCINATE 81.25 MG: 125 INJECTION, POWDER, FOR SOLUTION INTRAMUSCULAR; INTRAVENOUS at 10:04

## 2024-01-05 RX ADMIN — ASPIRIN 81 MG: 81 TABLET, COATED ORAL at 08:32

## 2024-01-05 RX ADMIN — DIFLUPREDNATE OPHTHALMIC 1 DROP: 0.5 EMULSION OPHTHALMIC at 13:12

## 2024-01-05 RX ADMIN — FLUCONAZOLE 100 MG: 100 TABLET ORAL at 08:29

## 2024-01-05 RX ADMIN — MYCOPHENOLATE MOFETIL 750 MG: 250 CAPSULE ORAL at 08:28

## 2024-01-05 RX ADMIN — ANTI-THYMOCYTE GLOBULIN (RABBIT) 150 MG: 5 INJECTION, POWDER, LYOPHILIZED, FOR SOLUTION INTRAVENOUS at 10:42

## 2024-01-05 RX ADMIN — METHOCARBAMOL 750 MG: 750 TABLET ORAL at 14:06

## 2024-01-05 RX ADMIN — ACETAMINOPHEN 650 MG: 325 TABLET, FILM COATED ORAL at 10:02

## 2024-01-05 RX ADMIN — AMLODIPINE BESYLATE 10 MG: 10 TABLET ORAL at 08:32

## 2024-01-05 RX ADMIN — Medication 50 MCG: at 08:32

## 2024-01-05 ASSESSMENT — ACTIVITIES OF DAILY LIVING (ADL)
ADLS_ACUITY_SCORE: 22

## 2024-01-05 NOTE — PROGRESS NOTES
CLINICAL NUTRITION SERVICES - DISCHARGE NOTE    Patient s discharge needs assessed and discharge planning has been conducted with the multidisciplinary transplant care team including physicians, pharmacy, social work and transplant coordinator.    Follow up/Monitoring:  Once discharged, place outpatient nutrition consult via the transplant team if nutrition concerns arise.    Pauly Tobias MS/RD/LD/CNSC  Available on [employer]   7A/7B (beds [id number]) RD pager: [phone]  Weekend/Holiday RD pager: [phone]

## 2024-01-05 NOTE — PROGRESS NOTES
Patient's Name: Boogie Villa    Procedure Date: 01/05/24  Procedure Time n    Procedure Performed: Central line removal     The Central Venous Catheter (CVC) was removed per provider order.     The central venous catheter was located: Left Internal Jugular vein, with a total of 3 lumens.     The patient was placed in Trendelenburg position with Insertion site lower than heart.     Valsalva response achieved by: Patient instructed to take a deep breath and bear down while the CVC was removed with a constant steady motion.     Firm pressure was applied at the access site for 15 minutes until bleeding stopped.     Post removal site assessment; Clean and dry without bleeding. Occlusive dressing applied: Yes    CVC tip was inspected and intact: Yes    Tip was sent to lab for culture per provider order: No    Patient tolerated the procedure: well    2nd RN present during removal (if applicable) Kesha VITALE, MAKAYLA   1/5/2024   5:00 PM

## 2024-01-05 NOTE — PROGRESS NOTES
Care Management Discharge Note    Discharge Date: 01/05/2024       Discharge Disposition: Home    Discharge Services: None    Discharge DME: None    Discharge Transportation: family or friend will provide    Private pay costs discussed: Not applicable    Does the patient's insurance plan have a 3 day qualifying hospital stay waiver?  No    PAS Confirmation Code:  N/A  Patient/family educated on Medicare website which has current facility and service quality ratings: no    Education Provided on the Discharge Plan: Yes  Persons Notified of Discharge Plans: patient, parents  Patient/Family in Agreement with the Plan: yes    Handoff Referral Completed: Yes    Additional Information:  Per care team anticipate discharge today.  ATC appointments requested.  Pt previously declined home care.    Zunilda Pedersen, RN BSN, PHN, ACM-RN  7A RN Care Coordinator  Phone: [phone]  Pager [phone]    To contact the weekend RNCC  [place] (0800 - 1630) Saturday and Sunday    Units: 5A,5B,5C [phone]    Units: 6A, [phone]    Units 6B, 6C, 6D [phone]    Units: 7A, 7B, 7C, 7D, [phone]    [facility] ([phone]) Saturday and Sunday  Units: 5 Ortho, 5MS & WB ED Pager: [phone]  Units: 6MS, 8A & 10 ICU  Pager [phone]    1/5/2024 9:23 AM

## 2024-01-05 NOTE — PROGRESS NOTES
[facility]   Transplant Nephrology Progress Note  Date of Admission:  12/28/2023  Today's Date: 01/05/2024    Recommendations:  - No acute indications for dialysis  - Would make no changes in immunosuppression at this time.  - Would finish out treatment of acute cellular-mediated rejection with rATG ~ 1.6 mg/kg (capped at 200 mg) x 4 doses for total ~ 6 mg/kg.  - Recommend prednisone taper 40 mg bid x 3 days, then 40 mg daily x 3 days, then 20 mg daily x 3 days and stop.    Assessment & Plan   # LDKT: Trend down, elevated creatinine.  Good urine output.  No acute indications for dialysis.  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  Patient is completing rATG ~ 1.6 mg/kg (capped at 200 mg) x 4 doses for total ~ 6 mg/kg with last dose today.   - Baseline Creatinine: ~ TBD   - Proteinuria: Not checked post transplant   - Date DSA Last Checked: Jan/2024      Latest DSA: No cPRA: 0%   - BK Viremia: Not checked post transplant   - Kidney Tx Biopsy: Jan 02, 2024; Result: Acute cellular-mediated rejection, Banff 1B.   Mild glomerulitis and severe peritubular capillaritis, but negative C4d and no DSA.  Mild arterial sclerosis.   - Transplant Ureteral Stent: Yes    # Immunosuppression: Tacrolimus immediate release (goal 8-10), Mycophenolate mofetil (dose 750 mg every 12 hours), and Prednisone (dose taper)   - Induction with Recent Transplant:  Low Intensity Protocol, but receiving rATG for total ~ 6 mg/kg due to early acute rejection.   - Continue with intensive monitoring of immunosuppression for efficacy and toxicity.   - Changes: Not at this time; Completing last dose of rATG today.  Recommend prednisone taper and then stop prednisone.     # Infection Prophylaxis:   - PJP: Sulfa/TMP (Bactrim)  - CMV: Valganciclovir (Valcyte); Patient is CMV IgG Ab discordant (D+/R-) and will continue on Valcyte x 6 months, then check CMV PCR monthly until  12 months post transplant.     # Hypertension: Controlled;  Goal BP: < 150/90   - Volume status: Euvolemic  EDW ~ 125.5 kg   - Changes: Not at this time    # Elevated Blood Glucose: Glucose generally running ~ 140-190s; Secondary to high dose steroids.   - Management as per primary team.    # Anemia in Chronic Renal Disease: Hgb: Stable, low      TOM: No   - Iron studies: Replete    # Mineral Bone Disorder:   - Secondary renal hyperparathyroidism; PTH level: Moderately elevated (301-600 pg/ml)        On treatment: None  - Vitamin D; level: Low        On supplement: Yes  - Calcium; level: Low        On supplement: No  - Phosphorus; level: Normal        On supplement: No    # Electrolytes:   - Potassium; level: Normal        On supplement: No  - Magnesium; level: Normal        On supplement: No  - Bicarbonate; level: Stable low        On supplement: Yes    # Obesity, Class II (BMI = 39.7): Stable weight.   - Once patient heals and recovers from surgery, would recommend working on weight loss for overall health by increasing exercise and watching caloric intake.    # H/o Ureteral Reimplantation, s/p Mitrofanoff: Patient reports not using for Mitrofanoff in a couple of years and voids normally on his own.  Mitrofanoff is nonfunctional at this time.    # H/o Bilateral Uveitis: No evidence of systemic autoimmune/inflammatory disease at last Rheumatology.     # EBV IgG Ab Discordance (D+/R-): Will do surveillance EBV PCR qmonth until 12 months post transplant, then q3 months until 2 years post transplant.    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    Abhilash Raza MD  Transplant Nephrology  Contact information available via [facility]  Paging/Directory    Interval History   Mr. Villa's creatinine is 3.65 (621); Trend down.  Good urine output.  Other significant labs/tests/vitals: Stable electrolytes.  Stable hemoglobin.  No new events overnight.  No chest pain or shortness of breath.  No leg swelling.  No nausea and vomiting.  Bowel movements are formed.  No fever, sweats or chills.    Review of Systems   4 point ROS was obtained and negative except as noted in the Interval History.    MEDICATIONS:   acetaminophen  650 mg Oral Once    amLODIPine  10 mg Oral Daily    anti-thymocyte globulin  150 mg Intravenous Central line Once    aspirin  81 mg Oral Daily    atorvastatin  10 mg Oral Daily    carvedilol  6.25 mg Oral BID    difluprednate  1 drop Both Eyes 4x Daily    diphenhydrAMINE  25-50 mg Oral Once    Or    diphenhydrAMINE  25-50 mg Per NG tube Once    fluconazole  100 mg Oral Daily    latanoprost  1 drop Both Eyes At Bedtime    lidocaine  2 patch Transdermal Q24H    [Held by provider] magnesium oxide  400 mg Oral Daily with lunch    methocarbamol  750 mg Oral TID    methylPREDNISolone  81.25 mg Intravenous Once    mycophenolate  750 mg Oral BID IS    pantoprazole  40 mg Oral QAM AC    polyethylene glycol  17 g Oral Daily    [START ON 2024] predniSONE  40 mg Oral BID    Followed by    [START ON 2024] predniSONE  40 mg Oral Daily    Followed by    [START ON 2024] predniSONE  20 mg Oral Daily    Followed by    [START ON 2024] predniSONE  5 mg Oral Daily    senna-docusate  1 tablet Oral BID    sodium bicarbonate  1,300 mg Oral BID    sodium chloride (PF)  10 mL Intracatheter Q8H    sulfamethoxazole-trimethoprim  1 tablet Oral Daily    tacrolimus  8 mg Oral BID IS    valGANciclovir  450 mg Oral Daily    cholecalciferol  50 mcg Oral Daily       Physical Exam   Temp  Av  F (36.7  C)  Min: 97.6  F (36.4  C)  Max: 98.6  F (37  C)      Pulse  Av.4  Min: 62  Max: 92 Resp  Av.5  Min: 11  Max: 27  SpO2  Av.2 %   "Min: 88 %  Max: 99 %    CVP (mmHg): 11 mmHgBP (!) 152/99 (BP Location: Left arm)   Pulse 79   Temp 97.8  F (36.6  C) (Oral)   Resp 18   Ht 1.803 m (5' 11\")   Wt 129 kg (284 lb 8 oz)   SpO2 92%   BMI 39.68 kg/m     Date 12/30/23 0700 - 12/31/23 0659   Shift 8541-2245 4693-9963 9349-2438 24 Hour Total   INTAKE   P.O. 120   120   Shift Total(mL/kg) 120(0.96)   120(0.96)   OUTPUT   Urine 400   400   Shift Total(mL/kg) 400(3.2)   400(3.2)   Weight (kg) 125 125 125 125      Admit Weight: 125 kg (275 lb 9.2 oz)     GENERAL APPEARANCE: alert and no distress  HENT: mouth without ulcers or lesions  RESP: lungs clear to auscultation - no rales, rhonchi or wheezes  CV: regular rhythm, normal rate, no rub, no murmur  EDEMA: no LE edema bilaterally  ABDOMEN: soft, nondistended, nontender, bowel sounds normal, obese  MS: extremities normal - no gross deformities noted, no evidence of inflammation in joints, no muscle tenderness  SKIN: no rash  TX KIDNEY: mild TTP, wound vac in place  DIALYSIS ACCESS:  Right IJ tunneled catheter    Data   All labs reviewed by me.  CMP  Recent Labs   Lab 01/05/24  0621 01/04/24  2111 01/04/24  1723 01/04/24  0556 01/03/24  0634 01/02/24  0554     --   --  143 139 137   POTASSIUM 3.9  --   --  3.5 4.5 4.3   CHLORIDE 110*  --   --  107 108* 104   CO2 21*  --   --  19* 14* 18*   ANIONGAP 11  --   --  17* 17* 15   * 199* 181* 181* 140* 131*   BUN 74.0*  --   --  85.9* 83.5* 79.5*   CR 3.65*  --   --  5.01* 6.35* 6.20*   GFRESTIMATED 23*  --   --  16* 12* 12*   NABIL 8.3*  --   --  9.1 9.5 9.6   MAG 2.3  --   --  2.5* 2.5* 2.5*   PHOS 3.4  --   --  4.2 5.1* 5.0*     CBC  Recent Labs   Lab 01/05/24  0621 01/04/24  0556 01/03/24  0634 01/02/24  0554   HGB 8.6* 9.3* 9.2* 9.5*   WBC 3.3* 4.9 9.3 8.9   RBC 2.67* 2.76* 2.77* 2.91*   HCT 25.6* 26.3* 27.6* 28.0*   MCV 96 95 100 96   MCH 32.2 33.7* 33.2* 32.6   MCHC 33.6 35.4 33.3 33.9   RDW 13.3 13.2 13.2 12.8    214 210 229 "     INR  Recent Labs   Lab 01/02/24  0834   INR 1.17*     ABG  No lab results found in last 7 days.     Urine Studies  Recent Labs   Lab Test 12/19/23  1007 03/29/21  1404 12/03/20  1525 10/19/20  1645 05/13/19  0728   COLOR Straw Straw Yellow Yellow Straw   APPEARANCE Clear Clear Clear Clear Clear   URINEGLC 70* 50* 100* 50 mg/dL* 50*   URINEBILI Negative Negative Negative Negative Negative   URINEKETONE Negative Negative Negative Negative Negative   SG 1.003 1.008 1.025 1.005 1.008   UBLD Trace* Negative Small* Small* Negative   URINEPH 7.5* 6.0 7.0 6.0 7.0   PROTEIN 100* >499* >300* >=500 mg/dL* >499*   UROBILINOGEN  --   --  0.2 <2.0 E.U./dL  --    NITRITE Negative Negative Negative Negative Negative   LEUKEST Negative Negative Negative Negative Negative   RBCU 0 0  --  0-2 <1   WBCU 1 <1  --  0-5 <1     Recent Labs   Lab Test 02/26/20  1530 11/25/19  0904 05/13/19  0728 12/04/18  1056 06/08/18  1310 02/10/17  1004 08/26/16  1150 02/19/16  1000   UTPG 4.31* 4.92* 4.31* 4.59* 6.69* 2.78* 2.45* 2.17*     PTH  Recent Labs   Lab Test 12/19/23  0953 07/16/21  1650 12/16/20  1611 12/16/20  0000 10/19/20  1614 02/26/20  1520 11/25/19  0859 05/13/19  0712 12/04/18  1054 06/08/18  1256 01/16/18  1102 08/09/17  1626 02/10/17  1002 08/26/16  1245 02/19/16  0955   PTHI 445* 308* 94* 94 131* 201* 154* 110* 177* 96* 133* 66 39 47 42     Iron Studies  Recent Labs   Lab Test 12/19/23  0953 02/26/20  1520 05/13/19  0712 06/08/18  1256 01/16/18  1102 08/09/17  1626 02/10/17  1002   IRON 104 66 71 87 116 93 54    251 269 257 290 284 297   IRONSAT 42 26 26 34 40 33 18   DAVID 826* 133 109 82 86 100 113       IMAGING:  All imaging studies reviewed by me.

## 2024-01-05 NOTE — PLAN OF CARE
"BP (!) 152/99 (BP Location: Left arm)   Pulse 79   Temp 97.8  F (36.6  C) (Oral)   Resp 18   Ht 1.803 m (5' 11\")   Wt 129 kg (284 lb 8 oz)   SpO2 92%   BMI 39.68 kg/m      Shift: 1135-4171  VS: hypertensive on RA, afebrile  Neuro: AOx4  BG: none  Labs: pending am collection results   Respiratory: WNL  Pain/Nausea/PRN: denies nausea/pain  Diet: Reg  LDA: SL PIV; internal jugular SL   GI/: voiding not saving; LBM 1/4  Skin: ABD incision with wound vac   Mobility: independent  Plan: Continue POC     Handoff given to following RN.                          "

## 2024-01-06 ENCOUNTER — INFUSION THERAPY VISIT (OUTPATIENT)
Dept: INFUSION THERAPY | Facility: CLINIC | Age: 24
End: 2024-01-06
Attending: NURSE PRACTITIONER
Payer: COMMERCIAL

## 2024-01-06 VITALS
RESPIRATION RATE: 18 BRPM | WEIGHT: 288.1 LBS | TEMPERATURE: 97.8 F | SYSTOLIC BLOOD PRESSURE: 143 MMHG | DIASTOLIC BLOOD PRESSURE: 91 MMHG | BODY MASS INDEX: 40.18 KG/M2 | HEART RATE: 85 BPM | OXYGEN SATURATION: 98 %

## 2024-01-06 DIAGNOSIS — Z94.0 KIDNEY REPLACED BY TRANSPLANT: Primary | ICD-10-CM

## 2024-01-06 LAB
ANION GAP SERPL CALCULATED.3IONS-SCNC: 10 MMOL/L (ref 7–15)
BASOPHILS # BLD AUTO: ABNORMAL 10*3/UL
BASOPHILS # BLD MANUAL: 0 10E3/UL (ref 0–0.2)
BASOPHILS NFR BLD AUTO: ABNORMAL %
BASOPHILS NFR BLD MANUAL: 0 %
BUN SERPL-MCNC: 64.4 MG/DL (ref 6–20)
CALCIUM SERPL-MCNC: 8.6 MG/DL (ref 8.6–10)
CHLORIDE SERPL-SCNC: 111 MMOL/L (ref 98–107)
CREAT SERPL-MCNC: 2.82 MG/DL (ref 0.67–1.17)
DEPRECATED HCO3 PLAS-SCNC: 19 MMOL/L (ref 22–29)
EGFRCR SERPLBLD CKD-EPI 2021: 31 ML/MIN/1.73M2
EOSINOPHIL # BLD AUTO: ABNORMAL 10*3/UL
EOSINOPHIL # BLD MANUAL: 0 10E3/UL (ref 0–0.7)
EOSINOPHIL NFR BLD AUTO: ABNORMAL %
EOSINOPHIL NFR BLD MANUAL: 0 %
ERYTHROCYTE [DISTWIDTH] IN BLOOD BY AUTOMATED COUNT: 13.2 % (ref 10–15)
GLUCOSE SERPL-MCNC: 168 MG/DL (ref 70–99)
HCT VFR BLD AUTO: 28.3 % (ref 40–53)
HGB BLD-MCNC: 9.9 G/DL (ref 13.3–17.7)
IMM GRANULOCYTES # BLD: ABNORMAL 10*3/UL
IMM GRANULOCYTES NFR BLD: ABNORMAL %
LYMPHOCYTES # BLD AUTO: ABNORMAL 10*3/UL
LYMPHOCYTES # BLD MANUAL: 0.3 10E3/UL (ref 0.8–5.3)
LYMPHOCYTES NFR BLD AUTO: ABNORMAL %
LYMPHOCYTES NFR BLD MANUAL: 8 %
MAGNESIUM SERPL-MCNC: 2.3 MG/DL (ref 1.7–2.3)
MCH RBC QN AUTO: 32.7 PG (ref 26.5–33)
MCHC RBC AUTO-ENTMCNC: 35 G/DL (ref 31.5–36.5)
MCV RBC AUTO: 93 FL (ref 78–100)
MONOCYTES # BLD AUTO: ABNORMAL 10*3/UL
MONOCYTES # BLD MANUAL: 0.1 10E3/UL (ref 0–1.3)
MONOCYTES NFR BLD AUTO: ABNORMAL %
MONOCYTES NFR BLD MANUAL: 3 %
MYELOCYTES # BLD MANUAL: 0.1 10E3/UL
MYELOCYTES NFR BLD MANUAL: 2 %
NEUTROPHILS # BLD AUTO: ABNORMAL 10*3/UL
NEUTROPHILS # BLD MANUAL: 3 10E3/UL (ref 1.6–8.3)
NEUTROPHILS NFR BLD AUTO: ABNORMAL %
NEUTROPHILS NFR BLD MANUAL: 85 %
NRBC # BLD AUTO: 0 10E3/UL
NRBC BLD AUTO-RTO: 0 /100
PHOSPHATE SERPL-MCNC: 2.8 MG/DL (ref 2.5–4.5)
PLAT MORPH BLD: ABNORMAL
PLATELET # BLD AUTO: 222 10E3/UL (ref 150–450)
POTASSIUM SERPL-SCNC: 4.1 MMOL/L (ref 3.4–5.3)
PROMYELOCYTES # BLD MANUAL: 0.1 10E3/UL
PROMYELOCYTES NFR BLD MANUAL: 2 %
RBC # BLD AUTO: 3.03 10E6/UL (ref 4.4–5.9)
RBC MORPH BLD: ABNORMAL
SODIUM SERPL-SCNC: 140 MMOL/L (ref 135–145)
TACROLIMUS BLD-MCNC: 6 UG/L (ref 5–15)
TME LAST DOSE: NORMAL H
TME LAST DOSE: NORMAL H
WBC # BLD AUTO: 3.5 10E3/UL (ref 4–11)

## 2024-01-06 PROCEDURE — 85007 BL SMEAR W/DIFF WBC COUNT: CPT

## 2024-01-06 PROCEDURE — 36415 COLL VENOUS BLD VENIPUNCTURE: CPT

## 2024-01-06 PROCEDURE — 84100 ASSAY OF PHOSPHORUS: CPT

## 2024-01-06 PROCEDURE — 80048 BASIC METABOLIC PNL TOTAL CA: CPT

## 2024-01-06 PROCEDURE — 85014 HEMATOCRIT: CPT

## 2024-01-06 PROCEDURE — 83735 ASSAY OF MAGNESIUM: CPT

## 2024-01-06 PROCEDURE — 80197 ASSAY OF TACROLIMUS: CPT

## 2024-01-06 RX ORDER — HEPARIN SODIUM 1000 [USP'U]/ML
3000 INJECTION, SOLUTION INTRAVENOUS; SUBCUTANEOUS EVERY 24 HOURS
Status: CANCELLED
Start: 2024-01-07

## 2024-01-06 RX ORDER — SODIUM BICARBONATE 650 MG/1
1300 TABLET ORAL 3 TIMES DAILY
Qty: 120 TABLET | Refills: 1 | Status: ON HOLD | OUTPATIENT
Start: 2024-01-06 | End: 2024-03-01

## 2024-01-06 NOTE — PROGRESS NOTES
"Nursing Note  Boogie Villa presents today to [facility] for:   Chief Complaint   Patient presents with    Follow Up     Kidney tx       Discharge date: 1/6/2024    Physical Assessment:  See physical assessment located under \"Document Flowsheets\".  Incision site: CDI with staples  Lines:HD line. Orders in therapy plan. Dressing change 1/7/24 and okay for lab draw.  Urine clarity: WNL- yellow clear  Hydration: Needs to increase to 2-3 liters.  Nutrition: WNL   Last BM: 1-6-24 loose  Pain: 0/10   My transplant place videos watched: not yet    Labs drawn by [facility] staff Yes    Plan of care for today: Labs, assessment and education.    Medication changes: increase sodium bicarbonate tid    Medications administered: none      Patient education:    The following teaching topics were addressed: Importance of drinking 2L of non-caffeinated fluids daily, Incisional care, Signs/symptoms of infection, Good handwashing, Medications (purposes, doses and times of administration), Phone numbers to call with concenrs (Transplant coordinator, [facility] and [facility]), 7A discharge check list, and Plan of care   Patient, Mother, and Father verbalized understanding and all questions answered.    Drug level:  Patient took 8mg of tac last evening at 8pm.  Care coordinator to follow up with the result.    Discharge Plan    Pt will follow up with [facility] 1/7/24  Discharge instructions reviewed with patient: YES  Patient/Representative verbalized understanding, all questions answered: YES      Dayana Duarte RN        BP (!) 143/91   Pulse 85   Temp 97.8  F (36.6  C) (Oral)   Resp 18   Wt 130.7 kg (288 lb 1.6 oz)   SpO2 98%   BMI 40.18 kg/m        "

## 2024-01-06 NOTE — RESULT ENCOUNTER NOTE
Tacrolimus level 6.0 at 12 hours, on 1/6/24.  Goal 8-10.   Current dose 8 mg in AM, 8 mg in PM, with fluconazole booster added 1/4.     Dose changed to 8 mg in AM, 9 mg in PM   Scheduled for twice weekly drug levels    Discussed with Boogie Perera message sent

## 2024-01-06 NOTE — DISCHARGE SUMMARY
DISCHARGE:  Patient with orders to discharge to home.     Education Provided:   Med Card updated  Lab Book updated  Handouts reviewed and sent w/ patient  Specialty Pharmacy seen  LDAs removed    Discharge instructions, medications & follow ups reviewed with patient and mother. Copy of discharge summary given to patient. I.J. and PIV removed. [facility] notified, report given.    Patient in stable condition. AVSS. Patient had no further questions regarding discharge instructions and medications. Patient transferred out by family & left with family.  Plan for follow up in clinic tomorrow morning.    DAVID VITALE RN on 1/5/2024 at 6:30 PM

## 2024-01-07 ENCOUNTER — INFUSION THERAPY VISIT (OUTPATIENT)
Dept: INFUSION THERAPY | Facility: CLINIC | Age: 24
End: 2024-01-07
Attending: NURSE PRACTITIONER
Payer: COMMERCIAL

## 2024-01-07 ENCOUNTER — TELEPHONE (OUTPATIENT)
Dept: TRANSPLANT | Facility: CLINIC | Age: 24
End: 2024-01-07

## 2024-01-07 VITALS
RESPIRATION RATE: 18 BRPM | OXYGEN SATURATION: 100 % | BODY MASS INDEX: 39.76 KG/M2 | WEIGHT: 285.1 LBS | TEMPERATURE: 97.7 F

## 2024-01-07 DIAGNOSIS — Z48.298 AFTERCARE FOLLOWING ORGAN TRANSPLANT: ICD-10-CM

## 2024-01-07 DIAGNOSIS — Z94.0 KIDNEY REPLACED BY TRANSPLANT: Primary | ICD-10-CM

## 2024-01-07 DIAGNOSIS — Z20.828 CONTACT WITH AND (SUSPECTED) EXPOSURE TO OTHER VIRAL COMMUNICABLE DISEASES: ICD-10-CM

## 2024-01-07 DIAGNOSIS — N18.6 END STAGE KIDNEY DISEASE (H): ICD-10-CM

## 2024-01-07 DIAGNOSIS — Z98.890 OTHER SPECIFIED POSTPROCEDURAL STATES: ICD-10-CM

## 2024-01-07 DIAGNOSIS — Z79.899 ENCOUNTER FOR LONG-TERM CURRENT USE OF MEDICATION: ICD-10-CM

## 2024-01-07 DIAGNOSIS — Z94.0 KIDNEY REPLACED BY TRANSPLANT: Chronic | ICD-10-CM

## 2024-01-07 LAB
ANION GAP SERPL CALCULATED.3IONS-SCNC: 10 MMOL/L (ref 7–15)
BASOPHILS # BLD AUTO: ABNORMAL 10*3/UL
BASOPHILS # BLD MANUAL: 0 10E3/UL (ref 0–0.2)
BASOPHILS NFR BLD AUTO: ABNORMAL %
BASOPHILS NFR BLD MANUAL: 0 %
BUN SERPL-MCNC: 51.9 MG/DL (ref 6–20)
CALCIUM SERPL-MCNC: 8.7 MG/DL (ref 8.6–10)
CHLORIDE SERPL-SCNC: 110 MMOL/L (ref 98–107)
CREAT SERPL-MCNC: 2.29 MG/DL (ref 0.67–1.17)
DEPRECATED HCO3 PLAS-SCNC: 20 MMOL/L (ref 22–29)
EGFRCR SERPLBLD CKD-EPI 2021: 40 ML/MIN/1.73M2
EOSINOPHIL # BLD AUTO: ABNORMAL 10*3/UL
EOSINOPHIL # BLD MANUAL: 0.1 10E3/UL (ref 0–0.7)
EOSINOPHIL NFR BLD AUTO: ABNORMAL %
EOSINOPHIL NFR BLD MANUAL: 1 %
ERYTHROCYTE [DISTWIDTH] IN BLOOD BY AUTOMATED COUNT: 13.2 % (ref 10–15)
FERRITIN SERPL-MCNC: 783 NG/ML (ref 31–409)
GLUCOSE SERPL-MCNC: 134 MG/DL (ref 70–99)
HCT VFR BLD AUTO: 28.2 % (ref 40–53)
HGB BLD-MCNC: 9.8 G/DL (ref 13.3–17.7)
IMM GRANULOCYTES # BLD: ABNORMAL 10*3/UL
IMM GRANULOCYTES NFR BLD: ABNORMAL %
IRON BINDING CAPACITY (ROCHE): 212 UG/DL (ref 240–430)
IRON SATN MFR SERPL: 79 % (ref 15–46)
IRON SERPL-MCNC: 167 UG/DL (ref 61–157)
LYMPHOCYTES # BLD AUTO: ABNORMAL 10*3/UL
LYMPHOCYTES # BLD MANUAL: 0.4 10E3/UL (ref 0.8–5.3)
LYMPHOCYTES NFR BLD AUTO: ABNORMAL %
LYMPHOCYTES NFR BLD MANUAL: 6 %
MAGNESIUM SERPL-MCNC: 2 MG/DL (ref 1.7–2.3)
MCH RBC QN AUTO: 32.8 PG (ref 26.5–33)
MCHC RBC AUTO-ENTMCNC: 34.8 G/DL (ref 31.5–36.5)
MCV RBC AUTO: 94 FL (ref 78–100)
MONOCYTES # BLD AUTO: ABNORMAL 10*3/UL
MONOCYTES # BLD MANUAL: 0.4 10E3/UL (ref 0–1.3)
MONOCYTES NFR BLD AUTO: ABNORMAL %
MONOCYTES NFR BLD MANUAL: 5 %
NEUTROPHILS # BLD AUTO: ABNORMAL 10*3/UL
NEUTROPHILS # BLD MANUAL: 6.3 10E3/UL (ref 1.6–8.3)
NEUTROPHILS NFR BLD AUTO: ABNORMAL %
NEUTROPHILS NFR BLD MANUAL: 88 %
NRBC # BLD AUTO: 0 10E3/UL
NRBC BLD AUTO-RTO: 0 /100
PHOSPHATE SERPL-MCNC: 2.6 MG/DL (ref 2.5–4.5)
PLAT MORPH BLD: ABNORMAL
PLATELET # BLD AUTO: 230 10E3/UL (ref 150–450)
POTASSIUM SERPL-SCNC: 4.1 MMOL/L (ref 3.4–5.3)
PTH-INTACT SERPL-MCNC: 746 PG/ML (ref 15–65)
RBC # BLD AUTO: 2.99 10E6/UL (ref 4.4–5.9)
RBC MORPH BLD: ABNORMAL
SODIUM SERPL-SCNC: 140 MMOL/L (ref 135–145)
TACROLIMUS BLD-MCNC: 8.7 UG/L (ref 5–15)
TME LAST DOSE: NORMAL H
TME LAST DOSE: NORMAL H
VIT D+METAB SERPL-MCNC: 15 NG/ML (ref 20–50)
WBC # BLD AUTO: 7.2 10E3/UL (ref 4–11)

## 2024-01-07 PROCEDURE — 83970 ASSAY OF PARATHORMONE: CPT

## 2024-01-07 PROCEDURE — 80048 BASIC METABOLIC PNL TOTAL CA: CPT

## 2024-01-07 PROCEDURE — 84100 ASSAY OF PHOSPHORUS: CPT

## 2024-01-07 PROCEDURE — 80197 ASSAY OF TACROLIMUS: CPT

## 2024-01-07 PROCEDURE — 85027 COMPLETE CBC AUTOMATED: CPT

## 2024-01-07 PROCEDURE — 83550 IRON BINDING TEST: CPT

## 2024-01-07 PROCEDURE — 82306 VITAMIN D 25 HYDROXY: CPT

## 2024-01-07 PROCEDURE — 85007 BL SMEAR W/DIFF WBC COUNT: CPT

## 2024-01-07 PROCEDURE — 250N000011 HC RX IP 250 OP 636: Performed by: NURSE PRACTITIONER

## 2024-01-07 PROCEDURE — 83735 ASSAY OF MAGNESIUM: CPT

## 2024-01-07 PROCEDURE — 36415 COLL VENOUS BLD VENIPUNCTURE: CPT

## 2024-01-07 PROCEDURE — 82728 ASSAY OF FERRITIN: CPT

## 2024-01-07 RX ORDER — ALBUTEROL SULFATE 90 UG/1
1-2 AEROSOL, METERED RESPIRATORY (INHALATION)
Status: CANCELLED
Start: 2024-01-07

## 2024-01-07 RX ORDER — DIPHENHYDRAMINE HYDROCHLORIDE 50 MG/ML
50 INJECTION INTRAMUSCULAR; INTRAVENOUS
Status: CANCELLED
Start: 2024-01-07

## 2024-01-07 RX ORDER — ALBUTEROL SULFATE 0.83 MG/ML
2.5 SOLUTION RESPIRATORY (INHALATION)
Status: CANCELLED | OUTPATIENT
Start: 2024-01-07

## 2024-01-07 RX ORDER — HEPARIN SODIUM 1000 [USP'U]/ML
3000 INJECTION, SOLUTION INTRAVENOUS; SUBCUTANEOUS EVERY 24 HOURS
Status: CANCELLED
Start: 2024-01-07

## 2024-01-07 RX ORDER — HEPARIN SODIUM 1000 [USP'U]/ML
2300 INJECTION, SOLUTION INTRAVENOUS; SUBCUTANEOUS ONCE
Status: COMPLETED | OUTPATIENT
Start: 2024-01-07 | End: 2024-01-07

## 2024-01-07 RX ORDER — HEPARIN SODIUM,PORCINE 10 UNIT/ML
5-20 VIAL (ML) INTRAVENOUS DAILY PRN
Status: CANCELLED | OUTPATIENT
Start: 2024-01-07

## 2024-01-07 RX ORDER — HEPARIN SODIUM 1000 [USP'U]/ML
2200 INJECTION, SOLUTION INTRAVENOUS; SUBCUTANEOUS ONCE
Status: COMPLETED | OUTPATIENT
Start: 2024-01-07 | End: 2024-01-07

## 2024-01-07 RX ORDER — HEPARIN SODIUM 1000 [USP'U]/ML
2200 INJECTION, SOLUTION INTRAVENOUS; SUBCUTANEOUS EVERY 24 HOURS
Status: CANCELLED
Start: 2024-01-07

## 2024-01-07 RX ORDER — EPINEPHRINE 1 MG/ML
0.3 INJECTION, SOLUTION INTRAMUSCULAR; SUBCUTANEOUS EVERY 5 MIN PRN
Status: CANCELLED | OUTPATIENT
Start: 2024-01-07

## 2024-01-07 RX ORDER — HEPARIN SODIUM 1000 [USP'U]/ML
3000 INJECTION, SOLUTION INTRAVENOUS; SUBCUTANEOUS EVERY 24 HOURS
Status: DISCONTINUED | OUTPATIENT
Start: 2024-01-07 | End: 2024-01-07

## 2024-01-07 RX ORDER — HEPARIN SODIUM (PORCINE) LOCK FLUSH IV SOLN 100 UNIT/ML 100 UNIT/ML
5 SOLUTION INTRAVENOUS
Status: CANCELLED | OUTPATIENT
Start: 2024-01-07

## 2024-01-07 RX ORDER — TACROLIMUS 1 MG/1
CAPSULE ORAL
Qty: 180 CAPSULE | Refills: 11 | Status: SHIPPED | OUTPATIENT
Start: 2024-01-06 | End: 2024-01-15

## 2024-01-07 RX ORDER — HEPARIN SODIUM 1000 [USP'U]/ML
2300 INJECTION, SOLUTION INTRAVENOUS; SUBCUTANEOUS EVERY 24 HOURS
Status: CANCELLED
Start: 2024-01-07

## 2024-01-07 RX ORDER — TACROLIMUS 5 MG/1
5 CAPSULE ORAL 2 TIMES DAILY
Qty: 60 CAPSULE | Refills: 1 | Status: SHIPPED | OUTPATIENT
Start: 2024-01-06 | End: 2024-01-15

## 2024-01-07 RX ORDER — METHYLPREDNISOLONE SODIUM SUCCINATE 125 MG/2ML
125 INJECTION, POWDER, LYOPHILIZED, FOR SOLUTION INTRAMUSCULAR; INTRAVENOUS
Status: CANCELLED
Start: 2024-01-07

## 2024-01-07 RX ADMIN — HEPARIN SODIUM 2200 UNITS: 1000 INJECTION, SOLUTION INTRAVENOUS; SUBCUTANEOUS at 07:42

## 2024-01-07 RX ADMIN — HEPARIN SODIUM 2300 UNITS: 1000 INJECTION, SOLUTION INTRAVENOUS; SUBCUTANEOUS at 09:17

## 2024-01-07 NOTE — PROGRESS NOTES
"Boogie Villa came to [facility] today for a lab and assess following a Kidney transplant on 12/28/2023.      Discharge date: 01/05/2024  Transplant coordinator: Nina CELAYA  Phone number patient can be reached at: [phone] (Cell)      Physical Assessment:  See physical assessment located under \"Document Flowsheets\".  Incision site: Abdominal incision to RLQ intact with staples; no s/s of infection noted. Incisional care and s/s of infection reviewed w/ patient who verbalized understanding.  Lines: HD catheter to (R) upper chest intact. Weekly dressing changed per orders. Message sent to coordinator (Nina Hu) stating that patient would like to have weekly dressing changes closer to home.   Silveira: None  Urine clarity: Clear yellow w/ no foul odor per patient.   Hydration: Patient reports drinking 5.5 liters in the last 24 hours. Reviewed fluid intake requirements w/ patient who verbalized understanding.  Nutrition: Patient reports appetite is good.   Last BM: last night (soft); denies taking stool softners but has them available if needed.   Pain: Patient reports minimal pain (1/10) to abdominal incision region upon arrival to [facility]. Taking Tylenol PRN w/ effective results.    Blood pressure/heart rate: 153/88 - 78 (sitting); 154/104 - 92 (standing); asymptomatic; reported to Ivis Monzon NP.      Labs drawn by [facility] staff: Yes    Plan of care for today:   1. Today's labs, vitals, weight and assessment were reviewed w/ Ivis Monzon NP who gave the following orders:          - Push fluids!         - Ivis Monzon NP to call patient today to inform him of plan for tomorrow regarding labs/clinic appointment.     Medication changes: None    Medications administered:  Administrations This Visit       heparin Lock (1000 units/mL High concentration) 2,200 Units       Admin Date  01/07/2024 Action  $Given Dose  2,200 Units Route  Intracatheter Documented By  Jason Tariq, RN              heparin Lock (1000 " units/mL High concentration) 2,300 Units       Admin Date  01/07/2024 Action  $Given Dose  2,300 Units Route  Intracatheter Documented By  Jason Tariq RN                    Patient education:    The following teaching topics were addressed: Incisional care, Signs/symptoms of infection, Medications (purposes, doses and times of administration), Phone numbers to call with concenrs (Transplant coordinator, [facility] and [facility]), and Plan of care   Patient and Mother verbalized understanding and all questions answered.    Drug level:  Patient reports taking 9 mg Tacrolimus last night at 1900. Spoke w/ Daysi STEVENS (Coordinator on call) who stated she would follow up with today's Tacrolimus level. Patient aware.     Face to face time: 45 minutes  Discharge Plan    Pt will follow up with: Ivis Monzon NP to call patient with plan for tomorrow regarding labs/clinic appointment. Patient aware.   Discharge instructions reviewed with patient: YES  Patient/Representative verbalized understanding, all questions answered: YES    Discharged from unit at 0925 with mother (Darcy) to home.    Jason Tariq, MAKAYLA

## 2024-01-07 NOTE — PATIENT INSTRUCTIONS
Contact Information:    Transplant Coordinator: Nina Hu   Transplant Office:  [phone]  [facility]:  [phone]  Ask for physician on call for kidney transplant.  [facility] (Transplant Unit):  [phone]  [facility]:  [phone]    Push fluids!!    2. Ivis Monzon NP will contact you today to let you know the plan for labs/appointments going forward.

## 2024-01-07 NOTE — TELEPHONE ENCOUNTER
Late entry - Tacrolimus level 6.0 at 12 hours, on 1/6/24.  Goal 8-10.   Current dose 8 mg in AM, 8 mg in PM, with fluconazole booster added 1/4.     Dose changed to 8 mg in AM, 9 mg in PM   Scheduled for twice weekly drug levels    Discussed with Boogie Perera message sent

## 2024-01-07 NOTE — TELEPHONE ENCOUNTER
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 S/p living donor kidney transplant with ureteral stent and venous reconstruction on 12/28/23.            H* Nita Martinez MD - Primary ospital Course  Kidney transplant: SCr edenilson 2.67. Cr then peaked at 6.35, biopsy concerning for Banff 1B rejection: g1 ptc3 C4d0. Medium eplet mismatch. DSA returned negative. Currently planning for thymo as below. Cr improved to 3.7 on the day of discharge.   -Good UOP. Did make urine PTA.  -Post-op US: patent vessels. Repeat US 12/31, normal, patent. Repeat US 1/2 with normal transplant, Improved velocities at RA anastamosis, RI 1.  -ASA 81 mg daily for thrombosis  prophylaxis in light of reconstruction.      ACR per preliminary path:  -concern for rejection in setting of low tacrolimus level and Low induction protocol.   -Kidney biopsy (1/2): Acute cell-mediated rejection, Banff class IB, with diffuse interstitial inflammation, severe tubulitis, and no arteritis  -DSA 1/2 returned negative  Donor type:  Live  DSA at time of transplant:  NO  Ureteral stent: YES  CMV:  Donor + / Recipient -  EBV:  Donor + / Recipient -  Thymo 200mg 1/2, 1/3, and 1/4. An additional 150 mg given 1/5 to complete 6mg/kg based on actual body weight

## 2024-01-08 ENCOUNTER — INFUSION THERAPY VISIT (OUTPATIENT)
Dept: INFUSION THERAPY | Facility: CLINIC | Age: 24
End: 2024-01-08
Attending: NURSE PRACTITIONER
Payer: COMMERCIAL

## 2024-01-08 ENCOUNTER — TELEPHONE (OUTPATIENT)
Dept: PHARMACY | Facility: CLINIC | Age: 24
End: 2024-01-08

## 2024-01-08 ENCOUNTER — TELEPHONE (OUTPATIENT)
Dept: TRANSPLANT | Facility: CLINIC | Age: 24
End: 2024-01-08

## 2024-01-08 VITALS
TEMPERATURE: 97.9 F | BODY MASS INDEX: 39.48 KG/M2 | RESPIRATION RATE: 18 BRPM | WEIGHT: 283.1 LBS | OXYGEN SATURATION: 97 %

## 2024-01-08 DIAGNOSIS — Z94.0 KIDNEY REPLACED BY TRANSPLANT: Primary | ICD-10-CM

## 2024-01-08 LAB
ANION GAP SERPL CALCULATED.3IONS-SCNC: 8 MMOL/L (ref 7–15)
BASOPHILS # BLD AUTO: ABNORMAL 10*3/UL
BASOPHILS # BLD MANUAL: 0 10E3/UL (ref 0–0.2)
BASOPHILS NFR BLD AUTO: ABNORMAL %
BASOPHILS NFR BLD MANUAL: 0 %
BUN SERPL-MCNC: 43 MG/DL (ref 6–20)
CALCIUM SERPL-MCNC: 8.9 MG/DL (ref 8.6–10)
CHLORIDE SERPL-SCNC: 111 MMOL/L (ref 98–107)
CREAT SERPL-MCNC: 1.94 MG/DL (ref 0.67–1.17)
DEPRECATED HCO3 PLAS-SCNC: 20 MMOL/L (ref 22–29)
EGFRCR SERPLBLD CKD-EPI 2021: 49 ML/MIN/1.73M2
EOSINOPHIL # BLD AUTO: ABNORMAL 10*3/UL
EOSINOPHIL # BLD MANUAL: 0 10E3/UL (ref 0–0.7)
EOSINOPHIL NFR BLD AUTO: ABNORMAL %
EOSINOPHIL NFR BLD MANUAL: 0 %
ERYTHROCYTE [DISTWIDTH] IN BLOOD BY AUTOMATED COUNT: 13.1 % (ref 10–15)
GLUCOSE SERPL-MCNC: 159 MG/DL (ref 70–99)
HCT VFR BLD AUTO: 28.2 % (ref 40–53)
HGB BLD-MCNC: 9.7 G/DL (ref 13.3–17.7)
IMM GRANULOCYTES # BLD: ABNORMAL 10*3/UL
IMM GRANULOCYTES NFR BLD: ABNORMAL %
LYMPHOCYTES # BLD AUTO: ABNORMAL 10*3/UL
LYMPHOCYTES # BLD MANUAL: 0.2 10E3/UL (ref 0.8–5.3)
LYMPHOCYTES NFR BLD AUTO: ABNORMAL %
LYMPHOCYTES NFR BLD MANUAL: 2 %
MAGNESIUM SERPL-MCNC: 2.1 MG/DL (ref 1.7–2.3)
MCH RBC QN AUTO: 32.4 PG (ref 26.5–33)
MCHC RBC AUTO-ENTMCNC: 34.4 G/DL (ref 31.5–36.5)
MCV RBC AUTO: 94 FL (ref 78–100)
METAMYELOCYTES # BLD MANUAL: 0.2 10E3/UL
METAMYELOCYTES NFR BLD MANUAL: 2 %
MONOCYTES # BLD AUTO: ABNORMAL 10*3/UL
MONOCYTES # BLD MANUAL: 0 10E3/UL (ref 0–1.3)
MONOCYTES NFR BLD AUTO: ABNORMAL %
MONOCYTES NFR BLD MANUAL: 0 %
MYELOCYTES # BLD MANUAL: 0.2 10E3/UL
MYELOCYTES NFR BLD MANUAL: 2 %
NEUTROPHILS # BLD AUTO: ABNORMAL 10*3/UL
NEUTROPHILS # BLD MANUAL: 9.9 10E3/UL (ref 1.6–8.3)
NEUTROPHILS NFR BLD AUTO: ABNORMAL %
NEUTROPHILS NFR BLD MANUAL: 94 %
NRBC # BLD AUTO: 0 10E3/UL
NRBC BLD AUTO-RTO: 0 /100
PHOSPHATE SERPL-MCNC: 2.5 MG/DL (ref 2.5–4.5)
PLAT MORPH BLD: ABNORMAL
PLATELET # BLD AUTO: 244 10E3/UL (ref 150–450)
POTASSIUM SERPL-SCNC: 4.6 MMOL/L (ref 3.4–5.3)
RBC # BLD AUTO: 2.99 10E6/UL (ref 4.4–5.9)
RBC MORPH BLD: ABNORMAL
SODIUM SERPL-SCNC: 139 MMOL/L (ref 135–145)
TACROLIMUS BLD-MCNC: 8.9 UG/L (ref 5–15)
TME LAST DOSE: NORMAL H
TME LAST DOSE: NORMAL H
WBC # BLD AUTO: 10.5 10E3/UL (ref 4–11)

## 2024-01-08 PROCEDURE — 250N000011 HC RX IP 250 OP 636: Performed by: NURSE PRACTITIONER

## 2024-01-08 PROCEDURE — 83735 ASSAY OF MAGNESIUM: CPT

## 2024-01-08 PROCEDURE — 84100 ASSAY OF PHOSPHORUS: CPT

## 2024-01-08 PROCEDURE — 99213 OFFICE O/P EST LOW 20 MIN: CPT | Mod: 24 | Performed by: NURSE PRACTITIONER

## 2024-01-08 PROCEDURE — 80048 BASIC METABOLIC PNL TOTAL CA: CPT

## 2024-01-08 PROCEDURE — 80197 ASSAY OF TACROLIMUS: CPT

## 2024-01-08 PROCEDURE — 85007 BL SMEAR W/DIFF WBC COUNT: CPT

## 2024-01-08 PROCEDURE — 85027 COMPLETE CBC AUTOMATED: CPT

## 2024-01-08 PROCEDURE — 36415 COLL VENOUS BLD VENIPUNCTURE: CPT

## 2024-01-08 RX ORDER — HEPARIN SODIUM 1000 [USP'U]/ML
2300 INJECTION, SOLUTION INTRAVENOUS; SUBCUTANEOUS EVERY 24 HOURS
Status: CANCELLED
Start: 2024-01-08

## 2024-01-08 RX ORDER — HEPARIN SODIUM (PORCINE) LOCK FLUSH IV SOLN 100 UNIT/ML 100 UNIT/ML
5 SOLUTION INTRAVENOUS
Status: CANCELLED | OUTPATIENT
Start: 2024-01-08

## 2024-01-08 RX ORDER — ALBUTEROL SULFATE 90 UG/1
1-2 AEROSOL, METERED RESPIRATORY (INHALATION)
Status: CANCELLED
Start: 2024-01-08

## 2024-01-08 RX ORDER — HEPARIN SODIUM 1000 [USP'U]/ML
2200 INJECTION, SOLUTION INTRAVENOUS; SUBCUTANEOUS EVERY 24 HOURS
Status: CANCELLED
Start: 2024-01-08

## 2024-01-08 RX ORDER — HEPARIN SODIUM,PORCINE 10 UNIT/ML
5-20 VIAL (ML) INTRAVENOUS DAILY PRN
Status: CANCELLED | OUTPATIENT
Start: 2024-01-08

## 2024-01-08 RX ORDER — METHYLPREDNISOLONE SODIUM SUCCINATE 125 MG/2ML
125 INJECTION, POWDER, LYOPHILIZED, FOR SOLUTION INTRAMUSCULAR; INTRAVENOUS
Status: CANCELLED
Start: 2024-01-08

## 2024-01-08 RX ORDER — EPINEPHRINE 1 MG/ML
0.3 INJECTION, SOLUTION INTRAMUSCULAR; SUBCUTANEOUS EVERY 5 MIN PRN
Status: CANCELLED | OUTPATIENT
Start: 2024-01-08

## 2024-01-08 RX ORDER — DIPHENHYDRAMINE HYDROCHLORIDE 50 MG/ML
50 INJECTION INTRAMUSCULAR; INTRAVENOUS
Status: CANCELLED
Start: 2024-01-08

## 2024-01-08 RX ORDER — ALBUTEROL SULFATE 0.83 MG/ML
2.5 SOLUTION RESPIRATORY (INHALATION)
Status: CANCELLED | OUTPATIENT
Start: 2024-01-08

## 2024-01-08 RX ORDER — HEPARIN SODIUM 1000 [USP'U]/ML
2200 INJECTION, SOLUTION INTRAVENOUS; SUBCUTANEOUS EVERY 24 HOURS
Status: DISCONTINUED | OUTPATIENT
Start: 2024-01-08 | End: 2024-01-08 | Stop reason: HOSPADM

## 2024-01-08 RX ADMIN — HEPARIN SODIUM 2200 UNITS: 1000 INJECTION, SOLUTION INTRAVENOUS; SUBCUTANEOUS at 07:48

## 2024-01-08 NOTE — PROGRESS NOTES
"Boogie Villa came to [facility] today for a lab and assess following a Kidney transplant on 12/28/2023.      Discharge date: 01/05/2023  Transplant coordinator: Nina CELAYA  Phone number patient can be reached at:  [phone] (Cell)       Physical Assessment:  See physical assessment located under \"Document Flowsheets\".  Incision site: Abdominal incision to RLQ intact with staples; no s/s of infection noted.  Lines: HD catheter to (R) upper chest intact w/ red lumen patent and used for lab draw this AM.   Silveira: None  Urine clarity: Clear yellow w/ no foul odor per patient.  Hydration: Patient reports drinking at least 6.8 liters in the last 24 hours.  Nutrition: Patient reports appetite is good.   Last BM: Yesterday (normal size/consistency).  Pain: Patient denies pain upon arrival to [facility] but has Tylenol available PRN which is effective per patient.    Blood pressure/heart rate: 153/91 - 86 (sitting); 153/98 - 92 (standing)     Labs drawn by [facility] staff: Yes    Plan of care for today:   1. Today's labs, vitals and assessment were reviewed w/ Clare Prasad NP who gave the following orders:          - Labs on Thursday. Clare Prasad NP stated she would contact patient's coordinator to have her set up lab appointment for closer to patient's home. (Patient aware).         - Clare Prasad NP stated she would contact patient's coordinator about finding a place closer to patient's home for weekly HD dressing changes. Clare requested we keep appointment at [facility] for this Friday for dressing change until an appointment closer to home is secured. (Patient aware).    Medication changes: None     Medications administered:  Administrations This Visit       heparin Lock (1000 units/mL High concentration) 2,200 Units       Admin Date  01/08/2024 Action  $Given Dose  2,200 Units Route  Intracatheter Documented By  Jason Tariq, RN                    Patient education:    The following teaching topics were addressed: " Plan of care   Patient and Mother verbalized understanding and all questions answered.    Drug level:  Patient reports taking Tacrolimus last night at 1930. Transplant coordinator to follow up with today's drug level and patient is aware.     Face to face time: 30 minutes  Discharge Plan    Pt will follow up with labs on Thursday. See note above.   Discharge instructions reviewed with patient: YES  Patient/Representative verbalized understanding, all questions answered: YES    Discharged from unit at 0935 with mother to home.    Jason Tariq RN

## 2024-01-08 NOTE — TELEPHONE ENCOUNTER
Boogie Villa is a post-kidney transplant patient who discharged on 1/5/24. Patient will get medications from local or other mail order pharmacy. The patient will be responsible for managing medications.    SOT*LIASAPPHIRE FARIA) IS A (RIGHT KIDNEY) TRANSPLANT PATIENT  (DATE OF TRANSPLANT: (12/28/23 )         HEALTH BENEFIT: [facility]    ID# [id number] GRP# [id number] (EFFECTIVE 01/01/2023)         PHARMACY BENEFIT: [facility]    PROCESSING INFO: ID# [id number] GRP# [id number] PCN# [id number] BIN# [id number] (EFFECTIVE 01/01/2022 ).    DEDUCTIBLE $0 & MAX OUT-OF-POCKET $6,650    COPAY STRUCTURE:    $ 12 FOR GENERIC    $ 50 FOR BRAND    $ 90 FOR NON-FORMULARY MEDICATIONS         TEST CLAIM SPECIALTY #28    MYCOPHENOLATE 250mg (#240/30DS)--- $0    PROGRAF 1mg (#120/30DS)--- $0    TACROLIMUS 1mg (#120/30DS)--- $0    CYCLOSPORINE 100mg (#60/30DS)--- $0    VALGANCICLOVIR 450mg (#60/30DS)--- $0         TEST CLAIM DISCHARGE #27    MYCOPHENOLATE 250mg (#240/30DS)--- $0    PROGRAF 1mg (#120/30DS)--- $0    TACROLIMUS 1mg (#120/30DS)--- $0    CYCLOSPORINE 100mg (#60/30DS)--- $0    VALGANCICLOVIR 450mg (#60/30DS)--- $0         **CAN PATIENT FILL AT [facility] FOR MEDICATIONS LISTED? PATIENT CAN FILL THROUGH [facility] HOWEVER PT HAS A PLAN THROUGH [facility], AND PER OUR CONTRACT WITH THEM, WE ARE ONLY ALLOWED TO SHIP TO PT IF THEY ARE PHYSICALLY UNABLE TO COME IN TO OUR PHARMACY TO . IF PT IS COMFORTABLE WITH US DOCUMENTING THIS IN THEIR RECORD, WE CAN SHIP OUT THEIR MEDS (FOR [place] RESIDENTS ONLY). IF NOT, THEY WILL NEED TO UTILIZE [facility]([phone]) OR  AT ANY [facility] SITE.                Carly [facility]

## 2024-01-08 NOTE — LETTER
1/8/2024         RE: Boogie Villa  [address] [place] [address]  [place] [address]        Dear Colleague,    Thank you for referring your patient, Boogie Villa, to the [facility]. Please see a copy of my visit note below.    TRANSPLANT NEPHROLOGY EARLY POST TRANSPLANT VISIT    Assessment & PlanDoing well. Labs Thursday.   # LDKT: Trend down   - Baseline Creatinine: ~ TBD   - Proteinuria: Not checked recently   - Date DSA Last Checked: Jan/2024      Latest DSA: No DSA at time of transplant   - BK Viremia: No   - Kidney Tx Biopsy:  01/02/2024; Result: Acute cellular-mediated rejection, Banff 1B.   Mild glomerulitis and severe peritubular capillaritis, but negative C4d and no DSA.  Mild arterial sclerosis.    - Transplant Ureteral Stent: Yes    # Immunosuppression: Tacrolimus immediate release (goal 8-10), Mycophenolate mofetil (dose 750 mg every 12 hours), and Prednisone (dose taper)   - Induction with Recent Transplant:  Low Intensity Protocol   - Continue with intensive monitoring of immunosuppression for efficacy and toxicity.   - Changes: Not at this time    # Infection Prophylaxis:   - PJP: Sulfa/TMP (Bactrim)  - CMV: Valganciclovir (Valcyte)  Patient is CMV IgG Ab discordant (D+/R-) and will continue on Valcyte x 6 months, then check CMV PCR monthly until 12 months post transplant.      # Hypertension: Borderline control;  Goal BP: < 150/90   - Volume status: Euvolemic  EDW ~ 125.5kg   - Changes: Not at this time    # Elevated Blood Glucose: Glucose generally running ~ 130-160s   - Management as per primary team.    # Anemia in Chronic Renal Disease: Hgb: Stable      TOM: No   - Iron studies: Replete    # Mineral Bone Disorder:    - Secondary renal hyperparathyroidism; PTH level: Significantly elevated (601-1200 pg/ml)        On treatment: None  - Vitamin D; level: Low        On supplement: No  - Calcium; level: Normal        On supplement: No  - Phosphorus; level:  Normal        On supplement: No    # Electrolytes:   - Potassium; level: Normal        On supplement: No  - Magnesium; level: Normal        On supplement: No  - Bicarbonate; level: Low normal        On supplement: Yes  - Sodium; level: Normal    # Obesity, Class II (BMI = 39.7): Stable weight.              - Once patient heals and recovers from surgery, would recommend working on weight loss for overall health by increasing exercise and watching caloric intake.     # H/o Ureteral Reimplantation, s/p Mitrofanoff: Patient reports not using for Mitrofanoff in a couple of years and voids normally on his own.  Mitrofanoff is nonfunctional at this time.     # H/o Bilateral Uveitis: No evidence of systemic autoimmune/inflammatory disease at last Rheumatology.      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    Return visit: Labs Thursday.     CHRISTIN Hernandez CNP    Chief Complaint  Mr. Villa is a 23 year old here for hospital follow up after kidney transplant.     History of Present Illness   Doing well. No concerns.   Eating and drinking well. Pain controlled. LBM yesterday.   Urinating with no issues. No F/C. No CP or SOB.       Problem List  Patient  Active Problem List   Diagnosis    Lymphangioma    ADHD (attention deficit hyperactivity disorder)    Allergic rhinitis due to pollen    Anxiety    Congenital posterior urethral valves    Iron deficiency    Neurogenic bladder    Class 2 severe obesity due to excess calories with serious comorbidity and body mass index (BMI) of 39.0 to 39.9 in adult (H)    Sensorineural hearing loss, asymmetrical    Renovascular hypertension    Secondary renal hyperparathyroidism (H24)    Vitamin D deficiency    Kidney replaced by transplant    Immunosuppressed status (H24)    Acute post-operative pain    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       Allergies  Allergies   Allergen Reactions    Banana Itching     Raw banana; itchy mouth      Dust Mites      Runny nose and watery eyes    Mold      Runny nose and watery eyes    Nsaids Other (See Comments)     CKD    Other [Seasonal Allergies]      Grass, Ragweed - gets runny nose and watery eyes    Sulfa Antibiotics      PN: LW Reaction: GI Upset as an infant  12/28 Admission: Tolerated Bactrim       Medications  Current Outpatient Medications   Medication Sig    acetaminophen (TYLENOL) 325 MG tablet Take 1-2 tablets (325-650 mg) by mouth every 4 hours as needed (For optimal non-opioid multimodal pain management to improve pain control.) (Patient not taking: Reported on 1/6/2024)    amLODIPine (NORVASC) 10 MG tablet Take 1 tablet (10 mg) by mouth daily    aspirin 81 MG EC tablet Take 1 tablet (81 mg) by mouth daily    atorvastatin (LIPITOR) 10 MG tablet Take 1 tablet (10 mg) by mouth daily    carvedilol (COREG) 6.25 MG tablet Take 1 tablet (6.25 mg) by mouth 2 times daily    cetirizine (ZYRTEC) 10 MG tablet Take 10 mg by mouth daily as needed for allergies (Patient not taking: Reported on 1/6/2024)    difluprednate (DUREZOL) 0.05 % ophthalmic emulsion INSTILL 1 DROP INTO EACH EYE SIX TIMES DAILY FOR THE FIRST 24 HOURS. THEN 1 DROP 4 TIMES DAILY UNTIL FOLLOW UP     fluconazole (DIFLUCAN) 100 MG tablet Take 1 tablet (100 mg) by mouth daily    latanoprost (XALATAN) 0.005 % ophthalmic solution Place 1 drop into both eyes at bedtime    Lidocaine (LIDOCARE) 4 % Patch Place 1-2 patches onto the skin every 24 hours To prevent lidocaine toxicity, patient should be patch free for 12 hrs daily. (Patient not taking: Reported on 1/6/2024)    methocarbamol (ROBAXIN) 750 MG tablet Take 1 tablet (750 mg) by mouth 3 times daily as needed for muscle spasms    methylphenidate (RITALIN) 20 MG tablet Take 20 mg by mouth as needed Prn    mycophenolate (GENERIC EQUIVALENT) 250 MG capsule Take 3 capsules (750 mg) by mouth 2 times daily    pantoprazole (PROTONIX) 40 MG EC tablet Take 1 tablet (40 mg) by mouth every morning (before breakfast)    predniSONE (DELTASONE) 20 MG tablet Take 2 tablets (40 mg) by mouth 2 times daily for 2 days, THEN 2 tablets (40 mg) daily for 3 days, THEN 1 tablet (20 mg) daily for 3 days.    Probiotic Product (PROBIOTIC-10 PO) Take 1 tablet by mouth every morning 1 tab capsule    senna-docusate (SENOKOT-S/PERICOLACE) 8.6-50 MG tablet Take 1 tablet by mouth 2 times daily as needed for constipation    sodium bicarbonate 650 MG tablet Take 2 tablets (1,300 mg) by mouth 3 times daily    sulfamethoxazole-trimethoprim (BACTRIM) 400-80 MG tablet Take 1 tablet by mouth daily    tacrolimus (GENERIC) 1 MG capsule Total dose = 8 mg every AM and 9 mg every PM    tacrolimus (GENERIC) 5 MG capsule Take 1 capsule (5 mg) by mouth 2 times daily Total dose = 8 mg every AM and 9 mg every PM    valGANciclovir (VALCYTE) 450 MG tablet Take 1 tablet (450 mg) by mouth daily Increase dose to 900 mg by mouth daily as directed by transplant team (with improving kidney function)    Vitamin D3 (CHOLECALCIFEROL) 25 mcg (1000 units) tablet Take 2 tablets (50 mcg) by mouth daily     No current facility-administered medications for this visit.     Facility-Administered Medications Ordered in Other Visits    Medication    heparin Lock (1000 units/mL High concentration) 2,200 Units     There are no discontinued medications.    Physical Exam  Vital Signs: There were no vitals taken for this visit.    GENERAL APPEARANCE: alert and no distress  HENT: mouth without ulcers or lesions  RESP: lungs clear to auscultation - no rales, rhonchi or wheezes  CV: regular rhythm, normal rate, no rub, no murmur  EDEMA: no LE edema bilaterally  ABDOMEN: soft, nondistended, nontender, bowel sounds normal  MS: extremities normal - no gross deformities noted, no evidence of inflammation in joints, no muscle tenderness  SKIN: no rash  SKIN: incision CDI with staples - no erythema or drainage.     Data        Latest Ref Rng & Units 1/8/2024     8:04 AM 1/7/2024     7:50 AM 1/6/2024     7:10 AM   Renal   Sodium 135 - 145 mmol/L 139  140  140    K 3.4 - 5.3 mmol/L 4.6  4.1  4.1    Cl 98 - 107 mmol/L 111  110  111    Cl (external) 98 - 107 mmol/L 111  110  111    CO2 22 - 29 mmol/L 20  20  19    Urea Nitrogen 6.0 - 20.0 mg/dL 43.0  51.9  64.4    Creatinine 0.67 - 1.17 mg/dL 1.94  2.29  2.82    Glucose 70 - 99 mg/dL 159  134  168    Calcium 8.6 - 10.0 mg/dL 8.9  8.7  8.6    Magnesium 1.7 - 2.3 mg/dL 2.1  2.0  2.3          Latest Ref Rng & Units 1/8/2024     8:04 AM 1/7/2024     7:50 AM 1/6/2024     7:10 AM   Bone Health   Phosphorus 2.5 - 4.5 mg/dL 2.5  2.6  2.8    Parathyroid Hormone Intact 15 - 65 pg/mL  746     Vit D Def 20 - 50 ng/mL  15           Latest Ref Rng & Units 1/8/2024     8:04 AM 1/7/2024     7:50 AM 1/6/2024     7:10 AM   Heme   WBC 4.0 - 11.0 10e3/uL 10.5  7.2  3.5    Hgb 13.3 - 17.7 g/dL 9.7  9.8  9.9    Plt 150 - 450 10e3/uL 244  230  222    ABSOLUTE NEUTROPHIL 1.6 - 8.3 10e3/uL 9.9  6.3  3.0    ABSOLUTE LYMPHOCYTES 0.8 - 5.3 10e3/uL 0.2  0.4  0.3    ABSOLUTE MONOCYTES 0.0 - 1.3 10e3/uL 0.0  0.4  0.1    ABSOLUTE EOSINOPHILS 0.0 - 0.7 10e3/uL 0.0  0.1  0.0          Latest Ref Rng & Units 12/19/2023     9:53 AM 7/13/2023      1:28 PM 7/16/2021     4:50 PM   Liver   AP 40 - 150 U/L 67      AP (external) 40 - 150 U/L  45     TBili <=1.2 mg/dL 0.5      TBili (external) 0.2 - 1.2 mg/dL  0.6     ALT 0 - 70 U/L 41      ALT (external) <=55 U/L  35     AST 0 - 45 U/L 27      AST (external) 10 - 40 U/L  26     Tot Protein 6.4 - 8.3 g/dL 7.6      Tot Protein (external) 6.4 - 8.3 g/dL  7.5     Albumin 3.5 - 5.0 g/dL   3.0    Albumin 3.5 - 5.2 g/dL 4.4      Albumin (external) 3.5 - 5.0 g/dL  3.8           Latest Ref Rng & Units 12/19/2023     9:53 AM 7/13/2023     1:28 PM   Pancreas   A1C <5.7 % 5.2     A1C (external) <=5.6 %  4.9          Latest Ref Rng & Units 1/7/2024     7:50 AM 12/19/2023     9:53 AM 2/26/2020     3:20 PM   Iron studies   Iron 61 - 157 ug/dL 167  104  66    Iron Saturation Index 15 - 46 %   26    Iron Sat Index 15 - 46 % 79  42     Ferritin 31 - 409 ng/mL 783  826  133          Latest Ref Rng & Units 12/19/2023     9:53 AM 3/29/2021     1:33 PM   [facility] Virology   EBV CAPSID ANTIBODY IGG No detectable antibody. No detectable antibody.  <0.2    Hep B Core NR^Nonreactive  Nonreactive        Recent Labs   Lab Test 12/31/23  0602 01/02/24  0554 01/04/24  0556 01/06/24  0710 01/07/24  0750   DOSTAC  --   --   --   --  1/6/2024   TACROL 1.5*   < > 4.4* 6.0 8.7    < > = values in this interval not displayed.                Clare Prasad, APRN CNP

## 2024-01-08 NOTE — TELEPHONE ENCOUNTER
General review  14 days  post op   Kidney Rejection   Banff class IB,     Item  Dialysis line   Abhilash Raza MD Davis, Francine, APRN CNP; Nina Hu RN  Repeat DSA in 2 weeks and if still negative and stable creatinine, remove line   Plan to have line care  with transplant  labs every Monday  in [facility] - Orders and appointments completed   Placed order for [facility] Donor Specific Antibodies  on Jan 22 ,2024

## 2024-01-08 NOTE — PROGRESS NOTES
TRANSPLANT NEPHROLOGY EARLY POST TRANSPLANT VISIT    Assessment & Plan Doing well. Labs Thursday.   # LDKT: Trend down   - Baseline Creatinine: ~ TBD   - Proteinuria: Not checked recently   - Date DSA Last Checked: Jan/2024      Latest DSA: No DSA at time of transplant   - BK Viremia: No   - Kidney Tx Biopsy:  01/02/2024; Result: Acute cellular-mediated rejection, Banff 1B.   Mild glomerulitis and severe peritubular capillaritis, but negative C4d and no DSA.  Mild arterial sclerosis.    - Transplant Ureteral Stent: Yes    # Immunosuppression: Tacrolimus immediate release (goal 8-10), Mycophenolate mofetil (dose 750 mg every 12 hours), and Prednisone (dose taper)   - Induction with Recent Transplant:  Low Intensity Protocol   - Continue with intensive monitoring of immunosuppression for efficacy and toxicity.   - Changes: Not at this time    # Infection Prophylaxis:   - PJP: Sulfa/TMP (Bactrim)  - CMV: Valganciclovir (Valcyte)  Patient is CMV IgG Ab discordant (D+/R-) and will continue on Valcyte x 6 months, then check CMV PCR monthly until 12 months post transplant.      # Hypertension: Borderline control;  Goal BP: < 150/90   - Volume status: Euvolemic  EDW ~ 125.5kg   - Changes: Not at this time    # Elevated Blood Glucose: Glucose generally running ~ 130-160s   - Management as per primary team.    # Anemia in Chronic Renal Disease: Hgb: Stable      TOM: No   - Iron studies: Replete    # Mineral Bone Disorder:    - Secondary renal hyperparathyroidism; PTH level: Significantly elevated (601-1200 pg/ml)        On treatment: None  - Vitamin D; level: Low        On supplement: No  - Calcium; level: Normal        On supplement: No  - Phosphorus; level: Normal        On supplement: No    # Electrolytes:   - Potassium; level: Normal        On supplement: No  - Magnesium; level: Normal        On supplement: No  - Bicarbonate; level: Low normal        On supplement: Yes  - Sodium; level: Normal    # Obesity, Class II (BMI  = 39.7): Stable weight.              - Once patient heals and recovers from surgery, would recommend working on weight loss for overall health by increasing exercise and watching caloric intake.     # H/o Ureteral Reimplantation, s/p Mitrofanoff: Patient reports not using for Mitrofanoff in a couple of years and voids normally on his own.  Mitrofanoff is nonfunctional at this time.     # H/o Bilateral Uveitis: No evidence of systemic autoimmune/inflammatory disease at last Rheumatology.      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    Return visit: Labs Thursday.     CHRISTIN Hernandez CNP    Chief Complaint   Mr. Villa is a 23 year old here for hospital follow up after kidney transplant.     History of Present Illness    Doing well. No concerns.   Eating and drinking well. Pain controlled. LBM yesterday.   Urinating with no issues. No F/C. No CP or SOB.       Problem List   Patient Active Problem List   Diagnosis    Lymphangioma    ADHD (attention deficit hyperactivity disorder)    Allergic rhinitis due to pollen    Anxiety    Congenital posterior urethral valves    Iron deficiency    Neurogenic bladder    Class 2 severe obesity due to excess  calories with serious comorbidity and body mass index (BMI) of 39.0 to 39.9 in adult (H)    Sensorineural hearing loss, asymmetrical    Renovascular hypertension    Secondary renal hyperparathyroidism (H24)    Vitamin D deficiency    Kidney replaced by transplant    Immunosuppressed status (H24)    Acute post-operative pain    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       Allergies   Allergies   Allergen Reactions    Banana Itching     Raw banana; itchy mouth      Dust Mites      Runny nose and watery eyes    Mold      Runny nose and watery eyes    Nsaids Other (See Comments)     CKD    Other [Seasonal Allergies]      Grass, Ragweed - gets runny nose and watery eyes    Sulfa Antibiotics      PN: LW Reaction: GI Upset as an infant  12/28 Admission: Tolerated Bactrim       Medications   Current Outpatient Medications   Medication Sig    acetaminophen (TYLENOL) 325 MG tablet Take 1-2 tablets (325-650 mg) by mouth every 4 hours as needed (For optimal non-opioid multimodal pain management to improve pain control.) (Patient not taking: Reported on 1/6/2024)    amLODIPine (NORVASC) 10 MG tablet Take 1 tablet (10 mg) by mouth daily    aspirin 81 MG EC tablet Take 1 tablet (81 mg) by mouth daily    atorvastatin (LIPITOR) 10 MG tablet Take 1 tablet (10 mg) by mouth daily    carvedilol (COREG) 6.25 MG tablet Take 1 tablet (6.25 mg) by mouth 2 times daily    cetirizine (ZYRTEC) 10 MG tablet Take 10 mg by mouth daily as needed for allergies (Patient not taking: Reported on 1/6/2024)    difluprednate (DUREZOL) 0.05 % ophthalmic emulsion INSTILL 1 DROP INTO EACH EYE SIX TIMES DAILY FOR THE FIRST 24 HOURS. THEN 1 DROP 4 TIMES DAILY UNTIL FOLLOW UP    fluconazole (DIFLUCAN) 100 MG tablet Take 1 tablet (100 mg) by mouth daily    latanoprost (XALATAN) 0.005 % ophthalmic solution Place 1 drop into both eyes at bedtime    Lidocaine (LIDOCARE) 4 % Patch Place 1-2 patches onto the skin every 24 hours To prevent  lidocaine toxicity, patient should be patch free for 12 hrs daily. (Patient not taking: Reported on 1/6/2024)    methocarbamol (ROBAXIN) 750 MG tablet Take 1 tablet (750 mg) by mouth 3 times daily as needed for muscle spasms    methylphenidate (RITALIN) 20 MG tablet Take 20 mg by mouth as needed Prn    mycophenolate (GENERIC EQUIVALENT) 250 MG capsule Take 3 capsules (750 mg) by mouth 2 times daily    pantoprazole (PROTONIX) 40 MG EC tablet Take 1 tablet (40 mg) by mouth every morning (before breakfast)    predniSONE (DELTASONE) 20 MG tablet Take 2 tablets (40 mg) by mouth 2 times daily for 2 days, THEN 2 tablets (40 mg) daily for 3 days, THEN 1 tablet (20 mg) daily for 3 days.    Probiotic Product (PROBIOTIC-10 PO) Take 1 tablet by mouth every morning 1 tab capsule    senna-docusate (SENOKOT-S/PERICOLACE) 8.6-50 MG tablet Take 1 tablet by mouth 2 times daily as needed for constipation    sodium bicarbonate 650 MG tablet Take 2 tablets (1,300 mg) by mouth 3 times daily    sulfamethoxazole-trimethoprim (BACTRIM) 400-80 MG tablet Take 1 tablet by mouth daily    tacrolimus (GENERIC) 1 MG capsule Total dose = 8 mg every AM and 9 mg every PM    tacrolimus (GENERIC) 5 MG capsule Take 1 capsule (5 mg) by mouth 2 times daily Total dose = 8 mg every AM and 9 mg every PM    valGANciclovir (VALCYTE) 450 MG tablet Take 1 tablet (450 mg) by mouth daily Increase dose to 900 mg by mouth daily as directed by transplant team (with improving kidney function)    Vitamin D3 (CHOLECALCIFEROL) 25 mcg (1000 units) tablet Take 2 tablets (50 mcg) by mouth daily     No current facility-administered medications for this visit.     Facility-Administered Medications Ordered in Other Visits   Medication    heparin Lock (1000 units/mL High concentration) 2,200 Units     There are no discontinued medications.    Physical Exam   Vital Signs: There were no vitals taken for this visit.    GENERAL APPEARANCE: alert and no distress  HENT: mouth without  ulcers or lesions  RESP: lungs clear to auscultation - no rales, rhonchi or wheezes  CV: regular rhythm, normal rate, no rub, no murmur  EDEMA: no LE edema bilaterally  ABDOMEN: soft, nondistended, nontender, bowel sounds normal  MS: extremities normal - no gross deformities noted, no evidence of inflammation in joints, no muscle tenderness  SKIN: no rash  SKIN: incision CDI with staples - no erythema or drainage.     Data         Latest Ref Rng & Units 1/8/2024     8:04 AM 1/7/2024     7:50 AM 1/6/2024     7:10 AM   Renal   Sodium 135 - 145 mmol/L 139  140  140    K 3.4 - 5.3 mmol/L 4.6  4.1  4.1    Cl 98 - 107 mmol/L 111  110  111    Cl (external) 98 - 107 mmol/L 111  110  111    CO2 22 - 29 mmol/L 20  20  19    Urea Nitrogen 6.0 - 20.0 mg/dL 43.0  51.9  64.4    Creatinine 0.67 - 1.17 mg/dL 1.94  2.29  2.82    Glucose 70 - 99 mg/dL 159  134  168    Calcium 8.6 - 10.0 mg/dL 8.9  8.7  8.6    Magnesium 1.7 - 2.3 mg/dL 2.1  2.0  2.3          Latest Ref Rng & Units 1/8/2024     8:04 AM 1/7/2024     7:50 AM 1/6/2024     7:10 AM   Bone Health   Phosphorus 2.5 - 4.5 mg/dL 2.5  2.6  2.8    Parathyroid Hormone Intact 15 - 65 pg/mL  746     Vit D Def 20 - 50 ng/mL  15           Latest Ref Rng & Units 1/8/2024     8:04 AM 1/7/2024     7:50 AM 1/6/2024     7:10 AM   Heme   WBC 4.0 - 11.0 10e3/uL 10.5  7.2  3.5    Hgb 13.3 - 17.7 g/dL 9.7  9.8  9.9    Plt 150 - 450 10e3/uL 244  230  222    ABSOLUTE NEUTROPHIL 1.6 - 8.3 10e3/uL 9.9  6.3  3.0    ABSOLUTE LYMPHOCYTES 0.8 - 5.3 10e3/uL 0.2  0.4  0.3    ABSOLUTE MONOCYTES 0.0 - 1.3 10e3/uL 0.0  0.4  0.1    ABSOLUTE EOSINOPHILS 0.0 - 0.7 10e3/uL 0.0  0.1  0.0          Latest Ref Rng & Units 12/19/2023     9:53 AM 7/13/2023     1:28 PM 7/16/2021     4:50 PM   Liver   AP 40 - 150 U/L 67      AP (external) 40 - 150 U/L  45     TBili <=1.2 mg/dL 0.5      TBili (external) 0.2 - 1.2 mg/dL  0.6     ALT 0 - 70 U/L 41      ALT (external) <=55 U/L  35     AST 0 - 45 U/L 27      AST (external)  10 - 40 U/L  26     Tot Protein 6.4 - 8.3 g/dL 7.6      Tot Protein (external) 6.4 - 8.3 g/dL  7.5     Albumin 3.5 - 5.0 g/dL   3.0    Albumin 3.5 - 5.2 g/dL 4.4      Albumin (external) 3.5 - 5.0 g/dL  3.8           Latest Ref Rng & Units 12/19/2023     9:53 AM 7/13/2023     1:28 PM   Pancreas   A1C <5.7 % 5.2     A1C (external) <=5.6 %  4.9          Latest Ref Rng & Units 1/7/2024     7:50 AM 12/19/2023     9:53 AM 2/26/2020     3:20 PM   Iron studies   Iron 61 - 157 ug/dL 167  104  66    Iron Saturation Index 15 - 46 %   26    Iron Sat Index 15 - 46 % 79  42     Ferritin 31 - 409 ng/mL 783  826  133          Latest Ref Rng & Units 12/19/2023     9:53 AM 3/29/2021     1:33 PM   [facility] Virology   EBV CAPSID ANTIBODY IGG No detectable antibody. No detectable antibody.  <0.2    Hep B Core NR^Nonreactive  Nonreactive        Recent Labs   Lab Test 12/31/23  0602 01/02/24  0554 01/04/24  0556 01/06/24  0710 01/07/24  0750   DOSTAC  --   --   --   --  1/6/2024   TACROL 1.5*   < > 4.4* 6.0 8.7    < > = values in this interval not displayed.

## 2024-01-09 ENCOUNTER — TELEPHONE (OUTPATIENT)
Dept: TRANSPLANT | Facility: CLINIC | Age: 24
End: 2024-01-09
Payer: COMMERCIAL

## 2024-01-09 ENCOUNTER — CARE COORDINATION (OUTPATIENT)
Dept: NEPHROLOGY | Facility: CLINIC | Age: 24
End: 2024-01-09
Payer: COMMERCIAL

## 2024-01-09 NOTE — PROGRESS NOTES
Pt s/p kidney txp on 12/28/23.  Graduated from Dialysis Access Care Coordination, resolved episode and removed Dialysis Access Care Coordinators from care team.    MALCOLM ZIMMERMAN RN on 1/9/2024 at 5:16 PM  Dialysis Access Care Coordinator  Phone: [phone]  Pool: P_Dialysis_Access_Nurse

## 2024-01-09 NOTE — TELEPHONE ENCOUNTER
Called [facility]    Fevers , Weight 283  /97   -- medications   Incision  -- good late   Water  120 L  --    Eating - yes   Nausea vomiting    Pain -- no pain    [facility]  --  bonus  -     1 week Kidney and Pancreas Transplant Patient Phone Call  Transplant Education ([email or web address])    Please review with the patient how to contact the Transplant Center:  Best phone contact is [phone], as if the need is urgent, any care coordinator will be able to assist you.  *THIS PHONE NUMBER IS ANSWERED 24/7.*  Coordinators are available 8am-5pm M-F, otherwise there will be an on-call RN available.  If the emergency room is necessary, please present to the [facility] ER (not a local emergency room).  When to contact the Transplant Center:  Fever > 100.5F  If BP drops (symptoms = dizziness, lightheadedness)  If your daily weight drops >5lbs   If you are unable to take your immunosuppression medications.    Medications:  Please review medications, update MAR if needed.   Ensure the patient has an up to date medication card at home and is knowledgeable in updating their med card (or has someone to assist in this).    *ALWAYS BRING YOUR MED CARD TO APPOINTMENTS.*    Confirm which pharmacy the patient will use to fill medications:       *ALWAYS CONTACT YOUR PHARMACY FIRST FOR REFILLS*    Labs:  Please review how to record lab values OR how to review lab results on [email or web address].   *Labs are expected to be drawn  for the first month of transplant.  It is recommended that you take a copy of your lab letter to each lab draw.    If the patient does not have a copy of their lab letter, please send one now.       Vitals:  Ensure the patient is recording the following:  BP - 2x daily  Temperature - daily  Weight - daily  IF DIABETIC / PANCREAS:   Blood sugar, 4x daily  Daily fasting blood sugar    Follow Up:  Review current scheduled follow up appointments with surgeon and nephrology.  Follow up with your PCP within 1 month  post-transplant.

## 2024-01-10 ENCOUNTER — TELEPHONE (OUTPATIENT)
Dept: TRANSPLANT | Facility: CLINIC | Age: 24
End: 2024-01-10
Payer: COMMERCIAL

## 2024-01-10 NOTE — TELEPHONE ENCOUNTER
---- Message -----   From: Abhilash Raza MD   Sent: 1/8/2024  12:51 PM CST   To: CHRISTIN Hernandez CNP; Nina Hu RN   [facility]                                         Would maybe repeat DSA in 2 weeks and if still negative and stable creatinine, he can get his line out.

## 2024-01-10 NOTE — TELEPHONE ENCOUNTER
Post Kidney and Pancreas Transplant Team Conference  Date: 1/10/2024  Transplant Coordinator: Nina Hu     Attendees:  [x]  Dr. Raza [x] Nina Hu, RN [x] Moni Davis LPN     [x]  Dr. Barrera [x] Sharron Maharaj, RN [] Eri Yoder LPN    [x] Dr. Richard [x] Arely Dennis RN    [x] Dr. Dexter [x] Janine Betancourt RN [] Kirsten Nix RN   [x] Dr. Howard [] Taitana Ware, RN    [] Dr. Raya [] Baylee Azar RN    []  Dr. Castillo [] Adwoa Randhawa RN    [] Dr. Gold [] Deven Hernández RN    [x] Ivis Monzon NP [] Dyan Gross RN    [x] Clare Prasad NP [] Heidy Still RN        Verbal Plan Read Back:   Continue current treatment plan    Routed to RN Coordinator   Moni Davis LPN

## 2024-01-11 ENCOUNTER — TELEPHONE (OUTPATIENT)
Dept: TRANSPLANT | Facility: CLINIC | Age: 24
End: 2024-01-11

## 2024-01-11 ENCOUNTER — LAB (OUTPATIENT)
Dept: LAB | Facility: CLINIC | Age: 24
End: 2024-01-11
Payer: COMMERCIAL

## 2024-01-11 DIAGNOSIS — Z94.0 KIDNEY TRANSPLANTED: ICD-10-CM

## 2024-01-11 DIAGNOSIS — Z48.298 AFTERCARE FOLLOWING ORGAN TRANSPLANT: ICD-10-CM

## 2024-01-11 DIAGNOSIS — Z98.890 OTHER SPECIFIED POSTPROCEDURAL STATES: ICD-10-CM

## 2024-01-11 DIAGNOSIS — Z20.828 CONTACT WITH AND (SUSPECTED) EXPOSURE TO OTHER VIRAL COMMUNICABLE DISEASES: ICD-10-CM

## 2024-01-11 DIAGNOSIS — Z79.899 ENCOUNTER FOR LONG-TERM CURRENT USE OF MEDICATION: ICD-10-CM

## 2024-01-11 DIAGNOSIS — Z94.0 KIDNEY REPLACED BY TRANSPLANT: ICD-10-CM

## 2024-01-11 LAB
ANION GAP SERPL CALCULATED.3IONS-SCNC: 9 MMOL/L (ref 7–15)
BUN SERPL-MCNC: 28.9 MG/DL (ref 6–20)
CALCIUM SERPL-MCNC: 8.7 MG/DL (ref 8.6–10)
CHLORIDE SERPL-SCNC: 114 MMOL/L (ref 98–107)
CMV DNA SPEC NAA+PROBE-ACNC: NOT DETECTED IU/ML
CREAT SERPL-MCNC: 1.78 MG/DL (ref 0.67–1.17)
DEPRECATED HCO3 PLAS-SCNC: 19 MMOL/L (ref 22–29)
EGFRCR SERPLBLD CKD-EPI 2021: 54 ML/MIN/1.73M2
ERYTHROCYTE [DISTWIDTH] IN BLOOD BY AUTOMATED COUNT: 13.8 % (ref 10–15)
GLUCOSE SERPL-MCNC: 123 MG/DL (ref 70–99)
HCT VFR BLD AUTO: 30.4 % (ref 40–53)
HGB BLD-MCNC: 10.4 G/DL (ref 13.3–17.7)
MAGNESIUM SERPL-MCNC: 1.7 MG/DL (ref 1.7–2.3)
MCH RBC QN AUTO: 33 PG (ref 26.5–33)
MCHC RBC AUTO-ENTMCNC: 34.2 G/DL (ref 31.5–36.5)
MCV RBC AUTO: 97 FL (ref 78–100)
MYCOPHENOLATE SERPL LC/MS/MS-MCNC: 3.79 MG/L (ref 1–3.5)
MYCOPHENOLATE-G SERPL LC/MS/MS-MCNC: 71.7 MG/L (ref 30–95)
PHOSPHATE SERPL-MCNC: 1.9 MG/DL (ref 2.5–4.5)
PLATELET # BLD AUTO: 249 10E3/UL (ref 150–450)
POTASSIUM SERPL-SCNC: 4.4 MMOL/L (ref 3.4–5.3)
RBC # BLD AUTO: 3.15 10E6/UL (ref 4.4–5.9)
SODIUM SERPL-SCNC: 142 MMOL/L (ref 135–145)
TACROLIMUS BLD-MCNC: 10.4 UG/L (ref 5–15)
TME LAST DOSE: ABNORMAL H
TME LAST DOSE: ABNORMAL H
TME LAST DOSE: NORMAL H
TME LAST DOSE: NORMAL H
WBC # BLD AUTO: 9.7 10E3/UL (ref 4–11)

## 2024-01-11 PROCEDURE — 86833 HLA CLASS II HIGH DEFIN QUAL: CPT | Performed by: INTERNAL MEDICINE

## 2024-01-11 PROCEDURE — 80048 BASIC METABOLIC PNL TOTAL CA: CPT | Performed by: PATHOLOGY

## 2024-01-11 PROCEDURE — 99000 SPECIMEN HANDLING OFFICE-LAB: CPT | Performed by: PATHOLOGY

## 2024-01-11 PROCEDURE — 80197 ASSAY OF TACROLIMUS: CPT | Performed by: INTERNAL MEDICINE

## 2024-01-11 PROCEDURE — 36415 COLL VENOUS BLD VENIPUNCTURE: CPT | Performed by: PATHOLOGY

## 2024-01-11 PROCEDURE — 84100 ASSAY OF PHOSPHORUS: CPT | Performed by: PATHOLOGY

## 2024-01-11 PROCEDURE — 86828 HLA CLASS I&II ANTIBODY QUAL: CPT | Performed by: INTERNAL MEDICINE

## 2024-01-11 PROCEDURE — 87799 DETECT AGENT NOS DNA QUANT: CPT | Performed by: INTERNAL MEDICINE

## 2024-01-11 PROCEDURE — 80180 DRUG SCRN QUAN MYCOPHENOLATE: CPT | Performed by: INTERNAL MEDICINE

## 2024-01-11 PROCEDURE — 83735 ASSAY OF MAGNESIUM: CPT | Performed by: PATHOLOGY

## 2024-01-11 PROCEDURE — 85027 COMPLETE CBC AUTOMATED: CPT | Performed by: PATHOLOGY

## 2024-01-11 PROCEDURE — 86832 HLA CLASS I HIGH DEFIN QUAL: CPT | Performed by: INTERNAL MEDICINE

## 2024-01-12 LAB — EBV DNA # SPEC NAA+PROBE: NOT DETECTED COPIES/ML

## 2024-01-15 ENCOUNTER — LAB (OUTPATIENT)
Dept: LAB | Facility: CLINIC | Age: 24
End: 2024-01-15
Payer: COMMERCIAL

## 2024-01-15 ENCOUNTER — INFUSION THERAPY VISIT (OUTPATIENT)
Dept: INFUSION THERAPY | Facility: CLINIC | Age: 24
End: 2024-01-15
Attending: INTERNAL MEDICINE
Payer: COMMERCIAL

## 2024-01-15 DIAGNOSIS — N18.6 END STAGE KIDNEY DISEASE (H): ICD-10-CM

## 2024-01-15 DIAGNOSIS — Z94.0 KIDNEY REPLACED BY TRANSPLANT: Primary | ICD-10-CM

## 2024-01-15 DIAGNOSIS — Z20.828 CONTACT WITH AND (SUSPECTED) EXPOSURE TO OTHER VIRAL COMMUNICABLE DISEASES: ICD-10-CM

## 2024-01-15 DIAGNOSIS — Z94.0 KIDNEY REPLACED BY TRANSPLANT: Primary | Chronic | ICD-10-CM

## 2024-01-15 DIAGNOSIS — Z94.0 KIDNEY REPLACED BY TRANSPLANT: ICD-10-CM

## 2024-01-15 DIAGNOSIS — Z48.298 AFTERCARE FOLLOWING ORGAN TRANSPLANT: ICD-10-CM

## 2024-01-15 DIAGNOSIS — Z98.890 OTHER SPECIFIED POSTPROCEDURAL STATES: ICD-10-CM

## 2024-01-15 DIAGNOSIS — Z79.899 ENCOUNTER FOR LONG-TERM CURRENT USE OF MEDICATION: ICD-10-CM

## 2024-01-15 LAB
ANION GAP SERPL CALCULATED.3IONS-SCNC: 8 MMOL/L (ref 7–15)
BUN SERPL-MCNC: 18.1 MG/DL (ref 6–20)
CALCIUM SERPL-MCNC: 9.3 MG/DL (ref 8.6–10)
CHLORIDE SERPL-SCNC: 109 MMOL/L (ref 98–107)
CREAT SERPL-MCNC: 1.59 MG/DL (ref 0.67–1.17)
DEPRECATED HCO3 PLAS-SCNC: 20 MMOL/L (ref 22–29)
EGFRCR SERPLBLD CKD-EPI 2021: 62 ML/MIN/1.73M2
ERYTHROCYTE [DISTWIDTH] IN BLOOD BY AUTOMATED COUNT: 13.8 % (ref 10–15)
GLUCOSE SERPL-MCNC: 101 MG/DL (ref 70–99)
HCT VFR BLD AUTO: 31.2 % (ref 40–53)
HGB BLD-MCNC: 10.6 G/DL (ref 13.3–17.7)
MCH RBC QN AUTO: 32.8 PG (ref 26.5–33)
MCHC RBC AUTO-ENTMCNC: 34 G/DL (ref 31.5–36.5)
MCV RBC AUTO: 97 FL (ref 78–100)
PLATELET # BLD AUTO: 201 10E3/UL (ref 150–450)
POTASSIUM SERPL-SCNC: 5.3 MMOL/L (ref 3.4–5.3)
RBC # BLD AUTO: 3.23 10E6/UL (ref 4.4–5.9)
SODIUM SERPL-SCNC: 137 MMOL/L (ref 135–145)
TACROLIMUS BLD-MCNC: 5.6 UG/L (ref 5–15)
TME LAST DOSE: NORMAL H
TME LAST DOSE: NORMAL H
WBC # BLD AUTO: 9.6 10E3/UL (ref 4–11)

## 2024-01-15 PROCEDURE — 250N000011 HC RX IP 250 OP 636: Performed by: INTERNAL MEDICINE

## 2024-01-15 PROCEDURE — 99000 SPECIMEN HANDLING OFFICE-LAB: CPT | Performed by: PATHOLOGY

## 2024-01-15 PROCEDURE — 250N000011 HC RX IP 250 OP 636: Performed by: NURSE PRACTITIONER

## 2024-01-15 PROCEDURE — 36415 COLL VENOUS BLD VENIPUNCTURE: CPT | Performed by: PATHOLOGY

## 2024-01-15 PROCEDURE — 80197 ASSAY OF TACROLIMUS: CPT | Performed by: INTERNAL MEDICINE

## 2024-01-15 PROCEDURE — 85027 COMPLETE CBC AUTOMATED: CPT | Performed by: PATHOLOGY

## 2024-01-15 PROCEDURE — 80048 BASIC METABOLIC PNL TOTAL CA: CPT | Performed by: PATHOLOGY

## 2024-01-15 RX ORDER — EPINEPHRINE 1 MG/ML
0.3 INJECTION, SOLUTION INTRAMUSCULAR; SUBCUTANEOUS EVERY 5 MIN PRN
Status: CANCELLED | OUTPATIENT
Start: 2024-01-22

## 2024-01-15 RX ORDER — HEPARIN SODIUM 1000 [USP'U]/ML
2300 INJECTION, SOLUTION INTRAVENOUS; SUBCUTANEOUS ONCE
Status: COMPLETED | OUTPATIENT
Start: 2024-01-15 | End: 2024-01-15

## 2024-01-15 RX ORDER — HEPARIN SODIUM 1000 [USP'U]/ML
2300 INJECTION, SOLUTION INTRAVENOUS; SUBCUTANEOUS EVERY 24 HOURS
Status: CANCELLED
Start: 2024-01-22

## 2024-01-15 RX ORDER — HEPARIN SODIUM 1000 [USP'U]/ML
2200 INJECTION, SOLUTION INTRAVENOUS; SUBCUTANEOUS EVERY 24 HOURS
Status: DISCONTINUED | OUTPATIENT
Start: 2024-01-15 | End: 2024-01-15 | Stop reason: HOSPADM

## 2024-01-15 RX ORDER — TACROLIMUS 1 MG/1
4 CAPSULE ORAL 2 TIMES DAILY
Qty: 240 CAPSULE | Refills: 11 | Status: SHIPPED | OUTPATIENT
Start: 2024-01-15 | End: 2024-01-31

## 2024-01-15 RX ORDER — HEPARIN SODIUM 1000 [USP'U]/ML
2300 INJECTION, SOLUTION INTRAVENOUS; SUBCUTANEOUS ONCE
Status: CANCELLED
Start: 2024-01-15 | End: 2024-01-15

## 2024-01-15 RX ORDER — TACROLIMUS 5 MG/1
5 CAPSULE ORAL 2 TIMES DAILY
Qty: 60 CAPSULE | Refills: 11 | Status: SHIPPED | OUTPATIENT
Start: 2024-01-15 | End: 2024-01-31

## 2024-01-15 RX ORDER — DIPHENHYDRAMINE HYDROCHLORIDE 50 MG/ML
50 INJECTION INTRAMUSCULAR; INTRAVENOUS
Status: CANCELLED
Start: 2024-01-22

## 2024-01-15 RX ORDER — HEPARIN SODIUM (PORCINE) LOCK FLUSH IV SOLN 100 UNIT/ML 100 UNIT/ML
5 SOLUTION INTRAVENOUS
Status: CANCELLED | OUTPATIENT
Start: 2024-01-22

## 2024-01-15 RX ORDER — HEPARIN SODIUM 1000 [USP'U]/ML
2300 INJECTION, SOLUTION INTRAVENOUS; SUBCUTANEOUS ONCE
Status: CANCELLED
Start: 2024-01-22 | End: 2024-01-22

## 2024-01-15 RX ORDER — METHYLPREDNISOLONE SODIUM SUCCINATE 125 MG/2ML
125 INJECTION, POWDER, LYOPHILIZED, FOR SOLUTION INTRAMUSCULAR; INTRAVENOUS
Status: CANCELLED
Start: 2024-01-22

## 2024-01-15 RX ORDER — HEPARIN SODIUM 1000 [USP'U]/ML
2300 INJECTION, SOLUTION INTRAVENOUS; SUBCUTANEOUS EVERY 24 HOURS
Status: DISCONTINUED | OUTPATIENT
Start: 2024-01-15 | End: 2024-01-15 | Stop reason: HOSPADM

## 2024-01-15 RX ORDER — HEPARIN SODIUM 1000 [USP'U]/ML
2200 INJECTION, SOLUTION INTRAVENOUS; SUBCUTANEOUS EVERY 24 HOURS
Status: CANCELLED
Start: 2024-01-22

## 2024-01-15 RX ORDER — HEPARIN SODIUM,PORCINE 10 UNIT/ML
5-20 VIAL (ML) INTRAVENOUS DAILY PRN
Status: CANCELLED | OUTPATIENT
Start: 2024-01-22

## 2024-01-15 RX ORDER — ALBUTEROL SULFATE 90 UG/1
1-2 AEROSOL, METERED RESPIRATORY (INHALATION)
Status: CANCELLED
Start: 2024-01-22

## 2024-01-15 RX ORDER — ALBUTEROL SULFATE 0.83 MG/ML
2.5 SOLUTION RESPIRATORY (INHALATION)
Status: CANCELLED | OUTPATIENT
Start: 2024-01-22

## 2024-01-15 RX ADMIN — HEPARIN SODIUM 2200 UNITS: 1000 INJECTION, SOLUTION INTRAVENOUS; SUBCUTANEOUS at 11:01

## 2024-01-15 RX ADMIN — HEPARIN SODIUM 2300 UNITS: 1000 INJECTION, SOLUTION INTRAVENOUS; SUBCUTANEOUS at 11:03

## 2024-01-15 NOTE — PROGRESS NOTES
Patient here for weekly dialysis line dressing change and flush per orders from Dr ESSIE Raza & Clare Prasad, APRN, CNP. Dialysis line to right upper chest covered with Primapore upon arrival to [facility]. Patient states his dressing came off at home on Saturday so he covered it with the Primapore. Educated patient on need to have staff change dressing in a timely manner if dressing is soiled or comes off. Patient verbalized understanding. No s/s of infection noted to site. Dressing changed and each lumen flushed/caps changed per orders without difficulty (see flowsheets for more information). Patient discharged home in stable condition with his mother and will return in one week for weekly dressing change/flush.     Administrations This Visit       heparin Lock (1000 units/mL High concentration) 2,200 Units       Admin Date  01/15/2024 Action  $Given Dose  2,200 Units Route  Intracatheter Documented By  Jason Tariq RN              heparin Lock (1000 units/mL High concentration) 2,300 Units       Admin Date  01/15/2024 Action  $Given Dose  2,300 Units Route  Intracatheter Documented By  Jason Tariq RN                  Drug Waste Record: Yes      Drug Name: Heparin  Dose: 4500 Units  Route administered: IV  NDC #:  30416-127-71  Amount of waste(mL): 5.5 ml  Reason for waste: Single use vial    Jason Tariq RN

## 2024-01-15 NOTE — TELEPHONE ENCOUNTER
Spoke to Patient and confirmed this was an accurate 12-hour trough.   Verified Tacrolimus IR dose 8 mg QAM, 9 mg QPM.   Confirmed no new medications or illness.   Confirmed no missed doses.   Patient confirms increase Tacrolimus IR dose to 9 mg BID

## 2024-01-15 NOTE — TELEPHONE ENCOUNTER
ISSUE:   Tacrolimus IR level 5.6 on 1.15.2024, goal 8-10, dose 8 mg QAM, 9 mg QPM  Prior 3 levels were at goal.    PLAN/LPN TASK:   Call Patientand confirm this was an accurate 12-hour trough.   Verify Tacrolimus IR dose 8 mg QAM, 9 mg QPM.   Confirm no new medications or illness.   Confirm no missed doses.   If accurate trough and accurate dose, increase Tacrolimus IR dose to 9 mg BID     Is this more than a 50% increase or decrease in current IS dose: No  If YES, justification: n/a    Repeat labs in 3 days.  *If > 50% change in immunosuppression dose, repeat labs in 1 week.

## 2024-01-17 ENCOUNTER — TELEPHONE (OUTPATIENT)
Dept: TRANSPLANT | Facility: CLINIC | Age: 24
End: 2024-01-17
Payer: COMMERCIAL

## 2024-01-17 ENCOUNTER — VIRTUAL VISIT (OUTPATIENT)
Dept: PHARMACY | Facility: CLINIC | Age: 24
End: 2024-01-17
Attending: INTERNAL MEDICINE
Payer: COMMERCIAL

## 2024-01-17 DIAGNOSIS — Z78.9 TAKES DIETARY SUPPLEMENTS: ICD-10-CM

## 2024-01-17 DIAGNOSIS — E78.5 DYSLIPIDEMIA: ICD-10-CM

## 2024-01-17 DIAGNOSIS — Z94.0 KIDNEY REPLACED BY TRANSPLANT: Primary | Chronic | ICD-10-CM

## 2024-01-17 DIAGNOSIS — K21.9 GASTROESOPHAGEAL REFLUX DISEASE, UNSPECIFIED WHETHER ESOPHAGITIS PRESENT: ICD-10-CM

## 2024-01-17 DIAGNOSIS — I15.1 HTN, KIDNEY TRANSPLANT RELATED: ICD-10-CM

## 2024-01-17 DIAGNOSIS — G89.18 ACUTE POST-OPERATIVE PAIN: ICD-10-CM

## 2024-01-17 DIAGNOSIS — Z94.0 HTN, KIDNEY TRANSPLANT RELATED: ICD-10-CM

## 2024-01-17 DIAGNOSIS — F90.9 ATTENTION DEFICIT HYPERACTIVITY DISORDER (ADHD), UNSPECIFIED ADHD TYPE: ICD-10-CM

## 2024-01-17 DIAGNOSIS — J30.1 SEASONAL ALLERGIC RHINITIS DUE TO POLLEN: ICD-10-CM

## 2024-01-17 PROCEDURE — 99207 PR NO CHARGE LOS: CPT | Mod: 93 | Performed by: PHARMACIST

## 2024-01-17 RX ORDER — CARVEDILOL 12.5 MG/1
12.5 TABLET ORAL 2 TIMES DAILY
Qty: 180 TABLET | Refills: 3 | Status: SHIPPED | OUTPATIENT
Start: 2024-01-17 | End: 2024-05-02

## 2024-01-17 NOTE — Clinical Note
Make sure we get magnesium and phosphorus levels at labs tomorrow- both were downtrending and patient has new nightly cramp symptoms (holding statin)  Other changes I made today:  1. Increase Valcyte (Valganciclovir) 2 tablets (900mg) once daily.  2. Decrease Pantoprazole 40mg every other day until you run out.  3. Move Amlodipine 10mg every evening, it will work a little better for BP.  4. Hold Atorvastatin for 2 weeks, see if your cramping improves.  5. Increase Phosphorus intake (Cheese, dairy, eggs, nuts, meats, diet lucero). 6. Increase Carvedilol to 12.5mg twice daily

## 2024-01-17 NOTE — PATIENT INSTRUCTIONS
"Recommendations from today's MTM visit:                                                       Increase Valcyte (Valganciclovir) 2 tablets (900mg) once daily.   Decrease Pantoprazole 40mg every other day until you run out.   Move Amlodipine 10mg every evening, it will work a little better for BP.   Hold Atorvastatin for 2 weeks, see if your cramping improves.   Increase Phosphorus intake (Cheese, dairy, eggs, nuts, meats, diet lucero).  Increase Carvedilol to 12.5mg twice daily     Follow-up: 3/6 at 11 AM    It was great speaking with you today.  I value your experience and would be very thankful for your time in providing feedback in our clinic survey. In the next few days, you may receive an email or text message from [employer] with a link to a survey related to your  clinical pharmacist.\"     To schedule another MTM appointment, please call the clinic directly or you may call the MTM scheduling line at [phone] or toll-free at [phone].     My Clinical Pharmacist's contact information:                                                      Please feel free to contact me with any questions or concerns you have.      Eligio Bell, PharmD  MTM Pharmacist    Phone: [phone]     "

## 2024-01-17 NOTE — PROGRESS NOTES
Medication Therapy Management (MTM) Encounter    ASSESSMENT:                            Medication Adherence/Access: No issues identified    Kidney Transplant:    Valcyte dosed too lower per current kidney function. Will increase dose to 900mg daily.     Supplements:   Pt will be checking phosphorus and magnesium levels tomorrow. Recommended he push high phos foods.     GERD:   PPI no longer indicated as he has no symptoms. Will taper off.     Hypertension   BP not at goal <150/90 for first 2 months post txp. Will move Amlodipine to the evening for better efficacy and increase Carvedilol dose.     Hyperlipidemia   Patient has new muscle cramps- could be from low phosphorus or low magnesium as both were declining with last labs. We will hold Atorvastatin to rule it out as a cause.     Allergic Rhinitis:   Stable.     Pain:   Stable.     ADHD:   Stable.     PLAN:                            Increase Valcyte (Valganciclovir) 2 tablets (900mg) once daily.   Decrease Pantoprazole 40mg every other day until you run out.   Move Amlodipine 10mg every evening, it will work a little better for BP.   Hold Atorvastatin for 2 weeks, see if your cramping improves.   Increase Phosphorus intake (Cheese, dairy, eggs, nuts, meats, diet lucero).  Increase Carvedilol to 12.5mg twice daily     Follow-up: 3/6 at 11 AM    SUBJECTIVE/OBJECTIVE:                          Boogie Villa is a 23 year old male called for a transitions of care visit. He was discharged from [facility] on 1/5 for kidney txp.      Reason for visit: initial post txp.    Allergies/ADRs: Reviewed in chart  Past Medical History: Reviewed in chart  Tobacco: He reports that he has never smoked. He has never been exposed to tobacco smoke. He has never used smokeless tobacco.  Alcohol: not currently using  THC/CBD: none    Medication Adherence/Access: Patient uses pill box(es) and uses reminders/alarms.  Patient takes medications 2 time(s) per day. 10am and pm  Per patient,  misses medication 0 times per week.   The patient fills medications at [place]: NO, fills medications at [facility].    Kidney Transplant:    Tacrolimus 9 mg twice daily  Mycophenolate Mofetil 750 mg twice daily.   Tac booster: Fluconazole 100mg daily  Pt reports no side effects  Transplant date: 12/28/23  Vascular Prophylaxis: Aspirin 81mg daily. Denies gastrointestinal bleed sx. .  CMV prophylaxis: CrCl 40 to 59 mL/minute: Valcyte 450 mg once daily Donor (+), Recipient (-), treat 6 months post tx   PJP prophylaxis: Bactrim SS daily  PPI use: Pantoprazole 40mg daily. Denies GERD sx.   Tx Coordinator: Nina Hu Tx MD: Dr. Richard, Using Med Card: Yes  Immunizations: annual flu shot 2023; Pneumovax 23:  unknown; Prevnar 20: unknown; TDaP:  2023; Shingrix: unknown, HBV: immunity per last titers, just barely over 12 surface antibody, COVID: Primary x 2 and Bivalent x 1    Estimated Creatinine Clearance: 98.6 mL/min (A) (based on SCr of 1.59 mg/dL (H)).    Supplements:   Sodium Bicarbonate 1300mg 3 TIMES DAILY.   Vitamin D3 50mcg daily.  Probiotic daily.   Appetite has been good, does not know what foods are high in phos.   Lab Results   Component Value Date    MAG 1.7 01/11/2024    CO2 20 (L) 01/15/2024    PHOS 1.9 (L) 01/11/2024   Vitamin D Deficiency Screening Results:  Lab Results   Component Value Date    VITDT 15 (L) 01/07/2024    VITDT 10 (L) 12/19/2023    VITDT 24 02/26/2020    VITDT 13 (L) 12/04/2018    VITDT 16 (L) 06/08/2018     GERD:   Pantoprazole 40 mg once daily   Patient reports no current symptoms. Had some GERD sx in the hospital, but none since.     Hypertension   Amlodipine 10mg every morning.   Carvedilol 6.25mg twice daily  Patient reports no current medication side effects  Patient self monitors blood pressure.  Home BP monitoring 140-150s/90s, 145/101 this morning, pulse 96 .     BP Readings from Last 3 Encounters:   01/06/24 (!) 143/91   01/05/24 (!) 150/92   12/19/23 131/85     Pulse Readings  from Last 3 Encounters:   01/06/24 85   01/05/24 83   12/19/23 67     Hyperlipidemia   Atorvastatin 10mg daily  Patient reports cramps at night last 4 days. 3 L daily.  The ASCVD Risk score (Camilo RICHARD, et al., 2019) failed to calculate for the following reasons:    The 2019 ASCVD risk score is only valid for ages 40 to 79   Recent Labs   Lab Test 12/04/18  1054   CHOL 239*   HDL 40*   *   TRIG 209*     Allergic Rhinitis:   cetirizine 10 mg once daily  Patient reports no current medication side effects.    Primary symptoms are runny nose.  Primary triggers are mold/pollens in the spring.   Patient feels that current therapy is effective.     Pain:   Acetaminophen 650mg every other day PRN.  Methocarbamol 750mg for cramps at night.  1-2/10 pain.     ADHD:   Has not been using Methylphenidate since txp, uses when he is working.  Uses about three times per week.     Today's Vitals: There were no vitals taken for this visit.  ----------------  Post Discharge Medication Reconciliation Status: discharge medications reconciled and changed, per note/orders.    I spent 25 minutes with this patient today. All changes were made via collaborative practice agreement with Dr. Richard. A copy of the visit note was provided to the patient's provider(s).    A summary of these recommendations was sent via [employer].    Eligio Bell, PharmD  MTM Pharmacist    Phone: [phone]     Telemedicine Visit Details  Type of service:  Telephone visit  Start Time: 9:58 AM  End Time: 10:23 AM     Medication Therapy Recommendations  Dyslipidemia    Current Medication: atorvastatin (LIPITOR) 10 MG tablet   Rationale: Undesirable effect - Adverse medication event - Safety   Recommendation: Discontinue Medication   Status: Accepted per CPA         Gastroesophageal reflux disease, unspecified whether esophagitis present    Current Medication: pantoprazole (PROTONIX) 40 MG EC tablet   Rationale: No medical indication at this time - Unnecessary  medication therapy - Indication   Recommendation: Discontinue Medication   Status: Accepted per CPA         HTN, kidney transplant related    Current Medication: amLODIPine (NORVASC) 10 MG tablet   Rationale: Incorrect administration - Dosage too low - Effectiveness   Recommendation: Change Administration Time   Status: Accepted - no CPA Needed          Current Medication: carvedilol (COREG) 6.25 MG tablet (Discontinued)   Rationale: Dose too low - Dosage too low - Effectiveness   Recommendation: Increase Dose   Status: Accepted per CPA         Kidney replaced by transplant    Current Medication: valGANciclovir (VALCYTE) 450 MG tablet   Rationale: Dose too low - Dosage too low - Effectiveness   Recommendation: Increase Dose   Status: Accepted per CPA

## 2024-01-17 NOTE — TELEPHONE ENCOUNTER
Patient stated he was returning RNCC Call.    Spoke to Boogie  confirmed he increased the tac dose   Confirmed no fevers bp stable -   no issues with incision at this time -

## 2024-01-17 NOTE — TELEPHONE ENCOUNTER
Post Kidney and Pancreas Transplant Team Conference  Date: 1/17/2024  Transplant Coordinator: Nina Hu RN     Attendees:  [x]  Dr. Raza [x] Nina Hu RN [x] Moni Davis LPN     [x]  Dr. Barrera [x] Sharron Maharaj, RN [] Eri Yoder LPN    [x] Dr. Richard [x] Arely Dennis RN    [x] Dr. Dexter [x] Janine Betancourt RN [] Kirsten Nix RN   [] Dr. Howard [] Tatiana Ware RN    [x] Dr. Raya [] Baylee Azar RN    []  Dr. Castillo [] Adwoa Randhawa RN    [] Dr. Gold [] Deven Hernández RN    [x] Ivis Monzon NP [] Dyan Gross RN    [x] Clare Prasad NP [] Heidy Still RN        Verbal Plan Read Back:   Continue current treatment plan    Routed to RN Coordinator   Moni Davis LPN

## 2024-01-18 ENCOUNTER — LAB (OUTPATIENT)
Dept: LAB | Facility: CLINIC | Age: 24
End: 2024-01-18
Payer: COMMERCIAL

## 2024-01-18 ENCOUNTER — TELEPHONE (OUTPATIENT)
Dept: TRANSPLANT | Facility: CLINIC | Age: 24
End: 2024-01-18

## 2024-01-18 DIAGNOSIS — Z98.890 OTHER SPECIFIED POSTPROCEDURAL STATES: ICD-10-CM

## 2024-01-18 DIAGNOSIS — Z48.298 AFTERCARE FOLLOWING ORGAN TRANSPLANT: ICD-10-CM

## 2024-01-18 DIAGNOSIS — Z79.899 ENCOUNTER FOR LONG-TERM CURRENT USE OF MEDICATION: ICD-10-CM

## 2024-01-18 DIAGNOSIS — Z94.0 KIDNEY REPLACED BY TRANSPLANT: Primary | ICD-10-CM

## 2024-01-18 DIAGNOSIS — Z94.0 KIDNEY REPLACED BY TRANSPLANT: ICD-10-CM

## 2024-01-18 DIAGNOSIS — Z20.828 CONTACT WITH AND (SUSPECTED) EXPOSURE TO OTHER VIRAL COMMUNICABLE DISEASES: ICD-10-CM

## 2024-01-18 LAB
ERYTHROCYTE [DISTWIDTH] IN BLOOD BY AUTOMATED COUNT: 13.8 % (ref 10–15)
HCT VFR BLD AUTO: 33.9 % (ref 40–53)
HGB BLD-MCNC: 11.3 G/DL (ref 13.3–17.7)
MCH RBC QN AUTO: 32.9 PG (ref 26.5–33)
MCHC RBC AUTO-ENTMCNC: 33.3 G/DL (ref 31.5–36.5)
MCV RBC AUTO: 99 FL (ref 78–100)
PLATELET # BLD AUTO: 212 10E3/UL (ref 150–450)
RBC # BLD AUTO: 3.43 10E6/UL (ref 4.4–5.9)
TACROLIMUS BLD-MCNC: 6.5 UG/L (ref 5–15)
TME LAST DOSE: NORMAL H
TME LAST DOSE: NORMAL H
WBC # BLD AUTO: 6.7 10E3/UL (ref 4–11)

## 2024-01-18 PROCEDURE — 36415 COLL VENOUS BLD VENIPUNCTURE: CPT

## 2024-01-18 PROCEDURE — 85027 COMPLETE CBC AUTOMATED: CPT

## 2024-01-18 PROCEDURE — 84100 ASSAY OF PHOSPHORUS: CPT

## 2024-01-18 PROCEDURE — 80048 BASIC METABOLIC PNL TOTAL CA: CPT

## 2024-01-18 PROCEDURE — 83735 ASSAY OF MAGNESIUM: CPT

## 2024-01-18 PROCEDURE — 80197 ASSAY OF TACROLIMUS: CPT

## 2024-01-18 RX ORDER — PREDNISONE 10 MG/1
10 TABLET ORAL DAILY
Qty: 10 TABLET | Refills: 0 | Status: SHIPPED | OUTPATIENT
Start: 2024-01-18 | End: 2024-02-07

## 2024-01-18 NOTE — TELEPHONE ENCOUNTER
Call from Boogie     Increased his tacrolimus  9 mg twice per day  this week for low level   Boogie returned phone call  - he was only taking tacrolimus  5mg twice per day   Called Ivis Monzon Transplant Surgery NP  RX prednisone 10 mg po daily -  # 6 tablets      Discussed with Boogie   to start prednisone 10 mg until tacrolimus  level above 8      Resume taking tacrolimus  9 mg

## 2024-01-19 LAB
ANION GAP SERPL CALCULATED.3IONS-SCNC: 8 MMOL/L (ref 7–15)
BUN SERPL-MCNC: 18.7 MG/DL (ref 6–20)
CALCIUM SERPL-MCNC: 9.9 MG/DL (ref 8.6–10)
CHLORIDE SERPL-SCNC: 108 MMOL/L (ref 98–107)
CREAT SERPL-MCNC: 1.87 MG/DL (ref 0.67–1.17)
DEPRECATED HCO3 PLAS-SCNC: 20 MMOL/L (ref 22–29)
EGFRCR SERPLBLD CKD-EPI 2021: 51 ML/MIN/1.73M2
GLUCOSE SERPL-MCNC: 98 MG/DL (ref 70–99)
MAGNESIUM SERPL-MCNC: 1.5 MG/DL (ref 1.7–2.3)
PHOSPHATE SERPL-MCNC: 2 MG/DL (ref 2.5–4.5)
POTASSIUM SERPL-SCNC: 5.7 MMOL/L (ref 3.4–5.3)
SODIUM SERPL-SCNC: 136 MMOL/L (ref 135–145)

## 2024-01-21 NOTE — TELEPHONE ENCOUNTER
Spoke to Boogie confirmed increased tacrolimus 9 mg twice per day   Picked up prednisone 10 mg daily -  Boogie verbalized understanding       Dr Imelda Harvey - message   Mild hyperK, low K diet. On bactrim and sodium bicarb 1300 mg po tid  If remains >5.5 on repeat lokelma 10 g x 1

## 2024-01-21 NOTE — TELEPHONE ENCOUNTER
Abhilash Raza MD Huepfel, Mary K, RN; Imelda Mar MD    Discussed it with Ivis and agree on the prednisone 10 mg daily.  Might even keep it on for a couple of weeks do to risk.             Previous Messages       ----- Message -----  From: Nina Hu, RN  Sent: 1/19/2024  10:52 AM CST  To: Abhilash Raza MD;               Boogie tacrolimus  dropped this week  Both yue and I asked him taking tacrolimus  9 mg bid    He called me last night taking only 5 mg bid mixed up his dose    Due to his rejection - Ivis Monzon Transplant Surgery NP   ordered prednisone 10 mg daily   until I can get his tacrolimus

## 2024-01-22 ENCOUNTER — LAB (OUTPATIENT)
Dept: LAB | Facility: CLINIC | Age: 24
End: 2024-01-22
Payer: COMMERCIAL

## 2024-01-22 ENCOUNTER — INFUSION THERAPY VISIT (OUTPATIENT)
Dept: INFUSION THERAPY | Facility: CLINIC | Age: 24
End: 2024-01-22
Attending: INTERNAL MEDICINE
Payer: COMMERCIAL

## 2024-01-22 DIAGNOSIS — Z20.828 CONTACT WITH AND (SUSPECTED) EXPOSURE TO OTHER VIRAL COMMUNICABLE DISEASES: ICD-10-CM

## 2024-01-22 DIAGNOSIS — Z48.298 AFTERCARE FOLLOWING ORGAN TRANSPLANT: ICD-10-CM

## 2024-01-22 DIAGNOSIS — Z79.899 ENCOUNTER FOR LONG-TERM CURRENT USE OF MEDICATION: ICD-10-CM

## 2024-01-22 DIAGNOSIS — Z94.0 KIDNEY REPLACED BY TRANSPLANT: ICD-10-CM

## 2024-01-22 DIAGNOSIS — Z98.890 OTHER SPECIFIED POSTPROCEDURAL STATES: ICD-10-CM

## 2024-01-22 DIAGNOSIS — Z94.0 KIDNEY REPLACED BY TRANSPLANT: Primary | ICD-10-CM

## 2024-01-22 LAB
ANION GAP SERPL CALCULATED.3IONS-SCNC: 8 MMOL/L (ref 7–15)
BUN SERPL-MCNC: 18.6 MG/DL (ref 6–20)
CALCIUM SERPL-MCNC: 10.2 MG/DL (ref 8.6–10)
CHLORIDE SERPL-SCNC: 107 MMOL/L (ref 98–107)
CREAT SERPL-MCNC: 1.9 MG/DL (ref 0.67–1.17)
DEPRECATED HCO3 PLAS-SCNC: 22 MMOL/L (ref 22–29)
DONOR IDENTIFICATION: NORMAL
DSA COMMENTS: NORMAL
DSA PRESENT: NO
DSA TEST METHOD: NORMAL
EGFRCR SERPLBLD CKD-EPI 2021: 50 ML/MIN/1.73M2
ERYTHROCYTE [DISTWIDTH] IN BLOOD BY AUTOMATED COUNT: 13.6 % (ref 10–15)
GLUCOSE SERPL-MCNC: 115 MG/DL (ref 70–99)
HCT VFR BLD AUTO: 31.8 % (ref 40–53)
HGB BLD-MCNC: 10.8 G/DL (ref 13.3–17.7)
INT SUB RESULT: NORMAL
INTERF SUBSTANCE: NORMAL
INTSUB TEST METHOD: NORMAL
MAGNESIUM SERPL-MCNC: 1.5 MG/DL (ref 1.7–2.3)
MCH RBC QN AUTO: 33 PG (ref 26.5–33)
MCHC RBC AUTO-ENTMCNC: 34 G/DL (ref 31.5–36.5)
MCV RBC AUTO: 97 FL (ref 78–100)
ORGAN: NORMAL
PHOSPHATE SERPL-MCNC: 2.1 MG/DL (ref 2.5–4.5)
PLATELET # BLD AUTO: 194 10E3/UL (ref 150–450)
POTASSIUM SERPL-SCNC: 5.3 MMOL/L (ref 3.4–5.3)
RBC # BLD AUTO: 3.27 10E6/UL (ref 4.4–5.9)
SA 1 CELL: NORMAL
SA 1 TEST METHOD: NORMAL
SA 2 CELL: NORMAL
SA 2 TEST METHOD: NORMAL
SA1 HI RISK ABY: NORMAL
SA1 MOD RISK ABY: NORMAL
SA2 HI RISK ABY: NORMAL
SA2 MOD RISK ABY: NORMAL
SODIUM SERPL-SCNC: 137 MMOL/L (ref 135–145)
TACROLIMUS BLD-MCNC: 11.3 UG/L (ref 5–15)
TME LAST DOSE: NORMAL H
TME LAST DOSE: NORMAL H
UNACCEPTABLE ANTIGENS: NORMAL
UNOS CPRA: 0
WBC # BLD AUTO: 4.8 10E3/UL (ref 4–11)
ZZZINT SUB COMMENTS: NORMAL
ZZZSA 1  COMMENTS: NORMAL
ZZZSA 2 COMMENTS: NORMAL

## 2024-01-22 PROCEDURE — 250N000011 HC RX IP 250 OP 636: Performed by: NURSE PRACTITIONER

## 2024-01-22 PROCEDURE — 84100 ASSAY OF PHOSPHORUS: CPT | Performed by: PATHOLOGY

## 2024-01-22 PROCEDURE — 86828 HLA CLASS I&II ANTIBODY QUAL: CPT | Performed by: INTERNAL MEDICINE

## 2024-01-22 PROCEDURE — 86832 HLA CLASS I HIGH DEFIN QUAL: CPT | Performed by: INTERNAL MEDICINE

## 2024-01-22 PROCEDURE — 85027 COMPLETE CBC AUTOMATED: CPT | Performed by: PATHOLOGY

## 2024-01-22 PROCEDURE — 86833 HLA CLASS II HIGH DEFIN QUAL: CPT | Performed by: INTERNAL MEDICINE

## 2024-01-22 PROCEDURE — 80048 BASIC METABOLIC PNL TOTAL CA: CPT | Performed by: PATHOLOGY

## 2024-01-22 PROCEDURE — 99000 SPECIMEN HANDLING OFFICE-LAB: CPT | Performed by: PATHOLOGY

## 2024-01-22 PROCEDURE — 80197 ASSAY OF TACROLIMUS: CPT | Performed by: INTERNAL MEDICINE

## 2024-01-22 PROCEDURE — 250N000011 HC RX IP 250 OP 636: Performed by: INTERNAL MEDICINE

## 2024-01-22 PROCEDURE — 96523 IRRIG DRUG DELIVERY DEVICE: CPT

## 2024-01-22 PROCEDURE — 83735 ASSAY OF MAGNESIUM: CPT | Performed by: PATHOLOGY

## 2024-01-22 PROCEDURE — 36415 COLL VENOUS BLD VENIPUNCTURE: CPT | Performed by: PATHOLOGY

## 2024-01-22 RX ORDER — HEPARIN SODIUM 1000 [USP'U]/ML
2200 INJECTION, SOLUTION INTRAVENOUS; SUBCUTANEOUS EVERY 24 HOURS
Status: DISCONTINUED | OUTPATIENT
Start: 2024-01-22 | End: 2024-01-22 | Stop reason: HOSPADM

## 2024-01-22 RX ORDER — HEPARIN SODIUM (PORCINE) LOCK FLUSH IV SOLN 100 UNIT/ML 100 UNIT/ML
5 SOLUTION INTRAVENOUS
Status: CANCELLED | OUTPATIENT
Start: 2024-01-29

## 2024-01-22 RX ORDER — METHYLPREDNISOLONE SODIUM SUCCINATE 125 MG/2ML
125 INJECTION, POWDER, LYOPHILIZED, FOR SOLUTION INTRAMUSCULAR; INTRAVENOUS
Status: CANCELLED
Start: 2024-01-29

## 2024-01-22 RX ORDER — HEPARIN SODIUM 1000 [USP'U]/ML
2300 INJECTION, SOLUTION INTRAVENOUS; SUBCUTANEOUS ONCE
Status: CANCELLED
Start: 2024-01-29 | End: 2024-01-29

## 2024-01-22 RX ORDER — ALBUTEROL SULFATE 0.83 MG/ML
2.5 SOLUTION RESPIRATORY (INHALATION)
Status: CANCELLED | OUTPATIENT
Start: 2024-01-29

## 2024-01-22 RX ORDER — DIPHENHYDRAMINE HYDROCHLORIDE 50 MG/ML
50 INJECTION INTRAMUSCULAR; INTRAVENOUS
Status: CANCELLED
Start: 2024-01-29

## 2024-01-22 RX ORDER — HEPARIN SODIUM 1000 [USP'U]/ML
2200 INJECTION, SOLUTION INTRAVENOUS; SUBCUTANEOUS EVERY 24 HOURS
Status: CANCELLED
Start: 2024-01-29

## 2024-01-22 RX ORDER — HEPARIN SODIUM 1000 [USP'U]/ML
2300 INJECTION, SOLUTION INTRAVENOUS; SUBCUTANEOUS ONCE
Status: COMPLETED | OUTPATIENT
Start: 2024-01-22 | End: 2024-01-22

## 2024-01-22 RX ORDER — HEPARIN SODIUM,PORCINE 10 UNIT/ML
5-20 VIAL (ML) INTRAVENOUS DAILY PRN
Status: CANCELLED | OUTPATIENT
Start: 2024-01-29

## 2024-01-22 RX ORDER — ALBUTEROL SULFATE 90 UG/1
1-2 AEROSOL, METERED RESPIRATORY (INHALATION)
Status: CANCELLED
Start: 2024-01-29

## 2024-01-22 RX ORDER — EPINEPHRINE 1 MG/ML
0.3 INJECTION, SOLUTION INTRAMUSCULAR; SUBCUTANEOUS EVERY 5 MIN PRN
Status: CANCELLED | OUTPATIENT
Start: 2024-01-29

## 2024-01-22 RX ADMIN — HEPARIN SODIUM 2200 UNITS: 1000 INJECTION, SOLUTION INTRAVENOUS; SUBCUTANEOUS at 10:46

## 2024-01-22 RX ADMIN — HEPARIN SODIUM 2300 UNITS: 1000 INJECTION, SOLUTION INTRAVENOUS; SUBCUTANEOUS at 10:47

## 2024-01-22 NOTE — PROGRESS NOTES
Called pt and left vm for her to call back.    Infusion Nursing Note:  Boogie Villa presents today for HD line dressing change, cap change and line flush.    Patient seen by provider today: No   present during visit today: Not Applicable.    Note: Pt tolerated dressing change, cap change and line flush with no issue. HD line flushed with 1000u/ml heparin in both lumens (see MAR).      Discharge Plan:   Discharge instructions reviewed with: Patient and mother.  Patient and/or family verbalized understanding of discharge instructions and all questions answered.  AVS to patient via [employer].  Patient will return 1/30 for next appointment (infusion and line flush).   Patient discharged in stable condition accompanied by: self and mother.  Departure Mode: Ambulatory.    Administrations This Visit       heparin Lock (1000 units/mL High concentration) 2,200 Units       Admin Date  01/22/2024 Action  $Given Dose  2,200 Units Route  Intracatheter Documented By  Adwoa Orlando RN              heparin Lock (1000 units/mL High concentration) 2,300 Units       Admin Date  01/22/2024 Action  $Given Dose  2,300 Units Route  Intracatheter Documented By  Adwoa Orlando RN                  Drug: Heparin  Dose: 4,500 Units  Route administered: IV  NDC#: 65901-098-56  Amoutn of waste(mL): 5.5ml  Reason for waste: single use vial        Adwoa Orlando RN

## 2024-01-22 NOTE — PATIENT INSTRUCTIONS
Dear Boogie Villa    Thank you for choosing [facility] ([facility]) for your dressing change, cap change and line flush.  The following information is a summary of our appointment as well as important reminders.      We look forward in seeing you on your next appointment here at [facility] ([facility]).  Please don t hesitate to call us at [phone] to reschedule any of your appointments or to speak with one of the [facility] registered nurses.  It was a pleasure taking care of you today.    Sincerely,    [facility]  [facility]  [address] [place] [address]  [address]  Phone:  [phone]

## 2024-01-25 ENCOUNTER — LAB (OUTPATIENT)
Dept: LAB | Facility: CLINIC | Age: 24
End: 2024-01-25
Payer: COMMERCIAL

## 2024-01-25 DIAGNOSIS — Z79.899 ENCOUNTER FOR LONG-TERM CURRENT USE OF MEDICATION: ICD-10-CM

## 2024-01-25 DIAGNOSIS — Z98.890 OTHER SPECIFIED POSTPROCEDURAL STATES: ICD-10-CM

## 2024-01-25 DIAGNOSIS — Z94.0 KIDNEY REPLACED BY TRANSPLANT: ICD-10-CM

## 2024-01-25 DIAGNOSIS — Z48.298 AFTERCARE FOLLOWING ORGAN TRANSPLANT: ICD-10-CM

## 2024-01-25 DIAGNOSIS — Z20.828 CONTACT WITH AND (SUSPECTED) EXPOSURE TO OTHER VIRAL COMMUNICABLE DISEASES: ICD-10-CM

## 2024-01-25 LAB
ERYTHROCYTE [DISTWIDTH] IN BLOOD BY AUTOMATED COUNT: 13.6 % (ref 10–15)
HCT VFR BLD AUTO: 31.9 % (ref 40–53)
HGB BLD-MCNC: 10.8 G/DL (ref 13.3–17.7)
MCH RBC QN AUTO: 33 PG (ref 26.5–33)
MCHC RBC AUTO-ENTMCNC: 33.9 G/DL (ref 31.5–36.5)
MCV RBC AUTO: 98 FL (ref 78–100)
PLATELET # BLD AUTO: 240 10E3/UL (ref 150–450)
RBC # BLD AUTO: 3.27 10E6/UL (ref 4.4–5.9)
TACROLIMUS BLD-MCNC: 11.4 UG/L (ref 5–15)
TME LAST DOSE: NORMAL H
TME LAST DOSE: NORMAL H
WBC # BLD AUTO: 4.7 10E3/UL (ref 4–11)

## 2024-01-25 PROCEDURE — 36415 COLL VENOUS BLD VENIPUNCTURE: CPT

## 2024-01-25 PROCEDURE — 85027 COMPLETE CBC AUTOMATED: CPT

## 2024-01-25 PROCEDURE — 80197 ASSAY OF TACROLIMUS: CPT

## 2024-01-25 PROCEDURE — 80048 BASIC METABOLIC PNL TOTAL CA: CPT

## 2024-01-26 LAB
ANION GAP SERPL CALCULATED.3IONS-SCNC: 9 MMOL/L (ref 7–15)
BUN SERPL-MCNC: 19 MG/DL (ref 6–20)
CALCIUM SERPL-MCNC: 10.4 MG/DL (ref 8.6–10)
CHLORIDE SERPL-SCNC: 107 MMOL/L (ref 98–107)
CREAT SERPL-MCNC: 1.91 MG/DL (ref 0.67–1.17)
DEPRECATED HCO3 PLAS-SCNC: 23 MMOL/L (ref 22–29)
EGFRCR SERPLBLD CKD-EPI 2021: 50 ML/MIN/1.73M2
GLUCOSE SERPL-MCNC: 91 MG/DL (ref 70–99)
POTASSIUM SERPL-SCNC: 5.3 MMOL/L (ref 3.4–5.3)
SODIUM SERPL-SCNC: 139 MMOL/L (ref 135–145)

## 2024-01-29 ENCOUNTER — LAB (OUTPATIENT)
Dept: LAB | Facility: CLINIC | Age: 24
End: 2024-01-29
Payer: COMMERCIAL

## 2024-01-29 DIAGNOSIS — Z79.899 ENCOUNTER FOR LONG-TERM CURRENT USE OF MEDICATION: ICD-10-CM

## 2024-01-29 DIAGNOSIS — Z48.298 AFTERCARE FOLLOWING ORGAN TRANSPLANT: ICD-10-CM

## 2024-01-29 DIAGNOSIS — Z20.828 CONTACT WITH AND (SUSPECTED) EXPOSURE TO OTHER VIRAL COMMUNICABLE DISEASES: ICD-10-CM

## 2024-01-29 DIAGNOSIS — Z94.0 KIDNEY REPLACED BY TRANSPLANT: ICD-10-CM

## 2024-01-29 DIAGNOSIS — Z98.890 OTHER SPECIFIED POSTPROCEDURAL STATES: ICD-10-CM

## 2024-01-29 LAB
ANION GAP SERPL CALCULATED.3IONS-SCNC: 10 MMOL/L (ref 7–15)
BUN SERPL-MCNC: 19.7 MG/DL (ref 6–20)
CALCIUM SERPL-MCNC: 10.1 MG/DL (ref 8.6–10)
CHLORIDE SERPL-SCNC: 105 MMOL/L (ref 98–107)
CREAT SERPL-MCNC: 1.93 MG/DL (ref 0.67–1.17)
DEPRECATED HCO3 PLAS-SCNC: 20 MMOL/L (ref 22–29)
DONOR IDENTIFICATION: NORMAL
DSA COMMENTS: NORMAL
DSA PRESENT: NO
DSA TEST METHOD: NORMAL
EGFRCR SERPLBLD CKD-EPI 2021: 49 ML/MIN/1.73M2
ERYTHROCYTE [DISTWIDTH] IN BLOOD BY AUTOMATED COUNT: 13.3 % (ref 10–15)
GLUCOSE SERPL-MCNC: 120 MG/DL (ref 70–99)
HCT VFR BLD AUTO: 29.8 % (ref 40–53)
HGB BLD-MCNC: 10.1 G/DL (ref 13.3–17.7)
INT SUB RESULT: NORMAL
INTERF SUBSTANCE: NORMAL
INTSUB TEST METHOD: NORMAL
MAGNESIUM SERPL-MCNC: 1.4 MG/DL (ref 1.7–2.3)
MCH RBC QN AUTO: 32.7 PG (ref 26.5–33)
MCHC RBC AUTO-ENTMCNC: 33.9 G/DL (ref 31.5–36.5)
MCV RBC AUTO: 96 FL (ref 78–100)
ORGAN: NORMAL
PHOSPHATE SERPL-MCNC: 1.8 MG/DL (ref 2.5–4.5)
PLATELET # BLD AUTO: 306 10E3/UL (ref 150–450)
POTASSIUM SERPL-SCNC: 5.3 MMOL/L (ref 3.4–5.3)
RBC # BLD AUTO: 3.09 10E6/UL (ref 4.4–5.9)
SA 1 CELL: NORMAL
SA 1 TEST METHOD: NORMAL
SA 2 CELL: NORMAL
SA 2 TEST METHOD: NORMAL
SA1 HI RISK ABY: NORMAL
SA1 MOD RISK ABY: NORMAL
SA2 HI RISK ABY: NORMAL
SA2 MOD RISK ABY: NORMAL
SODIUM SERPL-SCNC: 135 MMOL/L (ref 135–145)
TACROLIMUS BLD-MCNC: 19 UG/L (ref 5–15)
TME LAST DOSE: ABNORMAL H
TME LAST DOSE: ABNORMAL H
UNACCEPTABLE ANTIGENS: NORMAL
UNOS CPRA: 0
WBC # BLD AUTO: 5.6 10E3/UL (ref 4–11)
ZZZINT SUB COMMENTS: NORMAL
ZZZSA 1  COMMENTS: NORMAL
ZZZSA 2 COMMENTS: NORMAL

## 2024-01-29 PROCEDURE — 99000 SPECIMEN HANDLING OFFICE-LAB: CPT

## 2024-01-29 PROCEDURE — 87799 DETECT AGENT NOS DNA QUANT: CPT

## 2024-01-29 PROCEDURE — 87516 HEPATITIS B DNA AMP PROBE: CPT | Mod: 90

## 2024-01-29 PROCEDURE — 36415 COLL VENOUS BLD VENIPUNCTURE: CPT

## 2024-01-29 PROCEDURE — 87535 HIV-1 PROBE&REVERSE TRNSCRPJ: CPT | Mod: 90

## 2024-01-29 PROCEDURE — 84100 ASSAY OF PHOSPHORUS: CPT

## 2024-01-29 PROCEDURE — 80180 DRUG SCRN QUAN MYCOPHENOLATE: CPT

## 2024-01-29 PROCEDURE — 80048 BASIC METABOLIC PNL TOTAL CA: CPT

## 2024-01-29 PROCEDURE — 85027 COMPLETE CBC AUTOMATED: CPT

## 2024-01-29 PROCEDURE — 80197 ASSAY OF TACROLIMUS: CPT

## 2024-01-29 PROCEDURE — 87521 HEPATITIS C PROBE&RVRS TRNSC: CPT | Mod: 90

## 2024-01-29 PROCEDURE — 83735 ASSAY OF MAGNESIUM: CPT

## 2024-01-30 ENCOUNTER — OFFICE VISIT (OUTPATIENT)
Dept: TRANSPLANT | Facility: CLINIC | Age: 24
End: 2024-01-30
Attending: INTERNAL MEDICINE
Payer: COMMERCIAL

## 2024-01-30 ENCOUNTER — INFUSION THERAPY VISIT (OUTPATIENT)
Dept: INFUSION THERAPY | Facility: CLINIC | Age: 24
End: 2024-01-30
Attending: INTERNAL MEDICINE
Payer: COMMERCIAL

## 2024-01-30 VITALS
BODY MASS INDEX: 39.19 KG/M2 | HEART RATE: 96 BPM | SYSTOLIC BLOOD PRESSURE: 122 MMHG | WEIGHT: 281 LBS | OXYGEN SATURATION: 97 % | DIASTOLIC BLOOD PRESSURE: 79 MMHG

## 2024-01-30 DIAGNOSIS — D84.9 IMMUNOSUPPRESSION (H): Primary | ICD-10-CM

## 2024-01-30 DIAGNOSIS — Z94.0 KIDNEY REPLACED BY TRANSPLANT: ICD-10-CM

## 2024-01-30 DIAGNOSIS — Z94.0 KIDNEY REPLACED BY TRANSPLANT: Primary | ICD-10-CM

## 2024-01-30 LAB
BK VIRUS SPECIMEN TYPE: NORMAL
BKV DNA # SPEC NAA+PROBE: NOT DETECTED IU/ML
MYCOPHENOLATE SERPL LC/MS/MS-MCNC: 3.73 MG/L (ref 1–3.5)
MYCOPHENOLATE-G SERPL LC/MS/MS-MCNC: 63.8 MG/L (ref 30–95)
TME LAST DOSE: ABNORMAL H
TME LAST DOSE: ABNORMAL H

## 2024-01-30 PROCEDURE — 99213 OFFICE O/P EST LOW 20 MIN: CPT | Mod: 24 | Performed by: SURGERY

## 2024-01-30 PROCEDURE — 250N000011 HC RX IP 250 OP 636: Performed by: NURSE PRACTITIONER

## 2024-01-30 PROCEDURE — 250N000011 HC RX IP 250 OP 636: Performed by: INTERNAL MEDICINE

## 2024-01-30 PROCEDURE — 99213 OFFICE O/P EST LOW 20 MIN: CPT | Performed by: SURGERY

## 2024-01-30 RX ORDER — EPINEPHRINE 1 MG/ML
0.3 INJECTION, SOLUTION INTRAMUSCULAR; SUBCUTANEOUS EVERY 5 MIN PRN
Status: CANCELLED | OUTPATIENT
Start: 2024-02-05

## 2024-01-30 RX ORDER — ALBUTEROL SULFATE 0.83 MG/ML
2.5 SOLUTION RESPIRATORY (INHALATION)
Status: CANCELLED | OUTPATIENT
Start: 2024-02-05

## 2024-01-30 RX ORDER — HEPARIN SODIUM 1000 [USP'U]/ML
2200 INJECTION, SOLUTION INTRAVENOUS; SUBCUTANEOUS EVERY 24 HOURS
Status: CANCELLED
Start: 2024-02-05

## 2024-01-30 RX ORDER — LIDOCAINE HYDROCHLORIDE 10 MG/ML
INJECTION, SOLUTION EPIDURAL; INFILTRATION; INTRACAUDAL; PERINEURAL
Status: DISPENSED
Start: 2024-01-30 | End: 2024-01-30

## 2024-01-30 RX ORDER — LIDOCAINE HYDROCHLORIDE 10 MG/ML
5 INJECTION, SOLUTION EPIDURAL; INFILTRATION; INTRACAUDAL; PERINEURAL ONCE
Status: DISCONTINUED | OUTPATIENT
Start: 2024-01-30 | End: 2024-01-30

## 2024-01-30 RX ORDER — LIDOCAINE HYDROCHLORIDE 10 MG/ML
10 INJECTION, SOLUTION EPIDURAL; INFILTRATION; INTRACAUDAL; PERINEURAL ONCE
Status: DISCONTINUED | OUTPATIENT
Start: 2024-01-30 | End: 2024-02-07

## 2024-01-30 RX ORDER — ALBUTEROL SULFATE 90 UG/1
1-2 AEROSOL, METERED RESPIRATORY (INHALATION)
Status: CANCELLED
Start: 2024-02-05

## 2024-01-30 RX ORDER — HEPARIN SODIUM (PORCINE) LOCK FLUSH IV SOLN 100 UNIT/ML 100 UNIT/ML
5 SOLUTION INTRAVENOUS
Status: CANCELLED | OUTPATIENT
Start: 2024-02-05

## 2024-01-30 RX ORDER — METHYLPREDNISOLONE SODIUM SUCCINATE 125 MG/2ML
125 INJECTION, POWDER, LYOPHILIZED, FOR SOLUTION INTRAMUSCULAR; INTRAVENOUS
Status: CANCELLED
Start: 2024-02-05

## 2024-01-30 RX ORDER — HEPARIN SODIUM 1000 [USP'U]/ML
2300 INJECTION, SOLUTION INTRAVENOUS; SUBCUTANEOUS ONCE
Status: COMPLETED | OUTPATIENT
Start: 2024-01-30 | End: 2024-01-30

## 2024-01-30 RX ORDER — HEPARIN SODIUM,PORCINE 10 UNIT/ML
5-20 VIAL (ML) INTRAVENOUS DAILY PRN
Status: CANCELLED | OUTPATIENT
Start: 2024-02-05

## 2024-01-30 RX ORDER — HEPARIN SODIUM 1000 [USP'U]/ML
2200 INJECTION, SOLUTION INTRAVENOUS; SUBCUTANEOUS EVERY 24 HOURS
Status: DISCONTINUED | OUTPATIENT
Start: 2024-01-30 | End: 2024-01-30 | Stop reason: HOSPADM

## 2024-01-30 RX ORDER — HEPARIN SODIUM 1000 [USP'U]/ML
2300 INJECTION, SOLUTION INTRAVENOUS; SUBCUTANEOUS ONCE
Status: CANCELLED
Start: 2024-02-05 | End: 2024-02-05

## 2024-01-30 RX ORDER — DIPHENHYDRAMINE HYDROCHLORIDE 50 MG/ML
50 INJECTION INTRAMUSCULAR; INTRAVENOUS
Status: CANCELLED
Start: 2024-02-05

## 2024-01-30 RX ADMIN — HEPARIN SODIUM 2200 UNITS: 1000 INJECTION, SOLUTION INTRAVENOUS; SUBCUTANEOUS at 09:31

## 2024-01-30 RX ADMIN — HEPARIN SODIUM 2300 UNITS: 1000 INJECTION, SOLUTION INTRAVENOUS; SUBCUTANEOUS at 09:34

## 2024-01-30 ASSESSMENT — PAIN SCALES - GENERAL: PAINLEVEL: NO PAIN (0)

## 2024-01-30 NOTE — PATIENT INSTRUCTIONS
Dear Boogie Villa    Thank you for choosing [facility] ([facility]) for your dressing change.  The following information is a summary of our appointment as well as important reminders.        We look forward in seeing you on your next appointment here at [facility] ([facility]).  Please don t hesitate to call us at [phone] to reschedule any of your appointments or to speak with one of the [facility] registered nurses.  It was a pleasure taking care of you today.    Sincerely,    [facility]  [facility]  [address] [place] [address]  [address]  Phone:  [phone]

## 2024-01-30 NOTE — Clinical Note
"    1/30/2024         RE: Boogie Villa  [address] [place] [address]  [place] [address]        Dear Colleague,    Thank you for referring your patient, Boogie Villa, to the [facility]. Please see a copy of my visit note below.    Transplant Surgery Progress Note    Transplants:  12/28/2023 (Kidney); Postoperative day:  33  S: ***  Transplant History:    Transplant Type:  {UMP [place] STATUS POST:122285299}  Donor Type: Living   Transplant Date:  12/28/2023 (Kidney)   Ureteral Stent:  {YES / NO:242819::\"Yes\"}   Crossmatch:  {NEGATIVE:214034}   DSA at Tx:  {YES (EXPLAIN)/NO:491393}  Baseline Cr: ***   De[place]ovo DSA: {YES (EXPLAIN)/NO:596364}    Acute Rejection Hx:  {YES (EXPLAIN)/NO:553294}    Present Maintenance Immunosuppression:  {[facility] IMMUNOSUPPRESSION:483886849}    CMV IgG Ab Discordance:  {YES / NO:172438::\"Yes\"}  EBV IgG Ab Discordance:  {YES / NO:183603::\"Yes\"}    BK Viremia:  {YES (EXPLAIN)/NO:714532}  EBV Viremia:  {YES (EXPLAIN)/NO:760947}    Transplant Coordinator: Nina Hu     Transplant Office Phone Number: [phone]     Immunosuppressant Medications       Immunosuppressive Agents Disp Start End     mycophenolate (GENERIC EQUIVALENT) 250 MG capsule 180 capsule 1/5/2024 --    Sig - Route: Take 3 capsules (750 mg) by mouth 2 times daily - Oral    Class: E-Prescribe     tacrolimus (GENERIC) 1 MG capsule 240 capsule 1/15/2024 --    Sig - Route: Take 4 capsules (4 mg) by mouth 2 times daily Total dose = 9 mg twice per day - Oral    Class: E-Prescribe     tacrolimus (GENERIC) 5 MG capsule 60 capsule 1/15/2024 --    Sig - Route: Take 1 capsule (5 mg) by mouth 2 times daily Total dose = 9 mg twice per day - Oral    Class: E-Prescribe            Possible Immunosuppression-related side effects:   []             headache  []             vivid dreams  []             irritability  []             cognitive difficuties  []             fine tremor  []             nausea  []             " diarrhea  []             neuropathy      []             edema  []             renal calcineurin toxicity  []             hyperkalemia  []             post-transplant diabetes  []             decreased appetite  []             increased appetite  []             other:  []             none    Prescription Medications as of 1/30/2024         Rx Number Disp Refills Start End Last Dispensed Date Next Fill Date Owning Pharmacy    acetaminophen (TYLENOL) 325 MG tablet  -- -- 1/5/2024 --       Sig: Take 1-2 tablets (325-650 mg) by mouth every 4 hours as needed (For optimal non-opioid multimodal pain management to improve pain control.)    Class: No Print Out    Route: Oral    amLODIPine (NORVASC) 10 MG tablet  30 tablet 11 1/5/2024 --   [address]    Sig: Take 1 tablet (10 mg) by mouth daily    Class: E-Prescribe    Route: Oral    aspirin 81 MG EC tablet  30 tablet 11 1/6/2024 --   [address]    Sig: Take 1 tablet (81 mg) by mouth daily    Class: E-Prescribe    Route: Oral    carvedilol (COREG) 12.5 MG tablet  180 tablet 3 1/17/2024 --   [address]    Sig: Take 1 tablet (12.5 mg) by mouth 2 times daily    Class: E-Prescribe    Route: Oral    cetirizine (ZYRTEC) 10 MG tablet  -- --  --       Sig: Take 10 mg by mouth daily as needed for allergies (in the spring)    Class: Historical    Route: Oral    difluprednate (DUREZOL) 0.05 % ophthalmic emulsion  -- -- 4/4/2023 --       Sig: INSTILL 1 DROP INTO EACH EYE SIX TIMES DAILY FOR THE FIRST 24 HOURS. THEN 1 DROP 4 TIMES DAILY UNTIL FOLLOW UP    Class: Historical    fluconazole (DIFLUCAN) 100 MG tablet  30 tablet 11 1/6/2024 --   [address]    Sig: Take 1 tablet (100 mg) by mouth daily    Class: E-Prescribe    Notes to Pharmacy: Currently using as a  tacrolimus booster    Route: Oral    latanoprost (XALATAN) 0.005 % ophthalmic solution  -- --  --       Sig: Place 1 drop into both eyes at bedtime    Class: Historical    Route: Both Eyes    methocarbamol (ROBAXIN) 750 MG tablet  20 tablet 0 1/5/2024 --   [facility] [address]    Sig: Take 1 tablet (750 mg) by mouth 3 times daily as needed for muscle spasms    Class: E-Prescribe    Route: Oral    methylphenidate (RITALIN) 20 MG tablet  -- -- 2/16/2020 --   [facility] - [place], [place] - [address] [facility]    Sig: Take 20 mg by mouth as needed Prn    Class: Historical    Earliest Fill Date: 2/16/2020    Route: Oral    mycophenolate (GENERIC EQUIVALENT) 250 MG capsule  180 capsule 11 1/5/2024 --   [facility] [address]    Sig: Take 3 capsules (750 mg) by mouth 2 times daily    Class: E-Prescribe    Route: Oral    pantoprazole (PROTONIX) 40 MG EC tablet  30 tablet 0 1/6/2024 --   [facility] [address]    Sig: Take 1 tablet (40 mg) by mouth every morning (before breakfast)    Class: E-Prescribe    Route: Oral    predniSONE (DELTASONE) 10 MG tablet  10 tablet 0 1/18/2024 --   [id number] - [place], [place] - [address]    Sig: Take 1 tablet (10 mg) by mouth daily    Class: E-Prescribe    Notes to Pharmacy: TXP DT 12/28/2023 (Kidney) TXP Dischg DT 1/5/2024 DX Kidney replaced by transplant Z94.0 TX [facility] ([place], [place])    Route: Oral    Probiotic Product (PROBIOTIC-10 PO)  -- --  --       Sig: Take 1 tablet by mouth every morning    Class: Historical    Route: Oral    sodium bicarbonate 650 MG tablet  120 tablet 1 1/6/2024 --   [id number] - [place], [place] - [address] [place] [address]    Sig: Take 2 tablets (1,300 mg) by mouth 3 times daily    Class: E-Prescribe    Route: Oral     sulfamethoxazole-trimethoprim (BACTRIM) 400-80 MG tablet  30 tablet 11 1/6/2024 --   [address]    Sig: Take 1 tablet by mouth daily    Class: E-Prescribe    Route: Oral    tacrolimus (GENERIC) 1 MG capsule  240 capsule 11 1/15/2024 --   [address]    Sig: Take 4 capsules (4 mg) by mouth 2 times daily Total dose = 9 mg twice per day    Class: E-Prescribe    Route: Oral    tacrolimus (GENERIC) 5 MG capsule  60 capsule 11 1/15/2024 --   [address]    Sig: Take 1 capsule (5 mg) by mouth 2 times daily Total dose = 9 mg twice per day    Class: E-Prescribe    Route: Oral    valGANciclovir (VALCYTE) 450 MG tablet  60 tablet 5 1/6/2024 --   [address]    Sig: Take 1 tablet (450 mg) by mouth daily Increase dose to 900 mg by mouth daily as directed by transplant team (with improving kidney function)    Class: E-Prescribe    Route: Oral    Vitamin D3 (CHOLECALCIFEROL) 25 mcg (1000 units) tablet  60 tablet 11 1/6/2024 --   [address]    Sig: Take 2 tablets (50 mcg) by mouth daily    Class: E-Prescribe    Route: Oral    atorvastatin (LIPITOR) 10 MG tablet  30 tablet 11 1/6/2024 --   [address]    Sig: Take 1 tablet (10 mg) by mouth daily    Class: E-Prescribe    Route: Oral          Clinic-Administered Medications as of 1/30/2024         Dose Frequency Start End    heparin Lock (1000 units/mL High concentration) 2,200 Units 2,200 Units EVERY 24 HOURS 1/30/2024 --    Admin Instructions: Dwell to Red lumen.     Class: E-Prescribe    Route: Intracatheter    heparin Lock (1000 units/mL High concentration) 2,300 Units (Completed) 2,300 Units ONCE 1/30/2024  "1/30/2024    Admin Instructions: Dwell to blue lumen.    Class: E-Prescribe    Route: Intracatheter            O:   Pulse:  [96] 96  BP: (122)/(79) 122/79  SpO2:  [97 %] 97 %  General Appearance: {Distress levels:339686::\"in no apparent distress.\"}   Skin: {JAUNDICE:667832:s}  Heart: {CARDIACTVE:165060}  Lungs: easy respirations, no audible wheezing.  Abdomen: {ABDOMEN INSPECTION:602}, The wound is {INCISION APPEARANCE:196162}, {WITH:984460} hernia. The abdomen is {TENDER/NON-TENDER (NO DEFAULT):097720}. The {ORGAN:288472} graft {is/is not:811256} tender.  There {IS / IS NO:167100} ascites.  Extremities: Tremor {ABSENT:719799}   Edema: {ABSENT:861351} {EDEMA SEVERITY:348783}        Latest Ref Rng & Units 1/29/2024     9:37 AM 1/25/2024    10:03 AM 1/22/2024    10:21 AM 1/18/2024    10:30 AM 1/15/2024    10:36 AM   Transplant Immunosuppression Labs   Creat 0.67 - 1.17 mg/dL 1.93  1.91  1.90  1.87  1.59    Urea Nitrogen 6.0 - 20.0 mg/dL 19.7  19.0  18.6  18.7  18.1    WBC 4.0 - 11.0 10e3/uL 5.6  4.7  4.8  6.7  9.6        Chemistries:   Recent Labs   Lab Test 01/29/24  0937   BUN 19.7   CR 1.93*   GFRESTIMATED 49*   *     Lab Results   Component Value Date    A1C 5.2 12/19/2023     Recent Labs   Lab Test 12/19/23  0953   ALBUMIN 4.4   BILITOTAL 0.5   ALKPHOS 67   AST 27   ALT 41     Urine Studies:  Recent Labs   Lab Test 12/19/23  1007   COLOR Straw   APPEARANCE Clear   URINEGLC 70*   URINEBILI Negative   URINEKETONE Negative   SG 1.003   UBLD Trace*   URINEPH 7.5*   PROTEIN 100*   NITRITE Negative   LEUKEST Negative   RBCU 0   WBCU 1     Recent Labs   Lab Test 02/26/20  1530 11/25/19  0904   UTPG 4.31* 4.92*     Hematology:   Recent Labs   Lab Test 01/29/24  0937 01/25/24  1003 01/22/24  1021   HGB 10.1* 10.8* 10.8*    240 194   WBC 5.6 4.7 4.8     Coags:   Recent Labs   Lab Test 01/02/24  0834 12/19/23  0953   INR 1.17* 1.02     HLA antibodies:   SA1 Hi Risk Dianne   Date Value Ref Range Status "   03/29/2021 None  Final     SA1 HI RISK KAVIN   Date Value Ref Range Status   01/22/2024 None  Final     SA1 Mod Risk Kavin   Date Value Ref Range Status   03/29/2021 None  Final     SA1 MOD RISK KAVIN   Date Value Ref Range Status   01/22/2024 None  Final     SA2 Hi Risk Kavin   Date Value Ref Range Status   03/29/2021 None  Final     SA2 HI RISK KAVIN   Date Value Ref Range Status   01/22/2024 None  Final     SA2 Mod Risk Kavin   Date Value Ref Range Status   03/29/2021 None  Final     SA2 MOD RISK KAVIN   Date Value Ref Range Status   01/22/2024 None  Final       Assessment: Boogie Villa is doing { :7040973} s/p {[facility] [place] STATUS POST:536462747}:  Issues we addressed during {HIS / HER:533614} visit include:    Plan:    1. Graft function: some what elevated Cr likely due to elevated tac  2. Immunosuppression Management: No change Stop pred, watch tac, level and continue MMF  ***.  Complexity of management:Medium.  Contributing factors: organ dysfunction  3. BP: Good. Systolic 120's  4. Muscle cramps at night  5. Atorvastatin on hold for now. Peter to determine restart  6. Remove CVC today in clinic  Followup: ***    Total Time: *** min,   Counselling Time: *** min.    {[facility] TRANSPLANT MD SIGNATURE:[id number]}       Again, thank you for allowing me to participate in the care of your patient.        Sincerely,        Nita Martinez MD, MD

## 2024-01-30 NOTE — PROGRESS NOTES
Right upper chest HD line dressing changed per Dr Zavala. Old dressing removed without difficulty. No exudate noted around catheter exit site. Patient denied any tenderness. Area cleaned with chloraprep using sterile technique. Biopatch, tegaderm applied. Caps changes and line flushed with no issue. HD line blue and red lumen flushed with heparin.    Administrations This Visit       heparin Lock (1000 units/mL High concentration) 2,200 Units       Admin Date  01/30/2024 Action  $Given Dose  2,200 Units Route  Intracatheter Documented By  Konrad Cuba RN              heparin Lock (1000 units/mL High concentration) 2,300 Units       Admin Date  01/30/2024 Action  $Given Dose  2,300 Units Route  Intracatheter Documented By  Konrad Cuba RN                  Drug Waste Record    Drug Name: Heparin  Dose: 4.5ml or 4,500 units  Route administered: IV  NDC #:  37036-088-57  Amount of waste(mL): 5.5  Reason for waste: Single use vial

## 2024-01-30 NOTE — PROGRESS NOTES
Transplant Surgery Progress Note    Transplants:  12/28/2023 (Kidney); Postoperative day:  33  S: Pt doing well. 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 Treated with thymo  Transplant History:    Transplant Type:  LDKT  Donor Type: Living   Transplant Date:  12/28/2023 (Kidney)   Ureteral Stent:  Yes   Crossmatch:  negative   DSA at Tx:  No  Baseline Cr: TBD   DeNovo DSA: No    Acute Rejection Hx:  Yes: within a week of transplant    Present Maintenance Immunosuppression:  Tacrolimus and Mycophenolate mofetil    CMV IgG Ab Discordance:  Yes  EBV IgG Ab Discordance:  Yes    BK Viremia:  No  EBV Viremia:  Yes: <500    Transplant Coordinator: Nina Hu     Transplant Office Phone Number: [phone]     Immunosuppressant Medications       Immunosuppressive Agents Disp Start End     mycophenolate (GENERIC EQUIVALENT) 250 MG capsule 180 capsule 1/5/2024 --    Sig - Route: Take 3 capsules (750 mg) by mouth 2 times daily - Oral    Class: E-Prescribe     tacrolimus (GENERIC) 1 MG capsule 240 capsule 1/15/2024 --    Sig - Route: Take 4 capsules (4 mg) by mouth 2 times daily Total dose = 9 mg twice per day - Oral    Class: E-Prescribe     tacrolimus (GENERIC) 5 MG capsule 60 capsule 1/15/2024 --    Sig - Route: Take 1 capsule (5 mg) by mouth 2 times daily Total dose = 9 mg twice per day - Oral    Class: E-Prescribe            Possible Immunosuppression-related side effects:   []             headache  []             vivid dreams  []             irritability  []             cognitive difficuties  []             fine tremor  []             nausea  []             diarrhea  []             neuropathy      []             edema  []             renal calcineurin toxicity  []             hyperkalemia  []             post-transplant diabetes  []             decreased appetite  []             increased appetite  []             other:  []             none    Prescription Medications as of 1/30/2024         Rx Number Disp Refills  Start End Last Dispensed Date Next Fill Date Owning Pharmacy    acetaminophen (TYLENOL) 325 MG tablet  -- -- 1/5/2024 --       Sig: Take 1-2 tablets (325-650 mg) by mouth every 4 hours as needed (For optimal non-opioid multimodal pain management to improve pain control.)    Class: No Print Out    Route: Oral    amLODIPine (NORVASC) 10 MG tablet  30 tablet 11 1/5/2024 --   [address]    Sig: Take 1 tablet (10 mg) by mouth daily    Class: E-Prescribe    Route: Oral    aspirin 81 MG EC tablet  30 tablet 11 1/6/2024 --   [address]    Sig: Take 1 tablet (81 mg) by mouth daily    Class: E-Prescribe    Route: Oral    carvedilol (COREG) 12.5 MG tablet  180 tablet 3 1/17/2024 --   [address]    Sig: Take 1 tablet (12.5 mg) by mouth 2 times daily    Class: E-Prescribe    Route: Oral    cetirizine (ZYRTEC) 10 MG tablet  -- --  --       Sig: Take 10 mg by mouth daily as needed for allergies (in the spring)    Class: Historical    Route: Oral    difluprednate (DUREZOL) 0.05 % ophthalmic emulsion  -- -- 4/4/2023 --       Sig: INSTILL 1 DROP INTO EACH EYE SIX TIMES DAILY FOR THE FIRST 24 HOURS. THEN 1 DROP 4 TIMES DAILY UNTIL FOLLOW UP    Class: Historical    fluconazole (DIFLUCAN) 100 MG tablet  30 tablet 11 1/6/2024 --   [address]    Sig: Take 1 tablet (100 mg) by mouth daily    Class: E-Prescribe    Notes to Pharmacy: Currently using as a tacrolimus booster    Route: Oral    latanoprost (XALATAN) 0.005 % ophthalmic solution  -- --  --       Sig: Place 1 drop into both eyes at bedtime    Class: Historical    Route: Both Eyes    methocarbamol (ROBAXIN) 750 MG tablet  20 tablet 0 1/5/2024 --   [address]    Sig: Take 1 tablet (750 mg) by  mouth 3 times daily as needed for muscle spasms    Class: E-Prescribe    Route: Oral    methylphenidate (RITALIN) 20 MG tablet  -- -- 2/16/2020 --   [facility] - [place], [place] - [address] [place] [address]    Sig: Take 20 mg by mouth as needed Prn    Class: Historical    Earliest Fill Date: 2/16/2020    Route: Oral    mycophenolate (GENERIC EQUIVALENT) 250 MG capsule  180 capsule 11 1/5/2024 --   [facility] [address]    Sig: Take 3 capsules (750 mg) by mouth 2 times daily    Class: E-Prescribe    Route: Oral    pantoprazole (PROTONIX) 40 MG EC tablet  30 tablet 0 1/6/2024 --   [address]    Sig: Take 1 tablet (40 mg) by mouth every morning (before breakfast)    Class: E-Prescribe    Route: Oral    predniSONE (DELTASONE) 10 MG tablet  10 tablet 0 1/18/2024 --   [facility]/pharmacy #[id number] - [place] - [address]    Sig: Take 1 tablet (10 mg) by mouth daily    Class: E-Prescribe    Notes to Pharmacy: TXP DT 12/28/2023 (Kidney) TXP Dischg DT 1/5/2024 DX Kidney replaced by transplant Z94.0 [facility] ([place], [place])    Route: Oral    Probiotic Product (PROBIOTIC-10 PO)  -- --  --       Sig: Take 1 tablet by mouth every morning    Class: Historical    Route: Oral    sodium bicarbonate 650 MG tablet  120 tablet 1 1/6/2024 --   [facility] [address] [place] [address]    Sig: Take 2 tablets (1,300 mg) by mouth 3 times daily    Class: E-Prescribe    Route: Oral    sulfamethoxazole-trimethoprim (BACTRIM) 400-80 MG tablet  30 tablet 11 1/6/2024 --   [address]    Sig: Take 1 tablet by mouth daily    Class: E-Prescribe    Route: Oral    tacrolimus (GENERIC) 1 MG capsule  240 capsule 11 1/15/2024 --   [place], [place] - [address] [place] [address]  [facility] [address]    Sig: Take 4 capsules (4 mg) by mouth 2 times daily Total dose = 9 mg twice per day    Class: E-Prescribe    Route: Oral    tacrolimus (GENERIC) 5 MG capsule  60 capsule 11 1/15/2024 --   [address]    Sig: Take 1 capsule (5 mg) by mouth 2 times daily Total dose = 9 mg twice per day    Class: E-Prescribe    Route: Oral    valGANciclovir (VALCYTE) 450 MG tablet  60 tablet 5 1/6/2024 --   [address]    Sig: Take 1 tablet (450 mg) by mouth daily Increase dose to 900 mg by mouth daily as directed by transplant team (with improving kidney function)    Class: E-Prescribe    Route: Oral    Vitamin D3 (CHOLECALCIFEROL) 25 mcg (1000 units) tablet  60 tablet 11 1/6/2024 --   [address]    Sig: Take 2 tablets (50 mcg) by mouth daily    Class: E-Prescribe    Route: Oral    atorvastatin (LIPITOR) 10 MG tablet  30 tablet 11 1/6/2024 --   [address]    Sig: Take 1 tablet (10 mg) by mouth daily    Class: E-Prescribe    Route: Oral          Clinic-Administered Medications as of 1/30/2024         Dose Frequency Start End    heparin Lock (1000 units/mL High concentration) 2,200 Units 2,200 Units EVERY 24 HOURS 1/30/2024 --    Admin Instructions: Dwell to Red lumen.     Class: E-Prescribe    Route: Intracatheter    heparin Lock (1000 units/mL High concentration) 2,300 Units (Completed) 2,300 Units ONCE 1/30/2024 1/30/2024    Admin Instructions: Dwell to blue lumen.    Class: E-Prescribe    Route: Intracatheter            O:   Pulse:  [96] 96  BP: (122)/(79) 122/79  SpO2:  [97 %] 97 %  General Appearance: in no apparent distress.   Skin: Normal, no rashes or jaundice  Heart: regular rate and rhythm  Lungs: easy respirations, no audible wheezing.  Abdomen: rounded and  obese, The wound is dry and intact, without hernia. The abdomen is non-tender. The kidney graft is not tender.  There is no ascites.  Extremities: Tremor absent.   Edema: absent.         Latest Ref Rng & Units 1/29/2024     9:37 AM 1/25/2024    10:03 AM 1/22/2024    10:21 AM 1/18/2024    10:30 AM 1/15/2024    10:36 AM   Transplant Immunosuppression Labs   Creat 0.67 - 1.17 mg/dL 1.93  1.91  1.90  1.87  1.59    Urea Nitrogen 6.0 - 20.0 mg/dL 19.7  19.0  18.6  18.7  18.1    WBC 4.0 - 11.0 10e3/uL 5.6  4.7  4.8  6.7  9.6        Chemistries:   Recent Labs   Lab Test 01/29/24  0937   BUN 19.7   CR 1.93*   GFRESTIMATED 49*   *     Lab Results   Component Value Date    A1C 5.2 12/19/2023     Recent Labs   Lab Test 12/19/23  0953   ALBUMIN 4.4   BILITOTAL 0.5   ALKPHOS 67   AST 27   ALT 41     Urine Studies:  Recent Labs   Lab Test 12/19/23  1007   COLOR Straw   APPEARANCE Clear   URINEGLC 70*   URINEBILI Negative   URINEKETONE Negative   SG 1.003   UBLD Trace*   URINEPH 7.5*   PROTEIN 100*   NITRITE Negative   LEUKEST Negative   RBCU 0   WBCU 1     Recent Labs   Lab Test 02/26/20  1530 11/25/19  0904   UTPG 4.31* 4.92*     Hematology:   Recent Labs   Lab Test 01/29/24  0937 01/25/24  1003 01/22/24  1021   HGB 10.1* 10.8* 10.8*    240 194   WBC 5.6 4.7 4.8     Coags:   Recent Labs   Lab Test 01/02/24  0834 12/19/23  0953   INR 1.17* 1.02     HLA antibodies:   SA1 Hi Risk Kavin   Date Value Ref Range Status   03/29/2021 None  Final     SA1 HI RISK KAVIN   Date Value Ref Range Status   01/22/2024 None  Final     SA1 Mod Risk Kavin   Date Value Ref Range Status   03/29/2021 None  Final     SA1 MOD RISK KAVIN   Date Value Ref Range Status   01/22/2024 None  Final     SA2 Hi Risk Kavin   Date Value Ref Range Status   03/29/2021 None  Final     SA2 HI RISK KAVIN   Date Value Ref Range Status   01/22/2024 None  Final     SA2 Mod Risk Kavin   Date Value Ref Range Status   03/29/2021 None  Final     SA2 MOD RISK KAVIN   Date Value Ref  Range Status   01/22/2024 None  Final       Assessment: Boogie Villa is doing well s/p LDKT:  Issues we addressed during his visit include:    Plan:    1. Graft function: some what elevated Cr likely due to elevated tac  2. Immunosuppression Management: No change Stop pred, watch tac, level and continue MMF  .  Complexity of management:Medium.  Contributing factors: organ dysfunction  3. BP: Good. Systolic 120's  4. Muscle cramps at night  5. Atorvastatin on hold for now. Peter to determine restart  6. Remove CVC today in clinic  Followup: per protocol    Total Time: 30 min,         Nita Martinez MD FACS  Associate Professor of Surgery  Director, Living Kidney Donor Program.

## 2024-01-31 ENCOUNTER — TELEPHONE (OUTPATIENT)
Dept: TRANSPLANT | Facility: CLINIC | Age: 24
End: 2024-01-31
Payer: COMMERCIAL

## 2024-01-31 ENCOUNTER — MYC MEDICAL ADVICE (OUTPATIENT)
Dept: TRANSPLANT | Facility: CLINIC | Age: 24
End: 2024-01-31
Payer: COMMERCIAL

## 2024-01-31 DIAGNOSIS — Z94.0 KIDNEY REPLACED BY TRANSPLANT: Chronic | ICD-10-CM

## 2024-01-31 DIAGNOSIS — Z94.0 KIDNEY REPLACED BY TRANSPLANT: Primary | ICD-10-CM

## 2024-01-31 RX ORDER — TACROLIMUS 5 MG/1
5 CAPSULE ORAL 2 TIMES DAILY
Qty: 60 CAPSULE | Refills: 11 | Status: SHIPPED | OUTPATIENT
Start: 2024-01-31 | End: 2024-02-06

## 2024-01-31 RX ORDER — TACROLIMUS 1 MG/1
3 CAPSULE ORAL 2 TIMES DAILY
Qty: 180 CAPSULE | Refills: 11 | Status: SHIPPED | OUTPATIENT
Start: 2024-01-31 | End: 2024-02-06

## 2024-01-31 NOTE — PROGRESS NOTES
Placed orders for kidney transplant  biopsy  and central line removal   per acute kidney transplant  team [place]

## 2024-01-31 NOTE — TELEPHONE ENCOUNTER
Post Kidney and Pancreas Transplant Team Conference  Date: 1/31/2024  Transplant Coordinator: Nina Hu     Attendees:  []  Dr. Raza [] Nina Hu, MAKAYLA [] Moni Davis LPN     []  Dr. Barrera [] Sharron Maharaj, RN [] Eri Yoder LPN    [] Dr. Richard [] Arely Dennis RN    [] Dr. Dexter [] Janine Betancourt RN [] Kirsten Nix RN   [] Dr. Howard [] Tatiana Ware, RN    [] Dr. Raya [] Baylee Azar RN    []  Dr. Castillo [] Adwoa Randhawa RN    [] Dr. Gold [] Deven Hernández RN    [] Ivis Monzon NP [] Dyan Gross RN    [] Clare Prasad NP [] Heidy Still RN        Verbal Plan Read Back:   Closure biopsy and  central line  (removed per Dr Nita Beard  in clinic )  biopsy  entered      Routed to RN Coordinator   Moni Davis LPN

## 2024-02-01 ENCOUNTER — LAB (OUTPATIENT)
Dept: LAB | Facility: CLINIC | Age: 24
End: 2024-02-01
Payer: COMMERCIAL

## 2024-02-01 DIAGNOSIS — Z20.828 CONTACT WITH AND (SUSPECTED) EXPOSURE TO OTHER VIRAL COMMUNICABLE DISEASES: ICD-10-CM

## 2024-02-01 DIAGNOSIS — Z98.890 OTHER SPECIFIED POSTPROCEDURAL STATES: ICD-10-CM

## 2024-02-01 DIAGNOSIS — Z79.899 ENCOUNTER FOR LONG-TERM CURRENT USE OF MEDICATION: ICD-10-CM

## 2024-02-01 DIAGNOSIS — Z48.298 AFTERCARE FOLLOWING ORGAN TRANSPLANT: ICD-10-CM

## 2024-02-01 DIAGNOSIS — Z94.0 KIDNEY REPLACED BY TRANSPLANT: ICD-10-CM

## 2024-02-01 LAB
ERYTHROCYTE [DISTWIDTH] IN BLOOD BY AUTOMATED COUNT: 13.3 % (ref 10–15)
HBV DNA SERPL QL NAA+PROBE: NORMAL
HCT VFR BLD AUTO: 29.8 % (ref 40–53)
HCV RNA SERPL QL NAA+PROBE: NORMAL
HGB BLD-MCNC: 10 G/DL (ref 13.3–17.7)
HIV1+2 RNA SERPL QL NAA+PROBE: NORMAL
MCH RBC QN AUTO: 32.3 PG (ref 26.5–33)
MCHC RBC AUTO-ENTMCNC: 33.6 G/DL (ref 31.5–36.5)
MCV RBC AUTO: 96 FL (ref 78–100)
PLATELET # BLD AUTO: 327 10E3/UL (ref 150–450)
RBC # BLD AUTO: 3.1 10E6/UL (ref 4.4–5.9)
TACROLIMUS BLD-MCNC: 14.6 UG/L (ref 5–15)
TME LAST DOSE: NORMAL H
TME LAST DOSE: NORMAL H
WBC # BLD AUTO: 4.7 10E3/UL (ref 4–11)

## 2024-02-01 PROCEDURE — 36415 COLL VENOUS BLD VENIPUNCTURE: CPT

## 2024-02-01 PROCEDURE — 80048 BASIC METABOLIC PNL TOTAL CA: CPT

## 2024-02-01 PROCEDURE — 80197 ASSAY OF TACROLIMUS: CPT

## 2024-02-01 PROCEDURE — 85027 COMPLETE CBC AUTOMATED: CPT

## 2024-02-01 PROCEDURE — 80180 DRUG SCRN QUAN MYCOPHENOLATE: CPT

## 2024-02-02 ENCOUNTER — TELEPHONE (OUTPATIENT)
Dept: TRANSPLANT | Facility: CLINIC | Age: 24
End: 2024-02-02
Payer: COMMERCIAL

## 2024-02-02 LAB
ANION GAP SERPL CALCULATED.3IONS-SCNC: 11 MMOL/L (ref 7–15)
BUN SERPL-MCNC: 19.3 MG/DL (ref 6–20)
CALCIUM SERPL-MCNC: 10.2 MG/DL (ref 8.6–10)
CHLORIDE SERPL-SCNC: 106 MMOL/L (ref 98–107)
CREAT SERPL-MCNC: 2.06 MG/DL (ref 0.67–1.17)
DEPRECATED HCO3 PLAS-SCNC: 20 MMOL/L (ref 22–29)
EGFRCR SERPLBLD CKD-EPI 2021: 46 ML/MIN/1.73M2
GLUCOSE SERPL-MCNC: 104 MG/DL (ref 70–99)
MYCOPHENOLATE SERPL LC/MS/MS-MCNC: 3.87 MG/L (ref 1–3.5)
MYCOPHENOLATE-G SERPL LC/MS/MS-MCNC: 79.1 MG/L (ref 30–95)
POTASSIUM SERPL-SCNC: 5.1 MMOL/L (ref 3.4–5.3)
SODIUM SERPL-SCNC: 137 MMOL/L (ref 135–145)
TME LAST DOSE: ABNORMAL H
TME LAST DOSE: ABNORMAL H

## 2024-02-02 NOTE — TELEPHONE ENCOUNTER
Item 1 high tacrolimus  levels     Called Boogie  reviewed and discussed the following items   Boogie verbalized understanding     Abhilash Raza MD Huepfel, Nina GANNON, RN  Okay stopping fluconazole.    Reviewed with Boogie  to   discontinue fluconazole         2nd item confirmed central line pull in clinic and  transplant  kidney biopsy  set   Abhilash Raza MD Huepfel, Nina GANNON, RN; Nita Martinez MD  Okay to pull with DSA negative and not likely needing PLEX.  He does need a closure biopsy.              Boogie Stubbs. My CVC was taken out yesterday. I will now be going to the [place] clinic on Monday's to get labs done and I can schedule those. However, I will be in clinic on Monday 2/12 for a procedure at 10:15. Can you schedule me a lab appointment prior to 10:15 on that day?

## 2024-02-02 NOTE — LETTER
PHYSICIAN ORDERS      DATE & TIME ISSUED: 2024 11:00 AM  PATIENT NAME: Boogie Villa   : 2000     [facility] MR# [if applicable]: [id number]     DIAGNOSIS:  Kidney transplant   ICD-10 CODE:  z94.0          To whom it may concern:  Boogie Villa received a kidney transplant on   @ [facility]   He may  return  to work on    without restrictions   Thank you for your consideration         Any questions please call:  [phone]            Nita Martinez MD FACS  Associate Professor of Surgery  Director, Living Kidney Donor Program.

## 2024-02-05 ENCOUNTER — LAB (OUTPATIENT)
Dept: LAB | Facility: CLINIC | Age: 24
End: 2024-02-05
Payer: COMMERCIAL

## 2024-02-05 DIAGNOSIS — Z98.890 OTHER SPECIFIED POSTPROCEDURAL STATES: ICD-10-CM

## 2024-02-05 DIAGNOSIS — Z79.899 ENCOUNTER FOR LONG-TERM CURRENT USE OF MEDICATION: ICD-10-CM

## 2024-02-05 DIAGNOSIS — Z94.0 KIDNEY REPLACED BY TRANSPLANT: ICD-10-CM

## 2024-02-05 DIAGNOSIS — Z20.828 CONTACT WITH AND (SUSPECTED) EXPOSURE TO OTHER VIRAL COMMUNICABLE DISEASES: ICD-10-CM

## 2024-02-05 DIAGNOSIS — Z48.298 AFTERCARE FOLLOWING ORGAN TRANSPLANT: ICD-10-CM

## 2024-02-05 LAB
ANION GAP SERPL CALCULATED.3IONS-SCNC: 10 MMOL/L (ref 7–15)
BUN SERPL-MCNC: 22.2 MG/DL (ref 6–20)
CALCIUM SERPL-MCNC: 10.4 MG/DL (ref 8.6–10)
CHLORIDE SERPL-SCNC: 106 MMOL/L (ref 98–107)
CREAT SERPL-MCNC: 2.22 MG/DL (ref 0.67–1.17)
DEPRECATED HCO3 PLAS-SCNC: 22 MMOL/L (ref 22–29)
EGFRCR SERPLBLD CKD-EPI 2021: 42 ML/MIN/1.73M2
ERYTHROCYTE [DISTWIDTH] IN BLOOD BY AUTOMATED COUNT: 13 % (ref 10–15)
GLUCOSE SERPL-MCNC: 102 MG/DL (ref 70–99)
HCT VFR BLD AUTO: 30.2 % (ref 40–53)
HGB BLD-MCNC: 10 G/DL (ref 13.3–17.7)
MAGNESIUM SERPL-MCNC: 1.5 MG/DL (ref 1.7–2.3)
MCH RBC QN AUTO: 32.5 PG (ref 26.5–33)
MCHC RBC AUTO-ENTMCNC: 33.1 G/DL (ref 31.5–36.5)
MCV RBC AUTO: 98 FL (ref 78–100)
PHOSPHATE SERPL-MCNC: 2.1 MG/DL (ref 2.5–4.5)
PLATELET # BLD AUTO: 298 10E3/UL (ref 150–450)
POTASSIUM SERPL-SCNC: 4.9 MMOL/L (ref 3.4–5.3)
RBC # BLD AUTO: 3.08 10E6/UL (ref 4.4–5.9)
SODIUM SERPL-SCNC: 138 MMOL/L (ref 135–145)
TACROLIMUS BLD-MCNC: 13.9 UG/L (ref 5–15)
TME LAST DOSE: NORMAL H
TME LAST DOSE: NORMAL H
WBC # BLD AUTO: 4.9 10E3/UL (ref 4–11)

## 2024-02-05 PROCEDURE — 84100 ASSAY OF PHOSPHORUS: CPT

## 2024-02-05 PROCEDURE — 80048 BASIC METABOLIC PNL TOTAL CA: CPT

## 2024-02-05 PROCEDURE — 80197 ASSAY OF TACROLIMUS: CPT

## 2024-02-05 PROCEDURE — 83735 ASSAY OF MAGNESIUM: CPT

## 2024-02-05 PROCEDURE — 85027 COMPLETE CBC AUTOMATED: CPT

## 2024-02-05 PROCEDURE — 36415 COLL VENOUS BLD VENIPUNCTURE: CPT

## 2024-02-06 ENCOUNTER — MYC MEDICAL ADVICE (OUTPATIENT)
Dept: TRANSPLANT | Facility: CLINIC | Age: 24
End: 2024-02-06
Payer: COMMERCIAL

## 2024-02-06 ENCOUNTER — TELEPHONE (OUTPATIENT)
Dept: TRANSPLANT | Facility: CLINIC | Age: 24
End: 2024-02-06
Payer: COMMERCIAL

## 2024-02-06 DIAGNOSIS — Z94.0 KIDNEY REPLACED BY TRANSPLANT: Chronic | ICD-10-CM

## 2024-02-06 DIAGNOSIS — Z94.0 KIDNEY REPLACED BY TRANSPLANT: Primary | ICD-10-CM

## 2024-02-06 RX ORDER — TACROLIMUS 5 MG/1
5 CAPSULE ORAL 2 TIMES DAILY
Qty: 60 CAPSULE | Refills: 11 | Status: SHIPPED | OUTPATIENT
Start: 2024-02-06 | End: 2024-02-24

## 2024-02-06 RX ORDER — TACROLIMUS 1 MG/1
2 CAPSULE ORAL 2 TIMES DAILY
Qty: 120 CAPSULE | Refills: 11 | Status: SHIPPED | OUTPATIENT
Start: 2024-02-06 | End: 2024-02-24

## 2024-02-06 NOTE — TELEPHONE ENCOUNTER
Issue tacrolimus  level 13.9 above goal level   Confirmed stopped fluconazole on Feb 3 ,2024   Confirmed 12 hour trough level   Confirmed current dose of tacrolimus  8 mg twice per day   Lowered tacrolimus  7 mg twice per day     Boogie verbalized understanding of dose change   Sent RX to preferred pharmacy

## 2024-02-07 ENCOUNTER — OFFICE VISIT (OUTPATIENT)
Dept: TRANSPLANT | Facility: CLINIC | Age: 24
End: 2024-02-07
Attending: INTERNAL MEDICINE
Payer: COMMERCIAL

## 2024-02-07 VITALS
SYSTOLIC BLOOD PRESSURE: 117 MMHG | OXYGEN SATURATION: 97 % | DIASTOLIC BLOOD PRESSURE: 71 MMHG | WEIGHT: 275 LBS | HEART RATE: 99 BPM | BODY MASS INDEX: 38.35 KG/M2

## 2024-02-07 DIAGNOSIS — I15.1 HTN, KIDNEY TRANSPLANT RELATED: ICD-10-CM

## 2024-02-07 DIAGNOSIS — D84.9 IMMUNOSUPPRESSED STATUS (H): Chronic | ICD-10-CM

## 2024-02-07 DIAGNOSIS — D63.1 ANEMIA IN STAGE 3B CHRONIC KIDNEY DISEASE (H): ICD-10-CM

## 2024-02-07 DIAGNOSIS — Z94.0 HTN, KIDNEY TRANSPLANT RELATED: ICD-10-CM

## 2024-02-07 DIAGNOSIS — E87.20 METABOLIC ACIDOSIS: ICD-10-CM

## 2024-02-07 DIAGNOSIS — E55.9 VITAMIN D DEFICIENCY: ICD-10-CM

## 2024-02-07 DIAGNOSIS — E83.42 HYPOMAGNESEMIA: Primary | ICD-10-CM

## 2024-02-07 DIAGNOSIS — N18.32 CHRONIC KIDNEY DISEASE, STAGE 3B (H): ICD-10-CM

## 2024-02-07 DIAGNOSIS — Z48.298 AFTERCARE FOLLOWING ORGAN TRANSPLANT: ICD-10-CM

## 2024-02-07 DIAGNOSIS — Z94.0 KIDNEY REPLACED BY TRANSPLANT: ICD-10-CM

## 2024-02-07 DIAGNOSIS — N18.32 ANEMIA IN STAGE 3B CHRONIC KIDNEY DISEASE (H): ICD-10-CM

## 2024-02-07 DIAGNOSIS — Z29.89 NEED FOR PNEUMOCYSTIS PROPHYLAXIS: ICD-10-CM

## 2024-02-07 PROCEDURE — 99213 OFFICE O/P EST LOW 20 MIN: CPT | Performed by: INTERNAL MEDICINE

## 2024-02-07 PROCEDURE — G0463 HOSPITAL OUTPT CLINIC VISIT: HCPCS | Performed by: INTERNAL MEDICINE

## 2024-02-07 PROCEDURE — 99215 OFFICE O/P EST HI 40 MIN: CPT | Mod: 24 | Performed by: INTERNAL MEDICINE

## 2024-02-07 PROCEDURE — G2211 COMPLEX E/M VISIT ADD ON: HCPCS | Performed by: INTERNAL MEDICINE

## 2024-02-07 RX ORDER — MAGNESIUM OXIDE 400 MG/1
400 TABLET ORAL DAILY
Qty: 90 TABLET | Refills: 3 | Status: ON HOLD | OUTPATIENT
Start: 2024-02-07 | End: 2024-03-01

## 2024-02-07 ASSESSMENT — PAIN SCALES - GENERAL: PAINLEVEL: NO PAIN (0)

## 2024-02-07 NOTE — LETTER
2/7/2024         RE: Boogie Villa  [address]  [place] [address]        Dear Colleague,    Thank you for referring your patient, Boogie Vlila, to the [facility]. Please see a copy of my visit note below.    TRANSPLANT NEPHROLOGY EARLY POST TRANSPLANT VISIT    Assessment & Plan  # LDKT: Stable creatinine in the ~ 1.8-2.1 range so far.  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   - Baseline Creatinine: ~ 1.8-2.1   - Proteinuria: Not checked post transplant   - Date DSA Last Checked: Jan/2024      Latest DSA: No cPRA: 0%   - BK Viremia: No   - Kidney Tx Biopsy: Jan 02, 2024; Result: Acute cellular-mediated rejection, Banff 1b.   Mild glomerulitis and severe peritubular capillaritis, but negative C4d and no DSA. Mild arterial sclerosis.    - Transplant Ureteral Stent: Yes    # Immunosuppression: Tacrolimus immediate release (goal 8-10) and Mycophenolate mofetil (dose 750 mg every 12 hours)   - Induction with Recent Transplant:  Low Intensity Protocol, although did receive full dose rATG due to early acute rejection   - Continue with intensive monitoring of immunosuppression for efficacy and toxicity.   - Changes: Not at this time    # Infection Prophylaxis:   - PJP: Sulfa/TMP (Bactrim)  - CMV: Valganciclovir (Valcyte); Patient is CMV IgG Ab discordant (D+/R-) and will continue on Valcyte x 6 months, then check CMV PCR monthly until 12 months post transplant.    # Hypertension: Controlled;  Goal BP: < 140/90   - Volume status: Euvolemic   - Changes: Not at this time    # Elevated Blood Glucose: Glucose generally running ~ 100-120s   - Management as per primary care.    # Anemia in Chronic Renal Disease: Hgb: Stable      TOM: No   - Iron studies: High iron saturation; Will repeat iron panel at 4 months post transplant.    # Mineral Bone Disorder:    - Secondary renal hyperparathyroidism; PTH level:  Significantly elevated (601-1200 pg/ml)        On treatment: None  - Vitamin D; level: Low        On supplement: Yes  - Calcium; level: High        On supplement: No  - Phosphorus; level: Stable low        On supplement: No    # Electrolytes:   - Potassium; level: Normal        On supplement: No  - Magnesium; level: Stable low        On supplement: No; Will start magnesium oxide 400 mg daily with lunch.  - Bicarbonate; level: Normal        On supplement: Yes    # Obesity, Class II (BMI = 38.5): Stable weight.              - Once patient heals and recovers from surgery, would recommend working on weight loss for overall health by increasing exercise and watching caloric intake.    # GERD: Asymptomatic on PPI.  Patient is in the process of tapering off pantoprazole by starting to take it every other day.  If tolerated without significant symptoms, patient can stop pantoprazole and just take it or OTC famotidine (Pepcid) as needed for heartburn symptoms.     # H/o Ureteral Reimplantation, s/p Mitrofanoff: Patient reports not using for Mitrofanoff in a couple of years and voids normally on his own.  Mitrofanoff is nonfunctional at this time.     # H/o Bilateral Uveitis: No evidence of systemic autoimmune/inflammatory disease at last Rheumatology.      # EBV IgG Ab Discordance (D+/R-): Will do surveillance EBV PCR qmonth until 12 months post transplant, then q3 months until 2 years post transplant.    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    Return visit: Return for previously scheduled visit.    Abhilash Raza MD    The longitudinal plan of care for kidney transplant was addressed during this visit. Due to the added complexity in care, I will continue to support Boogie Villa in the subsequent management of this condition(s) and with the ongoing continuity of care of this condition(s).    Chief Complaint  Mr. Villa is a 23 year old here for kidney transplant and immunosuppression management.     History of Present Illness   Mr. Villa reports feeling good overall with some medical complaints.  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  Patient's early post transplant course was c/b acute cellular-mediated rejection, Banff 1B and received rATG for treatment.  His creatinine has since come down to the ~ 1.8-2.1 range.  Patient is scheduled for a closure kidney transplant biopsy in a couple of days.    His energy level is good and pretty normal at this point.  He is active, although only getting a little exercise so far.  Denies any chest pain or shortness of breath with exertion.  No leg swelling.  He was getting some muscle cramps and is now just holding his statin.    Appetite is good and his weight has been a bit up and down, but overall down about 10 lbs.  No nausea, vomiting or diarrhea.  No heartburn symptoms and is in the process of tapering off pantoprazole.  No fever, sweats or chills.  No night sweats.  No problems emptying his bladder.    Home BP:  120s systolic    Problem List  Patient Active Problem List   Diagnosis     Lymphangioma     ADHD (attention deficit hyperactivity disorder)     Allergic rhinitis due to pollen     Anxiety     Congenital posterior urethral valves     Iron deficiency     Neurogenic bladder     Class 2 severe obesity  due to excess calories with serious comorbidity and body mass index (BMI) of 39.0 to 39.9 in adult (H)     Sensorineural hearing loss, asymmetrical     HTN, kidney transplant related     Secondary renal hyperparathyroidism (H24)     Vitamin D deficiency     Kidney replaced by transplant     Immunosuppressed status (H24)     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     Chronic kidney disease, stage 3b (H)     Dehydration     Elevated serum creatinine     Aftercare following organ transplant     Need for pneumocystis prophylaxis     Anemia in chronic renal disease     Metabolic acidosis     Hypomagnesemia       Allergies  Allergies   Allergen Reactions     Banana Itching     Raw banana; itchy mouth       Dust Mites      Runny nose and watery eyes     Mold      Runny nose and watery eyes     Nsaids Other (See Comments)     CKD     Other [Seasonal Allergies]      Grass, Ragweed - gets runny nose and watery eyes     Sulfa Antibiotics      PN: LW Reaction: GI Upset as an infant  12/28 Admission: Tolerated Bactrim       Medications  No current facility-administered medications for this visit.     Current Outpatient Medications   Medication Sig     acetaminophen (TYLENOL) 325 MG tablet Take 1-2 tablets (325-650 mg) by mouth every 4 hours as needed (For optimal non-opioid multimodal pain management to improve pain control.)     amLODIPine (NORVASC) 10 MG tablet Take 1 tablet (10 mg) by mouth daily     carvedilol (COREG) 12.5 MG tablet Take 1 tablet (12.5 mg) by mouth 2 times daily     cetirizine (ZYRTEC) 10 MG tablet Take 10 mg by mouth daily as needed for allergies (in the spring)     magnesium oxide (MAG-OX) 400 MG tablet Take 1 tablet (400 mg) by mouth daily     methylphenidate (RITALIN) 20 MG tablet Take 20 mg by mouth as needed Prn     mycophenolate (GENERIC EQUIVALENT) 250 MG capsule Take 3 capsules (750 mg) by mouth 2 times daily     Probiotic Product (PROBIOTIC-10 PO) Take 1 tablet by mouth every  morning     sodium bicarbonate 650 MG tablet Take 2 tablets (1,300 mg) by mouth 3 times daily     sulfamethoxazole-trimethoprim (BACTRIM) 400-80 MG tablet Take 1 tablet by mouth daily     Vitamin D3 (CHOLECALCIFEROL) 25 mcg (1000 units) tablet Take 2 tablets (50 mcg) by mouth daily     aspirin 81 MG EC tablet Take 1 tablet (81 mg) by mouth daily     tacrolimus (GENERIC) 1 MG capsule Take 1 mg by mouth 2 times daily Take 1 mg capsule oral 2 times daily with the 5 mg capsule for a total of 6 mg twice a day     tacrolimus (GENERIC) 5 MG capsule Take 5 mg by mouth 2 times daily Take 5 mg capsule oral 2 times daily with the 1 mg capsule for a total of 6 mg twice a day     valGANciclovir (VALCYTE) 450 MG tablet Take 450 mg by mouth daily Take (2) 450 MG tablets by mouth 1 time a day for a total of 900 mg daily     Facility-Administered Medications Ordered in Other Visits   Medication     acetaminophen (TYLENOL) tablet 650 mg     amLODIPine (NORVASC) tablet 10 mg     [Held by provider] aspirin EC tablet 81 mg     carvedilol (COREG) tablet 12.5 mg     lidocaine (LMX4) cream     lidocaine 1 % 1 mL     mycophenolate (GENERIC EQUIVALENT) capsule 750 mg     sodium bicarbonate tablet 1,300 mg     sodium chloride (PF) 0.9% PF flush 3 mL     sodium chloride (PF) 0.9% PF flush 3 mL     [START ON 2/27/2024] sulfamethoxazole-trimethoprim (BACTRIM) 400-80 MG per tablet 1 tablet     tacrolimus (GENERIC) capsule 6 mg     [START ON 2/27/2024] valGANciclovir (VALCYTE) tablet 450 mg     Vitamin D3 (CHOLECALCIFEROL) tablet 50 mcg     Medications Discontinued During This Encounter   Medication Reason     atorvastatin (LIPITOR) 10 MG tablet      methocarbamol (ROBAXIN) 750 MG tablet      pantoprazole (PROTONIX) 40 MG EC tablet      predniSONE (DELTASONE) 10 MG tablet      lidocaine (PF) (XYLOCAINE) 1 % injection 10 mL        Physical Exam  Vital Signs: /71 (BP Location: Left arm, Patient Position: Sitting, Cuff Size: Adult Large)    Pulse 99   Wt 124.7 kg (275 lb)   SpO2 97%   BMI 38.35 kg/m      GENERAL APPEARANCE: alert and no distress  HENT: mouth without ulcers or lesions  RESP: lungs clear to auscultation - no rales, rhonchi or wheezes  CV: regular rhythm, normal rate, no rub, no murmur  EDEMA: no LE edema bilaterally  ABDOMEN: soft, nondistended, nontender, bowel sounds normal, obese  MS: extremities normal - no gross deformities noted, no evidence of inflammation in joints, no muscle tenderness  SKIN: no rash  TX KIDNEY: normal  DIALYSIS ACCESS:  None    Data        Latest Ref Rng & Units 2/25/2024     1:08 PM 2/25/2024    11:27 AM 2/25/2024     8:28 AM   Renal   Sodium 135 - 145 mmol/L  142     K 3.4 - 5.3 mmol/L  4.5     Cl 98 - 107 mmol/L  109     Cl (external) 98 - 107 mmol/L  109     CO2 22 - 29 mmol/L  22     Urea Nitrogen 6.0 - 20.0 mg/dL  37.9     Creatinine 0.67 - 1.17 mg/dL  4.69     Glucose 70 - 99 mg/dL 142  176  90    Calcium 8.6 - 10.0 mg/dL  9.1     Magnesium 1.7 - 2.3 mg/dL  2.0           Latest Ref Rng & Units 2/25/2024    11:27 AM 2/20/2024    10:33 AM 2/12/2024    10:30 AM   Bone Health   Phosphorus 2.5 - 4.5 mg/dL 4.1  2.4  1.8          Latest Ref Rng & Units 2/25/2024    11:27 AM 2/24/2024    10:08 AM 2/22/2024     9:58 AM   Heme   WBC 4.0 - 11.0 10e3/uL 3.2  3.8  4.0    Hgb 13.3 - 17.7 g/dL 8.1  9.2  9.5    Plt 150 - 450 10e3/uL 217  248  261          Latest Ref Rng & Units 12/19/2023     9:53 AM 7/13/2023     1:28 PM 7/16/2021     4:50 PM   Liver   AP 40 - 150 U/L 67      AP (external) 40 - 150 U/L  45     TBili <=1.2 mg/dL 0.5      TBili (external) 0.2 - 1.2 mg/dL  0.6     ALT 0 - 70 U/L 41      ALT (external) <=55 U/L  35     AST 0 - 45 U/L 27      AST (external) 10 - 40 U/L  26     Tot Protein 6.4 - 8.3 g/dL 7.6      Tot Protein (external) 6.4 - 8.3 g/dL  7.5     Albumin 3.5 - 5.0 g/dL   3.0    Albumin 3.5 - 5.2 g/dL 4.4      Albumin (external) 3.5 - 5.0 g/dL  3.8           Latest Ref Rng & Units 12/19/2023      9:53 AM 7/13/2023     1:28 PM   Pancreas   A1C <5.7 % 5.2     A1C (external) <=5.6 %  4.9          Latest Ref Rng & Units 1/7/2024     7:50 AM 12/19/2023     9:53 AM 2/26/2020     3:20 PM   Iron studies   Iron 61 - 157 ug/dL 167  104  66    Iron Saturation Index 15 - 46 %   26    Iron Sat Index 15 - 46 % 79  42     Ferritin 31 - 409 ng/mL 783  826  133          Latest Ref Rng & Units 2/20/2024    10:35 AM 2/8/2024     9:49 AM 1/11/2024     9:50 AM   [facility] Virology   EBV DNA COPIES/ML Not Detected copies/mL Not Detected  <500  Not Detected    EBV DNA LOG OF COPIES   <2.7       Failed to redirect to the Timeline version of the REVFS SmartLink.  Recent Labs   Lab Test 02/20/24  1033 02/22/24  0958 02/24/24  1008   DOSTAC 2/19/2024 2/21/2024 2/23/2024   TACROL 8.2 18.9* 8.5     Recent Labs   Lab Test 01/11/24  0950 01/29/24  0937 02/01/24  1008 02/12/24  1030   DOSMPA 1/10/2024   9:30 PM 1/28/2024   9:30 PM  --   --    MPACID 3.79* 3.73* 3.87* 2.23   MPAG 71.7 63.8 79.1 68.1        Again, thank you for allowing me to participate in the care of your patient.        Sincerely,        Abhilash Raza MD

## 2024-02-07 NOTE — LETTER
2/7/2024      RE: Boogie Villa  [address]  [place] [address]       TRANSPLANT NEPHROLOGY EARLY POST TRANSPLANT VISIT    Assessment & Plan  # LDKT: Stable creatinine in the ~ 1.8-2.1 range so far.  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   - Baseline Creatinine: ~ 1.8-2.1   - Proteinuria: Not checked post transplant   - Date DSA Last Checked: Jan/2024      Latest DSA: No cPRA: 0%   - BK Viremia: No   - Kidney Tx Biopsy: Jan 02, 2024; Result: Acute cellular-mediated rejection, Banff 1b.   Mild glomerulitis and severe peritubular capillaritis, but negative C4d and no DSA. Mild arterial sclerosis.    - Transplant Ureteral Stent: Yes    # Immunosuppression: Tacrolimus immediate release (goal 8-10) and Mycophenolate mofetil (dose 750 mg every 12 hours)   - Induction with Recent Transplant:  Low Intensity Protocol, although did receive full dose rATG due to early acute rejection   - Continue with intensive monitoring of immunosuppression for efficacy and toxicity.   - Changes: Not at this time    # Infection Prophylaxis:   - PJP: Sulfa/TMP (Bactrim)  - CMV: Valganciclovir (Valcyte); Patient is CMV IgG Ab discordant (D+/R-) and will continue on Valcyte x 6 months, then check CMV PCR monthly until 12 months post transplant.    # Hypertension: Controlled;  Goal BP: < 140/90   - Volume status: Euvolemic   - Changes: Not at this time    # Elevated Blood Glucose: Glucose generally running ~ 100-120s   - Management as per primary care.    # Anemia in Chronic Renal Disease: Hgb: Stable      TOM: No   - Iron studies: High iron saturation; Will repeat iron panel at 4 months post transplant.    # Mineral Bone Disorder:    - Secondary renal hyperparathyroidism; PTH level: Significantly elevated (601-1200 pg/ml)        On treatment: None  - Vitamin D; level: Low        On supplement: Yes  - Calcium; level: High        On supplement: No  -  Phosphorus; level: Stable low        On supplement: No    # Electrolytes:   - Potassium; level: Normal        On supplement: No  - Magnesium; level: Stable low        On supplement: No; Will start magnesium oxide 400 mg daily with lunch.  - Bicarbonate; level: Normal        On supplement: Yes    # Obesity, Class II (BMI = 38.5): Stable weight.              - Once patient heals and recovers from surgery, would recommend working on weight loss for overall health by increasing exercise and watching caloric intake.    # GERD: Asymptomatic on PPI.  Patient is in the process of tapering off pantoprazole by starting to take it every other day.  If tolerated without significant symptoms, patient can stop pantoprazole and just take it or OTC famotidine (Pepcid) as needed for heartburn symptoms.     # H/o Ureteral Reimplantation, s/p Mitrofanoff: Patient reports not using for Mitrofanoff in a couple of years and voids normally on his own.  Mitrofanoff is nonfunctional at this time.     # H/o Bilateral Uveitis: No evidence of systemic autoimmune/inflammatory disease at last Rheumatology.      # EBV IgG Ab Discordance (D+/R-): Will do surveillance EBV PCR qmonth until 12 months post transplant, then q3 months until 2 years post transplant.    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    Return visit: Return  for previously scheduled visit.    Abhilash Raza MD    The longitudinal plan of care for kidney transplant was addressed during this visit. Due to the added complexity in care, I will continue to support Boogie Villa in the subsequent management of this condition(s) and with the ongoing continuity of care of this condition(s).    Chief Complaint  Mr. Villa is a 23 year old here for kidney transplant and immunosuppression management.     History of Present Illness   Mr. Villa reports feeling good overall with some medical complaints.  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  Patient's early post transplant course was c/b acute cellular-mediated rejection, Banff 1B and received rATG for treatment.  His creatinine has since come down to the ~ 1.8-2.1 range.  Patient is scheduled for a closure kidney transplant biopsy in a couple of days.    His energy level is good and pretty normal at this point.  He is active, although only getting a little exercise so far.  Denies any chest pain or shortness of breath with exertion.  No leg swelling.  He was getting some muscle cramps and is now just holding his statin.    Appetite is good and his weight has been a bit up and down, but overall down about 10 lbs.  No nausea, vomiting or diarrhea.  No heartburn symptoms and is in the process of tapering off pantoprazole.  No fever, sweats or chills.  No night sweats.  No problems emptying his bladder.    Home BP:  120s systolic    Problem List  Patient Active Problem List   Diagnosis     Lymphangioma     ADHD (attention deficit hyperactivity disorder)     Allergic rhinitis due to pollen     Anxiety     Congenital posterior urethral valves     Iron deficiency     Neurogenic bladder     Class 2 severe obesity due to excess calories with serious comorbidity and body mass index (BMI) of 39.0 to 39.9 in adult (H)     Sensorineural hearing loss, asymmetrical     HTN, kidney transplant related      Secondary renal hyperparathyroidism (H24)     Vitamin D deficiency     Kidney replaced by transplant     Immunosuppressed status (H24)     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     Chronic kidney disease, stage 3b (H)     Dehydration     Elevated serum creatinine     Aftercare following organ transplant     Need for pneumocystis prophylaxis     Anemia in chronic renal disease     Metabolic acidosis     Hypomagnesemia       Allergies  Allergies   Allergen Reactions     Banana Itching     Raw banana; itchy mouth       Dust Mites      Runny nose and watery eyes     Mold      Runny nose and watery eyes     Nsaids Other (See Comments)     CKD     Other [Seasonal Allergies]      Grass, Ragweed - gets runny nose and watery eyes     Sulfa Antibiotics      PN: LW Reaction: GI Upset as an infant  12/28 Admission: Tolerated Bactrim       Medications  No current facility-administered medications for this visit.     Current Outpatient Medications   Medication Sig     acetaminophen (TYLENOL) 325 MG tablet Take 1-2 tablets (325-650 mg) by mouth every 4 hours as needed (For optimal non-opioid multimodal pain management to improve pain control.)     amLODIPine (NORVASC) 10 MG tablet Take 1 tablet (10 mg) by mouth daily     carvedilol (COREG) 12.5 MG tablet Take 1 tablet (12.5 mg) by mouth 2 times daily     cetirizine (ZYRTEC) 10 MG tablet Take 10 mg by mouth daily as needed for allergies (in the spring)     magnesium oxide (MAG-OX) 400 MG tablet Take 1 tablet (400 mg) by mouth daily     methylphenidate (RITALIN) 20 MG tablet Take 20 mg by mouth as needed Prn     mycophenolate (GENERIC EQUIVALENT) 250 MG capsule Take 3 capsules (750 mg) by mouth 2 times daily     Probiotic Product (PROBIOTIC-10 PO) Take 1 tablet by mouth every morning     sodium bicarbonate 650 MG tablet Take 2 tablets (1,300 mg) by mouth 3 times daily     sulfamethoxazole-trimethoprim (BACTRIM) 400-80 MG tablet Take 1 tablet by mouth  daily     Vitamin D3 (CHOLECALCIFEROL) 25 mcg (1000 units) tablet Take 2 tablets (50 mcg) by mouth daily     aspirin 81 MG EC tablet Take 1 tablet (81 mg) by mouth daily     tacrolimus (GENERIC) 1 MG capsule Take 1 mg by mouth 2 times daily Take 1 mg capsule oral 2 times daily with the 5 mg capsule for a total of 6 mg twice a day     tacrolimus (GENERIC) 5 MG capsule Take 5 mg by mouth 2 times daily Take 5 mg capsule oral 2 times daily with the 1 mg capsule for a total of 6 mg twice a day     valGANciclovir (VALCYTE) 450 MG tablet Take 450 mg by mouth daily Take (2) 450 MG tablets by mouth 1 time a day for a total of 900 mg daily     Facility-Administered Medications Ordered in Other Visits   Medication     acetaminophen (TYLENOL) tablet 650 mg     amLODIPine (NORVASC) tablet 10 mg     [Held by provider] aspirin EC tablet 81 mg     carvedilol (COREG) tablet 12.5 mg     lidocaine (LMX4) cream     lidocaine 1 % 1 mL     mycophenolate (GENERIC EQUIVALENT) capsule 750 mg     sodium bicarbonate tablet 1,300 mg     sodium chloride (PF) 0.9% PF flush 3 mL     sodium chloride (PF) 0.9% PF flush 3 mL     [START ON 2/27/2024] sulfamethoxazole-trimethoprim (BACTRIM) 400-80 MG per tablet 1 tablet     tacrolimus (GENERIC) capsule 6 mg     [START ON 2/27/2024] valGANciclovir (VALCYTE) tablet 450 mg     Vitamin D3 (CHOLECALCIFEROL) tablet 50 mcg     Medications Discontinued During This Encounter   Medication Reason     atorvastatin (LIPITOR) 10 MG tablet      methocarbamol (ROBAXIN) 750 MG tablet      pantoprazole (PROTONIX) 40 MG EC tablet      predniSONE (DELTASONE) 10 MG tablet      lidocaine (PF) (XYLOCAINE) 1 % injection 10 mL        Physical Exam  Vital Signs: /71 (BP Location: Left arm, Patient Position: Sitting, Cuff Size: Adult Large)   Pulse 99   Wt 124.7 kg (275 lb)   SpO2 97%   BMI 38.35 kg/m      GENERAL APPEARANCE: alert and no distress  HENT: mouth without ulcers or lesions  RESP: lungs clear to  auscultation - no rales, rhonchi or wheezes  CV: regular rhythm, normal rate, no rub, no murmur  EDEMA: no LE edema bilaterally  ABDOMEN: soft, nondistended, nontender, bowel sounds normal, obese  MS: extremities normal - no gross deformities noted, no evidence of inflammation in joints, no muscle tenderness  SKIN: no rash  TX KIDNEY: normal  DIALYSIS ACCESS:  None    Data        Latest Ref Rng & Units 2/25/2024     1:08 PM 2/25/2024    11:27 AM 2/25/2024     8:28 AM   Renal   Sodium 135 - 145 mmol/L  142     K 3.4 - 5.3 mmol/L  4.5     Cl 98 - 107 mmol/L  109     Cl (external) 98 - 107 mmol/L  109     CO2 22 - 29 mmol/L  22     Urea Nitrogen 6.0 - 20.0 mg/dL  37.9     Creatinine 0.67 - 1.17 mg/dL  4.69     Glucose 70 - 99 mg/dL 142  176  90    Calcium 8.6 - 10.0 mg/dL  9.1     Magnesium 1.7 - 2.3 mg/dL  2.0           Latest Ref Rng & Units 2/25/2024    11:27 AM 2/20/2024    10:33 AM 2/12/2024    10:30 AM   Bone Health   Phosphorus 2.5 - 4.5 mg/dL 4.1  2.4  1.8          Latest Ref Rng & Units 2/25/2024    11:27 AM 2/24/2024    10:08 AM 2/22/2024     9:58 AM   Heme   WBC 4.0 - 11.0 10e3/uL 3.2  3.8  4.0    Hgb 13.3 - 17.7 g/dL 8.1  9.2  9.5    Plt 150 - 450 10e3/uL 217  248  261          Latest Ref Rng & Units 12/19/2023     9:53 AM 7/13/2023     1:28 PM 7/16/2021     4:50 PM   Liver   AP 40 - 150 U/L 67      AP (external) 40 - 150 U/L  45     TBili <=1.2 mg/dL 0.5      TBili (external) 0.2 - 1.2 mg/dL  0.6     ALT 0 - 70 U/L 41      ALT (external) <=55 U/L  35     AST 0 - 45 U/L 27      AST (external) 10 - 40 U/L  26     Tot Protein 6.4 - 8.3 g/dL 7.6      Tot Protein (external) 6.4 - 8.3 g/dL  7.5     Albumin 3.5 - 5.0 g/dL   3.0    Albumin 3.5 - 5.2 g/dL 4.4      Albumin (external) 3.5 - 5.0 g/dL  3.8           Latest Ref Rng & Units 12/19/2023     9:53 AM 7/13/2023     1:28 PM   Pancreas   A1C <5.7 % 5.2     A1C (external) <=5.6 %  4.9          Latest Ref Rng & Units 1/7/2024     7:50 AM 12/19/2023     9:53 AM  2/26/2020     3:20 PM   Iron studies   Iron 61 - 157 ug/dL 167  104  66    Iron Saturation Index 15 - 46 %   26    Iron Sat Index 15 - 46 % 79  42     Ferritin 31 - 409 ng/mL 783  826  133          Latest Ref Rng & Units 2/20/2024    10:35 AM 2/8/2024     9:49 AM 1/11/2024     9:50 AM   [facility] Txp Virology   EBV DNA COPIES/ML Not Detected copies/mL Not Detected  <500  Not Detected    EBV DNA LOG OF COPIES   <2.7       Failed to redirect to the Timeline version of the REVFS SmartLink.  Recent Labs   Lab Test 02/20/24  1033 02/22/24  0958 02/24/24  1008   DOSTAC 2/19/2024 2/21/2024 2/23/2024   TACROL 8.2 18.9* 8.5     Recent Labs   Lab Test 01/11/24  0950 01/29/24  0937 02/01/24  1008 02/12/24  1030   DOSMPA 1/10/2024   9:30 PM 1/28/2024   9:30 PM  --   --    MPACID 3.79* 3.73* 3.87* 2.23   MPAG 71.7 63.8 79.1 68.1        Abhilash Raza MD

## 2024-02-07 NOTE — NURSING NOTE
Chief Complaint   Patient presents with    RECHECK     K/P POST ACUTE TXP NEPH - post kidney transplant, AKT, scheduled per request         /71 (BP Location: Left arm, Patient Position: Sitting, Cuff Size: Adult Large)   Pulse 99   Wt 124.7 kg (275 lb)   SpO2 97%   BMI 38.35 kg/m      Jose C Mendez CMA on 2/7/2024 at 11:41 AM

## 2024-02-07 NOTE — PATIENT INSTRUCTIONS
Patient Recommendations:  - Start magnesium oxide 400 mg daily with lunch.    Transplant Patient Information  Your Post Transplant Coordinator is: Nina Hu  For non urgent items, we encourage you to contact your coordinator/care team online via LiveOnDem[employer]  You and your care team can also contact your transplant coordinator Monday - Friday, 8am - 5pm at [phone] (Option 2 to reach the coordinator or Option 4 to schedule an appointment).  After hours for urgent matters, please call [facility] at [phone].

## 2024-02-08 ENCOUNTER — LAB (OUTPATIENT)
Dept: LAB | Facility: CLINIC | Age: 24
End: 2024-02-08
Payer: COMMERCIAL

## 2024-02-08 DIAGNOSIS — Z98.890 OTHER SPECIFIED POSTPROCEDURAL STATES: ICD-10-CM

## 2024-02-08 DIAGNOSIS — Z79.899 ENCOUNTER FOR LONG-TERM CURRENT USE OF MEDICATION: ICD-10-CM

## 2024-02-08 DIAGNOSIS — Z48.298 AFTERCARE FOLLOWING ORGAN TRANSPLANT: ICD-10-CM

## 2024-02-08 DIAGNOSIS — Z20.828 CONTACT WITH AND (SUSPECTED) EXPOSURE TO OTHER VIRAL COMMUNICABLE DISEASES: ICD-10-CM

## 2024-02-08 DIAGNOSIS — Z94.0 KIDNEY REPLACED BY TRANSPLANT: ICD-10-CM

## 2024-02-08 LAB
ERYTHROCYTE [DISTWIDTH] IN BLOOD BY AUTOMATED COUNT: 13.1 % (ref 10–15)
HCT VFR BLD AUTO: 28.6 % (ref 40–53)
HGB BLD-MCNC: 9.5 G/DL (ref 13.3–17.7)
MCH RBC QN AUTO: 32.6 PG (ref 26.5–33)
MCHC RBC AUTO-ENTMCNC: 33.2 G/DL (ref 31.5–36.5)
MCV RBC AUTO: 98 FL (ref 78–100)
PLATELET # BLD AUTO: 290 10E3/UL (ref 150–450)
RBC # BLD AUTO: 2.91 10E6/UL (ref 4.4–5.9)
TACROLIMUS BLD-MCNC: 11.9 UG/L (ref 5–15)
TME LAST DOSE: NORMAL H
TME LAST DOSE: NORMAL H
WBC # BLD AUTO: 3.8 10E3/UL (ref 4–11)

## 2024-02-08 PROCEDURE — 80048 BASIC METABOLIC PNL TOTAL CA: CPT

## 2024-02-08 PROCEDURE — 36415 COLL VENOUS BLD VENIPUNCTURE: CPT

## 2024-02-08 PROCEDURE — 87799 DETECT AGENT NOS DNA QUANT: CPT

## 2024-02-08 PROCEDURE — 85027 COMPLETE CBC AUTOMATED: CPT

## 2024-02-08 PROCEDURE — 80197 ASSAY OF TACROLIMUS: CPT

## 2024-02-09 ENCOUNTER — APPOINTMENT (OUTPATIENT)
Dept: MEDSURG UNIT | Facility: CLINIC | Age: 24
End: 2024-02-09
Attending: INTERNAL MEDICINE
Payer: COMMERCIAL

## 2024-02-09 ENCOUNTER — APPOINTMENT (OUTPATIENT)
Dept: INTERVENTIONAL RADIOLOGY/VASCULAR | Facility: CLINIC | Age: 24
End: 2024-02-09
Attending: INTERNAL MEDICINE
Payer: COMMERCIAL

## 2024-02-09 ENCOUNTER — HOSPITAL ENCOUNTER (OUTPATIENT)
Facility: CLINIC | Age: 24
Discharge: HOME | End: 2024-02-09
Attending: INTERNAL MEDICINE | Admitting: INTERNAL MEDICINE
Payer: COMMERCIAL

## 2024-02-09 VITALS
SYSTOLIC BLOOD PRESSURE: 117 MMHG | BODY MASS INDEX: 38.64 KG/M2 | HEART RATE: 83 BPM | RESPIRATION RATE: 16 BRPM | OXYGEN SATURATION: 98 % | DIASTOLIC BLOOD PRESSURE: 74 MMHG | TEMPERATURE: 97.7 F | HEIGHT: 71 IN | WEIGHT: 276 LBS

## 2024-02-09 DIAGNOSIS — Z94.0 KIDNEY REPLACED BY TRANSPLANT: ICD-10-CM

## 2024-02-09 LAB
ANION GAP SERPL CALCULATED.3IONS-SCNC: 10 MMOL/L (ref 7–15)
BUN SERPL-MCNC: 14.9 MG/DL (ref 6–20)
CALCIUM SERPL-MCNC: 9.9 MG/DL (ref 8.6–10)
CHLORIDE SERPL-SCNC: 107 MMOL/L (ref 98–107)
CREAT FLD-MCNC: 1.9 MG/DL
CREAT SERPL-MCNC: 1.89 MG/DL (ref 0.67–1.17)
CREAT SERPL-MCNC: 2.03 MG/DL (ref 0.67–1.17)
CREATININE BODY FLUID SOURCE: NORMAL
DEPRECATED HCO3 PLAS-SCNC: 21 MMOL/L (ref 22–29)
EGFRCR SERPLBLD CKD-EPI 2021: 46 ML/MIN/1.73M2
EGFRCR SERPLBLD CKD-EPI 2021: 51 ML/MIN/1.73M2
GLUCOSE SERPL-MCNC: 102 MG/DL (ref 70–99)
INR PPP: 0.93 (ref 0.85–1.15)
POTASSIUM SERPL-SCNC: 4.9 MMOL/L (ref 3.4–5.3)
SODIUM SERPL-SCNC: 138 MMOL/L (ref 135–145)

## 2024-02-09 PROCEDURE — 88313 SPECIAL STAINS GROUP 2: CPT | Mod: TC | Performed by: INTERNAL MEDICINE

## 2024-02-09 PROCEDURE — 88313 SPECIAL STAINS GROUP 2: CPT | Mod: 26 | Performed by: PATHOLOGY

## 2024-02-09 PROCEDURE — 10005 FNA BX W/US GDN 1ST LES: CPT | Performed by: PHYSICIAN ASSISTANT

## 2024-02-09 PROCEDURE — 82570 ASSAY OF URINE CREATININE: CPT | Performed by: PHYSICIAN ASSISTANT

## 2024-02-09 PROCEDURE — 10160 PNXR ASPIR ABSC HMTMA BULLA: CPT

## 2024-02-09 PROCEDURE — 85610 PROTHROMBIN TIME: CPT | Performed by: INTERNAL MEDICINE

## 2024-02-09 PROCEDURE — 258N000003 HC RX IP 258 OP 636: Performed by: INTERNAL MEDICINE

## 2024-02-09 PROCEDURE — C1889 IMPLANT/INSERT DEVICE, NOC: HCPCS

## 2024-02-09 PROCEDURE — 99152 MOD SED SAME PHYS/QHP 5/>YRS: CPT | Performed by: PHYSICIAN ASSISTANT

## 2024-02-09 PROCEDURE — 76942 ECHO GUIDE FOR BIOPSY: CPT | Mod: 26 | Performed by: PHYSICIAN ASSISTANT

## 2024-02-09 PROCEDURE — 50200 RENAL BIOPSY PERQ: CPT | Mod: RT | Performed by: PHYSICIAN ASSISTANT

## 2024-02-09 PROCEDURE — 88346 IMFLUOR 1ST 1ANTB STAIN PX: CPT | Mod: 26 | Performed by: PATHOLOGY

## 2024-02-09 PROCEDURE — 36415 COLL VENOUS BLD VENIPUNCTURE: CPT | Performed by: PHYSICIAN ASSISTANT

## 2024-02-09 PROCEDURE — 999N000142 HC STATISTIC PROCEDURE PREP ONLY

## 2024-02-09 PROCEDURE — 82565 ASSAY OF CREATININE: CPT | Performed by: PHYSICIAN ASSISTANT

## 2024-02-09 PROCEDURE — 88305 TISSUE EXAM BY PATHOLOGIST: CPT | Mod: 26 | Performed by: PATHOLOGY

## 2024-02-09 PROCEDURE — 250N000011 HC RX IP 250 OP 636: Performed by: STUDENT IN AN ORGANIZED HEALTH CARE EDUCATION/TRAINING PROGRAM

## 2024-02-09 PROCEDURE — 99152 MOD SED SAME PHYS/QHP 5/>YRS: CPT

## 2024-02-09 PROCEDURE — 250N000009 HC RX 250: Performed by: PHYSICIAN ASSISTANT

## 2024-02-09 PROCEDURE — 999N000248 HC STATISTIC IV INSERT WITH US BY RN

## 2024-02-09 PROCEDURE — 36415 COLL VENOUS BLD VENIPUNCTURE: CPT | Performed by: INTERNAL MEDICINE

## 2024-02-09 PROCEDURE — 88346 IMFLUOR 1ST 1ANTB STAIN PX: CPT | Mod: TC | Performed by: INTERNAL MEDICINE

## 2024-02-09 PROCEDURE — 88350 IMFLUOR EA ADDL 1ANTB STN PX: CPT | Mod: 26 | Performed by: PATHOLOGY

## 2024-02-09 PROCEDURE — 999N000133 HC STATISTIC PP CARE STAGE 2

## 2024-02-09 RX ORDER — FLUMAZENIL 0.1 MG/ML
0.2 INJECTION, SOLUTION INTRAVENOUS
Status: DISCONTINUED | OUTPATIENT
Start: 2024-02-09 | End: 2024-02-09 | Stop reason: HOSPADM

## 2024-02-09 RX ORDER — SODIUM CHLORIDE 9 MG/ML
INJECTION, SOLUTION INTRAVENOUS CONTINUOUS
Status: DISCONTINUED | OUTPATIENT
Start: 2024-02-09 | End: 2024-02-09 | Stop reason: HOSPADM

## 2024-02-09 RX ORDER — FENTANYL CITRATE 50 UG/ML
25-50 INJECTION, SOLUTION INTRAMUSCULAR; INTRAVENOUS EVERY 5 MIN PRN
Status: DISCONTINUED | OUTPATIENT
Start: 2024-02-09 | End: 2024-02-09 | Stop reason: HOSPADM

## 2024-02-09 RX ORDER — NALOXONE HYDROCHLORIDE 0.4 MG/ML
0.4 INJECTION, SOLUTION INTRAMUSCULAR; INTRAVENOUS; SUBCUTANEOUS
Status: DISCONTINUED | OUTPATIENT
Start: 2024-02-09 | End: 2024-02-09 | Stop reason: HOSPADM

## 2024-02-09 RX ORDER — LIDOCAINE HYDROCHLORIDE 10 MG/ML
1-30 INJECTION, SOLUTION EPIDURAL; INFILTRATION; INTRACAUDAL; PERINEURAL
Status: COMPLETED | OUTPATIENT
Start: 2024-02-09 | End: 2024-02-09

## 2024-02-09 RX ORDER — NALOXONE HYDROCHLORIDE 0.4 MG/ML
0.2 INJECTION, SOLUTION INTRAMUSCULAR; INTRAVENOUS; SUBCUTANEOUS
Status: DISCONTINUED | OUTPATIENT
Start: 2024-02-09 | End: 2024-02-09 | Stop reason: HOSPADM

## 2024-02-09 RX ORDER — LIDOCAINE 40 MG/G
CREAM TOPICAL
Status: DISCONTINUED | OUTPATIENT
Start: 2024-02-09 | End: 2024-02-09 | Stop reason: HOSPADM

## 2024-02-09 RX ADMIN — LIDOCAINE HYDROCHLORIDE 15 ML: 10 INJECTION, SOLUTION EPIDURAL; INFILTRATION; INTRACAUDAL; PERINEURAL at 10:06

## 2024-02-09 RX ADMIN — FENTANYL CITRATE 50 MCG: 50 INJECTION, SOLUTION INTRAMUSCULAR; INTRAVENOUS at 10:04

## 2024-02-09 RX ADMIN — MIDAZOLAM 1 MG: 1 INJECTION INTRAMUSCULAR; INTRAVENOUS at 10:13

## 2024-02-09 RX ADMIN — SODIUM CHLORIDE: 9 INJECTION, SOLUTION INTRAVENOUS at 08:49

## 2024-02-09 RX ADMIN — MIDAZOLAM 1 MG: 1 INJECTION INTRAMUSCULAR; INTRAVENOUS at 10:04

## 2024-02-09 RX ADMIN — FENTANYL CITRATE 50 MCG: 50 INJECTION, SOLUTION INTRAMUSCULAR; INTRAVENOUS at 10:13

## 2024-02-09 ASSESSMENT — ACTIVITIES OF DAILY LIVING (ADL)
ADLS_ACUITY_SCORE: 37

## 2024-02-09 NOTE — DISCHARGE INSTRUCTIONS
[facility]    Interventional Radiology  Patient Instructions Following Biopsy    AFTER YOU GO HOME  If you were given sedation, for the first 24 hours: we recommend that an adult stay with you, DO NOT drive or operate machinery at home or at work, DO NOT smoke, DO NOT make any important or legal decisions.  DO NOT drink alcoholic beverages the day of your procedure  Drink plenty of fluids   Resume your regular diet, unless otherwise instructed by your Primary Physician  Relax and take it easy for 48 hours  DO NOT do any strenuous exercise or lifting (> 10 lbs) for at least 7 days following your procedure  There should be minimal drainage from the biopsy site  Keep the dressing dry and in place for 24 hours. After 24 hours, remove the dressing. You may shower at that time. Do not scrub the procedure site vigorously for 5 days. Re apply a band aid to the site for the next 3 days. Change daily and as needed. Never leave a wet or dirty dressing in place.   No tub bath, hot tub, or swimming for 5 days.  No lotion or powder to the puncture site for 5 days.     CALL THE PHYSICIAN IF:  You start bleeding from the procedure site.  If you do start to bleed from that site, lie down flat and hold pressure on the site for a minimum of 10 minutes.  Your physician will tell you if you need to return to the hospital  You develop nausea or vomiting  You have excessive swelling, redness, or tenderness at the site  You have drainage that looks like it is infected.  You experience severe pain  You develop hives or a rash or unexplained itching  You develop shortness of breath  You develop a temperature of 101 degrees F or greater  You develop bloody clots or red urine after you are discharged  You develop chest pain or cough up blood, lightheadedness or fainting    [facility] INTERVENTIONAL RADIOLOGY DEPARTMENT  Procedure Physician Assistant: LINO Aguirre                                    Date of procedure: February  9, 2024    Telephone Numbers: [phone]      Monday-Friday 7:30 am to 4:00 pm  [phone] After 4:00 pm Monday-Friday, Weekends & Holidays.   Ask the  to contact the Interventional Radiologist on call.  Someone is on call 24 hrs/day    [facility] toll free number: [phone]      Monday-Friday 8:00 am to 4:30 pm  [facility] Emergency Dept: [phone]

## 2024-02-09 NOTE — PRE-PROCEDURE
GENERAL PRE-PROCEDURE:   Procedure:  Rihgt lower quadrant transplant kidney biopsy    Written consent obtained?: Yes    Risks and benefits: Risks, benefits and alternatives were discussed    Consent given by:  Patient  Patient states understanding of procedure being performed: Yes    Patient's understanding of procedure matches consent: Yes    Procedure consent matches procedure scheduled: Yes    Expected level of sedation:  Moderate  Appropriately NPO:  Yes  ASA Class:  1  Mallampati  :  Grade 1- soft palate, uvula, tonsillar pillars, and posterior pharyngeal wall visible  Lungs:  Lungs clear with good breath sounds bilaterally  Heart:  Normal heart sounds and rate  History & Physical reviewed:  History and physical reviewed and no updates needed  Statement of review:  I have reviewed the lab findings, diagnostic data, medications, and the plan for sedation

## 2024-02-09 NOTE — IR NOTE
Patient Name: Boogie Villa  Medical Record Number: [id number]  Today's Date: 2/9/2024    Procedure: Image guided Transplant Kidney Biopsy and Hodan-Nephric fluid aspiration  Proceduralist: Cuco Rosas PA-C  Pathology present: Yes, renal pathology present for collection    Procedure Start: 1005  Procedure end: 1029  Sedation Time: 24 min  Sedation medications administered: 2 mg versed & 100 mcg fentanyl     170 ml of serous, straw color fluid removed. Samples sent to lab    Report given to:  RN  : N/A    Other Notes: Pt arrived to IR room 6 from . Consent reviewed. Pt denies any questions or concerns regarding procedure. Pt positioned supine and monitored per protocol. Pt tolerated procedure without any noted complications. Pt transferred back to .

## 2024-02-09 NOTE — PROGRESS NOTES
I contacted Dr. Raza on behalf of HENRIETTA Rosas PA-C. Dr. Raza reviewed labs and does not feel that we need to pursue any different plan at this time. Patient will follow up per his normal protocol.   Cherelle Trujillo PA-C

## 2024-02-09 NOTE — PROGRESS NOTES
Pt returns to 2a s/p transplanted kidney biopsy and fluid aspiration. RLQ site CDI, soft, flat, non tender. Pt drowsy, wants to rest. Declined PO. Post procedure creatinine sent to lab.

## 2024-02-09 NOTE — IR NOTE
4 core biopsies taken from lower right abdomen transplanted kidney, and 170ml of fluid aspirated from the same site for additional lab testing. ASHLEY

## 2024-02-09 NOTE — PROGRESS NOTES
Recovery time completed without complications. Tolerated ambulation to BR. Able to void without complications. UO clear yellow. R abd procedure site unchanged; stable. Discharge instructions reviewed with pt & pt's dad. Questions & concerns addressed. Pt stable; meets discharge criteria.

## 2024-02-09 NOTE — PROCEDURES
[facility]    Procedure: IR Procedure Note    Date/Time: 2/9/2024 10:35 AM    Performed by: Jose C Rosas PA-C  Authorized by: Jose C Rosas PA-C      UNIVERSAL PROTOCOL   Site Marked: NA  Prior Images Obtained and Reviewed:  Yes  Required items: Required blood products, implants, devices and special equipment available    Patient identity confirmed:  Verbally with patient, arm band, provided demographic data and hospital-assigned identification number  Patient was reevaluated immediately before administering moderate or deep sedation or anesthesia  Confirmation Checklist:  Patient's identity using two indicators, relevant allergies, procedure was appropriate and matched the consent or emergent situation and correct equipment/implants were available  Time out: Immediately prior to the procedure a time out was called    Universal Protocol: the Joint Commission Universal Protocol was followed    Preparation: Patient was prepped and draped in usual sterile fashion       ANESTHESIA    Anesthesia:  Local infiltration  Local Anesthetic:  Lidocaine 1% without epinephrine      SEDATION  Patient Sedated: Yes    Vital signs: Vital signs monitored during sedation    See dictated procedure note for full details.  Findings: Sedation medications administered: 2 mg versed & 100 mcg fentanyl   Sedation time: 30 minutes    Specimens: fluid and/or tissue for laboratory analysis and core needle biopsy specimens sent for pathological analysis    Complications: None    Condition: Stable      PROCEDURE  Describe Procedure: Boogie Villa  [id number]    Patient s/p RLQ kidney transplant 12/28/23. Presenting to IR for second kidney biopsy for rejection.    Intra-procedure ultrasound demonstrates jewell-nephric fluid collection not previously imaged/known.    Completed aspiration of bright yellow colored fluid from around kidney. A total of 170 mL aspirated and fluid  sent for creatinine fluid lab.    Completed ultrasound guided transplant kidney biopsy.  Technically difficult to to depth and visualization and tissue pressure needed to maintain biopsy window. A total of four passes yielded tissue cores collected for further pathological evaluation.  Microscopy support present.  No immediate complications.  Dx: s/p renal transplant.  Ralph.    Sedation medications administered: 2 mg versed & 100 mcg fentanyl   Sedation time: 30 minutes      Patient Tolerance:  Patient tolerated the procedure well with no immediate complications  Length of time physician/provider present for 1:1 monitoring during sedation: 30

## 2024-02-12 ENCOUNTER — LAB (OUTPATIENT)
Dept: LAB | Facility: CLINIC | Age: 24
End: 2024-02-12
Payer: COMMERCIAL

## 2024-02-12 ENCOUNTER — TELEPHONE (OUTPATIENT)
Dept: TRANSPLANT | Facility: CLINIC | Age: 24
End: 2024-02-12

## 2024-02-12 ENCOUNTER — ANCILLARY PROCEDURE (OUTPATIENT)
Dept: GENERAL RADIOLOGY | Facility: CLINIC | Age: 24
End: 2024-02-12
Attending: INTERNAL MEDICINE
Payer: COMMERCIAL

## 2024-02-12 ENCOUNTER — OFFICE VISIT (OUTPATIENT)
Dept: TRANSPLANT | Facility: CLINIC | Age: 24
End: 2024-02-12
Attending: INTERNAL MEDICINE
Payer: COMMERCIAL

## 2024-02-12 DIAGNOSIS — Z98.890 OTHER SPECIFIED POSTPROCEDURAL STATES: ICD-10-CM

## 2024-02-12 DIAGNOSIS — Z48.298 AFTERCARE FOLLOWING ORGAN TRANSPLANT: ICD-10-CM

## 2024-02-12 DIAGNOSIS — Z79.899 ENCOUNTER FOR LONG-TERM CURRENT USE OF MEDICATION: ICD-10-CM

## 2024-02-12 DIAGNOSIS — Z20.828 CONTACT WITH AND (SUSPECTED) EXPOSURE TO OTHER VIRAL COMMUNICABLE DISEASES: ICD-10-CM

## 2024-02-12 DIAGNOSIS — Z94.0 KIDNEY REPLACED BY TRANSPLANT: ICD-10-CM

## 2024-02-12 DIAGNOSIS — N18.6 ESRD (END STAGE RENAL DISEASE) (H): ICD-10-CM

## 2024-02-12 DIAGNOSIS — Z76.82 ORGAN TRANSPLANT CANDIDATE: ICD-10-CM

## 2024-02-12 LAB
ANION GAP SERPL CALCULATED.3IONS-SCNC: 12 MMOL/L (ref 7–15)
BK VIRUS SPECIMEN TYPE: NORMAL
BKV DNA # SPEC NAA+PROBE: NOT DETECTED IU/ML
BUN SERPL-MCNC: 14.6 MG/DL (ref 6–20)
CALCIUM SERPL-MCNC: 9.8 MG/DL (ref 8.6–10)
CHLORIDE SERPL-SCNC: 106 MMOL/L (ref 98–107)
CREAT SERPL-MCNC: 1.92 MG/DL (ref 0.67–1.17)
DEPRECATED HCO3 PLAS-SCNC: 20 MMOL/L (ref 22–29)
EGFRCR SERPLBLD CKD-EPI 2021: 50 ML/MIN/1.73M2
ERYTHROCYTE [DISTWIDTH] IN BLOOD BY AUTOMATED COUNT: 13.2 % (ref 10–15)
GLUCOSE SERPL-MCNC: 155 MG/DL (ref 70–99)
HCT VFR BLD AUTO: 29.7 % (ref 40–53)
HGB BLD-MCNC: 10.3 G/DL (ref 13.3–17.7)
MAGNESIUM SERPL-MCNC: 1.4 MG/DL (ref 1.7–2.3)
MCH RBC QN AUTO: 33 PG (ref 26.5–33)
MCHC RBC AUTO-ENTMCNC: 34.7 G/DL (ref 31.5–36.5)
MCV RBC AUTO: 95 FL (ref 78–100)
PHOSPHATE SERPL-MCNC: 1.8 MG/DL (ref 2.5–4.5)
PLATELET # BLD AUTO: 289 10E3/UL (ref 150–450)
POTASSIUM SERPL-SCNC: 5 MMOL/L (ref 3.4–5.3)
RBC # BLD AUTO: 3.12 10E6/UL (ref 4.4–5.9)
SODIUM SERPL-SCNC: 138 MMOL/L (ref 135–145)
TACROLIMUS BLD-MCNC: 9 UG/L (ref 5–15)
TME LAST DOSE: NORMAL H
TME LAST DOSE: NORMAL H
WBC # BLD AUTO: 4.5 10E3/UL (ref 4–11)

## 2024-02-12 PROCEDURE — 74018 RADEX ABDOMEN 1 VIEW: CPT | Performed by: RADIOLOGY

## 2024-02-12 PROCEDURE — 87799 DETECT AGENT NOS DNA QUANT: CPT | Performed by: INTERNAL MEDICINE

## 2024-02-12 PROCEDURE — 84100 ASSAY OF PHOSPHORUS: CPT | Performed by: PATHOLOGY

## 2024-02-12 PROCEDURE — 86832 HLA CLASS I HIGH DEFIN QUAL: CPT | Performed by: SURGERY

## 2024-02-12 PROCEDURE — 80048 BASIC METABOLIC PNL TOTAL CA: CPT | Performed by: PATHOLOGY

## 2024-02-12 PROCEDURE — 83735 ASSAY OF MAGNESIUM: CPT | Performed by: PATHOLOGY

## 2024-02-12 PROCEDURE — 86828 HLA CLASS I&II ANTIBODY QUAL: CPT | Performed by: SURGERY

## 2024-02-12 PROCEDURE — 85027 COMPLETE CBC AUTOMATED: CPT | Performed by: PATHOLOGY

## 2024-02-12 PROCEDURE — 80180 DRUG SCRN QUAN MYCOPHENOLATE: CPT | Performed by: INTERNAL MEDICINE

## 2024-02-12 PROCEDURE — 52310 CYSTOSCOPY AND TREATMENT: CPT | Performed by: NURSE PRACTITIONER

## 2024-02-12 PROCEDURE — 250N000013 HC RX MED GY IP 250 OP 250 PS 637: Performed by: NURSE PRACTITIONER

## 2024-02-12 PROCEDURE — 99000 SPECIMEN HANDLING OFFICE-LAB: CPT | Performed by: PATHOLOGY

## 2024-02-12 PROCEDURE — 52310 CYSTOSCOPY AND TREATMENT: CPT | Mod: 58 | Performed by: NURSE PRACTITIONER

## 2024-02-12 PROCEDURE — 86833 HLA CLASS II HIGH DEFIN QUAL: CPT | Performed by: SURGERY

## 2024-02-12 PROCEDURE — 36415 COLL VENOUS BLD VENIPUNCTURE: CPT | Performed by: PATHOLOGY

## 2024-02-12 PROCEDURE — 80197 ASSAY OF TACROLIMUS: CPT | Performed by: INTERNAL MEDICINE

## 2024-02-12 PROCEDURE — 250N000009 HC RX 250: Performed by: NURSE PRACTITIONER

## 2024-02-12 RX ORDER — LEVOFLOXACIN 250 MG/1
500 TABLET, FILM COATED ORAL ONCE
Status: COMPLETED | OUTPATIENT
Start: 2024-02-12 | End: 2024-02-12

## 2024-02-12 RX ORDER — LIDOCAINE HYDROCHLORIDE 20 MG/ML
JELLY TOPICAL ONCE
Status: COMPLETED | OUTPATIENT
Start: 2024-02-12 | End: 2024-02-12

## 2024-02-12 RX ADMIN — LEVOFLOXACIN 500 MG: 250 TABLET, FILM COATED ORAL at 11:38

## 2024-02-12 RX ADMIN — LIDOCAINE HYDROCHLORIDE: 20 JELLY TOPICAL at 11:38

## 2024-02-12 NOTE — PROCEDURES
Transplant Surgery  Operative Note    Preop dx: Status post Living Unrelated Donor kidney transplant.  Post op dx: same   Procedure: Flexible cystoscopy and ureteral stent removal   Surgeon: amanda copeland np  Assistant: patrick gloria rn  Anesthesia: local  EBL: 0   Specimens: none.  Findings: none abnormal. Stent inspected and noted to be intact.   Indication: The patient is status post kidney transplant and the ureteral stent is no longer needed.    Procedure: The patient was positioned supine on the table.  The groin was sterilely prepped and draped in the usual fashion. Time out was done. Urojet was applied to the urethra. A flexible cystoscope was inserted and advanced thru the urethra into the bladder. The stent was visualized and grasped. The cystoscope, grasper and stent were removed en-mass. The stent was visualized to be intact.  The bladder mucosa was normal in appearance. Antibiotics were administered. The patient tolerated the procedure well and was asked to void before leaving the clinic.

## 2024-02-12 NOTE — TELEPHONE ENCOUNTER
Called Boogie reviewed prelim biopsy  indicates no rejection-- Boogie verbalized understanding      Confirmed he was in clinic today  to remove his JJ stent  today        ----- Message -----  From: Hamzah Richard MD  Sent: 2/11/2024   3:00 PM CST  To: Latanya Greer MD; Abhilash Raza MD; #  Subject: RE: prelim biopsy result                         Thanks  ----- Message -----  From: Latanya Greer MD  Sent: 2/9/2024   6:44 PM CST  To: Abhilash Raza MD; Hamzah Richard MD; #  Subject: prelim biopsy result                             Mild acute tubular injury, no significant signs of rejection seen currently.

## 2024-02-12 NOTE — NURSING NOTE
Cystoscopy - Ureteral stent removal    Prepped patient for procedure with no complications.   2% lidocaine gel injected into urethra via urojet.   Assisted Ivis Monzon with stent removal.  Administered 500mg Levaquin PO after procedure.  Patient was discharged in stable condition.       Allergies   Allergen Reactions    Banana Itching     Raw banana; itchy mouth      Dust Mites      Runny nose and watery eyes    Mold      Runny nose and watery eyes    Nsaids Other (See Comments)     CKD    Other [Seasonal Allergies]      Grass, Ragweed - gets runny nose and watery eyes    Sulfa Antibiotics      PN: LW Reaction: GI Upset as an infant  12/28 Admission: Tolerated Bactrim         Kike Palacio RN on 2/12/2024 at 11:24 AM

## 2024-02-12 NOTE — LETTER
2/12/2024         RE: Boogie Villa  [address]  [facility] [address]        Dear Colleague,    Thank you for referring your patient, Boogie Villa, to the [facility]. Please see a copy of my visit note below.    See procedure note      Again, thank you for allowing me to participate in the care of your patient.        Sincerely,        Ivis Monzon NP

## 2024-02-13 LAB
MYCOPHENOLATE SERPL LC/MS/MS-MCNC: 2.23 MG/L (ref 1–3.5)
MYCOPHENOLATE-G SERPL LC/MS/MS-MCNC: 68.1 MG/L (ref 30–95)
PATH REPORT.COMMENTS IMP SPEC: NORMAL
PATH REPORT.FINAL DX SPEC: NORMAL
PATH REPORT.GROSS SPEC: NORMAL
PATH REPORT.MICROSCOPIC SPEC OTHER STN: NORMAL
PATH REPORT.RELEVANT HX SPEC: NORMAL
PHOTO IMAGE: NORMAL
TME LAST DOSE: NORMAL H
TME LAST DOSE: NORMAL H

## 2024-02-14 LAB
EBV DNA # SPEC NAA+PROBE: <500 COPIES/ML
EBV DNA SPEC NAA+PROBE-LOG#: <2.7 {LOG_COPIES}/ML

## 2024-02-15 ENCOUNTER — LAB (OUTPATIENT)
Dept: LAB | Facility: CLINIC | Age: 24
End: 2024-02-15
Payer: COMMERCIAL

## 2024-02-15 DIAGNOSIS — Z48.298 AFTERCARE FOLLOWING ORGAN TRANSPLANT: ICD-10-CM

## 2024-02-15 DIAGNOSIS — Z20.828 CONTACT WITH AND (SUSPECTED) EXPOSURE TO OTHER VIRAL COMMUNICABLE DISEASES: ICD-10-CM

## 2024-02-15 DIAGNOSIS — Z94.0 KIDNEY REPLACED BY TRANSPLANT: ICD-10-CM

## 2024-02-15 DIAGNOSIS — Z98.890 OTHER SPECIFIED POSTPROCEDURAL STATES: ICD-10-CM

## 2024-02-15 DIAGNOSIS — Z79.899 ENCOUNTER FOR LONG-TERM CURRENT USE OF MEDICATION: ICD-10-CM

## 2024-02-15 LAB
ANION GAP SERPL CALCULATED.3IONS-SCNC: 11 MMOL/L (ref 7–15)
BUN SERPL-MCNC: 16.2 MG/DL (ref 6–20)
CALCIUM SERPL-MCNC: 9.9 MG/DL (ref 8.6–10)
CHLORIDE SERPL-SCNC: 106 MMOL/L (ref 98–107)
CREAT SERPL-MCNC: 2.04 MG/DL (ref 0.67–1.17)
DEPRECATED HCO3 PLAS-SCNC: 21 MMOL/L (ref 22–29)
EGFRCR SERPLBLD CKD-EPI 2021: 46 ML/MIN/1.73M2
ERYTHROCYTE [DISTWIDTH] IN BLOOD BY AUTOMATED COUNT: 13.1 % (ref 10–15)
GLUCOSE SERPL-MCNC: 104 MG/DL (ref 70–99)
HCT VFR BLD AUTO: 27.9 % (ref 40–53)
HGB BLD-MCNC: 9.6 G/DL (ref 13.3–17.7)
MCH RBC QN AUTO: 33.3 PG (ref 26.5–33)
MCHC RBC AUTO-ENTMCNC: 34.4 G/DL (ref 31.5–36.5)
MCV RBC AUTO: 97 FL (ref 78–100)
PLATELET # BLD AUTO: 241 10E3/UL (ref 150–450)
POTASSIUM SERPL-SCNC: 4.5 MMOL/L (ref 3.4–5.3)
RBC # BLD AUTO: 2.88 10E6/UL (ref 4.4–5.9)
SODIUM SERPL-SCNC: 138 MMOL/L (ref 135–145)
TACROLIMUS BLD-MCNC: 8.5 UG/L (ref 5–15)
TME LAST DOSE: NORMAL H
TME LAST DOSE: NORMAL H
WBC # BLD AUTO: 4.1 10E3/UL (ref 4–11)

## 2024-02-15 PROCEDURE — 80048 BASIC METABOLIC PNL TOTAL CA: CPT

## 2024-02-15 PROCEDURE — 85027 COMPLETE CBC AUTOMATED: CPT

## 2024-02-15 PROCEDURE — 80197 ASSAY OF TACROLIMUS: CPT

## 2024-02-15 PROCEDURE — 36415 COLL VENOUS BLD VENIPUNCTURE: CPT

## 2024-02-19 ENCOUNTER — LAB (OUTPATIENT)
Dept: LAB | Facility: CLINIC | Age: 24
End: 2024-02-19
Payer: COMMERCIAL

## 2024-02-19 DIAGNOSIS — Z94.0 KIDNEY REPLACED BY TRANSPLANT: ICD-10-CM

## 2024-02-19 DIAGNOSIS — Z79.899 ENCOUNTER FOR LONG-TERM CURRENT USE OF MEDICATION: ICD-10-CM

## 2024-02-19 DIAGNOSIS — Z94.0 KIDNEY TRANSPLANTED: ICD-10-CM

## 2024-02-19 DIAGNOSIS — Z20.828 CONTACT WITH AND (SUSPECTED) EXPOSURE TO OTHER VIRAL COMMUNICABLE DISEASES: ICD-10-CM

## 2024-02-19 DIAGNOSIS — Z48.298 AFTERCARE FOLLOWING ORGAN TRANSPLANT: ICD-10-CM

## 2024-02-19 DIAGNOSIS — Z98.890 OTHER SPECIFIED POSTPROCEDURAL STATES: ICD-10-CM

## 2024-02-20 ENCOUNTER — LAB (OUTPATIENT)
Dept: LAB | Facility: CLINIC | Age: 24
End: 2024-02-20
Payer: COMMERCIAL

## 2024-02-20 DIAGNOSIS — Z94.0 KIDNEY REPLACED BY TRANSPLANT: ICD-10-CM

## 2024-02-20 DIAGNOSIS — Z20.828 CONTACT WITH AND (SUSPECTED) EXPOSURE TO OTHER VIRAL COMMUNICABLE DISEASES: ICD-10-CM

## 2024-02-20 DIAGNOSIS — Z98.890 OTHER SPECIFIED POSTPROCEDURAL STATES: ICD-10-CM

## 2024-02-20 DIAGNOSIS — Z94.0 KIDNEY TRANSPLANTED: ICD-10-CM

## 2024-02-20 DIAGNOSIS — Z79.899 ENCOUNTER FOR LONG-TERM CURRENT USE OF MEDICATION: ICD-10-CM

## 2024-02-20 DIAGNOSIS — Z48.298 AFTERCARE FOLLOWING ORGAN TRANSPLANT: ICD-10-CM

## 2024-02-20 LAB
ANION GAP SERPL CALCULATED.3IONS-SCNC: 10 MMOL/L (ref 7–15)
BUN SERPL-MCNC: 13.9 MG/DL (ref 6–20)
CALCIUM SERPL-MCNC: 9.8 MG/DL (ref 8.6–10)
CHLORIDE SERPL-SCNC: 109 MMOL/L (ref 98–107)
CREAT SERPL-MCNC: 2.35 MG/DL (ref 0.67–1.17)
DEPRECATED HCO3 PLAS-SCNC: 22 MMOL/L (ref 22–29)
EGFRCR SERPLBLD CKD-EPI 2021: 39 ML/MIN/1.73M2
ERYTHROCYTE [DISTWIDTH] IN BLOOD BY AUTOMATED COUNT: 13 % (ref 10–15)
GLUCOSE SERPL-MCNC: 96 MG/DL (ref 70–99)
HCT VFR BLD AUTO: 28.6 % (ref 40–53)
HGB BLD-MCNC: 9.7 G/DL (ref 13.3–17.7)
MAGNESIUM SERPL-MCNC: 1.7 MG/DL (ref 1.7–2.3)
MCH RBC QN AUTO: 33 PG (ref 26.5–33)
MCHC RBC AUTO-ENTMCNC: 33.9 G/DL (ref 31.5–36.5)
MCV RBC AUTO: 97 FL (ref 78–100)
PHOSPHATE SERPL-MCNC: 2.4 MG/DL (ref 2.5–4.5)
PLATELET # BLD AUTO: 258 10E3/UL (ref 150–450)
POTASSIUM SERPL-SCNC: 4.7 MMOL/L (ref 3.4–5.3)
RBC # BLD AUTO: 2.94 10E6/UL (ref 4.4–5.9)
SODIUM SERPL-SCNC: 141 MMOL/L (ref 135–145)
TACROLIMUS BLD-MCNC: 8.2 UG/L (ref 5–15)
TME LAST DOSE: NORMAL H
TME LAST DOSE: NORMAL H
WBC # BLD AUTO: 3.9 10E3/UL (ref 4–11)

## 2024-02-20 PROCEDURE — 36415 COLL VENOUS BLD VENIPUNCTURE: CPT

## 2024-02-20 PROCEDURE — 80048 BASIC METABOLIC PNL TOTAL CA: CPT

## 2024-02-20 PROCEDURE — 80197 ASSAY OF TACROLIMUS: CPT

## 2024-02-20 PROCEDURE — 83735 ASSAY OF MAGNESIUM: CPT

## 2024-02-20 PROCEDURE — 87799 DETECT AGENT NOS DNA QUANT: CPT

## 2024-02-20 PROCEDURE — 85027 COMPLETE CBC AUTOMATED: CPT

## 2024-02-20 PROCEDURE — 84100 ASSAY OF PHOSPHORUS: CPT

## 2024-02-21 ENCOUNTER — TELEPHONE (OUTPATIENT)
Dept: TRANSPLANT | Facility: CLINIC | Age: 24
End: 2024-02-21

## 2024-02-21 DIAGNOSIS — Z94.0 KIDNEY REPLACED BY TRANSPLANT: Primary | ICD-10-CM

## 2024-02-21 LAB
CMV DNA SPEC NAA+PROBE-ACNC: NOT DETECTED IU/ML
EBV DNA # SPEC NAA+PROBE: NOT DETECTED COPIES/ML

## 2024-02-21 NOTE — TELEPHONE ENCOUNTER
Post Kidney and Pancreas Transplant Team Conference  Date: 2/21/2024  Transplant Coordinator: Nina Hu     Attendees:  [x]  Dr. Raza [x] Nina Hu, RN [x] Moni Davis LPN     [x]  Dr. Barrera [x] Sharron Maharaj, RN [] Eri Yoder LPN    [x] Dr. Richard [x] Arely Dennis RN    [] Dr. Dexter [x] Janine Betancourt, RN [] Kirsten Nix RN   [] Dr. Howard [] Tatiana Ware, RN    [x] Dr. Raya [] Baylee Azar, MAKAYLA    []  Dr. Castillo [] Adwoa Randhawa RN    [] Dr. Gold [] Deven Hernández RN    [x] Ivis Monzon, NP [] Dyan Gross RN    [x] Clare Prasad NP [] Heidy Still RN        Verbal Plan Read Back:   Recommend ultrasound and UA    Routed to RN Coordinator   Moni Davis LPN

## 2024-02-22 ENCOUNTER — LAB (OUTPATIENT)
Dept: LAB | Facility: CLINIC | Age: 24
End: 2024-02-22
Payer: COMMERCIAL

## 2024-02-22 DIAGNOSIS — Z94.0 KIDNEY REPLACED BY TRANSPLANT: ICD-10-CM

## 2024-02-22 DIAGNOSIS — Z20.828 CONTACT WITH AND (SUSPECTED) EXPOSURE TO OTHER VIRAL COMMUNICABLE DISEASES: ICD-10-CM

## 2024-02-22 DIAGNOSIS — Z79.899 ENCOUNTER FOR LONG-TERM CURRENT USE OF MEDICATION: ICD-10-CM

## 2024-02-22 DIAGNOSIS — Z98.890 OTHER SPECIFIED POSTPROCEDURAL STATES: ICD-10-CM

## 2024-02-22 DIAGNOSIS — Z48.298 AFTERCARE FOLLOWING ORGAN TRANSPLANT: ICD-10-CM

## 2024-02-22 LAB
ANION GAP SERPL CALCULATED.3IONS-SCNC: 11 MMOL/L (ref 7–15)
BUN SERPL-MCNC: 21 MG/DL (ref 6–20)
CALCIUM SERPL-MCNC: 10.1 MG/DL (ref 8.6–10)
CHLORIDE SERPL-SCNC: 110 MMOL/L (ref 98–107)
CREAT SERPL-MCNC: 3.05 MG/DL (ref 0.67–1.17)
DEPRECATED HCO3 PLAS-SCNC: 22 MMOL/L (ref 22–29)
EGFRCR SERPLBLD CKD-EPI 2021: 28 ML/MIN/1.73M2
ERYTHROCYTE [DISTWIDTH] IN BLOOD BY AUTOMATED COUNT: 12.9 % (ref 10–15)
GLUCOSE SERPL-MCNC: 104 MG/DL (ref 70–99)
HCT VFR BLD AUTO: 28.1 % (ref 40–53)
HGB BLD-MCNC: 9.5 G/DL (ref 13.3–17.7)
MCH RBC QN AUTO: 33 PG (ref 26.5–33)
MCHC RBC AUTO-ENTMCNC: 33.8 G/DL (ref 31.5–36.5)
MCV RBC AUTO: 98 FL (ref 78–100)
PLATELET # BLD AUTO: 261 10E3/UL (ref 150–450)
POTASSIUM SERPL-SCNC: 4.7 MMOL/L (ref 3.4–5.3)
RBC # BLD AUTO: 2.88 10E6/UL (ref 4.4–5.9)
SODIUM SERPL-SCNC: 143 MMOL/L (ref 135–145)
TACROLIMUS BLD-MCNC: 18.9 UG/L (ref 5–15)
TME LAST DOSE: ABNORMAL H
TME LAST DOSE: ABNORMAL H
WBC # BLD AUTO: 4 10E3/UL (ref 4–11)

## 2024-02-22 PROCEDURE — 36415 COLL VENOUS BLD VENIPUNCTURE: CPT

## 2024-02-22 PROCEDURE — 85027 COMPLETE CBC AUTOMATED: CPT

## 2024-02-22 PROCEDURE — 80048 BASIC METABOLIC PNL TOTAL CA: CPT

## 2024-02-22 PROCEDURE — 80197 ASSAY OF TACROLIMUS: CPT

## 2024-02-23 ENCOUNTER — OFFICE VISIT (OUTPATIENT)
Dept: TRANSPLANT | Facility: CLINIC | Age: 24
End: 2024-02-23
Attending: NURSE PRACTITIONER
Payer: COMMERCIAL

## 2024-02-23 ENCOUNTER — ANCILLARY PROCEDURE (OUTPATIENT)
Dept: ULTRASOUND IMAGING | Facility: CLINIC | Age: 24
End: 2024-02-23
Attending: INTERNAL MEDICINE
Payer: COMMERCIAL

## 2024-02-23 ENCOUNTER — TELEPHONE (OUTPATIENT)
Dept: TRANSPLANT | Facility: CLINIC | Age: 24
End: 2024-02-23

## 2024-02-23 ENCOUNTER — INFUSION THERAPY VISIT (OUTPATIENT)
Dept: INFUSION THERAPY | Facility: CLINIC | Age: 24
End: 2024-02-23
Attending: NURSE PRACTITIONER
Payer: COMMERCIAL

## 2024-02-23 VITALS
RESPIRATION RATE: 16 BRPM | WEIGHT: 290 LBS | SYSTOLIC BLOOD PRESSURE: 137 MMHG | OXYGEN SATURATION: 98 % | DIASTOLIC BLOOD PRESSURE: 76 MMHG | BODY MASS INDEX: 40.45 KG/M2 | HEART RATE: 78 BPM

## 2024-02-23 DIAGNOSIS — E86.0 DEHYDRATION: Primary | ICD-10-CM

## 2024-02-23 DIAGNOSIS — Z94.0 KIDNEY REPLACED BY TRANSPLANT: ICD-10-CM

## 2024-02-23 DIAGNOSIS — Z94.0 KIDNEY REPLACED BY TRANSPLANT: Primary | ICD-10-CM

## 2024-02-23 DIAGNOSIS — R82.998 OTHER ABNORMAL FINDINGS IN URINE: ICD-10-CM

## 2024-02-23 DIAGNOSIS — E86.0 DEHYDRATION: ICD-10-CM

## 2024-02-23 LAB
ALBUMIN UR-MCNC: 20 MG/DL
ANION GAP SERPL CALCULATED.3IONS-SCNC: 12 MMOL/L (ref 7–15)
APPEARANCE UR: CLEAR
BACTERIA #/AREA URNS HPF: ABNORMAL /HPF
BILIRUB UR QL STRIP: NEGATIVE
BUN SERPL-MCNC: 23.7 MG/DL (ref 6–20)
CALCIUM SERPL-MCNC: 8.3 MG/DL (ref 8.6–10)
CHLORIDE SERPL-SCNC: 112 MMOL/L (ref 98–107)
COLOR UR AUTO: ABNORMAL
CREAT SERPL-MCNC: 3.16 MG/DL (ref 0.67–1.17)
DEPRECATED HCO3 PLAS-SCNC: 17 MMOL/L (ref 22–29)
EGFRCR SERPLBLD CKD-EPI 2021: 27 ML/MIN/1.73M2
GLUCOSE SERPL-MCNC: 105 MG/DL (ref 70–99)
GLUCOSE UR STRIP-MCNC: 30 MG/DL
HGB UR QL STRIP: ABNORMAL
KETONES UR STRIP-MCNC: NEGATIVE MG/DL
LEUKOCYTE ESTERASE UR QL STRIP: NEGATIVE
NITRATE UR QL: NEGATIVE
PH UR STRIP: 7 [PH] (ref 5–7)
POTASSIUM SERPL-SCNC: 4.3 MMOL/L (ref 3.4–5.3)
RBC URINE: 2 /HPF
SODIUM SERPL-SCNC: 141 MMOL/L (ref 135–145)
SP GR UR STRIP: 1.01 (ref 1–1.03)
UROBILINOGEN UR STRIP-MCNC: NORMAL MG/DL
WBC URINE: 1 /HPF

## 2024-02-23 PROCEDURE — 258N000003 HC RX IP 258 OP 636: Performed by: NURSE PRACTITIONER

## 2024-02-23 PROCEDURE — G0463 HOSPITAL OUTPT CLINIC VISIT: HCPCS | Mod: 25 | Performed by: NURSE PRACTITIONER

## 2024-02-23 PROCEDURE — 76776 US EXAM K TRANSPL W/DOPPLER: CPT | Performed by: STUDENT IN AN ORGANIZED HEALTH CARE EDUCATION/TRAINING PROGRAM

## 2024-02-23 PROCEDURE — 87086 URINE CULTURE/COLONY COUNT: CPT | Performed by: NURSE PRACTITIONER

## 2024-02-23 PROCEDURE — 99213 OFFICE O/P EST LOW 20 MIN: CPT | Mod: 25 | Performed by: NURSE PRACTITIONER

## 2024-02-23 PROCEDURE — 96360 HYDRATION IV INFUSION INIT: CPT

## 2024-02-23 PROCEDURE — 99213 OFFICE O/P EST LOW 20 MIN: CPT | Mod: 24 | Performed by: NURSE PRACTITIONER

## 2024-02-23 PROCEDURE — 81001 URINALYSIS AUTO W/SCOPE: CPT | Performed by: NURSE PRACTITIONER

## 2024-02-23 PROCEDURE — 82565 ASSAY OF CREATININE: CPT

## 2024-02-23 PROCEDURE — 36415 COLL VENOUS BLD VENIPUNCTURE: CPT

## 2024-02-23 RX ORDER — ALBUTEROL SULFATE 90 UG/1
1-2 AEROSOL, METERED RESPIRATORY (INHALATION)
Status: CANCELLED
Start: 2024-03-01

## 2024-02-23 RX ORDER — DIPHENHYDRAMINE HYDROCHLORIDE 50 MG/ML
50 INJECTION INTRAMUSCULAR; INTRAVENOUS
Status: CANCELLED
Start: 2024-03-01

## 2024-02-23 RX ORDER — METHYLPREDNISOLONE SODIUM SUCCINATE 125 MG/2ML
125 INJECTION, POWDER, LYOPHILIZED, FOR SOLUTION INTRAMUSCULAR; INTRAVENOUS
Status: CANCELLED
Start: 2024-02-23

## 2024-02-23 RX ORDER — MEPERIDINE HYDROCHLORIDE 25 MG/ML
25 INJECTION INTRAMUSCULAR; INTRAVENOUS; SUBCUTANEOUS EVERY 30 MIN PRN
Status: CANCELLED | OUTPATIENT
Start: 2024-02-23

## 2024-02-23 RX ORDER — MEPERIDINE HYDROCHLORIDE 25 MG/ML
25 INJECTION INTRAMUSCULAR; INTRAVENOUS; SUBCUTANEOUS EVERY 30 MIN PRN
Status: CANCELLED | OUTPATIENT
Start: 2024-03-01

## 2024-02-23 RX ORDER — ALBUTEROL SULFATE 90 UG/1
1-2 AEROSOL, METERED RESPIRATORY (INHALATION)
Status: CANCELLED
Start: 2024-02-23

## 2024-02-23 RX ORDER — METHYLPREDNISOLONE SODIUM SUCCINATE 125 MG/2ML
125 INJECTION, POWDER, LYOPHILIZED, FOR SOLUTION INTRAMUSCULAR; INTRAVENOUS
Status: CANCELLED
Start: 2024-03-01

## 2024-02-23 RX ORDER — HEPARIN SODIUM,PORCINE 10 UNIT/ML
5-20 VIAL (ML) INTRAVENOUS DAILY PRN
Status: CANCELLED | OUTPATIENT
Start: 2024-03-01

## 2024-02-23 RX ORDER — ALBUTEROL SULFATE 0.83 MG/ML
2.5 SOLUTION RESPIRATORY (INHALATION)
Status: CANCELLED | OUTPATIENT
Start: 2024-02-23

## 2024-02-23 RX ORDER — HEPARIN SODIUM (PORCINE) LOCK FLUSH IV SOLN 100 UNIT/ML 100 UNIT/ML
5 SOLUTION INTRAVENOUS
Status: CANCELLED | OUTPATIENT
Start: 2024-02-23

## 2024-02-23 RX ORDER — DIPHENHYDRAMINE HYDROCHLORIDE 50 MG/ML
50 INJECTION INTRAMUSCULAR; INTRAVENOUS
Status: CANCELLED
Start: 2024-02-23

## 2024-02-23 RX ORDER — HEPARIN SODIUM,PORCINE 10 UNIT/ML
5-20 VIAL (ML) INTRAVENOUS DAILY PRN
Status: CANCELLED | OUTPATIENT
Start: 2024-02-23

## 2024-02-23 RX ORDER — EPINEPHRINE 1 MG/ML
0.3 INJECTION, SOLUTION INTRAMUSCULAR; SUBCUTANEOUS EVERY 5 MIN PRN
Status: CANCELLED | OUTPATIENT
Start: 2024-02-23

## 2024-02-23 RX ORDER — HEPARIN SODIUM (PORCINE) LOCK FLUSH IV SOLN 100 UNIT/ML 100 UNIT/ML
5 SOLUTION INTRAVENOUS
Status: CANCELLED | OUTPATIENT
Start: 2024-03-01

## 2024-02-23 RX ORDER — ALBUTEROL SULFATE 0.83 MG/ML
2.5 SOLUTION RESPIRATORY (INHALATION)
Status: CANCELLED | OUTPATIENT
Start: 2024-03-01

## 2024-02-23 RX ORDER — EPINEPHRINE 1 MG/ML
0.3 INJECTION, SOLUTION, CONCENTRATE INTRAVENOUS EVERY 5 MIN PRN
Status: CANCELLED | OUTPATIENT
Start: 2024-03-01

## 2024-02-23 RX ADMIN — SODIUM CHLORIDE 1000 ML: 9 INJECTION, SOLUTION INTRAVENOUS at 13:07

## 2024-02-23 NOTE — NURSING NOTE
Chief Complaint   Patient presents with    Follow Up     K txp 12/28/23       /76 (BP Location: Left arm, Patient Position: Sitting, Cuff Size: Adult Large)   Pulse 78   Resp 16   Wt 131.5 kg (290 lb)   SpO2 98%   BMI 40.45 kg/m      IDA ZHU, RN on 2/23/2024 at 12:03 PM

## 2024-02-23 NOTE — LETTER
2/23/2024         RE: Boogie Villa  [address]  [place] [address]        Dear Colleague,    Thank you for referring your patient, Boogie Villa, to the [facility]. Please see a copy of my visit note below.    Transplant Surgery Progress Note    Transplants:  12/28/2023 (Kidney); Postoperative day:  57  S: Boogie Villa is a 24yo with history of ESRD secondary to posterior ureteral valves s/p Mitrofanoff, HTN, hearing loss, ADD, and anxiety. S/p living donor kidney transplant with ureteral stent and venous reconstruction on 12/28/23 with Dr. Martinez.       No fevers or chills.      No nausea or vomiting    Has not missed medications.    No urinary symptoms.    Transplant History:    Transplant Type:  LDKT  Donor Type: Living   Transplant Date:  12/28/2023 (Kidney)   Ureteral Stent:  No   Crossmatch:  negative   DSA at Tx:  No  Baseline Cr: 1.8-2.1   DeNovo DSA: No    Acute Rejection Hx:  No    Present Maintenance Immunosuppression:  Tacrolimus and Mycophenolate mofetil    CMV IgG Ab Discordance:  Yes  EBV IgG Ab Discordance:  Yes    BK Viremia:  No  EBV Viremia:  No    Transplant Coordinator: Nina Hu     Transplant Office Phone Number: [phone]     Immunosuppressant Medications       Immunosuppressive Agents Disp Start End     mycophenolate (GENERIC EQUIVALENT) 250 MG capsule 180 capsule 1/5/2024 --    Sig - Route: Take 3 capsules (750 mg) by mouth 2 times daily - Oral    Class: E-Prescribe     tacrolimus (GENERIC) 1 MG capsule 120 capsule 2/6/2024 --    Sig - Route: Take 2 capsules (2 mg) by mouth 2 times daily Total dose = 7 mg twice per day - Oral    Class: E-Prescribe    Notes to Pharmacy: TXP DT 12/28/2023 (Kidney) TXP Dischg DT 1/5/2024 DX Kidney replaced by transplant Z94.0 TX [facility] ([place], [place])     tacrolimus (GENERIC) 5 MG capsule 60 capsule 2/6/2024 --    Sig - Route: Take 1 capsule (5 mg)  by mouth 2 times daily Total dose = 7 mg twice per day - Oral    Class: E-Prescribe    Notes to Pharmacy: TXP DT 12/28/2023 (Kidney) TXP Dischg DT 1/5/2024 DX Kidney replaced by transplant Z94.0 TX Center [facility] ([place], [place])            Possible Immunosuppression-related side effects:   []             headache  []             vivid dreams  []             irritability  []             cognitive difficuties  []             fine tremor  []             nausea  []             diarrhea  []             neuropathy      []             edema  []             renal calcineurin toxicity  []             hyperkalemia  []             post-transplant diabetes  []             decreased appetite  []             increased appetite  []             other:  []             none    Prescription Medications as of 2/23/2024         Rx Number Disp Refills Start End Last Dispensed Date Next Fill Date Owning Pharmacy    acetaminophen (TYLENOL) 325 MG tablet  -- -- 1/5/2024 --       Sig: Take 1-2 tablets (325-650 mg) by mouth every 4 hours as needed (For optimal non-opioid multimodal pain management to improve pain control.)    Class: No Print Out    Route: Oral    amLODIPine (NORVASC) 10 MG tablet  30 tablet 11 1/5/2024 --   [place], [place] - [address] [facility]    Sig: Take 1 tablet (10 mg) by mouth daily    Class: E-Prescribe    Route: Oral    aspirin 81 MG EC tablet  30 tablet 11 1/6/2024 --   [place], [place] - [address] [place] [address]    Sig: Take 1 tablet (81 mg) by mouth daily    Class: E-Prescribe    Route: Oral    carvedilol (COREG) 12.5 MG tablet  180 tablet 3 1/17/2024 --   [address] - [address] [place] [address]    Sig: Take 1 tablet (12.5 mg) by mouth 2 times daily    Class: E-Prescribe    Route: Oral    cetirizine (ZYRTEC) 10 MG tablet  -- --  --       Sig: Take 10 mg by mouth daily as needed for  allergies (in the spring)    Class: Historical    Route: Oral    difluprednate (DUREZOL) 0.05 % ophthalmic emulsion  -- -- 4/4/2023 --       Sig: INSTILL 1 DROP INTO EACH EYE SIX TIMES DAILY FOR THE FIRST 24 HOURS. THEN 1 DROP 4 TIMES DAILY UNTIL FOLLOW UP    Class: Historical    latanoprost (XALATAN) 0.005 % ophthalmic solution  -- --  --       Sig: Place 1 drop into both eyes at bedtime    Class: Historical    Route: Both Eyes    magnesium oxide (MAG-OX) 400 MG tablet  90 tablet 3 2/7/2024 --   [facility] [address] [place] [address]    Sig: Take 1 tablet (400 mg) by mouth daily    Class: E-Prescribe    Route: Oral    methylphenidate (RITALIN) 20 MG tablet  -- -- 2/16/2020 --   [facility] - [place], [place] - [address] [place] [address]    Sig: Take 20 mg by mouth as needed Prn    Class: Historical    Earliest Fill Date: 2/16/2020    Route: Oral    mycophenolate (GENERIC EQUIVALENT) 250 MG capsule  180 capsule 11 1/5/2024 --   [address]    Sig: Take 3 capsules (750 mg) by mouth 2 times daily    Class: E-Prescribe    Route: Oral    Probiotic Product (PROBIOTIC-10 PO)  -- --  --       Sig: Take 1 tablet by mouth every morning    Class: Historical    Route: Oral    sodium bicarbonate 650 MG tablet  120 tablet 1 1/6/2024 --   [address]    Sig: Take 2 tablets (1,300 mg) by mouth 3 times daily    Class: E-Prescribe    Route: Oral    sulfamethoxazole-trimethoprim (BACTRIM) 400-80 MG tablet  30 tablet 11 1/6/2024 --   [address]    Sig: Take 1 tablet by mouth daily    Class: E-Prescribe    Route: Oral    tacrolimus (GENERIC) 1 MG capsule  120 capsule 11 2/6/2024 --   [address]    Sig: Take 2 capsules (2 mg) by mouth 2 times daily Total dose = 7 mg twice per  day    Class: E-Prescribe    Notes to Pharmacy: TXP DT 12/28/2023 (Kidney) TXP Dischg DT 1/5/2024 DX Kidney replaced by transplant Z94.0 TX [facility] ([place], [place])    Route: Oral    tacrolimus (GENERIC) 5 MG capsule  60 capsule 11 2/6/2024 --   [id number] - [place], [place] - [address] [place] [address]    Sig: Take 1 capsule (5 mg) by mouth 2 times daily Total dose = 7 mg twice per day    Class: E-Prescribe    Notes to Pharmacy: TXP DT 12/28/2023 (Kidney) TXP Dischg DT 1/5/2024 DX Kidney replaced by transplant Z94.0 TX [facility] ([place], [place])    Route: Oral    valGANciclovir (VALCYTE) 450 MG tablet  60 tablet 5 1/6/2024 --   [facility] - [place], [place] - [address]     Sig: Take 1 tablet (450 mg) by mouth daily Increase dose to 900 mg by mouth daily as directed by transplant team (with improving kidney function)    Class: E-Prescribe    Route: Oral    Vitamin D3 (CHOLECALCIFEROL) 25 mcg (1000 units) tablet  60 tablet 11 1/6/2024 --   [place], [place] - [address] [facility]    Sig: Take 2 tablets (50 mcg) by mouth daily    Class: E-Prescribe    Route: Oral            O:      General Appearance: in no apparent distress.   Skin: Normal, no rashes or jaundice  Heart: regular rate and rhythm  Lungs: easy respirations, no audible wheezing.  Abdomen: obese, The wound is dry and intact, without hernia. The abdomen is non-tender. The kidney graft is not tender.  There is no ascites.  Extremities: Tremor absent.   Edema: absent.         Latest Ref Rng & Units 2/22/2024     9:58 AM 2/20/2024    10:33 AM 2/15/2024    10:06 AM 2/12/2024    10:30 AM 2/9/2024    10:50 AM   Transplant Immunosuppression Labs   Creat 0.67 - 1.17 mg/dL 3.05  2.35  2.04  1.92  1.89    Urea Nitrogen 6.0 - 20.0 mg/dL 21.0  13.9  16.2  14.6     WBC 4.0 - 11.0 10e3/uL 4.0  3.9  4.1  4.5          Chemistries:   Recent Labs   Lab Test 02/22/24  0958   BUN 21.0*   CR 3.05*   GFRESTIMATED 28*   *     Lab Results   Component Value Date    A1C 5.2 12/19/2023     Recent Labs   Lab Test 12/19/23  0953   ALBUMIN 4.4   BILITOTAL 0.5   ALKPHOS 67   AST 27   ALT 41     Urine Studies:  Recent Labs   Lab Test 12/19/23  1007   COLOR Straw   APPEARANCE Clear   URINEGLC 70*   URINEBILI Negative   URINEKETONE Negative   SG 1.003   UBLD Trace*   URINEPH 7.5*   PROTEIN 100*   NITRITE Negative   LEUKEST Negative   RBCU 0   WBCU 1     Recent Labs   Lab Test 02/26/20  1530 11/25/19  0904   UTPG 4.31* 4.92*     Hematology:   Recent Labs   Lab Test 02/22/24  0958 02/20/24  1033 02/15/24  1006   HGB 9.5* 9.7* 9.6*    258 241   WBC 4.0 3.9* 4.1     Coags:   Recent Labs   Lab Test 02/09/24  0835 01/02/24  0834   INR 0.93 1.17*     HLA antibodies:   SA1 Hi Risk Kavin   Date Value Ref Range Status   03/29/2021 None  Final     SA1 HI RISK KAVIN   Date Value Ref Range Status   01/22/2024 None  Final     SA1 Mod Risk Kavin   Date Value Ref Range Status   03/29/2021 None  Final     SA1 MOD RISK KAVIN   Date Value Ref Range Status   01/22/2024 None  Final     SA2 Hi Risk Kavin   Date Value Ref Range Status   03/29/2021 None  Final     SA2 HI RISK KAVIN   Date Value Ref Range Status   01/22/2024 None  Final     SA2 Mod Risk Kavin   Date Value Ref Range Status   03/29/2021 None  Final     SA2 MOD RISK KAVIN   Date Value Ref Range Status   01/22/2024 None  Final       Assessment: Boogie Villa is doing fairly well s/p LDKT:  Issues we addressed during his visit include:    Plan:    1. Graft function: elevated cr.  1L IVF, ua/ uc and kidney US  2. Immunosuppression Management: No change continue same IS  .  Complexity of management:Medium.  Contributing factors:  elevated cr  3. Elevated cr.  1L IVF, ua/uc and kidney us.  Labs tomorrow  Followup: as directed     Total Time: 35 min,   Counselling Time: 25 min.          Again, thank you  for allowing me to participate in the care of your patient.        Sincerely,        Ivis Monzon NP

## 2024-02-23 NOTE — PROGRESS NOTES
Transplant Surgery Progress Note    Transplants:  12/28/2023 (Kidney); Postoperative day:  57  S: Boogie Villa is a 24yo with history of ESRD secondary to posterior ureteral valves s/p Mitrofanoff, HTN, hearing loss, ADD, and anxiety. S/p living donor kidney transplant with ureteral stent and venous reconstruction on 12/28/23 with Dr. Martinez.       No fevers or chills.      No nausea or vomiting    Has not missed medications.    No urinary symptoms.    Transplant History:    Transplant Type:  LDKT  Donor Type: Living   Transplant Date:  12/28/2023 (Kidney)   Ureteral Stent:  No   Crossmatch:  negative   DSA at Tx:  No  Baseline Cr: 1.8-2.1   DeNovo DSA: No    Acute Rejection Hx:  No    Present Maintenance Immunosuppression:  Tacrolimus and Mycophenolate mofetil    CMV IgG Ab Discordance:  Yes  EBV IgG Ab Discordance:  Yes    BK Viremia:  No  EBV Viremia:  No    Transplant Coordinator: Nina Hu     Transplant Office Phone Number: [phone]     Immunosuppressant Medications       Immunosuppressive Agents Disp Start End     mycophenolate (GENERIC EQUIVALENT) 250 MG capsule 180 capsule 1/5/2024 --    Sig - Route: Take 3 capsules (750 mg) by mouth 2 times daily - Oral    Class: E-Prescribe     tacrolimus (GENERIC) 1 MG capsule 120 capsule 2/6/2024 --    Sig - Route: Take 2 capsules (2 mg) by mouth 2 times daily Total dose = 7 mg twice per day - Oral    Class: E-Prescribe    Notes to Pharmacy: TXP DT 12/28/2023 (Kidney) TXP Dischg DT 1/5/2024 DX Kidney replaced by transplant Z94.0 TX [facility] ([place], [place])     tacrolimus (GENERIC) 5 MG capsule 60 capsule 2/6/2024 --    Sig - Route: Take 1 capsule (5 mg) by mouth 2 times daily Total dose = 7 mg twice per day - Oral    Class: E-Prescribe    Notes to Pharmacy: TXP DT 12/28/2023 (Kidney) TXP Dischg DT 1/5/2024 DX Kidney replaced by transplant Z94.0 TX [facility]  ([place], [place])            Possible Immunosuppression-related side effects:   []             headache  []             vivid dreams  []             irritability  []             cognitive difficuties  []             fine tremor  []             nausea  []             diarrhea  []             neuropathy      []             edema  []             renal calcineurin toxicity  []             hyperkalemia  []             post-transplant diabetes  []             decreased appetite  []             increased appetite  []             other:  []             none    Prescription Medications as of 2/23/2024         Rx Number Disp Refills Start End Last Dispensed Date Next Fill Date Owning Pharmacy    acetaminophen (TYLENOL) 325 MG tablet  -- -- 1/5/2024 --       Sig: Take 1-2 tablets (325-650 mg) by mouth every 4 hours as needed (For optimal non-opioid multimodal pain management to improve pain control.)    Class: No Print Out    Route: Oral    amLODIPine (NORVASC) 10 MG tablet  30 tablet 11 1/5/2024 --   [address]    Sig: Take 1 tablet (10 mg) by mouth daily    Class: E-Prescribe    Route: Oral    aspirin 81 MG EC tablet  30 tablet 11 1/6/2024 --   [address]    Sig: Take 1 tablet (81 mg) by mouth daily    Class: E-Prescribe    Route: Oral    carvedilol (COREG) 12.5 MG tablet  180 tablet 3 1/17/2024 --   [address]    Sig: Take 1 tablet (12.5 mg) by mouth 2 times daily    Class: E-Prescribe    Route: Oral    cetirizine (ZYRTEC) 10 MG tablet  -- --  --       Sig: Take 10 mg by mouth daily as needed for allergies (in the spring)    Class: Historical    Route: Oral    difluprednate (DUREZOL) 0.05 % ophthalmic emulsion  -- -- 4/4/2023 --       Sig: INSTILL 1 DROP INTO EACH EYE SIX TIMES DAILY FOR THE FIRST 24 HOURS. THEN 1 DROP 4 TIMES DAILY UNTIL FOLLOW UP     Class: Historical    latanoprost (XALATAN) 0.005 % ophthalmic solution  -- --  --       Sig: Place 1 drop into both eyes at bedtime    Class: Historical    Route: Both Eyes    magnesium oxide (MAG-OX) 400 MG tablet  90 tablet 3 2/7/2024 --   [facility] [address] [place] [address]    Sig: Take 1 tablet (400 mg) by mouth daily    Class: E-Prescribe    Route: Oral    methylphenidate (RITALIN) 20 MG tablet  -- -- 2/16/2020 --   [facility] - [address]    Sig: Take 20 mg by mouth as needed Prn    Class: Historical    Earliest Fill Date: 2/16/2020    Route: Oral    mycophenolate (GENERIC EQUIVALENT) 250 MG capsule  180 capsule 11 1/5/2024 --   [address]    Sig: Take 3 capsules (750 mg) by mouth 2 times daily    Class: E-Prescribe    Route: Oral    Probiotic Product (PROBIOTIC-10 PO)  -- --  --       Sig: Take 1 tablet by mouth every morning    Class: Historical    Route: Oral    sodium bicarbonate 650 MG tablet  120 tablet 1 1/6/2024 --   [address]    Sig: Take 2 tablets (1,300 mg) by mouth 3 times daily    Class: E-Prescribe    Route: Oral    sulfamethoxazole-trimethoprim (BACTRIM) 400-80 MG tablet  30 tablet 11 1/6/2024 --   [address]    Sig: Take 1 tablet by mouth daily    Class: E-Prescribe    Route: Oral    tacrolimus (GENERIC) 1 MG capsule  120 capsule 11 2/6/2024 --   [facility] [address] [place] [address]    Sig: Take 2 capsules (2 mg) by mouth 2 times daily Total dose = 7 mg twice per day    Class: E-Prescribe    Notes to Pharmacy: TXP DT 12/28/2023 (Kidney) TXP Dischg DT 1/5/2024 DX Kidney replaced by transplant Z94.0 TX Center [facility] ([place], [place])    Route: Oral    tacrolimus (GENERIC) 5 MG  capsule  60 capsule 11 2/6/2024 --   [id number] - [place], [place] - [address] [facility]    Sig: Take 1 capsule (5 mg) by mouth 2 times daily Total dose = 7 mg twice per day    Class: E-Prescribe    Notes to Pharmacy: TXP DT 12/28/2023 (Kidney) TXP Dischg DT 1/5/2024 DX Kidney replaced by transplant Z94.0 TX Center [facility] ([place], [place])    Route: Oral    valGANciclovir (VALCYTE) 450 MG tablet  60 tablet 5 1/6/2024 --   [facility] - [place], [place] - [address] [facility]    Sig: Take 1 tablet (450 mg) by mouth daily Increase dose to 900 mg by mouth daily as directed by transplant team (with improving kidney function)    Class: E-Prescribe    Route: Oral    Vitamin D3 (CHOLECALCIFEROL) 25 mcg (1000 units) tablet  60 tablet 11 1/6/2024 --   [facility] - [place], [place] - [address] [facility]    Sig: Take 2 tablets (50 mcg) by mouth daily    Class: E-Prescribe    Route: Oral            O:      General Appearance: in no apparent distress.   Skin: Normal, no rashes or jaundice  Heart: regular rate and rhythm  Lungs: easy respirations, no audible wheezing.  Abdomen: obese, The wound is dry and intact, without hernia. The abdomen is non-tender. The kidney graft is not tender.  There is no ascites.  Extremities: Tremor absent.   Edema: absent.         Latest Ref Rng & Units 2/22/2024     9:58 AM 2/20/2024    10:33 AM 2/15/2024    10:06 AM 2/12/2024    10:30 AM 2/9/2024    10:50 AM   Transplant Immunosuppression Labs   Creat 0.67 - 1.17 mg/dL 3.05  2.35  2.04  1.92  1.89    Urea Nitrogen 6.0 - 20.0 mg/dL 21.0  13.9  16.2  14.6     WBC 4.0 - 11.0 10e3/uL 4.0  3.9  4.1  4.5         Chemistries:   Recent Labs   Lab Test 02/22/24  0958   BUN 21.0*   CR 3.05*   GFRESTIMATED 28*   *     Lab Results   Component Value Date    A1C 5.2 12/19/2023     Recent Labs   Lab Test 12/19/23  0953   ALBUMIN 4.4   BILITOTAL 0.5   ALKPHOS 67    AST 27   ALT 41     Urine Studies:  Recent Labs   Lab Test 12/19/23  1007   COLOR Straw   APPEARANCE Clear   URINEGLC 70*   URINEBILI Negative   URINEKETONE Negative   SG 1.003   UBLD Trace*   URINEPH 7.5*   PROTEIN 100*   NITRITE Negative   LEUKEST Negative   RBCU 0   WBCU 1     Recent Labs   Lab Test 02/26/20  1530 11/25/19  0904   UTPG 4.31* 4.92*     Hematology:   Recent Labs   Lab Test 02/22/24  0958 02/20/24  1033 02/15/24  1006   HGB 9.5* 9.7* 9.6*    258 241   WBC 4.0 3.9* 4.1     Coags:   Recent Labs   Lab Test 02/09/24  0835 01/02/24  0834   INR 0.93 1.17*     HLA antibodies:   SA1 Hi Risk Kavin   Date Value Ref Range Status   03/29/2021 None  Final     SA1 HI RISK KAVIN   Date Value Ref Range Status   01/22/2024 None  Final     SA1 Mod Risk Kavin   Date Value Ref Range Status   03/29/2021 None  Final     SA1 MOD RISK KAVIN   Date Value Ref Range Status   01/22/2024 None  Final     SA2 Hi Risk Kavin   Date Value Ref Range Status   03/29/2021 None  Final     SA2 HI RISK KAVIN   Date Value Ref Range Status   01/22/2024 None  Final     SA2 Mod Risk Kavin   Date Value Ref Range Status   03/29/2021 None  Final     SA2 MOD RISK KAVIN   Date Value Ref Range Status   01/22/2024 None  Final       Assessment: Boogie Villa is doing fairly well s/p LDKT:  Issues we addressed during his visit include:    Plan:    1. Graft function: elevated cr.  1L IVF, ua/ uc and kidney US  2. Immunosuppression Management: No change continue same IS  .  Complexity of management:Medium.  Contributing factors:  elevated cr  3. Elevated cr.  1L IVF, ua/uc and kidney us.  Labs tomorrow  Followup: as directed     Total Time: 35 min,   Counselling Time: 25 min.

## 2024-02-23 NOTE — TELEPHONE ENCOUNTER
Tacrolimus  level  increased alone with increase creatinine     Boogie checking his medication box -     Returning to clinic today for follow up visit

## 2024-02-23 NOTE — TELEPHONE ENCOUNTER
Issue Creatinine 3.0     Contacted Ivis Monzon Transplant Surgery NP   Requesting follow up  clinic visit today   Kidney ultrasound  orders placed  IV fluids  therapy plan 1 L NS     Plan to repeat BMP after IV fluids give today   Plan to repeat transplant  labs on 2/24/2024   lab appointment  -  appointment  and orders updated     Spoke to Boogie     Tacrolimus  level 18.9 from 8.2    Confirmed with  12 hour trough level   Confirmed to check medication box  - no extra tacrolimus  given

## 2024-02-23 NOTE — PROGRESS NOTES
Nursing Note  Boogie Villa presents today to [facility] for:   Chief Complaint   Patient presents with    Infusion     IVF     During today's [facility] appointment, orders from Ivis Monzon were completed.  Frequency: once    Progress note:  Patient identification verified by name and date of birth.  Assessment completed.  Vitals recorded in Doc Flowsheets.  Patient was provided with education regarding medication/procedure and possible side effects.  Patient verbalized understanding.     present during visit today: Not Applicable.    Treatment Conditions: Non-applicable.    Infusion length and rate:  infusion given over approximately  1 hours    Labs: were drawn per orders after infusion    Vascular access: peripheral IV was placed by vascular access nurse.    Is the next appt scheduled? None planned with specialty infusion    Post Infusion Assessment:  Patient tolerated infusion without incident.     Discharge Plan:   Follow up plan of care with: ongoing infusions at [facility].  Discharge instructions were reviewed with patient.  Patient/representative verbalized understanding of discharge instructions and all questions answered.  Patient discharged from [facility] in stable condition.    Heidy Stanley RN    Administrations This Visit       sodium chloride 0.9% BOLUS 1,000 mL       Admin Date  02/23/2024 Action  $New Bag Dose  1,000 mL Route  Intravenous Documented By  Heidy Stanley RN

## 2024-02-24 ENCOUNTER — TELEPHONE (OUTPATIENT)
Dept: TRANSPLANT | Facility: CLINIC | Age: 24
End: 2024-02-24

## 2024-02-24 ENCOUNTER — LAB (OUTPATIENT)
Dept: LAB | Facility: CLINIC | Age: 24
End: 2024-02-24
Payer: COMMERCIAL

## 2024-02-24 ENCOUNTER — HOSPITAL ENCOUNTER (INPATIENT)
Facility: CLINIC | Age: 24
LOS: 6 days | Discharge: HOME | DRG: 699 | End: 2024-03-01
Attending: EMERGENCY MEDICINE | Admitting: SURGERY
Payer: COMMERCIAL

## 2024-02-24 DIAGNOSIS — Z94.0 KIDNEY REPLACED BY TRANSPLANT: Chronic | ICD-10-CM

## 2024-02-24 DIAGNOSIS — T86.11 BANFF TYPE IB ACUTE CELLULAR REJECTION OF TRANSPLANTED KIDNEY: Primary | ICD-10-CM

## 2024-02-24 DIAGNOSIS — R73.9 STEROID-INDUCED HYPERGLYCEMIA: ICD-10-CM

## 2024-02-24 DIAGNOSIS — K21.9 GASTROESOPHAGEAL REFLUX DISEASE, UNSPECIFIED WHETHER ESOPHAGITIS PRESENT: ICD-10-CM

## 2024-02-24 DIAGNOSIS — E86.0 DEHYDRATION: ICD-10-CM

## 2024-02-24 DIAGNOSIS — R73.03 PRE-DIABETES: ICD-10-CM

## 2024-02-24 DIAGNOSIS — E83.42 HYPOMAGNESEMIA: ICD-10-CM

## 2024-02-24 DIAGNOSIS — Z98.890 OTHER SPECIFIED POSTPROCEDURAL STATES: ICD-10-CM

## 2024-02-24 DIAGNOSIS — T38.0X5A STEROID-INDUCED HYPERGLYCEMIA: ICD-10-CM

## 2024-02-24 DIAGNOSIS — R79.89 ELEVATED SERUM CREATININE: ICD-10-CM

## 2024-02-24 DIAGNOSIS — E87.20 METABOLIC ACIDOSIS: ICD-10-CM

## 2024-02-24 DIAGNOSIS — Z48.298 AFTERCARE FOLLOWING ORGAN TRANSPLANT: ICD-10-CM

## 2024-02-24 DIAGNOSIS — Z20.828 CONTACT WITH AND (SUSPECTED) EXPOSURE TO OTHER VIRAL COMMUNICABLE DISEASES: ICD-10-CM

## 2024-02-24 DIAGNOSIS — Z94.0 KIDNEY REPLACED BY TRANSPLANT: ICD-10-CM

## 2024-02-24 DIAGNOSIS — T86.11 KIDNEY TRANSPLANT REJECTION: ICD-10-CM

## 2024-02-24 DIAGNOSIS — Z79.899 ENCOUNTER FOR LONG-TERM CURRENT USE OF MEDICATION: ICD-10-CM

## 2024-02-24 LAB
ANION GAP SERPL CALCULATED.3IONS-SCNC: 12 MMOL/L (ref 7–15)
BACTERIA UR CULT: NO GROWTH
BUN SERPL-MCNC: 32 MG/DL (ref 6–20)
CALCIUM SERPL-MCNC: 9.2 MG/DL (ref 8.6–10)
CHLORIDE SERPL-SCNC: 109 MMOL/L (ref 98–107)
CREAT SERPL-MCNC: 4.1 MG/DL (ref 0.67–1.17)
DEPRECATED HCO3 PLAS-SCNC: 20 MMOL/L (ref 22–29)
EGFRCR SERPLBLD CKD-EPI 2021: 20 ML/MIN/1.73M2
ERYTHROCYTE [DISTWIDTH] IN BLOOD BY AUTOMATED COUNT: 12.3 % (ref 10–15)
GLUCOSE BLDC GLUCOMTR-MCNC: 119 MG/DL (ref 70–99)
GLUCOSE BLDC GLUCOMTR-MCNC: 94 MG/DL (ref 70–99)
GLUCOSE SERPL-MCNC: 107 MG/DL (ref 70–99)
HCT VFR BLD AUTO: 27 % (ref 40–53)
HGB BLD-MCNC: 9.2 G/DL (ref 13.3–17.7)
HOLD SPECIMEN: NORMAL
MCH RBC QN AUTO: 32.1 PG (ref 26.5–33)
MCHC RBC AUTO-ENTMCNC: 34.1 G/DL (ref 31.5–36.5)
MCV RBC AUTO: 94 FL (ref 78–100)
PLATELET # BLD AUTO: 248 10E3/UL (ref 150–450)
POTASSIUM SERPL-SCNC: 4.7 MMOL/L (ref 3.4–5.3)
RBC # BLD AUTO: 2.87 10E6/UL (ref 4.4–5.9)
SODIUM SERPL-SCNC: 141 MMOL/L (ref 135–145)
TACROLIMUS BLD-MCNC: 8.5 UG/L (ref 5–15)
TME LAST DOSE: NORMAL H
TME LAST DOSE: NORMAL H
WBC # BLD AUTO: 3.8 10E3/UL (ref 4–11)

## 2024-02-24 PROCEDURE — 87799 DETECT AGENT NOS DNA QUANT: CPT

## 2024-02-24 PROCEDURE — 85027 COMPLETE CBC AUTOMATED: CPT | Performed by: PATHOLOGY

## 2024-02-24 PROCEDURE — 87040 BLOOD CULTURE FOR BACTERIA: CPT

## 2024-02-24 PROCEDURE — 99284 EMERGENCY DEPT VISIT MOD MDM: CPT | Performed by: EMERGENCY MEDICINE

## 2024-02-24 PROCEDURE — 36415 COLL VENOUS BLD VENIPUNCTURE: CPT | Performed by: PATHOLOGY

## 2024-02-24 PROCEDURE — 250N000013 HC RX MED GY IP 250 OP 250 PS 637

## 2024-02-24 PROCEDURE — 99285 EMERGENCY DEPT VISIT HI MDM: CPT | Mod: 25 | Performed by: EMERGENCY MEDICINE

## 2024-02-24 PROCEDURE — 86833 HLA CLASS II HIGH DEFIN QUAL: CPT

## 2024-02-24 PROCEDURE — 99024 POSTOP FOLLOW-UP VISIT: CPT | Mod: GC | Performed by: SURGERY

## 2024-02-24 PROCEDURE — 87799 DETECT AGENT NOS DNA QUANT: CPT | Performed by: INTERNAL MEDICINE

## 2024-02-24 PROCEDURE — 80180 DRUG SCRN QUAN MYCOPHENOLATE: CPT | Performed by: INTERNAL MEDICINE

## 2024-02-24 PROCEDURE — 120N000011 HC R&B TRANSPLANT UMMC

## 2024-02-24 PROCEDURE — 80048 BASIC METABOLIC PNL TOTAL CA: CPT | Performed by: PATHOLOGY

## 2024-02-24 PROCEDURE — 86832 HLA CLASS I HIGH DEFIN QUAL: CPT

## 2024-02-24 PROCEDURE — 87086 URINE CULTURE/COLONY COUNT: CPT

## 2024-02-24 PROCEDURE — 82962 GLUCOSE BLOOD TEST: CPT

## 2024-02-24 PROCEDURE — 250N000012 HC RX MED GY IP 250 OP 636 PS 637

## 2024-02-24 PROCEDURE — 86828 HLA CLASS I&II ANTIBODY QUAL: CPT

## 2024-02-24 PROCEDURE — 80197 ASSAY OF TACROLIMUS: CPT | Performed by: NURSE PRACTITIONER

## 2024-02-24 PROCEDURE — 36415 COLL VENOUS BLD VENIPUNCTURE: CPT

## 2024-02-24 PROCEDURE — 99000 SPECIMEN HANDLING OFFICE-LAB: CPT | Performed by: PATHOLOGY

## 2024-02-24 RX ORDER — VALGANCICLOVIR 450 MG/1
900 TABLET, FILM COATED ORAL DAILY
Status: DISCONTINUED | OUTPATIENT
Start: 2024-02-24 | End: 2024-02-25

## 2024-02-24 RX ORDER — ASPIRIN 81 MG/1
81 TABLET ORAL DAILY
Status: DISCONTINUED | OUTPATIENT
Start: 2024-02-24 | End: 2024-02-27

## 2024-02-24 RX ORDER — TACROLIMUS 5 MG/1
5 CAPSULE ORAL
Status: DISCONTINUED | OUTPATIENT
Start: 2024-02-24 | End: 2024-02-25

## 2024-02-24 RX ORDER — CARVEDILOL 12.5 MG/1
12.5 TABLET ORAL 2 TIMES DAILY
Status: DISCONTINUED | OUTPATIENT
Start: 2024-02-24 | End: 2024-03-01 | Stop reason: HOSPADM

## 2024-02-24 RX ORDER — SULFAMETHOXAZOLE AND TRIMETHOPRIM 400; 80 MG/1; MG/1
1 TABLET ORAL DAILY
Status: DISCONTINUED | OUTPATIENT
Start: 2024-02-25 | End: 2024-02-25

## 2024-02-24 RX ORDER — VITAMIN B COMPLEX
50 TABLET ORAL DAILY
Status: DISCONTINUED | OUTPATIENT
Start: 2024-02-25 | End: 2024-02-28

## 2024-02-24 RX ORDER — TACROLIMUS 1 MG/1
1 CAPSULE ORAL
Status: DISCONTINUED | OUTPATIENT
Start: 2024-02-24 | End: 2024-02-25

## 2024-02-24 RX ORDER — ACETAMINOPHEN 325 MG/1
650 TABLET ORAL EVERY 4 HOURS PRN
Status: DISCONTINUED | OUTPATIENT
Start: 2024-02-24 | End: 2024-03-01 | Stop reason: HOSPADM

## 2024-02-24 RX ORDER — SODIUM BICARBONATE 650 MG/1
1300 TABLET ORAL 3 TIMES DAILY
Status: DISCONTINUED | OUTPATIENT
Start: 2024-02-24 | End: 2024-02-28

## 2024-02-24 RX ORDER — MYCOPHENOLATE MOFETIL 250 MG/1
750 CAPSULE ORAL 2 TIMES DAILY
Status: DISCONTINUED | OUTPATIENT
Start: 2024-02-24 | End: 2024-03-01 | Stop reason: HOSPADM

## 2024-02-24 RX ORDER — LIDOCAINE 40 MG/G
CREAM TOPICAL
Status: DISCONTINUED | OUTPATIENT
Start: 2024-02-24 | End: 2024-03-01 | Stop reason: HOSPADM

## 2024-02-24 RX ORDER — VALGANCICLOVIR 450 MG/1
450 TABLET, FILM COATED ORAL DAILY
Status: ON HOLD | COMMUNITY
End: 2024-03-01

## 2024-02-24 RX ORDER — TACROLIMUS 1 MG/1
1 CAPSULE ORAL 2 TIMES DAILY
Status: ON HOLD | COMMUNITY
End: 2024-03-01

## 2024-02-24 RX ORDER — AMLODIPINE BESYLATE 10 MG/1
10 TABLET ORAL DAILY
Status: DISCONTINUED | OUTPATIENT
Start: 2024-02-25 | End: 2024-03-01 | Stop reason: HOSPADM

## 2024-02-24 RX ORDER — TACROLIMUS 5 MG/1
5 CAPSULE ORAL 2 TIMES DAILY
Status: ON HOLD | COMMUNITY
End: 2024-03-01

## 2024-02-24 RX ADMIN — TACROLIMUS 5 MG: 5 CAPSULE ORAL at 18:43

## 2024-02-24 RX ADMIN — MYCOPHENOLATE MOFETIL 750 MG: 250 CAPSULE ORAL at 19:52

## 2024-02-24 RX ADMIN — CARVEDILOL 12.5 MG: 12.5 TABLET, FILM COATED ORAL at 19:52

## 2024-02-24 RX ADMIN — ACETAMINOPHEN 650 MG: 325 TABLET, FILM COATED ORAL at 22:25

## 2024-02-24 RX ADMIN — TACROLIMUS 1 MG: 1 CAPSULE ORAL at 18:43

## 2024-02-24 RX ADMIN — SODIUM BICARBONATE 1300 MG: 650 TABLET ORAL at 19:53

## 2024-02-24 ASSESSMENT — ACTIVITIES OF DAILY LIVING (ADL)
ADLS_ACUITY_SCORE: 37

## 2024-02-24 ASSESSMENT — COLUMBIA-SUICIDE SEVERITY RATING SCALE - C-SSRS
1. IN THE PAST MONTH, HAVE YOU WISHED YOU WERE DEAD OR WISHED YOU COULD GO TO SLEEP AND NOT WAKE UP?: NO
6. HAVE YOU EVER DONE ANYTHING, STARTED TO DO ANYTHING, OR PREPARED TO DO ANYTHING TO END YOUR LIFE?: NO
2. HAVE YOU ACTUALLY HAD ANY THOUGHTS OF KILLING YOURSELF IN THE PAST MONTH?: NO

## 2024-02-24 NOTE — H&P
"[facility]    H&P Note - General Surgery Service  Date of Admission:  2/24/2024  Reason for Admission: Rising Cr    Assessment & Plan: Surgery   Boogie Villa is a 23 year old male with history significant for ESKD s/p RLQ kidney transplant 12/28/2023 complicated by 10-day hospitalization for rejection (kidney transplant biopsy on 1/2/2024 with acute cellular-mediated rejection), on immunosuppression medication who presents to the emergency department with fatigue for 4d, elevated creatinine, but otherwise asymptomatic and making urine.  Patient had his creatinine tested yesterday which was 3.16.  Repeat labs done today demonstrated creatinine of 4.1.  Patient called his transplant nurse, who told him to present to the ED.  Patient has been adhering to his antirejection medications.  He denies fever.  He denies cough, chest pain, or flulike symptoms.  His surgical site is not painful to touch.  Patient did have a ultrasound done yesterday, which demonstrated a jewell-incisional superficial fluid collection measuring up to 9.8 cm and mild hydronephrosis of the RLQ renal transplant. Normal RIs. Urinalysis completed yesterday did not show signs of infection.     - EBV, CMV, BK Virus Levels  - Urine Culture, Blood Cultures  - DSA labs  - Admit to Transplant Surgery (Kidney)  - Consult Transplant Nephrology  - Regular Diet  - Ambulate Ad Randi  - Continue PTA medications       Clinically Significant Risk Factors Present on Admission                # Drug Induced Platelet Defect: home medication list includes an antiplatelet medication  # Acute Kidney Injury, unspecified: based on a >150% or 0.3 mg/dL increase in last creatinine compared to past 90 day average, will monitor renal function  # Hypertension: Noted on problem list      # Severe Obesity: Estimated body mass index is 40.45 kg/m  as calculated from the following:    Height as of this encounter: 1.803 m (5' 11\").    " Weight as of this encounter: 131.5 kg (290 lb).         # Financial/Environmental Concerns:             The patient's care was discussed with the Transplant Fellow, Dr. Schroeder.  Ten Mcarthur MD  General Surgery Resident  [facility]  Text page via [facility] Paging/Directory    ______________________________________________________________________    Chief Complaint   Rising Cr.    History is obtained from the patient, the ED staff, and the patient's chart.    History of Present Illness   Boogie Villa is a 23 year old male with history significant for ESKD s/p RLQ kidney transplant 12/28/2023 complicated by 10-day hospitalization for rejection (kidney transplant biopsy on 1/2/2024 with acute cellular-mediated rejection), on immunosuppression medication who presents to the emergency department with fatigue for 4d, elevated creatinine, but otherwise asymptomatic and making urine.  Patient had his creatinine tested yesterday which was 3.16.  Repeat labs done today demonstrated creatinine of 4.1.  Patient called his transplant nurse, who told him to present to the ED.  Patient has been adhering to his antirejection medications.  He denies fever.  He denies cough, chest pain, or flulike symptoms.  His surgical site is not painful to touch.  Patient did have a ultrasound done yesterday, which demonstrated a jewell-incisional superficial fluid collection measuring up to 9.8 cm and mild hydronephrosis of the RLQ renal transplant.  Urinalysis completed yesterday did not show signs of infection.       Past Medical History    Past Medical History:   Diagnosis Date    ADD (attention deficit disorder)     Allergic rhinitis     ESRD (end stage renal disease) on dialysis (H)     Hypertension 2000    Mitrofanoff appendicovesicostomy present (H)     Obesity     Posterior urethral valves     Secondary renal hyperparathyroidism (H24)     Vitamin D deficiency        Past Surgical History    Past Surgical History:   Procedure Laterality Date    ABDOMEN SURGERY  2003    Bilateral urereral tapering and re-implantation    ABDOMEN SURGERY  2008    Mitrofanoff    ablation of posterior urethral valves  2000    APPENDECTOMY  2008    BENCH KIDNEY  12/28/2023    Procedure: Bench kidney;  Surgeon: Nita Martinez MD;  Location: [facility] OR    CREATE FISTULA ARTERIOVENOUS UPPER EXTREMITY Left 5/25/2023    Procedure: LEFT Brachial Basilic ARTERIOVENOUS FISTULA CREATION SURGERY WITH INTRAOPERATIVE ULTRASOUND .;  Surgeon: Nita Martinez MD;  Location: [facility] OR    CREATE GRAFT LOOP ARTERIOVENOUS UPPER EXTREMITY Right 8/24/2023    Procedure: Right RadioCephalic AV Fistula and Branch Ligation x3;  Surgeon: Nita Martinez MD;  Location: [facility] OR    IR CVC TUNNEL PLACEMENT > 5 YRS OF AGE  2/16/2023    IR FINE NEEDLE ASPIRATION W ULTRASOUND  2/9/2024    IR RENAL BIOPSY RIGHT  1/2/2024    IR RENAL BIOPSY RIGHT  2/9/2024    ureteral reimplantation with tapering  2003       Prior to Admission Medications   I have reviewed this patient's current medications  Current Outpatient Medications on File Prior to Encounter   Medication Sig Dispense Refill    acetaminophen (TYLENOL) 325 MG tablet Take 1-2 tablets (325-650 mg) by mouth every 4 hours as needed (For optimal non-opioid multimodal pain management to improve pain control.)      amLODIPine (NORVASC) 10 MG tablet Take 1 tablet (10 mg) by mouth daily 30 tablet 11    aspirin 81 MG EC tablet Take 1 tablet (81 mg) by mouth daily 30 tablet 11    carvedilol (COREG) 12.5 MG tablet Take 1 tablet (12.5 mg) by mouth 2 times daily 180 tablet 3    cetirizine (ZYRTEC) 10 MG tablet Take 10 mg by mouth daily as needed for allergies (in the spring)      difluprednate (DUREZOL) 0.05 % ophthalmic emulsion INSTILL 1 DROP INTO EACH EYE SIX TIMES DAILY FOR THE FIRST 24 HOURS. THEN 1 DROP 4 TIMES DAILY UNTIL FOLLOW UP      latanoprost (XALATAN) 0.005 % ophthalmic solution Place 1 drop  into both eyes at bedtime (Patient not taking: Reported on 2/23/2024)      magnesium oxide (MAG-OX) 400 MG tablet Take 1 tablet (400 mg) by mouth daily 90 tablet 3    methylphenidate (RITALIN) 20 MG tablet Take 20 mg by mouth as needed Prn      mycophenolate (GENERIC EQUIVALENT) 250 MG capsule Take 3 capsules (750 mg) by mouth 2 times daily 180 capsule 11    Probiotic Product (PROBIOTIC-10 PO) Take 1 tablet by mouth every morning      sodium bicarbonate 650 MG tablet Take 2 tablets (1,300 mg) by mouth 3 times daily 120 tablet 1    sulfamethoxazole-trimethoprim (BACTRIM) 400-80 MG tablet Take 1 tablet by mouth daily 30 tablet 11    tacrolimus (GENERIC) 1 MG capsule Take 2 capsules (2 mg) by mouth 2 times daily Total dose = 7 mg twice per day (Patient taking differently: Take 1 mg by mouth 2 times daily Total dose = 6 mg twice per day) 120 capsule 11    tacrolimus (GENERIC) 5 MG capsule Take 1 capsule (5 mg) by mouth 2 times daily Total dose = 7 mg twice per day (Patient taking differently: Take 5 mg by mouth 2 times daily Total dose = 6 mg twice per day) 60 capsule 11    valGANciclovir (VALCYTE) 450 MG tablet Take 1 tablet (450 mg) by mouth daily Increase dose to 900 mg by mouth daily as directed by transplant team (with improving kidney function) (Patient taking differently: Take 900 mg by mouth daily Increase dose to 900 mg by mouth daily as directed by transplant team (with improving kidney function)) 60 tablet 5    Vitamin D3 (CHOLECALCIFEROL) 25 mcg (1000 units) tablet Take 2 tablets (50 mcg) by mouth daily 60 tablet 11          Review of Systems    The 10 point Review of Systems is negative other than noted in the HPI or here.    Social History   I have reviewed this patient's social history and updated it with pertinent information if needed.  Social History     Tobacco Use    Smoking status: Never     Passive exposure: Never    Smokeless tobacco: Never   Vaping Use    Vaping Use: Never used   Substance Use  Topics    Alcohol use: Never    Drug use: Not Currently     Types: Marijuana     Comment: none for 4-5 months         Family History   I have reviewed this patient's family history and updated it with pertinent information if needed.  Family History   Problem Relation Age of Onset    No Known Problems Mother     No Known Problems Father     Anesthesia Reaction No family hx of     Thrombosis No family hx of          Allergies   Allergies   Allergen Reactions    Banana Itching     Raw banana; itchy mouth      Dust Mites      Runny nose and watery eyes    Mold      Runny nose and watery eyes    Nsaids Other (See Comments)     CKD    Other [Seasonal Allergies]      Grass, Ragweed - gets runny nose and watery eyes    Sulfa Antibiotics      PN: LW Reaction: GI Upset as an infant  12/28 Admission: Tolerated Bactrim        Physical Exam   Vitals: Most Recent  Ranges (Last 24 hours)   Temp: 97.8  F (36.6  C)  Pulse: 87  BP: (!) 143/77  Resp: 16  SpO2: 98 %  O2 Device: None (Room air) Temp  Min: 97.8  F (36.6  C)  Max: 97.8  F (36.6  C)  Pulse  Min: 87  Max: 87  BP  Min: 143/77  Max: 143/77  Resp  Min: 16  Max: 16  SpO2  Min: 98 %  Max: 98 %     Physical Exam:  Gen: Appears stated age, NAD  HEENT: NC/AT, PERRL, EOMI, Sclera Anicteric, Hearing intact  Neuro: AO, no focal deficits  Psych: Affect appropriate, Behavior appropriate, Cooperative  CV: Hypertensive, Normocardic  Pulm: NWOB on RA  ABD: Soft, NT, ND. Well healing surgical scar. No signs of infection or inflammation.  MSK: MAEx4, warm, PPP  Skin: No obvious rashes, jaundice, erythema        Data     I have personally reviewed the following data over the past 24 hrs:    3.8 (L)  \   9.2 (L)   / 248     141 109 (H) 32.0 (H) /  107 (H)   4.7 20 (L) 4.10 (H) \       Imaging results reviewed over the past 24 hrs:   Recent Results (from the past 24 hour(s))   US Renal Transplant with Doppler    Narrative    ULTRASOUND RENAL TRANSPLANT  2/23/2024 3:46 PM    CLINICAL HISTORY:  kidney transplant 12/28/ 2023   increase  creatinine; Kidney replaced by transplant.    COMPARISONS: 1/2/2024    REFERRING PROVIDER: MARIA ELENA SMITH    TECHNIQUE: Right lower quadrant renal transplant evaluated with  grayscale, color Doppler, and Doppler waveform ultrasound. Transplant  renal vasculature evaluated with color Doppler and Doppler waveform  ultrasound.    Cine clips through the renal transplant and bladder were saved in the  patient's record.    FINDINGS:   RIGHT LOWER QUADRANT RENAL TRANSPLANT:         Fluid collections: None         Renal artery:            Hilum: 94 cm/s            Mid: 117 cm/s            Proximal: 137 cm/s            Anastomosis: 155 cm/s         Renal artery - iliac ratio: Within normal limits         Renal vein:            Hilum: 34 cm/s            Anastomosis: 50 cm/s         Length: 12.6 cm         Arcuate artery resistive index (superior / mid / inferior): 0.70  / 0.73 / 0.73         Parenchyma: Normal, no stone or mass. Mild hydronephrosis, which  did not improve after voiding..    Iliac artery:       Above anastomosis: 103 cm/s       Below anastomosis: 148 cm/s    Iliac vein:  Patent above and below anastomosis Bladder: Unremarkable    Periincisional fluid collection superficially, measuring 9.8 x 2.4 x  4.0 cm.      Impression    IMPRESSION:   1. Patent renal transplant vasculature.  2. Hodan incisional superficial fluid collection measuring up to 9.8 cm  as described above.  3. Mild hydronephrosis right lower quadrant renal transplant, which  did not improve after voiding.    GUIDELINES:  For native kidneys:       Diagnostic criteria suggestive of > 60% diameter stenosis             Renal artery:             Peak systolic velocity > 180 cm/s             RAR > 3.5             Turbulent flow         Arcuate artery Resistive Index Normal < 0.7    For renal transplants:       Renal transplant peak systolic velocity criteria range from > 180  cm/s to > 400 cm/s        Source suggests using:            Peak systolic velocity > or = 300 cm/s            Spectral broadening            Intraparenchymal arterial acceleration time > or = 0.1 s     Source: Geoff LLANOS, Keenan RODRIGUEZ, Charlie OWEN, et al. Screening for  transplant renal artery stenosis: Ultrasound-based stenosis  probability stratification. American Journal of Roentgenology.  2017;209: 4573-5678. [id number]    EPHRAIM DIAZ MD         SYSTEM ID:  [id number]

## 2024-02-24 NOTE — ED TRIAGE NOTES
"Triage Assessment & Note:    BP (!) 143/77   Pulse 87   Temp 97.8  F (36.6  C) (Oral)   Resp 16   Ht 1.803 m (5' 11\")   Wt 131.5 kg (290 lb)   SpO2 98%   BMI 40.45 kg/m        Patient presents with: Transplant pt comes ambulatory to triage with reports of elevated creat levels. No reports of fever, cough, SOB, CP, or travel.     Home Treatments/Remedies: home medications    Febrile / Afebrile: afebrile    Duration of C/o: n/a    Ivis Johnson RN  February 24, 2024            "

## 2024-02-24 NOTE — TELEPHONE ENCOUNTER
Jackson called concerned about increase in creatinine. He was 3.16 yesterday. And today 4.10.    Pt reports no changes.    SPoke with dr Brown and pt to go to the ER.  Attempted to reach patient, but no answer. Left message.    Boogie is aware of our recommendation to go the ER and will go.

## 2024-02-24 NOTE — ED PROVIDER NOTES
[place] EMERGENCY DEPARTMENT ([facility])    2/24/24       ED PROVIDER NOTE       History     Chief Complaint   Patient presents with    Abnormal Labs     creat     PANTERA  Boogie Villa is a 23 year old male with history significant for ESKD s/p RLQ kidney transplant 12/28/2023 complicated by 10-day hospitalization for rejection, on antirejection medication who presents to the emergency department with fatigue, nausea, elevated creatinine.  Patient had his creatinine tested yesterday which was 3.16.  Repeat labs done today demonstrated creatinine of 4.1.  Patient called his transplant nurse, who told him to present to the ED.  He states that he feels tired and nauseous now.  He is producing urine.  Patient has been adhering to his antirejection medications.  He denies fever.  He denies cough, chest pain, or flulike symptoms.  His surgical site is not painful to touch.  Patient did have a ultrasound done yesterday, which demonstrated a jewell-incisional superficial fluid collection measuring up to 9.8 cm and mild hydronephrosis of the RLQ renal transplant which not improved after voiding.  Urinalysis completed yesterday did not show signs of infection    EPIC RECORD REVIEW    ULTRASOUND RENAL TRANSPLANT 2/23/2024   IMPRESSION:   1. Patent renal transplant vasculature.  2. Jewell incisional superficial fluid collection measuring up to 9.8 cm  as described above.  3. Mild hydronephrosis right lower quadrant renal transplant, which  did not improve after voiding.    ULTRASOUND RENAL TRANSPLANT 1/2/2024  IMPRESSION:   1.  Normal grayscale appearance of right lower quadrant transplanted  kidney with patent Doppler evaluation.  2.  Improved peak systolic velocities at the renal artery anastomosis.  However, there is high resistance intrarenal waveforms with increased  arcuate artery resistive indices, now measuring up to 1.0, throughout  the upper, mid, and lower pole; which may relate to  transplant  dysfunction. Recommend attention on short-interval follow-up.    Past Medical History  Past Medical History:   Diagnosis Date    ADD (attention deficit disorder)     Allergic rhinitis     ESRD (end stage renal disease) on dialysis (H)     Hypertension 2000    Mitrofanoff appendicovesicostomy present (H)     Obesity     Posterior urethral valves     Secondary renal hyperparathyroidism (H24)     Vitamin D deficiency      Past Surgical History:   Procedure Laterality Date    ABDOMEN SURGERY  2003    Bilateral urereral tapering and re-implantation    ABDOMEN SURGERY  2008    Mitrofanoff    ablation of posterior urethral valves  2000    APPENDECTOMY  2008    BENCH KIDNEY  12/28/2023    Procedure: Bench kidney;  Surgeon: Nita Martinez MD;  Location: [facility] OR    CREATE FISTULA ARTERIOVENOUS UPPER EXTREMITY Left 5/25/2023    Procedure: LEFT Brachial Basilic ARTERIOVENOUS FISTULA CREATION SURGERY WITH INTRAOPERATIVE ULTRASOUND .;  Surgeon: Nita Martinez MD;  Location: [facility] OR    CREATE GRAFT LOOP ARTERIOVENOUS UPPER EXTREMITY Right 8/24/2023    Procedure: Right RadioCephalic AV Fistula and Branch Ligation x3;  Surgeon: Nita Martinez MD;  Location: [facility] OR    IR CVC TUNNEL PLACEMENT > 5 YRS OF AGE  2/16/2023    IR FINE NEEDLE ASPIRATION W ULTRASOUND  2/9/2024    IR RENAL BIOPSY RIGHT  1/2/2024    IR RENAL BIOPSY RIGHT  2/9/2024    ureteral reimplantation with tapering  2003     acetaminophen (TYLENOL) 325 MG tablet  amLODIPine (NORVASC) 10 MG tablet  aspirin 81 MG EC tablet  carvedilol (COREG) 12.5 MG tablet  cetirizine (ZYRTEC) 10 MG tablet  difluprednate (DUREZOL) 0.05 % ophthalmic emulsion  latanoprost (XALATAN) 0.005 % ophthalmic solution  magnesium oxide (MAG-OX) 400 MG tablet  methylphenidate (RITALIN) 20 MG tablet  mycophenolate (GENERIC EQUIVALENT) 250 MG capsule  Probiotic Product (PROBIOTIC-10 PO)  sodium bicarbonate 650 MG tablet  sulfamethoxazole-trimethoprim (BACTRIM) 400-80 MG  "tablet  tacrolimus (GENERIC) 1 MG capsule  tacrolimus (GENERIC) 5 MG capsule  valGANciclovir (VALCYTE) 450 MG tablet  Vitamin D3 (CHOLECALCIFEROL) 25 mcg (1000 units) tablet      Allergies   Allergen Reactions    Banana Itching     Raw banana; itchy mouth      Dust Mites      Runny nose and watery eyes    Mold      Runny nose and watery eyes    Nsaids Other (See Comments)     CKD    Other [Seasonal Allergies]      Grass, Ragweed - gets runny nose and watery eyes    Sulfa Antibiotics      PN: LW Reaction: GI Upset as an infant  12/28 Admission: Tolerated Bactrim     Family History  Family History   Problem Relation Age of Onset    No Known Problems Mother     No Known Problems Father     Anesthesia Reaction No family hx of     Thrombosis No family hx of      Social History   Social History     Tobacco Use    Smoking status: Never     Passive exposure: Never    Smokeless tobacco: Never   Vaping Use    Vaping Use: Never used   Substance Use Topics    Alcohol use: Never    Drug use: Not Currently     Types: Marijuana     Comment: none for 4-5 months         A complete review of systems was performed with pertinent positives and negatives noted in the HPI, and all other systems negative.    Physical Exam   BP: (!) 143/77  Pulse: 87  Temp: 97.8  F (36.6  C)  Resp: 16  Height: 180.3 cm (5' 11\")  Weight: 131.5 kg (290 lb)  SpO2: 98 %  Physical Exam  Vitals and nursing note reviewed.   Constitutional:       General: He is not in acute distress.     Appearance: He is not ill-appearing, toxic-appearing or diaphoretic.   HENT:      Head: Normocephalic and atraumatic.   Eyes:      General: No scleral icterus.     Extraocular Movements: Extraocular movements intact.      Conjunctiva/sclera: Conjunctivae normal.   Cardiovascular:      Rate and Rhythm: Normal rate.      Heart sounds: Normal heart sounds.   Pulmonary:      Effort: Pulmonary effort is normal. No respiratory distress.      Breath sounds: Normal breath sounds. "   Abdominal:      General: There is no distension.      Palpations: Abdomen is soft. There is no mass.      Tenderness: There is no abdominal tenderness. There is no right CVA tenderness, left CVA tenderness, guarding or rebound.      Hernia: No hernia is present.      Comments: Surgical wound in right lower quadrant is clean, dry, intact.  No tenderness to palpation of right lower quadrant, over transplanted kidney site   Musculoskeletal:         General: No tenderness. Normal range of motion.      Cervical back: Normal range of motion and neck supple.      Right lower leg: No edema.      Left lower leg: No edema.   Skin:     General: Skin is warm.      Findings: No rash.   Neurological:      General: No focal deficit present.      Mental Status: He is alert and oriented to person, place, and time.   Psychiatric:         Mood and Affect: Mood normal.         Behavior: Behavior normal.         Thought Content: Thought content normal.         Judgment: Judgment normal.           ED Course, Procedures, & Data      Procedures                      Results for orders placed or performed in visit on 02/24/24   Basic metabolic panel     Status: Abnormal   Result Value Ref Range    Sodium 141 135 - 145 mmol/L    Potassium 4.7 3.4 - 5.3 mmol/L    Chloride 109 (H) 98 - 107 mmol/L    Carbon Dioxide (CO2) 20 (L) 22 - 29 mmol/L    Anion Gap 12 7 - 15 mmol/L    Urea Nitrogen 32.0 (H) 6.0 - 20.0 mg/dL    Creatinine 4.10 (H) 0.67 - 1.17 mg/dL    GFR Estimate 20 (L) >60 mL/min/1.73m2    Calcium 9.2 8.6 - 10.0 mg/dL    Glucose 107 (H) 70 - 99 mg/dL   CBC with platelets     Status: Abnormal   Result Value Ref Range    WBC Count 3.8 (L) 4.0 - 11.0 10e3/uL    RBC Count 2.87 (L) 4.40 - 5.90 10e6/uL    Hemoglobin 9.2 (L) 13.3 - 17.7 g/dL    Hematocrit 27.0 (L) 40.0 - 53.0 %    MCV 94 78 - 100 fL    MCH 32.1 26.5 - 33.0 pg    MCHC 34.1 31.5 - 36.5 g/dL    RDW 12.3 10.0 - 15.0 %    Platelet Count 248 150 - 450 10e3/uL   Tacrolimus by Tandem  Mass Spectrometry     Status: None   Result Value Ref Range    Tacrolimus by Tandem Mass Spectrometry 8.5 5.0 - 15.0 ug/L    Tacrolimus Last Dose Date 2/23/2024     Tacrolimus Last Dose Time 10:00 PM     Narrative    This test was developed and its performance characteristics determined by the [facility],  Special [facility]. It has not been cleared or approved by the FDA. The laboratory is regulated under CLIA as qualified to perform high-complexity testing. This test is used for clinical purposes. It should not be regarded as investigational or for research.     Medications   lidocaine 1 % 1 mL (has no administration in time range)   lidocaine (LMX4) cream (has no administration in time range)   sodium chloride (PF) 0.9% PF flush 3 mL (has no administration in time range)   sodium chloride (PF) 0.9% PF flush 3 mL (has no administration in time range)   acetaminophen (TYLENOL) tablet 650 mg (has no administration in time range)   amLODIPine (NORVASC) tablet 10 mg (has no administration in time range)   carvedilol (COREG) tablet 12.5 mg (has no administration in time range)   mycophenolate (GENERIC EQUIVALENT) capsule 750 mg (has no administration in time range)   sodium bicarbonate tablet 1,300 mg (has no administration in time range)   sulfamethoxazole-trimethoprim (BACTRIM) 400-80 MG per tablet 1 tablet (has no administration in time range)   tacrolimus (GENERIC) capsule 1 mg (has no administration in time range)   tacrolimus (GENERIC) capsule 5 mg (has no administration in time range)   Vitamin D3 (CHOLECALCIFEROL) tablet 50 mcg (has no administration in time range)   valGANciclovir (VALCYTE) tablet 900 mg ( Oral Automatically Held 2/27/24 0800)   aspirin EC tablet 81 mg ( Oral Automatically Held 2/27/24 0800)     Labs Ordered and Resulted from Time of ED Arrival to Time of ED Departure - No data to display  No orders to display          Critical care was not performed.      Medical Decision Making  The patient's presentation was of high complexity (an acute health issue posing potential threat to life or bodily function).    The patient's evaluation involved:  review of external note(s) from 2 sources (see separate area of note for details)  review of 3+ test result(s) ordered prior to this encounter (see separate area of note for details)  strong consideration of a test (see separate area of note for details) that was ultimately deferred  ordering and/or review of 3+ test(s) in this encounter (see separate area of note for details)  independent interpretation of testing performed by another health professional (see separate area of note for details)  discussion of management or test interpretation with another health professional (see separate area of note for details)    The patient's management necessitated high risk (a decision regarding hospitalization).    Assessment & Plan    23-year-old man status post kidney transplant 2 months ago presenting to the emerged department with abnormal labs.  Patient has had a rising creatinine over the last week and transplant nurse coordinator directed him to the ED for further evaluation.  Patient laboratory studies done in outpatient setting earlier this morning which I am able to review.  His creatinine today is 4.1 and yesterday was 3.16.  Earlier this month his creatinine was 1.89.  There is mild leukopenia of 3800.  There is anemia with hemoglobin 9.2 which is at baseline.  Urinalysis done yesterday did not show any evidence of infection.  He also had renal transplant ultrasound done yesterday with patent vasculature and the jewell-incisional superficial fluid collection of almost 10 cm and mild hydronephrosis of right lower quadrant renal transplant.  Case discussed with surgery transplant team who evaluated the patient emergency department and decided to admit him to their service for further evaluation and management.  Patient and his  family agree with the plan.    I have reviewed the nursing notes. I have reviewed the findings, diagnosis, plan and need for follow up with the patient.    New Prescriptions    No medications on file       Final diagnoses:   Elevated serum creatinine       Jessica Edwards MD I, Nikhil Kapur, am serving as a trained medical scribe to document services personally performed by Jessica Edwards MD MD based on the provider's statements to me on February 24, 2024.  This document has been checked and approved by the attending provider.    Jerry PEREZ Nikola, MD MD, was physically present and have reviewed and verified the accuracy of this note documented by Forest Grijalva medical scribe.      Jessica Edwards MD MD   [facility] EMERGENCY DEPARTMENT  2/24/2024        Jessica Edwards MD  02/24/24 [id number]

## 2024-02-24 NOTE — MEDICATION SCRIBE - ADMISSION MEDICATION HISTORY
Medication Scribe Admission Medication History    Admission medication history is complete. The information provided in this note is only as accurate as the sources available at the time of the update.    Information Source(s): Patient via in-person    Pertinent Information:   Patient reported not taking difluprednate 0.05 % ophthalmic emulsion instill 1 drop into each eye six times daily for the first 24 hrs then 1 drop 4 times daily until follow up, latanoprost 0.005 % ophthalmic emulsion place 1 drop into both eyes at bedtime  Changes made to PTA medication list:  Added: None  Deleted: difluprednate 0.05 % ophthalmic emulsion,  latanoprost 0.005 % ophthalmic emulsion   Changed: From valganciclovir 450 mg tablet 1 tablet by mouth daily increase dose to 900 mg by mouth daily. To valganciclovir  take (2) 450 MG tablets by mouth 1 time a day for a total of 900 mg daily,   From tacrolimus 5 mg capsule take 1 capsule (5 mg) by mouth 2 times daily total dose 7 mg twice per day To tacrolimus 5 mg capsule take 5 mg capsule oral 2 times daily with the 1 mg capsule for a total of 6 mg twice a day,  From tacrolimus 1 MG capsule take 2 capsules (2 mg) by mouth 2 times daily total dose to equal 7 mg twice per day To take 1 mg capsule oral 2 times daily with the 5 mg capsule for a total of 6 mg twice  a day    Allergies reviewed with patient and updates made in EHR: yes    Medication History Completed By: Sivan Lui 2/24/2024 5:35 PM    .

## 2024-02-25 PROBLEM — G89.18 ACUTE POST-OPERATIVE PAIN: Status: RESOLVED | Noted: 2023-12-29 | Resolved: 2024-02-25

## 2024-02-25 PROBLEM — N18.9 ANEMIA IN CHRONIC RENAL DISEASE: Status: ACTIVE | Noted: 2024-02-25

## 2024-02-25 PROBLEM — D63.1 ANEMIA IN CHRONIC RENAL DISEASE: Status: ACTIVE | Noted: 2024-02-25

## 2024-02-25 PROBLEM — E83.42 HYPOMAGNESEMIA: Status: ACTIVE | Noted: 2024-02-25

## 2024-02-25 PROBLEM — E87.20 METABOLIC ACIDOSIS: Status: ACTIVE | Noted: 2024-02-25

## 2024-02-25 PROBLEM — Z48.298 AFTERCARE FOLLOWING ORGAN TRANSPLANT: Status: ACTIVE | Noted: 2024-02-25

## 2024-02-25 PROBLEM — Z29.89 NEED FOR PNEUMOCYSTIS PROPHYLAXIS: Status: ACTIVE | Noted: 2024-02-25

## 2024-02-25 LAB
ALBUMIN MFR UR ELPH: 40.3 MG/DL
ALBUMIN UR-MCNC: 30 MG/DL
ANION GAP SERPL CALCULATED.3IONS-SCNC: 11 MMOL/L (ref 7–15)
APPEARANCE UR: CLEAR
BACTERIA UR CULT: NO GROWTH
BILIRUB UR QL STRIP: NEGATIVE
BK VIRUS SPECIMEN TYPE: NORMAL
BK VIRUS SPECIMEN TYPE: NORMAL
BKV DNA # SPEC NAA+PROBE: NOT DETECTED IU/ML
BKV DNA # SPEC NAA+PROBE: NOT DETECTED IU/ML
BUN SERPL-MCNC: 37.9 MG/DL (ref 6–20)
CALCIUM SERPL-MCNC: 9.1 MG/DL (ref 8.6–10)
CHLORIDE SERPL-SCNC: 109 MMOL/L (ref 98–107)
CMV DNA SPEC NAA+PROBE-ACNC: NOT DETECTED IU/ML
COLOR UR AUTO: ABNORMAL
CREAT SERPL-MCNC: 4.69 MG/DL (ref 0.67–1.17)
CREAT UR-MCNC: 28.4 MG/DL
DEPRECATED HCO3 PLAS-SCNC: 22 MMOL/L (ref 22–29)
DONOR IDENTIFICATION: NORMAL
DSA COMMENTS: NORMAL
DSA PRESENT: NO
DSA TEST METHOD: NORMAL
EGFRCR SERPLBLD CKD-EPI 2021: 17 ML/MIN/1.73M2
ERYTHROCYTE [DISTWIDTH] IN BLOOD BY AUTOMATED COUNT: 12.6 % (ref 10–15)
GLUCOSE BLDC GLUCOMTR-MCNC: 113 MG/DL (ref 70–99)
GLUCOSE BLDC GLUCOMTR-MCNC: 142 MG/DL (ref 70–99)
GLUCOSE BLDC GLUCOMTR-MCNC: 90 MG/DL (ref 70–99)
GLUCOSE SERPL-MCNC: 176 MG/DL (ref 70–99)
GLUCOSE UR STRIP-MCNC: 150 MG/DL
HCT VFR BLD AUTO: 23.5 % (ref 40–53)
HGB BLD-MCNC: 8.1 G/DL (ref 13.3–17.7)
HGB UR QL STRIP: NEGATIVE
HOLD SPECIMEN: NORMAL
INT SUB RESULT: NORMAL
INTERF SUBSTANCE: NORMAL
INTSUB TEST METHOD: NORMAL
KETONES UR STRIP-MCNC: NEGATIVE MG/DL
LEUKOCYTE ESTERASE UR QL STRIP: NEGATIVE
MAGNESIUM SERPL-MCNC: 2 MG/DL (ref 1.7–2.3)
MCH RBC QN AUTO: 33.3 PG (ref 26.5–33)
MCHC RBC AUTO-ENTMCNC: 34.5 G/DL (ref 31.5–36.5)
MCV RBC AUTO: 97 FL (ref 78–100)
NITRATE UR QL: NEGATIVE
ORGAN: NORMAL
PH UR STRIP: 7.5 [PH] (ref 5–7)
PHOSPHATE SERPL-MCNC: 4.1 MG/DL (ref 2.5–4.5)
PLATELET # BLD AUTO: 217 10E3/UL (ref 150–450)
POTASSIUM SERPL-SCNC: 4.5 MMOL/L (ref 3.4–5.3)
PROT/CREAT 24H UR: 1.42 MG/MG CR (ref 0–0.2)
RBC # BLD AUTO: 2.43 10E6/UL (ref 4.4–5.9)
RBC URINE: 1 /HPF
SA 1 CELL: NORMAL
SA 1 TEST METHOD: NORMAL
SA 2 CELL: NORMAL
SA 2 TEST METHOD: NORMAL
SA1 HI RISK ABY: NORMAL
SA1 MOD RISK ABY: NORMAL
SA2 HI RISK ABY: NORMAL
SA2 MOD RISK ABY: NORMAL
SODIUM SERPL-SCNC: 142 MMOL/L (ref 135–145)
SP GR UR STRIP: 1.01 (ref 1–1.03)
UNACCEPTABLE ANTIGENS: NORMAL
UNOS CPRA: 0
UROBILINOGEN UR STRIP-MCNC: NORMAL MG/DL
WBC # BLD AUTO: 3.2 10E3/UL (ref 4–11)
WBC URINE: <1 /HPF
ZZZINT SUB COMMENTS: NORMAL
ZZZSA 1  COMMENTS: NORMAL
ZZZSA 2 COMMENTS: NORMAL

## 2024-02-25 PROCEDURE — 84999 UNLISTED CHEMISTRY PROCEDURE: CPT | Performed by: NURSE PRACTITIONER

## 2024-02-25 PROCEDURE — 80048 BASIC METABOLIC PNL TOTAL CA: CPT

## 2024-02-25 PROCEDURE — 86316 IMMUNOASSAY TUMOR OTHER: CPT | Performed by: NURSE PRACTITIONER

## 2024-02-25 PROCEDURE — 36415 COLL VENOUS BLD VENIPUNCTURE: CPT

## 2024-02-25 PROCEDURE — 999N000248 HC STATISTIC IV INSERT WITH US BY RN

## 2024-02-25 PROCEDURE — 81001 URINALYSIS AUTO W/SCOPE: CPT | Performed by: STUDENT IN AN ORGANIZED HEALTH CARE EDUCATION/TRAINING PROGRAM

## 2024-02-25 PROCEDURE — 36415 COLL VENOUS BLD VENIPUNCTURE: CPT | Performed by: NURSE PRACTITIONER

## 2024-02-25 PROCEDURE — 250N000013 HC RX MED GY IP 250 OP 250 PS 637

## 2024-02-25 PROCEDURE — 86825 HLA X-MATH NON-CYTOTOXIC: CPT | Performed by: NURSE PRACTITIONER

## 2024-02-25 PROCEDURE — 85027 COMPLETE CBC AUTOMATED: CPT

## 2024-02-25 PROCEDURE — 87040 BLOOD CULTURE FOR BACTERIA: CPT

## 2024-02-25 PROCEDURE — 86831 HLA CLASS II PHENOTYPE QUAL: CPT | Performed by: NURSE PRACTITIONER

## 2024-02-25 PROCEDURE — 250N000012 HC RX MED GY IP 250 OP 636 PS 637: Performed by: SURGERY

## 2024-02-25 PROCEDURE — 99255 IP/OBS CONSLTJ NEW/EST HI 80: CPT | Mod: AI | Performed by: STUDENT IN AN ORGANIZED HEALTH CARE EDUCATION/TRAINING PROGRAM

## 2024-02-25 PROCEDURE — 84156 ASSAY OF PROTEIN URINE: CPT | Performed by: STUDENT IN AN ORGANIZED HEALTH CARE EDUCATION/TRAINING PROGRAM

## 2024-02-25 PROCEDURE — 82962 GLUCOSE BLOOD TEST: CPT

## 2024-02-25 PROCEDURE — 83735 ASSAY OF MAGNESIUM: CPT

## 2024-02-25 PROCEDURE — 84100 ASSAY OF PHOSPHORUS: CPT

## 2024-02-25 PROCEDURE — 120N000011 HC R&B TRANSPLANT UMMC

## 2024-02-25 PROCEDURE — 250N000012 HC RX MED GY IP 250 OP 636 PS 637

## 2024-02-25 RX ORDER — VALGANCICLOVIR 450 MG/1
450 TABLET, FILM COATED ORAL EVERY OTHER DAY
Status: DISCONTINUED | OUTPATIENT
Start: 2024-02-27 | End: 2024-02-28

## 2024-02-25 RX ORDER — SULFAMETHOXAZOLE AND TRIMETHOPRIM 400; 80 MG/1; MG/1
1 TABLET ORAL
Status: DISCONTINUED | OUTPATIENT
Start: 2024-02-27 | End: 2024-02-27

## 2024-02-25 RX ADMIN — MYCOPHENOLATE MOFETIL 750 MG: 250 CAPSULE ORAL at 19:51

## 2024-02-25 RX ADMIN — TACROLIMUS 6 MG: 5 CAPSULE ORAL at 19:51

## 2024-02-25 RX ADMIN — TACROLIMUS 5 MG: 5 CAPSULE ORAL at 08:18

## 2024-02-25 RX ADMIN — SULFAMETHOXAZOLE AND TRIMETHOPRIM 1 TABLET: 400; 80 TABLET ORAL at 08:17

## 2024-02-25 RX ADMIN — SODIUM BICARBONATE 1300 MG: 650 TABLET ORAL at 08:17

## 2024-02-25 RX ADMIN — CARVEDILOL 12.5 MG: 12.5 TABLET, FILM COATED ORAL at 08:16

## 2024-02-25 RX ADMIN — CARVEDILOL 12.5 MG: 12.5 TABLET, FILM COATED ORAL at 19:53

## 2024-02-25 RX ADMIN — TACROLIMUS 1 MG: 1 CAPSULE ORAL at 08:18

## 2024-02-25 RX ADMIN — AMLODIPINE BESYLATE 10 MG: 10 TABLET ORAL at 08:16

## 2024-02-25 RX ADMIN — Medication 50 MCG: at 08:19

## 2024-02-25 RX ADMIN — SODIUM BICARBONATE 1300 MG: 650 TABLET ORAL at 13:30

## 2024-02-25 RX ADMIN — MYCOPHENOLATE MOFETIL 750 MG: 250 CAPSULE ORAL at 08:18

## 2024-02-25 RX ADMIN — SODIUM BICARBONATE 1300 MG: 650 TABLET ORAL at 19:51

## 2024-02-25 ASSESSMENT — ACTIVITIES OF DAILY LIVING (ADL)
ADLS_ACUITY_SCORE: 37

## 2024-02-25 NOTE — PROGRESS NOTES
TRANSPLANT NEPHROLOGY EARLY POST TRANSPLANT VISIT    Assessment & Plan   # LDKT: Stable creatinine in the ~ 1.8-2.1 range so far.  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   - Baseline Creatinine: ~ 1.8-2.1   - Proteinuria: Not checked post transplant   - Date DSA Last Checked: Jan/2024      Latest DSA: No cPRA: 0%   - BK Viremia: No   - Kidney Tx Biopsy: Jan 02, 2024; Result: Acute cellular-mediated rejection, Banff 1b.   Mild glomerulitis and severe peritubular capillaritis, but negative C4d and no DSA. Mild arterial sclerosis.    - Transplant Ureteral Stent: Yes    # Immunosuppression: Tacrolimus immediate release (goal 8-10) and Mycophenolate mofetil (dose 750 mg every 12 hours)   - Induction with Recent Transplant:  Low Intensity Protocol, although did receive full dose rATG due to early acute rejection   - Continue with intensive monitoring of immunosuppression for efficacy and toxicity.   - Changes: Not at this time    # Infection Prophylaxis:   - PJP: Sulfa/TMP (Bactrim)  - CMV: Valganciclovir (Valcyte); Patient is CMV IgG Ab discordant (D+/R-) and will continue on Valcyte x 6 months, then check CMV PCR monthly until 12 months post transplant.    # Hypertension: Controlled;  Goal BP: < 140/90   - Volume status: Euvolemic   - Changes: Not at this time    # Elevated Blood Glucose: Glucose generally running ~ 100-120s   - Management as per primary care.    # Anemia in Chronic Renal Disease: Hgb: Stable      TOM: No   - Iron studies: High iron saturation; Will repeat iron panel at 4 months post transplant.    # Mineral Bone Disorder:    - Secondary renal hyperparathyroidism; PTH level: Significantly elevated (601-1200 pg/ml)        On treatment: None  - Vitamin D; level: Low        On supplement: Yes  - Calcium; level: High        On supplement: No  - Phosphorus; level: Stable low        On supplement: No    # Electrolytes:   - Potassium;  level: Normal        On supplement: No  - Magnesium; level: Stable low        On supplement: No; Will start magnesium oxide 400 mg daily with lunch.  - Bicarbonate; level: Normal        On supplement: Yes    # Obesity, Class II (BMI = 38.5): Stable weight.              - Once patient heals and recovers from surgery, would recommend working on weight loss for overall health by increasing exercise and watching caloric intake.    # GERD: Asymptomatic on PPI.  Patient is in the process of tapering off pantoprazole by starting to take it every other day.  If tolerated without significant symptoms, patient can stop pantoprazole and just take it or OTC famotidine (Pepcid) as needed for heartburn symptoms.     # H/o Ureteral Reimplantation, s/p Mitrofanoff: Patient reports not using for Mitrofanoff in a couple of years and voids normally on his own.  Mitrofanoff is nonfunctional at this time.     # H/o Bilateral Uveitis: No evidence of systemic autoimmune/inflammatory disease at last Rheumatology.      # EBV IgG Ab Discordance (D+/R-): Will do surveillance EBV PCR qmonth until 12 months post transplant, then q3 months until 2 years post transplant.    # Medical Compliance: Yes    # Health Maintenance and Vaccination Review: Not Reviewed    # Transplant History:  Etiology of Kidney Failure: Posterior urethral valves  Tx: LDKT  Transplant: 12/28/2023 (Kidney)  Donor Type: Living Donor Class:   Crossmatch at time of Tx: negative  DSA at time of Tx: No  Significant changes in immunosuppression: None  CMV IgG Ab High Risk Discordance (D+/R-): Yes  EBV IgG Ab High Risk Discordance (D+/R-): Yes  Significant transplant-related complications: Acute cellular-mediated rejection    Transplant Office Phone Number: [phone]    Assessment and plan was discussed with the patient and he voiced his understanding and agreement.    Return visit: Return for previously scheduled visit.    Abhilash Raza MD    The longitudinal plan of  care for kidney transplant was addressed during this visit. Due to the added complexity in care, I will continue to support Boogie Villa in the subsequent management of this condition(s) and with the ongoing continuity of care of this condition(s).    Chief Complaint   Mr. Villa is a 23 year old here for kidney transplant and immunosuppression management.     History of Present Illness    Mr. Villa reports feeling good overall with some medical complaints.  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  Patient's early post transplant course was c/b acute cellular-mediated rejection, Banff 1B and received rATG for treatment.  His creatinine has since come down to the ~ 1.8-2.1 range.  Patient is scheduled for a closure kidney transplant biopsy in a couple of days.    His energy level is good and pretty normal at this point.  He is active, although only getting a little exercise so far.  Denies any chest pain or shortness of breath with exertion.  No leg swelling.  He was getting some muscle cramps and is now just holding his statin.    Appetite is good and his weight has been a bit up and down, but overall down about 10 lbs.  No nausea, vomiting or diarrhea.  No heartburn symptoms and is in the process of tapering off pantoprazole.  No fever, sweats or chills.  No night sweats.  No problems emptying his bladder.    Home BP:  120s systolic    Problem List   Patient Active Problem List   Diagnosis    Lymphangioma    ADHD (attention deficit hyperactivity disorder)    Allergic rhinitis due to pollen    Anxiety    Congenital posterior urethral valves    Iron deficiency    Neurogenic bladder    Class 2 severe obesity due to excess calories with serious comorbidity and body mass index (BMI) of 39.0 to 39.9 in adult (H)    Sensorineural hearing loss, asymmetrical    HTN, kidney transplant related    Secondary renal hyperparathyroidism (H24)    Vitamin D deficiency    Kidney replaced by  transplant    Immunosuppressed status (H24)    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    Chronic kidney disease, stage 3b (H)    Dehydration    Elevated serum creatinine    Aftercare following organ transplant    Need for pneumocystis prophylaxis    Anemia in chronic renal disease    Metabolic acidosis    Hypomagnesemia       Allergies   Allergies   Allergen Reactions    Banana Itching     Raw banana; itchy mouth      Dust Mites      Runny nose and watery eyes    Mold      Runny nose and watery eyes    Nsaids Other (See Comments)     CKD    Other [Seasonal Allergies]      Grass, Ragweed - gets runny nose and watery eyes    Sulfa Antibiotics      PN: LW Reaction: GI Upset as an infant  12/28 Admission: Tolerated Bactrim       Medications   No current facility-administered medications for this visit.     Current Outpatient Medications   Medication Sig    acetaminophen (TYLENOL) 325 MG tablet Take 1-2 tablets (325-650 mg) by mouth every 4 hours as needed (For optimal non-opioid multimodal pain management to improve pain control.)    amLODIPine (NORVASC) 10 MG tablet Take 1 tablet (10 mg) by mouth daily    carvedilol (COREG) 12.5 MG tablet Take 1 tablet (12.5 mg) by mouth 2 times daily    cetirizine (ZYRTEC) 10 MG tablet Take 10 mg by mouth daily as needed for allergies (in the spring)    magnesium oxide (MAG-OX) 400 MG tablet Take 1 tablet (400 mg) by mouth daily    methylphenidate (RITALIN) 20 MG tablet Take 20 mg by mouth as needed Prn    mycophenolate (GENERIC EQUIVALENT) 250 MG capsule Take 3 capsules (750 mg) by mouth 2 times daily    Probiotic Product (PROBIOTIC-10 PO) Take 1 tablet by mouth every morning    sodium bicarbonate 650 MG tablet Take 2 tablets (1,300 mg) by mouth 3 times daily    sulfamethoxazole-trimethoprim (BACTRIM) 400-80 MG tablet Take 1 tablet by mouth daily    Vitamin D3 (CHOLECALCIFEROL) 25 mcg (1000 units) tablet Take 2 tablets (50 mcg) by mouth daily    aspirin 81 MG  EC tablet Take 1 tablet (81 mg) by mouth daily    tacrolimus (GENERIC) 1 MG capsule Take 1 mg by mouth 2 times daily Take 1 mg capsule oral 2 times daily with the 5 mg capsule for a total of 6 mg twice a day    tacrolimus (GENERIC) 5 MG capsule Take 5 mg by mouth 2 times daily Take 5 mg capsule oral 2 times daily with the 1 mg capsule for a total of 6 mg twice a day    valGANciclovir (VALCYTE) 450 MG tablet Take 450 mg by mouth daily Take (2) 450 MG tablets by mouth 1 time a day for a total of 900 mg daily     Facility-Administered Medications Ordered in Other Visits   Medication    acetaminophen (TYLENOL) tablet 650 mg    amLODIPine (NORVASC) tablet 10 mg    [Held by provider] aspirin EC tablet 81 mg    carvedilol (COREG) tablet 12.5 mg    lidocaine (LMX4) cream    lidocaine 1 % 1 mL    mycophenolate (GENERIC EQUIVALENT) capsule 750 mg    sodium bicarbonate tablet 1,300 mg    sodium chloride (PF) 0.9% PF flush 3 mL    sodium chloride (PF) 0.9% PF flush 3 mL    [START ON 2/27/2024] sulfamethoxazole-trimethoprim (BACTRIM) 400-80 MG per tablet 1 tablet    tacrolimus (GENERIC) capsule 6 mg    [START ON 2/27/2024] valGANciclovir (VALCYTE) tablet 450 mg    Vitamin D3 (CHOLECALCIFEROL) tablet 50 mcg     Medications Discontinued During This Encounter   Medication Reason    atorvastatin (LIPITOR) 10 MG tablet     methocarbamol (ROBAXIN) 750 MG tablet     pantoprazole (PROTONIX) 40 MG EC tablet     predniSONE (DELTASONE) 10 MG tablet     lidocaine (PF) (XYLOCAINE) 1 % injection 10 mL        Physical Exam   Vital Signs: /71 (BP Location: Left arm, Patient Position: Sitting, Cuff Size: Adult Large)   Pulse 99   Wt 124.7 kg (275 lb)   SpO2 97%   BMI 38.35 kg/m      GENERAL APPEARANCE: alert and no distress  HENT: mouth without ulcers or lesions  RESP: lungs clear to auscultation - no rales, rhonchi or wheezes  CV: regular rhythm, normal rate, no rub, no murmur  EDEMA: no LE edema bilaterally  ABDOMEN: soft,  nondistended, nontender, bowel sounds normal, obese  MS: extremities normal - no gross deformities noted, no evidence of inflammation in joints, no muscle tenderness  SKIN: no rash  TX KIDNEY: normal  DIALYSIS ACCESS:  None    Data         Latest Ref Rng & Units 2/25/2024     1:08 PM 2/25/2024    11:27 AM 2/25/2024     8:28 AM   Renal   Sodium 135 - 145 mmol/L  142     K 3.4 - 5.3 mmol/L  4.5     Cl 98 - 107 mmol/L  109     Cl (external) 98 - 107 mmol/L  109     CO2 22 - 29 mmol/L  22     Urea Nitrogen 6.0 - 20.0 mg/dL  37.9     Creatinine 0.67 - 1.17 mg/dL  4.69     Glucose 70 - 99 mg/dL 142  176  90    Calcium 8.6 - 10.0 mg/dL  9.1     Magnesium 1.7 - 2.3 mg/dL  2.0           Latest Ref Rng & Units 2/25/2024    11:27 AM 2/20/2024    10:33 AM 2/12/2024    10:30 AM   Bone Health   Phosphorus 2.5 - 4.5 mg/dL 4.1  2.4  1.8          Latest Ref Rng & Units 2/25/2024    11:27 AM 2/24/2024    10:08 AM 2/22/2024     9:58 AM   Heme   WBC 4.0 - 11.0 10e3/uL 3.2  3.8  4.0    Hgb 13.3 - 17.7 g/dL 8.1  9.2  9.5    Plt 150 - 450 10e3/uL 217  248  261          Latest Ref Rng & Units 12/19/2023     9:53 AM 7/13/2023     1:28 PM 7/16/2021     4:50 PM   Liver   AP 40 - 150 U/L 67      AP (external) 40 - 150 U/L  45     TBili <=1.2 mg/dL 0.5      TBili (external) 0.2 - 1.2 mg/dL  0.6     ALT 0 - 70 U/L 41      ALT (external) <=55 U/L  35     AST 0 - 45 U/L 27      AST (external) 10 - 40 U/L  26     Tot Protein 6.4 - 8.3 g/dL 7.6      Tot Protein (external) 6.4 - 8.3 g/dL  7.5     Albumin 3.5 - 5.0 g/dL   3.0    Albumin 3.5 - 5.2 g/dL 4.4      Albumin (external) 3.5 - 5.0 g/dL  3.8           Latest Ref Rng & Units 12/19/2023     9:53 AM 7/13/2023     1:28 PM   Pancreas   A1C <5.7 % 5.2     A1C (external) <=5.6 %  4.9          Latest Ref Rng & Units 1/7/2024     7:50 AM 12/19/2023     9:53 AM 2/26/2020     3:20 PM   Iron studies   Iron 61 - 157 ug/dL 167  104  66    Iron Saturation Index 15 - 46 %   26    Iron Sat Index 15 - 46 % 79   42     Ferritin 31 - 409 ng/mL 783  826  133          Latest Ref Rng & Units 2/20/2024    10:35 AM 2/8/2024     9:49 AM 1/11/2024     9:50 AM   [facility] Txp Virology   EBV DNA COPIES/ML Not Detected copies/mL Not Detected  <500  Not Detected    EBV DNA LOG OF COPIES   <2.7       Failed to redirect to the Timeline version of the REVFS SmartLink.  Recent Labs   Lab Test 02/20/24  1033 02/22/24  0958 02/24/24  1008   DOSTAC 2/19/2024 2/21/2024 2/23/2024   TACROL 8.2 18.9* 8.5     Recent Labs   Lab Test 01/11/24  0950 01/29/24  0937 02/01/24  1008 02/12/24  1030   DOSMPA 1/10/2024   9:30 PM 1/28/2024   9:30 PM  --   --    MPACID 3.79* 3.73* 3.87* 2.23   MPAG 71.7 63.8 79.1 68.1

## 2024-02-25 NOTE — CONSULTS
[facility]  Transplant Nephrology Consult  Date of Admission:  2/24/2024  Today's Date: 02/25/2024  Requesting physician: Nita Martinez MD    Recommendations:  -agree with plan constructed by Tx Surg team to obtain lasix renogram, aspirate fluid collection seen on 2/23 renal U/S, and then proceed to graft bx if no diagnosis made at that point for this patient with ROSI of his Ktx (with recently diagnosed Acute cellular-mediated rejection, Banff 1b)  -I have ordered a UA and UPCR  - he is reporting symptoms c/w mild uremia (and states it feels the same as when he initiated dialysis in the past); CTM signs/symptoms that may push us provide dialysis while this workup is ongoing       Assessment & Plan   # LDKT: Trend up in creatinine  ; unclear cause, workup planned as above   - Baseline Creatinine: ~ 1.9   - Proteinuria:  ordered   - he is reporting symptoms c/w mild uremia (and states it feels the same as when he initiated dialysis in the past); CTM signs/symptoms that may push us provide dialysis while this workup is ongoing   - Kidney Tx Biopsy: Jan 02, 2024; Result: Acute cellular-mediated rejection, Banff 1b.   Mild glomerulitis and severe peritubular capillaritis, but negative C4d and no DSA. Mild arterial sclerosis.               - Transplant Ureteral Stent: recently removed according to the patient       # Immunosuppression:  tacrolimus and MMF   - Patient is in an immunosuppressed state and will continue to monitor for efficacy and toxicity of immunosuppression medications.   - Changes:  per surgery team    # Infection Prophylaxis:   - PJP: Sulfa/TMP (Bactrim)  - CMV: Valganciclovir (Valcyte); Patient is CMV IgG Ab discordant (D+/R-) and will continue on Valcyte x 6 months, then check CMV PCR monthly until 12 months post transplant.      # Hypertension: Borderline control;  Goal BP: < 140/90   - Volume status: Euvolemic  EDW ~    - Changes: No    #  electrolytes: no acute issues    # Obesity, Class II (BMI = 38.5): Stable weight.              - Once patient heals and recovers from surgery, would recommend working on weight loss for overall health by increasing exercise and watching caloric intake.     # GERD: Asymptomatic on PPI.  Patient is in the process of tapering off pantoprazole by starting to take it every other day.  If tolerated without significant symptoms, patient can stop pantoprazole and just take it or OTC famotidine (Pepcid) as needed for heartburn symptoms.     # H/o Ureteral Reimplantation, s/p Mitrofanoff: Patient reports not using for Mitrofanoff in a couple of years and voids normally on his own.  Mitrofanoff is nonfunctional at this time.     # H/o Bilateral Uveitis: No evidence of systemic autoimmune/inflammatory disease at last Rheumatology.      # EBV IgG Ab Discordance (D+/R-): Will do surveillance EBV PCR qmonth until 12 months post transplant, then q3 months until 2 years post transplant.       # Transplant History:  Etiology of Kidney Failure: Posterior urethral valves  Tx: LDKT  Transplant: 12/28/2023 (Kidney)  Donor Type: Living Donor Class:   Crossmatch at time of Tx: negative  DSA at time of Tx: No  Significant changes in immunosuppression: None  CMV IgG Ab High Risk Discordance (D+/R-): Yes  EBV IgG Ab High Risk Discordance (D+/R-): Yes  Significant transplant-related complications: Acute cellular-mediated rejection       Recommendations were communicated to the primary team verbally.      Daniel Brown MD  Nephrology         REASON FOR CONSULT   LDKTx patient with steeply rising creatinine     History of Present Illness   Boogie Villa is a 23 year old male with posterior urethral valves resulting in ESRD s/p LDKT 12/2023 with Acute cellular-mediated rejection, Banff 1b on 1/2/24 graft bx now with steeply rising creatinine for which we are consulted in transplant nephrology.     The patient's creatinine has been  rising for the last week or so. The patient states his coincides with his ureteral stent removal. Creatinine began to rise steeply (~1 mg/dl rise in one day), precipitating his ER presentation and admission. He has a known fluid collection, shown on U/S.     He feels fatigued and mildly nauseated and unwell. He states this feels exactly like when he initiated dialysis. No vomiting or diarrhea. No NSAID use. He states he is adherent to his meds.       Review of Systems    The 10 point Review of Systems is negative other than noted in the HPI or here.     Past Medical History    I have reviewed this patient's medical history and updated it with pertinent information if needed.   Past Medical History:   Diagnosis Date    ADD (attention deficit disorder)     Allergic rhinitis     ESRD (end stage renal disease) on dialysis (H)     Hypertension 2000    Mitrofanoff appendicovesicostomy present (H)     Obesity     Posterior urethral valves     Secondary renal hyperparathyroidism (H24)     Vitamin D deficiency        Past Surgical History   I have reviewed this patient's surgical history and updated it with pertinent information if needed.  Past Surgical History:   Procedure Laterality Date    ABDOMEN SURGERY  2003    Bilateral urereral tapering and re-implantation    ABDOMEN SURGERY  2008    Mitrofanoff    ablation of posterior urethral valves  2000    APPENDECTOMY  2008    BENCH KIDNEY  12/28/2023    Procedure: Bench kidney;  Surgeon: Nita Martinez MD;  Location: [facility] OR    CREATE FISTULA ARTERIOVENOUS UPPER EXTREMITY Left 5/25/2023    Procedure: LEFT Brachial Basilic ARTERIOVENOUS FISTULA CREATION SURGERY WITH INTRAOPERATIVE ULTRASOUND .;  Surgeon: Nita Martinez MD;  Location: [facility] OR    CREATE GRAFT LOOP ARTERIOVENOUS UPPER EXTREMITY Right 8/24/2023    Procedure: Right RadioCephalic AV Fistula and Branch Ligation x3;  Surgeon: Nita Martinez MD;  Location: [facility] OR    IR CVC TUNNEL PLACEMENT > 5 YRS OF  AGE  2023    IR FINE NEEDLE ASPIRATION W ULTRASOUND  2024    IR RENAL BIOPSY RIGHT  2024    IR RENAL BIOPSY RIGHT  2024    ureteral reimplantation with tapering         Family History   I have reviewed this patient's family history and updated it with pertinent information if needed.   Family History   Problem Relation Age of Onset    No Known Problems Mother     No Known Problems Father     Anesthesia Reaction No family hx of     Thrombosis No family hx of        Social History   I have reviewed this patient's social history and updated it with pertinent information if needed. Boogie Villa  reports that he has never smoked. He has never been exposed to tobacco smoke. He has never used smokeless tobacco. He reports that he does not currently use drugs after having used the following drugs: Marijuana. He reports that he does not drink alcohol.    Allergies   Allergies   Allergen Reactions    Banana Itching     Raw banana; itchy mouth      Dust Mites      Runny nose and watery eyes    Mold      Runny nose and watery eyes    Nsaids Other (See Comments)     CKD    Other [Seasonal Allergies]      Grass, Ragweed - gets runny nose and watery eyes    Sulfa Antibiotics      PN: LW Reaction: GI Upset as an infant   Admission: Tolerated Bactrim     Prior to Admission Medications    amLODIPine  10 mg Oral Daily    [Held by provider] aspirin  81 mg Oral Daily    carvedilol  12.5 mg Oral BID    mycophenolate  750 mg Oral BID    sodium bicarbonate  1,300 mg Oral TID    sodium chloride (PF)  3 mL Intracatheter Q8H    [START ON 2024] sulfamethoxazole-trimethoprim  1 tablet Oral Once per day on     tacrolimus  6 mg Oral BID IS    [START ON 2024] valGANciclovir  450 mg Oral Every Other Day    Vitamin D3  50 mcg Oral Daily         Physical Exam   Temp  Av.8  F (36.6  C)  Min: 97.6  F (36.4  C)  Max: 98.2  F (36.8  C)      Pulse  Av.6  Min: 78  Max: 100 Resp   "Av.4  Min: 16  Max: 20  SpO2  Av.1 %  Min: 97 %  Max: 99 %     BP (!) 146/98 (BP Location: Left arm, Patient Position: Sitting, Cuff Size: Adult Large)   Pulse 85   Temp 97.7  F (36.5  C) (Oral)   Resp 16   Ht 1.803 m (5' 11\")   Wt 131.5 kg (290 lb)   SpO2 98%   BMI 40.45 kg/m      Admit Weight: 131.5 kg (290 lb)     GENERAL APPEARANCE: alert and no distress  HENT: mouth without ulcers or lesions  RESP: lungs clear to auscultation - no rales, rhonchi or wheezes  CV: regular rhythm, normal rate, no rub, no murmur  EDEMA: no LE edema bilaterally  ABDOMEN: soft, nondistended, nontender, bowel sounds normal  MS: extremities normal - no gross deformities noted  SKIN: no rash on exposed skin  ACCESS: none; has RUE AVF (near wrist) that was never used and needed revision (but never done given he got his KTx and then TDC removed)    Data   CMP  Recent Labs   Lab 24  1308 24  1127 24  0828 24  2144 24  1854 24  0956 24  1430 24  0958 24  1033   NA  --  142  --   --   --  141 141 143 141   POTASSIUM  --  4.5  --   --   --  4.7 4.3 4.7 4.7   CHLORIDE  --  109*  --   --   --  109* 112* 110* 109*   CO2  --  22  --   --   --  20* 17* 22 22   ANIONGAP  --  11  --   --   --  12 12 11 10   * 176* 90 119*   < > 107* 105* 104* 96   BUN  --  37.9*  --   --   --  32.0* 23.7* 21.0* 13.9   CR  --  4.69*  --   --   --  4.10* 3.16* 3.05* 2.35*   GFRESTIMATED  --  17*  --   --   --  20* 27* 28* 39*   NABIL  --  9.1  --   --   --  9.2 8.3* 10.1* 9.8   MAG  --  2.0  --   --   --   --   --   --  1.7   PHOS  --  4.1  --   --   --   --   --   --  2.4*    < > = values in this interval not displayed.     CBC  Recent Labs   Lab 24  1127 24  1008 24  0958 24  1033   HGB 8.1* 9.2* 9.5* 9.7*   WBC 3.2* 3.8* 4.0 3.9*   RBC 2.43* 2.87* 2.88* 2.94*   HCT 23.5* 27.0* 28.1* 28.6*   MCV 97 94 98 97   MCH 33.3* 32.1 33.0 33.0   MCHC 34.5 34.1 33.8 33.9   RDW " 12.6 12.3 12.9 13.0    248 261 258     INRNo lab results found in last 7 days.  ABGNo lab results found in last 7 days.   Urine Studies  Recent Labs   Lab Test 02/25/24  1158 02/23/24  1234 12/19/23  1007 03/29/21  1404 12/03/20  1525 10/19/20  1645   COLOR Straw Straw Straw Straw Yellow Yellow   APPEARANCE Clear Clear Clear Clear Clear Clear   URINEGLC 150* 30* 70* 50* 100* 50 mg/dL*   URINEBILI Negative Negative Negative Negative Negative Negative   URINEKETONE Negative Negative Negative Negative Negative Negative   SG 1.006 1.006 1.003 1.008 1.025 1.005   UBLD Negative Trace* Trace* Negative Small* Small*   URINEPH 7.5* 7.0 7.5* 6.0 7.0 6.0   PROTEIN 30* 20* 100* >499* >300* >=500 mg/dL*   UROBILINOGEN  --   --   --   --  0.2 <2.0 E.U./dL   NITRITE Negative Negative Negative Negative Negative Negative   LEUKEST Negative Negative Negative Negative Negative Negative   RBCU 1 2 0 0  --  0-2   WBCU <1 1 1 <1  --  0-5     Recent Labs   Lab Test 02/26/20  1530 11/25/19  0904 05/13/19  0728 12/04/18  1056 06/08/18  1310 02/10/17  1004 08/26/16  1150 02/19/16  1000   UTPG 4.31* 4.92* 4.31* 4.59* 6.69* 2.78* 2.45* 2.17*     PTH  Recent Labs   Lab Test 01/07/24  0750 12/19/23  0953 07/16/21  1650 12/16/20  1611 12/16/20  0000 10/19/20  1614 02/26/20  1520 11/25/19  0859 05/13/19  0712 12/04/18  1054 06/08/18  1256 01/16/18  1102 08/09/17  1626 02/10/17  1002 08/26/16  1245 02/19/16  0955   PTHI 746* 445* 308* 94* 94 131* 201* 154* 110* 177* 96* 133* 66 39 47 42     Iron Studies  Recent Labs   Lab Test 01/07/24  0750 12/19/23  0953 02/26/20  1520 05/13/19  0712 06/08/18  1256 01/16/18  1102 08/09/17  1626 02/10/17  1002   IRON 167* 104 66 71 87 116 93 54   * 250 251 269 257 290 284 297   IRONSAT 79* 42 26 26 34 40 33 18   DAVID 783* 826* 133 109 82 86 100 113       IMAGING:  All imaging studies reviewed by me.

## 2024-02-25 NOTE — PROGRESS NOTES
Transplant Surgery  Inpatient Daily Progress Note  02/25/2024    Assessment & Plan:  23 year old male with history significant for ESKD s/p RLQ kidney transplant 12/28/2023 complicated by 10-day hospitalization for rejection (kidney transplant biopsy on 1/2/2024 with acute cellular-mediated rejection), on immunosuppression medication admitted 2/24 with 4 days of fatigue, elevated creatinine (3.16), but making urine.     Graft function: Creatinine 4.10 (4.1 on admission). Renal ultrasound 2/25 with patent vasculature, mild hydronephrosis. Hodan-incisional superficial fluid collection 9.8 cm. DSA labs pending. Unclear etiology of rising creatinine at this time.  - HLA & AT1R pending  - Plan for lasix renogram Monday  - Plan for aspiration of superficial fluid collection Monday  Immunosuppression:  -  BID  - Tacrolimus 5 BID    Cardiorespiratory: PTA Carvedilol   Hematology: HGB stable, pending this AM  GI/Nutrition: Regular diet  Fluid/Electrolytes: No mIVF  - Hypomagnesemia: PTA mag-ox 400 daily  Endocrine: Normoglycemic  : Voiding independently  Infectious disease:   - BCx and UCX pending  -  CMV, EBV, BK pending  Prophylaxis: PCP (Bactrim)  DVT Prophylaxis: Ambulatory  Activity: Up ad brittany  Disposition: Admit to 7A     Medical Decision Making: Medium  Admit 90060 (moderate level decision making)    SUNIL/Fellow/Resident Provider: Paloma Zhang MD    Faculty: Areli Gold M.D.    __________________________________________________________________  Transplant History: Admitted 2/24/2024 for fatigue, elevated creatinine (3.16).  12/28/2023 (Kidney), Postoperative day: 59     Interval History: History is obtained from the patient  Overnight events: Denies recent changes in his urination. Reports bowel movements per baseline, denies loose stools. No abdominal pain, nausea, emesis. No recent sick contacts.    ROS:   A 10-point review of systems was negative except as noted above.    Meds:   amLODIPine  10 mg  "Oral Daily    [Held by provider] aspirin  81 mg Oral Daily    carvedilol  12.5 mg Oral BID    mycophenolate  750 mg Oral BID    sodium bicarbonate  1,300 mg Oral TID    sodium chloride (PF)  3 mL Intracatheter Q8H    sulfamethoxazole-trimethoprim  1 tablet Oral Daily    tacrolimus  1 mg Oral BID IS    tacrolimus  5 mg Oral BID IS    [Held by provider] valGANciclovir  900 mg Oral Daily    Vitamin D3  50 mcg Oral Daily       Physical Exam:     Admit Weight: 131.5 kg (290 lb)    Current vitals:   BP (!) 146/98 (BP Location: Left arm, Patient Position: Sitting, Cuff Size: Adult Large)   Pulse 85   Temp 97.7  F (36.5  C) (Oral)   Resp 16   Ht 1.803 m (5' 11\")   Wt 131.5 kg (290 lb)   SpO2 98%   BMI 40.45 kg/m           Vital sign ranges:    Temp:  [97.6  F (36.4  C)-98.2  F (36.8  C)] 97.7  F (36.5  C)  Pulse:  [] 85  Resp:  [16-20] 16  BP: (134-146)/(77-98) 146/98  SpO2:  [97 %-99 %] 98 %  Patient Vitals for the past 24 hrs:   BP Temp Temp src Pulse Resp SpO2 Height Weight   02/25/24 0811 (!) 146/98 97.7  F (36.5  C) Oral 85 16 98 % -- --   02/25/24 0400 139/85 97.9  F (36.6  C) Oral 100 20 97 % -- --   02/25/24 0008 135/88 97.6  F (36.4  C) Oral 94 20 98 % -- --   02/24/24 1942 134/79 98.2  F (36.8  C) Oral 87 18 99 % -- --   02/24/24 1800 137/87 97.7  F (36.5  C) Oral 89 16 99 % -- --   02/24/24 1417 (!) 143/77 97.8  F (36.6  C) Oral 87 16 98 % 1.803 m (5' 11\") 131.5 kg (290 lb)     General Appearance: in no apparent distress.   Heart: regular rate and rhythm  Lungs: breathing comfortably on room air  Abdomen: The abdomen is soft, NT, ND with well healing surgical scar  : steward is not present.    Extremities: edema: minimal   Skin: Warm and dry   Neurologic: alert and oriented    Data:   CMP  Recent Labs   Lab 02/25/24  0828 02/24/24  2144 02/24/24  1854 02/24/24  0956 02/23/24  1430 02/22/24  0958 02/20/24  1033   NA  --   --   --  141 141   < > 141   POTASSIUM  --   --   --  4.7 4.3   < > 4.7 "   CHLORIDE  --   --   --  109* 112*   < > 109*   CO2  --   --   --  20* 17*   < > 22   GLC 90 119*   < > 107* 105*   < > 96   BUN  --   --   --  32.0* 23.7*   < > 13.9   CR  --   --   --  4.10* 3.16*   < > 2.35*   GFRESTIMATED  --   --   --  20* 27*   < > 39*   NABIL  --   --   --  9.2 8.3*   < > 9.8   MAG  --   --   --   --   --   --  1.7   PHOS  --   --   --   --   --   --  2.4*    < > = values in this interval not displayed.     CBC  Recent Labs   Lab 02/24/24  1008 02/22/24  0958   HGB 9.2* 9.5*   WBC 3.8* 4.0    261     Urinalysis  Recent Labs   Lab Test 02/23/24  1234 12/19/23  1007 10/19/20  1645 02/26/20  1530 11/25/19  0904   COLOR Straw Straw   < >  --   --    APPEARANCE Clear Clear   < >  --   --    URINEGLC 30* 70*   < >  --   --    URINEBILI Negative Negative   < >  --   --    URINEKETONE Negative Negative   < >  --   --    SG 1.006 1.003   < >  --   --    UBLD Trace* Trace*   < >  --   --    URINEPH 7.0 7.5*   < >  --   --    PROTEIN 20* 100*   < >  --   --    NITRITE Negative Negative   < >  --   --    LEUKEST Negative Negative   < >  --   --    RBCU 2 0   < >  --   --    WBCU 1 1   < >  --   --    UTPG  --   --   --  4.31* 4.92*    < > = values in this interval not displayed.     Cultures: Pending

## 2024-02-26 ENCOUNTER — APPOINTMENT (OUTPATIENT)
Dept: NUCLEAR MEDICINE | Facility: CLINIC | Age: 24
DRG: 699 | End: 2024-02-26
Attending: NURSE PRACTITIONER
Payer: COMMERCIAL

## 2024-02-26 ENCOUNTER — APPOINTMENT (OUTPATIENT)
Dept: INTERVENTIONAL RADIOLOGY/VASCULAR | Facility: CLINIC | Age: 24
DRG: 699 | End: 2024-02-26
Attending: PHYSICIAN ASSISTANT
Payer: COMMERCIAL

## 2024-02-26 LAB
ANION GAP SERPL CALCULATED.3IONS-SCNC: 14 MMOL/L (ref 7–15)
APPEARANCE FLD: ABNORMAL
APTT PPP: 26 SECONDS (ref 22–38)
BUN SERPL-MCNC: 39.8 MG/DL (ref 6–20)
CALCIUM SERPL-MCNC: 8.9 MG/DL (ref 8.6–10)
CELL COUNT BODY FLUID SOURCE: ABNORMAL
CHLORIDE SERPL-SCNC: 110 MMOL/L (ref 98–107)
COLOR FLD: ABNORMAL
CREAT FLD-MCNC: 5.6 MG/DL
CREAT SERPL-MCNC: 5.49 MG/DL (ref 0.67–1.17)
CREATININE BODY FLUID SOURCE: NORMAL
DEPRECATED HCO3 PLAS-SCNC: 20 MMOL/L (ref 22–29)
DONOR IDENTIFICATION: NORMAL
DSA COMMENTS: NORMAL
DSA PRESENT: NO
DSA TEST METHOD: NORMAL
EBV DNA # SPEC NAA+PROBE: <500 COPIES/ML
EBV DNA SPEC NAA+PROBE-LOG#: <2.7 {LOG_COPIES}/ML
EGFRCR SERPLBLD CKD-EPI 2021: 14 ML/MIN/1.73M2
ERYTHROCYTE [DISTWIDTH] IN BLOOD BY AUTOMATED COUNT: 12.5 % (ref 10–15)
GLUCOSE BLDC GLUCOMTR-MCNC: 81 MG/DL (ref 70–99)
GLUCOSE SERPL-MCNC: 90 MG/DL (ref 70–99)
HCT VFR BLD AUTO: 24 % (ref 40–53)
HGB BLD-MCNC: 8.2 G/DL (ref 13.3–17.7)
INR PPP: 1.08 (ref 0.85–1.15)
INT SUB RESULT: NORMAL
INTERF SUBSTANCE: NORMAL
INTSUB TEST METHOD: NORMAL
LYMPHOCYTES NFR FLD MANUAL: 85 %
MAGNESIUM SERPL-MCNC: 1.8 MG/DL (ref 1.7–2.3)
MCH RBC QN AUTO: 33.1 PG (ref 26.5–33)
MCHC RBC AUTO-ENTMCNC: 34.2 G/DL (ref 31.5–36.5)
MCV RBC AUTO: 97 FL (ref 78–100)
MONOS+MACROS NFR FLD MANUAL: 10 %
MYCOPHENOLATE SERPL LC/MS/MS-MCNC: 2.65 MG/L (ref 1–3.5)
MYCOPHENOLATE-G SERPL LC/MS/MS-MCNC: 141.7 MG/L (ref 30–95)
NEUTS BAND NFR FLD MANUAL: 6 %
ORGAN: NORMAL
PHOSPHATE SERPL-MCNC: 4.7 MG/DL (ref 2.5–4.5)
PLATELET # BLD AUTO: 225 10E3/UL (ref 150–450)
POTASSIUM SERPL-SCNC: 4.6 MMOL/L (ref 3.4–5.3)
RBC # BLD AUTO: 2.48 10E6/UL (ref 4.4–5.9)
SA 1 CELL: NORMAL
SA 1 TEST METHOD: NORMAL
SA 2 CELL: NORMAL
SA 2 TEST METHOD: NORMAL
SA1 HI RISK ABY: NORMAL
SA1 MOD RISK ABY: NORMAL
SA2 HI RISK ABY: NORMAL
SA2 MOD RISK ABY: NORMAL
SODIUM SERPL-SCNC: 144 MMOL/L (ref 135–145)
TACROLIMUS BLD-MCNC: 5.6 UG/L (ref 5–15)
TME LAST DOSE: ABNORMAL H
TME LAST DOSE: ABNORMAL H
TME LAST DOSE: NORMAL H
TME LAST DOSE: NORMAL H
UNACCEPTABLE ANTIGENS: NORMAL
UNOS CPRA: 0
WBC # BLD AUTO: 3.8 10E3/UL (ref 4–11)
WBC # FLD AUTO: 471 /UL
ZZZINT SUB COMMENTS: NORMAL
ZZZSA 1  COMMENTS: NORMAL
ZZZSA 2 COMMENTS: NORMAL

## 2024-02-26 PROCEDURE — 85730 THROMBOPLASTIN TIME PARTIAL: CPT | Performed by: NURSE PRACTITIONER

## 2024-02-26 PROCEDURE — 85027 COMPLETE CBC AUTOMATED: CPT | Performed by: NURSE PRACTITIONER

## 2024-02-26 PROCEDURE — 87075 CULTR BACTERIA EXCEPT BLOOD: CPT | Performed by: NURSE PRACTITIONER

## 2024-02-26 PROCEDURE — 250N000012 HC RX MED GY IP 250 OP 636 PS 637: Performed by: PHYSICIAN ASSISTANT

## 2024-02-26 PROCEDURE — 84100 ASSAY OF PHOSPHORUS: CPT

## 2024-02-26 PROCEDURE — 80048 BASIC METABOLIC PNL TOTAL CA: CPT

## 2024-02-26 PROCEDURE — 0TB03ZX EXCISION OF RIGHT KIDNEY, PERCUTANEOUS APPROACH, DIAGNOSTIC: ICD-10-PCS | Performed by: PHYSICIAN ASSISTANT

## 2024-02-26 PROCEDURE — 88348 ELECTRON MICROSCOPY DX: CPT | Mod: 26 | Performed by: PATHOLOGY

## 2024-02-26 PROCEDURE — 78707 K FLOW/FUNCT IMAGE W/O DRUG: CPT | Mod: MG

## 2024-02-26 PROCEDURE — 89051 BODY FLUID CELL COUNT: CPT | Performed by: NURSE PRACTITIONER

## 2024-02-26 PROCEDURE — 36415 COLL VENOUS BLD VENIPUNCTURE: CPT | Performed by: NURSE PRACTITIONER

## 2024-02-26 PROCEDURE — 50200 RENAL BIOPSY PERQ: CPT | Mod: RT | Performed by: PHYSICIAN ASSISTANT

## 2024-02-26 PROCEDURE — A9562 TC99M MERTIATIDE: HCPCS | Performed by: SURGERY

## 2024-02-26 PROCEDURE — 343N000001 HC RX 343: Performed by: SURGERY

## 2024-02-26 PROCEDURE — 80197 ASSAY OF TACROLIMUS: CPT | Performed by: NURSE PRACTITIONER

## 2024-02-26 PROCEDURE — 120N000011 HC R&B TRANSPLANT UMMC

## 2024-02-26 PROCEDURE — 85610 PROTHROMBIN TIME: CPT | Performed by: NURSE PRACTITIONER

## 2024-02-26 PROCEDURE — 82962 GLUCOSE BLOOD TEST: CPT

## 2024-02-26 PROCEDURE — 83735 ASSAY OF MAGNESIUM: CPT

## 2024-02-26 PROCEDURE — 250N000009 HC RX 250: Performed by: PHYSICIAN ASSISTANT

## 2024-02-26 PROCEDURE — 82570 ASSAY OF URINE CREATININE: CPT | Performed by: NURSE PRACTITIONER

## 2024-02-26 PROCEDURE — 99233 SBSQ HOSP IP/OBS HIGH 50: CPT | Performed by: INTERNAL MEDICINE

## 2024-02-26 PROCEDURE — 88313 SPECIAL STAINS GROUP 2: CPT | Mod: 26 | Performed by: PATHOLOGY

## 2024-02-26 PROCEDURE — 88346 IMFLUOR 1ST 1ANTB STAIN PX: CPT | Mod: TC | Performed by: PHYSICIAN ASSISTANT

## 2024-02-26 PROCEDURE — 76942 ECHO GUIDE FOR BIOPSY: CPT | Mod: 26 | Performed by: PHYSICIAN ASSISTANT

## 2024-02-26 PROCEDURE — G1010 CDSM STANSON: HCPCS | Mod: GC | Performed by: RADIOLOGY

## 2024-02-26 PROCEDURE — 78707 K FLOW/FUNCT IMAGE W/O DRUG: CPT | Mod: 26 | Performed by: RADIOLOGY

## 2024-02-26 PROCEDURE — 88346 IMFLUOR 1ST 1ANTB STAIN PX: CPT | Mod: 26 | Performed by: PATHOLOGY

## 2024-02-26 PROCEDURE — 49405 IMAGE CATH FLUID COLXN VISC: CPT

## 2024-02-26 PROCEDURE — 250N000013 HC RX MED GY IP 250 OP 250 PS 637

## 2024-02-26 PROCEDURE — 10160 PNXR ASPIR ABSC HMTMA BULLA: CPT | Performed by: PHYSICIAN ASSISTANT

## 2024-02-26 PROCEDURE — 88305 TISSUE EXAM BY PATHOLOGIST: CPT | Mod: 26 | Performed by: PATHOLOGY

## 2024-02-26 PROCEDURE — 87205 SMEAR GRAM STAIN: CPT | Performed by: NURSE PRACTITIONER

## 2024-02-26 PROCEDURE — 88350 IMFLUOR EA ADDL 1ANTB STN PX: CPT | Mod: 26 | Performed by: PATHOLOGY

## 2024-02-26 PROCEDURE — 0T903ZZ DRAINAGE OF RIGHT KIDNEY, PERCUTANEOUS APPROACH: ICD-10-PCS | Performed by: PHYSICIAN ASSISTANT

## 2024-02-26 PROCEDURE — 87102 FUNGUS ISOLATION CULTURE: CPT | Performed by: NURSE PRACTITIONER

## 2024-02-26 PROCEDURE — C1889 IMPLANT/INSERT DEVICE, NOC: HCPCS

## 2024-02-26 PROCEDURE — 250N000012 HC RX MED GY IP 250 OP 636 PS 637: Performed by: SURGERY

## 2024-02-26 PROCEDURE — 250N000012 HC RX MED GY IP 250 OP 636 PS 637

## 2024-02-26 RX ORDER — FUROSEMIDE 10 MG/ML
20 INJECTION INTRAMUSCULAR; INTRAVENOUS ONCE
Status: DISCONTINUED | OUTPATIENT
Start: 2024-02-26 | End: 2024-02-26

## 2024-02-26 RX ADMIN — AMLODIPINE BESYLATE 10 MG: 10 TABLET ORAL at 08:49

## 2024-02-26 RX ADMIN — TECHNESCAN TC 99M MERTIATIDE 9.7 MILLICURIE: 1 INJECTION, POWDER, LYOPHILIZED, FOR SOLUTION INTRAVENOUS at 13:01

## 2024-02-26 RX ADMIN — TACROLIMUS 7 MG: 5 CAPSULE ORAL at 18:17

## 2024-02-26 RX ADMIN — ACETAMINOPHEN 650 MG: 325 TABLET, FILM COATED ORAL at 11:25

## 2024-02-26 RX ADMIN — SODIUM BICARBONATE 1300 MG: 650 TABLET ORAL at 08:49

## 2024-02-26 RX ADMIN — LIDOCAINE HYDROCHLORIDE 16 ML: 10 INJECTION, SOLUTION EPIDURAL; INFILTRATION; INTRACAUDAL; PERINEURAL at 14:48

## 2024-02-26 RX ADMIN — MYCOPHENOLATE MOFETIL 750 MG: 250 CAPSULE ORAL at 08:49

## 2024-02-26 RX ADMIN — CARVEDILOL 12.5 MG: 12.5 TABLET, FILM COATED ORAL at 20:39

## 2024-02-26 RX ADMIN — SODIUM BICARBONATE 1300 MG: 650 TABLET ORAL at 15:17

## 2024-02-26 RX ADMIN — TACROLIMUS 6 MG: 5 CAPSULE ORAL at 08:50

## 2024-02-26 RX ADMIN — CARVEDILOL 12.5 MG: 12.5 TABLET, FILM COATED ORAL at 08:49

## 2024-02-26 RX ADMIN — MYCOPHENOLATE MOFETIL 750 MG: 250 CAPSULE ORAL at 20:39

## 2024-02-26 RX ADMIN — Medication 50 MCG: at 08:49

## 2024-02-26 RX ADMIN — SODIUM BICARBONATE 1300 MG: 650 TABLET ORAL at 20:38

## 2024-02-26 ASSESSMENT — ACTIVITIES OF DAILY LIVING (ADL)
ADLS_ACUITY_SCORE: 37
ADLS_ACUITY_SCORE: 20
ADLS_ACUITY_SCORE: 20
ADLS_ACUITY_SCORE: 37
ADLS_ACUITY_SCORE: 20
ADLS_ACUITY_SCORE: 37

## 2024-02-26 NOTE — IR NOTE
Patient Name: Boogie Villa  Medical Record Number: [id number]  Today's Date: 2/26/2024    Procedure: FNA jewell nephric fluid collection, renal transplant biopsy  Proceduralist: HENRIETTA Rosas PA-C  Pathology present: yes    Procedure Start: 1418  Procedure end: 1450  Sedation medications administered: none     Report given to: ED RN    Other Notes: Pt arrived to IR room 7 from nuclear medicine . Consent reviewed. Pt denies any questions or concerns regarding procedure. Pt positioned supine  and monitored per protocol. Pt tolerated procedure without any noted complications. Pt transferred back to ED. 5 biopsy passes. 150 ml jewell nephric   Subjective   Nancy Goodman is a 79 y.o. female.     History of Present Illness     Her contact dermatitis is much better  The facial swelling has improved but still with some irritated skin around her face    Diabetes Mellitus Type II, Follow-up:   Nancy Goodman is a 79 y.o. female who is here for follow-up of Type 2 diabetes mellitus.  Current symptoms/problems include none and have been stable. Patient is adherent with medications.  Known diabetic complications: none  Cardiovascular risk factors: advanced age (older than 55 for men, 65 for women), diabetes mellitus, dyslipidemia and hypertension  Current diabetic medications include levemir 42 units.   Current monitoring regimen: home blood tests - daily  Home blood sugar records: fasting range: low 100s, higher since being on steroids  Any episodes of hypoglycemia? no  Eye exam current (within one year): yes  She is on ACE inhibitor or angiotensin II receptor blocker. Patient is not on a statin.       Nancy Goodman  is here for follow-up of hypertension of several years duration. She is not exercising and is not adherent to a low-salt diet. Patient does not check her blood pressure   Patient denies chest pain and palpitations. She is compliant with meds.        Nancy Goodman returns today for follow up of Hyperlipidemia  Nancy indicates her exercise level as not at all.  Diet: trying to eat better  She could not tolerate there statins due to SE with these medications, taking OTC red yeast rice  Pt is due for labs        The following portions of the patient's history were reviewed and updated as appropriate: allergies, current medications, past family history, past medical history, past social history, past surgical history and problem list.    Review of Systems   Constitutional: Negative.    HENT: Negative.    Eyes: Negative.    Respiratory: Negative.  Negative for shortness of breath.    Cardiovascular: Negative.  Negative  for chest pain.   Gastrointestinal: Negative.  Negative for constipation and diarrhea.   Musculoskeletal: Negative.    Skin: Negative.    Neurological: Negative.    Psychiatric/Behavioral: Negative.    All other systems reviewed and are negative.      Objective   Physical Exam   Constitutional: She is oriented to person, place, and time. She appears well-developed and well-nourished. No distress.   HENT:   Head:       Cardiovascular: Normal rate and regular rhythm.   Murmur heard.   Systolic murmur is present with a grade of 1/6.  Pulmonary/Chest: Effort normal and breath sounds normal.   Abdominal: Soft. Bowel sounds are normal. She exhibits no distension. There is no tenderness.   Neurological: She is alert and oriented to person, place, and time.   Psychiatric: She has a normal mood and affect. Her behavior is normal. Judgment and thought content normal.   Nursing note and vitals reviewed.      Assessment/Plan   Nancy was seen today for diabetes and hypertension.    Diagnoses and all orders for this visit:    Type 2 diabetes mellitus without complication, with long-term current use of insulin (CMS/[facility])  -     insulin detemir (LEVEMIR) 100 UNIT/ML injection; Inject 42 Units under the skin into the appropriate area as directed Daily.  -     CBC & Differential  -     Comprehensive Metabolic Panel  -     Hemoglobin A1c    Coronary artery disease involving native coronary artery of native heart without angina pectoris  -     metoprolol tartrate (LOPRESSOR) 25 MG tablet; Take 1 tablet by mouth 2 (Two) Times a Day.  -     clopidogrel (PLAVIX) 75 MG tablet; Take 1 tablet by mouth Daily.  -     Comprehensive Metabolic Panel  -     Lipid Panel    Essential hypertension  -     irbesartan (AVAPRO) 75 MG tablet; Take 2 tablets by mouth Every Night.  -     CBC & Differential  -     Comprehensive Metabolic Panel    Irritant contact dermatitis due to plants, except food  -     fluticasone (CUTIVATE) 0.05 % cream; Apply   topically to the appropriate area as directed 2 (Two) Times a Day.  -     loratadine (CLARITIN) 10 MG tablet; Take 1 tablet by mouth Daily.    will recheck DM labs and f/u pending results.  No change in regimen, continue levemir and f/u pending labs  CAD stable, she has been doing well since her aortic valve replacement but still has a slight M  Will recheck lipids but she simply has not been able to tolerate statins    Ok claritin and steroid cream for facial allergic reaction.  Just want her to be off PO steroids, however her reaction was so severe that she needed to be on medicine

## 2024-02-26 NOTE — PROCEDURES
Boogie Villa  [id number]    Completed ultrasound guided aspiration of RLQ jewell-nephric fluid collection. A total of 150 mL dark myles colored fluid was aspirated.    Completed RLQ transplant kidney biopsy.  A total of five passes yielded tissue cores collected for further pathological evaluation.  Microscopy support present.  No immediate complications.  Dx: fluid collection, elevated creatinine.  [facility].  [facility]

## 2024-02-26 NOTE — PROCEDURES
Preoperative diagnosis: Unnecessary hemodialysis catheter   Postoperative diagnosis: Same  Procedure: Tunneled hemodialysis catheter removal  Findings: No unusual findings  Anesthesia: 1% lidocaine without epinephrine  Indications: This patient has a hemodialysis catheter that is no longer being used.  Removal is indicated.  Description of procedure: The patient was identified and informed consent was verified.  The patient was placed in the supine position.  A timeout was performed to confirm correct procedure and correct patient.  The area around the catheter was prepped with chlorhexidine.  The area surrounding the catheter and cuff was infiltrated with 1% lidocaine.  The cuff of the catheter was dissected free from the surrounding tissues.  The catheter was removed with the patient in trandelenberg position holding pressure for 10 minutes on the internal jugular vein.  The catheter was removed in its entirety and the cuff was noted to be intact.  The exit wound was dressed with gauze.  The patient tolerated the procedure well and was instructed to remain upright for the next 2 hours.  The patient was also instructed to place pressure on the neck should bleeding or swelling of the neck start to occur and returned to the emergency room immediately.  I was present for the entire procedure.  Blood loss: None  Complications: None

## 2024-02-26 NOTE — PROGRESS NOTES
[id number]: Pt had been alert and oriented X4, VSS on RA, afebrile, denied any pain. NPO for NM and IR procedure. Cr. Trending up. Pt had 800 ml of urine output. Mom has been at the bedside, very involved in cares.

## 2024-02-26 NOTE — PROGRESS NOTES
[facility]   Transplant Nephrology Progress Note  Date of Admission:  2/24/2024  Today's Date: 02/26/2024    Recommendations:  - No acute indications for dialysis.  - Awaiting kidney transplant biopsy results.  - Would make patient NPO after midnight just in case he might require dialysis access or line for acute rejection treatment.    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  Good urine output.  No acute indications for dialysis, but will assess daily.   - ROSI secondary to unclear etiology.  Patient had an early acute cellular-mediated rejection and was treated, but closure biopsy was okay.  No evidence of urine leak with creatinine in perinephric fluid the same as serum.  Kidney transplant ultrasound mostly unremarkable outside of  perinephric fluid collections and mild hydronephrosis.     - Baseline Creatinine: ~ 1.8-2.1   - Proteinuria: Moderate (1-3 grams)   - Date DSA Last Checked: Feb/2024      Latest DSA: No cPRA: 0%   - BK Viremia: No   - Kidney Tx Biopsy: Feb 09, 2024; Result: No diagnostic evidence of acute rejection.   No interstitial fibrosis and tubular atrophy.  Severe arterial sclerosis.             Jan 02, 2024; Result: Acute cellular-mediated rejection, [place] 1B.   Mild glomerulitis and severe peritubular capillaritis, but negative C4d and no DSA. Mild arterial sclerosis.    - Transplant Ureteral Stent: Removed    # Immunosuppression: Tacrolimus immediate release (goal 8-10) and Mycophenolate mofetil (dose 750 mg every 12 hours)   - Induction with Recent Transplant:  Low Intensity Protocol, although did receive full dose rATG due to early acute rejection    - Patient is in an immunosuppressed state and will continue to monitor for efficacy and toxicity of immunosuppression medications.   - Changes: Not at this time    # Infection Prophylaxis:   - PJP: Sulfa/TMP (Bactrim)  - CMV: Valganciclovir (Valcyte); Patient is CMV IgG Ab  discordant (D+/R-) and will continue on Valcyte x 6 months, then check CMV PCR monthly until 12 months post transplant.    # Hypertension: Borderline control;  Goal BP: < 140/90 (Hospitalization goal)   - Volume status: Mildly hypervolemic   - Changes: Not at this time    # Elevated Blood Glucose: Glucose generally running ~ 110-170s   - Management as per primary team.    # Anemia in Chronic Renal Disease: Hgb: Stable, low      TOM: No   - Iron studies: High iron saturation    # Mineral Bone Disorder:   - Secondary renal hyperparathyroidism; PTH level: Significantly elevated (601-1200 pg/ml)        On treatment: None  - Vitamin D; level: Low        On supplement: Yes  - Calcium; level: Normal        On supplement: No  - Phosphorus; level: High        On binder: No    # Electrolytes:   - Potassium; level: Normal        On supplement: No  - Magnesium; level: Normal        On supplement: No  - Bicarbonate; level: Stable low        On supplement: Yes    # Obesity: Stable weight.              - Once patient heals and recovers from surgery, would recommend working on weight loss for overall health by increasing exercise and watching caloric intake.     # H/o Ureteral Reimplantation, s/p Mitrofanoff: Patient reports not using for Mitrofanoff in a couple of years and voids normally on his own.  Mitrofanoff is nonfunctional at this time.     # H/o Bilateral Uveitis: No evidence of systemic autoimmune/inflammatory disease at last Rheumatology.      # EBV IgG Ab Discordance (D+/R-): Will do surveillance EBV PCR qmonth until 12 months post transplant, then q3 months until 2 years post transplant.  Last EBV PCR was detectable, but less than quantifiable with last check Feb/2024.    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    Abhilash Raza MD  Transplant Nephrology  Contact information available via [facility] Paging/Directory    Interval History   Mr. Villa's creatinine is 5.49 ( 0539); Trend up.  Good urine output.  Other significant labs/tests/vitals: Stable electrolytes.  Stable hemoglobin.  No new events overnight.  Patient had kidney transplant biopsy today.  Reports just feeling more fatigued.  No chest pain or shortness of breath.  No leg swelling.  A little nausea, but no vomiting.  Bowel movements are loose.  No fever, sweats or chills.    Review of Systems   4 point ROS was obtained and negative except as noted in the Interval History.    MEDICATIONS:   amLODIPine  10 mg Oral Daily    [Held by provider] aspirin  81 mg Oral Daily    carvedilol  12.5 mg Oral BID    mycophenolate  750 mg Oral BID    sodium bicarbonate  1,300 mg Oral TID    sodium chloride (PF)  3 mL Intracatheter Q8H    [START ON 2024] sulfamethoxazole-trimethoprim  1 tablet Oral Once per day on     tacrolimus  7 mg Oral BID IS    [START ON 2024] valGANciclovir  450 mg Oral Every Other Day    Vitamin D3  50 mcg Oral Daily         Physical Exam   Temp  Av.8  F (36.6  C)  Min: 97.3  F (36.3  C)  Max: 98.2  F (36.8  C)      Pulse  Av.7  Min: 78  Max: 100 Resp  Av.9  Min: 16  Max: 39  SpO2  Av.1 %  Min: 97 %  Max: 99 %     BP (!) 140/88   Pulse 80   Temp 97.3  F (36.3  C) (Oral)   Resp 20   Ht 1.803 m (5' 11\")   Wt 132 kg (291 lb)   SpO2 98%   BMI 40.59 kg/m     Date 24 07 - 2459   Shift 9852-1855 4706-4476 0810-7361 24 Hour Total   INTAKE   P.O. 240   240   Shift Total(mL/kg) 240(1.82)   240(1.82)   OUTPUT   Urine 800   800   Shift Total(mL/kg) 800(6.06)   800(6.06)   Weight (kg) 132 132 132 132    "   Admit Weight: 131.5 kg (290 lb)     GENERAL APPEARANCE: alert and no distress  HENT: mouth without ulcers or lesions  RESP: lungs clear to auscultation - no rales, rhonchi or wheezes  CV: regular rhythm, normal rate, no rub, no murmur  EDEMA: trace LE edema bilaterally  ABDOMEN: soft, nondistended, nontender, bowel sounds normal, obese  MS: extremities normal - no gross deformities noted, no evidence of inflammation in joints, no muscle tenderness  SKIN: no rash  TX KIDNEY: normal  DIALYSIS ACCESS:  None    Data   All labs reviewed by me.  CMP  Recent Labs   Lab 02/26/24  0539 02/25/24  2156 02/25/24  1308 02/25/24  1127 02/24/24  1854 02/24/24  0956 02/23/24  1430 02/22/24  0958 02/20/24  1033     --   --  142  --  141 141   < > 141   POTASSIUM 4.6  --   --  4.5  --  4.7 4.3   < > 4.7   CHLORIDE 110*  --   --  109*  --  109* 112*   < > 109*   CO2 20*  --   --  22  --  20* 17*   < > 22   ANIONGAP 14  --   --  11  --  12 12   < > 10   GLC 90 113* 142* 176*   < > 107* 105*   < > 96   BUN 39.8*  --   --  37.9*  --  32.0* 23.7*   < > 13.9   CR 5.49*  --   --  4.69*  --  4.10* 3.16*   < > 2.35*   GFRESTIMATED 14*  --   --  17*  --  20* 27*   < > 39*   NABIL 8.9  --   --  9.1  --  9.2 8.3*   < > 9.8   MAG 1.8  --   --  2.0  --   --   --   --  1.7   PHOS 4.7*  --   --  4.1  --   --   --   --  2.4*    < > = values in this interval not displayed.     CBC  Recent Labs   Lab 02/26/24  0539 02/25/24  1127 02/24/24  1008 02/22/24  0958   HGB 8.2* 8.1* 9.2* 9.5*   WBC 3.8* 3.2* 3.8* 4.0   RBC 2.48* 2.43* 2.87* 2.88*   HCT 24.0* 23.5* 27.0* 28.1*   MCV 97 97 94 98   MCH 33.1* 33.3* 32.1 33.0   MCHC 34.2 34.5 34.1 33.8   RDW 12.5 12.6 12.3 12.9    217 248 261     INR  Recent Labs   Lab 02/26/24  0539   INR 1.08   PTT 26     ABGNo lab results found in last 7 days.   Urine Studies  Recent Labs   Lab Test 02/25/24  1158 02/23/24  1234 12/19/23  1007 03/29/21  1404 12/03/20  1525 10/19/20  1645   COLOR Straw Straw Straw  Straw Yellow Yellow   APPEARANCE Clear Clear Clear Clear Clear Clear   URINEGLC 150* 30* 70* 50* 100* 50 mg/dL*   URINEBILI Negative Negative Negative Negative Negative Negative   URINEKETONE Negative Negative Negative Negative Negative Negative   SG 1.006 1.006 1.003 1.008 1.025 1.005   UBLD Negative Trace* Trace* Negative Small* Small*   URINEPH 7.5* 7.0 7.5* 6.0 7.0 6.0   PROTEIN 30* 20* 100* >499* >300* >=500 mg/dL*   UROBILINOGEN  --   --   --   --  0.2 <2.0 E.U./dL   NITRITE Negative Negative Negative Negative Negative Negative   LEUKEST Negative Negative Negative Negative Negative Negative   RBCU 1 2 0 0  --  0-2   WBCU <1 1 1 <1  --  0-5     Recent Labs   Lab Test 02/26/20  1530 11/25/19  0904 05/13/19  0728 12/04/18  1056 06/08/18  1310 02/10/17  1004 08/26/16  1150 02/19/16  1000   UTPG 4.31* 4.92* 4.31* 4.59* 6.69* 2.78* 2.45* 2.17*     PTH  Recent Labs   Lab Test 01/07/24  0750 12/19/23  0953 07/16/21  1650 12/16/20  1611 12/16/20  0000 10/19/20  1614 02/26/20  1520 11/25/19  0859 05/13/19  0712 12/04/18  1054 06/08/18  1256 01/16/18  1102 08/09/17  1626 02/10/17  1002 08/26/16  1245 02/19/16  0955   PTHI 746* 445* 308* 94* 94 131* 201* 154* 110* 177* 96* 133* 66 39 47 42     Iron Studies  Recent Labs   Lab Test 01/07/24  0750 12/19/23  0953 02/26/20  1520 05/13/19  0712 06/08/18  1256 01/16/18  1102 08/09/17  1626 02/10/17  1002   IRON 167* 104 66 71 87 116 93 54   * 250 251 269 257 290 284 297   IRONSAT 79* 42 26 26 34 40 33 18   DAVID 783* 826* 133 109 82 86 100 113       IMAGING:  All imaging studies reviewed by me.

## 2024-02-26 NOTE — PROGRESS NOTES
Transplant Surgery  Inpatient Daily Progress Note  02/26/2024    Assessment & Plan: 23 year old male with history significant for ESKD s/p RLQ kidney transplant 12/28/2023 complicated by 10-day hospitalization for rejection (kidney transplant biopsy on 1/2/2024 with acute cellular-mediated rejection), on immunosuppression medication admitted 2/24 with 4 days of fatigue, elevated creatinine (3.16), but making urine.     Graft function: Creatinine 5.5<-4.7 (4.1 on admission). Renal ultrasound 2/25 with patent vasculature, mild hydronephrosis. Hodan-incisional superficial fluid collection 9.8 cm. DSA negative. Unclear etiology of rising creatinine at this time.  - HLA & AT1R pending  - Lasix renogram 2/26 pending  - IR: aspiration of superficial fluid collection 2/26. Fluid Cr equal to serum. Other fluid pending.   Immunosuppression:  -  BID  - Tacrolimus 6 BID  Cardiorespiratory: ASA 81 on  hold for procedure  HTN: PTA Carvedilol 12.5 mg BID and Norvasc 10 mg daily   Hematology:   Anemia in chronic disease: HGB stable ~8  GI/Nutrition: Regular diet  Fluid/Electrolytes: No mIVF  Hypomagnesemia: PTA mag-ox 400 daily  Endocrine: Normoglycemic  : Voiding independently  Infectious disease:   - BCx and UCX NGTD  - CMV neg, BK neg  EBV viremia: <500 copies, monitor   Prophylaxis: PCP (Bactrim)  DVT Prophylaxis: Ambulatory  Activity: Up ad brittany  Disposition: Admit to      SUNIL/Fellow/Resident Provider: Radha Ortega PA-C [id number]    Faculty: John Castillo M.D.    __________________________________________________________________  Transplant History: Admitted 2/24/2024 for fatigue, elevated creatinine (3.16).  12/28/2023 (Kidney), Postoperative day: 60     Interval History: History is obtained from the patient  Overnight events: Denies recent changes in his urination. Reports bowel movements per baseline, denies loose stools. No abdominal pain, nausea, emesis. No recent sick contacts.    ROS:   A 10-point review of  "systems was negative except as noted above.    Meds:   amLODIPine  10 mg Oral Daily    [Held by provider] aspirin  81 mg Oral Daily    carvedilol  12.5 mg Oral BID    mycophenolate  750 mg Oral BID    sodium bicarbonate  1,300 mg Oral TID    sodium chloride (PF)  3 mL Intracatheter Q8H    [START ON 2/27/2024] sulfamethoxazole-trimethoprim  1 tablet Oral Once per day on Tuesday Thursday Saturday    tacrolimus  7 mg Oral BID IS    [START ON 2/27/2024] valGANciclovir  450 mg Oral Every Other Day    Vitamin D3  50 mcg Oral Daily       Physical Exam:     Admit Weight: 131.5 kg (290 lb)    Current vitals:   BP (!) 140/88   Pulse 80   Temp 97.3  F (36.3  C) (Oral)   Resp 20   Ht 1.803 m (5' 11\")   Wt 132 kg (291 lb)   SpO2 98%   BMI 40.59 kg/m         Vital sign ranges:    Temp:  [97.3  F (36.3  C)-98  F (36.7  C)] 97.3  F (36.3  C)  Pulse:  [80-94] 80  Resp:  [16-39] 20  BP: (136-146)/(86-94) 140/88  SpO2:  [97 %-99 %] 98 %  Patient Vitals for the past 24 hrs:   BP Temp Temp src Pulse Resp SpO2   02/26/24 1435 (!) 140/88 -- -- 80 20 98 %   02/26/24 1420 (!) 142/90 -- -- 81 24 --   02/26/24 1206 (!) 145/86 97.3  F (36.3  C) Oral 94 28 99 %   02/26/24 0854 (!) 145/94 -- -- 81 17 99 %   02/26/24 0849 (!) 145/94 -- -- 81 -- --   02/26/24 0738 (!) 136/90 97.7  F (36.5  C) Oral 89 (!) 39 97 %   02/26/24 0326 (!) 145/94 98  F (36.7  C) Oral 89 16 97 %   02/25/24 2329 (!) 145/91 97.9  F (36.6  C) Oral 84 18 99 %   02/25/24 1946 (!) 146/94 97.7  F (36.5  C) Oral 88 18 98 %   02/25/24 1633 (!) 136/92 97.8  F (36.6  C) Oral -- 16 --     General Appearance: in no apparent distress.   Heart: regular rate and rhythm  Lungs: breathing comfortably on room air  Abdomen: The abdomen is soft, NT, ND with well healing surgical scar  : steward is not present.    Extremities: edema: minimal   Skin: Warm and dry   Neurologic: alert and oriented    Data:   CMP  Recent Labs   Lab 02/26/24  1440 02/26/24  0539 02/25/24  2156 02/25/24  1308 " 02/25/24  1127   NA  --  144  --   --  142   POTASSIUM  --  4.6  --   --  4.5   CHLORIDE  --  110*  --   --  109*   CO2  --  20*  --   --  22   GLC  --  90 113*   < > 176*   BUN  --  39.8*  --   --  37.9*   CR  --  5.49*  --   --  4.69*   GFRESTIMATED  --  14*  --   --  17*   NABIL  --  8.9  --   --  9.1   MAG  --  1.8  --   --  2.0   PHOS  --  4.7*  --   --  4.1   FCREAT 5.6  --   --   --   --     < > = values in this interval not displayed.     CBC  Recent Labs   Lab 02/26/24  0539 02/25/24  1127   HGB 8.2* 8.1*   WBC 3.8* 3.2*    217     Urinalysis  Recent Labs   Lab Test 02/25/24  1158 02/23/24  1234 10/19/20  1645 02/26/20  1530 11/25/19  0904   COLOR Straw Straw   < >  --   --    APPEARANCE Clear Clear   < >  --   --    URINEGLC 150* 30*   < >  --   --    URINEBILI Negative Negative   < >  --   --    URINEKETONE Negative Negative   < >  --   --    SG 1.006 1.006   < >  --   --    UBLD Negative Trace*   < >  --   --    URINEPH 7.5* 7.0   < >  --   --    PROTEIN 30* 20*   < >  --   --    NITRITE Negative Negative   < >  --   --    LEUKEST Negative Negative   < >  --   --    RBCU 1 2   < >  --   --    WBCU <1 1   < >  --   --    UTPG  --   --   --  4.31* 4.92*    < > = values in this interval not displayed.

## 2024-02-26 NOTE — PROGRESS NOTES
Activity: Independent to bathroom.  Neuros:  A&O x4.   Cardiac:  WDL, except tachy  Respiratory:  WDL on RA. Denies SOB.  GI/:  Voiding spontaneously. +BS, passing flatus.   Diet: Soft diet. Tolerated meal without GI symptom  Skin: intact  Lines: PIV  Incisions/Drains: none  Labs: in process and evaluated by primary team   Pain/nausea:  Denies.  New changes this shift: No new changes  Plan:  Continue to assist according to current POC

## 2024-02-26 NOTE — CONSULTS
"      [facility] Consult Service Note  Interventional Radiology  02/26/24   6:51 AM    Consult Requested: \"perincisional fluid collection aspiraiton     Recommendations/Plan:    Patient is approved for US guided aspiration of jewell-incisional RLQ transplant fluid collection.  Can be done with local only.    Timing of procedure is TBD based on IR staffing/schedule and triage.  Please contact the IR charge RN at [phone] for estimated time of procedure.     Labs WNL for procedure.    Orders entered for procedure, NPO status since midnight but  No NPO required.  Medications to be held include: none  Informed consent will be completed prior to procedure.     Case and imaging discussed with IR attending Dr. Orestes Carroll, Kathleen Alejandro, NP to review recommendations/plan    Addendum:  02/26/24 9:33 AM  Received call from SUNIL Garcia  who is asking for RLQ transplant kidney biopsy in the setting of rising creatinine c/f rejection.    Surg path will be entered by her.      Addendum:  02/26/24 2:30 PM  Will proceed with aspiration and biopsy after discussion with team.  Will add on fluid creatinine as NM scan indicates some uptake from a fluid collection.      History of Present Illness:  Boogie Villa is a 23 year old English speaking male with past medical history of ESKD s/p RLQ kidney transplant 12/28/2023 complicated by 10-day hospitalization for rejection (kidney transplant biopsy on 1/2/2024 with acute cellular-mediated rejection), on immunosuppression medication who presents to the emergency department with fatigue for 4d, elevated creatinine, but otherwise asymptomatic and making urine.      Pt underwent 2/9/2024 RLQ transplant kidney biopsy as well as IR aspiration of jewell renal transplant fluid collection with removal of 170 ml of clear yellow fluid. Fluid creatinine not c/w urinoma.      Pt now has perincisional fluid collection on US on 2/23/2024 associated with mild " "hydronephrosis.      Diagnostic labs entered by: Kathleen Carroll NP     Pertinent Imaging Reviewed:       Expected date of discharge:  02/26/2024    Vitals:   BP (!) 145/94 (BP Location: Left arm, Patient Position: Semi-Machado's)   Pulse 89   Temp 98  F (36.7  C) (Oral)   Resp 16   Ht 1.803 m (5' 11\")   Wt 132 kg (291 lb)   SpO2 97%   BMI 40.59 kg/m      Pertinent Labs Reviewed:  CBC:  Lab Results   Component Value Date    WBC 3.8 (L) 02/26/2024    WBC 3.2 (L) 02/25/2024    WBC 3.8 (L) 02/24/2024    WBC 6.6 03/29/2021    WBC 7.1 10/19/2020    WBC 5.3 08/18/2020    WBC 5.3 08/18/2020     Lab Results   Component Value Date    HGB 8.2 02/26/2024    HGB 8.1 02/25/2024    HGB 9.2 02/24/2024    HGB 12.4 03/29/2021    HGB 12.9 12/16/2020    HGB 13.2 10/19/2020     Lab Results   Component Value Date     02/26/2024     02/25/2024     02/24/2024     03/29/2021     10/19/2020     08/18/2020     08/18/2020    INR:  Lab Results   Component Value Date    INR 1.08 02/26/2024    INR 1.06 03/29/2021    PTT 26 02/26/2024    PTT 26 03/29/2021     Last Comprehensive Metabolic Panel:  Lab Results   Component Value Date     02/26/2024    POTASSIUM 4.6 02/26/2024    CHLORIDE 110 (H) 02/26/2024    CO2 20 (L) 02/26/2024    ANIONGAP 14 02/26/2024    GLC 90 02/26/2024    BUN 39.8 (H) 02/26/2024    CR 5.49 (H) 02/26/2024    GFRESTIMATED 14 (L) 02/26/2024    NABIL 8.9 02/26/2024          COVID Results:  COVID-19 Antibody Results, Testing for Immunity           No data to display              COVID-19 PCR Results          12/27/2023    07:47   COVID-19 PCR Results   SARS CoV2 PCR Negative     Potassium   Date Value Ref Range Status   02/25/2024 4.5 3.4 - 5.3 mmol/L Final   07/16/2021 4.5 3.5 - 5.0 mmol/L Final   03/29/2021 4.0 3.4 - 5.3 mmol/L Final     Potassium POCT   Date Value Ref Range Status   12/28/2023 3.9 3.5 - 5.0 mmol/L Final          Daiana Beavers PA-C  Interventional " Radiology  Pager: [phone]

## 2024-02-27 ENCOUNTER — LAB REQUISITION (OUTPATIENT)
Dept: LAB | Facility: CLINIC | Age: 24
End: 2024-02-27
Payer: COMMERCIAL

## 2024-02-27 ENCOUNTER — DOCUMENTATION ONLY (OUTPATIENT)
Dept: TRANSPLANT | Facility: CLINIC | Age: 24
End: 2024-02-27
Payer: COMMERCIAL

## 2024-02-27 LAB
ANION GAP SERPL CALCULATED.3IONS-SCNC: 13 MMOL/L (ref 7–15)
BUN SERPL-MCNC: 46.2 MG/DL (ref 6–20)
CALCIUM SERPL-MCNC: 9.5 MG/DL (ref 8.6–10)
CHLORIDE SERPL-SCNC: 107 MMOL/L (ref 98–107)
CREAT SERPL-MCNC: 6.68 MG/DL (ref 0.67–1.17)
DEPRECATED HCO3 PLAS-SCNC: 21 MMOL/L (ref 22–29)
EGFRCR SERPLBLD CKD-EPI 2021: 11 ML/MIN/1.73M2
ERYTHROCYTE [DISTWIDTH] IN BLOOD BY AUTOMATED COUNT: 12.4 % (ref 10–15)
GLUCOSE BLDC GLUCOMTR-MCNC: 157 MG/DL (ref 70–99)
GLUCOSE BLDC GLUCOMTR-MCNC: 379 MG/DL (ref 70–99)
GLUCOSE SERPL-MCNC: 120 MG/DL (ref 70–99)
HCT VFR BLD AUTO: 24.4 % (ref 40–53)
HGB BLD-MCNC: 8.4 G/DL (ref 13.3–17.7)
MAGNESIUM SERPL-MCNC: 1.9 MG/DL (ref 1.7–2.3)
MCH RBC QN AUTO: 32.4 PG (ref 26.5–33)
MCHC RBC AUTO-ENTMCNC: 34.4 G/DL (ref 31.5–36.5)
MCV RBC AUTO: 94 FL (ref 78–100)
PHOSPHATE SERPL-MCNC: 5.3 MG/DL (ref 2.5–4.5)
PLATELET # BLD AUTO: 258 10E3/UL (ref 150–450)
POTASSIUM SERPL-SCNC: 5.2 MMOL/L (ref 3.4–5.3)
RBC # BLD AUTO: 2.59 10E6/UL (ref 4.4–5.9)
SODIUM SERPL-SCNC: 141 MMOL/L (ref 135–145)
WBC # BLD AUTO: 3.3 10E3/UL (ref 4–11)

## 2024-02-27 PROCEDURE — 36415 COLL VENOUS BLD VENIPUNCTURE: CPT

## 2024-02-27 PROCEDURE — 250N000012 HC RX MED GY IP 250 OP 636 PS 637: Performed by: PHYSICIAN ASSISTANT

## 2024-02-27 PROCEDURE — 83735 ASSAY OF MAGNESIUM: CPT

## 2024-02-27 PROCEDURE — 99233 SBSQ HOSP IP/OBS HIGH 50: CPT | Performed by: INTERNAL MEDICINE

## 2024-02-27 PROCEDURE — 250N000012 HC RX MED GY IP 250 OP 636 PS 637

## 2024-02-27 PROCEDURE — 84100 ASSAY OF PHOSPHORUS: CPT

## 2024-02-27 PROCEDURE — 250N000013 HC RX MED GY IP 250 OP 250 PS 637: Performed by: SURGERY

## 2024-02-27 PROCEDURE — 120N000011 HC R&B TRANSPLANT UMMC

## 2024-02-27 PROCEDURE — 80048 BASIC METABOLIC PNL TOTAL CA: CPT

## 2024-02-27 PROCEDURE — 250N000011 HC RX IP 250 OP 636: Performed by: PHYSICIAN ASSISTANT

## 2024-02-27 PROCEDURE — 85048 AUTOMATED LEUKOCYTE COUNT: CPT | Performed by: NURSE PRACTITIONER

## 2024-02-27 PROCEDURE — 258N000003 HC RX IP 258 OP 636: Performed by: PHYSICIAN ASSISTANT

## 2024-02-27 PROCEDURE — 250N000013 HC RX MED GY IP 250 OP 250 PS 637

## 2024-02-27 RX ORDER — NICOTINE POLACRILEX 4 MG
15-30 LOZENGE BUCCAL
Status: DISCONTINUED | OUTPATIENT
Start: 2024-02-27 | End: 2024-03-01 | Stop reason: HOSPADM

## 2024-02-27 RX ORDER — CALCIUM CARBONATE 500 MG/1
500-1000 TABLET, CHEWABLE ORAL DAILY PRN
Status: DISCONTINUED | OUTPATIENT
Start: 2024-02-27 | End: 2024-03-01 | Stop reason: HOSPADM

## 2024-02-27 RX ORDER — LOSARTAN POTASSIUM 25 MG/1
25 TABLET ORAL DAILY
Status: DISCONTINUED | OUTPATIENT
Start: 2024-02-27 | End: 2024-02-27

## 2024-02-27 RX ORDER — ASPIRIN 81 MG/1
81 TABLET, CHEWABLE ORAL DAILY
Status: DISCONTINUED | OUTPATIENT
Start: 2024-02-28 | End: 2024-03-01 | Stop reason: HOSPADM

## 2024-02-27 RX ORDER — MAGNESIUM OXIDE 400 MG/1
400 TABLET ORAL DAILY
Status: DISCONTINUED | OUTPATIENT
Start: 2024-02-28 | End: 2024-02-29

## 2024-02-27 RX ORDER — PENTAMIDINE ISETHIONATE 300 MG/300MG
300 INHALANT RESPIRATORY (INHALATION)
Qty: 300 MG | Refills: 0 | Status: DISCONTINUED | OUTPATIENT
Start: 2024-02-27 | End: 2024-02-28

## 2024-02-27 RX ORDER — LOSARTAN POTASSIUM 25 MG/1
25 TABLET ORAL EVERY EVENING
Status: DISCONTINUED | OUTPATIENT
Start: 2024-02-28 | End: 2024-03-01 | Stop reason: HOSPADM

## 2024-02-27 RX ORDER — ALBUTEROL SULFATE 0.83 MG/ML
2.5 SOLUTION RESPIRATORY (INHALATION)
Qty: 3 ML | Refills: 0 | Status: DISCONTINUED | OUTPATIENT
Start: 2024-02-27 | End: 2024-02-28

## 2024-02-27 RX ORDER — DEXTROSE MONOHYDRATE 25 G/50ML
25-50 INJECTION, SOLUTION INTRAVENOUS
Status: DISCONTINUED | OUTPATIENT
Start: 2024-02-27 | End: 2024-03-01 | Stop reason: HOSPADM

## 2024-02-27 RX ADMIN — SULFAMETHOXAZOLE AND TRIMETHOPRIM 1 TABLET: 400; 80 TABLET ORAL at 09:22

## 2024-02-27 RX ADMIN — SODIUM BICARBONATE 1300 MG: 650 TABLET ORAL at 09:18

## 2024-02-27 RX ADMIN — CARVEDILOL 12.5 MG: 12.5 TABLET, FILM COATED ORAL at 19:50

## 2024-02-27 RX ADMIN — MYCOPHENOLATE MOFETIL 750 MG: 250 CAPSULE ORAL at 19:50

## 2024-02-27 RX ADMIN — TACROLIMUS 7 MG: 5 CAPSULE ORAL at 18:44

## 2024-02-27 RX ADMIN — MYCOPHENOLATE MOFETIL 750 MG: 250 CAPSULE ORAL at 09:17

## 2024-02-27 RX ADMIN — TACROLIMUS 7 MG: 5 CAPSULE ORAL at 09:12

## 2024-02-27 RX ADMIN — INSULIN ASPART 1 UNITS: 100 INJECTION, SOLUTION INTRAVENOUS; SUBCUTANEOUS at 18:45

## 2024-02-27 RX ADMIN — Medication 50 MCG: at 09:18

## 2024-02-27 RX ADMIN — SODIUM BICARBONATE 1300 MG: 650 TABLET ORAL at 19:50

## 2024-02-27 RX ADMIN — SODIUM BICARBONATE 1300 MG: 650 TABLET ORAL at 14:46

## 2024-02-27 RX ADMIN — VALGANCICLOVIR 450 MG: 450 TABLET, FILM COATED ORAL at 09:18

## 2024-02-27 RX ADMIN — CARVEDILOL 12.5 MG: 12.5 TABLET, FILM COATED ORAL at 09:13

## 2024-02-27 RX ADMIN — SODIUM CHLORIDE 500 MG: 9 INJECTION, SOLUTION INTRAVENOUS at 14:45

## 2024-02-27 RX ADMIN — AMLODIPINE BESYLATE 10 MG: 10 TABLET ORAL at 09:18

## 2024-02-27 ASSESSMENT — ACTIVITIES OF DAILY LIVING (ADL)
ADLS_ACUITY_SCORE: 20

## 2024-02-27 NOTE — PLAN OF CARE
"VS: /87 (BP Location: Left arm)   Pulse 90   Temp 97.7  F (36.5  C) (Oral)   Resp 18   Ht 1.803 m (5' 11\")   Wt 129.6 kg (285 lb 12.8 oz)   SpO2 94%   BMI 39.86 kg/m      Cares: 2300 - 0730     Neuro: Aox4   VS: VSS    Cardiac: WDL   Respiratory: WDL on RA   GI/: voiding adequately w/out issues, LBM 2/26  Skin: RLQ biopsy site - C/D/I  Diet: NPO      Labs: awaiting AM lab results  BG: none   LDA: Right AVF, Left PIV SL   Mobility: UAL     Pain/Nausea: denies pain or nausea   PRN medications: none given   Plan of Care: NPO just incase possible dialysis line placement or line for acute rejection. Continue with current POC and update MD with any changes.       "

## 2024-02-27 NOTE — PROGRESS NOTES
Admitted/transferred from: ED  Time of arrival on unit 2000  2 RN full  skin assessment completed by Naa MEMBRENO and Kirsten SALOMON  Skin assessment finding: issues found Blisters on L underarm, bruising on BUE, RLQ incision approximated, RLQ biopsy site CDI    Interventions/actions: other none at this time      Will continue to monitor.

## 2024-02-27 NOTE — PROGRESS NOTES
[facility]   Transplant Nephrology Progress Note  Date of Admission:  2/24/2024  Today's Date: 02/27/2024    Recommendations:  - No acute indications for dialysis, but worsening kidney function.  - Recommend treating acute rejection with Solumedrol 500 mg IV daily x 3 days, then would do a prednisone taper and leave patient on prednisone 5 mg daily.  - Recommend starting losartan 25 mg nightly in case of AT1R antibody rejection.  - Recommend stopping Bactrim and giving one dose of pentamidine.    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  Good urine output.  No acute indications for dialysis, but will assess daily.   - ROSI secondary to unclear etiology.  Kidney transplant biopsy 2/26 shows borderline acute cellular-mediated rejection with significant acute tubular injury.  No evidence of ABMR.  Patient also had an early acute cellular-mediated rejection and was treated with rATG, but closure biopsy was okay except for acute tubular injury too.  No evidence of urine leak with creatinine in perinephric fluid the same as serum.  Kidney transplant ultrasound mostly unremarkable outside of  perinephric fluid collections and mild hydronephrosis.  Would treat borderline acute cellular-mediated rejection and stop Bactrim.  May need to change immunosuppression, but options are limited.   - Baseline Creatinine: ~ 1.8-2.1   - Proteinuria: Moderate (1-3 grams)   - Date DSA Last Checked: Feb/2024      Latest DSA: No cPRA: 0%   - BK Viremia: No   - Kidney Tx Biopsy: Feb 26, 2024; Result: Borderline acute cellular-mediated rejection.  No evidence of antibody-mediated rejection.   Significant acute tubular injury.  t1 i1 v0 (borderline), g0 ptc0 C4d0, ct0 ci0 cv1 ah1             Feb 09, 2024; Result: No diagnostic evidence of acute rejection.  No interstitial fibrosis and tubular atrophy.  Severe arterial sclerosis.             Jan 02, 2024; Result: Acute  cellular-mediated rejection, [place] 1B.   Mild glomerulitis and severe peritubular capillaritis, but negative C4d and no DSA. Mild arterial sclerosis.    - Transplant Ureteral Stent: Removed    # Immunosuppression: Tacrolimus immediate release (goal 8-10) and Mycophenolate mofetil (dose 750 mg every 12 hours)   - Induction with Recent Transplant:  Low Intensity Protocol, although did receive full dose rATG due to early acute rejection    - Patient is in an immunosuppressed state and will continue to monitor for efficacy and toxicity of immunosuppression medications.   - Changes: Not at this time; Would treat acute rejection with Solumedrol followed by a prednisone taper and leave patient on prednisone 5 mg daily.   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  Patient is EBV IgG Ab negative, so cannot use belatacept.  Would be apprehensive in using mTORi in patient with ATI and high serum creatinine.  Would consider using basiliximab for maintenance (two doses days 1 and 5, then monthly dosing) to allow lowering tacrolimus to 4-6 goal range.    # Infection Prophylaxis:   - PJP: Sulfa/TMP (Bactrim); Recommend stopping Bactrim due to ROSI and giving dose of inhaled pentamidine.  - CMV: Valganciclovir (Valcyte); Patient is CMV IgG Ab discordant (D+/R-) and will continue on Valcyte x 6 months, then check CMV PCR monthly until 12 months post transplant.    # Hypertension: Borderline control;  Goal BP: < 140/90 (Hospitalization goal)   - Volume status: Mildly hypervolemic   - Changes: Yes - Recommend starting losartan 25 mg nightly.    # Elevated Blood Glucose: Glucose generally running ~ 80-120s   - Management as per primary team.    # Anemia in Chronic Renal Disease: Hgb: Stable, low      TOM: No   - Iron studies: High iron saturation    # Mineral Bone Disorder:   - Secondary renal hyperparathyroidism; PTH level: Significantly elevated (601-1200 pg/ml)        On treatment: None  -  Vitamin D; level: Low        On supplement: Yes  - Calcium; level: Normal        On supplement: No  - Phosphorus; level: High        On binder: No    # Electrolytes:   - Potassium; level: Normal        On supplement: No  - Magnesium; level: Normal        On supplement: No  - Bicarbonate; level: Stable low        On supplement: Yes    # Obesity: Stable weight.              - Once patient heals and recovers from surgery, would recommend working on weight loss for overall health by increasing exercise and watching caloric intake.     # H/o Ureteral Reimplantation, s/p Mitrofanoff: Patient reports not using for Mitrofanoff in a couple of years and voids normally on his own.  Mitrofanoff is nonfunctional at this time.     # H/o Bilateral Uveitis: No evidence of systemic autoimmune/inflammatory disease at last Rheumatology.      # EBV IgG Ab Discordance (D+/R-): Will do surveillance EBV PCR qmonth until 12 months post transplant, then q3 months until 2 years post transplant.  Last EBV PCR was detectable, but less than quantifiable with last check Feb/2024.    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    Abhilash Raza MD  Transplant Nephrology  Contact information available via [facility] Paging/Directory    Interval History   Mr. Villa's creatinine is 6.68 (02/27 0628); Increased.  Good urine output.  Other significant labs/tests/vitals: Trend up in serum phosphorus and serum potassium.  Otherwise, stable electrolytes.  Stable hemoglobin.  No new events overnight.  No chest pain or shortness of breath.  No leg swelling.  No nausea and vomiting.  Bowel  "movements are loose.  No fever, sweats or chills.    Review of Systems   4 point ROS was obtained and negative except as noted in the Interval History.    MEDICATIONS:   amLODIPine  10 mg Oral Daily    [Held by provider] aspirin  81 mg Oral Daily    carvedilol  12.5 mg Oral BID    mycophenolate  750 mg Oral BID    sodium bicarbonate  1,300 mg Oral TID    sodium chloride (PF)  3 mL Intracatheter Q8H    sulfamethoxazole-trimethoprim  1 tablet Oral Once per day on     tacrolimus  7 mg Oral BID IS    valGANciclovir  450 mg Oral Every Other Day    Vitamin D3  50 mcg Oral Daily         Physical Exam   Temp  Av.8  F (36.6  C)  Min: 97.3  F (36.3  C)  Max: 98.2  F (36.8  C)      Pulse  Av.7  Min: 78  Max: 100 Resp  Av.9  Min: 16  Max: 39  SpO2  Av.1 %  Min: 97 %  Max: 99 %     BP (!) 145/86 (BP Location: Left arm)   Pulse 95   Temp 97.9  F (36.6  C) (Oral)   Resp 18   Ht 1.803 m (5' 11\")   Wt 129.6 kg (285 lb 12.8 oz)   SpO2 94%   BMI 39.86 kg/m     Date 24 0700 - 24 0659   Shift 1010-0173 3535-7138 5505-5317 24 Hour Total   INTAKE   P.O. 240   240   Shift Total(mL/kg) 240(1.82)   240(1.82)   OUTPUT   Urine 800   800   Shift Total(mL/kg) 800(6.06)   800(6.06)   Weight (kg) 132 132 132 132      Admit Weight: 131.5 kg (290 lb)     GENERAL APPEARANCE: alert and no distress  HENT: mouth without ulcers or lesions  RESP: lungs clear to auscultation - no rales, rhonchi or wheezes  CV: regular rhythm, normal rate, no rub, no murmur  EDEMA: none to trace LE edema bilaterally  ABDOMEN: soft, nondistended, nontender, bowel sounds normal, obese  MS: extremities normal - no gross deformities noted, no evidence of inflammation in joints, no muscle tenderness  SKIN: no rash  TX KIDNEY: normal  DIALYSIS ACCESS:  None    Data   All labs reviewed by me.  CMP  Recent Labs   Lab 24  0628 24  1621 24  0539 24  2156 24  1308 24  1127 " 02/24/24  1854 02/24/24  0956 02/22/24  0958 02/20/24  1033     --  144  --   --  142  --  141   < > 141   POTASSIUM 5.2  --  4.6  --   --  4.5  --  4.7   < > 4.7   CHLORIDE 107  --  110*  --   --  109*  --  109*   < > 109*   CO2 21*  --  20*  --   --  22  --  20*   < > 22   ANIONGAP 13  --  14  --   --  11  --  12   < > 10   * 81 90 113*   < > 176*   < > 107*   < > 96   BUN 46.2*  --  39.8*  --   --  37.9*  --  32.0*   < > 13.9   CR 6.68*  --  5.49*  --   --  4.69*  --  4.10*   < > 2.35*   GFRESTIMATED 11*  --  14*  --   --  17*  --  20*   < > 39*   NABIL 9.5  --  8.9  --   --  9.1  --  9.2   < > 9.8   MAG 1.9  --  1.8  --   --  2.0  --   --   --  1.7   PHOS 5.3*  --  4.7*  --   --  4.1  --   --   --  2.4*    < > = values in this interval not displayed.     CBC  Recent Labs   Lab 02/27/24  0628 02/26/24  0539 02/25/24  1127 02/24/24  1008   HGB 8.4* 8.2* 8.1* 9.2*   WBC 3.3* 3.8* 3.2* 3.8*   RBC 2.59* 2.48* 2.43* 2.87*   HCT 24.4* 24.0* 23.5* 27.0*   MCV 94 97 97 94   MCH 32.4 33.1* 33.3* 32.1   MCHC 34.4 34.2 34.5 34.1   RDW 12.4 12.5 12.6 12.3    225 217 248     INR  Recent Labs   Lab 02/26/24  0539   INR 1.08   PTT 26     ABGNo lab results found in last 7 days.   Urine Studies  Recent Labs   Lab Test 02/25/24  1158 02/23/24  1234 12/19/23  1007 03/29/21  1404 12/03/20  1525 10/19/20  1645   COLOR Straw Straw Straw Straw Yellow Yellow   APPEARANCE Clear Clear Clear Clear Clear Clear   URINEGLC 150* 30* 70* 50* 100* 50 mg/dL*   URINEBILI Negative Negative Negative Negative Negative Negative   URINEKETONE Negative Negative Negative Negative Negative Negative   SG 1.006 1.006 1.003 1.008 1.025 1.005   UBLD Negative Trace* Trace* Negative Small* Small*   URINEPH 7.5* 7.0 7.5* 6.0 7.0 6.0   PROTEIN 30* 20* 100* >499* >300* >=500 mg/dL*   UROBILINOGEN  --   --   --   --  0.2 <2.0 E.U./dL   NITRITE Negative Negative Negative Negative Negative Negative   LEUKEST Negative Negative Negative Negative  Negative Negative   RBCU 1 2 0 0  --  0-2   WBCU <1 1 1 <1  --  0-5     Recent Labs   Lab Test 02/26/20  1530 11/25/19  0904 05/13/19  0728 12/04/18  1056 06/08/18  1310 02/10/17  1004 08/26/16  1150 02/19/16  1000   UTPG 4.31* 4.92* 4.31* 4.59* 6.69* 2.78* 2.45* 2.17*     PTH  Recent Labs   Lab Test 01/07/24  0750 12/19/23  0953 07/16/21  1650 12/16/20  1611 12/16/20  0000 10/19/20  1614 02/26/20  1520 11/25/19  0859 05/13/19  0712 12/04/18  1054 06/08/18  1256 01/16/18  1102 08/09/17  1626 02/10/17  1002 08/26/16  1245 02/19/16  0955   PTHI 746* 445* 308* 94* 94 131* 201* 154* 110* 177* 96* 133* 66 39 47 42     Iron Studies  Recent Labs   Lab Test 01/07/24  0750 12/19/23  0953 02/26/20  1520 05/13/19  0712 06/08/18  1256 01/16/18  1102 08/09/17  1626 02/10/17  1002   IRON 167* 104 66 71 87 116 93 54   * 250 251 269 257 290 284 297   IRONSAT 79* 42 26 26 34 40 33 18   DAVID 783* 826* 133 109 82 86 100 113       IMAGING:  All imaging studies reviewed by me.

## 2024-02-27 NOTE — PROGRESS NOTES
[id number]  Pt arrived from ED @ 2000. HTN in 140s-150s, OVSS on RA. Denies pain or nausea, PIV SL. Voiding without issue, saving in urinal. Orders to PVR Q shift, bladder scan 79. RLQ incision approximated with K biopsy site CDI. Abdominal fluid culture pending, K biopsy pending. NPO at midnight in case line placement is needed. Report given to coming nurse.

## 2024-02-27 NOTE — PLAN OF CARE
Goal Outcome Evaluation:      Plan of Care Reviewed With: patient, family    Overall Patient Progress: improving    4x A/O. VSS. RA. Nuclear med and IR liver Bx completed. Denies pain and nausea. Strict I&Os, pt records. ACHS. Independent. Voiding spontaneously.

## 2024-02-27 NOTE — PROGRESS NOTES
Transplant Surgery  Inpatient Daily Progress Note  02/27/2024    Assessment & Plan: 23 year old male with history significant for ESKD s/p RLQ kidney transplant 12/28/2023 complicated by 10-day hospitalization for rejection (kidney transplant biopsy on 1/2/2024 with acute cellular-mediated rejection), on immunosuppression medication admitted 2/24 with 4 days of fatigue, elevated creatinine (3.16), but making urine.     Graft function: Creatinine 6.7<-5.5<-4.7 (4.1 on admission). Renal ultrasound 2/25 with patent vasculature, mild hydronephrosis. Hodan-incisional superficial fluid collection 9.8 cm. DSA negative. Unclear etiology of rising creatinine at this time.  - HLA & AT1R pending. Start Losartan 25 mg daily.   - DSA negative   - Lasix renogram 2/26  with no evidence of leak or obstruction   - IR: aspiration of superficial fluid collection 2/26. Fluid Cr equal to serum. Other fluid NGTD.   Borderline kidney rejection:  mg daily x3 followed by taper and Prednisone 5 mg daily due to 2nd rejection   Immunosuppression: Unable to use Belatacept due to EBV Ig negative.   -  BID  - Tacrolimus 6 BID  - Steroids as above  Cardiorespiratory: ASA 81 restarted  HTN: PTA Carvedilol 12.5 mg BID and Norvasc 10 mg daily. Add Losartan 25 daily.   Hematology:   Anemia in chronic disease: HGB stable ~8  GI/Nutrition: Regular diet  Diarrhea: Monitor  Fluid/Electrolytes: No mIVF, adequate oral intake   Hypomagnesemia: PTA mag-ox 400 daily  Endocrine:   Steroid induced hyperglycemia: sliding scale insulin prn  : Voiding independently. Place steward due to potential urethral obstruction. Negative PVR.   Infectious disease:   - BCx and UCX NGTD  - CMV neg, BK neg  EBV viremia: <500 copies, monitor closely.   Prophylaxis: PCP (Bactrim-hold due to ROSI, give Pentamidine on 2/27)  DVT Prophylaxis: Ambulatory  Activity: Up ad brittany  Disposition: 7A    SUNIL/Fellow/Resident Provider: Radha Ortega PA-C [id number]    Faculty: John  "GRAHAM Castillo    __________________________________________________________________  Transplant History: Admitted 2/24/2024 for fatigue, elevated creatinine (3.16).  12/28/2023 (Kidney), Postoperative day: 61     Interval History: History is obtained from the patient  Overnight events: New diarrhea.     ROS:   A 10-point review of systems was negative except as noted above.    Meds:   albuterol  2.5 mg Nebulization Once    And    pentamidine  300 mg Inhalation Once    amLODIPine  10 mg Oral Daily    [START ON 2/28/2024] aspirin  81 mg Oral Daily    carvedilol  12.5 mg Oral BID    insulin aspart  1-7 Units Subcutaneous TID AC    insulin aspart  1-5 Units Subcutaneous At Bedtime    [START ON 2/28/2024] losartan  25 mg Oral QPM    methylPREDNISolone  500 mg Intravenous Daily    mycophenolate  750 mg Oral BID    sodium bicarbonate  1,300 mg Oral TID    sodium chloride (PF)  3 mL Intracatheter Q8H    tacrolimus  7 mg Oral BID IS    valGANciclovir  450 mg Oral Every Other Day    Vitamin D3  50 mcg Oral Daily       Physical Exam:     Admit Weight: 131.5 kg (290 lb)    Current vitals:   BP (!) 145/86 (BP Location: Left arm)   Pulse 95   Temp 97.9  F (36.6  C) (Oral)   Resp 18   Ht 1.803 m (5' 11\")   Wt 129.6 kg (285 lb 12.8 oz)   SpO2 94%   BMI 39.86 kg/m         Vital sign ranges:    Temp:  [97.6  F (36.4  C)-97.9  F (36.6  C)] 97.9  F (36.6  C)  Pulse:  [80-95] 95  Resp:  [18-24] 18  BP: (123-150)/(83-91) 145/86  SpO2:  [94 %-99 %] 94 %  Patient Vitals for the past 24 hrs:   BP Temp Temp src Pulse Resp SpO2 Weight   02/27/24 0944 (!) 145/86 97.9  F (36.6  C) Oral 95 18 -- --   02/27/24 0539 127/87 97.7  F (36.5  C) Oral 90 18 94 % 129.6 kg (285 lb 12.8 oz)   02/27/24 0137 123/83 97.8  F (36.6  C) Oral 90 18 96 % --   02/26/24 2145 (!) 150/91 97.8  F (36.6  C) Oral 80 18 99 % --   02/26/24 1955 (!) 143/91 97.6  F (36.4  C) Oral 87 20 98 % --   02/26/24 1435 (!) 140/88 -- -- 80 20 98 % --   02/26/24 1420 (!) " 142/90 -- -- 81 24 -- --     General Appearance: in no apparent distress.   Heart: regular rate and rhythm  Lungs: breathing comfortably on room air  Abdomen: The abdomen is soft, NT, ND with well healing surgical scar  : steward is not present.    Extremities: edema: minimal   Skin: Warm and dry   Neurologic: alert and oriented    Data:   CMP  Recent Labs   Lab 02/27/24  0628 02/26/24  1621 02/26/24  1440 02/26/24  0539     --   --  144   POTASSIUM 5.2  --   --  4.6   CHLORIDE 107  --   --  110*   CO2 21*  --   --  20*   * 81  --  90   BUN 46.2*  --   --  39.8*   CR 6.68*  --   --  5.49*   GFRESTIMATED 11*  --   --  14*   NABIL 9.5  --   --  8.9   MAG 1.9  --   --  1.8   PHOS 5.3*  --   --  4.7*   FCREAT  --   --  5.6  --      CBC  Recent Labs   Lab 02/27/24  0628 02/26/24  0539   HGB 8.4* 8.2*   WBC 3.3* 3.8*    225     Urinalysis  Recent Labs   Lab Test 02/25/24  1158 02/23/24  1234 10/19/20  1645 02/26/20  1530 11/25/19  0904   COLOR Straw Straw   < >  --   --    APPEARANCE Clear Clear   < >  --   --    URINEGLC 150* 30*   < >  --   --    URINEBILI Negative Negative   < >  --   --    URINEKETONE Negative Negative   < >  --   --    SG 1.006 1.006   < >  --   --    UBLD Negative Trace*   < >  --   --    URINEPH 7.5* 7.0   < >  --   --    PROTEIN 30* 20*   < >  --   --    NITRITE Negative Negative   < >  --   --    LEUKEST Negative Negative   < >  --   --    RBCU 1 2   < >  --   --    WBCU <1 1   < >  --   --    UTPG  --   --   --  4.31* 4.92*    < > = values in this interval not displayed.

## 2024-02-28 LAB
ANION GAP SERPL CALCULATED.3IONS-SCNC: 14 MMOL/L (ref 7–15)
BUN SERPL-MCNC: 53.5 MG/DL (ref 6–20)
CALCIUM SERPL-MCNC: 10.1 MG/DL (ref 8.6–10)
CHLORIDE SERPL-SCNC: 104 MMOL/L (ref 98–107)
CREAT SERPL-MCNC: 6.71 MG/DL (ref 0.67–1.17)
DEPRECATED HCO3 PLAS-SCNC: 17 MMOL/L (ref 22–29)
EGFRCR SERPLBLD CKD-EPI 2021: 11 ML/MIN/1.73M2
ERYTHROCYTE [DISTWIDTH] IN BLOOD BY AUTOMATED COUNT: 12.1 % (ref 10–15)
GLUCOSE BLDC GLUCOMTR-MCNC: 170 MG/DL (ref 70–99)
GLUCOSE BLDC GLUCOMTR-MCNC: 223 MG/DL (ref 70–99)
GLUCOSE BLDC GLUCOMTR-MCNC: 224 MG/DL (ref 70–99)
GLUCOSE BLDC GLUCOMTR-MCNC: 227 MG/DL (ref 70–99)
GLUCOSE BLDC GLUCOMTR-MCNC: 251 MG/DL (ref 70–99)
GLUCOSE BLDC GLUCOMTR-MCNC: 268 MG/DL (ref 70–99)
GLUCOSE BLDC GLUCOMTR-MCNC: 269 MG/DL (ref 70–99)
GLUCOSE BLDC GLUCOMTR-MCNC: 277 MG/DL (ref 70–99)
GLUCOSE BLDC GLUCOMTR-MCNC: 292 MG/DL (ref 70–99)
GLUCOSE BLDC GLUCOMTR-MCNC: 295 MG/DL (ref 70–99)
GLUCOSE BLDC GLUCOMTR-MCNC: 356 MG/DL (ref 70–99)
GLUCOSE SERPL-MCNC: 272 MG/DL (ref 70–99)
HBA1C MFR BLD: 5.7 %
HCT VFR BLD AUTO: 25.4 % (ref 40–53)
HGB BLD-MCNC: 8.7 G/DL (ref 13.3–17.7)
MAGNESIUM SERPL-MCNC: 1.8 MG/DL (ref 1.7–2.3)
MCH RBC QN AUTO: 32.7 PG (ref 26.5–33)
MCHC RBC AUTO-ENTMCNC: 34.3 G/DL (ref 31.5–36.5)
MCV RBC AUTO: 96 FL (ref 78–100)
PHOSPHATE SERPL-MCNC: 3.1 MG/DL (ref 2.5–4.5)
PLATELET # BLD AUTO: 254 10E3/UL (ref 150–450)
POTASSIUM SERPL-SCNC: 5.3 MMOL/L (ref 3.4–5.3)
RBC # BLD AUTO: 2.66 10E6/UL (ref 4.4–5.9)
SODIUM SERPL-SCNC: 135 MMOL/L (ref 135–145)
TACROLIMUS BLD-MCNC: 6 UG/L (ref 5–15)
TME LAST DOSE: NORMAL H
TME LAST DOSE: NORMAL H
WBC # BLD AUTO: 5.4 10E3/UL (ref 4–11)

## 2024-02-28 PROCEDURE — 94642 AEROSOL INHALATION TREATMENT: CPT

## 2024-02-28 PROCEDURE — 94640 AIRWAY INHALATION TREATMENT: CPT | Mod: 76

## 2024-02-28 PROCEDURE — 250N000009 HC RX 250

## 2024-02-28 PROCEDURE — 250N000013 HC RX MED GY IP 250 OP 250 PS 637

## 2024-02-28 PROCEDURE — 85027 COMPLETE CBC AUTOMATED: CPT | Performed by: NURSE PRACTITIONER

## 2024-02-28 PROCEDURE — 250N000011 HC RX IP 250 OP 636: Mod: JZ | Performed by: PHYSICIAN ASSISTANT

## 2024-02-28 PROCEDURE — 250N000013 HC RX MED GY IP 250 OP 250 PS 637: Performed by: SURGERY

## 2024-02-28 PROCEDURE — 80048 BASIC METABOLIC PNL TOTAL CA: CPT

## 2024-02-28 PROCEDURE — 36415 COLL VENOUS BLD VENIPUNCTURE: CPT | Performed by: NURSE PRACTITIONER

## 2024-02-28 PROCEDURE — 80197 ASSAY OF TACROLIMUS: CPT | Performed by: NURSE PRACTITIONER

## 2024-02-28 PROCEDURE — 84100 ASSAY OF PHOSPHORUS: CPT

## 2024-02-28 PROCEDURE — 250N000012 HC RX MED GY IP 250 OP 636 PS 637

## 2024-02-28 PROCEDURE — 250N000012 HC RX MED GY IP 250 OP 636 PS 637: Performed by: PHYSICIAN ASSISTANT

## 2024-02-28 PROCEDURE — 120N000011 HC R&B TRANSPLANT UMMC

## 2024-02-28 PROCEDURE — 250N000009 HC RX 250: Performed by: PHYSICIAN ASSISTANT

## 2024-02-28 PROCEDURE — 83036 HEMOGLOBIN GLYCOSYLATED A1C: CPT | Performed by: PHYSICIAN ASSISTANT

## 2024-02-28 PROCEDURE — 83735 ASSAY OF MAGNESIUM: CPT

## 2024-02-28 PROCEDURE — 258N000003 HC RX IP 258 OP 636: Performed by: PHYSICIAN ASSISTANT

## 2024-02-28 PROCEDURE — 250N000013 HC RX MED GY IP 250 OP 250 PS 637: Performed by: PHYSICIAN ASSISTANT

## 2024-02-28 PROCEDURE — 999N000248 HC STATISTIC IV INSERT WITH US BY RN

## 2024-02-28 PROCEDURE — 99233 SBSQ HOSP IP/OBS HIGH 50: CPT | Mod: 24 | Performed by: INTERNAL MEDICINE

## 2024-02-28 PROCEDURE — 94640 AIRWAY INHALATION TREATMENT: CPT

## 2024-02-28 PROCEDURE — 999N000157 HC STATISTIC RCP TIME EA 10 MIN

## 2024-02-28 PROCEDURE — 36415 COLL VENOUS BLD VENIPUNCTURE: CPT | Performed by: PHYSICIAN ASSISTANT

## 2024-02-28 RX ORDER — DEXTROSE MONOHYDRATE 25 G/50ML
25-50 INJECTION, SOLUTION INTRAVENOUS
Status: DISCONTINUED | OUTPATIENT
Start: 2024-02-28 | End: 2024-02-28

## 2024-02-28 RX ORDER — ALBUTEROL SULFATE 0.83 MG/ML
2.5 SOLUTION RESPIRATORY (INHALATION)
Status: COMPLETED | OUTPATIENT
Start: 2024-02-28 | End: 2024-02-28

## 2024-02-28 RX ORDER — PANTOPRAZOLE SODIUM 40 MG/1
40 TABLET, DELAYED RELEASE ORAL
Status: DISCONTINUED | OUTPATIENT
Start: 2024-02-28 | End: 2024-03-01

## 2024-02-28 RX ORDER — HEPARIN SODIUM (PORCINE) LOCK FLUSH IV SOLN 100 UNIT/ML 100 UNIT/ML
5 SOLUTION INTRAVENOUS
Status: CANCELLED | OUTPATIENT
Start: 2024-03-06

## 2024-02-28 RX ORDER — MEPERIDINE HYDROCHLORIDE 25 MG/ML
25 INJECTION INTRAMUSCULAR; INTRAVENOUS; SUBCUTANEOUS EVERY 30 MIN PRN
Status: CANCELLED | OUTPATIENT
Start: 2024-03-06

## 2024-02-28 RX ORDER — PENTAMIDINE ISETHIONATE 300 MG/300MG
300 INHALANT RESPIRATORY (INHALATION)
Status: COMPLETED | OUTPATIENT
Start: 2024-02-28 | End: 2024-02-28

## 2024-02-28 RX ORDER — EPINEPHRINE 1 MG/ML
0.3 INJECTION, SOLUTION, CONCENTRATE INTRAVENOUS EVERY 5 MIN PRN
Status: CANCELLED | OUTPATIENT
Start: 2024-03-06

## 2024-02-28 RX ORDER — ALBUTEROL SULFATE 90 UG/1
1-2 AEROSOL, METERED RESPIRATORY (INHALATION)
Status: CANCELLED
Start: 2024-03-06

## 2024-02-28 RX ORDER — DEXTROSE MONOHYDRATE 100 MG/ML
INJECTION, SOLUTION INTRAVENOUS CONTINUOUS PRN
Status: DISCONTINUED | OUTPATIENT
Start: 2024-02-28 | End: 2024-03-01 | Stop reason: HOSPADM

## 2024-02-28 RX ORDER — ALBUTEROL SULFATE 0.83 MG/ML
2.5 SOLUTION RESPIRATORY (INHALATION)
Status: CANCELLED | OUTPATIENT
Start: 2024-03-06

## 2024-02-28 RX ORDER — VALGANCICLOVIR 450 MG/1
450 TABLET, FILM COATED ORAL
Status: DISCONTINUED | OUTPATIENT
Start: 2024-02-29 | End: 2024-02-29

## 2024-02-28 RX ORDER — NICOTINE POLACRILEX 4 MG
15-30 LOZENGE BUCCAL
Status: DISCONTINUED | OUTPATIENT
Start: 2024-02-28 | End: 2024-02-28

## 2024-02-28 RX ORDER — METHYLPREDNISOLONE SODIUM SUCCINATE 125 MG/2ML
125 INJECTION, POWDER, LYOPHILIZED, FOR SOLUTION INTRAMUSCULAR; INTRAVENOUS
Status: CANCELLED
Start: 2024-03-06

## 2024-02-28 RX ORDER — HEPARIN SODIUM,PORCINE 10 UNIT/ML
5-20 VIAL (ML) INTRAVENOUS DAILY PRN
Status: CANCELLED | OUTPATIENT
Start: 2024-03-06

## 2024-02-28 RX ORDER — DIPHENHYDRAMINE HYDROCHLORIDE 50 MG/ML
50 INJECTION INTRAMUSCULAR; INTRAVENOUS
Status: CANCELLED
Start: 2024-03-06

## 2024-02-28 RX ORDER — SODIUM BICARBONATE 650 MG/1
1950 TABLET ORAL 3 TIMES DAILY
Status: DISCONTINUED | OUTPATIENT
Start: 2024-02-28 | End: 2024-03-01 | Stop reason: HOSPADM

## 2024-02-28 RX ADMIN — LOSARTAN POTASSIUM 25 MG: 25 TABLET, FILM COATED ORAL at 20:32

## 2024-02-28 RX ADMIN — TACROLIMUS 7 MG: 5 CAPSULE ORAL at 18:08

## 2024-02-28 RX ADMIN — TACROLIMUS 7 MG: 5 CAPSULE ORAL at 08:48

## 2024-02-28 RX ADMIN — MYCOPHENOLATE MOFETIL 750 MG: 250 CAPSULE ORAL at 20:32

## 2024-02-28 RX ADMIN — ASPIRIN 81 MG CHEWABLE TABLET 81 MG: 81 TABLET CHEWABLE at 08:48

## 2024-02-28 RX ADMIN — INSULIN HUMAN 3.5 UNITS/HR: 1 INJECTION, SOLUTION INTRAVENOUS at 15:29

## 2024-02-28 RX ADMIN — CALCIUM CARBONATE (ANTACID) CHEW TAB 500 MG 500 MG: 500 CHEW TAB at 18:08

## 2024-02-28 RX ADMIN — CALCIUM CARBONATE (ANTACID) CHEW TAB 500 MG 500 MG: 500 CHEW TAB at 00:27

## 2024-02-28 RX ADMIN — MYCOPHENOLATE MOFETIL 750 MG: 250 CAPSULE ORAL at 08:49

## 2024-02-28 RX ADMIN — SODIUM CHLORIDE 500 MG: 9 INJECTION, SOLUTION INTRAVENOUS at 08:54

## 2024-02-28 RX ADMIN — CARVEDILOL 12.5 MG: 12.5 TABLET, FILM COATED ORAL at 08:50

## 2024-02-28 RX ADMIN — ALBUTEROL SULFATE 2.5 MG: 2.5 SOLUTION RESPIRATORY (INHALATION) at 13:36

## 2024-02-28 RX ADMIN — CARVEDILOL 12.5 MG: 12.5 TABLET, FILM COATED ORAL at 20:32

## 2024-02-28 RX ADMIN — CALCIUM CARBONATE (ANTACID) CHEW TAB 500 MG 500 MG: 500 CHEW TAB at 03:44

## 2024-02-28 RX ADMIN — SODIUM BICARBONATE 1300 MG: 650 TABLET ORAL at 08:50

## 2024-02-28 RX ADMIN — MAGNESIUM OXIDE TAB 400 MG (241.3 MG ELEMENTAL MG) 400 MG: 400 (241.3 MG) TAB at 12:36

## 2024-02-28 RX ADMIN — PENTAMIDINE ISETHIONATE 300 MG: 300 INHALANT RESPIRATORY (INHALATION) at 14:01

## 2024-02-28 RX ADMIN — AMLODIPINE BESYLATE 10 MG: 10 TABLET ORAL at 08:49

## 2024-02-28 RX ADMIN — SODIUM CHLORIDE 20 MG: 9 INJECTION, SOLUTION INTRAVENOUS at 22:15

## 2024-02-28 RX ADMIN — PANTOPRAZOLE SODIUM 40 MG: 40 TABLET, DELAYED RELEASE ORAL at 16:23

## 2024-02-28 RX ADMIN — SODIUM BICARBONATE 1950 MG: 650 TABLET ORAL at 16:23

## 2024-02-28 RX ADMIN — Medication 50 MCG: at 08:50

## 2024-02-28 RX ADMIN — SODIUM BICARBONATE 1950 MG: 650 TABLET ORAL at 20:32

## 2024-02-28 ASSESSMENT — ACTIVITIES OF DAILY LIVING (ADL)
ADLS_ACUITY_SCORE: 20

## 2024-02-28 NOTE — PROVIDER NOTIFICATION
7A16 NASRA BARNES [id number]. Pt states he isn't going to eat lunch. How should insulin dose be adjusted? Thanks, Alexis [phone]

## 2024-02-28 NOTE — PROGRESS NOTES
Transplant Surgery  Inpatient Daily Progress Note  02/28/2024    Assessment & Plan: 23 year old male with history significant for ESKD s/p RLQ kidney transplant 12/28/2023 complicated by 10-day hospitalization for rejection (kidney transplant biopsy on 1/2/2024 with acute cellular-mediated rejection), on immunosuppression medication admitted 2/24 with 4 days of fatigue, elevated creatinine (3.16), but making urine.     Graft function: Creatinine 6.7<-6.7<-5.5<-4.7 (4.1 on admission). Renal ultrasound 2/25 with patent vasculature, mild hydronephrosis. Hodan-incisional superficial fluid collection 9.8 cm. DSA negative. Unclear etiology of rising creatinine at this time.  - HLA & AT1R pending. Started Losartan 25 mg daily.   - DSA negative   - Lasix renogram 2/26  with no evidence of leak or obstruction   - IR: aspiration of superficial fluid collection 2/26. Fluid Cr equal to serum. Other fluid NGTD.   - Hold Bactrim (give pentamidine 2/28x1)   Borderline kidney rejection:  mg daily x3 followed by taper and Prednisone 5 mg daily due to 2nd rejection   Immunosuppression: Unable to use Belatacept due to EBV Ig negative.   -Simulect 2/28 & 3/3  -  BID  - Tacrolimus 7 BID, decrease goal to 4-6 with Simulect initiation   - Steroids as above  Cardiorespiratory: ASA 81 restarted  HTN: PTA Carvedilol 12.5 mg BID and Norvasc 10 mg daily. Add Losartan 25 daily.   Hematology:   Anemia in chronic disease: HGB stable ~8  GI/Nutrition: Regular diet  Diarrhea: Monitor  Heartburn: Start PPI BID  Fluid/Electrolytes: No mIVF, adequate oral intake   Hypomagnesemia: PTA mag-ox 400 daily  Hypercalcemia: Discontinue vit D   Low bicarb: 1950 mg BID  Endocrine:   Steroid induced hyperglycemia: sliding scale insulin prn, change to insulin gtt   : Voiding independently. Place steward if PVR positive.   Infectious disease:   - BCx and UCX NGTD  - CMV neg, BK neg  EBV viremia: <500 copies, monitor closely.   Prophylaxis: PCP  "(Bactrim-hold due to ROSI, give Pentamidine on 2/27)  DVT Prophylaxis: Ambulatory  Activity: Up ad brittany  Disposition: 7A    SUNIL/Fellow/Resident Provider: Radha Ortega PA-C [id number]    Faculty: John Castillo M.D.    __________________________________________________________________  Transplant History: Admitted 2/24/2024 for fatigue, elevated creatinine (3.16).  12/28/2023 (Kidney), Postoperative day: 62     Interval History: History is obtained from the patient  Overnight events: New diarrhea.     ROS:   A 10-point review of systems was negative except as noted above.    Meds:   amLODIPine  10 mg Oral Daily    aspirin  81 mg Oral Daily    carvedilol  12.5 mg Oral BID    losartan  25 mg Oral QPM    magnesium oxide  400 mg Oral Daily    methylPREDNISolone  500 mg Intravenous Daily    mycophenolate  750 mg Oral BID    pantoprazole  40 mg Oral BID AC    sodium bicarbonate  1,950 mg Oral TID    sodium chloride (PF)  3 mL Intracatheter Q8H    tacrolimus  8 mg Oral BID IS    [START ON 2/29/2024] valGANciclovir  450 mg Oral Once per day on Monday Thursday       Physical Exam:     Admit Weight: 131.5 kg (290 lb)    Current vitals:   BP (!) 148/84 (BP Location: Left arm)   Pulse 89   Temp 98.1  F (36.7  C) (Oral)   Resp 16   Ht 1.803 m (5' 11\")   Wt 130.1 kg (286 lb 14.4 oz)   SpO2 98%   BMI 40.01 kg/m         Vital sign ranges:    Temp:  [97.8  F (36.6  C)-98.2  F (36.8  C)] 98.1  F (36.7  C)  Pulse:  [74-99] 89  Resp:  [16-20] 16  BP: (144-167)/(82-97) 148/84  SpO2:  [96 %-100 %] 98 %  Patient Vitals for the past 24 hrs:   BP Temp Temp src Pulse Resp SpO2 Weight   02/28/24 1358 (!) 148/84 98.1  F (36.7  C) Oral 89 16 98 % --   02/28/24 1336 -- -- -- -- -- 98 % --   02/28/24 0931 (!) 144/91 98.2  F (36.8  C) Oral 89 16 97 % --   02/28/24 0840 (!) 167/95 97.9  F (36.6  C) Oral 91 16 99 % --   02/28/24 0640 (!) 157/88 97.9  F (36.6  C) Oral 93 18 96 % 130.1 kg (286 lb 14.4 oz)   02/28/24 0029 (!) 149/97 97.8  F (36.6 "  C) Oral 94 18 96 % --   02/27/24 2124 (!) 147/86 97.9  F (36.6  C) Oral 99 20 97 % --   02/27/24 1817 (!) 146/82 98  F (36.7  C) Oral 74 16 100 % --   02/27/24 1712 -- -- -- -- -- -- 131.2 kg (289 lb 3.9 oz)     General Appearance: in no apparent distress.   Heart: regular rate and rhythm  Lungs: breathing comfortably on room air  Abdomen: The abdomen is soft, NT, ND with well healing surgical scar  : steward is not present.    Extremities: edema: minimal   Skin: Warm and dry   Neurologic: alert and oriented    Data:   CMP  Recent Labs   Lab 02/28/24  1515 02/28/24  1242 02/28/24  0808 02/28/24  0636 02/27/24  1711 02/27/24  0628 02/26/24  1621 02/26/24  1440   NA  --   --   --  135  --  141  --   --    POTASSIUM  --   --   --  5.3  --  5.2  --   --    CHLORIDE  --   --   --  104  --  107  --   --    CO2  --   --   --  17*  --  21*  --   --    * 292*   < > 272*   < > 120*   < >  --    BUN  --   --   --  53.5*  --  46.2*  --   --    CR  --   --   --  6.71*  --  6.68*  --   --    GFRESTIMATED  --   --   --  11*  --  11*  --   --    NABIL  --   --   --  10.1*  --  9.5  --   --    MAG  --   --   --  1.8  --  1.9  --   --    PHOS  --   --   --  3.1  --  5.3*  --   --    FCREAT  --   --   --   --   --   --   --  5.6    < > = values in this interval not displayed.     CBC  Recent Labs   Lab 02/28/24  1313 02/28/24  0636 02/27/24  0628   HGB  --  8.7* 8.4*   WBC  --  5.4 3.3*   PLT  --  254 258   A1C 5.7*  --   --      Urinalysis  Recent Labs   Lab Test 02/25/24  1158 02/23/24  1234 10/19/20  1645 02/26/20  1530 11/25/19  0904   COLOR Straw Straw   < >  --   --    APPEARANCE Clear Clear   < >  --   --    URINEGLC 150* 30*   < >  --   --    URINEBILI Negative Negative   < >  --   --    URINEKETONE Negative Negative   < >  --   --    SG 1.006 1.006   < >  --   --    UBLD Negative Trace*   < >  --   --    URINEPH 7.5* 7.0   < >  --   --    PROTEIN 30* 20*   < >  --   --    NITRITE Negative Negative   < >  --   --     LEUKEST Negative Negative   < >  --   --    RBCU 1 2   < >  --   --    WBCU <1 1   < >  --   --    UTPG  --   --   --  4.31* 4.92*    < > = values in this interval not displayed.

## 2024-02-28 NOTE — PROVIDER NOTIFICATION
7A16 DIMA. Does pt still need post void bladder scans? PVR values have been <50ml today. Thanks, Alexis [phone]

## 2024-02-28 NOTE — PLAN OF CARE
"Goal Outcome Evaluation:      Plan of Care Reviewed With: patient             BP (!) 148/84 (BP Location: Left arm)   Pulse 89   Temp 98.1  F (36.7  C) (Oral)   Resp 16   Ht 1.803 m (5' 11\")   Wt 130.1 kg (286 lb 14.4 oz)   SpO2 98%   BMI 40.01 kg/m      Shift: 9882-6018  Iso Status: none  VS: Stable on RA, afebrile  Neuro: A&Ox4  BG: Q1H with insulin drip at 3.5 units/hr in algorithm 1   Resp: WDL  Card: WDL  Pain/Nausea: Denies pain and nausea.   Diet: 3g K+  IV Access: PIV SL  Infusions: Insulin  GI/: Voids freely. PVR after each void <50ml.  Skin: K biopsy site CDI  Mobility: independent  Plan: Continue POC    "

## 2024-02-28 NOTE — PROGRESS NOTES
[facility]   Transplant Nephrology Progress Note  Date of Admission:  2/24/2024  Today's Date: 02/28/2024    Recommendations:  - No acute indications for dialysis, but will assess daily.  - Plan to continue Solumedrol 500 mg IV daily x 3 days (2/27, 2/28 and 2/29), then would do a prednisone taper and leave patient on prednisone 5 mg daily.  - Recommend giving basiliximab for added immunosuppression to allow decreasing tacrolimus goal to 4-6.  - Recommend stopping cholecalciferol.  - Would increase sodium bicarbonate to 1950 mg tid.  - Recommend starting PPI.    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  Good urine output.  No acute indications for dialysis, but will assess daily.   - ROSI secondary to unclear etiology.  Kidney transplant biopsy 2/26 shows borderline acute cellular-mediated rejection with significant acute tubular injury.  No evidence of ABMR.  Patient also had an early acute cellular-mediated rejection and was treated with rATG, but closure biopsy was okay except for acute tubular injury too.  No evidence of urine leak with creatinine in perinephric fluid the same as serum.  Kidney transplant ultrasound mostly unremarkable outside of  perinephric fluid collections and mild hydronephrosis.  Treating borderline acute cellular-mediated rejection and stopped Bactrim.  Feel increased creatinine is also due to sensitivity to tacrolimus and would consider dosing basiliximab to allow lower tacrolimus goal levels.   - Baseline Creatinine: ~ 1.8-2.1   - Proteinuria: Moderate (1-3 grams)   - Date DSA Last Checked: Feb/2024      Latest DSA: No cPRA: 0%   - BK Viremia: No   - Kidney Tx Biopsy: Feb 26, 2024; Result: Borderline acute cellular-mediated rejection.  No evidence of antibody-mediated rejection.   Significant acute tubular injury.  t1 i1 v0 (borderline), g0 ptc0 C4d0, ct0 ci0 cv1 ah1             Feb 09, 2024; Result: No diagnostic evidence of  acute rejection.  No interstitial fibrosis and tubular atrophy.  Severe arterial sclerosis.             Jan 02, 2024; Result: Acute cellular-mediated rejection, [place] 1B.   Mild glomerulitis and severe peritubular capillaritis, but negative C4d and no DSA. Mild arterial sclerosis.    - Transplant Ureteral Stent: Removed    # Immunosuppression: Tacrolimus immediate release (goal 8-10) and Mycophenolate mofetil (dose 750 mg every 12 hours)   - Induction with Recent Transplant:  Low Intensity Protocol, although did receive full dose rATG due to early acute rejection    - Patient is in an immunosuppressed state and will continue to monitor for efficacy and toxicity of immunosuppression medications.   - Changes: Not at this time; Would treat acute rejection with Solumedrol followed by a prednisone taper and leave patient on prednisone 5 mg daily.   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  Patient is EBV IgG Ab negative, so cannot use belatacept.  Would be apprehensive in using mTORi in patient with ATI and high serum creatinine.  Would consider using basiliximab for maintenance (two doses days 1 and 5, then monthly dosing) to allow lowering tacrolimus to 4-6 goal range.    # Infection Prophylaxis:   - PJP: Inhaled pentamidine; Bactrim stopped due to ROSI and hyperkalemia.  - CMV: Valganciclovir (Valcyte); Patient is CMV IgG Ab discordant (D+/R-) and will continue on Valcyte x 6 months, then check CMV PCR monthly until 12 months post transplant.    # Hypertension: Borderline control;  Goal BP: < 140/90 (Hospitalization goal)   - Volume status: Mildly hypervolemic   - Changes: Not at this time    # Elevated Blood Glucose: Glucose generally running ~ 200s; Felt secondary to high dose steroids.   - Management as per primary team.    # Anemia in Chronic Renal Disease: Hgb: Stable, low      TOM: No   - Iron studies: High iron saturation    # Mineral Bone Disorder:   - Secondary renal  hyperparathyroidism; PTH level: Significantly elevated (601-1200 pg/ml)        On treatment: None  - Vitamin D; level: Low        On supplement: Yes; Recommend stopping cholecalciferol due to hypercalcemia.  - Calcium; level: High        On supplement: No  - Phosphorus; level: Normal        On binder: No    # Electrolytes:   - Potassium; level: High normal        On supplement: No  - Magnesium; level: Normal        On supplement: No  - Bicarbonate; level: Stable low        On supplement: Yes; Would increase sodium bicarbonate to 1950 mg tid.    # Obesity: Stable weight.              - Once patient heals and recovers from surgery, would recommend working on weight loss for overall health by increasing exercise and watching caloric intake.    # GERD: Patient reports no relief with taking calcium carbonate.   - Recommend starting PPI.     # H/o Ureteral Reimplantation, s/p Mitrofanoff: Patient reports not using for Mitrofanoff in a couple of years and voids normally on his own.  Mitrofanoff is nonfunctional at this time.     # H/o Bilateral Uveitis: No evidence of systemic autoimmune/inflammatory disease at last Rheumatology.      # EBV IgG Ab Discordance (D+/R-): Will do surveillance EBV PCR qmonth until 12 months post transplant, then q3 months until 2 years post transplant.  Last EBV PCR was detectable, but less than quantifiable with last check Feb/2024.    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    Abhilash Raza MD  Transplant Nephrology  Contact information available via [facility] Paging/Directory    Interval History   Jake Villa's  "creatinine is 6.71 ( 0636); Stable.  Good urine output.  Other significant labs/tests/vitals: Trend up serum potassium.  Decreased bicarbonate level.  Otherwise, stable electrolytes.  Stable hemoglobin.  No new events overnight.  Generally feeling more fatigued.  No chest pain or shortness of breath.  No leg swelling.  Occasional nausea and vomiting.  Bowel movements are loose.  Does report significant heartburn symptoms.  No fever, sweats or chills.    Review of Systems   4 point ROS was obtained and negative except as noted in the Interval History.    MEDICATIONS:   amLODIPine  10 mg Oral Daily    aspirin  81 mg Oral Daily    carvedilol  12.5 mg Oral BID    losartan  25 mg Oral QPM    magnesium oxide  400 mg Oral Daily    methylPREDNISolone  500 mg Intravenous Daily    mycophenolate  750 mg Oral BID    sodium bicarbonate  1,300 mg Oral TID    sodium chloride (PF)  3 mL Intracatheter Q8H    tacrolimus  8 mg Oral BID IS    [START ON 2024] valGANciclovir  450 mg Oral Once per day on     Vitamin D3  50 mcg Oral Daily      dextrose      insulin regular         Physical Exam   Temp  Av.8  F (36.6  C)  Min: 97.3  F (36.3  C)  Max: 98.2  F (36.8  C)      Pulse  Av.7  Min: 78  Max: 100 Resp  Av.9  Min: 16  Max: 39  SpO2  Av.1 %  Min: 97 %  Max: 99 %     BP (!) 148/84 (BP Location: Left arm)   Pulse 89   Temp 98.1  F (36.7  C) (Oral)   Resp 16   Ht 1.803 m (5' 11\")   Wt 130.1 kg (286 lb 14.4 oz)   SpO2 98%   BMI 40.01 kg/m     Date 24 0700 - 24 0659   Shift 8935-5970 8533-4835 7724-7645 24 Hour Total   INTAKE   P.O. 240   240   Shift Total(mL/kg) 240(1.82)   240(1.82)   OUTPUT   Urine 800   800   Shift Total(mL/kg) 800(6.06)   800(6.06)   Weight (kg) 132 132 132 132      Admit Weight: 131.5 kg (290 lb)     GENERAL APPEARANCE: alert and no distress  HENT: mouth without ulcers or lesions  RESP: lungs clear to auscultation - no rales, rhonchi or wheezes  CV: " regular rhythm, normal rate, no rub, no murmur  EDEMA: none to trace LE edema bilaterally  ABDOMEN: soft, nondistended, nontender, bowel sounds normal, obese  MS: extremities normal - no gross deformities noted, no evidence of inflammation in joints, no muscle tenderness  SKIN: no rash  TX KIDNEY: normal  DIALYSIS ACCESS:  None    Data   All labs reviewed by me.  CMP  Recent Labs   Lab 02/28/24  1242 02/28/24  0808 02/28/24  0636 02/28/24  0342 02/27/24  1711 02/27/24  0628 02/26/24  1621 02/26/24  0539 02/25/24  1308 02/25/24  1127   NA  --   --  135  --   --  141  --  144  --  142   POTASSIUM  --   --  5.3  --   --  5.2  --  4.6  --  4.5   CHLORIDE  --   --  104  --   --  107  --  110*  --  109*   CO2  --   --  17*  --   --  21*  --  20*  --  22   ANIONGAP  --   --  14  --   --  13  --  14  --  11   * 224* 272* 295*   < > 120*   < > 90   < > 176*   BUN  --   --  53.5*  --   --  46.2*  --  39.8*  --  37.9*   CR  --   --  6.71*  --   --  6.68*  --  5.49*  --  4.69*   GFRESTIMATED  --   --  11*  --   --  11*  --  14*  --  17*   NABIL  --   --  10.1*  --   --  9.5  --  8.9  --  9.1   MAG  --   --  1.8  --   --  1.9  --  1.8  --  2.0   PHOS  --   --  3.1  --   --  5.3*  --  4.7*  --  4.1    < > = values in this interval not displayed.     CBC  Recent Labs   Lab 02/28/24  0636 02/27/24  0628 02/26/24  0539 02/25/24  1127   HGB 8.7* 8.4* 8.2* 8.1*   WBC 5.4 3.3* 3.8* 3.2*   RBC 2.66* 2.59* 2.48* 2.43*   HCT 25.4* 24.4* 24.0* 23.5*   MCV 96 94 97 97   MCH 32.7 32.4 33.1* 33.3*   MCHC 34.3 34.4 34.2 34.5   RDW 12.1 12.4 12.5 12.6    258 225 217     INR  Recent Labs   Lab 02/26/24  0539   INR 1.08   PTT 26     ABGNo lab results found in last 7 days.   Urine Studies  Recent Labs   Lab Test 02/25/24  1158 02/23/24  1234 12/19/23  1007 03/29/21  1404 12/03/20  1525 10/19/20  1645   COLOR Straw Straw Straw Straw Yellow Yellow   APPEARANCE Clear Clear Clear Clear Clear Clear   URINEGLC 150* 30* 70* 50* 100* 50 mg/dL*    URINEBILI Negative Negative Negative Negative Negative Negative   URINEKETONE Negative Negative Negative Negative Negative Negative   SG 1.006 1.006 1.003 1.008 1.025 1.005   UBLD Negative Trace* Trace* Negative Small* Small*   URINEPH 7.5* 7.0 7.5* 6.0 7.0 6.0   PROTEIN 30* 20* 100* >499* >300* >=500 mg/dL*   UROBILINOGEN  --   --   --   --  0.2 <2.0 E.U./dL   NITRITE Negative Negative Negative Negative Negative Negative   LEUKEST Negative Negative Negative Negative Negative Negative   RBCU 1 2 0 0  --  0-2   WBCU <1 1 1 <1  --  0-5     Recent Labs   Lab Test 02/26/20  1530 11/25/19  0904 05/13/19  0728 12/04/18  1056 06/08/18  1310 02/10/17  1004 08/26/16  1150 02/19/16  1000   UTPG 4.31* 4.92* 4.31* 4.59* 6.69* 2.78* 2.45* 2.17*     PTH  Recent Labs   Lab Test 01/07/24  0750 12/19/23  0953 07/16/21  1650 12/16/20  1611 12/16/20  0000 10/19/20  1614 02/26/20  1520 11/25/19  0859 05/13/19  0712 12/04/18  1054 06/08/18  1256 01/16/18  1102 08/09/17  1626 02/10/17  1002 08/26/16  1245 02/19/16  0955   PTHI 746* 445* 308* 94* 94 131* 201* 154* 110* 177* 96* 133* 66 39 47 42     Iron Studies  Recent Labs   Lab Test 01/07/24  0750 12/19/23  0953 02/26/20  1520 05/13/19  0712 06/08/18  1256 01/16/18  1102 08/09/17  1626 02/10/17  1002   IRON 167* 104 66 71 87 116 93 54   * 250 251 269 257 290 284 297   IRONSAT 79* 42 26 26 34 40 33 18   DAVID 783* 826* 133 109 82 86 100 113       IMAGING:  All imaging studies reviewed by me.

## 2024-02-28 NOTE — PLAN OF CARE
"BP (!) 149/97 (BP Location: Left arm, Patient Position: Sitting)   Pulse 94   Temp 97.8  F (36.6  C) (Oral)   Resp 18   Ht 1.803 m (5' 11\")   Wt 131.2 kg (289 lb 3.9 oz)   SpO2 96%   BMI 40.34 kg/m      Shift: 9072-2162  VS: VSS on RA, afebrile  Neuro: AOx4  BG: ACHS, 379->356, paged oncall about 356, changed to high insulin resistance and additional 7 units given per sliding scale, recheck 295  Labs: am labs, Cr yesterday 6.68  Respiratory: WNL  Pain/Nausea/PRN: denies pain or nausea; Tums x2  Diet: 3g k  LDA: PIV SL  GI/: voids in urinal, PVR after each void <50 ml; LBM 2/26  Skin: K biopsy site CDI  Mobility: IND  Plan: continue POC    Handoff given to following RN.                          "

## 2024-02-29 PROBLEM — T86.19 PERINEPHRIC FLUID COLLECTION OF KIDNEY TRANSPLANT: Status: ACTIVE | Noted: 2024-02-29

## 2024-02-29 PROBLEM — B27.00 EBV (EPSTEIN-BARR VIRUS) VIREMIA: Status: ACTIVE | Noted: 2024-02-29

## 2024-02-29 PROBLEM — R73.9 STEROID-INDUCED HYPERGLYCEMIA: Status: ACTIVE | Noted: 2024-02-29

## 2024-02-29 PROBLEM — T86.11 KIDNEY TRANSPLANT REJECTION: Status: ACTIVE | Noted: 2024-02-29

## 2024-02-29 PROBLEM — T38.0X5A STEROID-INDUCED HYPERGLYCEMIA: Status: ACTIVE | Noted: 2024-02-29

## 2024-02-29 PROBLEM — Z94.0 KIDNEY REPLACED BY TRANSPLANT: Chronic | Status: ACTIVE | Noted: 2023-12-28

## 2024-02-29 PROBLEM — R73.03 PRE-DIABETES: Status: ACTIVE | Noted: 2024-02-29

## 2024-02-29 PROBLEM — N17.9 AKI (ACUTE KIDNEY INJURY) (H): Status: ACTIVE | Noted: 2024-02-29

## 2024-02-29 PROBLEM — K21.9 ESOPHAGEAL REFLUX: Status: ACTIVE | Noted: 2024-02-29

## 2024-02-29 PROBLEM — D84.9 IMMUNOSUPPRESSED STATUS (H): Chronic | Status: ACTIVE | Noted: 2023-12-29

## 2024-02-29 PROBLEM — N28.89 PERINEPHRIC FLUID COLLECTION OF KIDNEY TRANSPLANT: Status: ACTIVE | Noted: 2024-02-29

## 2024-02-29 LAB
ANION GAP SERPL CALCULATED.3IONS-SCNC: 14 MMOL/L (ref 7–15)
BACTERIA BLD CULT: NO GROWTH
BUN SERPL-MCNC: 55.6 MG/DL (ref 6–20)
CALCIUM SERPL-MCNC: 9.9 MG/DL (ref 8.6–10)
CHLORIDE SERPL-SCNC: 107 MMOL/L (ref 98–107)
CREAT SERPL-MCNC: 5.19 MG/DL (ref 0.67–1.17)
DEPRECATED HCO3 PLAS-SCNC: 18 MMOL/L (ref 22–29)
EGFRCR SERPLBLD CKD-EPI 2021: 15 ML/MIN/1.73M2
ERYTHROCYTE [DISTWIDTH] IN BLOOD BY AUTOMATED COUNT: 12.3 % (ref 10–15)
GLUCOSE BLDC GLUCOMTR-MCNC: 128 MG/DL (ref 70–99)
GLUCOSE BLDC GLUCOMTR-MCNC: 128 MG/DL (ref 70–99)
GLUCOSE BLDC GLUCOMTR-MCNC: 130 MG/DL (ref 70–99)
GLUCOSE BLDC GLUCOMTR-MCNC: 134 MG/DL (ref 70–99)
GLUCOSE BLDC GLUCOMTR-MCNC: 134 MG/DL (ref 70–99)
GLUCOSE BLDC GLUCOMTR-MCNC: 140 MG/DL (ref 70–99)
GLUCOSE BLDC GLUCOMTR-MCNC: 142 MG/DL (ref 70–99)
GLUCOSE BLDC GLUCOMTR-MCNC: 150 MG/DL (ref 70–99)
GLUCOSE BLDC GLUCOMTR-MCNC: 152 MG/DL (ref 70–99)
GLUCOSE BLDC GLUCOMTR-MCNC: 152 MG/DL (ref 70–99)
GLUCOSE BLDC GLUCOMTR-MCNC: 154 MG/DL (ref 70–99)
GLUCOSE BLDC GLUCOMTR-MCNC: 170 MG/DL (ref 70–99)
GLUCOSE BLDC GLUCOMTR-MCNC: 176 MG/DL (ref 70–99)
GLUCOSE BLDC GLUCOMTR-MCNC: 177 MG/DL (ref 70–99)
GLUCOSE BLDC GLUCOMTR-MCNC: 181 MG/DL (ref 70–99)
GLUCOSE BLDC GLUCOMTR-MCNC: 192 MG/DL (ref 70–99)
GLUCOSE BLDC GLUCOMTR-MCNC: 202 MG/DL (ref 70–99)
GLUCOSE BLDC GLUCOMTR-MCNC: 203 MG/DL (ref 70–99)
GLUCOSE BLDC GLUCOMTR-MCNC: 204 MG/DL (ref 70–99)
GLUCOSE BLDC GLUCOMTR-MCNC: 209 MG/DL (ref 70–99)
GLUCOSE BLDC GLUCOMTR-MCNC: 234 MG/DL (ref 70–99)
GLUCOSE BLDC GLUCOMTR-MCNC: 237 MG/DL (ref 70–99)
GLUCOSE BLDC GLUCOMTR-MCNC: 258 MG/DL (ref 70–99)
GLUCOSE SERPL-MCNC: 137 MG/DL (ref 70–99)
HCT VFR BLD AUTO: 24.6 % (ref 40–53)
HGB BLD-MCNC: 8.5 G/DL (ref 13.3–17.7)
MAGNESIUM SERPL-MCNC: 1.6 MG/DL (ref 1.7–2.3)
MCH RBC QN AUTO: 33.3 PG (ref 26.5–33)
MCHC RBC AUTO-ENTMCNC: 34.6 G/DL (ref 31.5–36.5)
MCV RBC AUTO: 97 FL (ref 78–100)
PHOSPHATE SERPL-MCNC: 4.4 MG/DL (ref 2.5–4.5)
PLATELET # BLD AUTO: 270 10E3/UL (ref 150–450)
POTASSIUM SERPL-SCNC: 4.8 MMOL/L (ref 3.4–5.3)
RBC # BLD AUTO: 2.55 10E6/UL (ref 4.4–5.9)
SODIUM SERPL-SCNC: 139 MMOL/L (ref 135–145)
WBC # BLD AUTO: 7.9 10E3/UL (ref 4–11)

## 2024-02-29 PROCEDURE — 250N000013 HC RX MED GY IP 250 OP 250 PS 637: Performed by: PHYSICIAN ASSISTANT

## 2024-02-29 PROCEDURE — 250N000012 HC RX MED GY IP 250 OP 636 PS 637: Performed by: PHYSICIAN ASSISTANT

## 2024-02-29 PROCEDURE — 36415 COLL VENOUS BLD VENIPUNCTURE: CPT

## 2024-02-29 PROCEDURE — 258N000003 HC RX IP 258 OP 636: Performed by: PHYSICIAN ASSISTANT

## 2024-02-29 PROCEDURE — 250N000009 HC RX 250: Performed by: PHYSICIAN ASSISTANT

## 2024-02-29 PROCEDURE — 84100 ASSAY OF PHOSPHORUS: CPT

## 2024-02-29 PROCEDURE — 83735 ASSAY OF MAGNESIUM: CPT

## 2024-02-29 PROCEDURE — 250N000013 HC RX MED GY IP 250 OP 250 PS 637

## 2024-02-29 PROCEDURE — 99233 SBSQ HOSP IP/OBS HIGH 50: CPT | Mod: 24 | Performed by: INTERNAL MEDICINE

## 2024-02-29 PROCEDURE — 250N000013 HC RX MED GY IP 250 OP 250 PS 637: Performed by: SURGERY

## 2024-02-29 PROCEDURE — 250N000012 HC RX MED GY IP 250 OP 636 PS 637

## 2024-02-29 PROCEDURE — 250N000011 HC RX IP 250 OP 636: Performed by: PHYSICIAN ASSISTANT

## 2024-02-29 PROCEDURE — 120N000011 HC R&B TRANSPLANT UMMC

## 2024-02-29 PROCEDURE — 80048 BASIC METABOLIC PNL TOTAL CA: CPT

## 2024-02-29 PROCEDURE — 85027 COMPLETE CBC AUTOMATED: CPT | Performed by: NURSE PRACTITIONER

## 2024-02-29 RX ORDER — PREDNISONE 20 MG/1
40 TABLET ORAL 2 TIMES DAILY
Status: DISCONTINUED | OUTPATIENT
Start: 2024-03-01 | End: 2024-03-01 | Stop reason: HOSPADM

## 2024-02-29 RX ORDER — PREDNISONE 20 MG/1
20 TABLET ORAL DAILY
Status: DISCONTINUED | OUTPATIENT
Start: 2024-03-06 | End: 2024-03-01 | Stop reason: HOSPADM

## 2024-02-29 RX ORDER — PREDNISONE 10 MG/1
10 TABLET ORAL DAILY
Status: DISCONTINUED | OUTPATIENT
Start: 2024-03-09 | End: 2024-03-01 | Stop reason: HOSPADM

## 2024-02-29 RX ORDER — VALGANCICLOVIR 450 MG/1
450 TABLET, FILM COATED ORAL EVERY OTHER DAY
Status: DISCONTINUED | OUTPATIENT
Start: 2024-03-01 | End: 2024-03-01

## 2024-02-29 RX ORDER — PREDNISONE 20 MG/1
40 TABLET ORAL DAILY
Status: DISCONTINUED | OUTPATIENT
Start: 2024-03-03 | End: 2024-03-01 | Stop reason: HOSPADM

## 2024-02-29 RX ORDER — MAGNESIUM OXIDE 400 MG/1
400 TABLET ORAL 2 TIMES DAILY
Status: DISCONTINUED | OUTPATIENT
Start: 2024-02-29 | End: 2024-03-01 | Stop reason: HOSPADM

## 2024-02-29 RX ADMIN — ASPIRIN 81 MG CHEWABLE TABLET 81 MG: 81 TABLET CHEWABLE at 07:32

## 2024-02-29 RX ADMIN — MYCOPHENOLATE MOFETIL 750 MG: 250 CAPSULE ORAL at 07:31

## 2024-02-29 RX ADMIN — SODIUM BICARBONATE 1950 MG: 650 TABLET ORAL at 15:00

## 2024-02-29 RX ADMIN — Medication 1 LOZENGE: at 06:10

## 2024-02-29 RX ADMIN — INSULIN HUMAN 14 UNITS/HR: 1 INJECTION, SOLUTION INTRAVENOUS at 18:19

## 2024-02-29 RX ADMIN — MYCOPHENOLATE MOFETIL 750 MG: 250 CAPSULE ORAL at 19:57

## 2024-02-29 RX ADMIN — MAGNESIUM OXIDE TAB 400 MG (241.3 MG ELEMENTAL MG) 400 MG: 400 (241.3 MG) TAB at 19:57

## 2024-02-29 RX ADMIN — INSULIN HUMAN 4 UNITS/HR: 1 INJECTION, SOLUTION INTRAVENOUS at 06:32

## 2024-02-29 RX ADMIN — CARVEDILOL 12.5 MG: 12.5 TABLET, FILM COATED ORAL at 19:57

## 2024-02-29 RX ADMIN — SODIUM CHLORIDE 500 MG: 9 INJECTION, SOLUTION INTRAVENOUS at 07:32

## 2024-02-29 RX ADMIN — SODIUM BICARBONATE 1950 MG: 650 TABLET ORAL at 19:57

## 2024-02-29 RX ADMIN — PANTOPRAZOLE SODIUM 40 MG: 40 TABLET, DELAYED RELEASE ORAL at 16:21

## 2024-02-29 RX ADMIN — TACROLIMUS 7 MG: 5 CAPSULE ORAL at 18:17

## 2024-02-29 RX ADMIN — TACROLIMUS 7 MG: 5 CAPSULE ORAL at 07:31

## 2024-02-29 RX ADMIN — AMLODIPINE BESYLATE 10 MG: 10 TABLET ORAL at 07:32

## 2024-02-29 RX ADMIN — SODIUM BICARBONATE 1950 MG: 650 TABLET ORAL at 07:31

## 2024-02-29 RX ADMIN — VALGANCICLOVIR 450 MG: 450 TABLET, FILM COATED ORAL at 09:13

## 2024-02-29 RX ADMIN — PANTOPRAZOLE SODIUM 40 MG: 40 TABLET, DELAYED RELEASE ORAL at 07:32

## 2024-02-29 RX ADMIN — LOSARTAN POTASSIUM 25 MG: 25 TABLET, FILM COATED ORAL at 19:57

## 2024-02-29 RX ADMIN — CARVEDILOL 12.5 MG: 12.5 TABLET, FILM COATED ORAL at 07:32

## 2024-02-29 ASSESSMENT — ACTIVITIES OF DAILY LIVING (ADL)
ADLS_ACUITY_SCORE: 20

## 2024-02-29 NOTE — PLAN OF CARE
"/68 (BP Location: Left arm)   Pulse 88   Temp 98.5  F (36.9  C) (Oral)   Resp 18   Ht 1.803 m (5' 11\")   Wt 130.1 kg (286 lb 14.4 oz)   SpO2 97%   BMI 40.01 kg/m      SHIFT: 1102-1589  ISOLATION: NA  VITALS: AVSS on RA  BG: Q1-2H  Recent Labs   Lab 02/29/24  0432 02/29/24  0305 02/29/24  0231 02/29/24  0104 02/29/24  0007 02/28/24  2318   * 142* 152* 128* 150* 170*   NEURO: A&O x4.  Diet: 3 Gram Potassium (low potassium) Diet    PAIN: No reported nausea. Sore throat pt attributes to snoring. Cepacol lozenges ordered for them.  RESPIRATORY: WDL  CARDIAC: WDL  : Voiding adequate amounts of clear, yellow urine.  GI: LBM: 02/27  TUBES: L AV Fistula, Subclavian DL tunneled line, L PIV x2  MIVF/GTT/ABX: NS TKO w/ Insulin on Alg IV  ASSIST: UAL  SAFETY: NA  SKIN: Biopsy site CDI  LABS:   Recent Labs   Lab 02/28/24  0636 02/27/24  0628 02/26/24  0539   POTASSIUM 5.3 5.2 4.6   PHOS 3.1 5.3* 4.7*   MAG 1.8 1.9 1.8   HGB 8.7* 8.4* 8.2*   CR 6.71* 6.68* 5.49*   PLAN: Per Abhilash Raza MD:    - No acute indications for dialysis, but will assess daily.  - Plan to continue Solumedrol 500 mg IV daily x 3 days (2/27, 2/28 and 2/29), then would do a prednisone taper and leave patient on prednisone 5 mg daily.  - Recommend giving basiliximab for added immunosuppression to allow decreasing tacrolimus goal to 4-6.  - Recommend stopping cholecalciferol.  - Would increase sodium bicarbonate to 1950 mg tid.  - Recommend starting PPI.  "

## 2024-02-29 NOTE — PROVIDER NOTIFICATION
7A, Room 7216, [place].    Pt's reporting a sore throat from snoring. Can we get an order for cepacol lozenges or something similar? Thanks!    Lozenges ordered and administered.

## 2024-02-29 NOTE — CONSULTS
Discharge Pharmacy Test Claim    Patient has commercial pharmacy benefits through [employer]. This plan covers freestyle lite testing supplies, humalog, humulin N, generic lispro, and semglee pens.    Test Claim Cop[employer]   freestyle lancets 7.81   freestyle lite 25.00   freestyle lite meter 0.00   humalog kwikpens 25.00   humulin N kwikpens 25.00   insulin lispro pens 25.00   lantus solostar pens not covered   novolog flexpens not covered   semglee yfgn pens 25.00     Heidy Garcia  [facility] Pharmacy Liaison  Ph: [phone]  Fax [phone]  Available on [employer] (learn more here)

## 2024-02-29 NOTE — PLAN OF CARE
"/76 (BP Location: Left arm)   Pulse 88   Temp 97.6  F (36.4  C) (Oral)   Resp 18   Ht 1.803 m (5' 11\")   Wt 128.5 kg (283 lb 6.4 oz)   SpO2 100%   BMI 39.53 kg/m      Shift: 6443-5540  Isolation Status: None  VS: VSS on RA, afebrile  Neuro: Aox4  Behaviors: Calm, pleasant. Family present  BG: Q1H. 152-258. No carb coverage for meals  Labs: Cr 5.19  Respiratory: WDL  Cardiac: WDL  Pain/Nausea: Denies pain or nausea  PRN: N/a  Diet: 3g K+  IV Access: L PIV. R AVF  Infusion(s): Ins gtt, Alg 4  Lines/Drains: none  GI/: Voiding without difficulty. Bladder scan q shift. Small BM this shift  Skin: L forearm bruising  Mobility: UAL  Events/Education: Will need diabetic education  Plan: Continue with plan of care   "

## 2024-02-29 NOTE — PLAN OF CARE
"Vitals: BP (!) 151/103   Pulse 82   Temp 97.8  F (36.6  C) (Oral)   Resp 16   Ht 1.803 m (5' 11\")   Wt 128.5 kg (283 lb 6.4 oz)   SpO2 96%   BMI 39.53 kg/m    Hypertensive.  Endocrine: Insulin drip, alg 4, glucoses 130-202, received 500 mg IV Solumedrol. Does not have carb coverage for meals.  Labs: Creatinine down to 5.1, other labs stable.  Pain: Denies pain.  PRN's: n/a  Diet: Regular diet, eating well.  LDA: PIV X2, TKO with Insulin, R arm fistula.  GI: Voids per urinal, high output per void.  : BM 2/27.  Skin: Scattered bruising on left arm.  Neuro: Alert and oriented, family here.  Mobility: Up ad brittany.  Education: Will need diabetic education.  Plan: Continue with plan of care. Discharge home when medically stable.                    "

## 2024-02-29 NOTE — DISCHARGE SUMMARY
[facility]    Discharge Summary  Transplant Surgery    Date of Admission:  2/24/2024  Date of Discharge:  3/1/2024  Discharging Provider: Kathleen Carroll NP / John Castillo MD    Attestation: I saw and examined this patient with the transplant team. I independently reviewed all pertinent imaging and laboratory information, and made independent management decisions including immunosuppression adjustment. I agree with the findings and plan as documented in this note.  John Castillo MD    Discharge Diagnoses   Principal Problem:    Elevated serum creatinine  Active Problems:    HTN, kidney transplant related    Kidney replaced by transplant    Immunosuppressed status (H24)    Anemia in chronic renal disease    Metabolic acidosis    Hypomagnesemia    ROSI (acute kidney injury) (H24)    Kidney transplant rejection    Perinephric fluid collection of kidney transplant    Esophageal reflux    EBV (Dai-Barr virus) viremia    Pre-diabetes    Steroid-induced hyperglycemia    Procedure/Surgery Information   Non-operative procedures 2/26/24 US-guided RLQ fluid aspiration (150 mL dark myles fluid)  2/26/24 RLQ Renal transplant biopsy       History of Present Illness   Boogie Villa is a 23 year old male with past medical history significant for ESKD s/p RLQ kidney transplant 12/28/2023 complicated by 10-day hospitalization for rejection (kidney transplant biopsy on 1/2/2024 with acute cellular-mediated rejection), on immunosuppression medication admitted 2/24 with 4 days of fatigue, elevated creatinine, but making urine.     Hospital Course   Boogie Villa was admitted on 2/24/2024.  The following problems were addressed during his hospitalization:    Hx LDKT 12/28/23 c/b ROSI; Borderline rejection: Patient admitted with elevated Cr in the setting of RLQ fluid collection and mild hydronephrosis on US. Renogram 2/26 with no e/o leak or obstruction, decreased  flow. Fluid aspiration not c/w urine leak. Infectious workup negative. Biopsy 2/26 with borderline rejection as below. Of note, patient did have another episode of rejection post-transplant (ACR treated with SM/Thymo). Etiology of acutely elevated Cr remains unclear given only borderline rejection, but may be ATI r/t tacrolimus. Plan to give basiliximab and run tacrolimus levels lower as below. Patient was also started on losartan d/t concern for non-HLA antibodies. Cr peaked at 6.7, now trending down to 3 (Baseline Cr 1.8-2).    - Currently on ASA 81 mg daily due to venous reconstruction x 3 months post-transplant.     Hodan-incisional fluid collection: Noted on US. IR aspirated 150 mL myles fluid on 2/26. Fluid Cr sero-equivalent. Cultures NGTD.     Borderline rejection: Biopsy 2/26 with preliminary pathology results consistent with borderline cellular rejection, no e/o AbMR. No DSA 2/24. Treatment as below.    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   - Continue losartan 25 mg daily    Immunosuppressed status:   Rejection treatment: Methylprednisolone 500 mg x 3 followed by taper to prednisone 5 mg daily.   Maintenance: Unable to use Belatacept (EBV IgG negative).   - Basiliximab 2/28, 3/3, 3/13 followed by monthly dosing in order to run tacrolimus at lower goal levels.    -  mg BID   - Tacrolimus 7 mg BID. Goal level decreased to 4-6.    - Prednisone 5 mg daily following taper.   Infection prophylaxis: PJP (Bactrim stopped d/t hyperkalemia; received Pentamidine 2/28), viral (Valcyte x 6 months post-transplant)    Transplant coordinator: Nina Hu [phone]    Cardiorespiratory:   HTN: Controlled on PTA amlodipine 10 mg daily and carvedilol 12.5 mg BID. Added losartan 25 mg daily as above d/t concern for non-HLA Ab.     Hematology:   Anemia of chronic disease: Hgb ~8, stable.    GI/Nutrition:   GERD: PPI BID initiated.    Fluid/Electrolytes:   Hypomagnesemia: PTA Mag-Ox  increased to 400 mg BID.  Hypercalcemia: PTA Vitamin D discontinued.  Low bicarb: PTA sodium bicarbonate increased to 1950 mg TID.    Endocrine:   Prediabetes; Steroid induced hyperglycemia: Hgb A1c 5.7%. Required insulin gtt with high dose steroids. Now transitioned to sliding scale aspart AC/HS. Diabetic education provided prior to discharge.     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 EBV IgG discordance (D+/R-).    - Monitor EBV levels monthly.         Discharge Disposition   Discharged to home   Condition at discharge: Stable    Pending Results   These results will be followed up by Transplant team  Unresulted Labs Ordered in the Past 30 Days of this Admission       Date and Time Order Name Status Description    2/26/2024  9:32 AM Surgical Pathology Exam In process     2/25/2024  1:55 PM Anaerobic bacterial culture Preliminary     2/25/2024  1:55 PM Fungus Culture, non-blood Preliminary     2/25/2024  1:55 PM Fine Needle Aspiration Aerobic Bacterial Culture Routine With Gram Stain Preliminary     2/25/2024  1:53 PM Other Laboratory; [facility]; Endothelial cell Ab (non HLA ab) (Laboratory Miscellaneous Order) In process     2/25/2024  1:53 PM Other Laboratory; [facility]; GLADIS-A (non HLA ab) (Laboratory Miscellaneous Order) In process           Final pathology results: Pending at time of discharge  Primary Care Physician   Rita Ward    Physical Exam   Temp: 97.4  F (36.3  C) Temp src: Oral BP: 134/75 Pulse: 77   Resp: 18 SpO2: 98 % O2 Device: None (Room air)    Vitals:    02/28/24 0640 02/29/24 0958 03/01/24 1300   Weight: 130.1 kg (286 lb 14.4 oz) 128.5 kg (283 lb 6.4 oz) 125 kg (275 lb 8 oz)     Vital Signs with Ranges  Temp:  [97.4  F (36.3  C)-98  F (36.7  C)] 97.4  F (36.3  C)  Pulse:  [67-95] 77  Resp:  [16-20] 18  BP: (126-152)/(61-98) 134/75  SpO2:  [97 %-98 %] 98 %  I/O last 3 completed shifts:  In: 612 [P.O.:500; I.V.:112]  Out: 6100 [Urine:6100]    Constitutional: resting comfortably in bed  Eyes:  EOMI  Respiratory: unlabored on RA  Cardiovascular: perfused  GI: abdomen soft, non-tender to palpation. Incision healing well.  Genitourinary: no Silveira present  Skin: warm, dry  Musculoskeletal: strength 5/5  Neurologic: A&Ox4  Neuropsychiatric: behavior appropriate to situation    Consultations This Hospital Stay   NEPHROLOGY KIDNEY/PANCREAS TRANSPLANT ADULT IP CONSULT  NURSING TO CONSULT FOR VASCULAR ACCESS CARE IP CONSULT  NURSING TO CONSULT FOR VASCULAR ACCESS CARE IP CONSULT  NURSING TO CONSULT FOR VASCULAR ACCESS CARE IP CONSULT  INTERVENTIONAL RADIOLOGY ADULT/PEDS IP CONSULT  PHARMACY IP CONSULT  NURSING TO CONSULT FOR VASCULAR ACCESS CARE IP CONSULT  DIABETES EDUCATION IP CONSULT  PHARMACY LIAISON FOR MEDICATION COVERAGE CONSULT    Time Spent on this Encounter   I have spent greater than 30 minutes on this discharge.    Discharge Orders   Discharge Medications   Current Discharge Medication List        START taking these medications    Details   Alcohol Swabs PADS Use to swab the area of the injection or janny as directed Per insurance coverage  Qty: 200 each, Refills: 1    Associated Diagnoses: Steroid-induced hyperglycemia; Pre-diabetes      blood glucose (NO BRAND SPECIFIED) lancets standard To use to test glucose level in the blood Use to test blood sugar  4  times daily as directed. To accompany glucose monitor brands per insurance coverage. Freestyle Lite lancets  Qty: 200 each, Refills: 1    Associated Diagnoses: Steroid-induced hyperglycemia; Pre-diabetes      blood glucose (NO BRAND SPECIFIED) test strip To use to test glucose level in the blood Use to test blood sugar  4 times daily as directed. To accompany glucose monitor brands per insurance coverage. Freestyle Lite test strips  Qty: 100 strip, Refills: 2    Associated Diagnoses: Steroid-induced hyperglycemia; Pre-diabetes      blood glucose monitoring (NO BRAND SPECIFIED) meter device kit Use as directed Per insurance coverage: Freestyle Lite  glucose meter  Qty: 1 kit, Refills: 0    Associated Diagnoses: Steroid-induced hyperglycemia; Pre-diabetes      !! insulin lispro (HUMALOG KWIKPEN) 100 UNIT/ML (1 unit dial) KWIKPEN Inject 1-7 Units Subcutaneous 3 times daily (before meals)  Qty: 3 mL, Refills: 2    Associated Diagnoses: Pre-diabetes      !! insulin lispro (HUMALOG KWIKPEN) 100 UNIT/ML (1 unit dial) KWIKPEN Inject 1-5 Units Subcutaneous at bedtime  Qty: 3 mL, Refills: 2    Associated Diagnoses: Pre-diabetes      insulin pen needle (32G X 4 MM) 32G X 4 MM miscellaneous Use as directed by provider Per insurance coverage  Qty: 200 each, Refills: 1    Associated Diagnoses: Steroid-induced hyperglycemia; Pre-diabetes      losartan (COZAAR) 25 MG tablet Take 1 tablet (25 mg) by mouth every evening  Qty: 30 tablet, Refills: 2    Associated Diagnoses: Kidney replaced by transplant      pantoprazole (PROTONIX) 40 MG EC tablet Take 1 tablet (40 mg) by mouth 2 times daily  Qty: 60 tablet, Refills: 2    Associated Diagnoses: Gastroesophageal reflux disease, unspecified whether esophagitis present      !! predniSONE (DELTASONE) 10 MG tablet Take 4 tablets (40 mg) by mouth 2 times daily for 3 days, THEN 4 tablets (40 mg) daily for 3 days, THEN 2 tablets (20 mg) daily for 3 days, THEN 1 tablet (10 mg) daily for 3 days.  Qty: 45 tablet, Refills: 0    Associated Diagnoses: Kidney replaced by transplant; Kidney transplant rejection      !! predniSONE (DELTASONE) 5 MG tablet Take 1 tablet (5 mg) by mouth daily  Qty: 30 tablet, Refills: 11    Associated Diagnoses: Kidney replaced by transplant      Sharps Container MISC Use as directed to dispose of needles, lancets and other sharps  Qty: 1 each, Refills: 1    Associated Diagnoses: Steroid-induced hyperglycemia; Pre-diabetes       !! - Potential duplicate medications found. Please discuss with provider.        CONTINUE these medications which have CHANGED    Details   aspirin 81 MG EC tablet Take 1 tablet (81 mg) by  mouth daily for 31 days Stop medication at 3 months post-transplant (~4/1/24).    Associated Diagnoses: Kidney replaced by transplant      magnesium oxide (MAG-OX) 400 MG tablet Take 1 tablet (400 mg) by mouth 2 times daily  Qty: 60 tablet, Refills: 2    Associated Diagnoses: Hypomagnesemia      sodium bicarbonate 650 MG tablet Take 3 tablets (1,950 mg) by mouth 3 times daily  Qty: 270 tablet, Refills: 2    Associated Diagnoses: Metabolic acidosis      !! tacrolimus (GENERIC) 1 MG capsule Take 1 capsule (1 mg) by mouth 2 times daily Total dose = 7 mg twice daily    Associated Diagnoses: Kidney replaced by transplant      !! tacrolimus (GENERIC) 5 MG capsule Take 1 capsule (5 mg) by mouth 2 times daily Total dose = 7 mg twice daily    Associated Diagnoses: Kidney replaced by transplant      valGANciclovir (VALCYTE) 450 MG tablet Take 1 tablet (450 mg) by mouth daily    Associated Diagnoses: Kidney replaced by transplant       !! - Potential duplicate medications found. Please discuss with provider.        CONTINUE these medications which have NOT CHANGED    Details   acetaminophen (TYLENOL) 325 MG tablet Take 1-2 tablets (325-650 mg) by mouth every 4 hours as needed (For optimal non-opioid multimodal pain management to improve pain control.)    Associated Diagnoses: Kidney replaced by transplant      amLODIPine (NORVASC) 10 MG tablet Take 1 tablet (10 mg) by mouth daily  Qty: 30 tablet, Refills: 11    Associated Diagnoses: CKD (chronic kidney disease) stage 5, GFR less than 15 ml/min (H); Hypertension, renal      carvedilol (COREG) 12.5 MG tablet Take 1 tablet (12.5 mg) by mouth 2 times daily  Qty: 180 tablet, Refills: 3    Associated Diagnoses: Kidney replaced by transplant      cetirizine (ZYRTEC) 10 MG tablet Take 10 mg by mouth daily as needed for allergies (in the spring)      methylphenidate (RITALIN) 20 MG tablet Take 20 mg by mouth as needed Prn      mycophenolate (GENERIC EQUIVALENT) 250 MG capsule Take 3  capsules (750 mg) by mouth 2 times daily  Qty: 180 capsule, Refills: 11    Associated Diagnoses: Kidney replaced by transplant           STOP taking these medications       Probiotic Product (PROBIOTIC-10 PO) Comments:   Reason for Stopping:         sulfamethoxazole-trimethoprim (BACTRIM) 400-80 MG tablet Comments:   Reason for Stopping:         Vitamin D3 (CHOLECALCIFEROL) 25 mcg (1000 units) tablet Comments:   Reason for Stopping:                  Reason for your hospital stay    You were admitted for an acute kidney injury and were found to have borderline rejection. Your medications were adjusted, the rejection was treated with steroids, and your kidney function is improving. You are discharging home in stable condition with close follow up.     Activity    Your activity upon discharge: Activity encouraged as tolerated.     Adult [facility] Follow-up and recommended labs and tests    [facility] FOLLOW UP:     1. Next IV basiliximab infusion on 3/3/24 followed by monthly. You may need to be re-admitted to the hospital if we cannot secure coverage outpatient.     2.  Follow up with Transplant Nephrology Dr. Zaire Harvey as scheduled on 3/6/24.     Remember to always bring an updated medication list to all appointments.        Call your Transplant Coordinator ([phone]) with questions about Transplant Center appointment scheduling.     LABS:     CBC, BMP, magnesium, phosphorus, tacrolimus level to be drawn daily every Monday and Thursday by home health care nurse if arranged, or at an outpatient lab.      EBV levels monthly.     Monitor and record    Monitor blood pressure and weight daily.     Monitor glucose 4 times per day.     When to contact your care team    WHEN TO CONTACT YOUR  COORDINATOR:     Transplant Coordinator [phone]     Notify your coordinator if you have pain over your kidney, increased redness or drainage from your incision, fever greater than 100F, decreased urine output or new  or increased amount of blood in urine.     Notify your coordinator immediately if you are ever unable to take your immunosuppressive medications for any reason.     If you have URGENT concerns after office hours, please call the hospital switchboard at [phone] and ask to have the organ transplant nurse on-call paged. If you have a life-threatening emergency, go to the nearest emergency room.     Diet    Diet recommendations post-transplant: Low potassium diet until kidney function improves. Heart healthy dietary habits long term (low saturated/trans fat, low sodium). Practice food safety precautions.         Data   Most Recent 3 CBC's:  Recent Labs   Lab Test 03/01/24  0717 02/29/24  0610 02/28/24  0636   WBC 9.5 7.9 5.4   HGB 8.5* 8.5* 8.7*   MCV 95 97 96    270 254      Most Recent 3 BMP's:  Recent Labs   Lab Test 03/01/24  1300 03/01/24  1043 03/01/24  0817 03/01/24  0717 02/29/24  0745 02/29/24  0610 02/28/24  0808 02/28/24  0636   NA  --   --   --  141  --  139  --  135   POTASSIUM  --   --   --  4.5  --  4.8  --  5.3   CHLORIDE  --   --   --  106  --  107  --  104   CO2  --   --   --  21*  --  18*  --  17*   BUN  --   --   --  48.0*  --  55.6*  --  53.5*   CR  --   --   --  3.01*  --  5.19*  --  6.71*   ANIONGAP  --   --   --  14  --  14  --  14   NABIL  --   --   --  9.3  --  9.9  --  10.1*   * 146* 120* 120*   < > 137*   < > 272*    < > = values in this interval not displayed.     Most Recent 2 LFT's:  Recent Labs   Lab Test 12/19/23  0953 03/29/21  1333   AST 27 11   ALT 41 28   ALKPHOS 67 73   BILITOTAL 0.5 0.2     Most Recent INR's and Anticoagulation Dosing History:  Anticoagulation Dose History          Latest Ref Rng & Units 3/29/2021 8/24/2023 12/19/2023 1/2/2024 2/9/2024 2/26/2024   Recent Dosing and Labs   INR 0.85 - 1.15 1.06  0.98  1.02  1.17  0.93  1.08      Most Recent 3 Troponin's:No lab results found.  Most Recent Cholesterol Panel:  Recent Labs   Lab Test 12/04/18  1054    CHOL 239*   *   HDL 40*   TRIG 209*     Most Recent 6 Bacteria Isolates From Any Culture (See EPIC Reports for Culture Details):  Recent Labs   Lab Test 08/26/16  1150 02/19/16  1000   CULT >100,000 colonies/mL mixed urogenital torie  Susceptibility testing not routinely done   <10,000 colonies/mL urogenital torie     Most Recent TSH, T4 and A1c Labs:  Recent Labs   Lab Test 02/28/24  1313   A1C 5.7*     Results for orders placed or performed during the hospital encounter of 02/24/24   IR Fine Needle Aspiration w Ultrasound    Narrative    [id number]    RUFUS BOSCH  [id number]  2000;  23 years    IR FINE NEEDLE ASPIRATION W ULTRASOUND  RLQ renal transplant hodan-incional fluid aspiration.    -----    PRE-OPERATIVE DIAGNOSIS:  hodan-nephric fluid collection    POST-OPERATIVE DIAGNOSIS:  Same    PROCEDURE:  Ultrasound guided aspiration      Impression    IMPRESSION:  Completed ultrasound guided aspiration of RLQ  hodan-nephric fluid collection. A total of 150 mL dark myles colored  fluid was aspirated.  Ralph. ?LOCAL     -----    CLINICAL HISTORY:  hodan-nephric fluid collection, aspiration requested    PERFORMED BY:  LINSEY Herrmann PA-C (Interventional  Radiology)    CONSENT:  The patient understood the limitations, alternatives, and  risks of the procedure and agreed to the procedure.  Written informed  consent was obtained and is documented in the patient record.    MEDICATIONS:  1% lidocaine was used for local anesthesia.      NURSING:  The patient was placed on continuous vital signs monitoring.   Vital signs were monitored by nursing staff under my supervision.      DESCRIPTION:  Hodan-nephric fluid was identified on limited abdominal  ultrasound exam and picture is documented in the patient's record.   The abdomen was prepped and draped in the usual sterile fashion.   Local anesthesia was achieved.  Under ultrasound guidance, a Temno  biopsy trocar was advanced into the fluid space  with fluid return.   Fluid was aspirated through outer cannula.  Cannula was removed on  completion different accession biopsy, and drainage of fluid and  sterile dressing was applied.     COMPLICATIONS:  No immediate complications; the patient remained  stable throughout the procedure    ESTIMATED BLOOD LOSS:  Minimal    SPECIMENS:  Per above    -----  [ The physician assistant/associate (PA) who performed this procedure  and signed the above report is licensed to practice in the state [facility] pursuant to [place] Statute 147A.09. ]    -----    BESSY GOLDEN PA-C         SYSTEM ID:  [id number]   IR Renal Biopsy Right    Narrative    accession # [id number]    Boogie Villa   [id number]      [id number]    BOOGIE CHIN DANITZA  [id number]  2000;  23 years    IR RENAL BIOPSY RIGHT  at same time as jewell incisional fluid aspiration.    -----    PRE-OPERATIVE DIAGNOSIS:  Abnormal transplant kidney function labs;  status post kidney transplant    POST-OPERATIVE DIAGNOSIS:  Same    PROCEDURE:  Ultrasound guided transplant kidney biopsy      Impression    IMPRESSION:  Completed RLQ transplant kidney biopsy. ?A total of five  passes yielded tissue cores collected for further pathological  evaluation. ?Microscopy support present. ?No immediate complications.  ?Dx: fluid collection, elevated creatinine. ?Ralph. ?LOCAL     ----------    CLINICAL HISTORY:  Abnormal transplant kidney function labs; status  post kidney transplant    PERFORMED BY:  LINSEY Herrmann PA-C (Interventional  Radiology)    CONSENT:  The patient understood the limitations, alternatives, and  risks of the procedure and agreed to the procedure.  Written informed  consent was obtained and is documented in the patient record.    MEDICATIONS:  1% lidocaine was used for local anesthesia.  Gelatin  foam plugs were used for hemostasis.    NURSING:  The patient was placed on continuous vital signs monitoring.   Vital signs were monitored by  nursing staff under my supervision.    DESCRIPTION:  The patient's low abdomen was prepped and draped in the  usual sterile fashion.  Under ultrasound guidance, the patient's skin,  subcutaneous tissue, and kidney capsule were anesthetized.  With  ultrasound guidance, using a 17/18 gauge coaxial needle system, tissue  specimens were obtained.  Needle was removed and the tract was sealed  with Gelfoam rolls.  Pressure was held the site, cleansed, and sterile  dressing applied.  Images were saved throughout the procedure.    COMPLICATIONS:  No immediate complication; the patient remained stable  throughout the procedure.    ESTIMATED BLOOD LOSS:  Minimal    SPECIMENS:  Per above    -----  [ The physician assistant/associate (PA) who performed this procedure  and signed the above report is licensed to practice in the [facility] pursuant to [place] Statute 147A.09. ]  -----    BESSY GOLDEN PA-C         SYSTEM ID:  [id number]   NM Renogram    Narrative    Examination:  NM RENOGRAM 2/26/2024 2:29 PM     Comparison: Ultrasound renal transplant with Doppler 2/23/2024    History: Please evaluate transplanted kidney; admission with ROSI of  unclear etiology  Renal transplant.  Additional info from EMR: 23 year old?male?with history significant  for ESKD s/p RLQ kidney transplant 12/28/2023 complicated by10-day  hospitalization for rejection?(kidney transplant biopsy on  1/2/2024?with acute cellular-mediated rejection),  on?immunosuppression?medication admitted 2/24 with 4 days of fatigue,  elevated creatinine (3.16), but making urine.     TECHNIQUE: 9.7 mCi of [id number] MAG3 was administered intravenously.  Dynamic first minute angiogram images were obtained over 60 seconds.  Subsequent images were obtained up to 30 minutes. A SPECT-CT of the  abdomen and pelvis was also performed.    Findings:   Evaluation of initial blood flow images demonstrates slightly  decreased blood flow to the right lower quadrant transplant  kidney   30 minutes nephrogram images demonstrates fair cortical uptake.  Continuous accumulation of the tracer throughout 30 minutes.  Negligible excretion.   Time-activity curve shows continuous increase in the curve with no  evidence of wash out.     SPECT-CT:  There is a hypodense fluid collection in the rectus sheath anterior to  the right lower quadrant transplant kidney also seen on prior  ultrasound without any radioactivity.    SPECT-CT shows persistent tracer in the transplant cortex and slight  radiotracer within the slightly prominent renal calyces and pelvis. No  tracer passage into the ureter.   Atrophic right native kidney is partially visualized.  Bilateral hydronephrotic native ureters with partially visualized  radioactivity in the right native ureter      Impression    Impression:   Decreased flow and decreased function of the transplant. Findings can  be seen with late developing acute rejection or cyclosporin toxicity.     I have personally reviewed the examination and initial interpretation  and I agree with the findings.    NIRMALA MANCILLA MD         SYSTEM ID:  [id number]

## 2024-02-29 NOTE — PROGRESS NOTES
Care Management Follow Up    Length of Stay (days): 5    Expected Discharge Date: 03/01/2024     Concerns to be Addressed: OP Simulect Coverage  Patient plan of care discussed at interdisciplinary rounds: Yes    Anticipated Discharge Disposition: Home     Anticipated Discharge Services: None  Anticipated Discharge DME: None    Patient/family educated on Medicare website which has current facility and service quality ratings: no  Education Provided on the Discharge Plan:    Patient/Family in Agreement with the Plan: yes    Referrals Placed by CM/SW: External Care Coordination  Private pay costs discussed: Not applicable    Additional Information:    Team inquired if outpatient simulect would be covered.  In basket message sent to [facility] team with request.  Pt will also need diabetic education prior to discharge.      Nohemy Pedersen, RN BSN, PHN, ACM-RN  2/12/2024  Nurse Coordinator    Phone: [phone]    Social Work and Care Management Department     SEARCHABLE in [facility] - search CARE COORDINATOR     [place] ([phone]) Saturday & Sunday; ([phone]) [facility] Recognized Holidays     Units: 5A Onc Vocera & 5C Vocera Pager: [phone]    Units: 6B Vocera & 6C Vocera  Pager: [phone]    Units: 7A SOT RNCC Vocera, 7B Med Surg Vocera, & 7C Med Surg Vocera  Pager: [phone]    Units: 6A Vocera & 4A CVICU Vocera, 4C MICU Vocera, and 4E SICU Vocera   Pager: [phone]    Units: 5 Ortho Vocera & 5 Med Surg Vocera  Pager: [phone]    Units: 6 Med Surg Vocera & 8 Med Surg Vocera  Pager [phone]    2/29/2024

## 2024-02-29 NOTE — PLAN OF CARE
"Goal Outcome Evaluation:    1075-6565  BP (!) 156/89 (BP Location: Left arm)   Pulse 89   Temp 97.7  F (36.5  C) (Oral)   Resp 16   Ht 1.803 m (5' 11\")   Wt 130.1 kg (286 lb 14.4 oz)   SpO2 98%   BMI 40.01 kg/m      Pt A&O, RA, VSS  Denies pain and SOB  Up independently ela bathroom  Tolerating 3 gram K diet.  No BM, Voiding spontaneously  PIV infusing Insulin drip (Alg 2) , 227,251,277  Parent at bedside  No acute change, continue w/poc          "

## 2024-02-29 NOTE — PROGRESS NOTES
Transplant Surgery  Inpatient Daily Progress Note  02/29/2024    Assessment & Plan: 23 year old male with history significant for ESKD s/p RLQ kidney transplant 12/28/2023 complicated by 10-day hospitalization for rejection (kidney transplant biopsy on 1/2/2024 with acute cellular-mediated rejection), on immunosuppression medication admitted 2/24 with 4 days of fatigue, elevated creatinine, but making urine.     Graft function: Creatinine 5.2<-6.7<-6.7<-5.5<-4.7 (4.1 on admission). Renal ultrasound 2/25 with patent vasculature, mild hydronephrosis. Hodan-incisional superficial fluid collection 9.8 cm. DSA negative. Unclear etiology of rising creatinine at this time.  - HLA & AT1R pending. Started Losartan 25 mg daily.   - DSA negative   - Lasix renogram 2/26  with no evidence of leak or obstruction   - IR: aspiration of superficial fluid collection 2/26. Fluid Cr equal to serum. Other fluid NGTD.   - Hold Bactrim (give pentamidine 2/28x1)   Borderline kidney rejection:  mg daily x3 followed by taper and Prednisone 5 mg daily due to 2nd rejection   Immunosuppression: Unable to use Belatacept due to EBV Ig negative.   - Simulect 2/28 & 3/3  -  BID  - Tacrolimus 7 BID, decrease goal to 4-6 with Simulect initiation   - Steroids as above  Cardiorespiratory: ASA 81 restarted  HTN: PTA Carvedilol 12.5 mg BID and Norvasc 10 mg daily. Add Losartan 25 daily.   Hematology:   Anemia in chronic disease: HGB stable ~8  GI/Nutrition: Regular diet  Diarrhea: Monitor  Heartburn: Start PPI BID  Fluid/Electrolytes: No mIVF, adequate oral intake   Hypomagnesemia: PTA mag-ox 400 daily  Hypercalcemia: Discontinue vit D   Low bicarb: 1950 mg BID  Endocrine:   Steroid induced hyperglycemia: sliding scale insulin prn, changed to insulin gtt. Ordered diabetic education for possible discharge on ssi.   : Voiding independently. Place steward if PVR >50  Infectious disease:   - BCx and UCX NGTD  - CMV neg, BK neg  EBV viremia: <500  "copies, monitor  Prophylaxis: PCP (Bactrim-hold due to ROSI, give Pentamidine on 2/27)  DVT Prophylaxis: Ambulatory  Activity: Up ad brittany  Disposition: 7A    SUNIL/Fellow/Resident Provider: Radha Ortega PA-C [id number]    Faculty: John Castillo M.D.    __________________________________________________________________  Transplant History: Admitted 2/24/2024 for fatigue, elevated creatinine (3.16).  12/28/2023 (Kidney), Postoperative day: 63     Interval History: History is obtained from the patient  Overnight events: No complaints.     ROS:   A 10-point review of systems was negative except as noted above.    Meds:   amLODIPine  10 mg Oral Daily    aspirin  81 mg Oral Daily    [START ON 3/3/2024] basiliximab (SIMULECT) 20 mg in sodium chloride 0.9 % 50 mL infusion  20 mg Intravenous Once    carvedilol  12.5 mg Oral BID    losartan  25 mg Oral QPM    magnesium oxide  400 mg Oral BID    mycophenolate  750 mg Oral BID    pantoprazole  40 mg Oral BID AC    sodium bicarbonate  1,950 mg Oral TID    sodium chloride (PF)  3 mL Intracatheter Q8H    tacrolimus  7 mg Oral BID IS    [START ON 3/1/2024] valGANciclovir  450 mg Oral Every Other Day       Physical Exam:     Admit Weight: 131.5 kg (290 lb)    Current vitals:   /76 (BP Location: Left arm)   Pulse 88   Temp 97.6  F (36.4  C) (Oral)   Resp 18   Ht 1.803 m (5' 11\")   Wt 128.5 kg (283 lb 6.4 oz)   SpO2 100%   BMI 39.53 kg/m         Vital sign ranges:    Temp:  [97.6  F (36.4  C)-98.5  F (36.9  C)] 97.6  F (36.4  C)  Pulse:  [82-95] 88  Resp:  [16-18] 18  BP: (126-156)/() 136/76  SpO2:  [96 %-100 %] 100 %  Patient Vitals for the past 24 hrs:   BP Temp Temp src Pulse Resp SpO2 Weight   02/29/24 1404 136/76 97.6  F (36.4  C) Oral 88 18 100 % --   02/29/24 0958 -- -- -- -- -- -- 128.5 kg (283 lb 6.4 oz)   02/29/24 0836 (!) 151/103 97.8  F (36.6  C) Oral 82 16 96 % --   02/29/24 0136 126/68 98.5  F (36.9  C) Oral 88 18 97 % --   02/28/24 2151 (!) 143/80 97.7 "  F (36.5  C) Oral 95 16 97 % --   02/28/24 1824 (!) 156/89 97.7  F (36.5  C) Oral 89 16 -- --     General Appearance: in no apparent distress.   Heart: regular rate and rhythm  Lungs: breathing comfortably on room air  Abdomen: The abdomen is soft, NT, ND with well healing surgical scar  : steward is not present.    Extremities: edema: minimal   Skin: Warm and dry   Neurologic: alert and oriented    Data:   CMP  Recent Labs   Lab 02/29/24  1406 02/29/24  1310 02/29/24  0745 02/29/24  0610 02/28/24  0808 02/28/24  0636 02/26/24  1621 02/26/24  1440   NA  --   --   --  139  --  135   < >  --    POTASSIUM  --   --   --  4.8  --  5.3   < >  --    CHLORIDE  --   --   --  107  --  104   < >  --    CO2  --   --   --  18*  --  17*   < >  --    * 181*   < > 137*   < > 272*   < >  --    BUN  --   --   --  55.6*  --  53.5*   < >  --    CR  --   --   --  5.19*  --  6.71*   < >  --    GFRESTIMATED  --   --   --  15*  --  11*   < >  --    NABIL  --   --   --  9.9  --  10.1*   < >  --    MAG  --   --   --  1.6*  --  1.8   < >  --    PHOS  --   --   --  4.4  --  3.1   < >  --    FCREAT  --   --   --   --   --   --   --  5.6    < > = values in this interval not displayed.     CBC  Recent Labs   Lab 02/29/24  0610 02/28/24  1313 02/28/24  0636   HGB 8.5*  --  8.7*   WBC 7.9  --  5.4     --  254   A1C  --  5.7*  --      Urinalysis  Recent Labs   Lab Test 02/25/24  1158 02/23/24  1234 10/19/20  1645 02/26/20  1530 11/25/19  0904   COLOR Straw Straw   < >  --   --    APPEARANCE Clear Clear   < >  --   --    URINEGLC 150* 30*   < >  --   --    URINEBILI Negative Negative   < >  --   --    URINEKETONE Negative Negative   < >  --   --    SG 1.006 1.006   < >  --   --    UBLD Negative Trace*   < >  --   --    URINEPH 7.5* 7.0   < >  --   --    PROTEIN 30* 20*   < >  --   --    NITRITE Negative Negative   < >  --   --    LEUKEST Negative Negative   < >  --   --    RBCU 1 2   < >  --   --    WBCU <1 1   < >  --   --    UTPG  --    --   --  4.31* 4.92*    < > = values in this interval not displayed.

## 2024-02-29 NOTE — PROGRESS NOTES
[facility]   Transplant Nephrology Progress Note  Date of Admission:  2/24/2024  Today's Date: 02/29/2024    Recommendations:  - No acute indications for dialysis, but will assess daily.  - Last dose of Solumedrol 500 mg IV today (2/27, 2/28 and 2/29), then would do a prednisone taper and leave patient on prednisone 5 mg daily.  - Received one dose of basiliximab (2/28) with plans for a second dose 4 days later (3/3) for added immunosuppression to allow decreasing tacrolimus goal to 4-6.    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  Good urine output.  No acute indications for dialysis, but will assess daily.   - ROSI secondary to unclear etiology.  Kidney transplant biopsy 2/26 shows borderline acute cellular-mediated rejection with significant acute tubular injury.  No evidence of ABMR.  Patient also had an early acute cellular-mediated rejection and was treated with rATG, but closure biopsy was okay except for acute tubular injury too.  No evidence of urine leak with creatinine in perinephric fluid the same as serum.  Kidney transplant ultrasound mostly unremarkable outside of  perinephric fluid collections and mild hydronephrosis.  Treating borderline acute cellular-mediated rejection and stopped Bactrim.  Feel increased creatinine is also due to sensitivity to tacrolimus and would consider dosing basiliximab to allow lower tacrolimus goal levels.   - Baseline Creatinine: ~ 1.8-2.1   - Proteinuria: Moderate (1-3 grams)   - Date DSA Last Checked: Feb/2024      Latest DSA: No cPRA: 0%   - BK Viremia: No   - Kidney Tx Biopsy: Feb 26, 2024; Result: Borderline acute cellular-mediated rejection.  No evidence of antibody-mediated rejection.   Significant acute tubular injury.  t1 i1 v0 (borderline), g0 ptc0 C4d0, ct0 ci0 cv1 ah1             Feb 09, 2024; Result: No diagnostic evidence of acute rejection.  No interstitial fibrosis and tubular atrophy.   Severe arterial sclerosis.             Jan 02, 2024; Result: Acute cellular-mediated rejection, [place] 1B.   Mild glomerulitis and severe peritubular capillaritis, but negative C4d and no DSA. Mild arterial sclerosis.    - Transplant Ureteral Stent: Removed    # Immunosuppression: Tacrolimus immediate release (goal 4-6), Mycophenolate mofetil (dose 750 mg every 12 hours), Prednisone (dose taper), and basiliximab.   - Induction with Recent Transplant:  Low Intensity Protocol, although did receive full dose rATG due to early acute rejection    - Patient is in an immunosuppressed state and will continue to monitor for efficacy and toxicity of immunosuppression medications.   - Changes: Not at this time; Would treat acute rejection with Solumedrol followed by a prednisone taper and leave patient on prednisone 5 mg daily.   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  Patient is EBV IgG Ab negative, so cannot use belatacept.  Would be apprehensive in using mTORi in patient with ATI and high serum creatinine.  For this reason started basiliximab for maintenance (two doses days 0 and 4 (20 mg doses), then monthly dosing (40 mg) to allow lowering tacrolimus to 4-6 goal range.    # Infection Prophylaxis:   - PJP: Inhaled pentamidine; Bactrim stopped due to ROSI and hyperkalemia.  - CMV: Valganciclovir (Valcyte); Patient is CMV IgG Ab discordant (D+/R-) and will continue on Valcyte x 6 months, then check CMV PCR monthly until 12 months post transplant.    # Hypertension: Borderline control;  Goal BP: < 140/90 (Hospitalization goal)   - Volume status: Mildly hypervolemic   - Changes: Not at this time    # Elevated Blood Glucose: Glucose generally running ~ 130-170s; Felt secondary to high dose steroids.   - Patient is on insulin gtt   - Management as per primary team.    # Anemia in Chronic Renal Disease: Hgb: Stable, low      TOM: No   - Iron studies: High iron saturation    # Mineral  Bone Disorder:   - Secondary renal hyperparathyroidism; PTH level: Significantly elevated (601-1200 pg/ml)        On treatment: None  - Vitamin D; level: Low        On supplement: Yes; Recommend stopping cholecalciferol due to hypercalcemia.  - Calcium; level: High normal        On supplement: No  - Phosphorus; level: Normal        On binder: No    # Electrolytes:   - Potassium; level: Normal        On supplement: No  - Magnesium; level: Low        On supplement: No  - Bicarbonate; level: Stable low        On supplement: Yes    # Obesity: Stable weight.              - Once patient heals and recovers from surgery, would recommend working on weight loss for overall health by increasing exercise and watching caloric intake.    # GERD: Patient reports no relief with taking calcium carbonate.   - Recommend starting PPI.     # H/o Ureteral Reimplantation, s/p Mitrofanoff: Patient reports not using for Mitrofanoff in a couple of years and voids normally on his own.  Mitrofanoff is nonfunctional at this time.     # H/o Bilateral Uveitis: No evidence of systemic autoimmune/inflammatory disease at last Rheumatology.      # EBV IgG Ab Discordance (D+/R-): Will do surveillance EBV PCR qmonth until 12 months post transplant, then q3 months until 2 years post transplant.  Last EBV PCR was detectable, but less than quantifiable with last check Feb/2024.    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    Abhilash Raza MD  Transplant Nephrology  Contact information available via [facility] Paging/Directory    Interval History   Mr. Villa's creatinine is 5.19 (02/29  "0610); Trend down.  Good urine output.  Other significant labs/tests/vitals: Stable electrolytes.  Stable hemoglobin.  No new events overnight.  No chest pain or shortness of breath.  No leg swelling.  No nausea and vomiting.  Bowel movements are loose.  No fever, sweats or chills.    Review of Systems   4 point ROS was obtained and negative except as noted in the Interval History.    MEDICATIONS:   amLODIPine  10 mg Oral Daily    aspirin  81 mg Oral Daily    [START ON 3/3/2024] basiliximab (SIMULECT) 20 mg in sodium chloride 0.9 % 50 mL infusion  20 mg Intravenous Once    carvedilol  12.5 mg Oral BID    losartan  25 mg Oral QPM    magnesium oxide  400 mg Oral BID    mycophenolate  750 mg Oral BID    pantoprazole  40 mg Oral BID AC    sodium bicarbonate  1,950 mg Oral TID    sodium chloride (PF)  3 mL Intracatheter Q8H    tacrolimus  7 mg Oral BID IS    [START ON 3/1/2024] valGANciclovir  450 mg Oral Every Other Day      dextrose      insulin regular 6 Units/hr (24 1408)       Physical Exam   Temp  Av.8  F (36.6  C)  Min: 97.3  F (36.3  C)  Max: 98.2  F (36.8  C)      Pulse  Av.7  Min: 78  Max: 100 Resp  Av.9  Min: 16  Max: 39  SpO2  Av.1 %  Min: 97 %  Max: 99 %     /76 (BP Location: Left arm)   Pulse 88   Temp 97.6  F (36.4  C) (Oral)   Resp 18   Ht 1.803 m (5' 11\")   Wt 128.5 kg (283 lb 6.4 oz)   SpO2 100%   BMI 39.53 kg/m     Date 24 0700 - 24 0659   Shift 1213-9145 7668-4892 1304-3584 24 Hour Total   INTAKE   P.O. 240   240   Shift Total(mL/kg) 240(1.82)   240(1.82)   OUTPUT   Urine 800   800   Shift Total(mL/kg) 800(6.06)   800(6.06)   Weight (kg) 132 132 132 132      Admit Weight: 131.5 kg (290 lb)     GENERAL APPEARANCE: alert and no distress  HENT: mouth without ulcers or lesions  RESP: lungs clear to auscultation - no rales, rhonchi or wheezes  CV: regular rhythm, normal rate, no rub, no murmur  EDEMA: none to trace LE edema bilaterally  ABDOMEN: soft, " nondistended, nontender, bowel sounds normal, obese  MS: extremities normal - no gross deformities noted, no evidence of inflammation in joints, no muscle tenderness  SKIN: no rash  TX KIDNEY: normal  DIALYSIS ACCESS:  None    Data   All labs reviewed by me.  CMP  Recent Labs   Lab 02/29/24  1406 02/29/24  1310 02/29/24  1217 02/29/24  1101 02/29/24  0745 02/29/24  0610 02/28/24  0808 02/28/24  0636 02/27/24  1711 02/27/24  0628 02/26/24  1621 02/26/24  0539   NA  --   --   --   --   --  139  --  135  --  141  --  144   POTASSIUM  --   --   --   --   --  4.8  --  5.3  --  5.2  --  4.6   CHLORIDE  --   --   --   --   --  107  --  104  --  107  --  110*   CO2  --   --   --   --   --  18*  --  17*  --  21*  --  20*   ANIONGAP  --   --   --   --   --  14  --  14  --  13  --  14   * 181* 170* 202*   < > 137*   < > 272*   < > 120*   < > 90   BUN  --   --   --   --   --  55.6*  --  53.5*  --  46.2*  --  39.8*   CR  --   --   --   --   --  5.19*  --  6.71*  --  6.68*  --  5.49*   GFRESTIMATED  --   --   --   --   --  15*  --  11*  --  11*  --  14*   NABIL  --   --   --   --   --  9.9  --  10.1*  --  9.5  --  8.9   MAG  --   --   --   --   --  1.6*  --  1.8  --  1.9  --  1.8   PHOS  --   --   --   --   --  4.4  --  3.1  --  5.3*  --  4.7*    < > = values in this interval not displayed.     CBC  Recent Labs   Lab 02/29/24  0610 02/28/24  0636 02/27/24  0628 02/26/24  0539   HGB 8.5* 8.7* 8.4* 8.2*   WBC 7.9 5.4 3.3* 3.8*   RBC 2.55* 2.66* 2.59* 2.48*   HCT 24.6* 25.4* 24.4* 24.0*   MCV 97 96 94 97   MCH 33.3* 32.7 32.4 33.1*   MCHC 34.6 34.3 34.4 34.2   RDW 12.3 12.1 12.4 12.5    254 258 225     INR  Recent Labs   Lab 02/26/24  0539   INR 1.08   PTT 26     ABGNo lab results found in last 7 days.   Urine Studies  Recent Labs   Lab Test 02/25/24  1158 02/23/24  1234 12/19/23  1007 03/29/21  1404 12/03/20  1525 10/19/20  1645   COLOR Straw Straw Straw Straw Yellow Yellow   APPEARANCE Clear Clear Clear Clear Clear  Clear   URINEGLC 150* 30* 70* 50* 100* 50 mg/dL*   URINEBILI Negative Negative Negative Negative Negative Negative   URINEKETONE Negative Negative Negative Negative Negative Negative   SG 1.006 1.006 1.003 1.008 1.025 1.005   UBLD Negative Trace* Trace* Negative Small* Small*   URINEPH 7.5* 7.0 7.5* 6.0 7.0 6.0   PROTEIN 30* 20* 100* >499* >300* >=500 mg/dL*   UROBILINOGEN  --   --   --   --  0.2 <2.0 E.U./dL   NITRITE Negative Negative Negative Negative Negative Negative   LEUKEST Negative Negative Negative Negative Negative Negative   RBCU 1 2 0 0  --  0-2   WBCU <1 1 1 <1  --  0-5     Recent Labs   Lab Test 02/26/20  1530 11/25/19  0904 05/13/19  0728 12/04/18  1056 06/08/18  1310 02/10/17  1004 08/26/16  1150 02/19/16  1000   UTPG 4.31* 4.92* 4.31* 4.59* 6.69* 2.78* 2.45* 2.17*     PTH  Recent Labs   Lab Test 01/07/24  0750 12/19/23  0953 07/16/21  1650 12/16/20  1611 12/16/20  0000 10/19/20  1614 02/26/20  1520 11/25/19  0859 05/13/19  0712 12/04/18  1054 06/08/18  1256 01/16/18  1102 08/09/17  1626 02/10/17  1002 08/26/16  1245 02/19/16  0955   PTHI 746* 445* 308* 94* 94 131* 201* 154* 110* 177* 96* 133* 66 39 47 42     Iron Studies  Recent Labs   Lab Test 01/07/24  0750 12/19/23  0953 02/26/20  1520 05/13/19  0712 06/08/18  1256 01/16/18  1102 08/09/17  1626 02/10/17  1002   IRON 167* 104 66 71 87 116 93 54   * 250 251 269 257 290 284 297   IRONSAT 79* 42 26 26 34 40 33 18   DAVID 783* 826* 133 109 82 86 100 113       IMAGING:  All imaging studies reviewed by me.

## 2024-03-01 ENCOUNTER — TELEPHONE (OUTPATIENT)
Dept: TRANSPLANT | Facility: CLINIC | Age: 24
End: 2024-03-01
Payer: COMMERCIAL

## 2024-03-01 VITALS
SYSTOLIC BLOOD PRESSURE: 134 MMHG | HEIGHT: 71 IN | WEIGHT: 275.5 LBS | TEMPERATURE: 97.4 F | OXYGEN SATURATION: 98 % | BODY MASS INDEX: 38.57 KG/M2 | HEART RATE: 77 BPM | RESPIRATION RATE: 18 BRPM | DIASTOLIC BLOOD PRESSURE: 75 MMHG

## 2024-03-01 LAB
ANION GAP SERPL CALCULATED.3IONS-SCNC: 14 MMOL/L (ref 7–15)
BACTERIA BLD CULT: NO GROWTH
BUN SERPL-MCNC: 48 MG/DL (ref 6–20)
CALCIUM SERPL-MCNC: 9.3 MG/DL (ref 8.6–10)
CHLORIDE SERPL-SCNC: 106 MMOL/L (ref 98–107)
CREAT SERPL-MCNC: 3.01 MG/DL (ref 0.67–1.17)
DEPRECATED HCO3 PLAS-SCNC: 21 MMOL/L (ref 22–29)
EGFRCR SERPLBLD CKD-EPI 2021: 29 ML/MIN/1.73M2
ERYTHROCYTE [DISTWIDTH] IN BLOOD BY AUTOMATED COUNT: 12.3 % (ref 10–15)
GLUCOSE BLDC GLUCOMTR-MCNC: 102 MG/DL (ref 70–99)
GLUCOSE BLDC GLUCOMTR-MCNC: 109 MG/DL (ref 70–99)
GLUCOSE BLDC GLUCOMTR-MCNC: 115 MG/DL (ref 70–99)
GLUCOSE BLDC GLUCOMTR-MCNC: 120 MG/DL (ref 70–99)
GLUCOSE BLDC GLUCOMTR-MCNC: 144 MG/DL (ref 70–99)
GLUCOSE BLDC GLUCOMTR-MCNC: 146 MG/DL (ref 70–99)
GLUCOSE BLDC GLUCOMTR-MCNC: 146 MG/DL (ref 70–99)
GLUCOSE BLDC GLUCOMTR-MCNC: 156 MG/DL (ref 70–99)
GLUCOSE BLDC GLUCOMTR-MCNC: 192 MG/DL (ref 70–99)
GLUCOSE BLDC GLUCOMTR-MCNC: 205 MG/DL (ref 70–99)
GLUCOSE SERPL-MCNC: 120 MG/DL (ref 70–99)
HCT VFR BLD AUTO: 25.8 % (ref 40–53)
HGB BLD-MCNC: 8.5 G/DL (ref 13.3–17.7)
MAGNESIUM SERPL-MCNC: 1.4 MG/DL (ref 1.7–2.3)
MCH RBC QN AUTO: 31.1 PG (ref 26.5–33)
MCHC RBC AUTO-ENTMCNC: 32.9 G/DL (ref 31.5–36.5)
MCV RBC AUTO: 95 FL (ref 78–100)
PATH REPORT.COMMENTS IMP SPEC: NORMAL
PATH REPORT.FINAL DX SPEC: NORMAL
PATH REPORT.GROSS SPEC: NORMAL
PATH REPORT.MICROSCOPIC SPEC OTHER STN: NORMAL
PATH REPORT.RELEVANT HX SPEC: NORMAL
PHOSPHATE SERPL-MCNC: 3.9 MG/DL (ref 2.5–4.5)
PHOTO IMAGE: NORMAL
PLATELET # BLD AUTO: 303 10E3/UL (ref 150–450)
POTASSIUM SERPL-SCNC: 4.5 MMOL/L (ref 3.4–5.3)
RBC # BLD AUTO: 2.73 10E6/UL (ref 4.4–5.9)
SCANNED LAB RESULT: NORMAL
SODIUM SERPL-SCNC: 141 MMOL/L (ref 135–145)
TACROLIMUS BLD-MCNC: 5.5 UG/L (ref 5–15)
TME LAST DOSE: NORMAL H
TME LAST DOSE: NORMAL H
WBC # BLD AUTO: 9.5 10E3/UL (ref 4–11)

## 2024-03-01 PROCEDURE — 250N000012 HC RX MED GY IP 250 OP 636 PS 637: Performed by: PHYSICIAN ASSISTANT

## 2024-03-01 PROCEDURE — 250N000011 HC RX IP 250 OP 636: Performed by: NURSE PRACTITIONER

## 2024-03-01 PROCEDURE — 36415 COLL VENOUS BLD VENIPUNCTURE: CPT | Performed by: NURSE PRACTITIONER

## 2024-03-01 PROCEDURE — 80197 ASSAY OF TACROLIMUS: CPT | Performed by: NURSE PRACTITIONER

## 2024-03-01 PROCEDURE — 83735 ASSAY OF MAGNESIUM: CPT

## 2024-03-01 PROCEDURE — 80048 BASIC METABOLIC PNL TOTAL CA: CPT

## 2024-03-01 PROCEDURE — 250N000012 HC RX MED GY IP 250 OP 636 PS 637

## 2024-03-01 PROCEDURE — 250N000013 HC RX MED GY IP 250 OP 250 PS 637

## 2024-03-01 PROCEDURE — 250N000012 HC RX MED GY IP 250 OP 636 PS 637: Performed by: NURSE PRACTITIONER

## 2024-03-01 PROCEDURE — 84100 ASSAY OF PHOSPHORUS: CPT

## 2024-03-01 PROCEDURE — 250N000013 HC RX MED GY IP 250 OP 250 PS 637: Performed by: SURGERY

## 2024-03-01 PROCEDURE — 99238 HOSP IP/OBS DSCHRG MGMT 30/<: CPT | Mod: 24 | Performed by: SURGERY

## 2024-03-01 PROCEDURE — 85027 COMPLETE CBC AUTOMATED: CPT | Performed by: NURSE PRACTITIONER

## 2024-03-01 PROCEDURE — 99233 SBSQ HOSP IP/OBS HIGH 50: CPT | Mod: 24 | Performed by: INTERNAL MEDICINE

## 2024-03-01 PROCEDURE — 250N000013 HC RX MED GY IP 250 OP 250 PS 637: Performed by: PHYSICIAN ASSISTANT

## 2024-03-01 RX ORDER — MAGNESIUM SULFATE HEPTAHYDRATE 40 MG/ML
2 INJECTION, SOLUTION INTRAVENOUS ONCE
Status: COMPLETED | OUTPATIENT
Start: 2024-03-01 | End: 2024-03-01

## 2024-03-01 RX ORDER — CONTAINER,EMPTY
EACH MISCELLANEOUS
Qty: 1 EACH | Refills: 1 | Status: SHIPPED | OUTPATIENT
Start: 2024-03-01

## 2024-03-01 RX ORDER — VALGANCICLOVIR 450 MG/1
450 TABLET, FILM COATED ORAL DAILY
Status: SHIPPED
Start: 2024-03-02 | End: 2024-03-06

## 2024-03-01 RX ORDER — INSULIN LISPRO 100 [IU]/ML
1-5 INJECTION, SOLUTION INTRAVENOUS; SUBCUTANEOUS
Qty: 3 ML | Refills: 2 | Status: SHIPPED | OUTPATIENT
Start: 2024-03-01 | End: 2024-03-01

## 2024-03-01 RX ORDER — TACROLIMUS 1 MG/1
1 CAPSULE ORAL 2 TIMES DAILY
Status: SHIPPED
Start: 2024-03-01 | End: 2024-04-07

## 2024-03-01 RX ORDER — ASPIRIN 81 MG/1
81 TABLET ORAL DAILY
Status: SHIPPED
Start: 2024-03-01 | End: 2024-04-01

## 2024-03-01 RX ORDER — BLOOD PRESSURE TEST KIT
KIT MISCELLANEOUS
Qty: 200 EACH | Refills: 1 | Status: SHIPPED | OUTPATIENT
Start: 2024-03-01

## 2024-03-01 RX ORDER — MAGNESIUM OXIDE 400 MG/1
400 TABLET ORAL 2 TIMES DAILY
Qty: 60 TABLET | Refills: 2 | Status: SHIPPED | OUTPATIENT
Start: 2024-03-01 | End: 2024-06-21

## 2024-03-01 RX ORDER — PANTOPRAZOLE SODIUM 40 MG/1
40 TABLET, DELAYED RELEASE ORAL 2 TIMES DAILY
Qty: 60 TABLET | Refills: 2 | Status: SHIPPED | OUTPATIENT
Start: 2024-03-01 | End: 2024-05-02

## 2024-03-01 RX ORDER — PREDNISONE 10 MG/1
TABLET ORAL
Qty: 45 TABLET | Refills: 0 | Status: SHIPPED | OUTPATIENT
Start: 2024-03-01 | End: 2024-03-13

## 2024-03-01 RX ORDER — INSULIN LISPRO 100 [IU]/ML
1-7 INJECTION, SOLUTION INTRAVENOUS; SUBCUTANEOUS
Qty: 3 ML | Refills: 2 | Status: SHIPPED | OUTPATIENT
Start: 2024-03-01 | End: 2024-03-01

## 2024-03-01 RX ORDER — LOSARTAN POTASSIUM 25 MG/1
25 TABLET ORAL EVERY EVENING
Qty: 30 TABLET | Refills: 2 | Status: SHIPPED | OUTPATIENT
Start: 2024-03-01 | End: 2024-06-04

## 2024-03-01 RX ORDER — SODIUM BICARBONATE 650 MG/1
1950 TABLET ORAL 3 TIMES DAILY
Qty: 270 TABLET | Refills: 2 | Status: SHIPPED | OUTPATIENT
Start: 2024-03-01 | End: 2024-06-04

## 2024-03-01 RX ORDER — TACROLIMUS 5 MG/1
5 CAPSULE ORAL 2 TIMES DAILY
Status: SHIPPED
Start: 2024-03-01 | End: 2024-04-07

## 2024-03-01 RX ORDER — INSULIN LISPRO 100 [IU]/ML
1-5 INJECTION, SOLUTION INTRAVENOUS; SUBCUTANEOUS AT BEDTIME
Qty: 3 ML | Refills: 2 | Status: SHIPPED | OUTPATIENT
Start: 2024-03-01 | End: 2024-07-01

## 2024-03-01 RX ORDER — INSULIN LISPRO 100 [IU]/ML
1-7 INJECTION, SOLUTION INTRAVENOUS; SUBCUTANEOUS
Qty: 3 ML | Refills: 2 | Status: SHIPPED | OUTPATIENT
Start: 2024-03-01 | End: 2024-07-01

## 2024-03-01 RX ORDER — VALGANCICLOVIR 450 MG/1
450 TABLET, FILM COATED ORAL DAILY
Status: DISCONTINUED | OUTPATIENT
Start: 2024-03-02 | End: 2024-03-01 | Stop reason: HOSPADM

## 2024-03-01 RX ORDER — PANTOPRAZOLE SODIUM 40 MG/1
40 TABLET, DELAYED RELEASE ORAL 2 TIMES DAILY
Status: DISCONTINUED | OUTPATIENT
Start: 2024-03-01 | End: 2024-03-01 | Stop reason: HOSPADM

## 2024-03-01 RX ORDER — PREDNISONE 5 MG/1
5 TABLET ORAL DAILY
Qty: 30 TABLET | Refills: 11 | Status: SHIPPED | OUTPATIENT
Start: 2024-03-12 | End: 2024-07-01

## 2024-03-01 RX ADMIN — AMLODIPINE BESYLATE 10 MG: 10 TABLET ORAL at 08:05

## 2024-03-01 RX ADMIN — ASPIRIN 81 MG CHEWABLE TABLET 81 MG: 81 TABLET CHEWABLE at 08:03

## 2024-03-01 RX ADMIN — MYCOPHENOLATE MOFETIL 750 MG: 250 CAPSULE ORAL at 08:03

## 2024-03-01 RX ADMIN — MAGNESIUM SULFATE IN WATER 2 G: 40 INJECTION, SOLUTION INTRAVENOUS at 09:44

## 2024-03-01 RX ADMIN — MAGNESIUM OXIDE TAB 400 MG (241.3 MG ELEMENTAL MG) 400 MG: 400 (241.3 MG) TAB at 08:04

## 2024-03-01 RX ADMIN — SODIUM BICARBONATE 1950 MG: 650 TABLET ORAL at 14:58

## 2024-03-01 RX ADMIN — PANTOPRAZOLE SODIUM 40 MG: 40 TABLET, DELAYED RELEASE ORAL at 08:03

## 2024-03-01 RX ADMIN — TACROLIMUS 7 MG: 5 CAPSULE ORAL at 08:03

## 2024-03-01 RX ADMIN — SODIUM BICARBONATE 1950 MG: 650 TABLET ORAL at 08:13

## 2024-03-01 RX ADMIN — INSULIN ASPART 1 UNITS: 100 INJECTION, SOLUTION INTRAVENOUS; SUBCUTANEOUS at 10:55

## 2024-03-01 RX ADMIN — VALGANCICLOVIR 450 MG: 450 TABLET, FILM COATED ORAL at 08:03

## 2024-03-01 RX ADMIN — PREDNISONE 40 MG: 20 TABLET ORAL at 08:03

## 2024-03-01 RX ADMIN — CARVEDILOL 12.5 MG: 12.5 TABLET, FILM COATED ORAL at 08:04

## 2024-03-01 ASSESSMENT — ACTIVITIES OF DAILY LIVING (ADL)
ADLS_ACUITY_SCORE: 20

## 2024-03-01 NOTE — PROGRESS NOTES
"/75 (BP Location: Left arm)   Pulse 77   Temp 97.4  F (36.3  C) (Oral)   Resp 18   Ht 1.803 m (5' 11\")   Wt 125 kg (275 lb 8 oz)   SpO2 98%   BMI 38.42 kg/m        5088-6537  Hypertensive (scheduled BP meds given), OVSS on RA. A+Ox4. Mom at bedside, supportive. ACHS, 146. Insulin given per sliding scale. Insulin gtt discontinued this am. Denies pain or nausea. 3g K diet. R AV fistula and 2 PIVs removed prior to discharge. Voiding WOD, saving in urinal. PVR 34 this shift. LBM today. Up ad brittany. Diabetic education done by cahntelle Manley RN. Will continue to monitor and notify team with changes.   "

## 2024-03-01 NOTE — PROGRESS NOTES
Care Management Discharge Note    Discharge Date: 03/01/2024       Discharge Disposition: Home    Discharge Services: OP Infusion     Discharge DME: None    Discharge Transportation: family or friend will provide    Private pay costs discussed: insurance costs deductibles    Does the patient's insurance plan have a 3 day qualifying hospital stay waiver?  No    PAS Confirmation Code:  n/a   Patient/family educated on Medicare website which has current facility and service quality ratings: no    Education Provided on the Discharge Plan:  Yes   Persons Notified of Discharge Plans: Pt, Parents  Patient/Family in Agreement with the Plan: yes    Handoff Referral Completed: Yes    Additional Information:  RNCC notified Pt is medically ready for discharge. Pt does have coverage for simulect in the OP setting but will need to sign a financial waiver. Specialty discharge pharmacist will review with Pt directly.    RNCC confirmed [place] [facility] [place] in the Pt's AVS. Pt has also has a lab apt on Monday, at the [facility].     DM2 education completed.     No other discharge needs identified at this time.     Pauly Villareal RN  RNCC Float   [phone]

## 2024-03-01 NOTE — TELEPHONE ENCOUNTER
Pharmacy called patient would like carvedilol (COREG) 12.5 MG tablet sent to [facility] mail order with a 90 day supply.

## 2024-03-01 NOTE — PROGRESS NOTES
DISCHARGE:  Patient with orders to discharge to home. Discharge instructions, medications, & follow ups reviewed with pt. Copy of discharge summary given to pt.  2 PIVs removed. All belongings packed & sent with patient. Medications filled & picked up at discharge pharmacy. Patient in stable condition. AVSS. Pt had no further questions regarding discharge instructions and medications. Patient transferred out by mom.

## 2024-03-01 NOTE — PLAN OF CARE
"/67   Pulse 89   Temp 97.5  F (36.4  C) (Oral)   Resp 20   Ht 1.803 m (5' 11\")   Wt 128.5 kg (283 lb 6.4 oz)   SpO2 97%   BMI 39.53 kg/m      SHIFT: 1115-9358  ISOLATION: NA  VITALS: AVSS on RA  BG: Q1-2H  Recent Labs   Lab 03/01/24  0600 03/01/24  0512 03/01/24  0357 03/01/24  0245 03/01/24  0118 03/01/24  0037   * 109* 146* 156* 192* 205*   NEURO: A&O x4  Diet: 3 Gram Potassium (low potassium) Diet    PAIN: No reported pain or nausea.  RESPIRATORY: WDL. Snoring at night.  CARDIAC: WDL  : Voiding adequately.  GI: LBM: 02/29  TUBES: R AV Fistula, L PIV x2  MIVF/GTT/ABX: NS @ 10 mL/hr carrying insulin gtt on Alg IV  ASSIST: UAL  SAFETY: NA  SKIN: L fore arm bruising.  LABS:   Recent Labs   Lab 02/29/24  0610 02/28/24  0636 02/27/24  0628   POTASSIUM 4.8 5.3 5.2   PHOS 4.4 3.1 5.3*   MAG 1.6* 1.8 1.9   HGB 8.5* 8.7* 8.4*   CR 5.19* 6.71* 6.68*   PLAN: Per Abhilash Raza MD:    - Last dose of Solumedrol 500 mg IV today (2/27, 2/28 and 2/29), then would do a prednisone taper and leave patient on prednisone 5 mg daily.  - Received one dose of basiliximab (2/28) with plans for a second dose 4 days later (3/3) for added immunosuppression to allow decreasing tacrolimus goal to 4-6.    "

## 2024-03-01 NOTE — TELEPHONE ENCOUNTER
Boogie Villa currently inpatient   Sent this result to be scanned into chart -    Routed this to Dr Raza -ordering provider to review   
Universal Safety Interventions

## 2024-03-01 NOTE — PROGRESS NOTES
[facility]   Transplant Nephrology Progress Note  Date of Admission:  2/24/2024  Today's Date: 03/01/2024    Recommendations:  - Would make no changes in immunosuppression.  - Continue prednisone taper and leave patient on prednisone 5 mg daily.  - Received one dose of basiliximab (2/28) with plans for a second dose 4 days later (3/3) for added immunosuppression to allow decreasing tacrolimus goal to 4-6.  - Okay to discharge from Transplant Nephrology perspective.    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  Good urine output.  No acute indications for dialysis, but will assess daily.   - ROSI secondary to unclear etiology.  Kidney transplant biopsy 2/26 shows borderline acute cellular-mediated rejection with significant acute tubular injury.  No evidence of ABMR.  Patient also had an early acute cellular-mediated rejection and was treated with rATG, but closure biopsy was okay except for acute tubular injury too.  No evidence of urine leak with creatinine in perinephric fluid the same as serum.  Kidney transplant ultrasound mostly unremarkable outside of  perinephric fluid collections and mild hydronephrosis.  Treating borderline acute cellular-mediated rejection and stopped Bactrim.  Feel increased creatinine is also due to sensitivity to tacrolimus and would consider dosing basiliximab to allow lower tacrolimus goal levels.   - Baseline Creatinine: ~ 1.8-2.1   - Proteinuria: Moderate (1-3 grams)   - Date DSA Last Checked: Feb/2024      Latest DSA: No cPRA: 0%   - BK Viremia: No   - Kidney Tx Biopsy: Feb 26, 2024; Result: Borderline acute cellular-mediated rejection.  No evidence of antibody-mediated rejection.   Significant acute tubular injury.  t1 i1 v0 (borderline), g0 ptc0 C4d0, ct0 ci0 cv1 ah1             Feb 09, 2024; Result: No diagnostic evidence of acute rejection.  No interstitial fibrosis and tubular atrophy.  Severe arterial  sclerosis.             Jan 02, 2024; Result: Acute cellular-mediated rejection, [place] 1B.   Mild glomerulitis and severe peritubular capillaritis, but negative C4d and no DSA. Mild arterial sclerosis.    - Transplant Ureteral Stent: Removed    # Immunosuppression: Tacrolimus immediate release (goal 4-6), Mycophenolate mofetil (dose 750 mg every 12 hours), Prednisone (dose taper), and basiliximab.   - Induction with Recent Transplant:  Low Intensity Protocol, although did receive full dose rATG due to early acute rejection    - Patient is in an immunosuppressed state and will continue to monitor for efficacy and toxicity of immunosuppression medications.   - Changes: Not at this time; Would treat acute rejection with Solumedrol followed by a prednisone taper and leave patient on prednisone 5 mg daily.   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  Patient is EBV IgG Ab negative, so cannot use belatacept.  Would be apprehensive in using mTORi in patient with ATI and high serum creatinine.  For this reason started basiliximab for maintenance (two doses days 0 and 4 (20 mg doses), then monthly dosing (40 mg) to allow lowering tacrolimus to 4-6 goal range.    # Infection Prophylaxis:   - PJP: Inhaled pentamidine; Bactrim stopped due to ROSI and hyperkalemia.  - CMV: Valganciclovir (Valcyte); Patient is CMV IgG Ab discordant (D+/R-) and will continue on Valcyte x 6 months, then check CMV PCR monthly until 12 months post transplant.    # Hypertension: Controlled;  Goal BP: < 140/90 (Hospitalization goal)   - Volume status: Mildly hypervolemic   - Changes: Not at this time    # Elevated Blood Glucose: Glucose generally running ~ 130-170s; Felt secondary to high dose steroids.   - Patient is off insulin gtt   - Management as per primary team.    # Anemia in Chronic Renal Disease: Hgb: Stable, low      TOM: No   - Iron studies: High iron saturation    # Mineral Bone Disorder:   -  Secondary renal hyperparathyroidism; PTH level: Significantly elevated (601-1200 pg/ml)        On treatment: None  - Vitamin D; level: Low        On supplement: Yes; Recommend stopping cholecalciferol due to hypercalcemia.  - Calcium; level: High normal        On supplement: No  - Phosphorus; level: Normal        On binder: No    # Electrolytes:   - Potassium; level: Normal        On supplement: No  - Magnesium; level: Stable low        On supplement: No  - Bicarbonate; level: Stable low        On supplement: Yes    # Obesity: Stable weight.              - Once patient heals and recovers from surgery, would recommend working on weight loss for overall health by increasing exercise and watching caloric intake.    # GERD: Patient reports no relief with taking calcium carbonate.   - Recommend starting PPI.     # H/o Ureteral Reimplantation, s/p Mitrofanoff: Patient reports not using for Mitrofanoff in a couple of years and voids normally on his own.  Mitrofanoff is nonfunctional at this time.     # H/o Bilateral Uveitis: No evidence of systemic autoimmune/inflammatory disease at last Rheumatology.      # EBV IgG Ab Discordance (D+/R-): Will do surveillance EBV PCR qmonth until 12 months post transplant, then q3 months until 2 years post transplant.  Last EBV PCR was detectable, but less than quantifiable with last check Feb/2024.    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    Abhilash Raza MD  Transplant Nephrology  Contact information available via [facility] Paging/Directory    Interval History   Mr. Villa's creatinine is 3.01 (03/01 0717); Trend  "down.  Good urine output.  Other significant labs/tests/vitals: Stable electrolytes.  Stable hemoglobin.  No new events overnight.  No chest pain or shortness of breath.  No leg swelling.  No nausea and vomiting.  Bowel movements are normal.  No fever, sweats or chills.    Review of Systems   4 point ROS was obtained and negative except as noted in the Interval History.    MEDICATIONS:   amLODIPine  10 mg Oral Daily    aspirin  81 mg Oral Daily    [START ON 3/3/2024] basiliximab (SIMULECT) 20 mg in sodium chloride 0.9 % 50 mL infusion  20 mg Intravenous Once    carvedilol  12.5 mg Oral BID    insulin aspart  1-7 Units Subcutaneous TID AC    insulin aspart  1-5 Units Subcutaneous At Bedtime    losartan  25 mg Oral QPM    magnesium oxide  400 mg Oral BID    mycophenolate  750 mg Oral BID    pantoprazole  40 mg Oral BID    predniSONE  40 mg Oral BID    Followed by    [START ON 3/3/2024] predniSONE  40 mg Oral Daily    Followed by    [START ON 3/6/2024] predniSONE  20 mg Oral Daily    Followed by    [START ON 3/9/2024] predniSONE  10 mg Oral Daily    sodium bicarbonate  1,950 mg Oral TID    sodium chloride (PF)  3 mL Intracatheter Q8H    tacrolimus  7 mg Oral BID IS    [START ON 3/2/2024] valGANciclovir  450 mg Oral Daily      dextrose         Physical Exam   Temp  Av.8  F (36.6  C)  Min: 97.3  F (36.3  C)  Max: 98.2  F (36.8  C)      Pulse  Av.7  Min: 78  Max: 100 Resp  Av.9  Min: 16  Max: 39  SpO2  Av.1 %  Min: 97 %  Max: 99 %     /86 (BP Location: Left arm)   Pulse 75   Temp 97.7  F (36.5  C) (Oral)   Resp 18   Ht 1.803 m (5' 11\")   Wt 128.5 kg (283 lb 6.4 oz)   SpO2 97%   BMI 39.53 kg/m     Date 24 0700 - 24 0659   Shift 4235-8609 9055-3151 8464-8652 24 Hour Total   INTAKE   P.O. 240   240   Shift Total(mL/kg) 240(1.82)   240(1.82)   OUTPUT   Urine 800   800   Shift Total(mL/kg) 800(6.06)   800(6.06)   Weight (kg) 132 132 132 132      Admit Weight: 131.5 kg (290 lb) "     GENERAL APPEARANCE: alert and no distress  HENT: mouth without ulcers or lesions  RESP: lungs clear to auscultation - no rales, rhonchi or wheezes  CV: regular rhythm, normal rate, no rub, no murmur  EDEMA: none to trace LE edema bilaterally  ABDOMEN: soft, nondistended, nontender, bowel sounds normal, obese  MS: extremities normal - no gross deformities noted, no evidence of inflammation in joints, no muscle tenderness  SKIN: no rash  TX KIDNEY: normal  DIALYSIS ACCESS:  None    Data   All labs reviewed by me.  CMP  Recent Labs   Lab 03/01/24  1043 03/01/24  0817 03/01/24  0717 03/01/24  0654 02/29/24  0745 02/29/24  0610 02/28/24  0808 02/28/24  0636 02/27/24  1711 02/27/24  0628   NA  --   --  141  --   --  139  --  135  --  141   POTASSIUM  --   --  4.5  --   --  4.8  --  5.3  --  5.2   CHLORIDE  --   --  106  --   --  107  --  104  --  107   CO2  --   --  21*  --   --  18*  --  17*  --  21*   ANIONGAP  --   --  14  --   --  14  --  14  --  13   * 120* 120* 115*   < > 137*   < > 272*   < > 120*   BUN  --   --  48.0*  --   --  55.6*  --  53.5*  --  46.2*   CR  --   --  3.01*  --   --  5.19*  --  6.71*  --  6.68*   GFRESTIMATED  --   --  29*  --   --  15*  --  11*  --  11*   NABIL  --   --  9.3  --   --  9.9  --  10.1*  --  9.5   MAG  --   --  1.4*  --   --  1.6*  --  1.8  --  1.9   PHOS  --   --  3.9  --   --  4.4  --  3.1  --  5.3*    < > = values in this interval not displayed.     CBC  Recent Labs   Lab 03/01/24  0717 02/29/24  0610 02/28/24  0636 02/27/24  0628   HGB 8.5* 8.5* 8.7* 8.4*   WBC 9.5 7.9 5.4 3.3*   RBC 2.73* 2.55* 2.66* 2.59*   HCT 25.8* 24.6* 25.4* 24.4*   MCV 95 97 96 94   MCH 31.1 33.3* 32.7 32.4   MCHC 32.9 34.6 34.3 34.4   RDW 12.3 12.3 12.1 12.4    270 254 258     INR  Recent Labs   Lab 02/26/24  0539   INR 1.08   PTT 26     ABGNo lab results found in last 7 days.   Urine Studies  Recent Labs   Lab Test 02/25/24  1158 02/23/24  1234 12/19/23  1007 03/29/21  1404  12/03/20  1525 10/19/20  1645   COLOR Straw Straw Straw Straw Yellow Yellow   APPEARANCE Clear Clear Clear Clear Clear Clear   URINEGLC 150* 30* 70* 50* 100* 50 mg/dL*   URINEBILI Negative Negative Negative Negative Negative Negative   URINEKETONE Negative Negative Negative Negative Negative Negative   SG 1.006 1.006 1.003 1.008 1.025 1.005   UBLD Negative Trace* Trace* Negative Small* Small*   URINEPH 7.5* 7.0 7.5* 6.0 7.0 6.0   PROTEIN 30* 20* 100* >499* >300* >=500 mg/dL*   UROBILINOGEN  --   --   --   --  0.2 <2.0 E.U./dL   NITRITE Negative Negative Negative Negative Negative Negative   LEUKEST Negative Negative Negative Negative Negative Negative   RBCU 1 2 0 0  --  0-2   WBCU <1 1 1 <1  --  0-5     Recent Labs   Lab Test 02/26/20  1530 11/25/19  0904 05/13/19  0728 12/04/18  1056 06/08/18  1310 02/10/17  1004 08/26/16  1150 02/19/16  1000   UTPG 4.31* 4.92* 4.31* 4.59* 6.69* 2.78* 2.45* 2.17*     PTH  Recent Labs   Lab Test 01/07/24  0750 12/19/23  0953 07/16/21  1650 12/16/20  1611 12/16/20  0000 10/19/20  1614 02/26/20  1520 11/25/19  0859 05/13/19  0712 12/04/18  1054 06/08/18  1256 01/16/18  1102 08/09/17  1626 02/10/17  1002 08/26/16  1245 02/19/16  0955   PTHI 746* 445* 308* 94* 94 131* 201* 154* 110* 177* 96* 133* 66 39 47 42     Iron Studies  Recent Labs   Lab Test 01/07/24  0750 12/19/23  0953 02/26/20  1520 05/13/19  0712 06/08/18  1256 01/16/18  1102 08/09/17  1626 02/10/17  1002   IRON 167* 104 66 71 87 116 93 54   * 250 251 269 257 290 284 297   IRONSAT 79* 42 26 26 34 40 33 18   DAVID 783* 826* 133 109 82 86 100 113       IMAGING:  All imaging studies reviewed by me.

## 2024-03-02 LAB
BACTERIA FLD CULT: NO GROWTH
GRAM STAIN RESULT: NORMAL
GRAM STAIN RESULT: NORMAL

## 2024-03-03 ENCOUNTER — INFUSION THERAPY VISIT (OUTPATIENT)
Dept: INFUSION THERAPY | Facility: CLINIC | Age: 24
End: 2024-03-03
Attending: PHYSICIAN ASSISTANT
Payer: COMMERCIAL

## 2024-03-03 VITALS
SYSTOLIC BLOOD PRESSURE: 116 MMHG | HEART RATE: 75 BPM | OXYGEN SATURATION: 97 % | DIASTOLIC BLOOD PRESSURE: 69 MMHG | TEMPERATURE: 97.7 F | RESPIRATION RATE: 18 BRPM

## 2024-03-03 DIAGNOSIS — T86.11 BANFF TYPE IB ACUTE CELLULAR REJECTION OF TRANSPLANTED KIDNEY: Primary | ICD-10-CM

## 2024-03-03 PROCEDURE — 258N000003 HC RX IP 258 OP 636: Performed by: NURSE PRACTITIONER

## 2024-03-03 PROCEDURE — 250N000011 HC RX IP 250 OP 636: Mod: JZ | Performed by: NURSE PRACTITIONER

## 2024-03-03 PROCEDURE — 96365 THER/PROPH/DIAG IV INF INIT: CPT

## 2024-03-03 RX ADMIN — SODIUM CHLORIDE 20 MG: 9 INJECTION, SOLUTION INTRAVENOUS at 13:04

## 2024-03-03 NOTE — PROGRESS NOTES
Nursing Note  Boogie Villa presents today to [facility] for:   Chief Complaint   Patient presents with    Infusion     IV Simulect       During today's [facility] appointment, orders from Kathleen Carroll NP were completed.  Frequency: See therapy plan (message sent to coordinator for clarification)    Progress note:  Patient identification verified by name and date of birth.  Assessment completed.  Vitals recorded in Doc Flowsheets.  Patient was provided with education regarding medication/procedure and possible side effects.  Patient verbalized understanding.     present during visit today: Not Applicable.    Treatment Conditions: Non-applicable.    Premedications: were not ordered.    Drug Waste Record: No    Infusion length and rate:  infusion given over approximately  30 minutes    Labs: were not ordered for this appointment.    Vascular access: peripheral IV placed today.    Is the next appt scheduled? Yes     Post Infusion Assessment:  Patient tolerated infusion without incident.  Blood return noted pre and post infusion.  Site patent and intact, free from redness, edema or discomfort.  No evidence of extravasations.  Access discontinued per protocol.     Discharge Plan:   Follow up plan of care with: ongoing infusions at [facility].  Discharge instructions were reviewed with patient.  Patient/representative verbalized understanding of discharge instructions and all questions answered.  Patient discharged from [facility] in stable condition.  Patient declined AVS.     Jason Tariq RN    Administrations This Visit       basiliximab (SIMULECT) 20 mg in sodium chloride 0.9 % 50 mL infusion       Admin Date  03/03/2024 Action  $New Bag Dose  20 mg Rate  100 mL/hr Route  Intravenous Documented By  Jason Tariq RN                    /69 (BP Location: Left arm,  Patient Position: Semi-Machado's, Cuff Size: Adult Large)   Pulse 75   Temp 97.7  F (36.5  C) (Oral)   Resp 18   SpO2 97%

## 2024-03-04 ENCOUNTER — LAB (OUTPATIENT)
Dept: LAB | Facility: CLINIC | Age: 24
End: 2024-03-04
Payer: COMMERCIAL

## 2024-03-04 DIAGNOSIS — Z94.0 KIDNEY REPLACED BY TRANSPLANT: ICD-10-CM

## 2024-03-04 DIAGNOSIS — Z20.828 CONTACT WITH AND (SUSPECTED) EXPOSURE TO OTHER VIRAL COMMUNICABLE DISEASES: ICD-10-CM

## 2024-03-04 DIAGNOSIS — Z48.298 AFTERCARE FOLLOWING ORGAN TRANSPLANT: ICD-10-CM

## 2024-03-04 DIAGNOSIS — Z98.890 OTHER SPECIFIED POSTPROCEDURAL STATES: ICD-10-CM

## 2024-03-04 DIAGNOSIS — Z79.899 ENCOUNTER FOR LONG-TERM CURRENT USE OF MEDICATION: ICD-10-CM

## 2024-03-04 LAB
ANION GAP SERPL CALCULATED.3IONS-SCNC: 12 MMOL/L (ref 7–15)
BACTERIA FLD CULT: NORMAL
BUN SERPL-MCNC: 36.6 MG/DL (ref 6–20)
CALCIUM SERPL-MCNC: 9.6 MG/DL (ref 8.6–10)
CHLORIDE SERPL-SCNC: 108 MMOL/L (ref 98–107)
CREAT SERPL-MCNC: 1.68 MG/DL (ref 0.67–1.17)
DEPRECATED HCO3 PLAS-SCNC: 22 MMOL/L (ref 22–29)
EGFRCR SERPLBLD CKD-EPI 2021: 58 ML/MIN/1.73M2
ERYTHROCYTE [DISTWIDTH] IN BLOOD BY AUTOMATED COUNT: 12.5 % (ref 10–15)
GLUCOSE SERPL-MCNC: 148 MG/DL (ref 70–99)
HCT VFR BLD AUTO: 30.7 % (ref 40–53)
HGB BLD-MCNC: 10.6 G/DL (ref 13.3–17.7)
MCH RBC QN AUTO: 32.7 PG (ref 26.5–33)
MCHC RBC AUTO-ENTMCNC: 34.5 G/DL (ref 31.5–36.5)
MCV RBC AUTO: 95 FL (ref 78–100)
PLATELET # BLD AUTO: 335 10E3/UL (ref 150–450)
POTASSIUM SERPL-SCNC: 4.9 MMOL/L (ref 3.4–5.3)
RBC # BLD AUTO: 3.24 10E6/UL (ref 4.4–5.9)
SODIUM SERPL-SCNC: 142 MMOL/L (ref 135–145)
TACROLIMUS BLD-MCNC: 8.5 UG/L (ref 5–15)
TME LAST DOSE: NORMAL H
TME LAST DOSE: NORMAL H
WBC # BLD AUTO: 12.3 10E3/UL (ref 4–11)

## 2024-03-04 PROCEDURE — 85027 COMPLETE CBC AUTOMATED: CPT

## 2024-03-04 PROCEDURE — 36415 COLL VENOUS BLD VENIPUNCTURE: CPT

## 2024-03-04 PROCEDURE — 80048 BASIC METABOLIC PNL TOTAL CA: CPT

## 2024-03-04 PROCEDURE — 80197 ASSAY OF TACROLIMUS: CPT

## 2024-03-05 ENCOUNTER — TELEPHONE (OUTPATIENT)
Dept: TRANSPLANT | Facility: CLINIC | Age: 24
End: 2024-03-05
Payer: COMMERCIAL

## 2024-03-05 DIAGNOSIS — Z94.0 KIDNEY REPLACED BY TRANSPLANT: Chronic | ICD-10-CM

## 2024-03-05 RX ORDER — TACROLIMUS 1 MG/1
1 CAPSULE ORAL 2 TIMES DAILY
Qty: 60 CAPSULE | Refills: 1 | Status: CANCELLED | OUTPATIENT
Start: 2024-03-05

## 2024-03-05 RX ORDER — TACROLIMUS 5 MG/1
5 CAPSULE ORAL 2 TIMES DAILY
Status: CANCELLED | OUTPATIENT
Start: 2024-03-05

## 2024-03-06 ENCOUNTER — OFFICE VISIT (OUTPATIENT)
Dept: TRANSPLANT | Facility: CLINIC | Age: 24
End: 2024-03-06
Attending: INTERNAL MEDICINE
Payer: MEDICARE

## 2024-03-06 ENCOUNTER — VIRTUAL VISIT (OUTPATIENT)
Dept: PHARMACY | Facility: CLINIC | Age: 24
End: 2024-03-06
Payer: COMMERCIAL

## 2024-03-06 ENCOUNTER — MYC MEDICAL ADVICE (OUTPATIENT)
Dept: PHARMACY | Facility: CLINIC | Age: 24
End: 2024-03-06

## 2024-03-06 VITALS
OXYGEN SATURATION: 99 % | BODY MASS INDEX: 38.91 KG/M2 | TEMPERATURE: 97.7 F | HEART RATE: 94 BPM | SYSTOLIC BLOOD PRESSURE: 118 MMHG | WEIGHT: 279 LBS | DIASTOLIC BLOOD PRESSURE: 77 MMHG

## 2024-03-06 DIAGNOSIS — E83.42 HYPOMAGNESEMIA: ICD-10-CM

## 2024-03-06 DIAGNOSIS — Z94.0 KIDNEY REPLACED BY TRANSPLANT: Primary | ICD-10-CM

## 2024-03-06 DIAGNOSIS — E78.5 DYSLIPIDEMIA: ICD-10-CM

## 2024-03-06 DIAGNOSIS — I15.1 HTN, KIDNEY TRANSPLANT RELATED: ICD-10-CM

## 2024-03-06 DIAGNOSIS — T38.0X5D STEROID-INDUCED DIABETES MELLITUS, SUBSEQUENT ENCOUNTER (H): ICD-10-CM

## 2024-03-06 DIAGNOSIS — K21.9 GASTROESOPHAGEAL REFLUX DISEASE, UNSPECIFIED WHETHER ESOPHAGITIS PRESENT: ICD-10-CM

## 2024-03-06 DIAGNOSIS — E09.9 STEROID-INDUCED DIABETES MELLITUS, SUBSEQUENT ENCOUNTER (H): ICD-10-CM

## 2024-03-06 DIAGNOSIS — Z94.0 HTN, KIDNEY TRANSPLANT RELATED: ICD-10-CM

## 2024-03-06 DIAGNOSIS — Z29.89 NEED FOR PNEUMOCYSTIS PROPHYLAXIS: ICD-10-CM

## 2024-03-06 DIAGNOSIS — F90.9 ATTENTION DEFICIT HYPERACTIVITY DISORDER (ADHD), UNSPECIFIED ADHD TYPE: ICD-10-CM

## 2024-03-06 DIAGNOSIS — D84.9 IMMUNOSUPPRESSION (H): ICD-10-CM

## 2024-03-06 DIAGNOSIS — T86.11 BANFF TYPE IB ACUTE CELLULAR REJECTION OF TRANSPLANTED KIDNEY: ICD-10-CM

## 2024-03-06 DIAGNOSIS — Z48.298 AFTERCARE FOLLOWING ORGAN TRANSPLANT: ICD-10-CM

## 2024-03-06 DIAGNOSIS — Z78.9 TAKES DIETARY SUPPLEMENTS: ICD-10-CM

## 2024-03-06 DIAGNOSIS — E87.20 METABOLIC ACIDOSIS: ICD-10-CM

## 2024-03-06 DIAGNOSIS — Z94.0 KIDNEY REPLACED BY TRANSPLANT: Primary | Chronic | ICD-10-CM

## 2024-03-06 DIAGNOSIS — N25.81 SECONDARY RENAL HYPERPARATHYROIDISM (H): ICD-10-CM

## 2024-03-06 LAB
SCANNED LAB RESULT: NORMAL
SCANNED LAB RESULT: NORMAL

## 2024-03-06 PROCEDURE — 99215 OFFICE O/P EST HI 40 MIN: CPT | Performed by: INTERNAL MEDICINE

## 2024-03-06 PROCEDURE — G0463 HOSPITAL OUTPT CLINIC VISIT: HCPCS | Performed by: INTERNAL MEDICINE

## 2024-03-06 PROCEDURE — 99213 OFFICE O/P EST LOW 20 MIN: CPT | Performed by: INTERNAL MEDICINE

## 2024-03-06 PROCEDURE — 99207 PR NO CHARGE LOS: CPT | Mod: 93 | Performed by: PHARMACIST

## 2024-03-06 PROCEDURE — G2211 COMPLEX E/M VISIT ADD ON: HCPCS | Performed by: INTERNAL MEDICINE

## 2024-03-06 RX ORDER — LANCETS 28 GAUGE
EACH MISCELLANEOUS
COMMUNITY
Start: 2024-03-01 | End: 2024-07-01

## 2024-03-06 RX ORDER — PENTAMIDINE ISETHIONATE 300 MG/300MG
300 INHALANT RESPIRATORY (INHALATION)
COMMUNITY
End: 2024-07-01

## 2024-03-06 RX ORDER — VALGANCICLOVIR 450 MG/1
900 TABLET, FILM COATED ORAL DAILY
Qty: 60 TABLET | Refills: 3 | Status: SHIPPED | OUTPATIENT
Start: 2024-03-06 | End: 2024-06-04

## 2024-03-06 ASSESSMENT — PAIN SCALES - GENERAL: PAINLEVEL: NO PAIN (0)

## 2024-03-06 NOTE — PROGRESS NOTES
Medication Therapy Management (MTM) Encounter    ASSESSMENT:                            Medication Adherence/Access: No issues identified    Kidney Transplant:    CrCl improved beyond 60ml/min, increased valcyte dose today.      Supplements:   Stable. Mag recently increased per patient report.     GERD:   Patient may decrease Pantoprazole to 40mg once daily when done with prednisone taper.       ADHD:   Stable.     Diabetes   BG well controlled <150 during prednisone taper.     Hypertension   Stable. BP at goal <140/90 for 2-4 months post txp.     Hyperlipidemia   Stable.     PLAN:                            Increase Valcyte to 900mg (2 tabs) daily.   Decrease Pantoprazole to 40mg once daily when you get down to 5mg of prednisone.    Follow-up: 2 months     SUBJECTIVE/OBJECTIVE:                          Boogie Villa is a 23 year old male called for a transitions of care visit. He was discharged from [facility] last week for increasing creatinine/ rejection. In [place] currently.      Reason for visit: 2 months post.    Allergies/ADRs: Reviewed in chart  Past Medical History: Reviewed in chart  Tobacco: He reports that he has never smoked. He has never been exposed to tobacco smoke. He has never used smokeless tobacco.  Alcohol: not currently using    Medication Adherence/Access: no issues reported    Kidney Transplant:    Tacrolimus 6 mg twice daily  Mycophenolate Mofetil 750 mg twice daily.   Prednisone 40mg daily tapering down to 5mg daily. Restarted due to recent rejection.   Pt reports no side effects  Transplant date: 12/28/23  Vascular Prophylaxis: Aspirin 81mg daily. Denies gastrointestinal bleed sx. .  CMV prophylaxis: CrCl 40 to 59 mL/minute: Valcyte 450 mg once daily Donor (+), Recipient (-), treat 6 months post tx   PJP prophylaxis: holding bactrim- gave 1 dose of Pentamidine in the hospital - planned 1 more dose of pentamidine before considering bactrim.   Tx Coordinator: Nina Hu Tx MD: Dr. Richard, Using  Med Card: Yes  Immunizations: annual flu shot 2023; Pneumovax 23:  unknown; Prevnar 20: unknown; TDaP:  2023; Shingrix: unknown, HBV: immunity per last titers, just barely over 12 surface antibody, COVID: Primary x 2 and Bivalent x 1    Estimated Creatinine Clearance: 92.7 mL/min (A) (based on SCr of 1.68 mg/dL (H)).     Supplements:   Magnesium 400mg twice daily   Sodium Bicarbonate 1950mg 3 TIMES DAILY.   Lab Results   Component Value Date    MAG 1.4 (L) 03/01/2024    MAG 1.6 (L) 02/29/2024    CO2 22 03/04/2024    CO2 21 (L) 03/01/2024     GERD:   Pantoprazole 40 mg twice daily   Patient reports no current symptoms.   Patient feels that current regimen is effective.     ADHD:   Methylphenidate 20mg prn sparingly.  Doing fine, 1-2x time weekly using.     Diabetes   Humalog sliding scale before meals, not needing much.  Patient is not experiencing side effects.  Blood sugar monitoring: 3 time(s) daily; Ranges: (patient reported) 120-140s   Current diabetes symptoms: none   Eye exam is up to date  Foot exam is up to date  Urine Albumin:   Lab Results   Component Value Date    UMALCR 2,224.08 (H) 12/19/2023      Lab Results   Component Value Date    A1C 5.7 (H) 02/28/2024     Hypertension   Amlodipine 10mg every evening.   Carvedilol 12.5mg twice daily  Losartan 25mg at bedtime.   Patient reports no current medication side effects  Patient self monitors blood pressure.  Home BP monitoring well controlled at home <140/90   BP Readings from Last 3 Encounters:   03/06/24 118/77   03/03/24 116/69   03/01/24 134/75     Pulse Readings from Last 3 Encounters:   03/06/24 94   03/03/24 75   03/01/24 77     Hyperlipidemia   Atorvastatin 10mg daily  Patient reports cramps at night last 4 days. 3 L daily.  The ASCVD Risk score (Camilo RICHARD, et al., 2019) failed to calculate for the following reasons:    The 2019 ASCVD risk score is only valid for ages 40 to 79   Recent Labs   Lab Test 12/04/18  1054   CHOL 239*   HDL 40*   *    TRIG 209*     Today's Vitals: There were no vitals taken for this visit.  ----------------  Post Discharge Medication Reconciliation Status: discharge medications reconciled and changed, per note/orders.    I spent 15 minutes with this patient today. All changes were made via collaborative practice agreement with Dr. Zaire Harvey. A copy of the visit note was provided to the patient's provider(s).    A summary of these recommendations was sent via [employer].    Eligio Bell, PharmD  [facility] Pharmacist    Phone: [phone]     Telemedicine Visit Details  Type of service:  Telephone visit  Start Time: 11:13 AM  End Time: 11:27 AM     Medication Therapy Recommendations  Aftercare following organ transplant    Current Medication: valGANciclovir (VALCYTE) 450 MG tablet   Rationale: Dose too low - Dosage too low - Effectiveness   Recommendation: Increase Dose   Status: Accepted per CPA         Esophageal reflux    Current Medication: pantoprazole (PROTONIX) 40 MG EC tablet   Rationale: Dose too high - Dosage too high - Safety   Recommendation: Decrease Frequency   Status: Accepted per CPA   Note: when done with pred taper.

## 2024-03-06 NOTE — NURSING NOTE
Chief Complaint   Patient presents with    RECHECK     K/P POST ACUTE TXP NEPH - post kidney transplant     /77 (BP Location: Left arm, Patient Position: Sitting, Cuff Size: Adult Large)   Pulse 94   Temp 97.7  F (36.5  C) (Oral)   Wt 126.6 kg (279 lb)   SpO2 99%   BMI 38.91 kg/m      Jose C Mendez CMA on 3/6/2024 at 10:35 AM

## 2024-03-06 NOTE — LETTER
3/6/2024         RE: Boogie Villa  [address]  [facility] [address]        Dear Colleague,    Thank you for referring your patient, Boogie Villa, to the [facility]. Please see a copy of my visit note below.    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   - ROSI secondary to unclear etiology.  Kidney transplant biopsy 2/26 shows borderline acute cellular-mediated rejection with significant acute tubular injury.  No evidence of ABMR.  Patient also had an early acute cellular-mediated rejection and was treated with rATG, but closure biopsy was okay except for acute tubular injury too.  No evidence of urine leak with creatinine in perinephric fluid the same as serum.  Kidney transplant ultrasound mostly unremarkable outside of  perinephric fluid collections and mild hydronephrosis.  Treating borderline acute cellular-mediated rejection and stopped Bactrim.  increased creatinine thought to be due to sensitivity to tacrolimus and would consider dosing basiliximab to allow lower tacrolimus goal levels. - now with improving renal function Cr down to 1.4, FK ~6-8, would consider adjusting FK goal back up to ~8 and holding off on further basiliximab doses.   - Recommend closure bx later this month ~ 4 weeks post recent bx and update DSA, BK, CMV , EBV pcr, UPC to guide further IS management     - Baseline Creatinine: ~ TBD, prior range 1.8-2   - Proteinuria: Moderate (1-3 grams)   - Date DSA Last Checked: Feb/2024      Latest DSA: No cPRA: 0%   - BK Viremia: No   - Kidney Tx Biopsy: Feb 26, 2024; Result: Borderline acute cellular-mediated rejection.  No evidence of antibody-mediated rejection.   Significant acute tubular injury.  t1 i1 v0 (borderline), g0 ptc0 C4d0, ct0 ci0 cv1 ah1             Feb 09, 2024; Result: No diagnostic evidence of acute rejection.  No interstitial fibrosis and tubular atrophy.  Severe  arterial sclerosis.             Jan 02, 2024; Result: Acute cellular-mediated rejection, Banff 1B.   Mild glomerulitis and severe peritubular capillaritis, but negative C4d and no DSA. Mild arterial sclerosis.    - Transplant Ureteral Stent: Removed   - venous reconstruction :ASA x 3m     # Immunosuppression: Tacrolimus immediate release (goal 4-6), Mycophenolate mofetil (dose 750 mg every 12 hours), Prednisone (dose taper), and basiliximab.   - Induction with Recent Transplant:  Low Intensity Protocol, although did receive full dose rATG due to early acute rejection    - Patient is in an immunosuppressed state and will continue to monitor for efficacy and toxicity of immunosuppression medications.   - Changes: Not at this time; Would treat acute rejection with Solumedrol followed by a prednisone taper and leave patient on prednisone 5 mg daily.   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  Patient is EBV IgG Ab negative, so cannot use belatacept.  Would be apprehensive in using mTORi in patient with ATI and high serum creatinine.  For this reason he was started on basiliximab for maintenance (two doses days 0 and 4 (20 mg doses), then monthly dosing (40 mg) to allow lowering tacrolimus to 4-6 goal range.  - now with improving renal function Cr down to 1.4, would consider adjusting FK goal back up to ~8 and holding off on further basiliximab doses.   - Recommend closure bx later this month ~ 4 weeks post recent bx and update DSA, BK, CMV , EBV pcr to guide further IS management    # Infection Prophylaxis:   - PJP: Inhaled pentamidine; Bactrim stopped due to ROSI and hyperkalemia. resume bactrim if K stable   - CMV: Valganciclovir (Valcyte); Patient is CMV IgG Ab discordant (D+/R-) and will continue on Valcyte x 6 months, then check CMV PCR monthly until 12 months post transplant.    # Hypertension: Controlled;  Goal BP: < 140/90   - Volume status: Mildly hypervolemic   -  Changes: Not at this time    # Elevated Blood Glucose:    - Management as per primary care.    # Anemia in Chronic Renal Disease: Hgb: Stable, low      TOM: No   - Iron studies: High iron saturation    # Mineral Bone Disorder:   - Secondary renal hyperparathyroidism; PTH level: Significantly elevated (601-1200 pg/ml)        On treatment: None  - Vitamin D; level: Low        On supplement: No; off cholecalciferol due to hypercalcemia.  - Calcium; level: High normal        On supplement: No  - Phosphorus; level: Normal        On binder: No    # Electrolytes:   - Potassium; level: Normal        On supplement: No  - Magnesium; level: Stable low        On supplement: No  - Bicarbonate; level: Stable low        On supplement: Yes    # Obesity: Stable weight.              - Once patient heals and recovers from surgery, would recommend working on weight loss for overall health by increasing exercise and watching caloric intake.    # GERD: Patient reports no relief with taking calcium carbonate.   - Recommend starting PPI.     # H/o Ureteral Reimplantation, s/p Mitrofanoff: Patient reports not using for Mitrofanoff in a couple of years and voids normally on his own.  Mitrofanoff is nonfunctional at this time.     # H/o Bilateral Uveitis: No evidence of systemic autoimmune/inflammatory disease at last Rheumatology.      # EBV IgG Ab Discordance (D+/R-): Will do surveillance EBV PCR qmonth until 12 months post transplant, then q3 months until 2 years post transplant.  Last EBV PCR was detectable, but less than quantifiable with last check Feb/2024.    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    Return visit: Return in about 4 weeks (around 4/3/2024).    Imelda Barrera MD    Chief Complaint  Mr. Villa is a 23 year old here for kidney transplant and immunosuppression management.     History of Present Illness   ***  Hospitalized 2/24-3/1 for ROSI, peak Cr US +mild hydronephrosis but renogram neg for leak, obstruction, reduced flows . Kidney bx showed borderline cellular rejection and significant ATI. He was given 2 doses of basiliximab and FK goal reduuced to 4-6 given concern for CNI toxicity, as he had no other options with EBV seronegative status (belatacept not an option) and severe ROSI/ATI (mTOR not ideal). His renal function improved since Cr now down to 1.4. Completing pred taper now on 40 mg po every day,   BP is better controlled overall. Glucose 120-180 on sliding scale insulin. He is trying to work on lifestyle modifications, diet and exercise to lose weight    IS Tacrolimus 6/6 /750 prednisone taper (now on 40 mg)  PPX: valcyte 450, pentamidine last 2/28-due 3/28  Home BP~130s/80s  Access RUE AVF +thrill/bruit (not ready yet)      Problem List  Patient Active Problem List   Diagnosis     Lymphangioma     ADHD (attention deficit hyperactivity disorder)     Allergic rhinitis due to pollen     Anxiety     Congenital posterior urethral valves     Iron deficiency     Neurogenic bladder     Class 2 severe obesity due to excess calories with serious comorbidity and body mass index (BMI) of 39.0 to 39.9 in adult (H)     Sensorineural hearing loss, asymmetrical     HTN, kidney transplant related     Secondary renal hyperparathyroidism (H24)     Vitamin D deficiency     Kidney replaced by transplant     Immunosuppressed status (H24)     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     Chronic kidney disease, stage 3b (H)     Dehydration     Elevated serum creatinine      Aftercare following organ transplant     Need for pneumocystis prophylaxis     Anemia in chronic renal disease     Metabolic acidosis     Hypomagnesemia     ROSI (acute kidney injury) (H24)     Kidney transplant rejection     Perinephric fluid collection of kidney transplant     Esophageal reflux     EBV (Dai-Barr virus) viremia     Pre-diabetes     Steroid-induced hyperglycemia       Allergies  Allergies   Allergen Reactions     Banana Itching     Raw banana; itchy mouth       Dust Mites      Runny nose and watery eyes     Mold      Runny nose and watery eyes     Nsaids Other (See Comments)     CKD     Other [Seasonal Allergies]      Grass, Ragweed - gets runny nose and watery eyes     Sulfa Antibiotics      PN: LW Reaction: GI Upset as an infant  12/28 Admission: Tolerated Bactrim       Medications  Current Outpatient Medications   Medication Sig     acetaminophen (TYLENOL) 325 MG tablet Take 1-2 tablets (325-650 mg) by mouth every 4 hours as needed (For optimal non-opioid multimodal pain management to improve pain control.)     Alcohol Swabs PADS Use to swab the area of the injection or janny as directed Per insurance coverage     amLODIPine (NORVASC) 10 MG tablet Take 1 tablet (10 mg) by mouth daily     aspirin 81 MG EC tablet Take 1 tablet (81 mg) by mouth daily for 31 days Stop medication at 3 months post-transplant (~4/1/24).     blood glucose (NO BRAND SPECIFIED) lancets standard To use to test glucose level in the blood Use to test blood sugar  4  times daily as directed. To accompany glucose monitor brands per insurance coverage. Freestyle Lite lancets     blood glucose (NO BRAND SPECIFIED) test strip To use to test glucose level in the blood Use to test blood sugar  4 times daily as directed. To accompany glucose monitor brands per insurance coverage. Freestyle Lite test strips     blood glucose monitoring (FREESTYLE) lancets      blood glucose monitoring (NO BRAND SPECIFIED) meter device kit Use  as directed Per insurance coverage: Freestyle Lite glucose meter     carvedilol (COREG) 12.5 MG tablet Take 1 tablet (12.5 mg) by mouth 2 times daily     cetirizine (ZYRTEC) 10 MG tablet Take 10 mg by mouth daily as needed for allergies (in the spring)     insulin lispro (HUMALOG KWIKPEN) 100 UNIT/ML (1 unit dial) KWIKPEN Inject 1-7 Units Subcutaneous 3 times daily (before meals)     insulin lispro (HUMALOG KWIKPEN) 100 UNIT/ML (1 unit dial) KWIKPEN Inject 1-5 Units Subcutaneous at bedtime     insulin pen needle (32G X 4 MM) 32G X 4 MM miscellaneous Use as directed by provider Per insurance coverage     losartan (COZAAR) 25 MG tablet Take 1 tablet (25 mg) by mouth every evening     magnesium oxide (MAG-OX) 400 MG tablet Take 1 tablet (400 mg) by mouth 2 times daily     methylphenidate (RITALIN) 20 MG tablet Take 20 mg by mouth as needed Prn     mycophenolate (GENERIC EQUIVALENT) 250 MG capsule Take 3 capsules (750 mg) by mouth 2 times daily     pantoprazole (PROTONIX) 40 MG EC tablet Take 1 tablet (40 mg) by mouth 2 times daily     predniSONE (DELTASONE) 10 MG tablet Take 4 tablets (40 mg) by mouth 2 times daily for 3 days, THEN 4 tablets (40 mg) daily for 3 days, THEN 2 tablets (20 mg) daily for 3 days, THEN 1 tablet (10 mg) daily for 3 days.     [START ON 3/12/2024] predniSONE (DELTASONE) 5 MG tablet Take 1 tablet (5 mg) by mouth daily (Patient not taking: Reported on 3/6/2024)     Sharps Container MISC Use as directed to dispose of needles, lancets and other sharps     sodium bicarbonate 650 MG tablet Take 3 tablets (1,950 mg) by mouth 3 times daily     tacrolimus (GENERIC) 1 MG capsule Take 1 capsule (1 mg) by mouth 2 times daily Total dose = 7 mg twice daily (Patient taking differently: Take 1 mg by mouth 2 times daily Total dose = 6 mg twice daily)     tacrolimus (GENERIC) 5 MG capsule Take 1 capsule (5 mg) by mouth 2 times daily Total dose = 7 mg twice daily (Patient taking differently: Take 5 mg by mouth 2  times daily Total dose = 6 mg twice daily)     pentamidine (NEBUPENT) 300 MG neb solution Inhale 300 mg into the lungs every 28 days     valGANciclovir (VALCYTE) 450 MG tablet Take 2 tablets (900 mg) by mouth daily     No current facility-administered medications for this visit.     There are no discontinued medications.    Physical Exam  Vital Signs: /77 (BP Location: Left arm, Patient Position: Sitting, Cuff Size: Adult Large)   Pulse 94   Temp 97.7  F (36.5  C) (Oral)   Wt 126.6 kg (279 lb)   SpO2 99%   BMI 38.91 kg/m      GENERAL APPEARANCE: alert and no distress  HENT: mouth without ulcers or lesions  RESP: lungs clear to auscultation - no rales, rhonchi or wheezes  CV: regular rhythm, normal rate, no rub, no murmur  EDEMA: none to trace LE edema bilaterally  ABDOMEN: soft, nondistended, nontender, bowel sounds normal, obese  MS: extremities normal - no gross deformities noted, no evidence of inflammation in joints, no muscle tenderness  SKIN: no rash  TX KIDNEY: normal  DIALYSIS ACCESS:  None    Data        Latest Ref Rng & Units 3/7/2024    10:15 AM 3/4/2024     9:59 AM 3/1/2024     1:00 PM   Renal   Sodium 135 - 145 mmol/L 138  142     K 3.4 - 5.3 mmol/L 4.6  4.9     Cl 98 - 107 mmol/L 107  108     Cl (external) 98 - 107 mmol/L 107  108     CO2 22 - 29 mmol/L 21  22     Urea Nitrogen 6.0 - 20.0 mg/dL 25.2  36.6     Creatinine 0.67 - 1.17 mg/dL 1.46  1.68     Glucose 70 - 99 mg/dL 134  148  144    Calcium 8.6 - 10.0 mg/dL 9.4  9.6           Latest Ref Rng & Units 3/7/2024    10:15 AM 3/1/2024     7:17 AM 2/29/2024     6:10 AM   Bone Health   Phosphorus 2.5 - 4.5 mg/dL  3.9  4.4    Vit D Def 20 - 50 ng/mL 19            Latest Ref Rng & Units 3/7/2024    10:15 AM 3/4/2024     9:59 AM 3/1/2024     7:17 AM   Heme   WBC 4.0 - 11.0 10e3/uL 13.6  12.3  9.5    Hgb 13.3 - 17.7 g/dL 10.8  10.6  8.5    Plt 150 - 450 10e3/uL 332  335  303          Latest Ref Rng & Units 12/19/2023     9:53 AM 7/13/2023      1:28 PM 7/16/2021     4:50 PM   Liver   AP 40 - 150 U/L 67      AP (external) 40 - 150 U/L  45     TBili <=1.2 mg/dL 0.5      TBili (external) 0.2 - 1.2 mg/dL  0.6     ALT 0 - 70 U/L 41      ALT (external) <=55 U/L  35     AST 0 - 45 U/L 27      AST (external) 10 - 40 U/L  26     Tot Protein 6.4 - 8.3 g/dL 7.6      Tot Protein (external) 6.4 - 8.3 g/dL  7.5     Albumin 3.5 - 5.0 g/dL   3.0    Albumin 3.5 - 5.2 g/dL 4.4      Albumin (external) 3.5 - 5.0 g/dL  3.8           Latest Ref Rng & Units 2/28/2024     1:13 PM 12/19/2023     9:53 AM 7/13/2023     1:28 PM   Pancreas   A1C <5.7 % 5.7  5.2     A1C (external) <=5.6 %   4.9          Latest Ref Rng & Units 1/7/2024     7:50 AM 12/19/2023     9:53 AM 2/26/2020     3:20 PM   Iron studies   Iron 61 - 157 ug/dL 167  104  66    Iron Saturation Index 15 - 46 %   26    Iron Sat Index 15 - 46 % 79  42     Ferritin 31 - 409 ng/mL 783  826  133          Latest Ref Rng & Units 2/24/2024     9:22 PM 2/20/2024    10:35 AM 2/8/2024     9:49 AM   [facility] Txp Virology   EBV DNA COPIES/ML Not Detected copies/mL <500  Not Detected  <500    EBV DNA LOG OF COPIES  <2.7   <2.7      Failed to redirect to the Timeline version of the REVFS SmartLink.  Recent Labs   Lab Test 02/24/24  1008 02/26/24  0539 03/01/24  0717 03/04/24  0959 03/07/24  1015   DOSTAC 2/23/2024  --   --  3/3/2024 3/6/2024   TACROL 8.5   < > 5.5 8.5 6.6    < > = values in this interval not displayed.     Recent Labs   Lab Test 01/11/24  0950 01/29/24  0937 02/01/24  1008 02/12/24  1030 02/24/24  1008   DOSMPA 1/10/2024   9:30 PM 1/28/2024   9:30 PM  --   --   --    MPACID 3.79* 3.73* 3.87* 2.23 2.65   MPAG 71.7 63.8 79.1 68.1 141.7*         Again, thank you for allowing me to participate in the care of your patient.        Sincerely,        Imelda Barrera MD

## 2024-03-06 NOTE — PATIENT INSTRUCTIONS
"Recommendations from today's MTM visit:                                                      Increase Valcyte to 900mg (2 tabs) daily.   Decrease Pantoprazole to 40mg once daily when you get down to 5mg of prednisone.    Follow-up: 2 months      It was great speaking with you today.  I value your experience and would be very thankful for your time in providing feedback in our clinic survey. In the next few days, you may receive an email or text message from [facility] with a link to a survey related to your  clinical pharmacist.\"     To schedule another MTM appointment, please call the clinic directly or you may call the MTM scheduling line at [phone] or toll-free at [phone].     My Clinical Pharmacist's contact information:                                                      Please feel free to contact me with any questions or concerns you have.      Eligio Bell, PharmD  MTM Pharmacist    Phone: [phone]     "

## 2024-03-06 NOTE — PROGRESS NOTES
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   - ROSI secondary to unclear etiology.  Kidney transplant biopsy 2/26 shows borderline acute cellular-mediated rejection with significant acute tubular injury.  No evidence of ABMR.  Patient also had an early acute cellular-mediated rejection and was treated with rATG, but closure biopsy was okay except for acute tubular injury too.  No evidence of urine leak with creatinine in perinephric fluid the same as serum.  Kidney transplant ultrasound mostly unremarkable outside of  perinephric fluid collections and mild hydronephrosis.  Treating borderline acute cellular-mediated rejection and stopped Bactrim.  increased creatinine thought to be due to sensitivity to tacrolimus and would consider dosing basiliximab to allow lower tacrolimus goal levels. - now with improving renal function Cr down to 1.4, FK ~6-8, would consider adjusting FK goal back up to ~8 and holding off on further basiliximab doses.   - Recommend closure bx later this month ~ 4 weeks post recent bx and update DSA, BK, CMV , EBV pcr, UPC to guide further IS management     - Baseline Creatinine: ~ TBD, prior range 1.8-2   - Proteinuria: Moderate (1-3 grams)   - Date DSA Last Checked: Feb/2024      Latest DSA: No cPRA: 0%   - BK Viremia: No   - Kidney Tx Biopsy: Feb 26, 2024; Result: Borderline acute cellular-mediated rejection.  No evidence of antibody-mediated rejection.   Significant acute tubular injury.  t1 i1 v0 (borderline), g0 ptc0 C4d0, ct0 ci0 cv1 ah1             Feb 09, 2024; Result: No diagnostic evidence of acute rejection.  No interstitial fibrosis and tubular atrophy.  Severe arterial sclerosis.             Jan 02, 2024; Result: Acute cellular-mediated rejection, Banff 1B.   Mild glomerulitis and severe peritubular capillaritis, but negative C4d and no DSA. Mild arterial sclerosis.    - Transplant Ureteral Stent: Removed   -  venous reconstruction :ASA x 3m     # Immunosuppression: Tacrolimus immediate release (goal 4-6), Mycophenolate mofetil (dose 750 mg every 12 hours), Prednisone (dose taper), and basiliximab.   - Induction with Recent Transplant:  Low Intensity Protocol, although did receive full dose rATG due to early acute rejection    - Patient is in an immunosuppressed state and will continue to monitor for efficacy and toxicity of immunosuppression medications.   - Changes: Not at this time; Would treat acute rejection with Solumedrol followed by a prednisone taper and leave patient on prednisone 5 mg daily.   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  Patient is EBV IgG Ab negative, so cannot use belatacept.  Would be apprehensive in using mTORi in patient with ATI and high serum creatinine.  For this reason he was started on basiliximab for maintenance (two doses days 0 and 4 (20 mg doses), then monthly dosing (40 mg) to allow lowering tacrolimus to 4-6 goal range.  - now with improving renal function Cr down to 1.4, would consider adjusting FK goal back up to ~8 and holding off on further basiliximab doses.   - Recommend closure bx later this month ~ 4 weeks post recent bx and update DSA, BK, CMV , EBV pcr to guide further IS management    # Infection Prophylaxis:   - PJP: Inhaled pentamidine; Bactrim stopped due to ROSI and hyperkalemia. resume bactrim if K stable   - CMV: Valganciclovir (Valcyte); Patient is CMV IgG Ab discordant (D+/R-) and will continue on Valcyte x 6 months, then check CMV PCR monthly until 12 months post transplant.    # Hypertension: Controlled;  Goal BP: < 140/90   - Volume status: Mildly hypervolemic   - Changes: Not at this time    # Elevated Blood Glucose:    - Management as per primary care.    # Anemia in Chronic Renal Disease: Hgb: Stable, low      TOM: No   - Iron studies: High iron saturation    # Mineral Bone Disorder:   - Secondary renal  hyperparathyroidism; PTH level: Significantly elevated (601-1200 pg/ml)        On treatment: None  - Vitamin D; level: Low        On supplement: No; off cholecalciferol due to hypercalcemia.  - Calcium; level: High normal        On supplement: No  - Phosphorus; level: Normal        On binder: No    # Electrolytes:   - Potassium; level: Normal        On supplement: No  - Magnesium; level: Stable low        On supplement: No  - Bicarbonate; level: Stable low        On supplement: Yes    # Obesity: Stable weight.              - Once patient heals and recovers from surgery, would recommend working on weight loss for overall health by increasing exercise and watching caloric intake.    # GERD: Patient reports no relief with taking calcium carbonate.   - Recommend starting PPI.     # H/o Ureteral Reimplantation, s/p Mitrofanoff: Patient reports not using for Mitrofanoff in a couple of years and voids normally on his own.  Mitrofanoff is nonfunctional at this time.     # H/o Bilateral Uveitis: No evidence of systemic autoimmune/inflammatory disease at last Rheumatology.      # EBV IgG Ab Discordance (D+/R-): Will do surveillance EBV PCR qmonth until 12 months post transplant, then q3 months until 2 years post transplant.  Last EBV PCR was detectable, but less than quantifiable with last check Feb/2024.    # Transplant History:  Etiology of Kidney Failure: Posterior urethral valves  Tx: LDKT  Transplant: 12/28/2023 (Kidney)  Donor Type: Living Donor Class:   Crossmatch at time of Tx: negative  DSA at time of Tx: No  Significant changes in immunosuppression: None  Significant transplant-related complications: Acute cellular-mediated rejection    Transplant Office Phone Number: [phone]    Assessment and plan was discussed with the patient and he voiced his understanding and agreement.    Return visit: Return in about 4 weeks (around 4/3/2024).    Imelda Barrera MD    Chief Complaint   Mr. Villa is a 23 year old here  for kidney transplant and immunosuppression management.     History of Present Illness      Hospitalized 2/24-3/1 for ROSI, peak Cr US +mild hydronephrosis but renogram neg for leak, obstruction, reduced flows . Kidney bx showed borderline cellular rejection and significant ATI. He was given 2 doses of basiliximab and FK goal reduuced to 4-6 given concern for CNI toxicity, as he had no other options with EBV seronegative status (belatacept not an option) and severe ROSI/ATI (mTOR not ideal). His renal function improved since Cr now down to 1.4. Completing pred taper now on 40 mg po every day,   BP is better controlled overall. Glucose 120-180 on sliding scale insulin. He is trying to work on lifestyle modifications, diet and exercise to lose weight    IS Tacrolimus 6/6 /750 prednisone taper (now on 40 mg)  PPX: valcyte 450, pentamidine last 2/28-due 3/28  Home BP~130s/80s  Access RUE AVF +thrill/bruit (not ready yet)      Problem List   Patient Active Problem List   Diagnosis    Lymphangioma    ADHD (attention deficit hyperactivity disorder)    Allergic rhinitis due to pollen    Anxiety    Congenital posterior urethral valves    Iron deficiency    Neurogenic bladder    Class 2 severe obesity due to excess calories with serious comorbidity and body mass index (BMI) of 39.0 to 39.9 in adult (H)    Sensorineural hearing loss, asymmetrical    HTN, kidney transplant related    Secondary renal hyperparathyroidism (H24)    Vitamin D deficiency    Kidney replaced by transplant    Immunosuppressed status (H24)    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    Chronic kidney disease, stage 3b (H)    Dehydration    Elevated serum creatinine    Aftercare following organ transplant    Need for pneumocystis prophylaxis    Anemia in chronic renal disease    Metabolic acidosis    Hypomagnesemia    ROSI (acute kidney injury) (H24)    Kidney transplant rejection    Perinephric fluid collection of kidney  transplant    Esophageal reflux    EBV (Dai-Barr virus) viremia    Pre-diabetes    Steroid-induced hyperglycemia       Allergies   Allergies   Allergen Reactions    Banana Itching     Raw banana; itchy mouth      Dust Mites      Runny nose and watery eyes    Mold      Runny nose and watery eyes    Nsaids Other (See Comments)     CKD    Other [Seasonal Allergies]      Grass, Ragweed - gets runny nose and watery eyes    Sulfa Antibiotics      PN: LW Reaction: GI Upset as an infant  12/28 Admission: Tolerated Bactrim       Medications   Current Outpatient Medications   Medication Sig    acetaminophen (TYLENOL) 325 MG tablet Take 1-2 tablets (325-650 mg) by mouth every 4 hours as needed (For optimal non-opioid multimodal pain management to improve pain control.)    Alcohol Swabs PADS Use to swab the area of the injection or janny as directed Per insurance coverage    amLODIPine (NORVASC) 10 MG tablet Take 1 tablet (10 mg) by mouth daily    aspirin 81 MG EC tablet Take 1 tablet (81 mg) by mouth daily for 31 days Stop medication at 3 months post-transplant (~4/1/24).    blood glucose (NO BRAND SPECIFIED) lancets standard To use to test glucose level in the blood Use to test blood sugar  4  times daily as directed. To accompany glucose monitor brands per insurance coverage. Freestyle Lite lancets    blood glucose (NO BRAND SPECIFIED) test strip To use to test glucose level in the blood Use to test blood sugar  4 times daily as directed. To accompany glucose monitor brands per insurance coverage. Freestyle Lite test strips    blood glucose monitoring (FREESTYLE) lancets     blood glucose monitoring (NO BRAND SPECIFIED) meter device kit Use as directed Per insurance coverage: Freestyle Lite glucose meter    carvedilol (COREG) 12.5 MG tablet Take 1 tablet (12.5 mg) by mouth 2 times daily    cetirizine (ZYRTEC) 10 MG tablet Take 10 mg by mouth daily as needed for allergies (in the spring)    insulin lispro (HUMALOG  KWIKPEN) 100 UNIT/ML (1 unit dial) KWIKPEN Inject 1-7 Units Subcutaneous 3 times daily (before meals)    insulin lispro (HUMALOG KWIKPEN) 100 UNIT/ML (1 unit dial) KWIKPEN Inject 1-5 Units Subcutaneous at bedtime    insulin pen needle (32G X 4 MM) 32G X 4 MM miscellaneous Use as directed by provider Per insurance coverage    losartan (COZAAR) 25 MG tablet Take 1 tablet (25 mg) by mouth every evening    magnesium oxide (MAG-OX) 400 MG tablet Take 1 tablet (400 mg) by mouth 2 times daily    methylphenidate (RITALIN) 20 MG tablet Take 20 mg by mouth as needed Prn    mycophenolate (GENERIC EQUIVALENT) 250 MG capsule Take 3 capsules (750 mg) by mouth 2 times daily    pantoprazole (PROTONIX) 40 MG EC tablet Take 1 tablet (40 mg) by mouth 2 times daily    predniSONE (DELTASONE) 10 MG tablet Take 4 tablets (40 mg) by mouth 2 times daily for 3 days, THEN 4 tablets (40 mg) daily for 3 days, THEN 2 tablets (20 mg) daily for 3 days, THEN 1 tablet (10 mg) daily for 3 days.    [START ON 3/12/2024] predniSONE (DELTASONE) 5 MG tablet Take 1 tablet (5 mg) by mouth daily (Patient not taking: Reported on 3/6/2024)    Sharps Container MISC Use as directed to dispose of needles, lancets and other sharps    sodium bicarbonate 650 MG tablet Take 3 tablets (1,950 mg) by mouth 3 times daily    tacrolimus (GENERIC) 1 MG capsule Take 1 capsule (1 mg) by mouth 2 times daily Total dose = 7 mg twice daily (Patient taking differently: Take 1 mg by mouth 2 times daily Total dose = 6 mg twice daily)    tacrolimus (GENERIC) 5 MG capsule Take 1 capsule (5 mg) by mouth 2 times daily Total dose = 7 mg twice daily (Patient taking differently: Take 5 mg by mouth 2 times daily Total dose = 6 mg twice daily)    pentamidine (NEBUPENT) 300 MG neb solution Inhale 300 mg into the lungs every 28 days    valGANciclovir (VALCYTE) 450 MG tablet Take 2 tablets (900 mg) by mouth daily     No current facility-administered medications for this visit.     There are no  discontinued medications.    Physical Exam   Vital Signs: /77 (BP Location: Left arm, Patient Position: Sitting, Cuff Size: Adult Large)   Pulse 94   Temp 97.7  F (36.5  C) (Oral)   Wt 126.6 kg (279 lb)   SpO2 99%   BMI 38.91 kg/m      GENERAL APPEARANCE: alert and no distress  HENT: mouth without ulcers or lesions  RESP: lungs clear to auscultation - no rales, rhonchi or wheezes  CV: regular rhythm, normal rate, no rub, no murmur  EDEMA: none to trace LE edema bilaterally  ABDOMEN: soft, nondistended, nontender, bowel sounds normal, obese  MS: extremities normal - no gross deformities noted, no evidence of inflammation in joints, no muscle tenderness  SKIN: no rash  TX KIDNEY: normal  DIALYSIS ACCESS:  None    Data         Latest Ref Rng & Units 3/7/2024    10:15 AM 3/4/2024     9:59 AM 3/1/2024     1:00 PM   Renal   Sodium 135 - 145 mmol/L 138  142     K 3.4 - 5.3 mmol/L 4.6  4.9     Cl 98 - 107 mmol/L 107  108     Cl (external) 98 - 107 mmol/L 107  108     CO2 22 - 29 mmol/L 21  22     Urea Nitrogen 6.0 - 20.0 mg/dL 25.2  36.6     Creatinine 0.67 - 1.17 mg/dL 1.46  1.68     Glucose 70 - 99 mg/dL 134  148  144    Calcium 8.6 - 10.0 mg/dL 9.4  9.6           Latest Ref Rng & Units 3/7/2024    10:15 AM 3/1/2024     7:17 AM 2/29/2024     6:10 AM   Bone Health   Phosphorus 2.5 - 4.5 mg/dL  3.9  4.4    Vit D Def 20 - 50 ng/mL 19            Latest Ref Rng & Units 3/7/2024    10:15 AM 3/4/2024     9:59 AM 3/1/2024     7:17 AM   Heme   WBC 4.0 - 11.0 10e3/uL 13.6  12.3  9.5    Hgb 13.3 - 17.7 g/dL 10.8  10.6  8.5    Plt 150 - 450 10e3/uL 332  335  303          Latest Ref Rng & Units 12/19/2023     9:53 AM 7/13/2023     1:28 PM 7/16/2021     4:50 PM   Liver   AP 40 - 150 U/L 67      AP (external) 40 - 150 U/L  45     TBili <=1.2 mg/dL 0.5      TBili (external) 0.2 - 1.2 mg/dL  0.6     ALT 0 - 70 U/L 41      ALT (external) <=55 U/L  35     AST 0 - 45 U/L 27      AST (external) 10 - 40 U/L  26     Tot Protein 6.4 -  8.3 g/dL 7.6      Tot Protein (external) 6.4 - 8.3 g/dL  7.5     Albumin 3.5 - 5.0 g/dL   3.0    Albumin 3.5 - 5.2 g/dL 4.4      Albumin (external) 3.5 - 5.0 g/dL  3.8           Latest Ref Rng & Units 2/28/2024     1:13 PM 12/19/2023     9:53 AM 7/13/2023     1:28 PM   Pancreas   A1C <5.7 % 5.7  5.2     A1C (external) <=5.6 %   4.9          Latest Ref Rng & Units 1/7/2024     7:50 AM 12/19/2023     9:53 AM 2/26/2020     3:20 PM   Iron studies   Iron 61 - 157 ug/dL 167  104  66    Iron Saturation Index 15 - 46 %   26    Iron Sat Index 15 - 46 % 79  42     Ferritin 31 - 409 ng/mL 783  826  133          Latest Ref Rng & Units 2/24/2024     9:22 PM 2/20/2024    10:35 AM 2/8/2024     9:49 AM   [facility] Txp Virology   EBV DNA COPIES/ML Not Detected copies/mL <500  Not Detected  <500    EBV DNA LOG OF COPIES  <2.7   <2.7      Failed to redirect to the Timeline version of the REVFS SmartLink.  Recent Labs   Lab Test 02/24/24  1008 02/26/24  0539 03/01/24  0717 03/04/24  0959 03/07/24  1015   DOSTAC 2/23/2024  --   --  3/3/2024 3/6/2024   TACROL 8.5   < > 5.5 8.5 6.6    < > = values in this interval not displayed.     Recent Labs   Lab Test 01/11/24  0950 01/29/24  0937 02/01/24  1008 02/12/24  1030 02/24/24  1008   DOSMPA 1/10/2024   9:30 PM 1/28/2024   9:30 PM  --   --   --    MPACID 3.79* 3.73* 3.87* 2.23 2.65   MPAG 71.7 63.8 79.1 68.1 141.7*     The longitudinal plan of care for kidney transplant were addressed during this visit. Due to the added complexity in care, I will continue to support Boogie in the subsequent management and with ongoing continuity of care.

## 2024-03-06 NOTE — PATIENT INSTRUCTIONS
Increase valcyte to 900 mg po daily (2 tabs of 450mg) continue till July 5    Once prednisone taper is complete, stay on 5 mg po every day      Transplant Patient Information  Your Post Transplant Coordinator is: Nina Hu  You and your care team can contact your transplant coordinator Monday - Friday, 8am - 5pm at [phone] (Option 2 to reach the coordinator or Option 4 to schedule an appointment).  You can also reach your care team online via [email or web address].  After hours for urgent matters, please call [facility] at [phone].

## 2024-03-07 ENCOUNTER — LAB (OUTPATIENT)
Dept: LAB | Facility: CLINIC | Age: 24
End: 2024-03-07
Payer: COMMERCIAL

## 2024-03-07 DIAGNOSIS — N18.30 CHRONIC KIDNEY DISEASE, STAGE 3 UNSPECIFIED (H): ICD-10-CM

## 2024-03-07 DIAGNOSIS — Z98.890 OTHER SPECIFIED POSTPROCEDURAL STATES: ICD-10-CM

## 2024-03-07 DIAGNOSIS — Z79.899 ENCOUNTER FOR LONG-TERM CURRENT USE OF MEDICATION: ICD-10-CM

## 2024-03-07 DIAGNOSIS — Z94.0 KIDNEY REPLACED BY TRANSPLANT: Primary | ICD-10-CM

## 2024-03-07 DIAGNOSIS — Z20.828 CONTACT WITH AND (SUSPECTED) EXPOSURE TO OTHER VIRAL COMMUNICABLE DISEASES: ICD-10-CM

## 2024-03-07 DIAGNOSIS — Z94.0 KIDNEY REPLACED BY TRANSPLANT: ICD-10-CM

## 2024-03-07 DIAGNOSIS — Z48.298 AFTERCARE FOLLOWING ORGAN TRANSPLANT: ICD-10-CM

## 2024-03-07 LAB
ERYTHROCYTE [DISTWIDTH] IN BLOOD BY AUTOMATED COUNT: 13 % (ref 10–15)
HCT VFR BLD AUTO: 33.1 % (ref 40–53)
HGB BLD-MCNC: 10.8 G/DL (ref 13.3–17.7)
MCH RBC QN AUTO: 32.3 PG (ref 26.5–33)
MCHC RBC AUTO-ENTMCNC: 32.6 G/DL (ref 31.5–36.5)
MCV RBC AUTO: 99 FL (ref 78–100)
PLATELET # BLD AUTO: 332 10E3/UL (ref 150–450)
RBC # BLD AUTO: 3.34 10E6/UL (ref 4.4–5.9)
SCANNED LAB RESULT: NORMAL
TACROLIMUS BLD-MCNC: 6.6 UG/L (ref 5–15)
TME LAST DOSE: NORMAL H
TME LAST DOSE: NORMAL H
WBC # BLD AUTO: 13.6 10E3/UL (ref 4–11)

## 2024-03-07 PROCEDURE — 80048 BASIC METABOLIC PNL TOTAL CA: CPT

## 2024-03-07 PROCEDURE — 80197 ASSAY OF TACROLIMUS: CPT

## 2024-03-07 PROCEDURE — 82306 VITAMIN D 25 HYDROXY: CPT

## 2024-03-07 PROCEDURE — 36415 COLL VENOUS BLD VENIPUNCTURE: CPT

## 2024-03-07 PROCEDURE — 85027 COMPLETE CBC AUTOMATED: CPT

## 2024-03-08 LAB
ANION GAP SERPL CALCULATED.3IONS-SCNC: 10 MMOL/L (ref 7–15)
BUN SERPL-MCNC: 25.2 MG/DL (ref 6–20)
CALCIUM SERPL-MCNC: 9.4 MG/DL (ref 8.6–10)
CHLORIDE SERPL-SCNC: 107 MMOL/L (ref 98–107)
CREAT SERPL-MCNC: 1.46 MG/DL (ref 0.67–1.17)
DEPRECATED HCO3 PLAS-SCNC: 21 MMOL/L (ref 22–29)
EGFRCR SERPLBLD CKD-EPI 2021: 69 ML/MIN/1.73M2
GLUCOSE SERPL-MCNC: 134 MG/DL (ref 70–99)
POTASSIUM SERPL-SCNC: 4.6 MMOL/L (ref 3.4–5.3)
SODIUM SERPL-SCNC: 138 MMOL/L (ref 135–145)
VIT D+METAB SERPL-MCNC: 19 NG/ML (ref 20–50)

## 2024-03-11 ENCOUNTER — LAB (OUTPATIENT)
Dept: LAB | Facility: CLINIC | Age: 24
End: 2024-03-11
Payer: MEDICARE

## 2024-03-11 DIAGNOSIS — Z48.298 AFTERCARE FOLLOWING ORGAN TRANSPLANT: ICD-10-CM

## 2024-03-11 DIAGNOSIS — Z94.0 KIDNEY TRANSPLANTED: ICD-10-CM

## 2024-03-11 DIAGNOSIS — Z98.890 OTHER SPECIFIED POSTPROCEDURAL STATES: ICD-10-CM

## 2024-03-11 DIAGNOSIS — Z20.828 CONTACT WITH AND (SUSPECTED) EXPOSURE TO OTHER VIRAL COMMUNICABLE DISEASES: ICD-10-CM

## 2024-03-11 DIAGNOSIS — Z94.0 KIDNEY REPLACED BY TRANSPLANT: ICD-10-CM

## 2024-03-11 DIAGNOSIS — Z79.899 ENCOUNTER FOR LONG-TERM CURRENT USE OF MEDICATION: ICD-10-CM

## 2024-03-11 LAB
ANION GAP SERPL CALCULATED.3IONS-SCNC: 9 MMOL/L (ref 7–15)
BUN SERPL-MCNC: 26.6 MG/DL (ref 6–20)
CALCIUM SERPL-MCNC: 9.3 MG/DL (ref 8.6–10)
CHLORIDE SERPL-SCNC: 106 MMOL/L (ref 98–107)
CREAT SERPL-MCNC: 1.65 MG/DL (ref 0.67–1.17)
DEPRECATED HCO3 PLAS-SCNC: 24 MMOL/L (ref 22–29)
EGFRCR SERPLBLD CKD-EPI 2021: 59 ML/MIN/1.73M2
ERYTHROCYTE [DISTWIDTH] IN BLOOD BY AUTOMATED COUNT: 13.3 % (ref 10–15)
GLUCOSE SERPL-MCNC: 112 MG/DL (ref 70–99)
HCT VFR BLD AUTO: 30.5 % (ref 40–53)
HGB BLD-MCNC: 10.2 G/DL (ref 13.3–17.7)
MAGNESIUM SERPL-MCNC: 1.3 MG/DL (ref 1.7–2.3)
MCH RBC QN AUTO: 33.6 PG (ref 26.5–33)
MCHC RBC AUTO-ENTMCNC: 33.4 G/DL (ref 31.5–36.5)
MCV RBC AUTO: 100 FL (ref 78–100)
PHOSPHATE SERPL-MCNC: 2.2 MG/DL (ref 2.5–4.5)
PLATELET # BLD AUTO: 260 10E3/UL (ref 150–450)
POTASSIUM SERPL-SCNC: 4.6 MMOL/L (ref 3.4–5.3)
RBC # BLD AUTO: 3.04 10E6/UL (ref 4.4–5.9)
SODIUM SERPL-SCNC: 139 MMOL/L (ref 135–145)
TACROLIMUS BLD-MCNC: 8 UG/L (ref 5–15)
TME LAST DOSE: NORMAL H
TME LAST DOSE: NORMAL H
WBC # BLD AUTO: 9.8 10E3/UL (ref 4–11)

## 2024-03-11 PROCEDURE — 83735 ASSAY OF MAGNESIUM: CPT

## 2024-03-11 PROCEDURE — 80048 BASIC METABOLIC PNL TOTAL CA: CPT

## 2024-03-11 PROCEDURE — 36415 COLL VENOUS BLD VENIPUNCTURE: CPT

## 2024-03-11 PROCEDURE — 84100 ASSAY OF PHOSPHORUS: CPT

## 2024-03-11 PROCEDURE — 80197 ASSAY OF TACROLIMUS: CPT

## 2024-03-11 PROCEDURE — 85027 COMPLETE CBC AUTOMATED: CPT

## 2024-03-11 PROCEDURE — 87799 DETECT AGENT NOS DNA QUANT: CPT

## 2024-03-11 NOTE — TELEPHONE ENCOUNTER
Imelda Harvey MD  3/8/2024  5:59 AM CST Back to Top      DSA and closure bx later this month       Orders placed for kidney biopsy  -

## 2024-03-12 LAB
BK VIRUS SPECIMEN TYPE: NORMAL
BKV DNA # SPEC NAA+PROBE: NOT DETECTED IU/ML
CMV DNA SPEC NAA+PROBE-ACNC: NOT DETECTED IU/ML
EBV DNA COPIES/ML, INSTRUMENT: ABNORMAL COPIES/ML
EBV DNA SPEC NAA+PROBE-LOG#: 4.1 {LOG_COPIES}/ML

## 2024-03-13 ENCOUNTER — TELEPHONE (OUTPATIENT)
Dept: TRANSPLANT | Facility: CLINIC | Age: 24
End: 2024-03-13
Payer: COMMERCIAL

## 2024-03-13 DIAGNOSIS — Z94.0 KIDNEY REPLACED BY TRANSPLANT: Primary | ICD-10-CM

## 2024-03-14 ENCOUNTER — LAB (OUTPATIENT)
Dept: LAB | Facility: CLINIC | Age: 24
End: 2024-03-14
Payer: MEDICARE

## 2024-03-14 DIAGNOSIS — Z48.298 AFTERCARE FOLLOWING ORGAN TRANSPLANT: ICD-10-CM

## 2024-03-14 DIAGNOSIS — Z98.890 OTHER SPECIFIED POSTPROCEDURAL STATES: ICD-10-CM

## 2024-03-14 DIAGNOSIS — Z79.899 ENCOUNTER FOR LONG-TERM CURRENT USE OF MEDICATION: ICD-10-CM

## 2024-03-14 DIAGNOSIS — Z20.828 CONTACT WITH AND (SUSPECTED) EXPOSURE TO OTHER VIRAL COMMUNICABLE DISEASES: ICD-10-CM

## 2024-03-14 DIAGNOSIS — Z94.0 KIDNEY REPLACED BY TRANSPLANT: ICD-10-CM

## 2024-03-14 LAB
ERYTHROCYTE [DISTWIDTH] IN BLOOD BY AUTOMATED COUNT: 13.4 % (ref 10–15)
HCT VFR BLD AUTO: 32.5 % (ref 40–53)
HGB BLD-MCNC: 10.5 G/DL (ref 13.3–17.7)
MCH RBC QN AUTO: 32.7 PG (ref 26.5–33)
MCHC RBC AUTO-ENTMCNC: 32.3 G/DL (ref 31.5–36.5)
MCV RBC AUTO: 101 FL (ref 78–100)
PLATELET # BLD AUTO: 220 10E3/UL (ref 150–450)
RBC # BLD AUTO: 3.21 10E6/UL (ref 4.4–5.9)
TACROLIMUS BLD-MCNC: 7.9 UG/L (ref 5–15)
TME LAST DOSE: NORMAL H
TME LAST DOSE: NORMAL H
WBC # BLD AUTO: 5.7 10E3/UL (ref 4–11)

## 2024-03-14 PROCEDURE — 85027 COMPLETE CBC AUTOMATED: CPT

## 2024-03-14 PROCEDURE — 36415 COLL VENOUS BLD VENIPUNCTURE: CPT

## 2024-03-14 PROCEDURE — 80197 ASSAY OF TACROLIMUS: CPT

## 2024-03-14 PROCEDURE — 80048 BASIC METABOLIC PNL TOTAL CA: CPT

## 2024-03-14 NOTE — TELEPHONE ENCOUNTER
Imelda Mar MD Huepfel, Mary K, RN  DSA and closure bx later this month\  Abhilash Raza MD Huepfel, Mary K, RN  New EBV viremia and would follow EBV PCR weekly for now.  In two weeks, recommend rechecking EBV IgG Ab.    Imelda Harvey MD    Back to Top      Increase Mg oxide to 800 mg po bid

## 2024-03-15 LAB
ANION GAP SERPL CALCULATED.3IONS-SCNC: 10 MMOL/L (ref 7–15)
BUN SERPL-MCNC: 18.5 MG/DL (ref 6–20)
CALCIUM SERPL-MCNC: 10.1 MG/DL (ref 8.6–10)
CHLORIDE SERPL-SCNC: 107 MMOL/L (ref 98–107)
CREAT SERPL-MCNC: 1.61 MG/DL (ref 0.67–1.17)
DEPRECATED HCO3 PLAS-SCNC: 23 MMOL/L (ref 22–29)
EGFRCR SERPLBLD CKD-EPI 2021: 61 ML/MIN/1.73M2
GLUCOSE SERPL-MCNC: 104 MG/DL (ref 70–99)
POTASSIUM SERPL-SCNC: 5.1 MMOL/L (ref 3.4–5.3)
SODIUM SERPL-SCNC: 140 MMOL/L (ref 135–145)

## 2024-03-17 NOTE — TELEPHONE ENCOUNTER
Abhliash Raza MD Huepfel, Mary K, RN    I have talked to him about his weight since his transplant.    If he is having insomnia or anxiety, he could try taking prednisone 10 mg every other day.  That works for some patients.    Marc          Previous Messages       ----- Message -----  From: Nina Hu, RN    To: Abhilash Raza MD  Subject    Boogie asked me if he will remain on Prednisone 5 mg -concern about his hunger and weight gain          Encouraged to follow up with PCP  anxiety

## 2024-03-18 ENCOUNTER — LAB (OUTPATIENT)
Dept: LAB | Facility: CLINIC | Age: 24
End: 2024-03-18
Payer: MEDICARE

## 2024-03-18 DIAGNOSIS — Z79.899 ENCOUNTER FOR LONG-TERM CURRENT USE OF MEDICATION: ICD-10-CM

## 2024-03-18 DIAGNOSIS — Z20.828 CONTACT WITH AND (SUSPECTED) EXPOSURE TO OTHER VIRAL COMMUNICABLE DISEASES: ICD-10-CM

## 2024-03-18 DIAGNOSIS — Z48.298 AFTERCARE FOLLOWING ORGAN TRANSPLANT: ICD-10-CM

## 2024-03-18 DIAGNOSIS — Z98.890 OTHER SPECIFIED POSTPROCEDURAL STATES: ICD-10-CM

## 2024-03-18 DIAGNOSIS — Z94.0 KIDNEY REPLACED BY TRANSPLANT: ICD-10-CM

## 2024-03-18 LAB
ANION GAP SERPL CALCULATED.3IONS-SCNC: 11 MMOL/L (ref 7–15)
BUN SERPL-MCNC: 24.3 MG/DL (ref 6–20)
CALCIUM SERPL-MCNC: 10 MG/DL (ref 8.6–10)
CHLORIDE SERPL-SCNC: 108 MMOL/L (ref 98–107)
CREAT SERPL-MCNC: 1.77 MG/DL (ref 0.67–1.17)
DEPRECATED HCO3 PLAS-SCNC: 22 MMOL/L (ref 22–29)
EGFRCR SERPLBLD CKD-EPI 2021: 55 ML/MIN/1.73M2
ERYTHROCYTE [DISTWIDTH] IN BLOOD BY AUTOMATED COUNT: 13 % (ref 10–15)
GLUCOSE SERPL-MCNC: 108 MG/DL (ref 70–99)
HCT VFR BLD AUTO: 32.7 % (ref 40–53)
HGB BLD-MCNC: 10.8 G/DL (ref 13.3–17.7)
MCH RBC QN AUTO: 32.9 PG (ref 26.5–33)
MCHC RBC AUTO-ENTMCNC: 33 G/DL (ref 31.5–36.5)
MCV RBC AUTO: 100 FL (ref 78–100)
PLATELET # BLD AUTO: 189 10E3/UL (ref 150–450)
POTASSIUM SERPL-SCNC: 4.8 MMOL/L (ref 3.4–5.3)
RBC # BLD AUTO: 3.28 10E6/UL (ref 4.4–5.9)
SODIUM SERPL-SCNC: 141 MMOL/L (ref 135–145)
WBC # BLD AUTO: 5.1 10E3/UL (ref 4–11)

## 2024-03-18 PROCEDURE — 85027 COMPLETE CBC AUTOMATED: CPT

## 2024-03-18 PROCEDURE — 80197 ASSAY OF TACROLIMUS: CPT

## 2024-03-18 PROCEDURE — 87799 DETECT AGENT NOS DNA QUANT: CPT

## 2024-03-18 PROCEDURE — 36415 COLL VENOUS BLD VENIPUNCTURE: CPT

## 2024-03-18 PROCEDURE — 80048 BASIC METABOLIC PNL TOTAL CA: CPT

## 2024-03-19 ENCOUNTER — TELEPHONE (OUTPATIENT)
Dept: TRANSPLANT | Facility: CLINIC | Age: 24
End: 2024-03-19
Payer: COMMERCIAL

## 2024-03-19 DIAGNOSIS — Z94.0 KIDNEY REPLACED BY TRANSPLANT: Chronic | ICD-10-CM

## 2024-03-19 LAB
EBV DNA COPIES/ML, INSTRUMENT: 5492 COPIES/ML
EBV DNA SPEC NAA+PROBE-LOG#: 3.7 {LOG_COPIES}/ML
TACROLIMUS BLD-MCNC: 10.2 UG/L (ref 5–15)
TME LAST DOSE: NORMAL H
TME LAST DOSE: NORMAL H

## 2024-03-19 NOTE — TELEPHONE ENCOUNTER
Issue tacrolimus  level 10.2      Tacrolimus  7 mg twice per day    Lower tacrolimus  6 mg twice per day     Prednisone 10 mg every other day -    Dr Ry Morejon

## 2024-03-21 ENCOUNTER — LAB (OUTPATIENT)
Dept: LAB | Facility: CLINIC | Age: 24
End: 2024-03-21
Payer: MEDICARE

## 2024-03-21 DIAGNOSIS — Z79.899 ENCOUNTER FOR LONG-TERM CURRENT USE OF MEDICATION: ICD-10-CM

## 2024-03-21 DIAGNOSIS — Z20.828 CONTACT WITH AND (SUSPECTED) EXPOSURE TO OTHER VIRAL COMMUNICABLE DISEASES: ICD-10-CM

## 2024-03-21 DIAGNOSIS — Z94.0 KIDNEY REPLACED BY TRANSPLANT: ICD-10-CM

## 2024-03-21 DIAGNOSIS — Z98.890 OTHER SPECIFIED POSTPROCEDURAL STATES: ICD-10-CM

## 2024-03-21 DIAGNOSIS — Z48.298 AFTERCARE FOLLOWING ORGAN TRANSPLANT: ICD-10-CM

## 2024-03-21 LAB
ERYTHROCYTE [DISTWIDTH] IN BLOOD BY AUTOMATED COUNT: 12.8 % (ref 10–15)
HCT VFR BLD AUTO: 32.1 % (ref 40–53)
HGB BLD-MCNC: 10.6 G/DL (ref 13.3–17.7)
MCH RBC QN AUTO: 32.2 PG (ref 26.5–33)
MCHC RBC AUTO-ENTMCNC: 33 G/DL (ref 31.5–36.5)
MCV RBC AUTO: 98 FL (ref 78–100)
PLATELET # BLD AUTO: 178 10E3/UL (ref 150–450)
RBC # BLD AUTO: 3.29 10E6/UL (ref 4.4–5.9)
TACROLIMUS BLD-MCNC: 7.8 UG/L (ref 5–15)
TME LAST DOSE: NORMAL H
TME LAST DOSE: NORMAL H
WBC # BLD AUTO: 4.6 10E3/UL (ref 4–11)

## 2024-03-21 PROCEDURE — 85027 COMPLETE CBC AUTOMATED: CPT

## 2024-03-21 PROCEDURE — 80197 ASSAY OF TACROLIMUS: CPT

## 2024-03-21 PROCEDURE — 80048 BASIC METABOLIC PNL TOTAL CA: CPT

## 2024-03-21 PROCEDURE — 36415 COLL VENOUS BLD VENIPUNCTURE: CPT

## 2024-03-22 LAB
ANION GAP SERPL CALCULATED.3IONS-SCNC: 13 MMOL/L (ref 7–15)
BUN SERPL-MCNC: 21.6 MG/DL (ref 6–20)
CALCIUM SERPL-MCNC: 10.6 MG/DL (ref 8.6–10)
CHLORIDE SERPL-SCNC: 105 MMOL/L (ref 98–107)
CREAT SERPL-MCNC: 1.75 MG/DL (ref 0.67–1.17)
DEPRECATED HCO3 PLAS-SCNC: 21 MMOL/L (ref 22–29)
EGFRCR SERPLBLD CKD-EPI 2021: 55 ML/MIN/1.73M2
GLUCOSE SERPL-MCNC: 106 MG/DL (ref 70–99)
POTASSIUM SERPL-SCNC: 4.7 MMOL/L (ref 3.4–5.3)
SODIUM SERPL-SCNC: 139 MMOL/L (ref 135–145)

## 2024-03-25 ENCOUNTER — LAB (OUTPATIENT)
Dept: LAB | Facility: CLINIC | Age: 24
End: 2024-03-25
Payer: MEDICARE

## 2024-03-25 DIAGNOSIS — Z79.899 ENCOUNTER FOR LONG-TERM CURRENT USE OF MEDICATION: ICD-10-CM

## 2024-03-25 DIAGNOSIS — Z20.828 CONTACT WITH AND (SUSPECTED) EXPOSURE TO OTHER VIRAL COMMUNICABLE DISEASES: ICD-10-CM

## 2024-03-25 DIAGNOSIS — E86.0 DEHYDRATION: ICD-10-CM

## 2024-03-25 DIAGNOSIS — Z94.0 KIDNEY REPLACED BY TRANSPLANT: ICD-10-CM

## 2024-03-25 DIAGNOSIS — Z48.298 AFTERCARE FOLLOWING ORGAN TRANSPLANT: ICD-10-CM

## 2024-03-25 DIAGNOSIS — Z98.890 OTHER SPECIFIED POSTPROCEDURAL STATES: ICD-10-CM

## 2024-03-25 LAB
ANION GAP SERPL CALCULATED.3IONS-SCNC: 12 MMOL/L (ref 7–15)
BACTERIA FLD CULT: NO GROWTH
BUN SERPL-MCNC: 23.1 MG/DL (ref 6–20)
CALCIUM SERPL-MCNC: 9.8 MG/DL (ref 8.6–10)
CHLORIDE SERPL-SCNC: 107 MMOL/L (ref 98–107)
CREAT SERPL-MCNC: 1.55 MG/DL (ref 0.67–1.17)
DEPRECATED HCO3 PLAS-SCNC: 22 MMOL/L (ref 22–29)
EGFRCR SERPLBLD CKD-EPI 2021: 64 ML/MIN/1.73M2
ERYTHROCYTE [DISTWIDTH] IN BLOOD BY AUTOMATED COUNT: 12.6 % (ref 10–15)
GLUCOSE SERPL-MCNC: 114 MG/DL (ref 70–99)
HCT VFR BLD AUTO: 31.1 % (ref 40–53)
HGB BLD-MCNC: 10.4 G/DL (ref 13.3–17.7)
MCH RBC QN AUTO: 32.8 PG (ref 26.5–33)
MCHC RBC AUTO-ENTMCNC: 33.4 G/DL (ref 31.5–36.5)
MCV RBC AUTO: 98 FL (ref 78–100)
PLATELET # BLD AUTO: 223 10E3/UL (ref 150–450)
POTASSIUM SERPL-SCNC: 4.5 MMOL/L (ref 3.4–5.3)
RBC # BLD AUTO: 3.17 10E6/UL (ref 4.4–5.9)
SODIUM SERPL-SCNC: 141 MMOL/L (ref 135–145)
WBC # BLD AUTO: 5.2 10E3/UL (ref 4–11)

## 2024-03-25 PROCEDURE — 85027 COMPLETE CBC AUTOMATED: CPT

## 2024-03-25 PROCEDURE — 80197 ASSAY OF TACROLIMUS: CPT

## 2024-03-25 PROCEDURE — 86832 HLA CLASS I HIGH DEFIN QUAL: CPT

## 2024-03-25 PROCEDURE — 80048 BASIC METABOLIC PNL TOTAL CA: CPT

## 2024-03-25 PROCEDURE — 36415 COLL VENOUS BLD VENIPUNCTURE: CPT

## 2024-03-25 PROCEDURE — 86833 HLA CLASS II HIGH DEFIN QUAL: CPT

## 2024-03-25 PROCEDURE — 87799 DETECT AGENT NOS DNA QUANT: CPT

## 2024-03-26 LAB
DONOR IDENTIFICATION: NORMAL
DSA COMMENTS: NORMAL
DSA PRESENT: NO
DSA TEST METHOD: NORMAL
EBV DNA COPIES/ML, INSTRUMENT: 8080 COPIES/ML
EBV DNA SPEC NAA+PROBE-LOG#: 3.9 {LOG_COPIES}/ML
ORGAN: NORMAL
SA 1 CELL: NORMAL
SA 1 TEST METHOD: NORMAL
SA 2 CELL: NORMAL
SA 2 TEST METHOD: NORMAL
SA1 HI RISK ABY: NORMAL
SA1 MOD RISK ABY: NORMAL
SA2 HI RISK ABY: NORMAL
SA2 MOD RISK ABY: NORMAL
TACROLIMUS BLD-MCNC: 8.9 UG/L (ref 5–15)
TME LAST DOSE: NORMAL H
TME LAST DOSE: NORMAL H
UNACCEPTABLE ANTIGENS: NORMAL
UNOS CPRA: 0
ZZZSA 1  COMMENTS: NORMAL
ZZZSA 2 COMMENTS: NORMAL

## 2024-03-28 ENCOUNTER — LAB (OUTPATIENT)
Dept: LAB | Facility: CLINIC | Age: 24
End: 2024-03-28
Payer: MEDICARE

## 2024-03-28 DIAGNOSIS — Z48.298 AFTERCARE FOLLOWING ORGAN TRANSPLANT: ICD-10-CM

## 2024-03-28 DIAGNOSIS — Z94.0 KIDNEY REPLACED BY TRANSPLANT: ICD-10-CM

## 2024-03-28 DIAGNOSIS — Z98.890 OTHER SPECIFIED POSTPROCEDURAL STATES: ICD-10-CM

## 2024-03-28 DIAGNOSIS — Z20.828 CONTACT WITH AND (SUSPECTED) EXPOSURE TO OTHER VIRAL COMMUNICABLE DISEASES: ICD-10-CM

## 2024-03-28 DIAGNOSIS — Z79.899 ENCOUNTER FOR LONG-TERM CURRENT USE OF MEDICATION: ICD-10-CM

## 2024-03-28 LAB
ANION GAP SERPL CALCULATED.3IONS-SCNC: 12 MMOL/L (ref 7–15)
BUN SERPL-MCNC: 26.1 MG/DL (ref 6–20)
CALCIUM SERPL-MCNC: 9.9 MG/DL (ref 8.6–10)
CHLORIDE SERPL-SCNC: 105 MMOL/L (ref 98–107)
CREAT SERPL-MCNC: 1.81 MG/DL (ref 0.67–1.17)
DEPRECATED HCO3 PLAS-SCNC: 22 MMOL/L (ref 22–29)
EGFRCR SERPLBLD CKD-EPI 2021: 53 ML/MIN/1.73M2
ERYTHROCYTE [DISTWIDTH] IN BLOOD BY AUTOMATED COUNT: 12.4 % (ref 10–15)
GLUCOSE SERPL-MCNC: 113 MG/DL (ref 70–99)
HCT VFR BLD AUTO: 31.4 % (ref 40–53)
HGB BLD-MCNC: 10.5 G/DL (ref 13.3–17.7)
MCH RBC QN AUTO: 32.4 PG (ref 26.5–33)
MCHC RBC AUTO-ENTMCNC: 33.4 G/DL (ref 31.5–36.5)
MCV RBC AUTO: 97 FL (ref 78–100)
PLATELET # BLD AUTO: 235 10E3/UL (ref 150–450)
POTASSIUM SERPL-SCNC: 4.5 MMOL/L (ref 3.4–5.3)
RBC # BLD AUTO: 3.24 10E6/UL (ref 4.4–5.9)
SODIUM SERPL-SCNC: 139 MMOL/L (ref 135–145)
TACROLIMUS BLD-MCNC: 7.3 UG/L (ref 5–15)
TME LAST DOSE: NORMAL H
TME LAST DOSE: NORMAL H
WBC # BLD AUTO: 4.8 10E3/UL (ref 4–11)

## 2024-03-28 PROCEDURE — 36415 COLL VENOUS BLD VENIPUNCTURE: CPT

## 2024-03-28 PROCEDURE — 80197 ASSAY OF TACROLIMUS: CPT

## 2024-03-28 PROCEDURE — 80048 BASIC METABOLIC PNL TOTAL CA: CPT

## 2024-03-28 PROCEDURE — 85027 COMPLETE CBC AUTOMATED: CPT

## 2024-04-01 ENCOUNTER — LAB (OUTPATIENT)
Dept: LAB | Facility: CLINIC | Age: 24
End: 2024-04-01
Payer: COMMERCIAL

## 2024-04-01 DIAGNOSIS — Z79.899 ENCOUNTER FOR LONG-TERM CURRENT USE OF MEDICATION: ICD-10-CM

## 2024-04-01 DIAGNOSIS — Z20.828 CONTACT WITH AND (SUSPECTED) EXPOSURE TO OTHER VIRAL COMMUNICABLE DISEASES: ICD-10-CM

## 2024-04-01 DIAGNOSIS — Z94.0 KIDNEY REPLACED BY TRANSPLANT: ICD-10-CM

## 2024-04-01 DIAGNOSIS — Z98.890 OTHER SPECIFIED POSTPROCEDURAL STATES: ICD-10-CM

## 2024-04-01 DIAGNOSIS — Z48.298 AFTERCARE FOLLOWING ORGAN TRANSPLANT: ICD-10-CM

## 2024-04-01 LAB
ANION GAP SERPL CALCULATED.3IONS-SCNC: 12 MMOL/L (ref 7–15)
BUN SERPL-MCNC: 20.9 MG/DL (ref 6–20)
CALCIUM SERPL-MCNC: 9.7 MG/DL (ref 8.6–10)
CHLORIDE SERPL-SCNC: 106 MMOL/L (ref 98–107)
CREAT SERPL-MCNC: 1.58 MG/DL (ref 0.67–1.17)
DEPRECATED HCO3 PLAS-SCNC: 20 MMOL/L (ref 22–29)
EGFRCR SERPLBLD CKD-EPI 2021: 63 ML/MIN/1.73M2
ERYTHROCYTE [DISTWIDTH] IN BLOOD BY AUTOMATED COUNT: 12.6 % (ref 10–15)
GLUCOSE SERPL-MCNC: 108 MG/DL (ref 70–99)
HCT VFR BLD AUTO: 30.8 % (ref 40–53)
HGB BLD-MCNC: 10.4 G/DL (ref 13.3–17.7)
MCH RBC QN AUTO: 32.9 PG (ref 26.5–33)
MCHC RBC AUTO-ENTMCNC: 33.8 G/DL (ref 31.5–36.5)
MCV RBC AUTO: 98 FL (ref 78–100)
PLATELET # BLD AUTO: 287 10E3/UL (ref 150–450)
POTASSIUM SERPL-SCNC: 4.7 MMOL/L (ref 3.4–5.3)
RBC # BLD AUTO: 3.16 10E6/UL (ref 4.4–5.9)
SODIUM SERPL-SCNC: 138 MMOL/L (ref 135–145)
TACROLIMUS BLD-MCNC: 8.8 UG/L (ref 5–15)
TME LAST DOSE: NORMAL H
TME LAST DOSE: NORMAL H
WBC # BLD AUTO: 6.5 10E3/UL (ref 4–11)

## 2024-04-01 PROCEDURE — 36415 COLL VENOUS BLD VENIPUNCTURE: CPT

## 2024-04-01 PROCEDURE — 80197 ASSAY OF TACROLIMUS: CPT

## 2024-04-01 PROCEDURE — 80048 BASIC METABOLIC PNL TOTAL CA: CPT

## 2024-04-01 PROCEDURE — 87799 DETECT AGENT NOS DNA QUANT: CPT

## 2024-04-01 PROCEDURE — 85027 COMPLETE CBC AUTOMATED: CPT

## 2024-04-02 LAB
EBV DNA COPIES/ML, INSTRUMENT: 6450 COPIES/ML
EBV DNA SPEC NAA+PROBE-LOG#: 3.8 {LOG_COPIES}/ML

## 2024-04-03 ENCOUNTER — MYC MEDICAL ADVICE (OUTPATIENT)
Dept: TRANSPLANT | Facility: CLINIC | Age: 24
End: 2024-04-03
Payer: COMMERCIAL

## 2024-04-04 ENCOUNTER — LAB (OUTPATIENT)
Dept: LAB | Facility: CLINIC | Age: 24
End: 2024-04-04
Payer: COMMERCIAL

## 2024-04-04 DIAGNOSIS — Z79.899 ENCOUNTER FOR LONG-TERM CURRENT USE OF MEDICATION: ICD-10-CM

## 2024-04-04 DIAGNOSIS — Z94.0 KIDNEY REPLACED BY TRANSPLANT: ICD-10-CM

## 2024-04-04 DIAGNOSIS — Z98.890 OTHER SPECIFIED POSTPROCEDURAL STATES: ICD-10-CM

## 2024-04-04 DIAGNOSIS — Z48.298 AFTERCARE FOLLOWING ORGAN TRANSPLANT: ICD-10-CM

## 2024-04-04 DIAGNOSIS — Z20.828 CONTACT WITH AND (SUSPECTED) EXPOSURE TO OTHER VIRAL COMMUNICABLE DISEASES: ICD-10-CM

## 2024-04-04 LAB
ERYTHROCYTE [DISTWIDTH] IN BLOOD BY AUTOMATED COUNT: 12.8 % (ref 10–15)
HCT VFR BLD AUTO: 31.6 % (ref 40–53)
HGB BLD-MCNC: 10.6 G/DL (ref 13.3–17.7)
MCH RBC QN AUTO: 32.3 PG (ref 26.5–33)
MCHC RBC AUTO-ENTMCNC: 33.5 G/DL (ref 31.5–36.5)
MCV RBC AUTO: 96 FL (ref 78–100)
PLATELET # BLD AUTO: 301 10E3/UL (ref 150–450)
RBC # BLD AUTO: 3.28 10E6/UL (ref 4.4–5.9)
TACROLIMUS BLD-MCNC: 6.3 UG/L (ref 5–15)
TME LAST DOSE: NORMAL H
TME LAST DOSE: NORMAL H
WBC # BLD AUTO: 5.3 10E3/UL (ref 4–11)

## 2024-04-04 PROCEDURE — 85027 COMPLETE CBC AUTOMATED: CPT

## 2024-04-04 PROCEDURE — 36415 COLL VENOUS BLD VENIPUNCTURE: CPT

## 2024-04-04 PROCEDURE — 80048 BASIC METABOLIC PNL TOTAL CA: CPT

## 2024-04-04 PROCEDURE — 80197 ASSAY OF TACROLIMUS: CPT

## 2024-04-05 ENCOUNTER — TELEPHONE (OUTPATIENT)
Dept: TRANSPLANT | Facility: CLINIC | Age: 24
End: 2024-04-05
Payer: COMMERCIAL

## 2024-04-05 LAB
ANION GAP SERPL CALCULATED.3IONS-SCNC: 11 MMOL/L (ref 7–15)
BUN SERPL-MCNC: 25.6 MG/DL (ref 6–20)
CALCIUM SERPL-MCNC: 10.1 MG/DL (ref 8.6–10)
CHLORIDE SERPL-SCNC: 107 MMOL/L (ref 98–107)
CREAT SERPL-MCNC: 1.74 MG/DL (ref 0.67–1.17)
DEPRECATED HCO3 PLAS-SCNC: 23 MMOL/L (ref 22–29)
EGFRCR SERPLBLD CKD-EPI 2021: 56 ML/MIN/1.73M2
GLUCOSE SERPL-MCNC: 101 MG/DL (ref 70–99)
POTASSIUM SERPL-SCNC: 4.5 MMOL/L (ref 3.4–5.3)
SODIUM SERPL-SCNC: 141 MMOL/L (ref 135–145)

## 2024-04-05 NOTE — TELEPHONE ENCOUNTER
[facility] called regarding a anesthesia billing issue needing more information when the patient received the kidney in December.

## 2024-04-06 NOTE — TELEPHONE ENCOUNTER
Called Boogie       Reviewed discussion regarding prednisone  should be  with Dr Raza 5/7/2024   Encouraged him to have the  transplant   kidney biopsy  - prior to his appointment    Gave the IR phone # to call              When can I talk to a doctor about getting off Prednisone?      How do I schedule the appointment for my biopsy? I think they called once but I missed it and they haven't called again.      Thank you.

## 2024-04-07 ENCOUNTER — HEALTH MAINTENANCE LETTER (OUTPATIENT)
Age: 24
End: 2024-04-07

## 2024-04-07 RX ORDER — TACROLIMUS 1 MG/1
1 CAPSULE ORAL 2 TIMES DAILY
Qty: 60 CAPSULE | Refills: 11 | Status: SHIPPED | OUTPATIENT
Start: 2024-04-07 | End: 2024-05-24

## 2024-04-07 RX ORDER — TACROLIMUS 5 MG/1
5 CAPSULE ORAL 2 TIMES DAILY
Qty: 60 CAPSULE | Refills: 4 | Status: SHIPPED | OUTPATIENT
Start: 2024-04-07 | End: 2024-05-24

## 2024-04-08 ENCOUNTER — LAB (OUTPATIENT)
Dept: LAB | Facility: CLINIC | Age: 24
End: 2024-04-08
Payer: COMMERCIAL

## 2024-04-08 DIAGNOSIS — Z94.0 KIDNEY REPLACED BY TRANSPLANT: ICD-10-CM

## 2024-04-08 DIAGNOSIS — Z20.828 CONTACT WITH AND (SUSPECTED) EXPOSURE TO OTHER VIRAL COMMUNICABLE DISEASES: ICD-10-CM

## 2024-04-08 DIAGNOSIS — Z48.298 AFTERCARE FOLLOWING ORGAN TRANSPLANT: ICD-10-CM

## 2024-04-08 DIAGNOSIS — Z79.899 ENCOUNTER FOR LONG-TERM CURRENT USE OF MEDICATION: ICD-10-CM

## 2024-04-08 DIAGNOSIS — Z94.0 KIDNEY TRANSPLANTED: ICD-10-CM

## 2024-04-08 DIAGNOSIS — Z98.890 OTHER SPECIFIED POSTPROCEDURAL STATES: ICD-10-CM

## 2024-04-08 LAB
ERYTHROCYTE [DISTWIDTH] IN BLOOD BY AUTOMATED COUNT: 12.7 % (ref 10–15)
HCT VFR BLD AUTO: 31.2 % (ref 40–53)
HGB BLD-MCNC: 10.5 G/DL (ref 13.3–17.7)
MCH RBC QN AUTO: 33 PG (ref 26.5–33)
MCHC RBC AUTO-ENTMCNC: 33.7 G/DL (ref 31.5–36.5)
MCV RBC AUTO: 98 FL (ref 78–100)
PLATELET # BLD AUTO: 325 10E3/UL (ref 150–450)
RBC # BLD AUTO: 3.18 10E6/UL (ref 4.4–5.9)
TACROLIMUS BLD-MCNC: 8.6 UG/L (ref 5–15)
TME LAST DOSE: NORMAL H
TME LAST DOSE: NORMAL H
WBC # BLD AUTO: 4.9 10E3/UL (ref 4–11)

## 2024-04-08 PROCEDURE — 80197 ASSAY OF TACROLIMUS: CPT

## 2024-04-08 PROCEDURE — 83735 ASSAY OF MAGNESIUM: CPT

## 2024-04-08 PROCEDURE — 84100 ASSAY OF PHOSPHORUS: CPT

## 2024-04-08 PROCEDURE — 86832 HLA CLASS I HIGH DEFIN QUAL: CPT

## 2024-04-08 PROCEDURE — 36415 COLL VENOUS BLD VENIPUNCTURE: CPT

## 2024-04-08 PROCEDURE — 85027 COMPLETE CBC AUTOMATED: CPT

## 2024-04-08 PROCEDURE — 87799 DETECT AGENT NOS DNA QUANT: CPT

## 2024-04-08 PROCEDURE — 86833 HLA CLASS II HIGH DEFIN QUAL: CPT

## 2024-04-08 PROCEDURE — 80048 BASIC METABOLIC PNL TOTAL CA: CPT

## 2024-04-09 LAB
ANION GAP SERPL CALCULATED.3IONS-SCNC: 10 MMOL/L (ref 7–15)
BUN SERPL-MCNC: 21.1 MG/DL (ref 6–20)
CALCIUM SERPL-MCNC: 9.7 MG/DL (ref 8.6–10)
CHLORIDE SERPL-SCNC: 109 MMOL/L (ref 98–107)
CMV DNA SPEC NAA+PROBE-ACNC: NOT DETECTED IU/ML
CREAT SERPL-MCNC: 1.53 MG/DL (ref 0.67–1.17)
DEPRECATED HCO3 PLAS-SCNC: 23 MMOL/L (ref 22–29)
EBV DNA SERPL NAA+PROBE-ACNC: NOT DETECTED IU/ML
EGFRCR SERPLBLD CKD-EPI 2021: 65 ML/MIN/1.73M2
GLUCOSE SERPL-MCNC: 156 MG/DL (ref 70–99)
MAGNESIUM SERPL-MCNC: 1.7 MG/DL (ref 1.7–2.3)
PHOSPHATE SERPL-MCNC: 2.1 MG/DL (ref 2.5–4.5)
POTASSIUM SERPL-SCNC: 4.4 MMOL/L (ref 3.4–5.3)
SODIUM SERPL-SCNC: 142 MMOL/L (ref 135–145)

## 2024-04-10 ENCOUNTER — TELEPHONE (OUTPATIENT)
Dept: INTERVENTIONAL RADIOLOGY/VASCULAR | Facility: CLINIC | Age: 24
End: 2024-04-10
Payer: COMMERCIAL

## 2024-04-10 LAB
BK VIRUS SPECIMEN TYPE: NORMAL
BKV DNA # SPEC NAA+PROBE: NOT DETECTED IU/ML

## 2024-04-11 ENCOUNTER — APPOINTMENT (OUTPATIENT)
Dept: MEDSURG UNIT | Facility: CLINIC | Age: 24
End: 2024-04-11
Attending: INTERNAL MEDICINE
Payer: COMMERCIAL

## 2024-04-11 ENCOUNTER — HOSPITAL ENCOUNTER (OUTPATIENT)
Facility: CLINIC | Age: 24
Discharge: HOME | End: 2024-04-11
Attending: INTERNAL MEDICINE | Admitting: RADIOLOGY
Payer: COMMERCIAL

## 2024-04-11 ENCOUNTER — APPOINTMENT (OUTPATIENT)
Dept: INTERVENTIONAL RADIOLOGY/VASCULAR | Facility: CLINIC | Age: 24
End: 2024-04-11
Attending: INTERNAL MEDICINE
Payer: COMMERCIAL

## 2024-04-11 VITALS
BODY MASS INDEX: 41.38 KG/M2 | HEIGHT: 71 IN | DIASTOLIC BLOOD PRESSURE: 78 MMHG | OXYGEN SATURATION: 97 % | TEMPERATURE: 97.8 F | RESPIRATION RATE: 16 BRPM | SYSTOLIC BLOOD PRESSURE: 123 MMHG | HEART RATE: 80 BPM | WEIGHT: 295.6 LBS

## 2024-04-11 DIAGNOSIS — Z94.0 KIDNEY REPLACED BY TRANSPLANT: ICD-10-CM

## 2024-04-11 LAB
INR PPP: 0.97 (ref 0.85–1.15)
PLATELET # BLD AUTO: 330 10E3/UL (ref 150–450)

## 2024-04-11 PROCEDURE — 88313 SPECIAL STAINS GROUP 2: CPT | Mod: 26 | Performed by: PATHOLOGY

## 2024-04-11 PROCEDURE — 88350 IMFLUOR EA ADDL 1ANTB STN PX: CPT | Mod: 26 | Performed by: PATHOLOGY

## 2024-04-11 PROCEDURE — 999N000142 HC STATISTIC PROCEDURE PREP ONLY

## 2024-04-11 PROCEDURE — 99153 MOD SED SAME PHYS/QHP EA: CPT

## 2024-04-11 PROCEDURE — 76942 ECHO GUIDE FOR BIOPSY: CPT | Mod: 26 | Performed by: RADIOLOGY

## 2024-04-11 PROCEDURE — 85610 PROTHROMBIN TIME: CPT | Performed by: NURSE PRACTITIONER

## 2024-04-11 PROCEDURE — 999N000127 HC STATISTIC PERIPHERAL IV START W US GUIDANCE

## 2024-04-11 PROCEDURE — 88346 IMFLUOR 1ST 1ANTB STAIN PX: CPT | Mod: 26 | Performed by: PATHOLOGY

## 2024-04-11 PROCEDURE — 258N000003 HC RX IP 258 OP 636: Performed by: NURSE PRACTITIONER

## 2024-04-11 PROCEDURE — 99152 MOD SED SAME PHYS/QHP 5/>YRS: CPT | Performed by: RADIOLOGY

## 2024-04-11 PROCEDURE — 50200 RENAL BIOPSY PERQ: CPT | Mod: RT | Performed by: RADIOLOGY

## 2024-04-11 PROCEDURE — 36415 COLL VENOUS BLD VENIPUNCTURE: CPT | Performed by: NURSE PRACTITIONER

## 2024-04-11 PROCEDURE — 88348 ELECTRON MICROSCOPY DX: CPT | Mod: 26 | Performed by: PATHOLOGY

## 2024-04-11 PROCEDURE — 99152 MOD SED SAME PHYS/QHP 5/>YRS: CPT

## 2024-04-11 PROCEDURE — 88313 SPECIAL STAINS GROUP 2: CPT | Mod: TC | Performed by: INTERNAL MEDICINE

## 2024-04-11 PROCEDURE — 88348 ELECTRON MICROSCOPY DX: CPT | Mod: TC | Performed by: INTERNAL MEDICINE

## 2024-04-11 PROCEDURE — 999N000133 HC STATISTIC PP CARE STAGE 2

## 2024-04-11 PROCEDURE — 250N000011 HC RX IP 250 OP 636: Performed by: RADIOLOGY

## 2024-04-11 PROCEDURE — 250N000009 HC RX 250: Performed by: NURSE PRACTITIONER

## 2024-04-11 PROCEDURE — 88305 TISSUE EXAM BY PATHOLOGIST: CPT | Mod: 26 | Performed by: PATHOLOGY

## 2024-04-11 PROCEDURE — 272N000505 HC NEEDLE CR5

## 2024-04-11 PROCEDURE — 85049 AUTOMATED PLATELET COUNT: CPT | Performed by: NURSE PRACTITIONER

## 2024-04-11 RX ORDER — NALOXONE HYDROCHLORIDE 0.4 MG/ML
0.4 INJECTION, SOLUTION INTRAMUSCULAR; INTRAVENOUS; SUBCUTANEOUS
Status: DISCONTINUED | OUTPATIENT
Start: 2024-04-11 | End: 2024-04-11 | Stop reason: HOSPADM

## 2024-04-11 RX ORDER — NALOXONE HYDROCHLORIDE 0.4 MG/ML
0.2 INJECTION, SOLUTION INTRAMUSCULAR; INTRAVENOUS; SUBCUTANEOUS
Status: DISCONTINUED | OUTPATIENT
Start: 2024-04-11 | End: 2024-04-11 | Stop reason: HOSPADM

## 2024-04-11 RX ORDER — SERTRALINE HYDROCHLORIDE 25 MG/1
150 TABLET, FILM COATED ORAL DAILY
COMMUNITY

## 2024-04-11 RX ORDER — FENTANYL CITRATE 50 UG/ML
25-50 INJECTION, SOLUTION INTRAMUSCULAR; INTRAVENOUS EVERY 5 MIN PRN
Status: DISCONTINUED | OUTPATIENT
Start: 2024-04-11 | End: 2024-04-11 | Stop reason: HOSPADM

## 2024-04-11 RX ORDER — FLUMAZENIL 0.1 MG/ML
0.2 INJECTION, SOLUTION INTRAVENOUS
Status: DISCONTINUED | OUTPATIENT
Start: 2024-04-11 | End: 2024-04-11 | Stop reason: HOSPADM

## 2024-04-11 RX ORDER — SODIUM CHLORIDE 9 MG/ML
INJECTION, SOLUTION INTRAVENOUS CONTINUOUS
Status: DISCONTINUED | OUTPATIENT
Start: 2024-04-11 | End: 2024-04-11 | Stop reason: HOSPADM

## 2024-04-11 RX ORDER — LIDOCAINE 40 MG/G
CREAM TOPICAL
Status: DISCONTINUED | OUTPATIENT
Start: 2024-04-11 | End: 2024-04-11 | Stop reason: HOSPADM

## 2024-04-11 RX ADMIN — FENTANYL CITRATE 50 MCG: 50 INJECTION, SOLUTION INTRAMUSCULAR; INTRAVENOUS at 09:58

## 2024-04-11 RX ADMIN — LIDOCAINE HYDROCHLORIDE 1 ML: 10 INJECTION, SOLUTION EPIDURAL; INFILTRATION; INTRACAUDAL; PERINEURAL at 09:58

## 2024-04-11 RX ADMIN — MIDAZOLAM 1 MG: 1 INJECTION INTRAMUSCULAR; INTRAVENOUS at 09:59

## 2024-04-11 RX ADMIN — SODIUM CHLORIDE: 9 INJECTION, SOLUTION INTRAVENOUS at 08:54

## 2024-04-11 RX ADMIN — FENTANYL CITRATE 50 MCG: 50 INJECTION, SOLUTION INTRAMUSCULAR; INTRAVENOUS at 10:05

## 2024-04-11 RX ADMIN — MIDAZOLAM 1 MG: 1 INJECTION INTRAMUSCULAR; INTRAVENOUS at 10:06

## 2024-04-11 RX ADMIN — FENTANYL CITRATE 50 MCG: 50 INJECTION, SOLUTION INTRAMUSCULAR; INTRAVENOUS at 10:09

## 2024-04-11 ASSESSMENT — ACTIVITIES OF DAILY LIVING (ADL)
ADLS_ACUITY_SCORE: 37

## 2024-04-11 NOTE — PRE-PROCEDURE
GENERAL PRE-PROCEDURE:   Procedure:  Right renal transplant biopsy  Date/Time:  4/11/2024 9:33 AM    Verbal consent obtained?: Yes    Written consent obtained?: Yes    Risks and benefits: Risks, benefits and alternatives were discussed    Consent given by:  Patient  Patient states understanding of procedure being performed: Yes    Patient's understanding of procedure matches consent: Yes    Procedure consent matches procedure scheduled: Yes    Expected level of sedation:  Moderate  Appropriately NPO:  Yes  ASA Class:  2  Mallampati  :  Grade 1- soft palate, uvula, tonsillar pillars, and posterior pharyngeal wall visible  Lungs:  Lungs clear with good breath sounds bilaterally  Heart:  Normal heart sounds and rate  History & Physical reviewed:  Abbreviated history and physical done prior to moderate sedation  Statement of review:  I have reviewed the lab findings, diagnostic data, medications, and the plan for sedation

## 2024-04-11 NOTE — DISCHARGE INSTRUCTIONS
[facility]    Interventional Radiology  Patient Instructions Following Biopsy    AFTER YOU GO HOME  If you were given sedation, for the first 24 hours: we recommend that an adult stay with you, DO NOT drive or operate machinery at home or at work, DO NOT smoke, DO NOT make any important or legal decisions.  DO NOT drink alcoholic beverages the day of your procedure  Drink plenty of fluids   Resume your regular diet, unless otherwise instructed by your Primary Physician  Relax and take it easy for 48 hours  DO NOT do any strenuous exercise or lifting (> 10 lbs) for at least 7 days following your procedure  Keep the dressing dry and in place for 24 hours. Replace with Band aid for 2 days.  Never leave a wet dressing in place.  Do not take a shower for at least 12 hours following your procedure. No tub bath, hot tub, or swimming for 5 days.  There should be minimum drainage from the biopsy site    CALL THE PHYSICIAN IF:  You start bleeding from the procedure site.  If you do start to bleed from that site, lie down flat and hold pressure on the site for a minimum of 10 minutes.  Your physician will tell you if you need to return to the hospital  You develop nausea or vomiting  You have excessive swelling, redness, or tenderness at the site  You have drainage that looks like it is infected.  You experience severe pain  You develop hives or a rash or unexplained itching  You develop shortness of breath  You develop a temperature of 101 degrees F or greater  You develop bloody clots or red urine after you are discharged      [facility] INTERVENTIONAL RADIOLOGY DEPARTMENT  Procedure Physician: Dr. Magana                                     Date of procedure: April 11, 2024  Telephone Numbers: [phone]      Monday-Friday 7:30 am to 4:00 pm  [phone] After 4:00 pm Monday-Friday, Weekends & Holidays.   Ask for the Interventional Radiologist on call.  Someone is on call 24 hrs/day  [facility] toll free  number: [phone] Monday-Friday 8:00 am to 4:30 pm  [facility] Emergency Dept: [phone]

## 2024-04-11 NOTE — PROGRESS NOTES
Pt arrived via cart with RN from IR s/p kidney biopsy. VSS. RLQ site CDI, no hematoma. Patient denies pain.  Dad at bedside. Flat bedrest until 1215 per MD orders

## 2024-04-11 NOTE — IR NOTE
Patient Name: Boogie Villa  Medical Record Number: [id number]  Today's Date: 4/11/2024    Procedure: R renal transplant bx  Proceduralist: MD [place]  Pathology present: yes    Procedure Start: 1000  Procedure end: 1015  Sedation Time: 0955 - 1015 (20 min)  Sedation Medications: 2 mg Versed and 150 mcg Fentanyl  Bedrest 2 hours from 1015 - 1215   Report given to: CARLA, RN  : no    Other Notes: Pt arrived to IR room 2 from . Consent reviewed. Pt denies any questions or concerns regarding procedure. Pt positioned supine and monitored per protocol. Pt tolerated procedure without any noted complications. Pt transferred back to .

## 2024-04-11 NOTE — PROGRESS NOTES
Pt discharged after right renal biopsy. RLQ site CDI. PIV intact upon removal. Urine clear. Ambulating, eating and drinking. Dad was ride home. Demonstrated understanding of discharge teaching.

## 2024-04-11 NOTE — PROGRESS NOTES
Pt prepped for renal biopsy. PIV placed via ultrasound, labs in process. Consent needs to be signed.

## 2024-04-11 NOTE — PROCEDURES
[facility]    Procedure: IR Procedure Note    Date/Time: 4/11/2024 10:19 AM    Performed by: Orestes Magana MD  Authorized by: Orestes Magana MD      UNIVERSAL PROTOCOL   Site Marked: NA  Prior Images Obtained and Reviewed:  Yes  Required items: Required blood products, implants, devices and special equipment available    Patient identity confirmed:  Verbally with patient, arm band, provided demographic data and hospital-assigned identification number  Patient was reevaluated immediately before administering moderate or deep sedation or anesthesia  Confirmation Checklist:  Patient's identity using two indicators, relevant allergies, procedure was appropriate and matched the consent or emergent situation and correct equipment/implants were available  Time out: Immediately prior to the procedure a time out was called    Universal Protocol: the Joint Commission Universal Protocol was followed    Preparation: Patient was prepped and draped in usual sterile fashion       ANESTHESIA    Anesthesia:  Local infiltration  Local Anesthetic:  Lidocaine 1% without epinephrine      SEDATION  Patient Sedated: Yes    Sedation:  Fentanyl and midazolam  Vital signs: Vital signs monitored during sedation    See dictated procedure note for full details.  Findings: Procedure Start: 1000  Procedure end: 1015  Sedation Time: 0955 - 1015 (20 min)  Sedation Medications: 2 mg Versed and 150 mcg Fentanyl  Bedrest 2 hours from 1015 - 1215       Specimens: core needle biopsy specimens sent for pathological analysis    Complications: None    Condition: Stable    Plan:         PROCEDURE  Describe Procedure: Completed ultrasound-guided RLQ transplant kidney biopsy. three passes yielded three cores sent to pathology in preservative. Pathology on site to determine adequacy of specimen.  Gelfoam in tract.  Patient Tolerance:  Patient tolerated the procedure well with no immediate  complications  Length of time physician/provider present for 1:1 monitoring during sedation: 20

## 2024-04-12 ENCOUNTER — MYC MEDICAL ADVICE (OUTPATIENT)
Dept: TRANSPLANT | Facility: CLINIC | Age: 24
End: 2024-04-12
Payer: COMMERCIAL

## 2024-04-12 ENCOUNTER — TELEPHONE (OUTPATIENT)
Dept: TRANSPLANT | Facility: CLINIC | Age: 24
End: 2024-04-12
Payer: COMMERCIAL

## 2024-04-12 DIAGNOSIS — Z94.0 KIDNEY REPLACED BY TRANSPLANT: Primary | ICD-10-CM

## 2024-04-12 NOTE — TELEPHONE ENCOUNTER
Abhilash Raza MD Huepfel, Mary K, RN  Can you please tell the patient that his biopsy shows no acute rejection.    Thanks    Received: Today  Abhilash Raza MD Huepfel, Mary K, RN  Okay to restart Bactrim 400/80 mg daily.      Mk Hyman reviewed kidney transplant  biopsy  negative  for rejection   Reviewed may restart TMP Sulfa

## 2024-04-15 ENCOUNTER — LAB (OUTPATIENT)
Dept: LAB | Facility: CLINIC | Age: 24
End: 2024-04-15
Payer: COMMERCIAL

## 2024-04-15 DIAGNOSIS — Z20.828 CONTACT WITH AND (SUSPECTED) EXPOSURE TO OTHER VIRAL COMMUNICABLE DISEASES: ICD-10-CM

## 2024-04-15 DIAGNOSIS — Z48.298 AFTERCARE FOLLOWING ORGAN TRANSPLANT: ICD-10-CM

## 2024-04-15 DIAGNOSIS — Z79.899 ENCOUNTER FOR LONG-TERM CURRENT USE OF MEDICATION: ICD-10-CM

## 2024-04-15 DIAGNOSIS — Z98.890 OTHER SPECIFIED POSTPROCEDURAL STATES: ICD-10-CM

## 2024-04-15 DIAGNOSIS — Z94.0 KIDNEY REPLACED BY TRANSPLANT: ICD-10-CM

## 2024-04-15 LAB
ERYTHROCYTE [DISTWIDTH] IN BLOOD BY AUTOMATED COUNT: 12.5 % (ref 10–15)
HCT VFR BLD AUTO: 33.2 % (ref 40–53)
HGB BLD-MCNC: 11 G/DL (ref 13.3–17.7)
MCH RBC QN AUTO: 32.3 PG (ref 26.5–33)
MCHC RBC AUTO-ENTMCNC: 33.1 G/DL (ref 31.5–36.5)
MCV RBC AUTO: 97 FL (ref 78–100)
PLATELET # BLD AUTO: 284 10E3/UL (ref 150–450)
RBC # BLD AUTO: 3.41 10E6/UL (ref 4.4–5.9)
TACROLIMUS BLD-MCNC: 7.7 UG/L (ref 5–15)
TME LAST DOSE: NORMAL H
TME LAST DOSE: NORMAL H
WBC # BLD AUTO: 4.3 10E3/UL (ref 4–11)

## 2024-04-15 PROCEDURE — 80197 ASSAY OF TACROLIMUS: CPT

## 2024-04-15 PROCEDURE — 87799 DETECT AGENT NOS DNA QUANT: CPT

## 2024-04-15 PROCEDURE — 80048 BASIC METABOLIC PNL TOTAL CA: CPT

## 2024-04-15 PROCEDURE — 36415 COLL VENOUS BLD VENIPUNCTURE: CPT

## 2024-04-15 PROCEDURE — 85027 COMPLETE CBC AUTOMATED: CPT

## 2024-04-16 LAB
ANION GAP SERPL CALCULATED.3IONS-SCNC: 12 MMOL/L (ref 7–15)
BUN SERPL-MCNC: 23.5 MG/DL (ref 6–20)
CALCIUM SERPL-MCNC: 10.1 MG/DL (ref 8.6–10)
CHLORIDE SERPL-SCNC: 108 MMOL/L (ref 98–107)
CREAT SERPL-MCNC: 1.52 MG/DL (ref 0.67–1.17)
DEPRECATED HCO3 PLAS-SCNC: 22 MMOL/L (ref 22–29)
DONOR IDENTIFICATION: NORMAL
DSA COMMENTS: NORMAL
DSA PRESENT: NO
DSA TEST METHOD: NORMAL
EBV DNA SERPL NAA+PROBE-ACNC: <35 IU/ML
EBV DNA SERPL NAA+PROBE-LOG#: <1.5 {LOG_COPIES}/ML
EGFRCR SERPLBLD CKD-EPI 2021: 66 ML/MIN/1.73M2
GLUCOSE SERPL-MCNC: 104 MG/DL (ref 70–99)
ORGAN: NORMAL
POTASSIUM SERPL-SCNC: 4.8 MMOL/L (ref 3.4–5.3)
SA 1 CELL: NORMAL
SA 1 TEST METHOD: NORMAL
SA 2 CELL: NORMAL
SA 2 TEST METHOD: NORMAL
SA1 HI RISK ABY: NORMAL
SA1 MOD RISK ABY: NORMAL
SA2 HI RISK ABY: NORMAL
SA2 MOD RISK ABY: NORMAL
SODIUM SERPL-SCNC: 142 MMOL/L (ref 135–145)
UNACCEPTABLE ANTIGENS: NORMAL
UNOS CPRA: 0
ZZZSA 1  COMMENTS: NORMAL
ZZZSA 2 COMMENTS: NORMAL

## 2024-04-16 RX ORDER — SULFAMETHOXAZOLE AND TRIMETHOPRIM 400; 80 MG/1; MG/1
1 TABLET ORAL DAILY
Qty: 30 TABLET | Refills: 11 | Status: SHIPPED | OUTPATIENT
Start: 2024-04-16

## 2024-04-17 LAB
PATH REPORT.ADDENDUM SPEC: NORMAL
PATH REPORT.COMMENTS IMP SPEC: NORMAL
PATH REPORT.FINAL DX SPEC: NORMAL
PATH REPORT.GROSS SPEC: NORMAL
PATH REPORT.MICROSCOPIC SPEC OTHER STN: NORMAL
PATH REPORT.RELEVANT HX SPEC: NORMAL
PHOTO IMAGE: NORMAL

## 2024-04-22 ENCOUNTER — LAB (OUTPATIENT)
Dept: LAB | Facility: CLINIC | Age: 24
End: 2024-04-22
Payer: COMMERCIAL

## 2024-04-22 DIAGNOSIS — Z94.0 KIDNEY REPLACED BY TRANSPLANT: ICD-10-CM

## 2024-04-22 DIAGNOSIS — Z48.298 AFTERCARE FOLLOWING ORGAN TRANSPLANT: ICD-10-CM

## 2024-04-22 DIAGNOSIS — Z79.899 ENCOUNTER FOR LONG-TERM CURRENT USE OF MEDICATION: ICD-10-CM

## 2024-04-22 DIAGNOSIS — Z98.890 OTHER SPECIFIED POSTPROCEDURAL STATES: ICD-10-CM

## 2024-04-22 DIAGNOSIS — Z20.828 CONTACT WITH AND (SUSPECTED) EXPOSURE TO OTHER VIRAL COMMUNICABLE DISEASES: ICD-10-CM

## 2024-04-22 LAB
ANION GAP SERPL CALCULATED.3IONS-SCNC: 12 MMOL/L (ref 7–15)
BUN SERPL-MCNC: 25.2 MG/DL (ref 6–20)
CALCIUM SERPL-MCNC: 10 MG/DL (ref 8.6–10)
CHLORIDE SERPL-SCNC: 107 MMOL/L (ref 98–107)
CREAT SERPL-MCNC: 1.75 MG/DL (ref 0.67–1.17)
DEPRECATED HCO3 PLAS-SCNC: 21 MMOL/L (ref 22–29)
EGFRCR SERPLBLD CKD-EPI 2021: 55 ML/MIN/1.73M2
ERYTHROCYTE [DISTWIDTH] IN BLOOD BY AUTOMATED COUNT: 12.5 % (ref 10–15)
GLUCOSE SERPL-MCNC: 119 MG/DL (ref 70–99)
HCT VFR BLD AUTO: 34.6 % (ref 40–53)
HGB BLD-MCNC: 11.4 G/DL (ref 13.3–17.7)
MAGNESIUM SERPL-MCNC: 1.9 MG/DL (ref 1.7–2.3)
MCH RBC QN AUTO: 32.2 PG (ref 26.5–33)
MCHC RBC AUTO-ENTMCNC: 32.9 G/DL (ref 31.5–36.5)
MCV RBC AUTO: 98 FL (ref 78–100)
PHOSPHATE SERPL-MCNC: 2.2 MG/DL (ref 2.5–4.5)
PLATELET # BLD AUTO: 267 10E3/UL (ref 150–450)
POTASSIUM SERPL-SCNC: 4.8 MMOL/L (ref 3.4–5.3)
RBC # BLD AUTO: 3.54 10E6/UL (ref 4.4–5.9)
SODIUM SERPL-SCNC: 140 MMOL/L (ref 135–145)
WBC # BLD AUTO: 4.3 10E3/UL (ref 4–11)

## 2024-04-22 PROCEDURE — 84100 ASSAY OF PHOSPHORUS: CPT

## 2024-04-22 PROCEDURE — 85027 COMPLETE CBC AUTOMATED: CPT

## 2024-04-22 PROCEDURE — 80197 ASSAY OF TACROLIMUS: CPT

## 2024-04-22 PROCEDURE — 36415 COLL VENOUS BLD VENIPUNCTURE: CPT

## 2024-04-22 PROCEDURE — 87799 DETECT AGENT NOS DNA QUANT: CPT

## 2024-04-22 PROCEDURE — 83735 ASSAY OF MAGNESIUM: CPT

## 2024-04-22 PROCEDURE — 80048 BASIC METABOLIC PNL TOTAL CA: CPT

## 2024-04-23 LAB
BK VIRUS SPECIMEN TYPE: NORMAL
BKV DNA # SPEC NAA+PROBE: NOT DETECTED IU/ML
TACROLIMUS BLD-MCNC: 8.5 UG/L (ref 5–15)
TME LAST DOSE: NORMAL H
TME LAST DOSE: NORMAL H

## 2024-05-01 ENCOUNTER — TELEPHONE (OUTPATIENT)
Dept: TRANSPLANT | Facility: CLINIC | Age: 24
End: 2024-05-01

## 2024-05-01 NOTE — TELEPHONE ENCOUNTER
Spoke to patient who was unable to talk at the time of the call.  Patient states he will call back to the tx center when he is available.  [facility] office phone number given to pt.

## 2024-05-02 DIAGNOSIS — Z94.0 KIDNEY REPLACED BY TRANSPLANT: Primary | Chronic | ICD-10-CM

## 2024-05-02 DIAGNOSIS — K21.9 GASTROESOPHAGEAL REFLUX DISEASE, UNSPECIFIED WHETHER ESOPHAGITIS PRESENT: ICD-10-CM

## 2024-05-02 RX ORDER — PANTOPRAZOLE SODIUM 40 MG/1
40 TABLET, DELAYED RELEASE ORAL 2 TIMES DAILY
Qty: 180 TABLET | Refills: 3 | Status: SHIPPED | OUTPATIENT
Start: 2024-05-02 | End: 2024-07-01

## 2024-05-02 RX ORDER — CARVEDILOL 12.5 MG/1
12.5 TABLET ORAL 2 TIMES DAILY
Qty: 180 TABLET | Refills: 3 | Status: SHIPPED | OUTPATIENT
Start: 2024-05-02

## 2024-05-06 ENCOUNTER — LAB (OUTPATIENT)
Dept: LAB | Facility: CLINIC | Age: 24
End: 2024-05-06
Payer: COMMERCIAL

## 2024-05-06 DIAGNOSIS — Z98.890 OTHER SPECIFIED POSTPROCEDURAL STATES: ICD-10-CM

## 2024-05-06 DIAGNOSIS — Z20.828 CONTACT WITH AND (SUSPECTED) EXPOSURE TO OTHER VIRAL COMMUNICABLE DISEASES: ICD-10-CM

## 2024-05-06 DIAGNOSIS — Z79.899 ENCOUNTER FOR LONG-TERM CURRENT USE OF MEDICATION: ICD-10-CM

## 2024-05-06 DIAGNOSIS — Z94.0 KIDNEY REPLACED BY TRANSPLANT: ICD-10-CM

## 2024-05-06 DIAGNOSIS — Z48.298 AFTERCARE FOLLOWING ORGAN TRANSPLANT: Primary | ICD-10-CM

## 2024-05-06 LAB
ERYTHROCYTE [DISTWIDTH] IN BLOOD BY AUTOMATED COUNT: 12.3 % (ref 10–15)
HCT VFR BLD AUTO: 34 % (ref 40–53)
HGB BLD-MCNC: 11.4 G/DL (ref 13.3–17.7)
MCH RBC QN AUTO: 31.8 PG (ref 26.5–33)
MCHC RBC AUTO-ENTMCNC: 33.5 G/DL (ref 31.5–36.5)
MCV RBC AUTO: 95 FL (ref 78–100)
PLATELET # BLD AUTO: 256 10E3/UL (ref 150–450)
RBC # BLD AUTO: 3.59 10E6/UL (ref 4.4–5.9)
TACROLIMUS BLD-MCNC: 6.6 UG/L (ref 5–15)
TME LAST DOSE: NORMAL H
TME LAST DOSE: NORMAL H
WBC # BLD AUTO: 2.9 10E3/UL (ref 4–11)

## 2024-05-06 PROCEDURE — 87799 DETECT AGENT NOS DNA QUANT: CPT

## 2024-05-06 PROCEDURE — 84100 ASSAY OF PHOSPHORUS: CPT

## 2024-05-06 PROCEDURE — 80048 BASIC METABOLIC PNL TOTAL CA: CPT

## 2024-05-06 PROCEDURE — 83735 ASSAY OF MAGNESIUM: CPT

## 2024-05-06 PROCEDURE — 36415 COLL VENOUS BLD VENIPUNCTURE: CPT

## 2024-05-06 PROCEDURE — 80180 DRUG SCRN QUAN MYCOPHENOLATE: CPT

## 2024-05-06 PROCEDURE — 85027 COMPLETE CBC AUTOMATED: CPT

## 2024-05-06 PROCEDURE — 80197 ASSAY OF TACROLIMUS: CPT

## 2024-05-07 ENCOUNTER — OFFICE VISIT (OUTPATIENT)
Dept: TRANSPLANT | Facility: CLINIC | Age: 24
End: 2024-05-07
Attending: INTERNAL MEDICINE
Payer: COMMERCIAL

## 2024-05-07 VITALS
WEIGHT: 305.3 LBS | SYSTOLIC BLOOD PRESSURE: 119 MMHG | OXYGEN SATURATION: 96 % | BODY MASS INDEX: 42.58 KG/M2 | HEART RATE: 104 BPM | DIASTOLIC BLOOD PRESSURE: 79 MMHG | TEMPERATURE: 98.3 F

## 2024-05-07 DIAGNOSIS — D84.9 IMMUNOSUPPRESSED STATUS (H): Chronic | ICD-10-CM

## 2024-05-07 DIAGNOSIS — Z29.89 NEED FOR PNEUMOCYSTIS PROPHYLAXIS: ICD-10-CM

## 2024-05-07 DIAGNOSIS — E66.813 CLASS 3 SEVERE OBESITY WITH SERIOUS COMORBIDITY AND BODY MASS INDEX (BMI) OF 40.0 TO 44.9 IN ADULT, UNSPECIFIED OBESITY TYPE (H): ICD-10-CM

## 2024-05-07 DIAGNOSIS — N18.2 ANEMIA IN STAGE 2 CHRONIC KIDNEY DISEASE: ICD-10-CM

## 2024-05-07 DIAGNOSIS — N18.2 CKD (CHRONIC KIDNEY DISEASE) STAGE 2, GFR 60-89 ML/MIN: ICD-10-CM

## 2024-05-07 DIAGNOSIS — E66.01 CLASS 3 SEVERE OBESITY WITH SERIOUS COMORBIDITY AND BODY MASS INDEX (BMI) OF 40.0 TO 44.9 IN ADULT, UNSPECIFIED OBESITY TYPE (H): ICD-10-CM

## 2024-05-07 DIAGNOSIS — G47.19 EXCESSIVE DAYTIME SLEEPINESS: Primary | ICD-10-CM

## 2024-05-07 DIAGNOSIS — Z48.298 AFTERCARE FOLLOWING ORGAN TRANSPLANT: ICD-10-CM

## 2024-05-07 DIAGNOSIS — Z94.0 KIDNEY REPLACED BY TRANSPLANT: ICD-10-CM

## 2024-05-07 DIAGNOSIS — E83.42 HYPOMAGNESEMIA: ICD-10-CM

## 2024-05-07 DIAGNOSIS — G47.9 SLEEP DISORDER: ICD-10-CM

## 2024-05-07 DIAGNOSIS — I15.1 HTN, KIDNEY TRANSPLANT RELATED: ICD-10-CM

## 2024-05-07 DIAGNOSIS — B27.00 EBV (EPSTEIN-BARR VIRUS) VIREMIA: ICD-10-CM

## 2024-05-07 DIAGNOSIS — D63.1 ANEMIA IN STAGE 2 CHRONIC KIDNEY DISEASE: ICD-10-CM

## 2024-05-07 DIAGNOSIS — Z94.0 HTN, KIDNEY TRANSPLANT RELATED: ICD-10-CM

## 2024-05-07 DIAGNOSIS — E87.20 METABOLIC ACIDOSIS: ICD-10-CM

## 2024-05-07 LAB
ANION GAP SERPL CALCULATED.3IONS-SCNC: 10 MMOL/L (ref 7–15)
BK VIRUS SPECIMEN TYPE: NORMAL
BKV DNA # SPEC NAA+PROBE: NOT DETECTED IU/ML
BUN SERPL-MCNC: 18.8 MG/DL (ref 6–20)
CALCIUM SERPL-MCNC: 9.8 MG/DL (ref 8.6–10)
CHLORIDE SERPL-SCNC: 106 MMOL/L (ref 98–107)
CREAT SERPL-MCNC: 1.49 MG/DL (ref 0.67–1.17)
DEPRECATED HCO3 PLAS-SCNC: 23 MMOL/L (ref 22–29)
EGFRCR SERPLBLD CKD-EPI 2021: 67 ML/MIN/1.73M2
GLUCOSE SERPL-MCNC: 108 MG/DL (ref 70–99)
Lab: NORMAL
MAGNESIUM SERPL-MCNC: 1.7 MG/DL (ref 1.7–2.3)
PERFORMING LABORATORY: NORMAL
PHOSPHATE SERPL-MCNC: 2.3 MG/DL (ref 2.5–4.5)
POTASSIUM SERPL-SCNC: 4.5 MMOL/L (ref 3.4–5.3)
SODIUM SERPL-SCNC: 139 MMOL/L (ref 135–145)
SPECIMEN STATUS: NORMAL
TEST NAME: NORMAL

## 2024-05-07 PROCEDURE — 99213 OFFICE O/P EST LOW 20 MIN: CPT | Performed by: INTERNAL MEDICINE

## 2024-05-07 PROCEDURE — G2211 COMPLEX E/M VISIT ADD ON: HCPCS | Performed by: INTERNAL MEDICINE

## 2024-05-07 PROCEDURE — 99215 OFFICE O/P EST HI 40 MIN: CPT | Performed by: INTERNAL MEDICINE

## 2024-05-07 PROCEDURE — G0463 HOSPITAL OUTPT CLINIC VISIT: HCPCS | Performed by: INTERNAL MEDICINE

## 2024-05-07 ASSESSMENT — PAIN SCALES - GENERAL: PAINLEVEL: NO PAIN (0)

## 2024-05-07 NOTE — NURSING NOTE
Chief Complaint   Patient presents with    RECHECK       /79   Pulse 104   Temp 98.3  F (36.8  C) (Oral)   Wt 138.5 kg (305 lb 4.8 oz)   SpO2 96%   BMI 42.58 kg/m      Yandel Lucas on 5/7/2024 at 10:22 AM

## 2024-05-07 NOTE — LETTER
5/7/2024      RE: Boogie Villa  [address]  [place] [address]       TRANSPLANT NEPHROLOGY EARLY POST TRANSPLANT VISIT    Assessment & Plan  # LDKT: Stable creatinine in the ~ 1.5-1.8 range over the last couple of months, which is decreased from previous baseline closer to ~ 1.8-2.1 range so far.  Patient did have an early Banff 1b acute cellular-mediated rejection, Banff 1B, which was treated with rATG.  Follow up biopsy showed only mild ATI.  Then more recently had marked ROSI with Cr ~ 6.4 with kidney transplant biopsy 2/26 only showing borderline acute cellular-mediated rejection and mild ATI.  Patient was treated with Solumedrol and there was concern for kidney being overly sensitive to CNI.  Patient could not receive belatacept due to EBV IgG Ab negative, so he instead received basiliximab to allow lower tacrolimus dose.  Surprisingly, patient's creatinine improved extremely well to new baseline, despite therapeutic tacrolimus levels.  No further plans to change off tacrolimus at this time.   - Baseline Creatinine: ~ 1.8-2.1   - Proteinuria: Moderate (1-3 grams), but checked during ROSI episode   - Date DSA Last Checked: Apr/2024      Latest DSA: No cPRA: 0%   - BK Viremia: No   - Kidney Tx Biopsy: Apr 11, 2024; Result: No diagnostic evidence of acute rejection.   Mild acute tubular injury.  Moderate arterial sclerosis and no significant arteriolar hyalinosis.             Feb 26, 2024; Result: Borderline acute cellular-mediated rejection.   Patchy acute tubular injury.  Mild arterial sclerosis and mild arteriolar hyalinosis.             Feb 09, 2024; Result: No diagnostic evidence of acute rejection.   Mild acute tubular injury.  Severe arterial sclerosis and no significant arteriolar hyalinosis.             Jan 02, 2024; Result: Acute cellular-mediated rejection, Banff 1b.  Mild glomerulitis and severe peritubular capillaritis, but negative C4d and no DSA. Mild arterial sclerosis.   - Transplant  Ureteral Stent: Removed    # Immunosuppression: Tacrolimus immediate release (goal 8-10), Mycophenolate mofetil (dose 750 mg every 12 hours), and Prednisone (dose 5 mg daily)   - Induction with Recent Transplant:  Low Intensity Protocol, although did receive full dose rATG due to early acute rejection   - Continue with intensive monitoring of immunosuppression for efficacy and toxicity.   - Changes: Yes - Patient has considerable side effects from prednisone, mostly including weight gain, but also possibly sleep disorder.   He tried taking prednisone 10 mg every other day with no improvement.  With patient's obesity and elevated blood glucose, he would likely benefit from coming off steroids.  Discussed potential risks and benefits of coming of steroids.  The risks would include worsening or recurrent acute rejection episodes.   - Will discuss prednisone taper at clinical care meeting and relay this to patient.    # Infection Prophylaxis:   - PJP: Sulfa/TMP (Bactrim)  - CMV: Valganciclovir (Valcyte); Patient is CMV IgG Ab discordant (D+/R-) and will continue on Valcyte x 6 months, then check CMV PCR monthly until 12 months post transplant.    # Hypertension: Controlled;  Goal BP: < 130/80   - Volume status: Euvolemic   - Changes: Not at this time; Recommend patient check blood pressure at home on a regular basis, such as once every week or two, and follow up with Transplant Coordinator if above goal.    # Elevated Blood Glucose: Glucose generally running ~ 100-150s Last HbA1c: 5.7%   - Management as per primary care.    # Anemia in Chronic Renal Disease: Hgb: Stable      TOM: No   - Iron studies: High iron saturation; Will repeat iron panel.    # Mineral Bone Disorder:    - Secondary renal hyperparathyroidism; PTH level: Significantly elevated (601-1200 pg/ml)        On treatment: None  - Vitamin D; level: Low        On supplement: Yes  - Calcium; level: High        On supplement: No    # Electrolytes:   -  Potassium; level: Normal        On supplement: No  - Magnesium; level: Normal        On supplement: Yes  - Bicarbonate; level: Normal        On supplement: Yes    # EBV Viremia: Trend down, minimal EBV PCR at ~ detectable, but less than quantifiable with last check Apr/2024.  Peak EBV PCR was ~ 12K on Mar/2024.  EBV IgG Ab negative with last check and patient is EBV IgG Ab discordant (D+/R-).IgG level hasn't been checked.   - Will continue to follow EBV PCR every month.   - Will repeat EBV IgG Ab now that patient has developed low level EBV viremia.    # Obesity, Class II (BMI = 42.6): Weight is up ~ 25-30 lbs.  Prednisone may be contributing to his weight gain.   - Recommend weight loss for overall health by increasing exercise and watching caloric intake.    - Will refer patient to [facility] Management Clinic.   - Will see if possible to taper off prednisone.    # GERD: Asymptomatic on PPI.   - Recommend tapering off pantoprazole by starting to take it every other day.  If tolerated without significant symptoms, patient can stop pantoprazole and just take it or OTC famotidine (Pepcid) as needed for heartburn symptoms.     # H/o Ureteral Reimplantation, s/p Mitrofanoff: Patient reports not using for Mitrofanoff in a couple of years and voids normally on his own.  Mitrofanoff is nonfunctional at this time.     # H/o Bilateral Uveitis: No evidence of systemic autoimmune/inflammatory disease at last Rheumatology.      # Sleep Disorder: Patient reports restless sleep and doesn't feel rested in the morning.  He is morbidly obese and would be at risk of sleep apnea.  His father has sleep apnea.   - Will refer to Sleep Clinic for further evaluation.    # Depression/Anxiety: Patient reports mood is a bit better since starting sertraline.    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    Return visit: Return for previously scheduled visit.    Abhilash Raza MD    I spent a total of 45 minutes on the date of the encounter doing chart review, performing a history and physical exam, completing documentation and any further activities as noted above.    The longitudinal plan of care for kidney transplant was addressed during this visit. Due to the added complexity in care, I will continue to support Boogie Villa in the subsequent management of this condition(s) and with the ongoing continuity of care of this condition(s).    Chief Complaint  Mr. Villa is a 23 year old here for kidney transplant and immunosuppression management.     History of Present Illness   Mr. Villa reports feeling good overall with some medical complaints.  Since last clinic visit, patient reports no hospitalizations or new medical complaints and has been doing well overall.  His energy level is reportedly \"terrible,\" as he hasn't been sleeping well.  Patient tries to go to bed about midnight or 1 am.  He lies in bed for about 45 minutes or so before falling asleep.  Patient then reports restless sleep with waking up frequently.  He generally can get back to sleep in a short time, but doesn't feel rested in the morning.  He reports his dad does have sleep apnea.  He does feel his mood is better after starting on sertraline.  He is otherwise active, but only getting a little exercise, mostly with walking.  Denies any " "chest pain, but some shortness of breath with exertion, which he feels is due to deconditioning.  No leg swelling.    Appetite is \"too good\" and patient finds it hard to not eat a lot as he reports getting very irritable if he is hungry.  He is up ~ 25-30 lbs since transplant.  Patient says his eating started to increase following when he started prednisone.  No nausea, vomiting or diarrhea.  No heartburn symptoms on pantoprazole.  No fever, sweats or chills.  Occasional, mild night sweats.    Home BP:  110/70s    Problem List  Patient Active Problem List   Diagnosis     Lymphangioma     ADHD (attention deficit hyperactivity disorder)     Allergic rhinitis due to pollen     Anxiety     Congenital posterior urethral valves     Iron deficiency     Neurogenic bladder     Class 2 severe obesity due to excess calories with serious comorbidity and body mass index (BMI) of 39.0 to 39.9 in adult (H)     Sensorineural hearing loss, asymmetrical     HTN, kidney transplant related     Secondary renal hyperparathyroidism (H24)     Vitamin D deficiency     Kidney replaced by transplant     Immunosuppressed status (H24)     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     CKD (chronic kidney disease) stage 2, GFR 60-89 ml/min     Aftercare following organ transplant     Need for pneumocystis prophylaxis     Anemia in chronic renal disease     Metabolic acidosis     Hypomagnesemia     Kidney transplant rejection     GERD (gastroesophageal reflux disease)     EBV (Dai-Barr virus) viremia     Pre-diabetes     Steroid-induced hyperglycemia     Sleep disorder       Allergies  Allergies   Allergen Reactions     Banana Itching     Raw banana; itchy mouth       Dust Mites      Runny nose and watery eyes     Mold      Runny nose and watery eyes     Nsaids Other (See Comments)     CKD     Other [Seasonal Allergies]      Grass, Ragweed - gets runny nose and watery eyes     Sulfa Antibiotics      PN: LW Reaction: GI " Upset as an infant  12/28 Admission: Tolerated Bactrim       Medications  Current Outpatient Medications   Medication Sig Dispense Refill     acetaminophen (TYLENOL) 325 MG tablet Take 1-2 tablets (325-650 mg) by mouth every 4 hours as needed (For optimal non-opioid multimodal pain management to improve pain control.)       Alcohol Swabs PADS Use to swab the area of the injection or janny as directed Per insurance coverage 200 each 1     amLODIPine (NORVASC) 10 MG tablet Take 1 tablet (10 mg) by mouth daily 30 tablet 11     blood glucose (NO BRAND SPECIFIED) lancets standard To use to test glucose level in the blood Use to test blood sugar  4  times daily as directed. To accompany glucose monitor brands per insurance coverage. Freestyle Lite lancets 200 each 1     blood glucose (NO BRAND SPECIFIED) test strip To use to test glucose level in the blood Use to test blood sugar  4 times daily as directed. To accompany glucose monitor brands per insurance coverage. Freestyle Lite test strips 100 strip 2     blood glucose monitoring (FREESTYLE) lancets        blood glucose monitoring (NO BRAND SPECIFIED) meter device kit Use as directed Per insurance coverage: Freestyle Lite glucose meter 1 kit 0     carvedilol (COREG) 12.5 MG tablet Take 1 tablet (12.5 mg) by mouth 2 times daily 180 tablet 3     cetirizine (ZYRTEC) 10 MG tablet Take 10 mg by mouth daily as needed for allergies (in the spring)       cholecalciferol (VITAMIN D3) 25 mcg (1000 units) capsule Take 1 capsule (25 mcg) by mouth daily       insulin lispro (HUMALOG KWIKPEN) 100 UNIT/ML (1 unit dial) KWIKPEN Inject 1-7 Units Subcutaneous 3 times daily (before meals) 3 mL 2     insulin lispro (HUMALOG KWIKPEN) 100 UNIT/ML (1 unit dial) KWIKPEN Inject 1-5 Units Subcutaneous at bedtime 3 mL 2     insulin pen needle (32G X 4 MM) 32G X 4 MM miscellaneous Use as directed by provider Per insurance coverage 200 each 1     losartan (COZAAR) 25 MG tablet Take 1 tablet (25  mg) by mouth every evening 30 tablet 2     magnesium oxide (MAG-OX) 400 MG tablet Take 1 tablet (400 mg) by mouth 2 times daily 60 tablet 2     methylphenidate (RITALIN) 20 MG tablet Take 20 mg by mouth as needed Prn       mycophenolate (GENERIC EQUIVALENT) 250 MG capsule Take 3 capsules (750 mg) by mouth 2 times daily 180 capsule 11     pantoprazole (PROTONIX) 40 MG EC tablet Take 1 tablet (40 mg) by mouth 2 times daily 180 tablet 3     pentamidine (NEBUPENT) 300 MG neb solution Inhale 300 mg into the lungs every 28 days       predniSONE (DELTASONE) 5 MG tablet Take 1 tablet (5 mg) by mouth daily 30 tablet 11     sertraline (ZOLOFT) 25 MG tablet Take 100 mg by mouth daily       Sharps Container MISC Use as directed to dispose of needles, lancets and other sharps 1 each 1     sodium bicarbonate 650 MG tablet Take 3 tablets (1,950 mg) by mouth 3 times daily 270 tablet 2     sulfamethoxazole-trimethoprim (BACTRIM) 400-80 MG tablet Take 1 tablet by mouth daily 30 tablet 11     tacrolimus (GENERIC EQUIVALENT) 1 MG capsule Take 1 capsule (1 mg) by mouth 2 times daily Total dose = 6 mg twice daily 60 capsule 11     tacrolimus (GENERIC EQUIVALENT) 5 MG capsule Take 1 capsule (5 mg) by mouth 2 times daily Total dose = 6 mg twice daily 60 capsule 4     valGANciclovir (VALCYTE) 450 MG tablet Take 2 tablets (900 mg) by mouth daily 60 tablet 3     No current facility-administered medications for this visit.     There are no discontinued medications.      Physical Exam  Vital Signs: /79   Pulse 104   Temp 98.3  F (36.8  C) (Oral)   Wt 138.5 kg (305 lb 4.8 oz)   SpO2 96%   BMI 42.58 kg/m      GENERAL APPEARANCE: alert and no distress  HENT: mouth without ulcers or lesions  RESP: lungs clear to auscultation - no rales, rhonchi or wheezes  CV: regular rhythm, normal rate, no rub, no murmur  EDEMA: no LE edema bilaterally  ABDOMEN: soft, nondistended, nontender, bowel sounds normal, obese  MS: extremities normal - no  gross deformities noted, no evidence of inflammation in joints, no muscle tenderness  SKIN: no rash  TX KIDNEY: normal  DIALYSIS ACCESS:  None    Data        Latest Ref Rng & Units 5/13/2024    10:38 AM 5/6/2024    10:43 AM 4/22/2024    10:58 AM   Renal   Sodium 135 - 145 mmol/L 142  139  140    K 3.4 - 5.3 mmol/L 4.9  4.5  4.8    Cl 98 - 107 mmol/L 109  106  107    Cl (external) 98 - 107 mmol/L 109  106  107    CO2 22 - 29 mmol/L 22  23  21    Urea Nitrogen 6.0 - 20.0 mg/dL 28.5  18.8  25.2    Creatinine 0.67 - 1.17 mg/dL 1.84  1.49  1.75    Glucose 70 - 99 mg/dL 103  108  119    Calcium 8.6 - 10.0 mg/dL 10.3  9.8  10.0    Magnesium 1.7 - 2.3 mg/dL  1.7  1.9          Latest Ref Rng & Units 5/13/2024    10:38 AM 5/6/2024    10:43 AM 4/22/2024    10:58 AM   Bone Health   Phosphorus 2.5 - 4.5 mg/dL  2.3  2.2    Parathyroid Hormone Intact 15 - 65 pg/mL 126            Latest Ref Rng & Units 5/13/2024    10:38 AM 5/6/2024    10:43 AM 4/22/2024    10:59 AM   Heme   WBC 4.0 - 11.0 10e3/uL 2.4  2.9  4.3    Hgb 13.3 - 17.7 g/dL 12.0  11.4  11.4    Plt 150 - 450 10e3/uL 288  256  267    ABSOLUTE NEUTROPHIL 1.6 - 8.3 10e3/uL 1.7      ABSOLUTE LYMPHOCYTES 0.8 - 5.3 10e3/uL 0.5      ABSOLUTE MONOCYTES 0.0 - 1.3 10e3/uL 0.1      ABSOLUTE EOSINOPHILS 0.0 - 0.7 10e3/uL 0.1            Latest Ref Rng & Units 12/19/2023     9:53 AM 7/13/2023     1:28 PM 7/16/2021     4:50 PM   Liver   AP 40 - 150 U/L 67      AP (external) 40 - 150 U/L  45     TBili <=1.2 mg/dL 0.5      TBili (external) 0.2 - 1.2 mg/dL  0.6     ALT 0 - 70 U/L 41      ALT (external) <=55 U/L  35     AST 0 - 45 U/L 27      AST (external) 10 - 40 U/L  26     Tot Protein 6.4 - 8.3 g/dL 7.6      Tot Protein (external) 6.4 - 8.3 g/dL  7.5     Albumin 3.5 - 5.0 g/dL   3.0    Albumin 3.5 - 5.2 g/dL 4.4      Albumin (external) 3.5 - 5.0 g/dL  3.8           Latest Ref Rng & Units 2/28/2024     1:13 PM 12/19/2023     9:53 AM 7/13/2023     1:28 PM   Pancreas   A1C <5.7 % 5.7  5.2      A1C (external) <=5.6 %   4.9          Latest Ref Rng & Units 1/7/2024     7:50 AM 12/19/2023     9:53 AM 2/26/2020     3:20 PM   Iron studies   Iron 61 - 157 ug/dL 167  104  66    Iron Saturation Index 15 - 46 %   26    Iron Sat Index 15 - 46 % 79  42     Ferritin 31 - 409 ng/mL 783  826  133          4/1/2024    10:39 AM 3/25/2024    10:22 AM 3/18/2024    10:33 AM   [facility] Txp Virology   EBV DNA LOG OF COPIES 3.8  3.9  3.7      Failed to redirect to the Timeline version of the REVFS SmartLink.  Recent Labs   Lab Test 04/22/24  1101 05/06/24  1043 05/13/24  1038   DOSTAC 4/21/2024 5/5/2024 5/12/2024   TACROL 8.5 6.6 7.9     Recent Labs   Lab Test 01/11/24  0950 01/29/24  0937 02/01/24  1008 02/12/24  1030 02/24/24  1008   DOSMPA 1/10/2024   9:30 PM 1/28/2024   9:30 PM  --   --   --    MPACID 3.79* 3.73* 3.87* 2.23 2.65   MPAG 71.7 63.8 79.1 68.1 141.7*        Abhilash Raza MD

## 2024-05-07 NOTE — PATIENT INSTRUCTIONS
Patient Recommendations:  - Recommend tapering off pantoprazole by starting to take it every other day.  If tolerated without significant symptoms, patient can stop pantoprazole and just take it or over-the-counter famotidine (Pepcid) as needed for heartburn symptoms.  - Recommend checking blood pressure at home on a regular basis, such as once every week or two, and follow up if above goal of less than 130/80.  - Recommend weight loss for overall health by increasing exercise and watching caloric intake.   Will refer to Weight Management Clinic.  - Will refer to Sleep Clinic.  - Start cholecalciferol 25 mcg daily.  - Recommend taking magnesium supplement at lunch and supper to separate it by at least two hours from mycophenolate.    Transplant Patient Information  Your Post Transplant Coordinator is: Nina Hu  For non urgent items, we encourage you to contact your coordinator/care team online via [employer]  You and your care team can also contact your transplant coordinator Monday - Friday, 8am - 5pm at [phone] (Option 2 to reach the coordinator or Option 4 to schedule an appointment).  After hours for urgent matters, please call [facility] at [phone].

## 2024-05-07 NOTE — LETTER
5/7/2024         RE: Boogie Villa  [address]  [facility] [address]        Dear Colleague,    Thank you for referring your patient, Boogie Villa, to the [facility]. Please see a copy of my visit note below.    TRANSPLANT NEPHROLOGY EARLY POST TRANSPLANT VISIT    Assessment & Plan  # LDKT: Stable creatinine in the ~ 1.5-1.8 range over the last couple of months, which is decreased from previous baseline closer to ~ 1.8-2.1 range so far.  Patient did have an early Banff 1b acute cellular-mediated rejection, Banff 1B, which was treated with rATG.  Follow up biopsy showed only mild ATI.  Then more recently had marked ROSI with Cr ~ 6.4 with kidney transplant biopsy 2/26 only showing borderline acute cellular-mediated rejection and mild ATI.  Patient was treated with Solumedrol and there was concern for kidney being overly sensitive to CNI.  Patient could not receive belatacept due to EBV IgG Ab negative, so he instead received basiliximab to allow lower tacrolimus dose.  Surprisingly, patient's creatinine improved extremely well to new baseline, despite therapeutic tacrolimus levels.  No further plans to change off tacrolimus at this time.   - Baseline Creatinine: ~ 1.8-2.1   - Proteinuria: Moderate (1-3 grams), but checked during ROSI episode   - Date DSA Last Checked: Apr/2024      Latest DSA: No cPRA: 0%   - BK Viremia: No   - Kidney Tx Biopsy: Apr 11, 2024; Result: No diagnostic evidence of acute rejection.   Mild acute tubular injury.  Moderate arterial sclerosis and no significant arteriolar hyalinosis.             Feb 26, 2024; Result: Borderline acute cellular-mediated rejection.   Patchy acute tubular injury.  Mild arterial sclerosis and mild arteriolar hyalinosis.             Feb 09, 2024; Result: No diagnostic evidence of acute rejection.   Mild acute tubular injury.  Severe arterial sclerosis and no significant arteriolar hyalinosis.             Jan 02, 2024; Result:  Acute cellular-mediated rejection, Banff 1b.  Mild glomerulitis and severe peritubular capillaritis, but negative C4d and no DSA. Mild arterial sclerosis.   - Transplant Ureteral Stent: Removed    # Immunosuppression: Tacrolimus immediate release (goal 8-10), Mycophenolate mofetil (dose 750 mg every 12 hours), and Prednisone (dose 5 mg daily)   - Induction with Recent Transplant:  Low Intensity Protocol, although did receive full dose rATG due to early acute rejection   - Continue with intensive monitoring of immunosuppression for efficacy and toxicity.   - Changes: Yes - Patient has considerable side effects from prednisone, mostly including weight gain, but also possibly sleep disorder.   He tried taking prednisone 10 mg every other day with no improvement.  With patient's obesity and elevated blood glucose, he would likely benefit from coming off steroids.  Discussed potential risks and benefits of coming of steroids.  The risks would include worsening or recurrent acute rejection episodes.   - Will discuss prednisone taper at clinical care meeting and relay this to patient.    # Infection Prophylaxis:   - PJP: Sulfa/TMP (Bactrim)  - CMV: Valganciclovir (Valcyte); Patient is CMV IgG Ab discordant (D+/R-) and will continue on Valcyte x 6 months, then check CMV PCR monthly until 12 months post transplant.    # Hypertension: Controlled;  Goal BP: < 130/80   - Volume status: Euvolemic   - Changes: Not at this time; Recommend patient check blood pressure at home on a regular basis, such as once every week or two, and follow up with Transplant Coordinator if above goal.    # Elevated Blood Glucose: Glucose generally running ~ 100-150s Last HbA1c: 5.7%   - Management as per primary care.    # Anemia in Chronic Renal Disease: Hgb: Stable      TOM: No   - Iron studies: High iron saturation; Will repeat iron panel.    # Mineral Bone Disorder:    - Secondary renal hyperparathyroidism; PTH level: Significantly elevated  (601-1200 pg/ml)        On treatment: None  - Vitamin D; level: Low        On supplement: Yes  - Calcium; level: High        On supplement: No    # Electrolytes:   - Potassium; level: Normal        On supplement: No  - Magnesium; level: Normal        On supplement: Yes  - Bicarbonate; level: Normal        On supplement: Yes    # EBV Viremia: Trend down, minimal EBV PCR at ~ detectable, but less than quantifiable with last check Apr/2024.  Peak EBV PCR was ~ 12K on Mar/2024.  EBV IgG Ab negative with last check and patient is EBV IgG Ab discordant (D+/R-).IgG level hasn't been checked.   - Will continue to follow EBV PCR every month.   - Will repeat EBV IgG Ab now that patient has developed low level EBV viremia.    # Obesity, Class II (BMI = 42.6): Weight is up ~ 25-30 lbs.  Prednisone may be contributing to his weight gain.   - Recommend weight loss for overall health by increasing exercise and watching caloric intake.    - Will refer patient to Weight Management Clinic.   - Will see if possible to taper off prednisone.    # GERD: Asymptomatic on PPI.   - Recommend tapering off pantoprazole by starting to take it every other day.  If tolerated without significant symptoms, patient can stop pantoprazole and just take it or OTC famotidine (Pepcid) as needed for heartburn symptoms.     # H/o Ureteral Reimplantation, s/p Mitrofanoff: Patient reports not using for Mitrofanoff in a couple of years and voids normally on his own.  Mitrofanoff is nonfunctional at this time.     # H/o Bilateral Uveitis: No evidence of systemic autoimmune/inflammatory disease at last Rheumatology.      # Sleep Disorder: Patient reports restless sleep and doesn't feel rested in the morning.  He is morbidly obese and would be at risk of sleep apnea.  His father has sleep apnea.   - Will refer to Sleep Clinic for further evaluation.    # Depression/Anxiety: Patient reports mood is a bit better since starting sertraline.    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    Return visit: Return for previously scheduled visit.    Abhilash Raza MD    I spent a total of 45 minutes on the date of the encounter doing chart review, performing a history and physical exam, completing documentation and any further activities as noted above.    The longitudinal plan of care for kidney transplant was addressed during this visit. Due to the added complexity in care, I will continue to support Boogie Villa in the subsequent management of this condition(s) and with the ongoing continuity of care of this condition(s).    Chief Complaint  Mr. Villa is a 23 year old here for kidney transplant and immunosuppression management.     History of Present Illness   Mr. Villa reports feeling good overall with some medical complaints.  Since last clinic visit, patient reports no hospitalizations or new medical complaints and has been doing well overall.  His energy level is reportedly \"terrible,\" as he hasn't been sleeping well.  Patient tries to go to bed about midnight or 1 am.  He lies in bed for about 45 minutes or so before falling asleep.  Patient then reports restless sleep with waking up frequently.  He generally can get back to sleep in a short time, but doesn't feel rested in the morning.  He reports his dad does have sleep apnea.  He does " "feel his mood is better after starting on sertraline.  He is otherwise active, but only getting a little exercise, mostly with walking.  Denies any chest pain, but some shortness of breath with exertion, which he feels is due to deconditioning.  No leg swelling.    Appetite is \"too good\" and patient finds it hard to not eat a lot as he reports getting very irritable if he is hungry.  He is up ~ 25-30 lbs since transplant.  Patient says his eating started to increase following when he started prednisone.  No nausea, vomiting or diarrhea.  No heartburn symptoms on pantoprazole.  No fever, sweats or chills.  Occasional, mild night sweats.    Home BP:  110/70s    Problem List  Patient Active Problem List   Diagnosis     Lymphangioma     ADHD (attention deficit hyperactivity disorder)     Allergic rhinitis due to pollen     Anxiety     Congenital posterior urethral valves     Iron deficiency     Neurogenic bladder     Class 2 severe obesity due to excess calories with serious comorbidity and body mass index (BMI) of 39.0 to 39.9 in adult (H)     Sensorineural hearing loss, asymmetrical     HTN, kidney transplant related     Secondary renal hyperparathyroidism (H24)     Vitamin D deficiency     Kidney replaced by transplant     Immunosuppressed status (H24)     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     CKD (chronic kidney disease) stage 2, GFR 60-89 ml/min     Aftercare following organ transplant     Need for pneumocystis prophylaxis     Anemia in chronic renal disease     Metabolic acidosis     Hypomagnesemia     Kidney transplant rejection     GERD (gastroesophageal reflux disease)     EBV (Dai-Barr virus) viremia     Pre-diabetes     Steroid-induced hyperglycemia     Sleep disorder       Allergies  Allergies   Allergen Reactions     Banana Itching     Raw banana; itchy mouth       Dust Mites      Runny nose and watery eyes     Mold      Runny nose and watery eyes     Nsaids Other (See " Comments)     CKD     Other [Seasonal Allergies]      Grass, Ragweed - gets runny nose and watery eyes     Sulfa Antibiotics      PN: LW Reaction: GI Upset as an infant  12/28 Admission: Tolerated Bactrim       Medications  Current Outpatient Medications   Medication Sig Dispense Refill     acetaminophen (TYLENOL) 325 MG tablet Take 1-2 tablets (325-650 mg) by mouth every 4 hours as needed (For optimal non-opioid multimodal pain management to improve pain control.)       Alcohol Swabs PADS Use to swab the area of the injection or janny as directed Per insurance coverage 200 each 1     amLODIPine (NORVASC) 10 MG tablet Take 1 tablet (10 mg) by mouth daily 30 tablet 11     blood glucose (NO BRAND SPECIFIED) lancets standard To use to test glucose level in the blood Use to test blood sugar  4  times daily as directed. To accompany glucose monitor brands per insurance coverage. Freestyle Lite lancets 200 each 1     blood glucose (NO BRAND SPECIFIED) test strip To use to test glucose level in the blood Use to test blood sugar  4 times daily as directed. To accompany glucose monitor brands per insurance coverage. Freestyle Lite test strips 100 strip 2     blood glucose monitoring (FREESTYLE) lancets        blood glucose monitoring (NO BRAND SPECIFIED) meter device kit Use as directed Per insurance coverage: Freestyle Lite glucose meter 1 kit 0     carvedilol (COREG) 12.5 MG tablet Take 1 tablet (12.5 mg) by mouth 2 times daily 180 tablet 3     cetirizine (ZYRTEC) 10 MG tablet Take 10 mg by mouth daily as needed for allergies (in the spring)       cholecalciferol (VITAMIN D3) 25 mcg (1000 units) capsule Take 1 capsule (25 mcg) by mouth daily       insulin lispro (HUMALOG KWIKPEN) 100 UNIT/ML (1 unit dial) KWIKPEN Inject 1-7 Units Subcutaneous 3 times daily (before meals) 3 mL 2     insulin lispro (HUMALOG KWIKPEN) 100 UNIT/ML (1 unit dial) KWIKPEN Inject 1-5 Units Subcutaneous at bedtime 3 mL 2     insulin pen needle (32G  X 4 MM) 32G X 4 MM miscellaneous Use as directed by provider Per insurance coverage 200 each 1     losartan (COZAAR) 25 MG tablet Take 1 tablet (25 mg) by mouth every evening 30 tablet 2     magnesium oxide (MAG-OX) 400 MG tablet Take 1 tablet (400 mg) by mouth 2 times daily 60 tablet 2     methylphenidate (RITALIN) 20 MG tablet Take 20 mg by mouth as needed Prn       mycophenolate (GENERIC EQUIVALENT) 250 MG capsule Take 3 capsules (750 mg) by mouth 2 times daily 180 capsule 11     pantoprazole (PROTONIX) 40 MG EC tablet Take 1 tablet (40 mg) by mouth 2 times daily 180 tablet 3     pentamidine (NEBUPENT) 300 MG neb solution Inhale 300 mg into the lungs every 28 days       predniSONE (DELTASONE) 5 MG tablet Take 1 tablet (5 mg) by mouth daily 30 tablet 11     sertraline (ZOLOFT) 25 MG tablet Take 100 mg by mouth daily       Sharps Container MISC Use as directed to dispose of needles, lancets and other sharps 1 each 1     sodium bicarbonate 650 MG tablet Take 3 tablets (1,950 mg) by mouth 3 times daily 270 tablet 2     sulfamethoxazole-trimethoprim (BACTRIM) 400-80 MG tablet Take 1 tablet by mouth daily 30 tablet 11     tacrolimus (GENERIC EQUIVALENT) 1 MG capsule Take 1 capsule (1 mg) by mouth 2 times daily Total dose = 6 mg twice daily 60 capsule 11     tacrolimus (GENERIC EQUIVALENT) 5 MG capsule Take 1 capsule (5 mg) by mouth 2 times daily Total dose = 6 mg twice daily 60 capsule 4     valGANciclovir (VALCYTE) 450 MG tablet Take 2 tablets (900 mg) by mouth daily 60 tablet 3     No current facility-administered medications for this visit.     There are no discontinued medications.      Physical Exam  Vital Signs: /79   Pulse 104   Temp 98.3  F (36.8  C) (Oral)   Wt 138.5 kg (305 lb 4.8 oz)   SpO2 96%   BMI 42.58 kg/m      GENERAL APPEARANCE: alert and no distress  HENT: mouth without ulcers or lesions  RESP: lungs clear to auscultation - no rales, rhonchi or wheezes  CV: regular rhythm, normal rate, no  rub, no murmur  EDEMA: no LE edema bilaterally  ABDOMEN: soft, nondistended, nontender, bowel sounds normal, obese  MS: extremities normal - no gross deformities noted, no evidence of inflammation in joints, no muscle tenderness  SKIN: no rash  TX KIDNEY: normal  DIALYSIS ACCESS:  None    Data        Latest Ref Rng & Units 5/13/2024    10:38 AM 5/6/2024    10:43 AM 4/22/2024    10:58 AM   Renal   Sodium 135 - 145 mmol/L 142  139  140    K 3.4 - 5.3 mmol/L 4.9  4.5  4.8    Cl 98 - 107 mmol/L 109  106  107    Cl (external) 98 - 107 mmol/L 109  106  107    CO2 22 - 29 mmol/L 22  23  21    Urea Nitrogen 6.0 - 20.0 mg/dL 28.5  18.8  25.2    Creatinine 0.67 - 1.17 mg/dL 1.84  1.49  1.75    Glucose 70 - 99 mg/dL 103  108  119    Calcium 8.6 - 10.0 mg/dL 10.3  9.8  10.0    Magnesium 1.7 - 2.3 mg/dL  1.7  1.9          Latest Ref Rng & Units 5/13/2024    10:38 AM 5/6/2024    10:43 AM 4/22/2024    10:58 AM   Bone Health   Phosphorus 2.5 - 4.5 mg/dL  2.3  2.2    Parathyroid Hormone Intact 15 - 65 pg/mL 126            Latest Ref Rng & Units 5/13/2024    10:38 AM 5/6/2024    10:43 AM 4/22/2024    10:59 AM   Heme   WBC 4.0 - 11.0 10e3/uL 2.4  2.9  4.3    Hgb 13.3 - 17.7 g/dL 12.0  11.4  11.4    Plt 150 - 450 10e3/uL 288  256  267    ABSOLUTE NEUTROPHIL 1.6 - 8.3 10e3/uL 1.7      ABSOLUTE LYMPHOCYTES 0.8 - 5.3 10e3/uL 0.5      ABSOLUTE MONOCYTES 0.0 - 1.3 10e3/uL 0.1      ABSOLUTE EOSINOPHILS 0.0 - 0.7 10e3/uL 0.1            Latest Ref Rng & Units 12/19/2023     9:53 AM 7/13/2023     1:28 PM 7/16/2021     4:50 PM   Liver   AP 40 - 150 U/L 67      AP (external) 40 - 150 U/L  45     TBili <=1.2 mg/dL 0.5      TBili (external) 0.2 - 1.2 mg/dL  0.6     ALT 0 - 70 U/L 41      ALT (external) <=55 U/L  35     AST 0 - 45 U/L 27      AST (external) 10 - 40 U/L  26     Tot Protein 6.4 - 8.3 g/dL 7.6      Tot Protein (external) 6.4 - 8.3 g/dL  7.5     Albumin 3.5 - 5.0 g/dL   3.0    Albumin 3.5 - 5.2 g/dL 4.4      Albumin (external) 3.5 - 5.0  g/dL  3.8           Latest Ref Rng & Units 2/28/2024     1:13 PM 12/19/2023     9:53 AM 7/13/2023     1:28 PM   Pancreas   A1C <5.7 % 5.7  5.2     A1C (external) <=5.6 %   4.9          Latest Ref Rng & Units 1/7/2024     7:50 AM 12/19/2023     9:53 AM 2/26/2020     3:20 PM   Iron studies   Iron 61 - 157 ug/dL 167  104  66    Iron Saturation Index 15 - 46 %   26    Iron Sat Index 15 - 46 % 79  42     Ferritin 31 - 409 ng/mL 783  826  133          4/1/2024    10:39 AM 3/25/2024    10:22 AM 3/18/2024    10:33 AM   [facility] Virology   EBV DNA LOG OF COPIES 3.8  3.9  3.7      Failed to redirect to the Timeline version of the REVFS SmartLink.  Recent Labs   Lab Test 04/22/24  1101 05/06/24  1043 05/13/24  1038   DOSTAC 4/21/2024 5/5/2024 5/12/2024   TACROL 8.5 6.6 7.9     Recent Labs   Lab Test 01/11/24  0950 01/29/24  0937 02/01/24  1008 02/12/24  1030 02/24/24  1008   DOSMPA 1/10/2024   9:30 PM 1/28/2024   9:30 PM  --   --   --    MPACID 3.79* 3.73* 3.87* 2.23 2.65   MPAG 71.7 63.8 79.1 68.1 141.7*          Again, thank you for allowing me to participate in the care of your patient.        Sincerely,        Abhilash Raza MD

## 2024-05-08 ENCOUNTER — TELEPHONE (OUTPATIENT)
Dept: TRANSPLANT | Facility: CLINIC | Age: 24
End: 2024-05-08
Payer: COMMERCIAL

## 2024-05-08 NOTE — TELEPHONE ENCOUNTER
Post Kidney and Pancreas Transplant Team Conference  Date: 5/8/2024  Transplant Coordinator: Nina Hu     Attendees:  [x]  Dr. Raza [x] Nina Hu, RN [x] Moni Davis LPN     [x]  Dr. Barrera [x] Sharron Maharaj, RN [] Eri Yoder LPN    [x] Dr. Richard [x] Arely Dennis, MAKAYLA    [x] Dr. Dexter [x] Janine Betancourt, RN [x] Kirsten Nix RN   [] Dr. Howard [] Tatiana Ware, RN    [x] Dr. Raya [] Baylee Azar RN    []  Dr. Castillo [] Adwoa Randhawa RN    [] Dr. Gold [] Deven Hernández RN    [x] Ivis Monzon, NP [] Dyan Gross RN    [x] Clare Prasad NP [] Heidy Still RN        Verbal Plan Read Back:   Plan to decrease Prednisone so that he is off by 1 yr post txp  follow up with Dr Raza       Routed to RN Coordinator   Moni Davis LPN

## 2024-05-09 LAB — MISCELLANEOUS TEST 1 (ARUP): ABNORMAL

## 2024-05-13 ENCOUNTER — LAB (OUTPATIENT)
Dept: LAB | Facility: CLINIC | Age: 24
End: 2024-05-13
Payer: COMMERCIAL

## 2024-05-13 DIAGNOSIS — Z98.890 OTHER SPECIFIED POSTPROCEDURAL STATES: ICD-10-CM

## 2024-05-13 DIAGNOSIS — Z94.0 KIDNEY REPLACED BY TRANSPLANT: ICD-10-CM

## 2024-05-13 DIAGNOSIS — Z48.298 AFTERCARE FOLLOWING ORGAN TRANSPLANT: ICD-10-CM

## 2024-05-13 DIAGNOSIS — Z79.899 ENCOUNTER FOR LONG-TERM CURRENT USE OF MEDICATION: ICD-10-CM

## 2024-05-13 DIAGNOSIS — Z94.0 KIDNEY TRANSPLANTED: ICD-10-CM

## 2024-05-13 DIAGNOSIS — Z20.828 CONTACT WITH AND (SUSPECTED) EXPOSURE TO OTHER VIRAL COMMUNICABLE DISEASES: Primary | ICD-10-CM

## 2024-05-13 LAB
ANION GAP SERPL CALCULATED.3IONS-SCNC: 11 MMOL/L (ref 7–15)
BASOPHILS # BLD MANUAL: 0 10E3/UL (ref 0–0.2)
BASOPHILS NFR BLD MANUAL: 0 %
BUN SERPL-MCNC: 28.5 MG/DL (ref 6–20)
CALCIUM SERPL-MCNC: 10.3 MG/DL (ref 8.6–10)
CHLORIDE SERPL-SCNC: 109 MMOL/L (ref 98–107)
CREAT SERPL-MCNC: 1.84 MG/DL (ref 0.67–1.17)
DEPRECATED HCO3 PLAS-SCNC: 22 MMOL/L (ref 22–29)
EGFRCR SERPLBLD CKD-EPI 2021: 52 ML/MIN/1.73M2
EOSINOPHIL # BLD MANUAL: 0.1 10E3/UL (ref 0–0.7)
EOSINOPHIL NFR BLD MANUAL: 5 %
ERYTHROCYTE [DISTWIDTH] IN BLOOD BY AUTOMATED COUNT: 12.1 % (ref 10–15)
GLUCOSE SERPL-MCNC: 103 MG/DL (ref 70–99)
HCT VFR BLD AUTO: 36.6 % (ref 40–53)
HGB BLD-MCNC: 12 G/DL (ref 13.3–17.7)
LYMPHOCYTES # BLD MANUAL: 0.5 10E3/UL (ref 0.8–5.3)
LYMPHOCYTES NFR BLD MANUAL: 20 %
MCH RBC QN AUTO: 31.6 PG (ref 26.5–33)
MCHC RBC AUTO-ENTMCNC: 32.8 G/DL (ref 31.5–36.5)
MCV RBC AUTO: 96 FL (ref 78–100)
MONOCYTES # BLD MANUAL: 0.1 10E3/UL (ref 0–1.3)
MONOCYTES NFR BLD MANUAL: 5 %
MYELOCYTES # BLD MANUAL: 0 10E3/UL
MYELOCYTES NFR BLD MANUAL: 1 %
NEUTROPHILS # BLD MANUAL: 1.7 10E3/UL (ref 1.6–8.3)
NEUTROPHILS NFR BLD MANUAL: 69 %
NRBC # BLD AUTO: 0 10E3/UL
NRBC BLD AUTO-RTO: 0 /100
PLAT MORPH BLD: ABNORMAL
PLATELET # BLD AUTO: 288 10E3/UL (ref 150–450)
POTASSIUM SERPL-SCNC: 4.9 MMOL/L (ref 3.4–5.3)
PTH-INTACT SERPL-MCNC: 126 PG/ML (ref 15–65)
RBC # BLD AUTO: 3.8 10E6/UL (ref 4.4–5.9)
RBC MORPH BLD: ABNORMAL
SODIUM SERPL-SCNC: 142 MMOL/L (ref 135–145)
TACROLIMUS BLD-MCNC: 7.9 UG/L (ref 5–15)
TME LAST DOSE: NORMAL H
TME LAST DOSE: NORMAL H
WBC # BLD AUTO: 2.4 10E3/UL (ref 4–11)

## 2024-05-13 PROCEDURE — 80048 BASIC METABOLIC PNL TOTAL CA: CPT

## 2024-05-13 PROCEDURE — 36415 COLL VENOUS BLD VENIPUNCTURE: CPT

## 2024-05-13 PROCEDURE — 86832 HLA CLASS I HIGH DEFIN QUAL: CPT

## 2024-05-13 PROCEDURE — 85025 COMPLETE CBC W/AUTO DIFF WBC: CPT

## 2024-05-13 PROCEDURE — 80180 DRUG SCRN QUAN MYCOPHENOLATE: CPT

## 2024-05-13 PROCEDURE — 80197 ASSAY OF TACROLIMUS: CPT

## 2024-05-13 PROCEDURE — 87799 DETECT AGENT NOS DNA QUANT: CPT

## 2024-05-13 PROCEDURE — 86833 HLA CLASS II HIGH DEFIN QUAL: CPT

## 2024-05-13 PROCEDURE — 83970 ASSAY OF PARATHORMONE: CPT

## 2024-05-14 ENCOUNTER — TELEPHONE (OUTPATIENT)
Dept: TRANSPLANT | Facility: CLINIC | Age: 24
End: 2024-05-14
Payer: COMMERCIAL

## 2024-05-14 DIAGNOSIS — Z94.0 KIDNEY REPLACED BY TRANSPLANT: Primary | ICD-10-CM

## 2024-05-14 DIAGNOSIS — R79.9 ABNORMAL FINDING OF BLOOD CHEMISTRY, UNSPECIFIED: ICD-10-CM

## 2024-05-14 PROBLEM — G47.9 SLEEP DISORDER: Status: ACTIVE | Noted: 2024-05-14

## 2024-05-14 PROBLEM — T86.19 PERINEPHRIC FLUID COLLECTION OF KIDNEY TRANSPLANT: Status: RESOLVED | Noted: 2024-02-29 | Resolved: 2024-05-14

## 2024-05-14 PROBLEM — N18.2 CKD (CHRONIC KIDNEY DISEASE) STAGE 2, GFR 60-89 ML/MIN: Status: ACTIVE | Noted: 2024-02-07

## 2024-05-14 PROBLEM — E86.0 DEHYDRATION: Status: RESOLVED | Noted: 2024-02-23 | Resolved: 2024-05-14

## 2024-05-14 PROBLEM — N28.89 PERINEPHRIC FLUID COLLECTION OF KIDNEY TRANSPLANT: Status: RESOLVED | Noted: 2024-02-29 | Resolved: 2024-05-14

## 2024-05-14 PROBLEM — R79.89 ELEVATED SERUM CREATININE: Status: RESOLVED | Noted: 2024-02-24 | Resolved: 2024-05-14

## 2024-05-14 PROBLEM — N17.9 AKI (ACUTE KIDNEY INJURY) (H): Status: RESOLVED | Noted: 2024-02-29 | Resolved: 2024-05-14

## 2024-05-14 LAB
CMV DNA SPEC NAA+PROBE-ACNC: NOT DETECTED IU/ML
EBV DNA SERPL NAA+PROBE-ACNC: <35 IU/ML
EBV DNA SERPL NAA+PROBE-LOG#: <1.5 {LOG_COPIES}/ML
Lab: NORMAL
PERFORMING LABORATORY: NORMAL
SPECIMEN STATUS: NORMAL
TEST NAME: NORMAL

## 2024-05-14 NOTE — TELEPHONE ENCOUNTER
Abhilash Raza MD Huepfel, Mary K, RN  Please confirm that we have a prednisone taper plan.    Please check the following labs: Iron panel, UPC, and EBV IgG Ab.    Abhilash Raza MD Huepfel, Mary K, RN  Keep tacrolimus goal at 8-10.  Decrease prednisone to 4 mg daily.  Will decrease prednisone by 1 mg monthly.    Marc

## 2024-05-14 NOTE — PROGRESS NOTES
TRANSPLANT NEPHROLOGY EARLY POST TRANSPLANT VISIT    Assessment & Plan   # LDKT: Stable creatinine in the ~ 1.5-1.8 range over the last couple of months, which is decreased from previous baseline closer to ~ 1.8-2.1 range so far.  Patient did have an early Banff 1b acute cellular-mediated rejection, Banff 1B, which was treated with rATG.  Follow up biopsy showed only mild ATI.  Then more recently had marked ROSI with Cr ~ 6.4 with kidney transplant biopsy 2/26 only showing borderline acute cellular-mediated rejection and mild ATI.  Patient was treated with Solumedrol and there was concern for kidney being overly sensitive to CNI.  Patient could not receive belatacept due to EBV IgG Ab negative, so he instead received basiliximab to allow lower tacrolimus dose.  Surprisingly, patient's creatinine improved extremely well to new baseline, despite therapeutic tacrolimus levels.  No further plans to change off tacrolimus at this time.   - Baseline Creatinine: ~ 1.8-2.1   - Proteinuria: Moderate (1-3 grams), but checked during ROSI episode   - Date DSA Last Checked: Apr/2024      Latest DSA: No cPRA: 0%   - BK Viremia: No   - Kidney Tx Biopsy: Apr 11, 2024; Result: No diagnostic evidence of acute rejection.   Mild acute tubular injury.  Moderate arterial sclerosis and no significant arteriolar hyalinosis.             Feb 26, 2024; Result: Borderline acute cellular-mediated rejection.   Patchy acute tubular injury.  Mild arterial sclerosis and mild arteriolar hyalinosis.             Feb 09, 2024; Result: No diagnostic evidence of acute rejection.   Mild acute tubular injury.  Severe arterial sclerosis and no significant arteriolar hyalinosis.             Jan 02, 2024; Result: Acute cellular-mediated rejection, Banff 1b.  Mild glomerulitis and severe peritubular capillaritis, but negative C4d and no DSA. Mild arterial sclerosis.   - Transplant Ureteral Stent: Removed    # Immunosuppression: Tacrolimus immediate release (goal  8-10), Mycophenolate mofetil (dose 750 mg every 12 hours), and Prednisone (dose 5 mg daily)   - Induction with Recent Transplant:  Low Intensity Protocol, although did receive full dose rATG due to early acute rejection   - Continue with intensive monitoring of immunosuppression for efficacy and toxicity.   - Changes: Yes - Patient has considerable side effects from prednisone, mostly including weight gain, but also possibly sleep disorder.   He tried taking prednisone 10 mg every other day with no improvement.  With patient's obesity and elevated blood glucose, he would likely benefit from coming off steroids.  Discussed potential risks and benefits of coming of steroids.  The risks would include worsening or recurrent acute rejection episodes.   - Will discuss prednisone taper at clinical care meeting and relay this to patient.    # Infection Prophylaxis:   - PJP: Sulfa/TMP (Bactrim)  - CMV: Valganciclovir (Valcyte); Patient is CMV IgG Ab discordant (D+/R-) and will continue on Valcyte x 6 months, then check CMV PCR monthly until 12 months post transplant.    # Hypertension: Controlled;  Goal BP: < 130/80   - Volume status: Euvolemic   - Changes: Not at this time; Recommend patient check blood pressure at home on a regular basis, such as once every week or two, and follow up with Transplant Coordinator if above goal.    # Elevated Blood Glucose: Glucose generally running ~ 100-150s Last HbA1c: 5.7%   - Management as per primary care.    # Anemia in Chronic Renal Disease: Hgb: Stable      TOM: No   - Iron studies: High iron saturation; Will repeat iron panel.    # Mineral Bone Disorder:    - Secondary renal hyperparathyroidism; PTH level: Significantly elevated (601-1200 pg/ml)        On treatment: None  - Vitamin D; level: Low        On supplement: Yes  - Calcium; level: High        On supplement: No    # Electrolytes:   - Potassium; level: Normal        On supplement: No  - Magnesium; level: Normal        On  supplement: Yes  - Bicarbonate; level: Normal        On supplement: Yes    # EBV Viremia: Trend down, minimal EBV PCR at ~ detectable, but less than quantifiable with last check Apr/2024.  Peak EBV PCR was ~ 12K on Mar/2024.  EBV IgG Ab negative with last check and patient is EBV IgG Ab discordant (D+/R-).IgG level hasn't been checked.   - Will continue to follow EBV PCR every month.   - Will repeat EBV IgG Ab now that patient has developed low level EBV viremia.    # Obesity, Class II (BMI = 42.6): Weight is up ~ 25-30 lbs.  Prednisone may be contributing to his weight gain.   - Recommend weight loss for overall health by increasing exercise and watching caloric intake.    - Will refer patient to [facility].   - Will see if possible to taper off prednisone.    # GERD: Asymptomatic on PPI.   - Recommend tapering off pantoprazole by starting to take it every other day.  If tolerated without significant symptoms, patient can stop pantoprazole and just take it or OTC famotidine (Pepcid) as needed for heartburn symptoms.     # H/o Ureteral Reimplantation, s/p Mitrofanoff: Patient reports not using for Mitrofanoff in a couple of years and voids normally on his own.  Mitrofanoff is nonfunctional at this time.     # H/o Bilateral Uveitis: No evidence of systemic autoimmune/inflammatory disease at last Rheumatology.      # Sleep Disorder: Patient reports restless sleep and doesn't feel rested in the morning.  He is morbidly obese and would be at risk of sleep apnea.  His father has sleep apnea.   - Will refer to Sleep Clinic for further evaluation.    # Depression/Anxiety: Patient reports mood is a bit better since starting sertraline.    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    Return visit: Return for previously scheduled visit.    Abhilash Raza MD    I spent a total of 45 minutes on the date of the encounter doing chart review, performing a history and physical exam, completing documentation and any further activities as noted above.    The longitudinal plan of care for kidney transplant was addressed during this visit. Due to the added complexity in care, I will continue to support Boogie Villa in the subsequent management of this condition(s) and with the ongoing continuity of care of this condition(s).    Chief Complaint   Mr. Villa is a 23 year old here for kidney transplant and immunosuppression management.     History of Present Illness    Mr. Villa reports feeling good overall with some medical complaints.  Since last clinic visit, patient reports no hospitalizations or new medical complaints and has been doing well overall.  His energy level is reportedly \"terrible,\" as he hasn't been sleeping well.  Patient tries to go to bed about midnight or 1 am.  He lies in bed for about 45 minutes or so before falling asleep.  Patient then reports restless sleep with waking up frequently.  He generally can get back to sleep in a short time, but doesn't feel rested in the morning.  He reports his dad does have sleep apnea.  He does feel his mood is better after starting on sertraline.  He is otherwise active, but only getting a little exercise, mostly with walking.  Denies any chest pain, but some shortness of breath with exertion, which he feels is due to deconditioning.  No " "leg swelling.    Appetite is \"too good\" and patient finds it hard to not eat a lot as he reports getting very irritable if he is hungry.  He is up ~ 25-30 lbs since transplant.  Patient says his eating started to increase following when he started prednisone.  No nausea, vomiting or diarrhea.  No heartburn symptoms on pantoprazole.  No fever, sweats or chills.  Occasional, mild night sweats.    Home BP:  110/70s    Problem List   Patient Active Problem List   Diagnosis    Lymphangioma    ADHD (attention deficit hyperactivity disorder)    Allergic rhinitis due to pollen    Anxiety    Congenital posterior urethral valves    Iron deficiency    Neurogenic bladder    Class 2 severe obesity due to excess calories with serious comorbidity and body mass index (BMI) of 39.0 to 39.9 in adult (H)    Sensorineural hearing loss, asymmetrical    HTN, kidney transplant related    Secondary renal hyperparathyroidism (H24)    Vitamin D deficiency    Kidney replaced by transplant    Immunosuppressed status (H24)    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    CKD (chronic kidney disease) stage 2, GFR 60-89 ml/min    Aftercare following organ transplant    Need for pneumocystis prophylaxis    Anemia in chronic renal disease    Metabolic acidosis    Hypomagnesemia    Kidney transplant rejection    GERD (gastroesophageal reflux disease)    EBV (Dai-Barr virus) viremia    Pre-diabetes    Steroid-induced hyperglycemia    Sleep disorder       Allergies   Allergies   Allergen Reactions    Banana Itching     Raw banana; itchy mouth      Dust Mites      Runny nose and watery eyes    Mold      Runny nose and watery eyes    Nsaids Other (See Comments)     CKD    Other [Seasonal Allergies]      Grass, Ragweed - gets runny nose and watery eyes    Sulfa Antibiotics      PN: LW Reaction: GI Upset as an infant  12/28 Admission: Tolerated Bactrim       Medications   Current Outpatient Medications   Medication Sig Dispense " Refill    acetaminophen (TYLENOL) 325 MG tablet Take 1-2 tablets (325-650 mg) by mouth every 4 hours as needed (For optimal non-opioid multimodal pain management to improve pain control.)      Alcohol Swabs PADS Use to swab the area of the injection or janny as directed Per insurance coverage 200 each 1    amLODIPine (NORVASC) 10 MG tablet Take 1 tablet (10 mg) by mouth daily 30 tablet 11    blood glucose (NO BRAND SPECIFIED) lancets standard To use to test glucose level in the blood Use to test blood sugar  4  times daily as directed. To accompany glucose monitor brands per insurance coverage. Freestyle Lite lancets 200 each 1    blood glucose (NO BRAND SPECIFIED) test strip To use to test glucose level in the blood Use to test blood sugar  4 times daily as directed. To accompany glucose monitor brands per insurance coverage. Freestyle Lite test strips 100 strip 2    blood glucose monitoring (FREESTYLE) lancets       blood glucose monitoring (NO BRAND SPECIFIED) meter device kit Use as directed Per insurance coverage: Freestyle Lite glucose meter 1 kit 0    carvedilol (COREG) 12.5 MG tablet Take 1 tablet (12.5 mg) by mouth 2 times daily 180 tablet 3    cetirizine (ZYRTEC) 10 MG tablet Take 10 mg by mouth daily as needed for allergies (in the spring)      cholecalciferol (VITAMIN D3) 25 mcg (1000 units) capsule Take 1 capsule (25 mcg) by mouth daily      insulin lispro (HUMALOG KWIKPEN) 100 UNIT/ML (1 unit dial) KWIKPEN Inject 1-7 Units Subcutaneous 3 times daily (before meals) 3 mL 2    insulin lispro (HUMALOG KWIKPEN) 100 UNIT/ML (1 unit dial) KWIKPEN Inject 1-5 Units Subcutaneous at bedtime 3 mL 2    insulin pen needle (32G X 4 MM) 32G X 4 MM miscellaneous Use as directed by provider Per insurance coverage 200 each 1    losartan (COZAAR) 25 MG tablet Take 1 tablet (25 mg) by mouth every evening 30 tablet 2    magnesium oxide (MAG-OX) 400 MG tablet Take 1 tablet (400 mg) by mouth 2 times daily 60 tablet 2     methylphenidate (RITALIN) 20 MG tablet Take 20 mg by mouth as needed Prn      mycophenolate (GENERIC EQUIVALENT) 250 MG capsule Take 3 capsules (750 mg) by mouth 2 times daily 180 capsule 11    pantoprazole (PROTONIX) 40 MG EC tablet Take 1 tablet (40 mg) by mouth 2 times daily 180 tablet 3    pentamidine (NEBUPENT) 300 MG neb solution Inhale 300 mg into the lungs every 28 days      predniSONE (DELTASONE) 5 MG tablet Take 1 tablet (5 mg) by mouth daily 30 tablet 11    sertraline (ZOLOFT) 25 MG tablet Take 100 mg by mouth daily      Sharps Container MISC Use as directed to dispose of needles, lancets and other sharps 1 each 1    sodium bicarbonate 650 MG tablet Take 3 tablets (1,950 mg) by mouth 3 times daily 270 tablet 2    sulfamethoxazole-trimethoprim (BACTRIM) 400-80 MG tablet Take 1 tablet by mouth daily 30 tablet 11    tacrolimus (GENERIC EQUIVALENT) 1 MG capsule Take 1 capsule (1 mg) by mouth 2 times daily Total dose = 6 mg twice daily 60 capsule 11    tacrolimus (GENERIC EQUIVALENT) 5 MG capsule Take 1 capsule (5 mg) by mouth 2 times daily Total dose = 6 mg twice daily 60 capsule 4    valGANciclovir (VALCYTE) 450 MG tablet Take 2 tablets (900 mg) by mouth daily 60 tablet 3     No current facility-administered medications for this visit.     There are no discontinued medications.      Physical Exam   Vital Signs: /79   Pulse 104   Temp 98.3  F (36.8  C) (Oral)   Wt 138.5 kg (305 lb 4.8 oz)   SpO2 96%   BMI 42.58 kg/m      GENERAL APPEARANCE: alert and no distress  HENT: mouth without ulcers or lesions  RESP: lungs clear to auscultation - no rales, rhonchi or wheezes  CV: regular rhythm, normal rate, no rub, no murmur  EDEMA: no LE edema bilaterally  ABDOMEN: soft, nondistended, nontender, bowel sounds normal, obese  MS: extremities normal - no gross deformities noted, no evidence of inflammation in joints, no muscle tenderness  SKIN: no rash  TX KIDNEY: normal  DIALYSIS ACCESS:  None    Data          Latest Ref Rng & Units 5/13/2024    10:38 AM 5/6/2024    10:43 AM 4/22/2024    10:58 AM   Renal   Sodium 135 - 145 mmol/L 142  139  140    K 3.4 - 5.3 mmol/L 4.9  4.5  4.8    Cl 98 - 107 mmol/L 109  106  107    Cl (external) 98 - 107 mmol/L 109  106  107    CO2 22 - 29 mmol/L 22  23  21    Urea Nitrogen 6.0 - 20.0 mg/dL 28.5  18.8  25.2    Creatinine 0.67 - 1.17 mg/dL 1.84  1.49  1.75    Glucose 70 - 99 mg/dL 103  108  119    Calcium 8.6 - 10.0 mg/dL 10.3  9.8  10.0    Magnesium 1.7 - 2.3 mg/dL  1.7  1.9          Latest Ref Rng & Units 5/13/2024    10:38 AM 5/6/2024    10:43 AM 4/22/2024    10:58 AM   Bone Health   Phosphorus 2.5 - 4.5 mg/dL  2.3  2.2    Parathyroid Hormone Intact 15 - 65 pg/mL 126            Latest Ref Rng & Units 5/13/2024    10:38 AM 5/6/2024    10:43 AM 4/22/2024    10:59 AM   Heme   WBC 4.0 - 11.0 10e3/uL 2.4  2.9  4.3    Hgb 13.3 - 17.7 g/dL 12.0  11.4  11.4    Plt 150 - 450 10e3/uL 288  256  267    ABSOLUTE NEUTROPHIL 1.6 - 8.3 10e3/uL 1.7      ABSOLUTE LYMPHOCYTES 0.8 - 5.3 10e3/uL 0.5      ABSOLUTE MONOCYTES 0.0 - 1.3 10e3/uL 0.1      ABSOLUTE EOSINOPHILS 0.0 - 0.7 10e3/uL 0.1            Latest Ref Rng & Units 12/19/2023     9:53 AM 7/13/2023     1:28 PM 7/16/2021     4:50 PM   Liver   AP 40 - 150 U/L 67      AP (external) 40 - 150 U/L  45     TBili <=1.2 mg/dL 0.5      TBili (external) 0.2 - 1.2 mg/dL  0.6     ALT 0 - 70 U/L 41      ALT (external) <=55 U/L  35     AST 0 - 45 U/L 27      AST (external) 10 - 40 U/L  26     Tot Protein 6.4 - 8.3 g/dL 7.6      Tot Protein (external) 6.4 - 8.3 g/dL  7.5     Albumin 3.5 - 5.0 g/dL   3.0    Albumin 3.5 - 5.2 g/dL 4.4      Albumin (external) 3.5 - 5.0 g/dL  3.8           Latest Ref Rng & Units 2/28/2024     1:13 PM 12/19/2023     9:53 AM 7/13/2023     1:28 PM   Pancreas   A1C <5.7 % 5.7  5.2     A1C (external) <=5.6 %   4.9          Latest Ref Rng & Units 1/7/2024     7:50 AM 12/19/2023     9:53 AM 2/26/2020     3:20 PM   Iron studies   Iron 61 -  157 ug/dL 167  104  66    Iron Saturation Index 15 - 46 %   26    Iron Sat Index 15 - 46 % 79  42     Ferritin 31 - 409 ng/mL 783  826  133          4/1/2024    10:39 AM 3/25/2024    10:22 AM 3/18/2024    10:33 AM   [facility]   EBV DNA LOG OF COPIES 3.8  3.9  3.7      Failed to redirect to the Timeline version of the REVFS SmartLink.  Recent Labs   Lab Test 04/22/24  1101 05/06/24  1043 05/13/24  1038   DOSTAC 4/21/2024 5/5/2024 5/12/2024   TACROL 8.5 6.6 7.9     Recent Labs   Lab Test 01/11/24  0950 01/29/24  0937 02/01/24  1008 02/12/24  1030 02/24/24  1008   DOSMPA 1/10/2024   9:30 PM 1/28/2024   9:30 PM  --   --   --    MPACID 3.79* 3.73* 3.87* 2.23 2.65   MPAG 71.7 63.8 79.1 68.1 141.7*

## 2024-05-16 DIAGNOSIS — Z94.0 KIDNEY REPLACED BY TRANSPLANT: Primary | Chronic | ICD-10-CM

## 2024-05-16 LAB — MISCELLANEOUS TEST 1 (ARUP): ABNORMAL

## 2024-05-16 NOTE — TELEPHONE ENCOUNTER
Left message and sent mychart message to patient regarding:  Low MPA level and recommend increasing mycophenolate mofetil to 1000 mg twice daily.  Will need to watch white blood cell count.  Recommend repeating MPA level in 2 weeks.

## 2024-05-16 NOTE — TELEPHONE ENCOUNTER
Dr Raza   Low MPA level and recommend increasing mycophenolate mofetil to 1000 mg twice daily.  Will need to watch white blood cell count.  Recommend repeating MPA level in 2 weeks.     Please contact Boogie  regarding the message from Dr Raza

## 2024-05-16 NOTE — LETTER
PHYSICIAN ORDERS      DATE & TIME ISSUED: May 17, 2024 6:31 AM  PATIENT NAME: Boogie Villa   : 2000     [facility] MR# [if applicable]: [id number]     DIAGNOSIS:  Kidney Transplant  ICD-10 CODE: Z94.0     Please repeat the following lab in 2 weeks:  Mycophenolic Acid level    Any questions please call: [phone]    Please fax each result same day as resulted/available    Critical lab results page [phone]  Please fax lab results to [phone].    .

## 2024-05-17 RX ORDER — MYCOPHENOLATE MOFETIL 250 MG/1
1000 CAPSULE ORAL 2 TIMES DAILY
Qty: 240 CAPSULE | Refills: 11 | Status: SHIPPED | OUTPATIENT
Start: 2024-05-17

## 2024-05-17 NOTE — TELEPHONE ENCOUNTER
Patient confirms increasing mycophenolate mofetil to 1000 mg twice daily and repeating MPA level in 2 weeks.

## 2024-05-19 RX ORDER — SODIUM BICARBONATE 650 MG/1
1300 TABLET ORAL 3 TIMES DAILY
Qty: 120 TABLET | Refills: 0 | OUTPATIENT
Start: 2024-05-19

## 2024-05-21 ENCOUNTER — LAB (OUTPATIENT)
Dept: LAB | Facility: CLINIC | Age: 24
End: 2024-05-21
Payer: COMMERCIAL

## 2024-05-21 DIAGNOSIS — R79.9 ABNORMAL FINDING OF BLOOD CHEMISTRY, UNSPECIFIED: ICD-10-CM

## 2024-05-21 DIAGNOSIS — Z94.0 KIDNEY REPLACED BY TRANSPLANT: Primary | ICD-10-CM

## 2024-05-21 LAB
BASOPHILS # BLD MANUAL: 0 10E3/UL (ref 0–0.2)
BASOPHILS NFR BLD MANUAL: 0 %
EOSINOPHIL # BLD MANUAL: 0.2 10E3/UL (ref 0–0.7)
EOSINOPHIL NFR BLD MANUAL: 7 %
ERYTHROCYTE [DISTWIDTH] IN BLOOD BY AUTOMATED COUNT: 12.3 % (ref 10–15)
HCT VFR BLD AUTO: 35.6 % (ref 40–53)
HGB BLD-MCNC: 11.6 G/DL (ref 13.3–17.7)
LYMPHOCYTES # BLD MANUAL: 0.6 10E3/UL (ref 0.8–5.3)
LYMPHOCYTES NFR BLD MANUAL: 22 %
Lab: NORMAL
MCH RBC QN AUTO: 31.7 PG (ref 26.5–33)
MCHC RBC AUTO-ENTMCNC: 32.6 G/DL (ref 31.5–36.5)
MCV RBC AUTO: 97 FL (ref 78–100)
METAMYELOCYTES # BLD MANUAL: 0 10E3/UL
METAMYELOCYTES NFR BLD MANUAL: 1 %
MONOCYTES # BLD MANUAL: 0.1 10E3/UL (ref 0–1.3)
MONOCYTES NFR BLD MANUAL: 5 %
MYELOCYTES # BLD MANUAL: 0.2 10E3/UL
MYELOCYTES NFR BLD MANUAL: 7 %
NEUTROPHILS # BLD MANUAL: 1.6 10E3/UL (ref 1.6–8.3)
NEUTROPHILS NFR BLD MANUAL: 58 %
NRBC # BLD AUTO: 0 10E3/UL
NRBC BLD AUTO-RTO: 0 /100
PERFORMING LABORATORY: NORMAL
PLAT MORPH BLD: ABNORMAL
PLATELET # BLD AUTO: 296 10E3/UL (ref 150–450)
RBC # BLD AUTO: 3.66 10E6/UL (ref 4.4–5.9)
RBC MORPH BLD: ABNORMAL
TACROLIMUS BLD-MCNC: 6.4 UG/L (ref 5–15)
TEST NAME: NORMAL
TME LAST DOSE: NORMAL H
TME LAST DOSE: NORMAL H
WBC # BLD AUTO: 2.8 10E3/UL (ref 4–11)

## 2024-05-21 PROCEDURE — 80197 ASSAY OF TACROLIMUS: CPT

## 2024-05-21 PROCEDURE — 83550 IRON BINDING TEST: CPT

## 2024-05-21 PROCEDURE — 80180 DRUG SCRN QUAN MYCOPHENOLATE: CPT

## 2024-05-21 PROCEDURE — 83540 ASSAY OF IRON: CPT

## 2024-05-21 PROCEDURE — 36415 COLL VENOUS BLD VENIPUNCTURE: CPT

## 2024-05-21 PROCEDURE — 80048 BASIC METABOLIC PNL TOTAL CA: CPT

## 2024-05-21 PROCEDURE — 85025 COMPLETE CBC W/AUTO DIFF WBC: CPT

## 2024-05-21 PROCEDURE — 86665 EPSTEIN-BARR CAPSID VCA: CPT

## 2024-05-22 LAB
ANION GAP SERPL CALCULATED.3IONS-SCNC: 11 MMOL/L (ref 7–15)
BUN SERPL-MCNC: 25.3 MG/DL (ref 6–20)
CALCIUM SERPL-MCNC: 9.6 MG/DL (ref 8.6–10)
CHLORIDE SERPL-SCNC: 105 MMOL/L (ref 98–107)
CREAT SERPL-MCNC: 1.84 MG/DL (ref 0.67–1.17)
DEPRECATED HCO3 PLAS-SCNC: 23 MMOL/L (ref 22–29)
DONOR IDENTIFICATION: NORMAL
DSA COMMENTS: NORMAL
DSA PRESENT: NO
DSA TEST METHOD: NORMAL
EBV VCA IGG SER IA-ACNC: <10 U/ML
EBV VCA IGG SER IA-ACNC: NORMAL
EGFRCR SERPLBLD CKD-EPI 2021: 52 ML/MIN/1.73M2
GLUCOSE SERPL-MCNC: 122 MG/DL (ref 70–99)
IRON BINDING CAPACITY (ROCHE): 238 UG/DL (ref 240–430)
IRON SATN MFR SERPL: 32 % (ref 15–46)
IRON SERPL-MCNC: 77 UG/DL (ref 61–157)
ORGAN: NORMAL
POTASSIUM SERPL-SCNC: 4.5 MMOL/L (ref 3.4–5.3)
SA 1  COMMENTS: NORMAL
SA 1 CELL: NORMAL
SA 1 TEST METHOD: NORMAL
SA 2 CELL: NORMAL
SA 2 COMMENTS: NORMAL
SA 2 TEST METHOD: NORMAL
SA1 HI RISK ABY: NORMAL
SA1 MOD RISK ABY: NORMAL
SA2 HI RISK ABY: NORMAL
SA2 MOD RISK ABY: NORMAL
SODIUM SERPL-SCNC: 139 MMOL/L (ref 135–145)
UNACCEPTABLE ANTIGENS: NORMAL
UNOS CPRA: 0

## 2024-05-23 ENCOUNTER — TELEPHONE (OUTPATIENT)
Dept: TRANSPLANT | Facility: CLINIC | Age: 24
End: 2024-05-23
Payer: COMMERCIAL

## 2024-05-23 DIAGNOSIS — Z94.0 KIDNEY REPLACED BY TRANSPLANT: Chronic | ICD-10-CM

## 2024-05-23 NOTE — TELEPHONE ENCOUNTER
Tacrolimus  level  6.4  slightly below goal level  8        PLAN:   Called Patient and confirm this was an accurate 12-hour trough.   Verify Tacrolimus IR dose 6 mg BID.   Confirm no new medications or or missed doses.   Confirm no new illness / infection / diarrhea.   If accurate trough and accurate dose, increase Tacrolimus IR dose to 7 mg BID          Increased tacrolimus  to tacrolimus  level 7 mg twice per day   Boogie verbalized understanding of dose change   Discussed Prednisone taper delay until the tacrolimus  closer to 8        ++++++++++++++++++++++++++++++++++++++++++++++    Holding pred wean until tacrolimus  level closer 8    slightly increase creatinine   Received: 2 days ago  Abhilash Raza MD  [facility]  Agree    +++++++++++++++++++++++++++++++++  ----- Message -----   From: Abhilash Raza MD   Sent: 5/9/2024   7:36 AM CDT     plan for prednisone             Keep tacrolimus goal at 8-10.  Decrease prednisone to 4 mg daily.  Will decrease prednisone by 1 mg monthly.

## 2024-05-24 RX ORDER — TACROLIMUS 1 MG/1
2 CAPSULE ORAL 2 TIMES DAILY
Qty: 120 CAPSULE | Refills: 11 | Status: SHIPPED | OUTPATIENT
Start: 2024-05-24 | End: 2024-05-29

## 2024-05-24 RX ORDER — TACROLIMUS 5 MG/1
5 CAPSULE ORAL 2 TIMES DAILY
Qty: 60 CAPSULE | Refills: 4 | Status: SHIPPED | OUTPATIENT
Start: 2024-05-24 | End: 2024-05-29

## 2024-05-25 LAB — MISCELLANEOUS TEST 1 (ARUP): ABNORMAL

## 2024-05-28 ENCOUNTER — LAB (OUTPATIENT)
Dept: LAB | Facility: CLINIC | Age: 24
End: 2024-05-28
Payer: COMMERCIAL

## 2024-05-28 DIAGNOSIS — Z94.0 KIDNEY REPLACED BY TRANSPLANT: Primary | ICD-10-CM

## 2024-05-28 LAB
ANION GAP SERPL CALCULATED.3IONS-SCNC: 12 MMOL/L (ref 7–15)
BASOPHILS # BLD MANUAL: 0 10E3/UL (ref 0–0.2)
BASOPHILS NFR BLD MANUAL: 0 %
BUN SERPL-MCNC: 27.5 MG/DL (ref 6–20)
CALCIUM SERPL-MCNC: 9.8 MG/DL (ref 8.6–10)
CHLORIDE SERPL-SCNC: 104 MMOL/L (ref 98–107)
CREAT SERPL-MCNC: 1.53 MG/DL (ref 0.67–1.17)
DEPRECATED HCO3 PLAS-SCNC: 22 MMOL/L (ref 22–29)
EGFRCR SERPLBLD CKD-EPI 2021: 65 ML/MIN/1.73M2
EOSINOPHIL # BLD MANUAL: 0.1 10E3/UL (ref 0–0.7)
EOSINOPHIL NFR BLD MANUAL: 5 %
ERYTHROCYTE [DISTWIDTH] IN BLOOD BY AUTOMATED COUNT: 12.3 % (ref 10–15)
GLUCOSE SERPL-MCNC: 103 MG/DL (ref 70–99)
HCT VFR BLD AUTO: 36.4 % (ref 40–53)
HGB BLD-MCNC: 11.9 G/DL (ref 13.3–17.7)
LYMPHOCYTES # BLD MANUAL: 0.7 10E3/UL (ref 0.8–5.3)
LYMPHOCYTES NFR BLD MANUAL: 28 %
Lab: NORMAL
MCH RBC QN AUTO: 30.9 PG (ref 26.5–33)
MCHC RBC AUTO-ENTMCNC: 32.7 G/DL (ref 31.5–36.5)
MCV RBC AUTO: 95 FL (ref 78–100)
METAMYELOCYTES # BLD MANUAL: 0.1 10E3/UL
METAMYELOCYTES NFR BLD MANUAL: 4 %
MONOCYTES # BLD MANUAL: 0.2 10E3/UL (ref 0–1.3)
MONOCYTES NFR BLD MANUAL: 6 %
MYELOCYTES # BLD MANUAL: 0.1 10E3/UL
MYELOCYTES NFR BLD MANUAL: 2 %
NEUTROPHILS # BLD MANUAL: 1.4 10E3/UL (ref 1.6–8.3)
NEUTROPHILS NFR BLD MANUAL: 55 %
NRBC # BLD AUTO: 0 10E3/UL
NRBC BLD AUTO-RTO: 0 /100
PERFORMING LABORATORY: NORMAL
PLAT MORPH BLD: ABNORMAL
PLATELET # BLD AUTO: 278 10E3/UL (ref 150–450)
POTASSIUM SERPL-SCNC: 4.6 MMOL/L (ref 3.4–5.3)
RBC # BLD AUTO: 3.85 10E6/UL (ref 4.4–5.9)
RBC MORPH BLD: ABNORMAL
SODIUM SERPL-SCNC: 138 MMOL/L (ref 135–145)
SPECIMEN STATUS: NORMAL
TEST NAME: NORMAL
VARIANT LYMPHS BLD QL SMEAR: PRESENT
WBC # BLD AUTO: 2.5 10E3/UL (ref 4–11)

## 2024-05-28 PROCEDURE — 80180 DRUG SCRN QUAN MYCOPHENOLATE: CPT

## 2024-05-28 PROCEDURE — 85025 COMPLETE CBC W/AUTO DIFF WBC: CPT

## 2024-05-28 PROCEDURE — 80048 BASIC METABOLIC PNL TOTAL CA: CPT

## 2024-05-28 PROCEDURE — 36415 COLL VENOUS BLD VENIPUNCTURE: CPT

## 2024-05-28 PROCEDURE — 80197 ASSAY OF TACROLIMUS: CPT

## 2024-05-29 DIAGNOSIS — Z94.0 KIDNEY REPLACED BY TRANSPLANT: Primary | Chronic | ICD-10-CM

## 2024-05-29 LAB
TACROLIMUS BLD-MCNC: 7.4 UG/L (ref 5–15)
TME LAST DOSE: NORMAL H
TME LAST DOSE: NORMAL H

## 2024-05-29 RX ORDER — TACROLIMUS 1 MG/1
3 CAPSULE ORAL 2 TIMES DAILY
Qty: 180 CAPSULE | Refills: 11 | Status: SHIPPED | OUTPATIENT
Start: 2024-05-29 | End: 2024-06-07

## 2024-05-29 RX ORDER — TACROLIMUS 5 MG/1
5 CAPSULE ORAL 2 TIMES DAILY
Qty: 60 CAPSULE | Refills: 11 | Status: SHIPPED | OUTPATIENT
Start: 2024-05-29 | End: 2024-06-07

## 2024-05-29 NOTE — TELEPHONE ENCOUNTER
Patient confirmed this was an accurate 12-hour trough.   Verified Tacrolimus IR dose  7 mg BID.   Confirmed no new medications or or missed doses.   Confirmed no new illness / infection / diarrhea.   Patient confirms increase Tacrolimus XR dose to 8 mg BID and repeat labs in 1 week.

## 2024-05-29 NOTE — TELEPHONE ENCOUNTER
Issue tacrolimus  7.4   below goal level       PLAN:   Call Patient and confirm this was an accurate 12-hour trough.   Verify Tacrolimus IR dose  7 mg BID.   Confirm no new medications or or missed doses.   Confirm no new illness / infection / diarrhea.   If accurate trough and accurate dose, increase Tacrolimus XR dose to  8 mg BID     Is this more than a 50% increase or decrease in current IS dose: No      Repeat labs in one week

## 2024-06-01 LAB — MISCELLANEOUS TEST 1 (ARUP): ABNORMAL

## 2024-06-04 DIAGNOSIS — I12.9 HYPERTENSION, RENAL: ICD-10-CM

## 2024-06-04 DIAGNOSIS — N18.5 CKD (CHRONIC KIDNEY DISEASE) STAGE 5, GFR LESS THAN 15 ML/MIN (H): ICD-10-CM

## 2024-06-04 DIAGNOSIS — Z94.0 KIDNEY REPLACED BY TRANSPLANT: Chronic | ICD-10-CM

## 2024-06-04 DIAGNOSIS — E87.20 METABOLIC ACIDOSIS: ICD-10-CM

## 2024-06-04 RX ORDER — SODIUM BICARBONATE 650 MG/1
1950 TABLET ORAL 3 TIMES DAILY
Qty: 270 TABLET | Refills: 11 | Status: SHIPPED | OUTPATIENT
Start: 2024-06-04

## 2024-06-04 RX ORDER — LOSARTAN POTASSIUM 25 MG/1
25 TABLET ORAL EVERY EVENING
Qty: 30 TABLET | Refills: 11 | Status: SHIPPED | OUTPATIENT
Start: 2024-06-04

## 2024-06-04 RX ORDER — AMLODIPINE BESYLATE 10 MG/1
10 TABLET ORAL DAILY
Qty: 30 TABLET | Refills: 11 | Status: SHIPPED | OUTPATIENT
Start: 2024-06-04

## 2024-06-04 RX ORDER — VALGANCICLOVIR 450 MG/1
900 TABLET, FILM COATED ORAL DAILY
Qty: 60 TABLET | Refills: 0 | Status: SHIPPED | OUTPATIENT
Start: 2024-06-04 | End: 2024-07-01

## 2024-06-05 ENCOUNTER — LAB (OUTPATIENT)
Dept: LAB | Facility: CLINIC | Age: 24
End: 2024-06-05
Payer: COMMERCIAL

## 2024-06-05 DIAGNOSIS — Z94.0 KIDNEY REPLACED BY TRANSPLANT: Primary | ICD-10-CM

## 2024-06-05 LAB
BASOPHILS # BLD AUTO: 0 10E3/UL (ref 0–0.2)
BASOPHILS NFR BLD AUTO: 0 %
EOSINOPHIL # BLD AUTO: 0.1 10E3/UL (ref 0–0.7)
EOSINOPHIL NFR BLD AUTO: 4 %
ERYTHROCYTE [DISTWIDTH] IN BLOOD BY AUTOMATED COUNT: 12.3 % (ref 10–15)
HCT VFR BLD AUTO: 36 % (ref 40–53)
HGB BLD-MCNC: 11.9 G/DL (ref 13.3–17.7)
IMM GRANULOCYTES # BLD: 0.1 10E3/UL
IMM GRANULOCYTES NFR BLD: 3 %
LYMPHOCYTES # BLD AUTO: 0.6 10E3/UL (ref 0.8–5.3)
LYMPHOCYTES NFR BLD AUTO: 20 %
Lab: NORMAL
MCH RBC QN AUTO: 30.8 PG (ref 26.5–33)
MCHC RBC AUTO-ENTMCNC: 33.1 G/DL (ref 31.5–36.5)
MCV RBC AUTO: 93 FL (ref 78–100)
MONOCYTES # BLD AUTO: 0.3 10E3/UL (ref 0–1.3)
MONOCYTES NFR BLD AUTO: 9 %
NEUTROPHILS # BLD AUTO: 2.1 10E3/UL (ref 1.6–8.3)
NEUTROPHILS NFR BLD AUTO: 65 %
PERFORMING LABORATORY: NORMAL
PLATELET # BLD AUTO: 253 10E3/UL (ref 150–450)
RBC # BLD AUTO: 3.86 10E6/UL (ref 4.4–5.9)
TACROLIMUS BLD-MCNC: 11.6 UG/L (ref 5–15)
TEST NAME: NORMAL
TME LAST DOSE: NORMAL H
TME LAST DOSE: NORMAL H
WBC # BLD AUTO: 3.2 10E3/UL (ref 4–11)

## 2024-06-05 PROCEDURE — 80180 DRUG SCRN QUAN MYCOPHENOLATE: CPT

## 2024-06-05 PROCEDURE — 80048 BASIC METABOLIC PNL TOTAL CA: CPT

## 2024-06-05 PROCEDURE — 85025 COMPLETE CBC W/AUTO DIFF WBC: CPT

## 2024-06-05 PROCEDURE — 36415 COLL VENOUS BLD VENIPUNCTURE: CPT

## 2024-06-05 PROCEDURE — 80197 ASSAY OF TACROLIMUS: CPT

## 2024-06-06 DIAGNOSIS — Z94.0 KIDNEY REPLACED BY TRANSPLANT: Primary | Chronic | ICD-10-CM

## 2024-06-06 LAB
ANION GAP SERPL CALCULATED.3IONS-SCNC: 15 MMOL/L (ref 7–15)
BUN SERPL-MCNC: 19.4 MG/DL (ref 6–20)
CALCIUM SERPL-MCNC: 10 MG/DL (ref 8.6–10)
CHLORIDE SERPL-SCNC: 104 MMOL/L (ref 98–107)
CREAT SERPL-MCNC: 1.69 MG/DL (ref 0.67–1.17)
DEPRECATED HCO3 PLAS-SCNC: 19 MMOL/L (ref 22–29)
EGFRCR SERPLBLD CKD-EPI 2021: 57 ML/MIN/1.73M2
GLUCOSE SERPL-MCNC: 100 MG/DL (ref 70–99)
POTASSIUM SERPL-SCNC: 4.7 MMOL/L (ref 3.4–5.3)
SODIUM SERPL-SCNC: 138 MMOL/L (ref 135–145)

## 2024-06-06 NOTE — TELEPHONE ENCOUNTER
ISSUE:  Tacrolimus 11.6, goal 8-10    PLAN:  Call and confirm a 12 hour trough and current tacrolimus dose of 8 mg twice daily.    Any recent illness, diarrhea, or medication changes?  If an accurate level, decrease dose to 7.5 mg twice daily and repeat level in 1-2 weeks     LPN TASK:  Call with above  Update rx   Update lab order

## 2024-06-06 NOTE — LETTER
PHYSICIAN ORDERS      DATE & TIME ISSUED: 2024 7:08 AM  PATIENT NAME: Boogie Villa   : 2000     [facility] MR# [if applicable]: [id number]     DIAGNOSIS:  ***  ICD-10 CODE: ***     ***      Any questions please call: ***    Please fax each result same day as resulted/available    Critical lab results page [phone]    { TRANSPLANT DEPT FAXES:[id number]}.    {[facility] TRANSPLANT MD SIGNATURE:[id number]}

## 2024-06-06 NOTE — TELEPHONE ENCOUNTER
[employer] message sent to patient regarding:  Tacrolimus 11.6, goal 8-10     PLAN:  Call and confirm a 12 hour trough and current tacrolimus dose of 8 mg twice daily.    Any recent illness, diarrhea, or medication changes?  If an accurate level, decrease dose to 7.5 mg twice daily and repeat level in 1-2 weeks

## 2024-06-06 NOTE — TELEPHONE ENCOUNTER
Left message for patient regarding:  Tacrolimus 11.6, goal 8-10     PLAN:  Call and confirm a 12 hour trough and current tacrolimus dose of 8 mg twice daily.    Any recent illness, diarrhea, or medication changes?  If an accurate level, decrease dose to 7.5 mg twice daily and repeat level in 1-2 weeks

## 2024-06-07 DIAGNOSIS — Z94.0 KIDNEY REPLACED BY TRANSPLANT: Primary | Chronic | ICD-10-CM

## 2024-06-07 RX ORDER — TACROLIMUS 1 MG/1
2 CAPSULE ORAL 2 TIMES DAILY
Qty: 120 CAPSULE | Refills: 11 | Status: SHIPPED | OUTPATIENT
Start: 2024-06-07 | End: 2024-06-07

## 2024-06-07 RX ORDER — TACROLIMUS 1 MG/1
2 CAPSULE ORAL 2 TIMES DAILY
Qty: 120 CAPSULE | Refills: 11 | Status: SHIPPED | OUTPATIENT
Start: 2024-06-07 | End: 2024-06-21

## 2024-06-07 RX ORDER — TACROLIMUS 0.5 MG/1
0.5 CAPSULE ORAL 2 TIMES DAILY
Qty: 60 CAPSULE | Refills: 11 | Status: SHIPPED | OUTPATIENT
Start: 2024-06-07 | End: 2024-06-07

## 2024-06-07 RX ORDER — TACROLIMUS 0.5 MG/1
0.5 CAPSULE ORAL 2 TIMES DAILY
Qty: 60 CAPSULE | Refills: 11 | Status: SHIPPED | OUTPATIENT
Start: 2024-06-07 | End: 2024-06-21

## 2024-06-07 RX ORDER — TACROLIMUS 5 MG/1
5 CAPSULE ORAL 2 TIMES DAILY
Qty: 60 CAPSULE | Refills: 11 | Status: SHIPPED | OUTPATIENT
Start: 2024-06-07 | End: 2024-06-07

## 2024-06-07 RX ORDER — TACROLIMUS 5 MG/1
5 CAPSULE ORAL 2 TIMES DAILY
Qty: 60 CAPSULE | Refills: 11 | Status: SHIPPED | OUTPATIENT
Start: 2024-06-07 | End: 2024-06-21

## 2024-06-07 NOTE — TELEPHONE ENCOUNTER
confirmed a 12 hour trough and current tacrolimus dose of 8 mg twice daily.    Patient denies any recent illness, diarrhea, or medication changes.  Confirmed decrease dose to 7.5 mg twice daily and repeat level in 1-2 weeks

## 2024-06-09 LAB — MISCELLANEOUS TEST 1 (ARUP): NORMAL

## 2024-06-13 ENCOUNTER — LAB (OUTPATIENT)
Dept: LAB | Facility: CLINIC | Age: 24
End: 2024-06-13
Payer: COMMERCIAL

## 2024-06-13 DIAGNOSIS — Z94.0 KIDNEY TRANSPLANTED: ICD-10-CM

## 2024-06-13 DIAGNOSIS — Z98.890 OTHER SPECIFIED POSTPROCEDURAL STATES: ICD-10-CM

## 2024-06-13 DIAGNOSIS — Z20.828 CONTACT WITH AND (SUSPECTED) EXPOSURE TO OTHER VIRAL COMMUNICABLE DISEASES: ICD-10-CM

## 2024-06-13 DIAGNOSIS — Z79.899 ENCOUNTER FOR LONG-TERM CURRENT USE OF MEDICATION: ICD-10-CM

## 2024-06-13 DIAGNOSIS — Z94.0 KIDNEY REPLACED BY TRANSPLANT: ICD-10-CM

## 2024-06-13 DIAGNOSIS — Z48.298 AFTERCARE FOLLOWING ORGAN TRANSPLANT: ICD-10-CM

## 2024-06-13 LAB
BASOPHILS # BLD AUTO: 0 10E3/UL (ref 0–0.2)
BASOPHILS NFR BLD AUTO: 1 %
EOSINOPHIL # BLD AUTO: 0.1 10E3/UL (ref 0–0.7)
EOSINOPHIL NFR BLD AUTO: 3 %
ERYTHROCYTE [DISTWIDTH] IN BLOOD BY AUTOMATED COUNT: 12.8 % (ref 10–15)
HCT VFR BLD AUTO: 35.5 % (ref 40–53)
HGB BLD-MCNC: 11.8 G/DL (ref 13.3–17.7)
IMM GRANULOCYTES # BLD: 0 10E3/UL
IMM GRANULOCYTES NFR BLD: 1 %
LYMPHOCYTES # BLD AUTO: 0.7 10E3/UL (ref 0.8–5.3)
LYMPHOCYTES NFR BLD AUTO: 17 %
MCH RBC QN AUTO: 31.1 PG (ref 26.5–33)
MCHC RBC AUTO-ENTMCNC: 33.2 G/DL (ref 31.5–36.5)
MCV RBC AUTO: 93 FL (ref 78–100)
MONOCYTES # BLD AUTO: 0.3 10E3/UL (ref 0–1.3)
MONOCYTES NFR BLD AUTO: 7 %
NEUTROPHILS # BLD AUTO: 3 10E3/UL (ref 1.6–8.3)
NEUTROPHILS NFR BLD AUTO: 72 %
PLATELET # BLD AUTO: 244 10E3/UL (ref 150–450)
RBC # BLD AUTO: 3.8 10E6/UL (ref 4.4–5.9)
TACROLIMUS BLD-MCNC: 9.2 UG/L (ref 5–15)
TME LAST DOSE: NORMAL H
TME LAST DOSE: NORMAL H
WBC # BLD AUTO: 4.2 10E3/UL (ref 4–11)

## 2024-06-13 PROCEDURE — 86833 HLA CLASS II HIGH DEFIN QUAL: CPT

## 2024-06-13 PROCEDURE — 84100 ASSAY OF PHOSPHORUS: CPT

## 2024-06-13 PROCEDURE — 36415 COLL VENOUS BLD VENIPUNCTURE: CPT

## 2024-06-13 PROCEDURE — 85025 COMPLETE CBC W/AUTO DIFF WBC: CPT

## 2024-06-13 PROCEDURE — 80197 ASSAY OF TACROLIMUS: CPT

## 2024-06-13 PROCEDURE — 83735 ASSAY OF MAGNESIUM: CPT

## 2024-06-13 PROCEDURE — 86832 HLA CLASS I HIGH DEFIN QUAL: CPT

## 2024-06-13 PROCEDURE — 87799 DETECT AGENT NOS DNA QUANT: CPT

## 2024-06-13 PROCEDURE — 80048 BASIC METABOLIC PNL TOTAL CA: CPT

## 2024-06-14 LAB
ANION GAP SERPL CALCULATED.3IONS-SCNC: 11 MMOL/L (ref 7–15)
BUN SERPL-MCNC: 23.5 MG/DL (ref 6–20)
CALCIUM SERPL-MCNC: 9.9 MG/DL (ref 8.6–10)
CHLORIDE SERPL-SCNC: 107 MMOL/L (ref 98–107)
CMV DNA SPEC NAA+PROBE-ACNC: NOT DETECTED IU/ML
CREAT SERPL-MCNC: 1.72 MG/DL (ref 0.67–1.17)
DEPRECATED HCO3 PLAS-SCNC: 22 MMOL/L (ref 22–29)
EBV DNA SERPL NAA+PROBE-ACNC: <35 IU/ML
EBV DNA SERPL NAA+PROBE-LOG#: <1.5 {LOG_COPIES}/ML
EGFRCR SERPLBLD CKD-EPI 2021: 56 ML/MIN/1.73M2
GLUCOSE SERPL-MCNC: 102 MG/DL (ref 70–99)
MAGNESIUM SERPL-MCNC: 1.7 MG/DL (ref 1.7–2.3)
PHOSPHATE SERPL-MCNC: 2.7 MG/DL (ref 2.5–4.5)
POTASSIUM SERPL-SCNC: 4.8 MMOL/L (ref 3.4–5.3)
SODIUM SERPL-SCNC: 140 MMOL/L (ref 135–145)

## 2024-06-19 ENCOUNTER — LAB (OUTPATIENT)
Dept: LAB | Facility: CLINIC | Age: 24
End: 2024-06-19
Payer: COMMERCIAL

## 2024-06-19 DIAGNOSIS — Z94.0 KIDNEY REPLACED BY TRANSPLANT: ICD-10-CM

## 2024-06-19 LAB
ANION GAP SERPL CALCULATED.3IONS-SCNC: 12 MMOL/L (ref 7–15)
BASOPHILS # BLD AUTO: 0 10E3/UL (ref 0–0.2)
BASOPHILS NFR BLD AUTO: 0 %
BUN SERPL-MCNC: 22 MG/DL (ref 6–20)
CALCIUM SERPL-MCNC: 9.9 MG/DL (ref 8.6–10)
CHLORIDE SERPL-SCNC: 106 MMOL/L (ref 98–107)
CREAT SERPL-MCNC: 1.59 MG/DL (ref 0.67–1.17)
DEPRECATED HCO3 PLAS-SCNC: 21 MMOL/L (ref 22–29)
EGFRCR SERPLBLD CKD-EPI 2021: 62 ML/MIN/1.73M2
EOSINOPHIL # BLD AUTO: 0.1 10E3/UL (ref 0–0.7)
EOSINOPHIL NFR BLD AUTO: 3 %
ERYTHROCYTE [DISTWIDTH] IN BLOOD BY AUTOMATED COUNT: 12.8 % (ref 10–15)
GLUCOSE SERPL-MCNC: 111 MG/DL (ref 70–99)
HCT VFR BLD AUTO: 35.1 % (ref 40–53)
HGB BLD-MCNC: 11.6 G/DL (ref 13.3–17.7)
IMM GRANULOCYTES # BLD: 0.1 10E3/UL
IMM GRANULOCYTES NFR BLD: 1 %
LYMPHOCYTES # BLD AUTO: 0.6 10E3/UL (ref 0.8–5.3)
LYMPHOCYTES NFR BLD AUTO: 13 %
MCH RBC QN AUTO: 30.9 PG (ref 26.5–33)
MCHC RBC AUTO-ENTMCNC: 33 G/DL (ref 31.5–36.5)
MCV RBC AUTO: 93 FL (ref 78–100)
MONOCYTES # BLD AUTO: 0.3 10E3/UL (ref 0–1.3)
MONOCYTES NFR BLD AUTO: 6 %
NEUTROPHILS # BLD AUTO: 3.6 10E3/UL (ref 1.6–8.3)
NEUTROPHILS NFR BLD AUTO: 77 %
PLATELET # BLD AUTO: 260 10E3/UL (ref 150–450)
POTASSIUM SERPL-SCNC: 4.2 MMOL/L (ref 3.4–5.3)
RBC # BLD AUTO: 3.76 10E6/UL (ref 4.4–5.9)
SODIUM SERPL-SCNC: 139 MMOL/L (ref 135–145)
TACROLIMUS BLD-MCNC: 5.5 UG/L (ref 5–15)
TME LAST DOSE: NORMAL H
TME LAST DOSE: NORMAL H
WBC # BLD AUTO: 4.7 10E3/UL (ref 4–11)

## 2024-06-19 PROCEDURE — 36415 COLL VENOUS BLD VENIPUNCTURE: CPT

## 2024-06-19 PROCEDURE — 85025 COMPLETE CBC W/AUTO DIFF WBC: CPT

## 2024-06-19 PROCEDURE — 80197 ASSAY OF TACROLIMUS: CPT

## 2024-06-19 PROCEDURE — 80048 BASIC METABOLIC PNL TOTAL CA: CPT

## 2024-06-21 ENCOUNTER — TELEPHONE (OUTPATIENT)
Dept: TRANSPLANT | Facility: CLINIC | Age: 24
End: 2024-06-21
Payer: COMMERCIAL

## 2024-06-21 DIAGNOSIS — Z94.0 KIDNEY REPLACED BY TRANSPLANT: Chronic | ICD-10-CM

## 2024-06-21 DIAGNOSIS — E83.42 HYPOMAGNESEMIA: ICD-10-CM

## 2024-06-21 RX ORDER — TACROLIMUS 5 MG/1
5 CAPSULE ORAL 2 TIMES DAILY
Qty: 60 CAPSULE | Refills: 11 | Status: SHIPPED | OUTPATIENT
Start: 2024-06-21 | End: 2024-09-24

## 2024-06-21 RX ORDER — TACROLIMUS 1 MG/1
2 CAPSULE ORAL 2 TIMES DAILY
Qty: 120 CAPSULE | Refills: 11 | Status: CANCELLED | OUTPATIENT
Start: 2024-06-21

## 2024-06-21 RX ORDER — MAGNESIUM OXIDE 400 MG/1
800 TABLET ORAL
Qty: 60 TABLET | Refills: 3 | Status: SHIPPED | OUTPATIENT
Start: 2024-06-21

## 2024-06-21 RX ORDER — TACROLIMUS 1 MG/1
3 CAPSULE ORAL 2 TIMES DAILY
Qty: 180 CAPSULE | Refills: 11 | Status: SHIPPED | OUTPATIENT
Start: 2024-06-21 | End: 2024-09-24

## 2024-06-21 NOTE — TELEPHONE ENCOUNTER
Issue tacrolimus  5.5   ]    PLAN:   Call Patient and confirm this was an accurate 12-hour trough.   Verify Tacrolimus IR dose 7.5 mg BID.   Confirm no new medications or or missed doses.   Confirm no new illness / infection / diarrhea.   If accurate trough and accurate dose, increase Tacrolimus IR dose to 8 mg BID     Is this more than a 50% increase or decrease in current IS dose: No    *If > 50% change in immunosuppression dose, repeat labs in 1 week.         Call Boogie reviewed dose change   Plan to follow up with Dr Raza first week of July

## 2024-06-24 ENCOUNTER — LAB (OUTPATIENT)
Dept: LAB | Facility: CLINIC | Age: 24
End: 2024-06-24
Payer: COMMERCIAL

## 2024-06-24 DIAGNOSIS — Z48.298 AFTERCARE FOLLOWING ORGAN TRANSPLANT: ICD-10-CM

## 2024-06-24 DIAGNOSIS — Z98.890 OTHER SPECIFIED POSTPROCEDURAL STATES: ICD-10-CM

## 2024-06-24 DIAGNOSIS — Z94.0 KIDNEY REPLACED BY TRANSPLANT: ICD-10-CM

## 2024-06-24 DIAGNOSIS — Z79.899 ENCOUNTER FOR LONG-TERM CURRENT USE OF MEDICATION: ICD-10-CM

## 2024-06-24 DIAGNOSIS — Z20.828 CONTACT WITH AND (SUSPECTED) EXPOSURE TO OTHER VIRAL COMMUNICABLE DISEASES: ICD-10-CM

## 2024-06-24 LAB
ANION GAP SERPL CALCULATED.3IONS-SCNC: 11 MMOL/L (ref 7–15)
BUN SERPL-MCNC: 21.9 MG/DL (ref 6–20)
CALCIUM SERPL-MCNC: 9.8 MG/DL (ref 8.6–10)
CHLORIDE SERPL-SCNC: 106 MMOL/L (ref 98–107)
CREAT SERPL-MCNC: 1.65 MG/DL (ref 0.67–1.17)
DEPRECATED HCO3 PLAS-SCNC: 22 MMOL/L (ref 22–29)
EGFRCR SERPLBLD CKD-EPI 2021: 59 ML/MIN/1.73M2
ERYTHROCYTE [DISTWIDTH] IN BLOOD BY AUTOMATED COUNT: 13.1 % (ref 10–15)
GLUCOSE SERPL-MCNC: 105 MG/DL (ref 70–99)
HCT VFR BLD AUTO: 36.1 % (ref 40–53)
HGB BLD-MCNC: 11.8 G/DL (ref 13.3–17.7)
MCH RBC QN AUTO: 30.9 PG (ref 26.5–33)
MCHC RBC AUTO-ENTMCNC: 32.7 G/DL (ref 31.5–36.5)
MCV RBC AUTO: 95 FL (ref 78–100)
PLATELET # BLD AUTO: 251 10E3/UL (ref 150–450)
POTASSIUM SERPL-SCNC: 4.7 MMOL/L (ref 3.4–5.3)
RBC # BLD AUTO: 3.82 10E6/UL (ref 4.4–5.9)
SODIUM SERPL-SCNC: 139 MMOL/L (ref 135–145)
TACROLIMUS BLD-MCNC: 9.1 UG/L (ref 5–15)
TME LAST DOSE: NORMAL H
TME LAST DOSE: NORMAL H
WBC # BLD AUTO: 5.2 10E3/UL (ref 4–11)

## 2024-06-24 PROCEDURE — 87799 DETECT AGENT NOS DNA QUANT: CPT

## 2024-06-24 PROCEDURE — 85027 COMPLETE CBC AUTOMATED: CPT

## 2024-06-24 PROCEDURE — 36415 COLL VENOUS BLD VENIPUNCTURE: CPT

## 2024-06-24 PROCEDURE — 80197 ASSAY OF TACROLIMUS: CPT

## 2024-06-24 PROCEDURE — 80048 BASIC METABOLIC PNL TOTAL CA: CPT

## 2024-06-25 LAB
BK VIRUS SPECIMEN TYPE: NORMAL
BKV DNA # SPEC NAA+PROBE: NOT DETECTED IU/ML
DONOR IDENTIFICATION: NORMAL
DSA COMMENTS: NORMAL
DSA PRESENT: NO
DSA TEST METHOD: NORMAL
ORGAN: NORMAL
SA 1  COMMENTS: NORMAL
SA 1 CELL: NORMAL
SA 1 TEST METHOD: NORMAL
SA 2 CELL: NORMAL
SA 2 COMMENTS: NORMAL
SA 2 TEST METHOD: NORMAL
SA1 HI RISK ABY: NORMAL
SA1 MOD RISK ABY: NORMAL
SA2 HI RISK ABY: NORMAL
SA2 MOD RISK ABY: NORMAL
UNACCEPTABLE ANTIGENS: NORMAL
UNOS CPRA: 0

## 2024-07-01 ENCOUNTER — TELEPHONE (OUTPATIENT)
Dept: TRANSPLANT | Facility: CLINIC | Age: 24
End: 2024-07-01

## 2024-07-01 ENCOUNTER — OFFICE VISIT (OUTPATIENT)
Dept: TRANSPLANT | Facility: CLINIC | Age: 24
End: 2024-07-01
Attending: INTERNAL MEDICINE
Payer: COMMERCIAL

## 2024-07-01 ENCOUNTER — LAB (OUTPATIENT)
Dept: LAB | Facility: CLINIC | Age: 24
End: 2024-07-01
Attending: INTERNAL MEDICINE
Payer: COMMERCIAL

## 2024-07-01 VITALS
WEIGHT: 315 LBS | DIASTOLIC BLOOD PRESSURE: 82 MMHG | BODY MASS INDEX: 43.93 KG/M2 | TEMPERATURE: 97.6 F | SYSTOLIC BLOOD PRESSURE: 135 MMHG | HEART RATE: 85 BPM | OXYGEN SATURATION: 97 %

## 2024-07-01 DIAGNOSIS — E87.20 METABOLIC ACIDOSIS: ICD-10-CM

## 2024-07-01 DIAGNOSIS — Z48.298 AFTERCARE FOLLOWING ORGAN TRANSPLANT: ICD-10-CM

## 2024-07-01 DIAGNOSIS — E66.9 OBESITY, UNSPECIFIED CLASSIFICATION, UNSPECIFIED OBESITY TYPE, UNSPECIFIED WHETHER SERIOUS COMORBIDITY PRESENT: Primary | ICD-10-CM

## 2024-07-01 DIAGNOSIS — N18.31 STAGE 3A CHRONIC KIDNEY DISEASE (H): ICD-10-CM

## 2024-07-01 DIAGNOSIS — B27.00 EBV (EPSTEIN-BARR VIRUS) VIREMIA: ICD-10-CM

## 2024-07-01 DIAGNOSIS — E66.01 CLASS 2 SEVERE OBESITY DUE TO EXCESS CALORIES WITH SERIOUS COMORBIDITY AND BODY MASS INDEX (BMI) OF 39.0 TO 39.9 IN ADULT (H): ICD-10-CM

## 2024-07-01 DIAGNOSIS — I15.1 HTN, KIDNEY TRANSPLANT RELATED: ICD-10-CM

## 2024-07-01 DIAGNOSIS — E55.9 VITAMIN D DEFICIENCY: ICD-10-CM

## 2024-07-01 DIAGNOSIS — Z98.890 OTHER SPECIFIED POSTPROCEDURAL STATES: ICD-10-CM

## 2024-07-01 DIAGNOSIS — D84.9 IMMUNOSUPPRESSED STATUS (H): Chronic | ICD-10-CM

## 2024-07-01 DIAGNOSIS — Z94.0 KIDNEY REPLACED BY TRANSPLANT: Primary | ICD-10-CM

## 2024-07-01 DIAGNOSIS — Z94.0 HTN, KIDNEY TRANSPLANT RELATED: ICD-10-CM

## 2024-07-01 DIAGNOSIS — Z79.899 ENCOUNTER FOR LONG-TERM CURRENT USE OF MEDICATION: ICD-10-CM

## 2024-07-01 DIAGNOSIS — N18.31 ANEMIA IN STAGE 3A CHRONIC KIDNEY DISEASE (H): ICD-10-CM

## 2024-07-01 DIAGNOSIS — E83.42 HYPOMAGNESEMIA: ICD-10-CM

## 2024-07-01 DIAGNOSIS — D63.1 ANEMIA IN STAGE 3A CHRONIC KIDNEY DISEASE (H): ICD-10-CM

## 2024-07-01 DIAGNOSIS — E66.812 CLASS 2 SEVERE OBESITY DUE TO EXCESS CALORIES WITH SERIOUS COMORBIDITY AND BODY MASS INDEX (BMI) OF 39.0 TO 39.9 IN ADULT (H): ICD-10-CM

## 2024-07-01 DIAGNOSIS — Z94.0 KIDNEY REPLACED BY TRANSPLANT: ICD-10-CM

## 2024-07-01 DIAGNOSIS — Z20.828 CONTACT WITH AND (SUSPECTED) EXPOSURE TO OTHER VIRAL COMMUNICABLE DISEASES: ICD-10-CM

## 2024-07-01 DIAGNOSIS — Z29.89 NEED FOR PNEUMOCYSTIS PROPHYLAXIS: ICD-10-CM

## 2024-07-01 LAB
ANION GAP SERPL CALCULATED.3IONS-SCNC: 11 MMOL/L (ref 7–15)
BK VIRUS SPECIMEN TYPE: NORMAL
BKV DNA # SPEC NAA+PROBE: NOT DETECTED IU/ML
BUN SERPL-MCNC: 22.4 MG/DL (ref 6–20)
CALCIUM SERPL-MCNC: 10 MG/DL (ref 8.6–10)
CHLORIDE SERPL-SCNC: 106 MMOL/L (ref 98–107)
CREAT SERPL-MCNC: 1.58 MG/DL (ref 0.67–1.17)
DEPRECATED HCO3 PLAS-SCNC: 23 MMOL/L (ref 22–29)
EGFRCR SERPLBLD CKD-EPI 2021: 62 ML/MIN/1.73M2
ERYTHROCYTE [DISTWIDTH] IN BLOOD BY AUTOMATED COUNT: 13.2 % (ref 10–15)
GLUCOSE SERPL-MCNC: 102 MG/DL (ref 70–99)
HCT VFR BLD AUTO: 36.1 % (ref 40–53)
HGB BLD-MCNC: 11.9 G/DL (ref 13.3–17.7)
MAGNESIUM SERPL-MCNC: 1.4 MG/DL (ref 1.7–2.3)
MCH RBC QN AUTO: 30.8 PG (ref 26.5–33)
MCHC RBC AUTO-ENTMCNC: 33 G/DL (ref 31.5–36.5)
MCV RBC AUTO: 94 FL (ref 78–100)
PHOSPHATE SERPL-MCNC: 2.5 MG/DL (ref 2.5–4.5)
PLATELET # BLD AUTO: 279 10E3/UL (ref 150–450)
POTASSIUM SERPL-SCNC: 4.7 MMOL/L (ref 3.4–5.3)
RBC # BLD AUTO: 3.86 10E6/UL (ref 4.4–5.9)
SODIUM SERPL-SCNC: 140 MMOL/L (ref 135–145)
TACROLIMUS BLD-MCNC: 7.2 UG/L (ref 5–15)
TME LAST DOSE: NORMAL H
TME LAST DOSE: NORMAL H
WBC # BLD AUTO: 5.4 10E3/UL (ref 4–11)

## 2024-07-01 PROCEDURE — 99215 OFFICE O/P EST HI 40 MIN: CPT | Performed by: INTERNAL MEDICINE

## 2024-07-01 PROCEDURE — G0463 HOSPITAL OUTPT CLINIC VISIT: HCPCS | Performed by: INTERNAL MEDICINE

## 2024-07-01 PROCEDURE — 80048 BASIC METABOLIC PNL TOTAL CA: CPT | Performed by: PATHOLOGY

## 2024-07-01 PROCEDURE — 99000 SPECIMEN HANDLING OFFICE-LAB: CPT | Performed by: PATHOLOGY

## 2024-07-01 PROCEDURE — 87799 DETECT AGENT NOS DNA QUANT: CPT | Performed by: INTERNAL MEDICINE

## 2024-07-01 PROCEDURE — 80197 ASSAY OF TACROLIMUS: CPT | Performed by: INTERNAL MEDICINE

## 2024-07-01 PROCEDURE — 36415 COLL VENOUS BLD VENIPUNCTURE: CPT | Performed by: PATHOLOGY

## 2024-07-01 PROCEDURE — G2211 COMPLEX E/M VISIT ADD ON: HCPCS | Performed by: INTERNAL MEDICINE

## 2024-07-01 PROCEDURE — 83735 ASSAY OF MAGNESIUM: CPT | Performed by: PATHOLOGY

## 2024-07-01 PROCEDURE — 84100 ASSAY OF PHOSPHORUS: CPT | Performed by: PATHOLOGY

## 2024-07-01 PROCEDURE — 85027 COMPLETE CBC AUTOMATED: CPT | Performed by: PATHOLOGY

## 2024-07-01 PROCEDURE — 99213 OFFICE O/P EST LOW 20 MIN: CPT | Performed by: INTERNAL MEDICINE

## 2024-07-01 RX ORDER — PREDNISONE 1 MG/1
TABLET ORAL
Qty: 210 TABLET | Refills: 1 | Status: SHIPPED | OUTPATIENT
Start: 2024-07-01 | End: 2024-08-30

## 2024-07-01 ASSESSMENT — PAIN SCALES - GENERAL: PAINLEVEL: NO PAIN (0)

## 2024-07-01 NOTE — LETTER
7/1/2024      Boogie Villa  [address] [place] [id number]      Dear Colleague,    Thank you for referring your patient, Boogie Villa, to the [facility]PL[facility]. Please see a copy of my visit note below.    TRANSPLANT NEPHROLOGY CLINIC VISIT     Assessment & Plan  # LDKT: CKD Stage 3a - Stable   - Baseline Creatinine: ~ 1.5-1.8   - Proteinuria: Moderate (1-3 grams), but checked at times of ROSI   - DSA Hx: No DSA   - Last cPRA: 0%   - BK Viremia: No   - Kidney Tx Biopsy Hx: Acute cellular-mediated rejection and Moderate vascular changes.    # Immunosuppression: Tacrolimus immediate release (goal 6-8), Mycophenolate mofetil (dose 1000 mg every 12 hours), and Prednisone (dose 5 mg daily)   - Induction with Recent Transplant:  Low Intensity Protocol   - Continue with intensive monitoring of immunosuppression for efficacy and toxicity.   - Historical Changes in Immunosuppression:  Patient was started on prednisone due to h/o acute rejection.  However, with obesity and elevated blood glucose, will plan slow taper down of prednisone by 1 mg every month.  Patient can decrease to 4 mg now.   - Changes: Not at this time    # Infection Prevention:      - PJP: Sulfa/TMP (Bactrim)  - CMV: None, prophylaxis completed; Patient is CMV IgG Ab discordant (D+/R-) and completed Valcyte prophylaxis x 6 months.  Will check CMV PCR monthly until 12 months post transplant.  - CMV IgG Ab High Risk Discordance (D+/R-): Yes  - EBV IgG Ab High Risk Discordance (D+/R-): Yes    # Hypertension: Borderline control;  Goal BP: < 130/80   - Changes: Not at this time; Recommend patient check blood pressure at home on a regular basis, such as once every week or two, and follow up with transplant coordinator if above goal.    # Elevated Blood Glucose: Glucose generally running ~ 100-120s Last HbA1c: 5.7%    # Anemia in Chronic Renal Disease: Hgb: Stable      TOM: No   - Iron studies: Replete    # Mineral Bone Disorder:     - Secondary renal hyperparathyroidism; PTH level: Minimally elevated ( pg/ml)        On treatment: None  - Vitamin D; level: Low        On supplement: Yes  - Calcium; level: Normal        On supplement: No    # Electrolytes:   - Potassium; level: Normal        On supplement: No  - Magnesium; level: Normal        On supplement: Yes  - Bicarbonate; level: Normal        On supplement: Yes    # EBV Viremia: Trend down, minimal EBV PCR was detectable, but less than quantifiable with last check Jun/2024.  EBV IgG Ab negative and patient was EBV IgG Ab Discordance (D+/R-) at time of transplant.  IgG level has not been checked.   - Will continue to follow EBV PCR every monthly.    # Obesity, Class III (BMI = 44.0): Weight is up ~ 10 lbs.   - Recommend weight loss for overall health by increasing exercise and watching caloric intake.  - Patient may benefit from GLP1 agonists or SGLT2 inhibitors.  - Will refer patient to Weight Management Program and strongly encouraged him to make an appointment.    # Other Significant PMH:   - GERD: Asymptomatic and now off PPI.  - H/o Ureteral Reimplantation, s/p Mitrofanoff: Patient reports not using for Mitrofanoff in a couple of years and voids normally on his own.  Mitrofanoff is nonfunctional at this time.  - H/o Bilateral Uveitis: No evidence of systemic autoimmune/inflammatory disease at last Rheumatology.   - Sleep Disorder: Patient reports restless sleep and doesn't feel rested in the morning, although maybe a bit better lately.  He is morbidly obese and would be at risk of sleep apnea.  His father has sleep apnea.  - Will refer to Sleep Clinic for further evaluation.  - Depression/Anxiety: Patient reports mood is stable.  Continues on sertraline.  - Microcytic Lymphangioma: Patient has a large lesion on his back that bothers him, especially when sleeping.  Was seen by Dermatology and referred to surgery for removal.     # Skin Cancer Risk:    - Discussed sun protection  and recommend regular follow up with Dermatology.    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    Return visit: Return for previously scheduled visit.    Abhilash Raza MD    I spent a total of 45 minutes on the date of the encounter doing chart review, performing a history and physical exam, completing documentation and any further activities as noted above.    The longitudinal plan of care for the diagnosis(es)/condition(s) as documented were addressed during this visit. Due to the added complexity in care, I will continue to support Boogie in the subsequent management and with ongoing continuity of care.      Chief Complaint  Mr. Villa is a 24 year old here for kidney transplant and immunosuppression management.     History of Present Illness   Mr. Villa reports feeling good overall.  Since last clinic visit:   Hospitalizations: No   New Medical Issues: Yes, but still some issues with sleeping.  Also reports having a large lymphangioma on his back with plans to have it surgically removed.  Chest pain or shortness of breath: Yes; No chest pain, but some shortness of breath with exertion and feels deconditioned.  Lower extremity swelling: No  Weight change: Yes; Weight is up ~ 10 lbs.  Nausea and vomiting: No  Diarrhea: No  Heartburn symptoms: No; Now off pantoprazole.  Fever, sweats or chills: No  Urinary complaints: No    Home BP: Not checked    Problem List  Patient Active Problem List   Diagnosis     Lymphangioma     ADHD (attention deficit hyperactivity disorder)     Allergic rhinitis due to pollen     Anxiety     Congenital posterior urethral valves     Iron deficiency      Neurogenic bladder     Class 2 severe obesity due to excess calories with serious comorbidity and body mass index (BMI) of 39.0 to 39.9 in adult (H)     Sensorineural hearing loss, asymmetrical     HTN, kidney transplant related     Secondary renal hyperparathyroidism (H24)     Vitamin D deficiency     Kidney replaced by transplant     Immunosuppressed status (H24)     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     Stage 3a chronic kidney disease (H)     Aftercare following organ transplant     Need for pneumocystis prophylaxis     Anemia in chronic renal disease     Metabolic acidosis     Hypomagnesemia     Kidney transplant rejection     GERD (gastroesophageal reflux disease)     EBV (Dai-Barr virus) viremia     Pre-diabetes     Steroid-induced hyperglycemia     Sleep disorder       Allergies  Allergies   Allergen Reactions     Banana Itching     Raw banana; itchy mouth       Dust Mites      Runny nose and watery eyes     Mold      Runny nose and watery eyes     Nsaids Other (See Comments)     CKD     Other [Seasonal Allergies]      Grass, Ragweed - gets runny nose and watery eyes     Sulfa Antibiotics      PN: LW Reaction: GI Upset as an infant  12/28 Admission: Tolerated Bactrim       Medications  Current Outpatient Medications   Medication Sig Dispense Refill     acetaminophen (TYLENOL) 325 MG tablet Take 1-2 tablets (325-650 mg) by mouth every 4 hours as needed (For optimal non-opioid multimodal pain management to improve pain control.)       Alcohol Swabs PADS Use to swab the area of the injection or janny as directed Per insurance coverage 200 each 1     amLODIPine (NORVASC) 10 MG tablet Take 1 tablet (10 mg) by mouth daily 30 tablet 11     carvedilol (COREG) 12.5 MG tablet Take 1 tablet (12.5 mg) by mouth 2 times daily 180 tablet 3     cetirizine (ZYRTEC) 10 MG tablet Take 10 mg by mouth daily as needed for allergies (in the spring)       cholecalciferol (VITAMIN D3) 25 mcg (1000  units) capsule Take 1 capsule (25 mcg) by mouth daily       losartan (COZAAR) 25 MG tablet Take 1 tablet (25 mg) by mouth every evening 30 tablet 11     magnesium oxide (MAG-OX) 400 MG tablet Take 2 tablets (800 mg) by mouth daily (with lunch) 60 tablet 3     mycophenolate (GENERIC EQUIVALENT) 250 MG capsule Take 4 capsules (1,000 mg) by mouth 2 times daily 240 capsule 11     predniSONE (DELTASONE) 1 MG tablet Take 4 tablets (4 mg) by mouth daily for 30 days, THEN 3 tablets (3 mg) daily for 30 days. 210 tablet 1     sertraline (ZOLOFT) 25 MG tablet Take 100 mg by mouth daily       Sharps Container MISC Use as directed to dispose of needles, lancets and other sharps 1 each 1     sodium bicarbonate 650 MG tablet Take 3 tablets (1,950 mg) by mouth 3 times daily 270 tablet 11     sulfamethoxazole-trimethoprim (BACTRIM) 400-80 MG tablet Take 1 tablet by mouth daily 30 tablet 11     tacrolimus (GENERIC EQUIVALENT) 1 MG capsule Take 3 capsules (3 mg) by mouth 2 times daily Total dose = 8.0 mg twice daily 180 capsule 11     tacrolimus (GENERIC EQUIVALENT) 5 MG capsule Take 1 capsule (5 mg) by mouth 2 times daily Total dose = 8.0 mg twice daily 60 capsule 11     No current facility-administered medications for this visit.     Medications Discontinued During This Encounter   Medication Reason     blood glucose (NO BRAND SPECIFIED) lancets standard      blood glucose (NO BRAND SPECIFIED) test strip      blood glucose monitoring (FREESTYLE) lancets      blood glucose monitoring (NO BRAND SPECIFIED) meter device kit      insulin lispro (HUMALOG KWIKPEN) 100 UNIT/ML (1 unit dial) KWIKPEN      insulin lispro (HUMALOG KWIKPEN) 100 UNIT/ML (1 unit dial) KWIKPEN      insulin pen needle (32G X 4 MM) 32G X 4 MM miscellaneous      methylphenidate (RITALIN) 20 MG tablet      pantoprazole (PROTONIX) 40 MG EC tablet      pentamidine (NEBUPENT) 300 MG neb solution      predniSONE (DELTASONE) 5 MG tablet      valGANciclovir (VALCYTE) 450 MG  tablet        Physical Exam  Vital Signs: /82 (BP Location: Left arm, Patient Position: Sitting, Cuff Size: Adult Large)   Pulse 85   Temp 97.6  F (36.4  C) (Oral)   Wt 142.9 kg (315 lb)   SpO2 97%   BMI 43.93 kg/m      GENERAL APPEARANCE: alert and no distress  HENT: mouth without ulcers or lesions  RESP: lungs clear to auscultation - no rales, rhonchi or wheezes  CV: regular rhythm, normal rate, no rub, no murmur  EDEMA: no LE edema bilaterally  ABDOMEN: soft, nondistended, nontender, bowel sounds normal, morbidly obese  MS: extremities normal - no gross deformities noted, no evidence of inflammation in joints, no muscle tenderness  SKIN: no rash  TX KIDNEY: normal  DIALYSIS ACCESS:  None    Data        Latest Ref Rng & Units 6/24/2024    10:57 AM 6/19/2024    10:13 AM 6/13/2024    10:19 AM   Renal   Sodium 135 - 145 mmol/L 139  139  140    K 3.4 - 5.3 mmol/L 4.7  4.2  4.8    Cl 98 - 107 mmol/L 106  106  107    Cl (external) 98 - 107 mmol/L 106  106  107    CO2 22 - 29 mmol/L 22  21  22    Urea Nitrogen 6.0 - 20.0 mg/dL 21.9  22.0  23.5    Creatinine 0.67 - 1.17 mg/dL 1.65  1.59  1.72    Glucose 70 - 99 mg/dL 105  111  102    Calcium 8.6 - 10.0 mg/dL 9.8  9.9  9.9    Magnesium 1.7 - 2.3 mg/dL   1.7          Latest Ref Rng & Units 6/13/2024    10:19 AM 5/13/2024    10:38 AM 5/6/2024    10:43 AM   Bone Health   Phosphorus 2.5 - 4.5 mg/dL 2.7   2.3    Parathyroid Hormone Intact 15 - 65 pg/mL  126           Latest Ref Rng & Units 6/24/2024    10:57 AM 6/19/2024    10:13 AM 6/13/2024    10:19 AM   Heme   WBC 4.0 - 11.0 10e3/uL 5.2  4.7  4.2    Hgb 13.3 - 17.7 g/dL 11.8  11.6  11.8    Plt 150 - 450 10e3/uL 251  260  244          Latest Ref Rng & Units 12/19/2023     9:53 AM 7/13/2023     1:28 PM 7/16/2021     4:50 PM   Liver   AP 40 - 150 U/L 67      AP (external) 40 - 150 U/L  45     TBili <=1.2 mg/dL 0.5      TBili (external) 0.2 - 1.2 mg/dL  0.6     ALT 0 - 70 U/L 41      ALT (external) <=55 U/L  35      AST 0 - 45 U/L 27      AST (external) 10 - 40 U/L  26     Tot Protein 6.4 - 8.3 g/dL 7.6      Tot Protein (external) 6.4 - 8.3 g/dL  7.5     Albumin 3.5 - 5.0 g/dL   3.0    Albumin 3.5 - 5.2 g/dL 4.4      Albumin (external) 3.5 - 5.0 g/dL  3.8           Latest Ref Rng & Units 2/28/2024     1:13 PM 12/19/2023     9:53 AM 7/13/2023     1:28 PM   Pancreas   A1C <5.7 % 5.7  5.2     A1C (external) <=5.6 %   4.9          Latest Ref Rng & Units 5/21/2024    10:28 AM 1/7/2024     7:50 AM 12/19/2023     9:53 AM   Iron studies   Iron 61 - 157 ug/dL 77  167  104    Iron Sat Index 15 - 46 % 32  79  42    Ferritin 31 - 409 ng/mL  783  826          Latest Ref Rng & Units 5/21/2024    10:28 AM 4/1/2024    10:39 AM 3/25/2024    10:22 AM   [facility]xp Virology   EBV CAPSID ANTIBODY IGG No detectable antibody. No detectable antibody.      EBV DNA LOG OF COPIES   3.8  3.9      Failed to redirect to the Timeline version of the REVFS SmartLink.  Recent Labs   Lab Test 06/13/24  1019 06/19/24  1013 06/24/24  1057   DOSTAC 6/12/2024 6/18/2024 6/23/2024   TACROL 9.2 5.5 9.1     Recent Labs   Lab Test 01/11/24  0950 01/29/24  0937 02/01/24  1008 02/12/24  1030 02/24/24  1008   DOSMPA 1/10/2024   9:30 PM 1/28/2024   9:30 PM  --   --   --    MPACID 3.79* 3.73* 3.87* 2.23 2.65   MPAG 71.7 63.8 79.1 68.1 141.7*          Again, thank you for allowing me to participate in the care of your patient.        Sincerely,        Abhilash Raza MD

## 2024-07-01 NOTE — PROGRESS NOTES
TRANSPLANT NEPHROLOGY CLINIC VISIT     Assessment & Plan   # LDKT: CKD Stage 3a - Stable   - Baseline Creatinine: ~ 1.5-1.8   - Proteinuria: Moderate (1-3 grams), but checked at times of ROSI   - DSA Hx: No DSA   - Last cPRA: 0%   - BK Viremia: No   - Kidney Tx Biopsy Hx: Acute cellular-mediated rejection and Moderate vascular changes.    # Immunosuppression: Tacrolimus immediate release (goal 6-8), Mycophenolate mofetil (dose 1000 mg every 12 hours), and Prednisone (dose 5 mg daily)   - Induction with Recent Transplant:  Low Intensity Protocol   - Continue with intensive monitoring of immunosuppression for efficacy and toxicity.   - Historical Changes in Immunosuppression:  Patient was started on prednisone due to h/o acute rejection.  However, with obesity and elevated blood glucose, will plan slow taper down of prednisone by 1 mg every month.  Patient can decrease to 4 mg now.   - Changes: Not at this time    # Infection Prevention:      - PJP: Sulfa/TMP (Bactrim)  - CMV: None, prophylaxis completed; Patient is CMV IgG Ab discordant (D+/R-) and completed Valcyte prophylaxis x 6 months.  Will check CMV PCR monthly until 12 months post transplant.  - CMV IgG Ab High Risk Discordance (D+/R-): Yes  - EBV IgG Ab High Risk Discordance (D+/R-): Yes    # Hypertension: Borderline control;  Goal BP: < 130/80   - Changes: Not at this time; Recommend patient check blood pressure at home on a regular basis, such as once every week or two, and follow up with transplant coordinator if above goal.    # Elevated Blood Glucose: Glucose generally running ~ 100-120s Last HbA1c: 5.7%    # Anemia in Chronic Renal Disease: Hgb: Stable      TOM: No   - Iron studies: Replete    # Mineral Bone Disorder:    - Secondary renal hyperparathyroidism; PTH level: Minimally elevated ( pg/ml)        On treatment: None  - Vitamin D; level: Low        On supplement: Yes  - Calcium; level: Normal        On supplement: No    # Electrolytes:   -  Potassium; level: Normal        On supplement: No  - Magnesium; level: Normal        On supplement: Yes  - Bicarbonate; level: Normal        On supplement: Yes    # EBV Viremia: Trend down, minimal EBV PCR was detectable, but less than quantifiable with last check Jun/2024.  EBV IgG Ab negative and patient was EBV IgG Ab Discordance (D+/R-) at time of transplant.  IgG level has not been checked.   - Will continue to follow EBV PCR every monthly.    # Obesity, Class III (BMI = 44.0): Weight is up ~ 10 lbs.   - Recommend weight loss for overall health by increasing exercise and watching caloric intake.  - Patient may benefit from GLP1 agonists or SGLT2 inhibitors.  - Will refer patient to Weight Management Program and strongly encouraged him to make an appointment.    # Other Significant PMH:   - GERD: Asymptomatic and now off PPI.  - H/o Ureteral Reimplantation, s/p Mitrofanoff: Patient reports not using for Mitrofanoff in a couple of years and voids normally on his own.  Mitrofanoff is nonfunctional at this time.  - H/o Bilateral Uveitis: No evidence of systemic autoimmune/inflammatory disease at last Rheumatology.   - Sleep Disorder: Patient reports restless sleep and doesn't feel rested in the morning, although maybe a bit better lately.  He is morbidly obese and would be at risk of sleep apnea.  His father has sleep apnea.  - Will refer to Sleep Clinic for further evaluation.  - Depression/Anxiety: Patient reports mood is stable.  Continues on sertraline.  - Microcytic Lymphangioma: Patient has a large lesion on his back that bothers him, especially when sleeping.  Was seen by Dermatology and referred to surgery for removal.     # Skin Cancer Risk:    - Discussed sun protection and recommend regular follow up with Dermatology.    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    Return visit: Return for previously scheduled visit.    Abhilash Raza MD    I spent a total of 45 minutes on the date of the encounter doing chart review, performing a history and physical exam, completing documentation and any further activities as noted above.    The longitudinal plan of care for the diagnosis(es)/condition(s) as documented were addressed during this visit. Due to the added complexity in care, I will continue to support Boogie in the subsequent management and with ongoing continuity of care.      Chief Complaint   Mr. Villa is a 24 year old here for kidney transplant and immunosuppression management.     History of Present Illness    Mr. Villa reports feeling good overall.  Since last clinic visit:   Hospitalizations: No   New Medical Issues: Yes, but still some issues with sleeping.  Also reports having a large lymphangioma on his back with plans to have it surgically removed.  Chest pain or shortness of breath: Yes; No chest pain, but some shortness of breath with exertion and feels deconditioned.  Lower extremity swelling: No  Weight change: Yes; Weight is up ~ 10 lbs.  Nausea and vomiting: No  Diarrhea: No  Heartburn symptoms: No; Now off pantoprazole.  Fever, sweats or chills: No  Urinary complaints: No    Home BP: Not checked    Problem List   Patient Active Problem List   Diagnosis    Lymphangioma    ADHD (attention deficit hyperactivity disorder)    Allergic rhinitis due to pollen    Anxiety    Congenital posterior urethral valves    Iron deficiency    Neurogenic bladder    Class 2 severe obesity due to excess calories with serious comorbidity and body mass index (BMI) of 39.0 to 39.9 in adult (H)    Sensorineural hearing loss, asymmetrical    HTN, kidney transplant related    Secondary renal  hyperparathyroidism (H24)    Vitamin D deficiency    Kidney replaced by transplant    Immunosuppressed status (H24)    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    Stage 3a chronic kidney disease (H)    Aftercare following organ transplant    Need for pneumocystis prophylaxis    Anemia in chronic renal disease    Metabolic acidosis    Hypomagnesemia    Kidney transplant rejection    GERD (gastroesophageal reflux disease)    EBV (Dai-Barr virus) viremia    Pre-diabetes    Steroid-induced hyperglycemia    Sleep disorder       Allergies   Allergies   Allergen Reactions    Banana Itching     Raw banana; itchy mouth      Dust Mites      Runny nose and watery eyes    Mold      Runny nose and watery eyes    Nsaids Other (See Comments)     CKD    Other [Seasonal Allergies]      Grass, Ragweed - gets runny nose and watery eyes    Sulfa Antibiotics      PN: LW Reaction: GI Upset as an infant  12/28 Admission: Tolerated Bactrim       Medications   Current Outpatient Medications   Medication Sig Dispense Refill    acetaminophen (TYLENOL) 325 MG tablet Take 1-2 tablets (325-650 mg) by mouth every 4 hours as needed (For optimal non-opioid multimodal pain management to improve pain control.)      Alcohol Swabs PADS Use to swab the area of the injection or janny as directed Per insurance coverage 200 each 1    amLODIPine (NORVASC) 10 MG tablet Take 1 tablet (10 mg) by mouth daily 30 tablet 11    carvedilol (COREG) 12.5 MG tablet Take 1 tablet (12.5 mg) by mouth 2 times daily 180 tablet 3    cetirizine (ZYRTEC) 10 MG tablet Take 10 mg by mouth daily as needed for allergies (in the spring)      cholecalciferol (VITAMIN D3) 25 mcg (1000 units) capsule Take 1 capsule (25 mcg) by mouth daily      losartan (COZAAR) 25 MG tablet Take 1 tablet (25 mg) by mouth every evening 30 tablet 11    magnesium oxide (MAG-OX) 400 MG tablet Take 2 tablets (800 mg) by mouth daily (with lunch) 60 tablet 3    mycophenolate (GENERIC  EQUIVALENT) 250 MG capsule Take 4 capsules (1,000 mg) by mouth 2 times daily 240 capsule 11    predniSONE (DELTASONE) 1 MG tablet Take 4 tablets (4 mg) by mouth daily for 30 days, THEN 3 tablets (3 mg) daily for 30 days. 210 tablet 1    sertraline (ZOLOFT) 25 MG tablet Take 100 mg by mouth daily      Sharps Container MISC Use as directed to dispose of needles, lancets and other sharps 1 each 1    sodium bicarbonate 650 MG tablet Take 3 tablets (1,950 mg) by mouth 3 times daily 270 tablet 11    sulfamethoxazole-trimethoprim (BACTRIM) 400-80 MG tablet Take 1 tablet by mouth daily 30 tablet 11    tacrolimus (GENERIC EQUIVALENT) 1 MG capsule Take 3 capsules (3 mg) by mouth 2 times daily Total dose = 8.0 mg twice daily 180 capsule 11    tacrolimus (GENERIC EQUIVALENT) 5 MG capsule Take 1 capsule (5 mg) by mouth 2 times daily Total dose = 8.0 mg twice daily 60 capsule 11     No current facility-administered medications for this visit.     Medications Discontinued During This Encounter   Medication Reason    blood glucose (NO BRAND SPECIFIED) lancets standard     blood glucose (NO BRAND SPECIFIED) test strip     blood glucose monitoring (FREESTYLE) lancets     blood glucose monitoring (NO BRAND SPECIFIED) meter device kit     insulin lispro (HUMALOG KWIKPEN) 100 UNIT/ML (1 unit dial) KWIKPEN     insulin lispro (HUMALOG KWIKPEN) 100 UNIT/ML (1 unit dial) KWIKPEN     insulin pen needle (32G X 4 MM) 32G X 4 MM miscellaneous     methylphenidate (RITALIN) 20 MG tablet     pantoprazole (PROTONIX) 40 MG EC tablet     pentamidine (NEBUPENT) 300 MG neb solution     predniSONE (DELTASONE) 5 MG tablet     valGANciclovir (VALCYTE) 450 MG tablet        Physical Exam   Vital Signs: /82 (BP Location: Left arm, Patient Position: Sitting, Cuff Size: Adult Large)   Pulse 85   Temp 97.6  F (36.4  C) (Oral)   Wt 142.9 kg (315 lb)   SpO2 97%   BMI 43.93 kg/m      GENERAL APPEARANCE: alert and no distress  HENT: mouth without ulcers  or lesions  RESP: lungs clear to auscultation - no rales, rhonchi or wheezes  CV: regular rhythm, normal rate, no rub, no murmur  EDEMA: no LE edema bilaterally  ABDOMEN: soft, nondistended, nontender, bowel sounds normal, morbidly obese  MS: extremities normal - no gross deformities noted, no evidence of inflammation in joints, no muscle tenderness  SKIN: no rash  TX KIDNEY: normal  DIALYSIS ACCESS:  None    Data         Latest Ref Rng & Units 6/24/2024    10:57 AM 6/19/2024    10:13 AM 6/13/2024    10:19 AM   Renal   Sodium 135 - 145 mmol/L 139  139  140    K 3.4 - 5.3 mmol/L 4.7  4.2  4.8    Cl 98 - 107 mmol/L 106  106  107    Cl (external) 98 - 107 mmol/L 106  106  107    CO2 22 - 29 mmol/L 22  21  22    Urea Nitrogen 6.0 - 20.0 mg/dL 21.9  22.0  23.5    Creatinine 0.67 - 1.17 mg/dL 1.65  1.59  1.72    Glucose 70 - 99 mg/dL 105  111  102    Calcium 8.6 - 10.0 mg/dL 9.8  9.9  9.9    Magnesium 1.7 - 2.3 mg/dL   1.7          Latest Ref Rng & Units 6/13/2024    10:19 AM 5/13/2024    10:38 AM 5/6/2024    10:43 AM   Bone Health   Phosphorus 2.5 - 4.5 mg/dL 2.7   2.3    Parathyroid Hormone Intact 15 - 65 pg/mL  126           Latest Ref Rng & Units 6/24/2024    10:57 AM 6/19/2024    10:13 AM 6/13/2024    10:19 AM   Heme   WBC 4.0 - 11.0 10e3/uL 5.2  4.7  4.2    Hgb 13.3 - 17.7 g/dL 11.8  11.6  11.8    Plt 150 - 450 10e3/uL 251  260  244          Latest Ref Rng & Units 12/19/2023     9:53 AM 7/13/2023     1:28 PM 7/16/2021     4:50 PM   Liver   AP 40 - 150 U/L 67      AP (external) 40 - 150 U/L  45     TBili <=1.2 mg/dL 0.5      TBili (external) 0.2 - 1.2 mg/dL  0.6     ALT 0 - 70 U/L 41      ALT (external) <=55 U/L  35     AST 0 - 45 U/L 27      AST (external) 10 - 40 U/L  26     Tot Protein 6.4 - 8.3 g/dL 7.6      Tot Protein (external) 6.4 - 8.3 g/dL  7.5     Albumin 3.5 - 5.0 g/dL   3.0    Albumin 3.5 - 5.2 g/dL 4.4      Albumin (external) 3.5 - 5.0 g/dL  3.8           Latest Ref Rng & Units 2/28/2024     1:13 PM  12/19/2023     9:53 AM 7/13/2023     1:28 PM   Pancreas   A1C <5.7 % 5.7  5.2     A1C (external) <=5.6 %   4.9          Latest Ref Rng & Units 5/21/2024    10:28 AM 1/7/2024     7:50 AM 12/19/2023     9:53 AM   Iron studies   Iron 61 - 157 ug/dL 77  167  104    Iron Sat Index 15 - 46 % 32  79  42    Ferritin 31 - 409 ng/mL  783  826          Latest Ref Rng & Units 5/21/2024    10:28 AM 4/1/2024    10:39 AM 3/25/2024    10:22 AM   [facility] Virology   EBV CAPSID ANTIBODY IGG No detectable antibody. No detectable antibody.      EBV DNA LOG OF COPIES   3.8  3.9      Failed to redirect to the Timeline version of the REVFS SmartLink.  Recent Labs   Lab Test 06/13/24  1019 06/19/24  1013 06/24/24  1057   DOSTAC 6/12/2024 6/18/2024 6/23/2024   TACROL 9.2 5.5 9.1     Recent Labs   Lab Test 01/11/24  0950 01/29/24  0937 02/01/24  1008 02/12/24  1030 02/24/24  1008   DOSMPA 1/10/2024   9:30 PM 1/28/2024   9:30 PM  --   --   --    MPACID 3.79* 3.73* 3.87* 2.23 2.65   MPAG 71.7 63.8 79.1 68.1 141.7*

## 2024-07-01 NOTE — TELEPHONE ENCOUNTER
Abhilash Raza MD Huepfel, Mary K, RN  Please check the following labs: UPC, IgG, and CD4 level.    Updated orders in EPIC and sent a [employer] message to patient for follow unit(s)

## 2024-07-01 NOTE — NURSING NOTE
Chief Complaint   Patient presents with    RECHECK      K/P POST ACUTE TXP NEPH - Post Txp       /82 (BP Location: Left arm, Patient Position: Sitting, Cuff Size: Adult Large)   Pulse 85   Temp 97.6  F (36.4  C) (Oral)   Wt 97.9 kg (215 lb 12.8 oz)   SpO2 97%   BMI 30.10 kg/m      Jose C Mendez CMA on 7/1/2024 at 9:45 AM

## 2024-07-01 NOTE — LETTER
7/1/2024      RE: Boogie Villa  [address]  [place] [address]       TRANSPLANT NEPHROLOGY CLINIC VISIT     Assessment & Plan  # LDKT: CKD Stage 3a - Stable   - Baseline Creatinine: ~ 1.5-1.8   - Proteinuria: Moderate (1-3 grams), but checked at times of ROSI   - DSA Hx: No DSA   - Last cPRA: 0%   - BK Viremia: No   - Kidney Tx Biopsy Hx: Acute cellular-mediated rejection and Moderate vascular changes.    # Immunosuppression: Tacrolimus immediate release (goal 6-8), Mycophenolate mofetil (dose 1000 mg every 12 hours), and Prednisone (dose 5 mg daily)   - Induction with Recent Transplant:  Low Intensity Protocol   - Continue with intensive monitoring of immunosuppression for efficacy and toxicity.   - Historical Changes in Immunosuppression:  Patient was started on prednisone due to h/o acute rejection.  However, with obesity and elevated blood glucose, will plan slow taper down of prednisone by 1 mg every month.  Patient can decrease to 4 mg now.   - Changes: Not at this time    # Infection Prevention:      - PJP: Sulfa/TMP (Bactrim)  - CMV: None, prophylaxis completed; Patient is CMV IgG Ab discordant (D+/R-) and completed Valcyte prophylaxis x 6 months.  Will check CMV PCR monthly until 12 months post transplant.  - CMV IgG Ab High Risk Discordance (D+/R-): Yes  - EBV IgG Ab High Risk Discordance (D+/R-): Yes    # Hypertension: Borderline control;  Goal BP: < 130/80   - Changes: Not at this time; Recommend patient check blood pressure at home on a regular basis, such as once every week or two, and follow up with transplant coordinator if above goal.    # Elevated Blood Glucose: Glucose generally running ~ 100-120s Last HbA1c: 5.7%    # Anemia in Chronic Renal Disease: Hgb: Stable      TOM: No   - Iron studies: Replete    # Mineral Bone Disorder:    - Secondary renal hyperparathyroidism; PTH level: Minimally elevated ( pg/ml)        On treatment: None  - Vitamin D; level: Low        On supplement:  Yes  - Calcium; level: Normal        On supplement: No    # Electrolytes:   - Potassium; level: Normal        On supplement: No  - Magnesium; level: Normal        On supplement: Yes  - Bicarbonate; level: Normal        On supplement: Yes    # EBV Viremia: Trend down, minimal EBV PCR was detectable, but less than quantifiable with last check Jun/2024.  EBV IgG Ab negative and patient was EBV IgG Ab Discordance (D+/R-) at time of transplant.  IgG level has not been checked.   - Will continue to follow EBV PCR every monthly.    # Obesity, Class III (BMI = 44.0): Weight is up ~ 10 lbs.   - Recommend weight loss for overall health by increasing exercise and watching caloric intake.  - Patient may benefit from GLP1 agonists or SGLT2 inhibitors.  - Will refer patient to Weight Management Program and strongly encouraged him to make an appointment.    # Other Significant PMH:   - GERD: Asymptomatic and now off PPI.  - H/o Ureteral Reimplantation, s/p Mitrofanoff: Patient reports not using for Mitrofanoff in a couple of years and voids normally on his own.  Mitrofanoff is nonfunctional at this time.  - H/o Bilateral Uveitis: No evidence of systemic autoimmune/inflammatory disease at last Rheumatology.   - Sleep Disorder: Patient reports restless sleep and doesn't feel rested in the morning, although maybe a bit better lately.  He is morbidly obese and would be at risk of sleep apnea.  His father has sleep apnea.  - Will refer to Sleep Clinic for further evaluation.  - Depression/Anxiety: Patient reports mood is stable.  Continues on sertraline.  - Microcytic Lymphangioma: Patient has a large lesion on his back that bothers him, especially when sleeping.  Was seen by Dermatology and referred to surgery for removal.     # Skin Cancer Risk:    - Discussed sun protection and recommend regular follow up with Dermatology.    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    Return visit: Return for previously scheduled visit.    Abhilash Raza MD    I spent a total of 45 minutes on the date of the encounter doing chart review, performing a history and physical exam, completing documentation and any further activities as noted above.    The longitudinal plan of care for the diagnosis(es)/condition(s) as documented were addressed during this visit. Due to the added complexity in care, I will continue to support Boogie in the subsequent management and with ongoing continuity of care.      Chief Complaint  Mr. Villa is a 24 year old here for kidney transplant and immunosuppression management.     History of Present Illness   Mr. Villa reports feeling good overall.  Since last clinic visit:   Hospitalizations: No   New Medical Issues: Yes, but still some issues with sleeping.  Also reports having a large lymphangioma on his back with plans to have it surgically removed.  Chest pain or shortness of breath: Yes; No chest pain, but some shortness of breath with exertion and feels deconditioned.  Lower extremity swelling: No  Weight change: Yes; Weight is up ~ 10 lbs.  Nausea and vomiting: No  Diarrhea: No  Heartburn symptoms: No; Now off pantoprazole.  Fever, sweats or chills: No  Urinary complaints: No    Home BP: Not checked    Problem List  Patient Active Problem List   Diagnosis     Lymphangioma     ADHD (attention deficit hyperactivity disorder)     Allergic rhinitis due to pollen     Anxiety     Congenital posterior urethral valves     Iron deficiency     Neurogenic bladder     Class 2 severe obesity due to excess calories with serious comorbidity and body mass index (BMI) of 39.0 to 39.9 in adult (H)      Sensorineural hearing loss, asymmetrical     HTN, kidney transplant related     Secondary renal hyperparathyroidism (H24)     Vitamin D deficiency     Kidney replaced by transplant     Immunosuppressed status (H24)     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     Stage 3a chronic kidney disease (H)     Aftercare following organ transplant     Need for pneumocystis prophylaxis     Anemia in chronic renal disease     Metabolic acidosis     Hypomagnesemia     Kidney transplant rejection     GERD (gastroesophageal reflux disease)     EBV (Dai-Barr virus) viremia     Pre-diabetes     Steroid-induced hyperglycemia     Sleep disorder       Allergies  Allergies   Allergen Reactions     Banana Itching     Raw banana; itchy mouth       Dust Mites      Runny nose and watery eyes     Mold      Runny nose and watery eyes     Nsaids Other (See Comments)     CKD     Other [Seasonal Allergies]      Grass, Ragweed - gets runny nose and watery eyes     Sulfa Antibiotics      PN: LW Reaction: GI Upset as an infant  12/28 Admission: Tolerated Bactrim       Medications  Current Outpatient Medications   Medication Sig Dispense Refill     acetaminophen (TYLENOL) 325 MG tablet Take 1-2 tablets (325-650 mg) by mouth every 4 hours as needed (For optimal non-opioid multimodal pain management to improve pain control.)       Alcohol Swabs PADS Use to swab the area of the injection or janny as directed Per insurance coverage 200 each 1     amLODIPine (NORVASC) 10 MG tablet Take 1 tablet (10 mg) by mouth daily 30 tablet 11     carvedilol (COREG) 12.5 MG tablet Take 1 tablet (12.5 mg) by mouth 2 times daily 180 tablet 3     cetirizine (ZYRTEC) 10 MG tablet Take 10 mg by mouth daily as needed for allergies (in the spring)       cholecalciferol (VITAMIN D3) 25 mcg (1000 units) capsule Take 1 capsule (25 mcg) by mouth daily       losartan (COZAAR) 25 MG tablet Take 1 tablet (25 mg) by mouth every evening 30 tablet 11      magnesium oxide (MAG-OX) 400 MG tablet Take 2 tablets (800 mg) by mouth daily (with lunch) 60 tablet 3     mycophenolate (GENERIC EQUIVALENT) 250 MG capsule Take 4 capsules (1,000 mg) by mouth 2 times daily 240 capsule 11     predniSONE (DELTASONE) 1 MG tablet Take 4 tablets (4 mg) by mouth daily for 30 days, THEN 3 tablets (3 mg) daily for 30 days. 210 tablet 1     sertraline (ZOLOFT) 25 MG tablet Take 100 mg by mouth daily       Sharps Container MISC Use as directed to dispose of needles, lancets and other sharps 1 each 1     sodium bicarbonate 650 MG tablet Take 3 tablets (1,950 mg) by mouth 3 times daily 270 tablet 11     sulfamethoxazole-trimethoprim (BACTRIM) 400-80 MG tablet Take 1 tablet by mouth daily 30 tablet 11     tacrolimus (GENERIC EQUIVALENT) 1 MG capsule Take 3 capsules (3 mg) by mouth 2 times daily Total dose = 8.0 mg twice daily 180 capsule 11     tacrolimus (GENERIC EQUIVALENT) 5 MG capsule Take 1 capsule (5 mg) by mouth 2 times daily Total dose = 8.0 mg twice daily 60 capsule 11     No current facility-administered medications for this visit.     Medications Discontinued During This Encounter   Medication Reason     blood glucose (NO BRAND SPECIFIED) lancets standard      blood glucose (NO BRAND SPECIFIED) test strip      blood glucose monitoring (FREESTYLE) lancets      blood glucose monitoring (NO BRAND SPECIFIED) meter device kit      insulin lispro (HUMALOG KWIKPEN) 100 UNIT/ML (1 unit dial) KWIKPEN      insulin lispro (HUMALOG KWIKPEN) 100 UNIT/ML (1 unit dial) KWIKPEN      insulin pen needle (32G X 4 MM) 32G X 4 MM miscellaneous      methylphenidate (RITALIN) 20 MG tablet      pantoprazole (PROTONIX) 40 MG EC tablet      pentamidine (NEBUPENT) 300 MG neb solution      predniSONE (DELTASONE) 5 MG tablet      valGANciclovir (VALCYTE) 450 MG tablet        Physical Exam  Vital Signs: /82 (BP Location: Left arm, Patient Position: Sitting, Cuff Size: Adult Large)   Pulse 85   Temp 97.6   F (36.4  C) (Oral)   Wt 142.9 kg (315 lb)   SpO2 97%   BMI 43.93 kg/m      GENERAL APPEARANCE: alert and no distress  HENT: mouth without ulcers or lesions  RESP: lungs clear to auscultation - no rales, rhonchi or wheezes  CV: regular rhythm, normal rate, no rub, no murmur  EDEMA: no LE edema bilaterally  ABDOMEN: soft, nondistended, nontender, bowel sounds normal, morbidly obese  MS: extremities normal - no gross deformities noted, no evidence of inflammation in joints, no muscle tenderness  SKIN: no rash  TX KIDNEY: normal  DIALYSIS ACCESS:  None    Data        Latest Ref Rng & Units 6/24/2024    10:57 AM 6/19/2024    10:13 AM 6/13/2024    10:19 AM   Renal   Sodium 135 - 145 mmol/L 139  139  140    K 3.4 - 5.3 mmol/L 4.7  4.2  4.8    Cl 98 - 107 mmol/L 106  106  107    Cl (external) 98 - 107 mmol/L 106  106  107    CO2 22 - 29 mmol/L 22  21  22    Urea Nitrogen 6.0 - 20.0 mg/dL 21.9  22.0  23.5    Creatinine 0.67 - 1.17 mg/dL 1.65  1.59  1.72    Glucose 70 - 99 mg/dL 105  111  102    Calcium 8.6 - 10.0 mg/dL 9.8  9.9  9.9    Magnesium 1.7 - 2.3 mg/dL   1.7          Latest Ref Rng & Units 6/13/2024    10:19 AM 5/13/2024    10:38 AM 5/6/2024    10:43 AM   Bone Health   Phosphorus 2.5 - 4.5 mg/dL 2.7   2.3    Parathyroid Hormone Intact 15 - 65 pg/mL  126           Latest Ref Rng & Units 6/24/2024    10:57 AM 6/19/2024    10:13 AM 6/13/2024    10:19 AM   Heme   WBC 4.0 - 11.0 10e3/uL 5.2  4.7  4.2    Hgb 13.3 - 17.7 g/dL 11.8  11.6  11.8    Plt 150 - 450 10e3/uL 251  260  244          Latest Ref Rng & Units 12/19/2023     9:53 AM 7/13/2023     1:28 PM 7/16/2021     4:50 PM   Liver   AP 40 - 150 U/L 67      AP (external) 40 - 150 U/L  45     TBili <=1.2 mg/dL 0.5      TBili (external) 0.2 - 1.2 mg/dL  0.6     ALT 0 - 70 U/L 41      ALT (external) <=55 U/L  35     AST 0 - 45 U/L 27      AST (external) 10 - 40 U/L  26     Tot Protein 6.4 - 8.3 g/dL 7.6      Tot Protein (external) 6.4 - 8.3 g/dL  7.5     Albumin 3.5 -  5.0 g/dL   3.0    Albumin 3.5 - 5.2 g/dL 4.4      Albumin (external) 3.5 - 5.0 g/dL  3.8           Latest Ref Rng & Units 2/28/2024     1:13 PM 12/19/2023     9:53 AM 7/13/2023     1:28 PM   Pancreas   A1C <5.7 % 5.7  5.2     A1C (external) <=5.6 %   4.9          Latest Ref Rng & Units 5/21/2024    10:28 AM 1/7/2024     7:50 AM 12/19/2023     9:53 AM   Iron studies   Iron 61 - 157 ug/dL 77  167  104    Iron Sat Index 15 - 46 % 32  79  42    Ferritin 31 - 409 ng/mL  783  826          Latest Ref Rng & Units 5/21/2024    10:28 AM 4/1/2024    10:39 AM 3/25/2024    10:22 AM   [facility] Txp Virology   EBV CAPSID ANTIBODY IGG No detectable antibody. No detectable antibody.      EBV DNA LOG OF COPIES   3.8  3.9      Failed to redirect to the Timeline version of the REVFS SmartLink.  Recent Labs   Lab Test 06/13/24  1019 06/19/24  1013 06/24/24  1057   DOSTAC 6/12/2024 6/18/2024 6/23/2024   TACROL 9.2 5.5 9.1     Recent Labs   Lab Test 01/11/24  0950 01/29/24  0937 02/01/24  1008 02/12/24  1030 02/24/24  1008   DOSMPA 1/10/2024   9:30 PM 1/28/2024   9:30 PM  --   --   --    MPACID 3.79* 3.73* 3.87* 2.23 2.65   MPAG 71.7 63.8 79.1 68.1 141.7*        Abhilash Raza MD

## 2024-07-01 NOTE — PATIENT INSTRUCTIONS
Patient Recommendations:  - Decrease prednisone to 4 mg daily x 30 days, then decrease to 3 mg daily x 30 days.  - Will refer to Weight Management Program.    Transplant Patient Information  Your Post Transplant Coordinator is: Nina Hu  For non urgent items, we encourage you to contact your coordinator/care team online via [employer]  You and your care team can also contact your transplant coordinator Monday - Friday, 8am - 5pm at [phone] (Option 2 to reach the coordinator or Option 4 to schedule an appointment).  After hours for urgent matters, please call [facility] at [phone].

## 2024-07-09 ENCOUNTER — LAB (OUTPATIENT)
Dept: LAB | Facility: CLINIC | Age: 24
End: 2024-07-09
Payer: COMMERCIAL

## 2024-07-09 DIAGNOSIS — Z94.0 KIDNEY REPLACED BY TRANSPLANT: ICD-10-CM

## 2024-07-09 DIAGNOSIS — Z94.0 KIDNEY TRANSPLANTED: ICD-10-CM

## 2024-07-09 DIAGNOSIS — Z79.899 ENCOUNTER FOR LONG-TERM CURRENT USE OF MEDICATION: ICD-10-CM

## 2024-07-09 DIAGNOSIS — Z98.890 OTHER SPECIFIED POSTPROCEDURAL STATES: ICD-10-CM

## 2024-07-09 DIAGNOSIS — Z20.828 CONTACT WITH AND (SUSPECTED) EXPOSURE TO OTHER VIRAL COMMUNICABLE DISEASES: ICD-10-CM

## 2024-07-09 DIAGNOSIS — Z48.298 AFTERCARE FOLLOWING ORGAN TRANSPLANT: ICD-10-CM

## 2024-07-09 LAB
ALBUMIN MFR UR ELPH: 26.7 MG/DL
ALBUMIN SERPL BCG-MCNC: 4.6 G/DL (ref 3.5–5.2)
ALP SERPL-CCNC: 66 U/L (ref 40–150)
ALT SERPL W P-5'-P-CCNC: 46 U/L (ref 0–70)
ANION GAP SERPL CALCULATED.3IONS-SCNC: 11 MMOL/L (ref 7–15)
AST SERPL W P-5'-P-CCNC: 21 U/L (ref 0–45)
BASOPHILS # BLD AUTO: 0 10E3/UL (ref 0–0.2)
BASOPHILS NFR BLD AUTO: 0 %
BILIRUB DIRECT SERPL-MCNC: <0.2 MG/DL (ref 0–0.3)
BILIRUB SERPL-MCNC: 0.3 MG/DL
BUN SERPL-MCNC: 23.6 MG/DL (ref 6–20)
CALCIUM SERPL-MCNC: 9.8 MG/DL (ref 8.6–10)
CD3 CELLS # BLD: 360 CELLS/UL (ref 603–2990)
CD3 CELLS NFR BLD: 44 % (ref 49–84)
CD3+CD4+ CELLS # BLD: 150 CELLS/UL (ref 441–2156)
CD3+CD4+ CELLS NFR BLD: 19 % (ref 28–63)
CD3+CD4+ CELLS/CD3+CD8+ CLL BLD: 0.97 % (ref 1.4–2.6)
CD3+CD8+ CELLS # BLD: 155 CELLS/UL (ref 125–1312)
CD3+CD8+ CELLS NFR BLD: 19 % (ref 10–40)
CHLORIDE SERPL-SCNC: 105 MMOL/L (ref 98–107)
CHOLEST SERPL-MCNC: 194 MG/DL
CREAT SERPL-MCNC: 1.48 MG/DL (ref 0.67–1.17)
CREAT UR-MCNC: 99.6 MG/DL
DEPRECATED HCO3 PLAS-SCNC: 21 MMOL/L (ref 22–29)
EGFRCR SERPLBLD CKD-EPI 2021: 67 ML/MIN/1.73M2
EOSINOPHIL # BLD AUTO: 0.1 10E3/UL (ref 0–0.7)
EOSINOPHIL NFR BLD AUTO: 2 %
ERYTHROCYTE [DISTWIDTH] IN BLOOD BY AUTOMATED COUNT: 13 % (ref 10–15)
FASTING STATUS PATIENT QL REPORTED: NO
FASTING STATUS PATIENT QL REPORTED: NO
GLUCOSE SERPL-MCNC: 106 MG/DL (ref 70–99)
HBA1C MFR BLD: 5.8 % (ref 0–5.6)
HCT VFR BLD AUTO: 36 % (ref 40–53)
HDLC SERPL-MCNC: 36 MG/DL
HGB BLD-MCNC: 12.1 G/DL (ref 13.3–17.7)
IMM GRANULOCYTES # BLD: 0.1 10E3/UL
IMM GRANULOCYTES NFR BLD: 1 %
LDLC SERPL CALC-MCNC: 121 MG/DL
LYMPHOCYTES # BLD AUTO: 0.8 10E3/UL (ref 0.8–5.3)
LYMPHOCYTES NFR BLD AUTO: 12 %
MCH RBC QN AUTO: 31.3 PG (ref 26.5–33)
MCHC RBC AUTO-ENTMCNC: 33.6 G/DL (ref 31.5–36.5)
MCV RBC AUTO: 93 FL (ref 78–100)
MONOCYTES # BLD AUTO: 0.5 10E3/UL (ref 0–1.3)
MONOCYTES NFR BLD AUTO: 8 %
NEUTROPHILS # BLD AUTO: 5 10E3/UL (ref 1.6–8.3)
NEUTROPHILS NFR BLD AUTO: 77 %
NONHDLC SERPL-MCNC: 158 MG/DL
PLATELET # BLD AUTO: 290 10E3/UL (ref 150–450)
POTASSIUM SERPL-SCNC: 4.6 MMOL/L (ref 3.4–5.3)
PROT SERPL-MCNC: 7 G/DL (ref 6.4–8.3)
PROT/CREAT 24H UR: 0.27 MG/MG CR (ref 0–0.2)
RBC # BLD AUTO: 3.86 10E6/UL (ref 4.4–5.9)
SODIUM SERPL-SCNC: 137 MMOL/L (ref 135–145)
T CELL COMMENT: ABNORMAL
TACROLIMUS BLD-MCNC: 8.4 UG/L (ref 5–15)
TME LAST DOSE: NORMAL H
TME LAST DOSE: NORMAL H
TRIGL SERPL-MCNC: 183 MG/DL
URATE SERPL-MCNC: 7.3 MG/DL (ref 3.4–7)
WBC # BLD AUTO: 6.5 10E3/UL (ref 4–11)

## 2024-07-09 PROCEDURE — 83036 HEMOGLOBIN GLYCOSYLATED A1C: CPT

## 2024-07-09 PROCEDURE — 82784 ASSAY IGA/IGD/IGG/IGM EACH: CPT

## 2024-07-09 PROCEDURE — 84156 ASSAY OF PROTEIN URINE: CPT

## 2024-07-09 PROCEDURE — 87799 DETECT AGENT NOS DNA QUANT: CPT

## 2024-07-09 PROCEDURE — 36415 COLL VENOUS BLD VENIPUNCTURE: CPT

## 2024-07-09 PROCEDURE — 82248 BILIRUBIN DIRECT: CPT

## 2024-07-09 PROCEDURE — 86360 T CELL ABSOLUTE COUNT/RATIO: CPT

## 2024-07-09 PROCEDURE — 80053 COMPREHEN METABOLIC PANEL: CPT

## 2024-07-09 PROCEDURE — 80061 LIPID PANEL: CPT

## 2024-07-09 PROCEDURE — 84550 ASSAY OF BLOOD/URIC ACID: CPT

## 2024-07-09 PROCEDURE — 86359 T CELLS TOTAL COUNT: CPT

## 2024-07-09 PROCEDURE — 86832 HLA CLASS I HIGH DEFIN QUAL: CPT

## 2024-07-09 PROCEDURE — 85025 COMPLETE CBC W/AUTO DIFF WBC: CPT

## 2024-07-09 PROCEDURE — 86833 HLA CLASS II HIGH DEFIN QUAL: CPT

## 2024-07-09 PROCEDURE — 80197 ASSAY OF TACROLIMUS: CPT

## 2024-07-10 LAB
BK VIRUS SPECIMEN TYPE: NORMAL
BKV DNA # SPEC NAA+PROBE: NOT DETECTED IU/ML
CMV DNA SPEC NAA+PROBE-ACNC: NOT DETECTED IU/ML
EBV DNA SERPL NAA+PROBE-ACNC: NOT DETECTED IU/ML
IGG SERPL-MCNC: 639 MG/DL (ref 610–1616)

## 2024-07-12 LAB
DONOR IDENTIFICATION: NORMAL
DSA COMMENTS: NORMAL
DSA PRESENT: NO
DSA TEST METHOD: NORMAL
ORGAN: NORMAL
SA 1  COMMENTS: NORMAL
SA 1 CELL: NORMAL
SA 1 TEST METHOD: NORMAL
SA 2 CELL: NORMAL
SA 2 COMMENTS: NORMAL
SA 2 TEST METHOD: NORMAL
SA1 HI RISK ABY: NORMAL
SA1 MOD RISK ABY: NORMAL
SA2 HI RISK ABY: NORMAL
SA2 MOD RISK ABY: NORMAL
UNACCEPTABLE ANTIGENS: NORMAL
UNOS CPRA: 0

## 2024-07-17 ENCOUNTER — LAB (OUTPATIENT)
Dept: LAB | Facility: CLINIC | Age: 24
End: 2024-07-17
Payer: COMMERCIAL

## 2024-07-17 DIAGNOSIS — Z94.0 KIDNEY REPLACED BY TRANSPLANT: ICD-10-CM

## 2024-07-17 LAB
ANION GAP SERPL CALCULATED.3IONS-SCNC: 11 MMOL/L (ref 7–15)
BASOPHILS # BLD AUTO: 0 10E3/UL (ref 0–0.2)
BASOPHILS NFR BLD AUTO: 0 %
BUN SERPL-MCNC: 21.4 MG/DL (ref 6–20)
CALCIUM SERPL-MCNC: 9.7 MG/DL (ref 8.8–10.4)
CHLORIDE SERPL-SCNC: 103 MMOL/L (ref 98–107)
CREAT SERPL-MCNC: 1.59 MG/DL (ref 0.67–1.17)
EGFRCR SERPLBLD CKD-EPI 2021: 62 ML/MIN/1.73M2
EOSINOPHIL # BLD AUTO: 0.1 10E3/UL (ref 0–0.7)
EOSINOPHIL NFR BLD AUTO: 1 %
ERYTHROCYTE [DISTWIDTH] IN BLOOD BY AUTOMATED COUNT: 12.8 % (ref 10–15)
GLUCOSE SERPL-MCNC: 112 MG/DL (ref 70–99)
HCO3 SERPL-SCNC: 24 MMOL/L (ref 22–29)
HCT VFR BLD AUTO: 36.5 % (ref 40–53)
HGB BLD-MCNC: 12.2 G/DL (ref 13.3–17.7)
IMM GRANULOCYTES # BLD: 0.1 10E3/UL
IMM GRANULOCYTES NFR BLD: 1 %
LYMPHOCYTES # BLD AUTO: 0.7 10E3/UL (ref 0.8–5.3)
LYMPHOCYTES NFR BLD AUTO: 8 %
MCH RBC QN AUTO: 31.1 PG (ref 26.5–33)
MCHC RBC AUTO-ENTMCNC: 33.4 G/DL (ref 31.5–36.5)
MCV RBC AUTO: 93 FL (ref 78–100)
MONOCYTES # BLD AUTO: 0.6 10E3/UL (ref 0–1.3)
MONOCYTES NFR BLD AUTO: 7 %
NEUTROPHILS # BLD AUTO: 6.8 10E3/UL (ref 1.6–8.3)
NEUTROPHILS NFR BLD AUTO: 82 %
PLATELET # BLD AUTO: 286 10E3/UL (ref 150–450)
POTASSIUM SERPL-SCNC: 4.7 MMOL/L (ref 3.4–5.3)
RBC # BLD AUTO: 3.92 10E6/UL (ref 4.4–5.9)
SODIUM SERPL-SCNC: 138 MMOL/L (ref 135–145)
TACROLIMUS BLD-MCNC: 7.1 UG/L (ref 5–15)
TME LAST DOSE: NORMAL H
TME LAST DOSE: NORMAL H
WBC # BLD AUTO: 8.3 10E3/UL (ref 4–11)

## 2024-07-17 PROCEDURE — 80048 BASIC METABOLIC PNL TOTAL CA: CPT

## 2024-07-17 PROCEDURE — 80197 ASSAY OF TACROLIMUS: CPT

## 2024-07-17 PROCEDURE — 85025 COMPLETE CBC W/AUTO DIFF WBC: CPT

## 2024-07-17 PROCEDURE — 36415 COLL VENOUS BLD VENIPUNCTURE: CPT

## 2024-07-26 ENCOUNTER — LAB (OUTPATIENT)
Dept: LAB | Facility: CLINIC | Age: 24
End: 2024-07-26
Payer: COMMERCIAL

## 2024-07-26 DIAGNOSIS — Z79.899 ENCOUNTER FOR LONG-TERM CURRENT USE OF MEDICATION: ICD-10-CM

## 2024-07-26 DIAGNOSIS — Z94.0 KIDNEY REPLACED BY TRANSPLANT: ICD-10-CM

## 2024-07-26 DIAGNOSIS — Z20.828 CONTACT WITH AND (SUSPECTED) EXPOSURE TO OTHER VIRAL COMMUNICABLE DISEASES: ICD-10-CM

## 2024-07-26 DIAGNOSIS — Z48.298 AFTERCARE FOLLOWING ORGAN TRANSPLANT: ICD-10-CM

## 2024-07-26 DIAGNOSIS — Z98.890 OTHER SPECIFIED POSTPROCEDURAL STATES: ICD-10-CM

## 2024-07-26 LAB
ANION GAP SERPL CALCULATED.3IONS-SCNC: 12 MMOL/L (ref 7–15)
BUN SERPL-MCNC: 23.8 MG/DL (ref 6–20)
CALCIUM SERPL-MCNC: 10.1 MG/DL (ref 8.8–10.4)
CHLORIDE SERPL-SCNC: 105 MMOL/L (ref 98–107)
CREAT SERPL-MCNC: 1.68 MG/DL (ref 0.67–1.17)
EGFRCR SERPLBLD CKD-EPI 2021: 58 ML/MIN/1.73M2
ERYTHROCYTE [DISTWIDTH] IN BLOOD BY AUTOMATED COUNT: 12.7 % (ref 10–15)
GLUCOSE SERPL-MCNC: 101 MG/DL (ref 70–99)
HCO3 SERPL-SCNC: 24 MMOL/L (ref 22–29)
HCT VFR BLD AUTO: 35.9 % (ref 40–53)
HGB BLD-MCNC: 11.9 G/DL (ref 13.3–17.7)
MCH RBC QN AUTO: 31 PG (ref 26.5–33)
MCHC RBC AUTO-ENTMCNC: 33.1 G/DL (ref 31.5–36.5)
MCV RBC AUTO: 94 FL (ref 78–100)
PLATELET # BLD AUTO: 265 10E3/UL (ref 150–450)
POTASSIUM SERPL-SCNC: 4.5 MMOL/L (ref 3.4–5.3)
RBC # BLD AUTO: 3.84 10E6/UL (ref 4.4–5.9)
SODIUM SERPL-SCNC: 141 MMOL/L (ref 135–145)
TACROLIMUS BLD-MCNC: 9.5 UG/L (ref 5–15)
TME LAST DOSE: NORMAL H
TME LAST DOSE: NORMAL H
WBC # BLD AUTO: 7 10E3/UL (ref 4–11)

## 2024-07-26 PROCEDURE — 80048 BASIC METABOLIC PNL TOTAL CA: CPT

## 2024-07-26 PROCEDURE — 85027 COMPLETE CBC AUTOMATED: CPT

## 2024-07-26 PROCEDURE — 36415 COLL VENOUS BLD VENIPUNCTURE: CPT

## 2024-07-26 PROCEDURE — 80197 ASSAY OF TACROLIMUS: CPT

## 2024-08-09 ENCOUNTER — LAB (OUTPATIENT)
Dept: LAB | Facility: CLINIC | Age: 24
End: 2024-08-09
Payer: COMMERCIAL

## 2024-08-09 DIAGNOSIS — Z98.890 OTHER SPECIFIED POSTPROCEDURAL STATES: ICD-10-CM

## 2024-08-09 DIAGNOSIS — Z94.0 KIDNEY TRANSPLANTED: ICD-10-CM

## 2024-08-09 DIAGNOSIS — Z20.828 CONTACT WITH AND (SUSPECTED) EXPOSURE TO OTHER VIRAL COMMUNICABLE DISEASES: ICD-10-CM

## 2024-08-09 DIAGNOSIS — Z48.298 AFTERCARE FOLLOWING ORGAN TRANSPLANT: ICD-10-CM

## 2024-08-09 DIAGNOSIS — Z94.0 KIDNEY REPLACED BY TRANSPLANT: ICD-10-CM

## 2024-08-09 DIAGNOSIS — Z79.899 ENCOUNTER FOR LONG-TERM CURRENT USE OF MEDICATION: ICD-10-CM

## 2024-08-09 LAB
ANION GAP SERPL CALCULATED.3IONS-SCNC: 11 MMOL/L (ref 7–15)
BUN SERPL-MCNC: 20.9 MG/DL (ref 6–20)
CALCIUM SERPL-MCNC: 9.8 MG/DL (ref 8.8–10.4)
CHLORIDE SERPL-SCNC: 106 MMOL/L (ref 98–107)
CREAT SERPL-MCNC: 1.31 MG/DL (ref 0.67–1.17)
EGFRCR SERPLBLD CKD-EPI 2021: 78 ML/MIN/1.73M2
ERYTHROCYTE [DISTWIDTH] IN BLOOD BY AUTOMATED COUNT: 13 % (ref 10–15)
GLUCOSE SERPL-MCNC: 123 MG/DL (ref 70–99)
HCO3 SERPL-SCNC: 23 MMOL/L (ref 22–29)
HCT VFR BLD AUTO: 35.6 % (ref 40–53)
HGB BLD-MCNC: 11.6 G/DL (ref 13.3–17.7)
MCH RBC QN AUTO: 30.2 PG (ref 26.5–33)
MCHC RBC AUTO-ENTMCNC: 32.6 G/DL (ref 31.5–36.5)
MCV RBC AUTO: 93 FL (ref 78–100)
PLATELET # BLD AUTO: 244 10E3/UL (ref 150–450)
POTASSIUM SERPL-SCNC: 4.5 MMOL/L (ref 3.4–5.3)
RBC # BLD AUTO: 3.84 10E6/UL (ref 4.4–5.9)
SODIUM SERPL-SCNC: 140 MMOL/L (ref 135–145)
TACROLIMUS BLD-MCNC: 5.3 UG/L (ref 5–15)
TME LAST DOSE: NORMAL H
TME LAST DOSE: NORMAL H
WBC # BLD AUTO: 7.4 10E3/UL (ref 4–11)

## 2024-08-09 PROCEDURE — 87799 DETECT AGENT NOS DNA QUANT: CPT

## 2024-08-09 PROCEDURE — 85027 COMPLETE CBC AUTOMATED: CPT

## 2024-08-09 PROCEDURE — 80048 BASIC METABOLIC PNL TOTAL CA: CPT

## 2024-08-09 PROCEDURE — 36415 COLL VENOUS BLD VENIPUNCTURE: CPT

## 2024-08-09 PROCEDURE — 80197 ASSAY OF TACROLIMUS: CPT

## 2024-08-09 PROCEDURE — 86832 HLA CLASS I HIGH DEFIN QUAL: CPT

## 2024-08-09 PROCEDURE — 86833 HLA CLASS II HIGH DEFIN QUAL: CPT

## 2024-08-10 LAB
BK VIRUS SPECIMEN TYPE: NORMAL
BKV DNA # SPEC NAA+PROBE: NOT DETECTED IU/ML
CMV DNA SPEC NAA+PROBE-ACNC: NOT DETECTED IU/ML
EBV DNA SERPL NAA+PROBE-ACNC: NOT DETECTED IU/ML

## 2024-08-12 ENCOUNTER — MYC MEDICAL ADVICE (OUTPATIENT)
Dept: TRANSPLANT | Facility: CLINIC | Age: 24
End: 2024-08-12

## 2024-08-16 ENCOUNTER — VIRTUAL VISIT (OUTPATIENT)
Dept: SLEEP MEDICINE | Facility: CLINIC | Age: 24
End: 2024-08-16
Payer: COMMERCIAL

## 2024-08-16 DIAGNOSIS — E66.812 CLASS 2 SEVERE OBESITY DUE TO EXCESS CALORIES WITH SERIOUS COMORBIDITY AND BODY MASS INDEX (BMI) OF 39.0 TO 39.9 IN ADULT (H): ICD-10-CM

## 2024-08-16 DIAGNOSIS — R06.83 SNORING: ICD-10-CM

## 2024-08-16 DIAGNOSIS — G47.19 EXCESSIVE DAYTIME SLEEPINESS: Primary | ICD-10-CM

## 2024-08-16 DIAGNOSIS — Z94.0 KIDNEY REPLACED BY TRANSPLANT: Chronic | ICD-10-CM

## 2024-08-16 DIAGNOSIS — E66.01 CLASS 2 SEVERE OBESITY DUE TO EXCESS CALORIES WITH SERIOUS COMORBIDITY AND BODY MASS INDEX (BMI) OF 39.0 TO 39.9 IN ADULT (H): ICD-10-CM

## 2024-08-16 PROBLEM — H40.053 RAISED INTRAOCULAR PRESSURE OF BOTH EYES: Status: ACTIVE | Noted: 2024-02-06

## 2024-08-16 PROBLEM — H30.23 INTERMEDIATE UVEITIS OF BOTH EYES: Status: ACTIVE | Noted: 2024-02-06

## 2024-08-16 PROCEDURE — 99203 OFFICE O/P NEW LOW 30 MIN: CPT | Mod: 95 | Performed by: INTERNAL MEDICINE

## 2024-08-16 ASSESSMENT — ANXIETY QUESTIONNAIRES
1. FEELING NERVOUS, ANXIOUS, OR ON EDGE: MORE THAN HALF THE DAYS
3. WORRYING TOO MUCH ABOUT DIFFERENT THINGS: SEVERAL DAYS
GAD7 TOTAL SCORE: 11
GAD7 TOTAL SCORE: 11
2. NOT BEING ABLE TO STOP OR CONTROL WORRYING: MORE THAN HALF THE DAYS
5. BEING SO RESTLESS THAT IT IS HARD TO SIT STILL: NEARLY EVERY DAY
GAD7 TOTAL SCORE: 11
IF YOU CHECKED OFF ANY PROBLEMS ON THIS QUESTIONNAIRE, HOW DIFFICULT HAVE THESE PROBLEMS MADE IT FOR YOU TO DO YOUR WORK, TAKE CARE OF THINGS AT HOME, OR GET ALONG WITH OTHER PEOPLE: VERY DIFFICULT
7. FEELING AFRAID AS IF SOMETHING AWFUL MIGHT HAPPEN: SEVERAL DAYS
4. TROUBLE RELAXING: SEVERAL DAYS
7. FEELING AFRAID AS IF SOMETHING AWFUL MIGHT HAPPEN: SEVERAL DAYS
8. IF YOU CHECKED OFF ANY PROBLEMS, HOW DIFFICULT HAVE THESE MADE IT FOR YOU TO DO YOUR WORK, TAKE CARE OF THINGS AT HOME, OR GET ALONG WITH OTHER PEOPLE?: VERY DIFFICULT
6. BECOMING EASILY ANNOYED OR IRRITABLE: SEVERAL DAYS

## 2024-08-16 ASSESSMENT — SLEEP AND FATIGUE QUESTIONNAIRES
HOW LIKELY ARE YOU TO NOD OFF OR FALL ASLEEP WHILE SITTING AND READING: WOULD NEVER DOZE
HOW LIKELY ARE YOU TO NOD OFF OR FALL ASLEEP WHEN YOU ARE A PASSENGER IN A CAR FOR AN HOUR WITHOUT A BREAK: SLIGHT CHANCE OF DOZING
HOW LIKELY ARE YOU TO NOD OFF OR FALL ASLEEP WHILE SITTING QUIETLY AFTER LUNCH WITHOUT ALCOHOL: SLIGHT CHANCE OF DOZING
HOW LIKELY ARE YOU TO NOD OFF OR FALL ASLEEP WHILE SITTING INACTIVE IN A PUBLIC PLACE: WOULD NEVER DOZE
HOW LIKELY ARE YOU TO NOD OFF OR FALL ASLEEP WHILE LYING DOWN TO REST IN THE AFTERNOON WHEN CIRCUMSTANCES PERMIT: HIGH CHANCE OF DOZING
HOW LIKELY ARE YOU TO NOD OFF OR FALL ASLEEP IN A CAR, WHILE STOPPED FOR A FEW MINUTES IN TRAFFIC: WOULD NEVER DOZE
HOW LIKELY ARE YOU TO NOD OFF OR FALL ASLEEP WHILE SITTING AND TALKING TO SOMEONE: WOULD NEVER DOZE
HOW LIKELY ARE YOU TO NOD OFF OR FALL ASLEEP WHILE WATCHING TV: SLIGHT CHANCE OF DOZING
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
## 2024-08-16 NOTE — PROGRESS NOTES
Pt Comprehensive sleep study order and today's visit note faxed to JAMAL Gray for scheduling f-[phone]  Hardeep Daniel CMA

## 2024-08-16 NOTE — TELEPHONE ENCOUNTER
Issue tacrolimus  level 5.3  slightly below goal level 6 to 8          Boogie Villa, Nina GANNON RN  Phone Number: [phone]     I am currently taking 7, I did miss a dose the morning before so that may explain why it was lower than expected

## 2024-08-16 NOTE — PROGRESS NOTES
"Boogie is a 24 year old who is being evaluated via a billable video visit.    How would you like to obtain your AVS? MyChart  If the video visit is dropped, the invitation should be resent by: Text to cell phone: [phone]  Will anyone else be joining your video visit? No  {If patient encounters technical issues they should call [phone] :407854}    {PROVIDER CHARTING PREFERENCE:645313}    Subjective   Boogie is a 24 year old, presenting for the following health issues:  Sleep Problem (New pt needing a sleep consult)    Video Start Time: {video visit start/end time for provider to select:729070}    HPI     ***    {ROS Picklists (Optional):798635}      Objective           Vitals:  No vitals were obtained today due to virtual visit.    Physical Exam   GENERAL: alert and no distress  EYES: Eyes grossly normal to inspection.  No discharge or erythema, or obvious scleral/conjunctival abnormalities.  RESP: No audible wheeze, cough, or visible cyanosis.    SKIN: Visible skin clear. No significant rash, abnormal pigmentation or lesions.  NEURO: Cranial nerves grossly intact.  Mentation and speech appropriate for age.  PSYCH: Appropriate affect, tone, and pace of words    {Diagnostic Test Results (Optional):515546}      Video-Visit Details    Type of service:  Video Visit   Video End Time:{video visit start/end time for provider to select:152948}  Originating Location (pt. Location): {video visit patient location:220140::\"Home\"}  {PROVIDER LOCATION On-site should be selected for visits conducted from your clinic location or adjoining [facility] hospital, academic office, or other nearby [facility] building. Off-site should be selected for all other provider locations, including home:844664}  Distant Location (provider location):  {virtual location provider:640418}  Platform used for Video Visit: {Virtual Visit Platforms:477245::\"Smart Cube\"}  Signed Electronically by: Michel Scanlon MD  {Email feedback regarding this note to " [email or web address]   :204589}  Answers submitted by the patient for this visit:  AMMON-7 (Submitted on 8/16/2024)  AMMON 7 TOTAL SCORE: 11

## 2024-08-19 ENCOUNTER — TELEPHONE (OUTPATIENT)
Dept: FAMILY MEDICINE | Facility: CLINIC | Age: 24
End: 2024-08-19

## 2024-08-19 NOTE — TELEPHONE ENCOUNTER
General Call    Contacts       Contact Date/Time Type Contact Phone/Fax    08/19/2024 03:17 PM CDT Phone (Incoming) Baylee calling from [facility] (Other) [phone]          Reason for Call:  Baylee calling from [facility] in [place] about Comprehensive Sleep Study Order and Visit notes - says she received the faxes all chopped up. Can this be re-faxed? I am trying to find the comprehensive sleep study order but unable to find them in Other Orders tab or referrals tabs so I was unable to fax this.   Please fax visit notes and orders to [phone]

## 2024-08-20 NOTE — TELEPHONE ENCOUNTER
All requested forms were refaxed to [facility] in [place]. [phone]  Hardeep Daniel CMA

## 2024-09-16 ENCOUNTER — TELEPHONE (OUTPATIENT)
Dept: TRANSPLANT | Facility: CLINIC | Age: 24
End: 2024-09-16

## 2024-09-16 NOTE — TELEPHONE ENCOUNTER
General  Route to LPN    Reason for call: Pt has questions about his lab     Call back needed? Yes    Return Call Needed  Same as documented in contacts section  When to return call?: Greater than one day: Route standard priority

## 2024-09-20 ENCOUNTER — LAB (OUTPATIENT)
Dept: LAB | Facility: CLINIC | Age: 24
End: 2024-09-20
Payer: COMMERCIAL

## 2024-09-20 DIAGNOSIS — Z20.828 CONTACT WITH AND (SUSPECTED) EXPOSURE TO OTHER VIRAL COMMUNICABLE DISEASES: ICD-10-CM

## 2024-09-20 DIAGNOSIS — Z98.890 OTHER SPECIFIED POSTPROCEDURAL STATES: ICD-10-CM

## 2024-09-20 DIAGNOSIS — Z48.298 AFTERCARE FOLLOWING ORGAN TRANSPLANT: ICD-10-CM

## 2024-09-20 DIAGNOSIS — Z94.0 KIDNEY REPLACED BY TRANSPLANT: ICD-10-CM

## 2024-09-20 DIAGNOSIS — Z94.0 KIDNEY TRANSPLANTED: ICD-10-CM

## 2024-09-20 DIAGNOSIS — Z79.899 ENCOUNTER FOR LONG-TERM CURRENT USE OF MEDICATION: ICD-10-CM

## 2024-09-20 LAB
ANION GAP SERPL CALCULATED.3IONS-SCNC: 14 MMOL/L (ref 7–15)
BUN SERPL-MCNC: 22.3 MG/DL (ref 6–20)
CALCIUM SERPL-MCNC: 9.8 MG/DL (ref 8.8–10.4)
CHLORIDE SERPL-SCNC: 103 MMOL/L (ref 98–107)
CREAT SERPL-MCNC: 1.66 MG/DL (ref 0.67–1.17)
EGFRCR SERPLBLD CKD-EPI 2021: 59 ML/MIN/1.73M2
ERYTHROCYTE [DISTWIDTH] IN BLOOD BY AUTOMATED COUNT: 13 % (ref 10–15)
GLUCOSE SERPL-MCNC: 118 MG/DL (ref 70–99)
HCO3 SERPL-SCNC: 21 MMOL/L (ref 22–29)
HCT VFR BLD AUTO: 35.6 % (ref 40–53)
HGB BLD-MCNC: 11.5 G/DL (ref 13.3–17.7)
MCH RBC QN AUTO: 29.7 PG (ref 26.5–33)
MCHC RBC AUTO-ENTMCNC: 32.3 G/DL (ref 31.5–36.5)
MCV RBC AUTO: 92 FL (ref 78–100)
PLATELET # BLD AUTO: 257 10E3/UL (ref 150–450)
POTASSIUM SERPL-SCNC: 4.3 MMOL/L (ref 3.4–5.3)
RBC # BLD AUTO: 3.87 10E6/UL (ref 4.4–5.9)
SODIUM SERPL-SCNC: 138 MMOL/L (ref 135–145)
TACROLIMUS BLD-MCNC: 8.5 UG/L (ref 5–15)
TME LAST DOSE: NORMAL H
TME LAST DOSE: NORMAL H
WBC # BLD AUTO: 5.8 10E3/UL (ref 4–11)

## 2024-09-20 PROCEDURE — 80048 BASIC METABOLIC PNL TOTAL CA: CPT

## 2024-09-20 PROCEDURE — 80197 ASSAY OF TACROLIMUS: CPT

## 2024-09-20 PROCEDURE — 85027 COMPLETE CBC AUTOMATED: CPT

## 2024-09-20 PROCEDURE — 86832 HLA CLASS I HIGH DEFIN QUAL: CPT

## 2024-09-20 PROCEDURE — 87799 DETECT AGENT NOS DNA QUANT: CPT

## 2024-09-20 PROCEDURE — 86833 HLA CLASS II HIGH DEFIN QUAL: CPT

## 2024-09-20 PROCEDURE — 36415 COLL VENOUS BLD VENIPUNCTURE: CPT

## 2024-09-21 LAB — CMV DNA SPEC NAA+PROBE-ACNC: NOT DETECTED IU/ML

## 2024-09-22 LAB
BK VIRUS SPECIMEN TYPE: NORMAL
BKV DNA # SPEC NAA+PROBE: NOT DETECTED IU/ML
EBV DNA SERPL NAA+PROBE-ACNC: NOT DETECTED IU/ML

## 2024-09-24 ENCOUNTER — TELEPHONE (OUTPATIENT)
Dept: TRANSPLANT | Facility: CLINIC | Age: 24
End: 2024-09-24
Payer: COMMERCIAL

## 2024-09-24 ENCOUNTER — OFFICE VISIT (OUTPATIENT)
Dept: TRANSPLANT | Facility: CLINIC | Age: 24
End: 2024-09-24
Attending: SURGERY
Payer: COMMERCIAL

## 2024-09-24 VITALS
DIASTOLIC BLOOD PRESSURE: 77 MMHG | HEART RATE: 92 BPM | SYSTOLIC BLOOD PRESSURE: 119 MMHG | OXYGEN SATURATION: 96 % | BODY MASS INDEX: 45.05 KG/M2 | WEIGHT: 315 LBS

## 2024-09-24 DIAGNOSIS — Z94.0 KIDNEY REPLACED BY TRANSPLANT: Primary | ICD-10-CM

## 2024-09-24 PROCEDURE — 99213 OFFICE O/P EST LOW 20 MIN: CPT | Performed by: NURSE PRACTITIONER

## 2024-09-24 RX ORDER — TACROLIMUS 1 MG/1
2 CAPSULE ORAL 2 TIMES DAILY
Qty: 180 CAPSULE | Refills: 11 | Status: SHIPPED | OUTPATIENT
Start: 2024-09-24

## 2024-09-24 RX ORDER — TACROLIMUS 5 MG/1
5 CAPSULE ORAL 2 TIMES DAILY
Qty: 60 CAPSULE | Refills: 11 | Status: SHIPPED | OUTPATIENT
Start: 2024-09-24

## 2024-09-24 NOTE — LETTER
9/24/2024      Boogie Villa  [address] [place] [id number]      Dear Colleague,    Thank you for referring your patient, Boogie Villa, to the [facility] TRANSPLANT [facility]. Please see a copy of my visit note below.    Dialysis Access Service   Progress Note    S:  Mr. Villa is being seen today for surgical followup of his dialysis access.  He reports no issues with the wound, and  no steal syndrome of the distal extremity. He is worried his fistula moved     O:  Pulse:  [92] 92  BP: (119)/(77) 119/77  SpO2:  [96 %] 96 %  GENERAL: healthy  Circulation:   Radial pulse Fistula present  Ulnar pulse  2+   Capillary refill:  capillary refill brisk    Sensory exam:   arm: Normal   [x]           Abnormal   []          Comment:    hand: Normal   [x]           Abnormal   []          Comment:   Motor exam:   arm: Normal   [x]           Abnormal   []          Comment:    hand: Normal   [x]           Abnormal   []          Comment:    Access: R upper extremity wound(s) healed. non-tender. capillary refill brisk     Assessment & Plan: Mr. Villa's dialysis access has matured very well at this time point.      Patients fistula did not migrate.  Recommended weight loss for overall health and well being     We would like to see the patient back in the clinic as directed to assess progress. The patient was counselled to contact one of the nurse coordinators, Kathy Medley RN or Ivis Springer RN at [phone] with any questions or concerns.  Thank you for the opportunity to participate in Mr. Villa's care.    TT: 20 min  CT: 10 min           Again, thank you for allowing me to participate in the care of your patient.        Sincerely,        Ivis Monzon NP

## 2024-09-24 NOTE — PROGRESS NOTES
Dialysis Access Service   Progress Note    S:  Mr. Villa is being seen today for surgical followup of his dialysis access.  He reports no issues with the wound, and  no steal syndrome of the distal extremity. He is worried his fistula moved     O:  Pulse:  [92] 92  BP: (119)/(77) 119/77  SpO2:  [96 %] 96 %  GENERAL: healthy  Circulation:   Radial pulse Fistula present  Ulnar pulse  2+   Capillary refill:  capillary refill brisk    Sensory exam:   arm: Normal   [x]           Abnormal   []          Comment:    hand: Normal   [x]           Abnormal   []          Comment:   Motor exam:   arm: Normal   [x]           Abnormal   []          Comment:    hand: Normal   [x]           Abnormal   []          Comment:    Access: R upper extremity wound(s) healed. non-tender. capillary refill brisk     Assessment & Plan: Mr. Villa's dialysis access has matured very well at this time point.      Patients fistula did not migrate.  Recommended weight loss for overall health and well being     We would like to see the patient back in the clinic as directed to assess progress. The patient was counselled to contact one of the nurse coordinators, Katyh Medley RN or Ivis Springer RN at [phone] with any questions or concerns.  Thank you for the opportunity to participate in Mr. Villa's care.    TT: 20 min  CT: 10 min

## 2024-09-24 NOTE — TELEPHONE ENCOUNTER
ISSUE:   Tacrolimus IR level 8.5 on 09/20/24, goal 6-8, dose 7 mg BID.    PLAN:   Called patient, who reports he has been taking 7 mg BID tacrolimus. Med list updated. Patient reports this was not an accurate 12 hour trough, was less. No dose change made. Plan to repeat labs in 1 week and assure we have a 12 hour trough. Educated patient on importance of having an accurate trough to dose medication.   Repeat labs in 1 weeks.     OUTCOME:   Repeat labs in 1 week    Heidy Guido RN   Transplant Coordinator  [phone]

## 2024-10-03 ENCOUNTER — LAB (OUTPATIENT)
Dept: LAB | Facility: CLINIC | Age: 24
End: 2024-10-03
Attending: INTERNAL MEDICINE
Payer: COMMERCIAL

## 2024-10-03 ENCOUNTER — OFFICE VISIT (OUTPATIENT)
Dept: TRANSPLANT | Facility: CLINIC | Age: 24
End: 2024-10-03
Attending: INTERNAL MEDICINE
Payer: MEDICARE

## 2024-10-03 VITALS
TEMPERATURE: 98 F | SYSTOLIC BLOOD PRESSURE: 115 MMHG | WEIGHT: 315 LBS | DIASTOLIC BLOOD PRESSURE: 77 MMHG | OXYGEN SATURATION: 97 % | HEART RATE: 107 BPM | BODY MASS INDEX: 44.6 KG/M2

## 2024-10-03 DIAGNOSIS — Z29.89 NEED FOR PNEUMOCYSTIS PROPHYLAXIS: ICD-10-CM

## 2024-10-03 DIAGNOSIS — D63.1 ANEMIA IN STAGE 3A CHRONIC KIDNEY DISEASE (H): ICD-10-CM

## 2024-10-03 DIAGNOSIS — N18.31 ANEMIA IN STAGE 3A CHRONIC KIDNEY DISEASE (H): ICD-10-CM

## 2024-10-03 DIAGNOSIS — Z48.298 AFTERCARE FOLLOWING ORGAN TRANSPLANT: Primary | ICD-10-CM

## 2024-10-03 DIAGNOSIS — Z48.298 AFTERCARE FOLLOWING ORGAN TRANSPLANT: ICD-10-CM

## 2024-10-03 DIAGNOSIS — E66.812 CLASS 2 SEVERE OBESITY DUE TO EXCESS CALORIES WITH SERIOUS COMORBIDITY AND BODY MASS INDEX (BMI) OF 39.0 TO 39.9 IN ADULT (H): ICD-10-CM

## 2024-10-03 DIAGNOSIS — Z94.0 HTN, KIDNEY TRANSPLANT RELATED: ICD-10-CM

## 2024-10-03 DIAGNOSIS — G47.9 SLEEP DISORDER: ICD-10-CM

## 2024-10-03 DIAGNOSIS — N18.31 STAGE 3A CHRONIC KIDNEY DISEASE (H): ICD-10-CM

## 2024-10-03 DIAGNOSIS — Z20.828 CONTACT WITH AND (SUSPECTED) EXPOSURE TO OTHER VIRAL COMMUNICABLE DISEASES: ICD-10-CM

## 2024-10-03 DIAGNOSIS — Z79.899 ENCOUNTER FOR LONG-TERM CURRENT USE OF MEDICATION: ICD-10-CM

## 2024-10-03 DIAGNOSIS — I15.1 HTN, KIDNEY TRANSPLANT RELATED: ICD-10-CM

## 2024-10-03 DIAGNOSIS — E87.20 METABOLIC ACIDOSIS: ICD-10-CM

## 2024-10-03 DIAGNOSIS — Z94.0 KIDNEY REPLACED BY TRANSPLANT: ICD-10-CM

## 2024-10-03 DIAGNOSIS — E66.01 CLASS 2 SEVERE OBESITY DUE TO EXCESS CALORIES WITH SERIOUS COMORBIDITY AND BODY MASS INDEX (BMI) OF 39.0 TO 39.9 IN ADULT (H): ICD-10-CM

## 2024-10-03 DIAGNOSIS — Z98.890 OTHER SPECIFIED POSTPROCEDURAL STATES: ICD-10-CM

## 2024-10-03 DIAGNOSIS — E83.42 HYPOMAGNESEMIA: ICD-10-CM

## 2024-10-03 DIAGNOSIS — D84.9 IMMUNOSUPPRESSED STATUS (H): Chronic | ICD-10-CM

## 2024-10-03 DIAGNOSIS — E55.9 VITAMIN D DEFICIENCY: ICD-10-CM

## 2024-10-03 LAB
ANION GAP SERPL CALCULATED.3IONS-SCNC: 12 MMOL/L (ref 7–15)
BK VIRUS SPECIMEN TYPE: NORMAL
BKV DNA # SPEC NAA+PROBE: NOT DETECTED IU/ML
BUN SERPL-MCNC: 20.3 MG/DL (ref 6–20)
CALCIUM SERPL-MCNC: 10 MG/DL (ref 8.8–10.4)
CHLORIDE SERPL-SCNC: 103 MMOL/L (ref 98–107)
CREAT SERPL-MCNC: 1.53 MG/DL (ref 0.67–1.17)
EGFRCR SERPLBLD CKD-EPI 2021: 65 ML/MIN/1.73M2
ERYTHROCYTE [DISTWIDTH] IN BLOOD BY AUTOMATED COUNT: 13.2 % (ref 10–15)
GLUCOSE SERPL-MCNC: 149 MG/DL (ref 70–99)
HCO3 SERPL-SCNC: 23 MMOL/L (ref 22–29)
HCT VFR BLD AUTO: 38 % (ref 40–53)
HGB BLD-MCNC: 12.1 G/DL (ref 13.3–17.7)
MCH RBC QN AUTO: 29.3 PG (ref 26.5–33)
MCHC RBC AUTO-ENTMCNC: 31.8 G/DL (ref 31.5–36.5)
MCV RBC AUTO: 92 FL (ref 78–100)
PLATELET # BLD AUTO: 281 10E3/UL (ref 150–450)
POTASSIUM SERPL-SCNC: 3.9 MMOL/L (ref 3.4–5.3)
RBC # BLD AUTO: 4.13 10E6/UL (ref 4.4–5.9)
SODIUM SERPL-SCNC: 138 MMOL/L (ref 135–145)
TACROLIMUS BLD-MCNC: 6.3 UG/L (ref 5–15)
TME LAST DOSE: NORMAL H
TME LAST DOSE: NORMAL H
WBC # BLD AUTO: 7.9 10E3/UL (ref 4–11)

## 2024-10-03 PROCEDURE — 99214 OFFICE O/P EST MOD 30 MIN: CPT | Performed by: INTERNAL MEDICINE

## 2024-10-03 PROCEDURE — 99000 SPECIMEN HANDLING OFFICE-LAB: CPT | Performed by: PATHOLOGY

## 2024-10-03 PROCEDURE — 80197 ASSAY OF TACROLIMUS: CPT | Performed by: INTERNAL MEDICINE

## 2024-10-03 PROCEDURE — 36415 COLL VENOUS BLD VENIPUNCTURE: CPT | Performed by: PATHOLOGY

## 2024-10-03 PROCEDURE — G0463 HOSPITAL OUTPT CLINIC VISIT: HCPCS | Performed by: INTERNAL MEDICINE

## 2024-10-03 PROCEDURE — 87799 DETECT AGENT NOS DNA QUANT: CPT | Performed by: INTERNAL MEDICINE

## 2024-10-03 PROCEDURE — 85027 COMPLETE CBC AUTOMATED: CPT | Performed by: PATHOLOGY

## 2024-10-03 PROCEDURE — G2211 COMPLEX E/M VISIT ADD ON: HCPCS | Performed by: INTERNAL MEDICINE

## 2024-10-03 PROCEDURE — 80048 BASIC METABOLIC PNL TOTAL CA: CPT | Performed by: PATHOLOGY

## 2024-10-03 RX ORDER — DEXTROAMPHETAMINE SACCHARATE, AMPHETAMINE ASPARTATE MONOHYDRATE, DEXTROAMPHETAMINE SULFATE AND AMPHETAMINE SULFATE 3.75; 3.75; 3.75; 3.75 MG/1; MG/1; MG/1; MG/1
1 CAPSULE, EXTENDED RELEASE ORAL DAILY
COMMUNITY
Start: 2024-09-13

## 2024-10-03 RX ORDER — PREDNISONE 1 MG/1
2 TABLET ORAL DAILY
COMMUNITY
Start: 2024-10-03 | End: 2024-11-11

## 2024-10-03 ASSESSMENT — PAIN SCALES - GENERAL: PAINLEVEL: NO PAIN (0)

## 2024-10-03 NOTE — LETTER
10/3/2024      Boogie Villa  [address] [place] [id number]      Dear Colleague,    Thank you for referring your patient, Boogie Villa, to the [facility] TRANSPLANT CL[facility]. Please see a copy of my visit note below.    TRANSPLANT NEPHROLOGY CLINIC VISIT     Assessment & Plan  # LDKT: CKD Stage 3a - Stable   - Baseline Creatinine: ~ 1.5-1.8   - Proteinuria: Minimal (0.2-0.5 grams)   - DSA Hx: No DSA   - Last cPRA: 0%   - BK Viremia: No   - Kidney Tx Biopsy Hx: Acute cellular-mediated rejection and Moderate vascular changes.    # Immunosuppression: Tacrolimus immediate release (goal 6-8), Mycophenolate mofetil (dose 1000 mg every 12 hours), and Prednisone (dose 3 mg daily)   - Induction with Recent Transplant:  Low Intensity Protocol   - Continue with intensive monitoring of immunosuppression for efficacy and toxicity.   - Historical Changes in Immunosuppression:  Patient was started on prednisone due to h/o acute rejection.  However, with obesity and elevated blood glucose, prednisone is being tapered down.   - Changes: Yes - Okay to decrease prednisone down to 2 mg daily now.  If tolerated, will plan to decrease prednisone down to 1 mg daily at next clinic visit with plans to stop it following that.    # Infection Prevention:      Last CD4 Level: 150 (Jul/2024)  - PJP: Sulfa/TMP (Bactrim)  - CMV: None, prophylaxis completed; Patient is CMV IgG Ab discordant (D+/R-) and completed Valcyte prophylaxis x 6 months.  Will check CMV PCR monthly until 12 months post transplant.  - CMV IgG Ab High Risk Discordance (D+/R-): Yes  CMV Serostatus: Negative  - EBV IgG Ab High Risk Discordance (D+/R-): Yes  EBV Serostatus: Negative    # Hypertension: Controlled;  Goal BP: < 130/80   - Changes: No    # Elevated Blood Glucose: Glucose generally running ~ 100-110s Last HbA1c: 5.8%    # Anemia in Chronic Renal Disease: Hgb: Stable      TOM: No   - Iron studies: Replete    # Mineral Bone Disorder:    - Secondary  renal hyperparathyroidism; PTH level: Minimally elevated ( pg/ml)        On treatment: None  - Vitamin D; level: Low        On supplement: Yes  - Calcium; level: Normal        On supplement: No    # Electrolytes:   - Potassium; level: Normal        On supplement: No  - Magnesium; level: Stable low        On supplement: Yes  - Bicarbonate; level: Stable low        On supplement: Yes    # EBV Viremia: Stable, 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  EBV IgG Ab negative and patient was EBV IgG Ab Discordance (D+/R-) at time of transplant.  IgG level is normal.   - Will continue to follow EBV PCR every monthly.    # Obesity, Class III (BMI = 44.6): Weight has been up and down a bit lately.   - Recommend weight loss for overall health by increasing exercise and watching caloric intake.  - Patient may benefit from GLP1 agonists or SGLT2 inhibitors.  - Patient is being followed by Weight Management Program.    # Other Significant PMH:  - H/o Ureteral Reimplantation, s/p Mitrofanoff: Patient reports not using for Mitrofanoff in a couple of years and voids normally on his own.  Mitrofanoff is nonfunctional at this time.  - H/o Bilateral Uveitis: No evidence of systemic autoimmune/inflammatory disease at last Rheumatology.   - Sleep Disorder: Patient was seen in Sleep Clinic and being scheduled for sleep study.  - Depression/Anxiety/ADHD: Patient reports mood is stable.  Continues on sertraline and now recently started on Adderall.     # Skin Cancer Risk:    - Discussed sun protection and recommend regular follow up with Dermatology.    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    Return visit:  Return for previously scheduled visit.    Abhilash Raza MD    The longitudinal plan of care for the diagnosis(es)/condition(s) as documented were addressed during this visit. Due to the added complexity in care, I will continue to support Boogie in the subsequent management and with ongoing continuity of care.      Chief Complaint  Mr. Villa is a 24 year old here for kidney transplant and immunosuppression management.     History of Present Illness   Mr. Villa reports feeling good overall.  Since last clinic visit:   Hospitalizations: No   New Medical Issues: No  Chest pain or shortness of breath: Yes; No chest pain, but some shortness of breath with exertion, as he feels deconditioned.  Lower extremity swelling: No  Weight change: Yes; Weight has been a bit up and down.  Started working with Weight Management Program.  Nausea and vomiting: No  Diarrhea: Yes; Occasional loose stools, maybe 1-2x per week.  Heartburn symptoms: No  Fever, sweats or chills: No  Urinary complaints: No    Home BP:  120/80s    Problem List  Patient Active Problem List   Diagnosis     Lymphangioma     ADHD (attention deficit hyperactivity disorder)     Allergic rhinitis due to pollen     Anxiety     Congenital posterior urethral valves     Iron deficiency     Neurogenic bladder     Class 2 severe obesity due to excess calories with serious comorbidity and body mass index (BMI) of 39.0 to 39.9 in adult (H)     Sensorineural hearing loss, asymmetrical     HTN, kidney transplant related     Secondary renal hyperparathyroidism (H)     Vitamin D deficiency     Kidney replaced by transplant     Immunosuppressed status (H)     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     Stage 3a chronic kidney disease (H)     Aftercare following organ transplant     Need for pneumocystis prophylaxis     Anemia in chronic renal disease     Metabolic acidosis     Hypomagnesemia     Kidney transplant rejection     GERD  (gastroesophageal reflux disease)     EBV (Dai-Barr virus) viremia     Pre-diabetes     Steroid-induced hyperglycemia     Sleep disorder     Intermediate uveitis of both eyes     Raised intraocular pressure of both eyes       Allergies  Allergies   Allergen Reactions     Banana Itching     Raw banana; itchy mouth       Dust Mites      Runny nose and watery eyes     Mold      Runny nose and watery eyes     Nsaids Other (See Comments)     CKD     Other [Seasonal Allergies]      Grass, Ragweed - gets runny nose and watery eyes     Sulfa Antibiotics      PN: LW Reaction: GI Upset as an infant  12/28 Admission: Tolerated Bactrim       Medications  Current Outpatient Medications   Medication Sig Dispense Refill     acetaminophen (TYLENOL) 325 MG tablet Take 1-2 tablets (325-650 mg) by mouth every 4 hours as needed (For optimal non-opioid multimodal pain management to improve pain control.)       Alcohol Swabs PADS Use to swab the area of the injection or janny as directed Per insurance coverage 200 each 1     amLODIPine (NORVASC) 10 MG tablet Take 1 tablet (10 mg) by mouth daily 30 tablet 11     amphetamine-dextroamphetamine (ADDERALL XR) 15 MG 24 hr capsule Take 1 capsule by mouth daily.       carvedilol (COREG) 12.5 MG tablet Take 1 tablet (12.5 mg) by mouth 2 times daily 180 tablet 3     cetirizine (ZYRTEC) 10 MG tablet Take 10 mg by mouth daily as needed for allergies (in the spring)       cholecalciferol (VITAMIN D3) 25 mcg (1000 units) capsule Take 1 capsule (25 mcg) by mouth daily       losartan (COZAAR) 25 MG tablet Take 1 tablet (25 mg) by mouth every evening 30 tablet 11     magnesium oxide (MAG-OX) 400 MG tablet Take 2 tablets (800 mg) by mouth daily (with lunch) 60 tablet 3     mycophenolate (GENERIC EQUIVALENT) 250 MG capsule Take 4 capsules (1,000 mg) by mouth 2 times daily 240 capsule 11     predniSONE (DELTASONE) 1 MG tablet Take 2 tablets (2 mg) by mouth daily.       sertraline (ZOLOFT) 25 MG tablet  Take 150 mg by mouth daily.       Sharps Container MISC Use as directed to dispose of needles, lancets and other sharps 1 each 1     sodium bicarbonate 650 MG tablet Take 3 tablets (1,950 mg) by mouth 3 times daily 270 tablet 11     sulfamethoxazole-trimethoprim (BACTRIM) 400-80 MG tablet Take 1 tablet by mouth daily 30 tablet 11     tacrolimus (GENERIC EQUIVALENT) 1 MG capsule Take 2 capsules (2 mg) by mouth 2 times daily. Total dose = 7 mg twice daily 180 capsule 11     tacrolimus (GENERIC EQUIVALENT) 5 MG capsule Take 1 capsule (5 mg) by mouth 2 times daily. Total dose = 7 mg twice daily 60 capsule 11     No current facility-administered medications for this visit.     There are no discontinued medications.      Physical Exam  Vital Signs: /77   Pulse 107   Temp 98  F (36.7  C) (Oral)   Wt 145.1 kg (319 lb 12.8 oz)   SpO2 97%   BMI 44.60 kg/m      GENERAL APPEARANCE: alert and no distress  HENT: mouth without ulcers or lesions  RESP: lungs clear to auscultation - no rales, rhonchi or wheezes  CV: regular rhythm, normal rate, no rub, no murmur  EDEMA: no LE edema bilaterally  ABDOMEN: soft, nondistended, nontender, bowel sounds normal, morbidly obese  MS: extremities normal - no gross deformities noted, no evidence of inflammation in joints, no muscle tenderness  SKIN: no rash  TX KIDNEY: normal  DIALYSIS ACCESS:  None    Data        Latest Ref Rng & Units 10/3/2024    11:00 AM 9/20/2024    10:33 AM 8/9/2024    10:21 AM   Renal   Sodium 135 - 145 mmol/L 138  138  140    K 3.4 - 5.3 mmol/L 3.9  4.3  4.5    Cl 98 - 107 mmol/L 103  103  106    Cl (external) 98 - 107 mmol/L 103  103  106    CO2 22 - 29 mmol/L 23  21  23    Urea Nitrogen 6.0 - 20.0 mg/dL 20.3  22.3  20.9    Creatinine 0.67 - 1.17 mg/dL 1.53  1.66  1.31    Glucose 70 - 99 mg/dL 149  118  123    Calcium 8.8 - 10.4 mg/dL 10.0  9.8  9.8          Latest Ref Rng & Units 7/1/2024    11:00 AM 6/13/2024    10:19 AM 5/13/2024    10:38 AM   Bone Health    Phosphorus 2.5 - 4.5 mg/dL 2.5  2.7     Parathyroid Hormone Intact 15 - 65 pg/mL   126          Latest Ref Rng & Units 10/3/2024    11:00 AM 9/20/2024    10:33 AM 8/9/2024    10:21 AM   Heme   WBC 4.0 - 11.0 10e3/uL 7.9  5.8  7.4    Hgb 13.3 - 17.7 g/dL 12.1  11.5  11.6    Plt 150 - 450 10e3/uL 281  257  244          Latest Ref Rng & Units 7/9/2024    10:24 AM 12/19/2023     9:53 AM 7/13/2023     1:28 PM   Liver   AP 40 - 150 U/L 66  67     AP (external) 40 - 150 U/L   45    TBili <=1.2 mg/dL 0.3  0.5     TBili (external) 0.2 - 1.2 mg/dL   0.6    Bilirubin Direct 0.00 - 0.30 mg/dL <0.20      ALT 0 - 70 U/L 46  41     ALT (external) <=55 U/L   35    AST 0 - 45 U/L 21  27     AST (external) 10 - 40 U/L   26    Tot Protein 6.4 - 8.3 g/dL 7.0  7.6     Tot Protein (external) 6.4 - 8.3 g/dL   7.5    Albumin 3.5 - 5.2 g/dL 4.6  4.4     Albumin (external) 3.5 - 5.0 g/dL   3.8          Latest Ref Rng & Units 7/9/2024    10:24 AM 2/28/2024     1:13 PM 12/19/2023     9:53 AM   Pancreas   A1C 0.0 - 5.6 % 5.8  5.7  5.2          Latest Ref Rng & Units 5/21/2024    10:28 AM 1/7/2024     7:50 AM 12/19/2023     9:53 AM   Iron studies   Iron 61 - 157 ug/dL 77  167  104    Iron Sat Index 15 - 46 % 32  79  42    Ferritin 31 - 409 ng/mL  783  826          Latest Ref Rng & Units 5/21/2024    10:28 AM 4/1/2024    10:39 AM 3/25/2024    10:22 AM   [facility] Txp Virology   EBV CAPSID ANTIBODY IGG No detectable antibody. No detectable antibody.      EBV DNA LOG OF COPIES   3.8  3.9      Failed to redirect to the Timeline version of the REVFS SmartLink.  Recent Labs   Lab Test 08/09/24  1021 09/20/24  1033 10/03/24  1100   DOSTAC 8/8/2024 9/19/2024 10/2/2024   TACROL 5.3 8.5 6.3     Recent Labs   Lab Test 01/11/24  0950 01/29/24  0937 02/01/24  1008 02/12/24  1030 02/24/24  1008   DOSMPA 1/10/2024   9:30 PM 1/28/2024   9:30 PM  --   --   --    MPACID 3.79* 3.73* 3.87* 2.23 2.65   MPAG 71.7 63.8 79.1 68.1 141.7*          Again, thank you for  allowing me to participate in the care of your patient.        Sincerely,        Abhilash Raza MD

## 2024-10-03 NOTE — NURSING NOTE
Chief Complaint   Patient presents with    RECHECK       /77   Pulse 107   Temp 98  F (36.7  C) (Oral)   Wt 145.1 kg (319 lb 12.8 oz)   SpO2 97%   BMI 44.60 kg/m      Yandel Lucas on 10/3/2024 at 11:02 AM

## 2024-10-03 NOTE — PROGRESS NOTES
TRANSPLANT NEPHROLOGY CLINIC VISIT     Assessment & Plan   # LDKT: CKD Stage 3a - Stable   - Baseline Creatinine: ~ 1.5-1.8   - Proteinuria: Minimal (0.2-0.5 grams)   - DSA Hx: No DSA   - Last cPRA: 0%   - BK Viremia: No   - Kidney Tx Biopsy Hx: Acute cellular-mediated rejection and Moderate vascular changes.    # Immunosuppression: Tacrolimus immediate release (goal 6-8), Mycophenolate mofetil (dose 1000 mg every 12 hours), and Prednisone (dose 3 mg daily)   - Induction with Recent Transplant:  Low Intensity Protocol   - Continue with intensive monitoring of immunosuppression for efficacy and toxicity.   - Historical Changes in Immunosuppression:  Patient was started on prednisone due to h/o acute rejection.  However, with obesity and elevated blood glucose, prednisone is being tapered down.   - Changes: Yes - Okay to decrease prednisone down to 2 mg daily now.  If tolerated, will plan to decrease prednisone down to 1 mg daily at next clinic visit with plans to stop it following that.    # Infection Prevention:      Last CD4 Level: 150 (Jul/2024)  - PJP: Sulfa/TMP (Bactrim)  - CMV: None, prophylaxis completed; Patient is CMV IgG Ab discordant (D+/R-) and completed Valcyte prophylaxis x 6 months.  Will check CMV PCR monthly until 12 months post transplant.  - CMV IgG Ab High Risk Discordance (D+/R-): Yes  CMV Serostatus: Negative  - EBV IgG Ab High Risk Discordance (D+/R-): Yes  EBV Serostatus: Negative    # Hypertension: Controlled;  Goal BP: < 130/80   - Changes: No    # Elevated Blood Glucose: Glucose generally running ~ 100-110s Last HbA1c: 5.8%    # Anemia in Chronic Renal Disease: Hgb: Stable      TOM: No   - Iron studies: Replete    # Mineral Bone Disorder:    - Secondary renal hyperparathyroidism; PTH level: Minimally elevated ( pg/ml)        On treatment: None  - Vitamin D; level: Low        On supplement: Yes  - Calcium; level: Normal        On supplement: No    # Electrolytes:   - Potassium; level:  Normal        On supplement: No  - Magnesium; level: Stable low        On supplement: Yes  - Bicarbonate; level: Stable low        On supplement: Yes    # EBV Viremia: Stable, 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  EBV IgG Ab negative and patient was EBV IgG Ab Discordance (D+/R-) at time of transplant.  IgG level is normal.   - Will continue to follow EBV PCR every monthly.    # Obesity, Class III (BMI = 44.6): Weight has been up and down a bit lately.   - Recommend weight loss for overall health by increasing exercise and watching caloric intake.  - Patient may benefit from GLP1 agonists or SGLT2 inhibitors.  - Patient is being followed by Weight Management Program.    # Other Significant PMH:  - H/o Ureteral Reimplantation, s/p Mitrofanoff: Patient reports not using for Mitrofanoff in a couple of years and voids normally on his own.  Mitrofanoff is nonfunctional at this time.  - H/o Bilateral Uveitis: No evidence of systemic autoimmune/inflammatory disease at last Rheumatology.   - Sleep Disorder: Patient was seen in Sleep Clinic and being scheduled for sleep study.  - Depression/Anxiety/ADHD: Patient reports mood is stable.  Continues on sertraline and now recently started on Adderall.     # Skin Cancer Risk:    - Discussed sun protection and recommend regular follow up with Dermatology.    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    Return visit: Return for previously scheduled visit.    Abhilash Raza MD    The longitudinal plan of care for the diagnosis(es)/condition(s) as documented were addressed during this visit. Due to the added complexity in care, I will continue to  support Boogie in the subsequent management and with ongoing continuity of care.      Chief Complaint   Mr. Villa is a 24 year old here for kidney transplant and immunosuppression management.     History of Present Illness    Mr. Villa reports feeling good overall.  Since last clinic visit:   Hospitalizations: No   New Medical Issues: No  Chest pain or shortness of breath: Yes; No chest pain, but some shortness of breath with exertion, as he feels deconditioned.  Lower extremity swelling: No  Weight change: Yes; Weight has been a bit up and down.  Started working with Weight Management Program.  Nausea and vomiting: No  Diarrhea: Yes; Occasional loose stools, maybe 1-2x per week.  Heartburn symptoms: No  Fever, sweats or chills: No  Urinary complaints: No    Home BP:  120/80s    Problem List   Patient Active Problem List   Diagnosis    Lymphangioma    ADHD (attention deficit hyperactivity disorder)    Allergic rhinitis due to pollen    Anxiety    Congenital posterior urethral valves    Iron deficiency    Neurogenic bladder    Class 2 severe obesity due to excess calories with serious comorbidity and body mass index (BMI) of 39.0 to 39.9 in adult (H)    Sensorineural hearing loss, asymmetrical    HTN, kidney transplant related    Secondary renal hyperparathyroidism (H)    Vitamin D deficiency    Kidney replaced by transplant    Immunosuppressed status (H)    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    Stage 3a chronic kidney disease (H)    Aftercare following organ transplant    Need for pneumocystis prophylaxis    Anemia in chronic renal disease    Metabolic acidosis    Hypomagnesemia    Kidney transplant rejection    GERD (gastroesophageal reflux disease)    EBV (Dai-Barr virus) viremia    Pre-diabetes    Steroid-induced hyperglycemia    Sleep disorder    Intermediate uveitis of both eyes    Raised intraocular pressure of both eyes       Allergies   Allergies   Allergen Reactions     Banana Itching     Raw banana; itchy mouth      Dust Mites      Runny nose and watery eyes    Mold      Runny nose and watery eyes    Nsaids Other (See Comments)     CKD    Other [Seasonal Allergies]      Grass, Ragweed - gets runny nose and watery eyes    Sulfa Antibiotics      PN: LW Reaction: GI Upset as an infant  12/28 Admission: Tolerated Bactrim       Medications   Current Outpatient Medications   Medication Sig Dispense Refill    acetaminophen (TYLENOL) 325 MG tablet Take 1-2 tablets (325-650 mg) by mouth every 4 hours as needed (For optimal non-opioid multimodal pain management to improve pain control.)      Alcohol Swabs PADS Use to swab the area of the injection or janny as directed Per insurance coverage 200 each 1    amLODIPine (NORVASC) 10 MG tablet Take 1 tablet (10 mg) by mouth daily 30 tablet 11    amphetamine-dextroamphetamine (ADDERALL XR) 15 MG 24 hr capsule Take 1 capsule by mouth daily.      carvedilol (COREG) 12.5 MG tablet Take 1 tablet (12.5 mg) by mouth 2 times daily 180 tablet 3    cetirizine (ZYRTEC) 10 MG tablet Take 10 mg by mouth daily as needed for allergies (in the spring)      cholecalciferol (VITAMIN D3) 25 mcg (1000 units) capsule Take 1 capsule (25 mcg) by mouth daily      losartan (COZAAR) 25 MG tablet Take 1 tablet (25 mg) by mouth every evening 30 tablet 11    magnesium oxide (MAG-OX) 400 MG tablet Take 2 tablets (800 mg) by mouth daily (with lunch) 60 tablet 3    mycophenolate (GENERIC EQUIVALENT) 250 MG capsule Take 4 capsules (1,000 mg) by mouth 2 times daily 240 capsule 11    predniSONE (DELTASONE) 1 MG tablet Take 2 tablets (2 mg) by mouth daily.      sertraline (ZOLOFT) 25 MG tablet Take 150 mg by mouth daily.      Sharps Container MISC Use as directed to dispose of needles, lancets and other sharps 1 each 1    sodium bicarbonate 650 MG tablet Take 3 tablets (1,950 mg) by mouth 3 times daily 270 tablet 11    sulfamethoxazole-trimethoprim (BACTRIM) 400-80 MG tablet Take 1  tablet by mouth daily 30 tablet 11    tacrolimus (GENERIC EQUIVALENT) 1 MG capsule Take 2 capsules (2 mg) by mouth 2 times daily. Total dose = 7 mg twice daily 180 capsule 11    tacrolimus (GENERIC EQUIVALENT) 5 MG capsule Take 1 capsule (5 mg) by mouth 2 times daily. Total dose = 7 mg twice daily 60 capsule 11     No current facility-administered medications for this visit.     There are no discontinued medications.      Physical Exam   Vital Signs: /77   Pulse 107   Temp 98  F (36.7  C) (Oral)   Wt 145.1 kg (319 lb 12.8 oz)   SpO2 97%   BMI 44.60 kg/m      GENERAL APPEARANCE: alert and no distress  HENT: mouth without ulcers or lesions  RESP: lungs clear to auscultation - no rales, rhonchi or wheezes  CV: regular rhythm, normal rate, no rub, no murmur  EDEMA: no LE edema bilaterally  ABDOMEN: soft, nondistended, nontender, bowel sounds normal, morbidly obese  MS: extremities normal - no gross deformities noted, no evidence of inflammation in joints, no muscle tenderness  SKIN: no rash  TX KIDNEY: normal  DIALYSIS ACCESS:  None    Data         Latest Ref Rng & Units 10/3/2024    11:00 AM 9/20/2024    10:33 AM 8/9/2024    10:21 AM   Renal   Sodium 135 - 145 mmol/L 138  138  140    K 3.4 - 5.3 mmol/L 3.9  4.3  4.5    Cl 98 - 107 mmol/L 103  103  106    Cl (external) 98 - 107 mmol/L 103  103  106    CO2 22 - 29 mmol/L 23  21  23    Urea Nitrogen 6.0 - 20.0 mg/dL 20.3  22.3  20.9    Creatinine 0.67 - 1.17 mg/dL 1.53  1.66  1.31    Glucose 70 - 99 mg/dL 149  118  123    Calcium 8.8 - 10.4 mg/dL 10.0  9.8  9.8          Latest Ref Rng & Units 7/1/2024    11:00 AM 6/13/2024    10:19 AM 5/13/2024    10:38 AM   Bone Health   Phosphorus 2.5 - 4.5 mg/dL 2.5  2.7     Parathyroid Hormone Intact 15 - 65 pg/mL   126          Latest Ref Rng & Units 10/3/2024    11:00 AM 9/20/2024    10:33 AM 8/9/2024    10:21 AM   Heme   WBC 4.0 - 11.0 10e3/uL 7.9  5.8  7.4    Hgb 13.3 - 17.7 g/dL 12.1  11.5  11.6    Plt 150 - 450  10e3/uL 281  257  244          Latest Ref Rng & Units 7/9/2024    10:24 AM 12/19/2023     9:53 AM 7/13/2023     1:28 PM   Liver   AP 40 - 150 U/L 66  67     AP (external) 40 - 150 U/L   45    TBili <=1.2 mg/dL 0.3  0.5     TBili (external) 0.2 - 1.2 mg/dL   0.6    Bilirubin Direct 0.00 - 0.30 mg/dL <0.20      ALT 0 - 70 U/L 46  41     ALT (external) <=55 U/L   35    AST 0 - 45 U/L 21  27     AST (external) 10 - 40 U/L   26    Tot Protein 6.4 - 8.3 g/dL 7.0  7.6     Tot Protein (external) 6.4 - 8.3 g/dL   7.5    Albumin 3.5 - 5.2 g/dL 4.6  4.4     Albumin (external) 3.5 - 5.0 g/dL   3.8          Latest Ref Rng & Units 7/9/2024    10:24 AM 2/28/2024     1:13 PM 12/19/2023     9:53 AM   Pancreas   A1C 0.0 - 5.6 % 5.8  5.7  5.2          Latest Ref Rng & Units 5/21/2024    10:28 AM 1/7/2024     7:50 AM 12/19/2023     9:53 AM   Iron studies   Iron 61 - 157 ug/dL 77  167  104    Iron Sat Index 15 - 46 % 32  79  42    Ferritin 31 - 409 ng/mL  783  826          Latest Ref Rng & Units 5/21/2024    10:28 AM 4/1/2024    10:39 AM 3/25/2024    10:22 AM   [facility] Txp Virology   EBV CAPSID ANTIBODY IGG No detectable antibody. No detectable antibody.      EBV DNA LOG OF COPIES   3.8  3.9      Failed to redirect to the Timeline version of the REVFS SmartLink.  Recent Labs   Lab Test 08/09/24  1021 09/20/24  1033 10/03/24  1100   DOSTAC 8/8/2024 9/19/2024 10/2/2024   TACROL 5.3 8.5 6.3     Recent Labs   Lab Test 01/11/24  0950 01/29/24  0937 02/01/24  1008 02/12/24  1030 02/24/24  1008   DOSMPA 1/10/2024   9:30 PM 1/28/2024   9:30 PM  --   --   --    MPACID 3.79* 3.73* 3.87* 2.23 2.65   MPAG 71.7 63.8 79.1 68.1 141.7*

## 2024-10-03 NOTE — LETTER
10/3/2024      RE: Boogie Villa  [address]  [facility] [place] [facility] [place] [address]       TRANSPLANT NEPHROLOGY CLINIC VISIT     Assessment & Plan  # LDKT: CKD Stage 3a - Stable   - Baseline Creatinine: ~ 1.5-1.8   - Proteinuria: Minimal (0.2-0.5 grams)   - DSA Hx: No DSA   - Last cPRA: 0%   - BK Viremia: No   - Kidney Tx Biopsy Hx: Acute cellular-mediated rejection and Moderate vascular changes.    # Immunosuppression: Tacrolimus immediate release (goal 6-8), Mycophenolate mofetil (dose 1000 mg every 12 hours), and Prednisone (dose 3 mg daily)   - Induction with Recent Transplant:  Low Intensity Protocol   - Continue with intensive monitoring of immunosuppression for efficacy and toxicity.   - Historical Changes in Immunosuppression:  Patient was started on prednisone due to h/o acute rejection.  However, with obesity and elevated blood glucose, prednisone is being tapered down.   - Changes: Yes - Okay to decrease prednisone down to 2 mg daily now.  If tolerated, will plan to decrease prednisone down to 1 mg daily at next clinic visit with plans to stop it following that.    # Infection Prevention:      Last CD4 Level: 150 (Jul/2024)  - PJP: Sulfa/TMP (Bactrim)  - CMV: None, prophylaxis completed; Patient is CMV IgG Ab discordant (D+/R-) and completed Valcyte prophylaxis x 6 months.  Will check CMV PCR monthly until 12 months post transplant.  - CMV IgG Ab High Risk Discordance (D+/R-): Yes  CMV Serostatus: Negative  - EBV IgG Ab High Risk Discordance (D+/R-): Yes  EBV Serostatus: Negative    # Hypertension: Controlled;  Goal BP: < 130/80   - Changes: No    # Elevated Blood Glucose: Glucose generally running ~ 100-110s Last HbA1c: 5.8%    # Anemia in Chronic Renal Disease: Hgb: Stable      TOM: No   - Iron studies: Replete    # Mineral Bone Disorder:    - Secondary renal hyperparathyroidism; PTH level: Minimally elevated ( pg/ml)        On treatment: None  - Vitamin D; level: Low        On supplement: Yes  - Calcium;  level: Normal        On supplement: No    # Electrolytes:   - Potassium; level: Normal        On supplement: No  - Magnesium; level: Stable low        On supplement: Yes  - Bicarbonate; level: Stable low        On supplement: Yes    # EBV Viremia: Stable, 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  EBV IgG Ab negative and patient was EBV IgG Ab Discordance (D+/R-) at time of transplant.  IgG level is normal.   - Will continue to follow EBV PCR every monthly.    # Obesity, Class III (BMI = 44.6): Weight has been up and down a bit lately.   - Recommend weight loss for overall health by increasing exercise and watching caloric intake.  - Patient may benefit from GLP1 agonists or SGLT2 inhibitors.  - Patient is being followed by Weight Management Program.    # Other Significant PMH:  - H/o Ureteral Reimplantation, s/p Mitrofanoff: Patient reports not using for Mitrofanoff in a couple of years and voids normally on his own.  Mitrofanoff is nonfunctional at this time.  - H/o Bilateral Uveitis: No evidence of systemic autoimmune/inflammatory disease at last Rheumatology.   - Sleep Disorder: Patient was seen in Sleep Clinic and being scheduled for sleep study.  - Depression/Anxiety/ADHD: Patient reports mood is stable.  Continues on sertraline and now recently started on Adderall.     # Skin Cancer Risk:    - Discussed sun protection and recommend regular follow up with Dermatology.    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    Return visit: Return for previously scheduled visit.    Abhilash Raza MD    The longitudinal plan of care for the diagnosis(es)/condition(s) as documented were addressed  during this visit. Due to the added complexity in care, I will continue to support Boogie in the subsequent management and with ongoing continuity of care.      Chief Complaint  Mr. Villa is a 24 year old here for kidney transplant and immunosuppression management.     History of Present Illness   Mr. Villa reports feeling good overall.  Since last clinic visit:   Hospitalizations: No   New Medical Issues: No  Chest pain or shortness of breath: Yes; No chest pain, but some shortness of breath with exertion, as he feels deconditioned.  Lower extremity swelling: No  Weight change: Yes; Weight has been a bit up and down.  Started working with Weight Management Program.  Nausea and vomiting: No  Diarrhea: Yes; Occasional loose stools, maybe 1-2x per week.  Heartburn symptoms: No  Fever, sweats or chills: No  Urinary complaints: No    Home BP:  120/80s    Problem List  Patient Active Problem List   Diagnosis     Lymphangioma     ADHD (attention deficit hyperactivity disorder)     Allergic rhinitis due to pollen     Anxiety     Congenital posterior urethral valves     Iron deficiency     Neurogenic bladder     Class 2 severe obesity due to excess calories with serious comorbidity and body mass index (BMI) of 39.0 to 39.9 in adult (H)     Sensorineural hearing loss, asymmetrical     HTN, kidney transplant related     Secondary renal hyperparathyroidism (H)     Vitamin D deficiency     Kidney replaced by transplant     Immunosuppressed status (H)     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     Stage 3a chronic kidney disease (H)     Aftercare following organ transplant     Need for pneumocystis prophylaxis     Anemia in chronic renal disease     Metabolic acidosis     Hypomagnesemia     Kidney transplant rejection     GERD (gastroesophageal reflux disease)     EBV (Dai-Barr virus) viremia     Pre-diabetes     Steroid-induced hyperglycemia     Sleep disorder     Intermediate uveitis of both  eyes     Raised intraocular pressure of both eyes       Allergies  Allergies   Allergen Reactions     Banana Itching     Raw banana; itchy mouth       Dust Mites      Runny nose and watery eyes     Mold      Runny nose and watery eyes     Nsaids Other (See Comments)     CKD     Other [Seasonal Allergies]      Grass, Ragweed - gets runny nose and watery eyes     Sulfa Antibiotics      PN: LW Reaction: GI Upset as an infant  12/28 Admission: Tolerated Bactrim       Medications  Current Outpatient Medications   Medication Sig Dispense Refill     acetaminophen (TYLENOL) 325 MG tablet Take 1-2 tablets (325-650 mg) by mouth every 4 hours as needed (For optimal non-opioid multimodal pain management to improve pain control.)       Alcohol Swabs PADS Use to swab the area of the injection or janny as directed Per insurance coverage 200 each 1     amLODIPine (NORVASC) 10 MG tablet Take 1 tablet (10 mg) by mouth daily 30 tablet 11     amphetamine-dextroamphetamine (ADDERALL XR) 15 MG 24 hr capsule Take 1 capsule by mouth daily.       carvedilol (COREG) 12.5 MG tablet Take 1 tablet (12.5 mg) by mouth 2 times daily 180 tablet 3     cetirizine (ZYRTEC) 10 MG tablet Take 10 mg by mouth daily as needed for allergies (in the spring)       cholecalciferol (VITAMIN D3) 25 mcg (1000 units) capsule Take 1 capsule (25 mcg) by mouth daily       losartan (COZAAR) 25 MG tablet Take 1 tablet (25 mg) by mouth every evening 30 tablet 11     magnesium oxide (MAG-OX) 400 MG tablet Take 2 tablets (800 mg) by mouth daily (with lunch) 60 tablet 3     mycophenolate (GENERIC EQUIVALENT) 250 MG capsule Take 4 capsules (1,000 mg) by mouth 2 times daily 240 capsule 11     predniSONE (DELTASONE) 1 MG tablet Take 2 tablets (2 mg) by mouth daily.       sertraline (ZOLOFT) 25 MG tablet Take 150 mg by mouth daily.       Sharps Container MISC Use as directed to dispose of needles, lancets and other sharps 1 each 1     sodium bicarbonate 650 MG tablet Take 3  tablets (1,950 mg) by mouth 3 times daily 270 tablet 11     sulfamethoxazole-trimethoprim (BACTRIM) 400-80 MG tablet Take 1 tablet by mouth daily 30 tablet 11     tacrolimus (GENERIC EQUIVALENT) 1 MG capsule Take 2 capsules (2 mg) by mouth 2 times daily. Total dose = 7 mg twice daily 180 capsule 11     tacrolimus (GENERIC EQUIVALENT) 5 MG capsule Take 1 capsule (5 mg) by mouth 2 times daily. Total dose = 7 mg twice daily 60 capsule 11     No current facility-administered medications for this visit.     There are no discontinued medications.      Physical Exam  Vital Signs: /77   Pulse 107   Temp 98  F (36.7  C) (Oral)   Wt 145.1 kg (319 lb 12.8 oz)   SpO2 97%   BMI 44.60 kg/m      GENERAL APPEARANCE: alert and no distress  HENT: mouth without ulcers or lesions  RESP: lungs clear to auscultation - no rales, rhonchi or wheezes  CV: regular rhythm, normal rate, no rub, no murmur  EDEMA: no LE edema bilaterally  ABDOMEN: soft, nondistended, nontender, bowel sounds normal, morbidly obese  MS: extremities normal - no gross deformities noted, no evidence of inflammation in joints, no muscle tenderness  SKIN: no rash  TX KIDNEY: normal  DIALYSIS ACCESS:  None    Data        Latest Ref Rng & Units 10/3/2024    11:00 AM 9/20/2024    10:33 AM 8/9/2024    10:21 AM   Renal   Sodium 135 - 145 mmol/L 138  138  140    K 3.4 - 5.3 mmol/L 3.9  4.3  4.5    Cl 98 - 107 mmol/L 103  103  106    Cl (external) 98 - 107 mmol/L 103  103  106    CO2 22 - 29 mmol/L 23  21  23    Urea Nitrogen 6.0 - 20.0 mg/dL 20.3  22.3  20.9    Creatinine 0.67 - 1.17 mg/dL 1.53  1.66  1.31    Glucose 70 - 99 mg/dL 149  118  123    Calcium 8.8 - 10.4 mg/dL 10.0  9.8  9.8          Latest Ref Rng & Units 7/1/2024    11:00 AM 6/13/2024    10:19 AM 5/13/2024    10:38 AM   Bone Health   Phosphorus 2.5 - 4.5 mg/dL 2.5  2.7     Parathyroid Hormone Intact 15 - 65 pg/mL   126          Latest Ref Rng & Units 10/3/2024    11:00 AM 9/20/2024    10:33 AM  8/9/2024    10:21 AM   Heme   WBC 4.0 - 11.0 10e3/uL 7.9  5.8  7.4    Hgb 13.3 - 17.7 g/dL 12.1  11.5  11.6    Plt 150 - 450 10e3/uL 281  257  244          Latest Ref Rng & Units 7/9/2024    10:24 AM 12/19/2023     9:53 AM 7/13/2023     1:28 PM   Liver   AP 40 - 150 U/L 66  67     AP (external) 40 - 150 U/L   45    TBili <=1.2 mg/dL 0.3  0.5     TBili (external) 0.2 - 1.2 mg/dL   0.6    Bilirubin Direct 0.00 - 0.30 mg/dL <0.20      ALT 0 - 70 U/L 46  41     ALT (external) <=55 U/L   35    AST 0 - 45 U/L 21  27     AST (external) 10 - 40 U/L   26    Tot Protein 6.4 - 8.3 g/dL 7.0  7.6     Tot Protein (external) 6.4 - 8.3 g/dL   7.5    Albumin 3.5 - 5.2 g/dL 4.6  4.4     Albumin (external) 3.5 - 5.0 g/dL   3.8          Latest Ref Rng & Units 7/9/2024    10:24 AM 2/28/2024     1:13 PM 12/19/2023     9:53 AM   Pancreas   A1C 0.0 - 5.6 % 5.8  5.7  5.2          Latest Ref Rng & Units 5/21/2024    10:28 AM 1/7/2024     7:50 AM 12/19/2023     9:53 AM   Iron studies   Iron 61 - 157 ug/dL 77  167  104    Iron Sat Index 15 - 46 % 32  79  42    Ferritin 31 - 409 ng/mL  783  826          Latest Ref Rng & Units 5/21/2024    10:28 AM 4/1/2024    10:39 AM 3/25/2024    10:22 AM   [facility] Txp Virology   EBV CAPSID ANTIBODY IGG No detectable antibody. No detectable antibody.      EBV DNA LOG OF COPIES   3.8  3.9      Failed to redirect to the Timeline version of the REVFS SmartLink.  Recent Labs   Lab Test 08/09/24  1021 09/20/24  1033 10/03/24  1100   DOSTAC 8/8/2024 9/19/2024 10/2/2024   TACROL 5.3 8.5 6.3     Recent Labs   Lab Test 01/11/24  0950 01/29/24  0937 02/01/24  1008 02/12/24  1030 02/24/24  1008   DOSMPA 1/10/2024   9:30 PM 1/28/2024   9:30 PM  --   --   --    MPACID 3.79* 3.73* 3.87* 2.23 2.65   MPAG 71.7 63.8 79.1 68.1 141.7*        Abhilash Raza MD

## 2024-10-03 NOTE — PATIENT INSTRUCTIONS
Patient Recommendations:  - Decrease prednisone down to 2 mg daily.    Transplant Patient Information  Your Post Transplant Coordinator is: Nina Hu  For non urgent items, we encourage you to contact your coordinator/care team online via [employer]  You and your care team can also contact your transplant coordinator Monday - Friday, 8am - 5pm at [phone] (Option 2 to reach the coordinator or Option 4 to schedule an appointment).  After hours for urgent matters, please call [facility] at [phone].

## 2024-10-13 ENCOUNTER — TELEPHONE (OUTPATIENT)
Dept: TRANSPLANT | Facility: CLINIC | Age: 24
End: 2024-10-13
Payer: COMMERCIAL

## 2024-10-13 DIAGNOSIS — Z94.0 KIDNEY REPLACED BY TRANSPLANT: Primary | ICD-10-CM

## 2024-10-13 NOTE — TELEPHONE ENCOUNTER
From: Abhilash Raza MD   Sent: 10/6/2024   8:39 AM CDT   To: Nina Hu RN   Subject: Clinic visit                                     Please check the following labs: Magnesium.     I continued tapering down his prednisone.   - Changes: Yes - Okay to decrease prednisone down to 2 mg daily now.  If tolerated, will plan to decrease prednisone down to 1 mg daily at next clinic visit with plans to stop it following that.       Updated orders  --

## 2024-10-16 ENCOUNTER — VIRTUAL VISIT (OUTPATIENT)
Dept: ENDOCRINOLOGY | Facility: CLINIC | Age: 24
End: 2024-10-16
Payer: COMMERCIAL

## 2024-10-16 DIAGNOSIS — E66.812 CLASS 2 SEVERE OBESITY DUE TO EXCESS CALORIES WITH SERIOUS COMORBIDITY AND BODY MASS INDEX (BMI) OF 39.0 TO 39.9 IN ADULT (H): Primary | ICD-10-CM

## 2024-10-16 DIAGNOSIS — E66.01 CLASS 2 SEVERE OBESITY DUE TO EXCESS CALORIES WITH SERIOUS COMORBIDITY AND BODY MASS INDEX (BMI) OF 39.0 TO 39.9 IN ADULT (H): Primary | ICD-10-CM

## 2024-10-16 DIAGNOSIS — T38.0X5A STEROID-INDUCED HYPERGLYCEMIA: ICD-10-CM

## 2024-10-16 DIAGNOSIS — R73.9 STEROID-INDUCED HYPERGLYCEMIA: ICD-10-CM

## 2024-10-16 PROCEDURE — G2211 COMPLEX E/M VISIT ADD ON: HCPCS | Mod: 95

## 2024-10-16 PROCEDURE — 99213 OFFICE O/P EST LOW 20 MIN: CPT | Mod: 95

## 2024-10-16 ASSESSMENT — PAIN SCALES - GENERAL: PAINLEVEL: NO PAIN (0)

## 2024-10-16 NOTE — NURSING NOTE
Is the patient currently in the state of [place]? YES    Visit mode:VIDEO    If the visit is dropped, the patient can be reconnected by: VIDEO VISIT: Send to e-mail at: [email or web address]    Will anyone else be joining the visit? NO  (If patient encounters technical issues they should call [phone]    How would you like to obtain your AVS? MyChart    Are changes needed to the allergy or medication list? No    Are refills needed on medications prescribed by this physician? NO    Reason for visit: Follow Up    Netta WEBSTER

## 2024-10-16 NOTE — PROGRESS NOTES
Virtual Visit Details    Type of service:  Video Visit   Video Start Time:  2:35pm  Video End Time:3:02 PM    Originating Location (pt. Location): Home    Distant Location (provider location):  On-site  Platform used for Video Visit: Sulaiman

## 2024-10-16 NOTE — LETTER
10/16/2024       RE: Boogie Villa  [address]  [facility] [address]     Dear Colleague,    Thank you for referring your patient, Boogie Villa, to the [facility] [place] at [facility]. Please see a copy of my visit note below.      Return Medical Weight Management Note     Boogie Villa  MRN:  [id number]  :  2000  LEANN:  10/16/2024    Dear Rita Ward, CHRISTIN CNP,    I had the pleasure of seeing your patient Boogie Villa. He is a 24 year old male who I am continuing to see for treatment of obesity related to:        2023    12:01 PM   --   I have the following health issues associated with obesity High Blood Pressure   I have the following symptoms associated with obesity Fatigue       Assessment & Plan  Problem List Items Addressed This Visit       Class 2 severe obesity due to excess calories with serious comorbidity and body mass index (BMI) of 39.0 to 39.9 in adult (H) - Primary    Relevant Orders    Hemoglobin A1c    Hepatic panel    Steroid-induced hyperglycemia    Relevant Orders    Hemoglobin A1c    Hepatic panel      Plan  No meds started today, discussed wegovy, topiramate, contrave, metformin. Boogie will run these meds by transplant team to see if there are good options given his kidney transplant   Labs ordered today: A1c and liver function labs- will fax labs the [facility] with [facility]   Follow up with Yessenia in 3 months   Dietician appointment as needed   Keep up the excellent work!     Potential anti-obesity medications for this patient the future  WEGOVY  Would likely be a great candidate, though Boogie had some hesitancy as he feels it would be hard for him to be motivated to implement an injection into his weekly schedule.   No history of pancreatitis   No personal or family history of medullary thyroid carcinoma  No personal or family  "history of Multiple Endocrine Neoplasia Type 2  .       The following medications could be considered with caution for this patient   TOPIRAMATE  No history of kidney stones  No history of glaucoma   No issues with memory  Caution would be needed with this medication due to status post kidney transplant, would want to confirm with transplant team prior to starting .  .  CONTRAVE  No current opioid use  No history of liver disease  No chronic pain  No history of bipolar disorder  No history of cardiovascular disease   No history of cardiac arrhythmias   No history of seizures  No history of bulimia nervosa  No history of anorexia nervosa  Caution would be needed with this medication due to status post kidney transplant, would want to confirm with transplant team on starting   .  METFORMIN  Would likely see the most benefit due to some concern for insulin resistance with chronic prednisone therapy   Is concurrently taking the following medication(s) associated with weight gain: prednisone, tacrolimus,   GFR >45  Caution would be needed with this medication due to status post kidney transplant   .    Contraindicated/Failed Weight loss medications for this patient   PHENTERMINE  Contraindicated due to current stimulant treatment for adhd   .    INTERVAL HISTORY:  MWM generally with Yessenia Heller since 2020   Last see by her 1 year ago, 10/26/23   Tolerated saxenda up to 1.2mg. didn't find beneficial and wanted to stop medication. Struggles more with \"self control\" around food option choices rather than hunger. Discussed how this can be cravings related sometimes. Has started to make meaningful changes to diet since last seen- losing about 1lb a month. Less sugar beverages, more water, smaller portions, monitoring caloric intake. His currently personally set goal is to have an average of 2200 calories daily. Tries to stick to 2,000 during the week and more calories on the weekend. Not actually tracking but feels he is near " goal often. Discussed trying to actually track this now to help with consistency and then determining next goal for further progress. Possible that we underestimate frequency of splurges if not tracking. Encouraged a check point with RD in 4 weeks to see what he learned from this and then consider caloric goals. Also discussed strategies to further adjust food choices.      Also planning to increase activity. This is limited by time and energy. Currently doing some strength training once a week. Looking at . Discussed slow steps to start this process. Smaller increment goals like a 10min walk which can fit better in schedule.     -kidney transplant April 2024     Today in visit- new to me 10/16/24  Was able to lose weight to transplant goal, but has since seen significant weight regain, about 40lbs. Ongoing prednisone therapy.   Thins are going well with kidney transplant, continuing to follow with transplant team.       Has been trying to lose weight with eating smaller portions, increasing protein over the last 6 months, but has still seen persistent weight gain.     Is unsure if appetite is a huge issue for him.   Wt Readings from Last 5 Encounters:   10/03/24 145.1 kg (319 lb 12.8 oz)   09/24/24 146.5 kg (323 lb)   07/01/24 142.9 kg (315 lb)   05/07/24 138.5 kg (305 lb 4.8 oz)   04/11/24 134.1 kg (295 lb 9.6 oz)       Anti-obesity medication history    Current:   Aderall for adhd- has not been helpful with weight loss     Past/Failed/contraindicated:   Saxenda- tolerated up to 1.2mg, self discontinued due to lack of perceived benefit. Anxious with injectable meds, struggles to stick to these.     Recent diet changes: prioritizing high protein, will have protein poptart for breakfast and for snacks. Burrito for dinner most nights.     Recent exercise/activity changes: no major exercise changes, hoping to get back into this. Prior to transplant was walking on treadmill 2x a week, but fell out of  "this habit with recovering from transplant. Lifting weights about once a week lately, aims for 2x a week.      Recent stressors: stress levels are pretty high, specifically with anxiety. Working regularly with therapist, started some meds recently as well.     Recent sleep changes: sleep has been \"terrible.\" Able to fall asleep but will wake up and feel exhausted still. Trying to get sleep study done.     Vitamins/Labs: A1c and lfts ordered today. Kidney labs monitored continuously by transplant team.       CURRENT WEIGHT:   0 lbs 0 oz                      10/26/2023    12:47 PM   Changes and Difficulties   I have made the following changes to my diet since my last visit: trying to eat less   With regards to my diet, I am still struggling with: eating healthy   I have made the following changes to my activity/exercise since my last visit: none   With regards to my activity/exercise, I am still struggling with: nothing             MEDICATIONS:   Current Outpatient Medications   Medication Sig Dispense Refill     acetaminophen (TYLENOL) 325 MG tablet Take 1-2 tablets (325-650 mg) by mouth every 4 hours as needed (For optimal non-opioid multimodal pain management to improve pain control.)       Alcohol Swabs PADS Use to swab the area of the injection or janny as directed Per insurance coverage 200 each 1     amLODIPine (NORVASC) 10 MG tablet Take 1 tablet (10 mg) by mouth daily 30 tablet 11     amphetamine-dextroamphetamine (ADDERALL XR) 15 MG 24 hr capsule Take 1 capsule by mouth daily.       carvedilol (COREG) 12.5 MG tablet Take 1 tablet (12.5 mg) by mouth 2 times daily 180 tablet 3     cetirizine (ZYRTEC) 10 MG tablet Take 10 mg by mouth daily as needed for allergies (in the spring)       cholecalciferol (VITAMIN D3) 25 mcg (1000 units) capsule Take 1 capsule (25 mcg) by mouth daily       losartan (COZAAR) 25 MG tablet Take 1 tablet (25 mg) by mouth every evening 30 tablet 11     magnesium oxide (MAG-OX) 400 MG " tablet Take 2 tablets (800 mg) by mouth daily (with lunch) 60 tablet 3     mycophenolate (GENERIC EQUIVALENT) 250 MG capsule Take 4 capsules (1,000 mg) by mouth 2 times daily 240 capsule 11     predniSONE (DELTASONE) 1 MG tablet Take 2 tablets (2 mg) by mouth daily.       sertraline (ZOLOFT) 25 MG tablet Take 150 mg by mouth daily.       Sharps Container MISC Use as directed to dispose of needles, lancets and other sharps 1 each 1     sodium bicarbonate 650 MG tablet Take 3 tablets (1,950 mg) by mouth 3 times daily 270 tablet 11     sulfamethoxazole-trimethoprim (BACTRIM) 400-80 MG tablet Take 1 tablet by mouth daily 30 tablet 11     tacrolimus (GENERIC EQUIVALENT) 1 MG capsule Take 2 capsules (2 mg) by mouth 2 times daily. Total dose = 7 mg twice daily 180 capsule 11     tacrolimus (GENERIC EQUIVALENT) 5 MG capsule Take 1 capsule (5 mg) by mouth 2 times daily. Total dose = 7 mg twice daily 60 capsule 11           10/26/2023    12:47 PM   Weight Loss Medication History Reviewed With Patient   Which weight loss medications are you currently taking on a regular basis? None   If you are not taking a weight loss medication that was prescribed to you, please indicate why: Other   Are you having any side effects from the weight loss medication that we have prescribed you? No               12/18/2020     2:41 PM 10/17/2023     3:53 PM   MORGAN Score (Last Two)   MORGAN Raw Score 37 29    29   Activation Score 79.2 52.9    52.9   MORGAN Level 4 2    2         PHYSICAL EXAM:  Objective   There were no vitals taken for this visit.    Vitals - Patient Reported  Pain Score: No Pain (0)      Vitals:  No vitals were obtained today due to virtual visit.    GENERAL: alert and no distress  EYES: Eyes grossly normal to inspection.  No discharge or erythema, or obvious scleral/conjunctival abnormalities.  RESP: No audible wheeze, cough, or visible cyanosis.    SKIN: Visible skin clear. No significant rash, abnormal pigmentation or  lesions.  NEURO: Cranial nerves grossly intact.  Mentation and speech appropriate for age.  PSYCH: Appropriate affect, tone, and pace of words        Sincerely,    Netta Yu PA-C      24 minutes spent by me on the date of the encounter doing chart review, history and exam, documentation and further activities per the note    The longitudinal plan of care for the diagnosis(es)/condition(s) as documented were addressed during this visit. Due to the added complexity in care, I will continue to support Boogie in the subsequent management and with ongoing continuity of care.     Virtual Visit Details    Type of service:  Video Visit   Video Start Time:  2:35pm  Video End Time:3:02 PM    Originating Location (pt. Location): Home    Distant Location (provider location):  On-site  Platform used for Video Visit: Sulaiman      Again, thank you for allowing me to participate in the care of your patient.      Sincerely,    Netta Yu PA-C

## 2024-10-16 NOTE — PATIENT INSTRUCTIONS
"Thank you for allowing us the privilege of caring for you. We hope we provided you with the excellent service you deserve.   Please let us know if there is anything else we can do for you so that we can be sure you are completely satisfied with your care experience.    To ensure the quality of our services you may be receiving a patient satisfaction survey from an independent patient satisfaction monitoring company.    The greatest compliment you can give is a \"Likely to Recommend\"    Your visit was with Netta Yu PA-C today.    Instructions per today's visit:     Jeffrey Villa, it was great to visit with you today.  Here is a review of our visit.  If our clinic scheduler is not able to reach you please call [phone] to schedule your next appointments.    Plan  No meds started today, discussed wegovy, topiramate, contrave, metformin. Boogie will run these meds by transplant team to see if there are good options given his kidney transplant   Labs ordered today: A1c and liver function labs- [facility]   Follow up with Yessenia in 3 months   Dietician appointment as needed   Keep up the excellent work!       Information about Video Visits with [employer]: video visit information  _________________________________________________________________________________________________________________________________________________________  If you are asked by your clinic team to have your blood pressure checked:  [facility] do offer several locations for blood pressure checks. Please follow the below link to schedule an appointment. Scheduling an appointment at the pharmacy for a blood pressure check is now preferred.    Appointment Plus ([email or web address])  _________________________________________________________________________________________________________________________________________________________  Important " contact and scheduling information:  Please call our contact center at [phone] to schedule your next appointments.  To find a lab location near you, please call [phone].  For any nursing questions or concerns call Jannet Dougherty LPN at [phone] or Verna Mcmanus RN at [phone]  Please call during clinic hours Monday through Friday 8:00a - 4:00p if you have questions or you can contact us via NextMediumhart at anytime and we will reply during clinic hours.    Lab results will be communicated through My Chart or letter (if My Chart not used). Please call the clinic if you have not received communication after 1 week or if you have any questions.?  Clinic Fax: [phone]    _________________________________________________________________________________________________________________________________________________________  Meal Replacement Products:    Here is the link to our new e-store where you can purchase our meal replacement products    [facility] E-Store  [email or web address]    The one week starter kit is a great way to sample a variety of products and see what works for you.    If you want more information about the product go to: [employer]    If you are an employee or [facility] or [facility] please contact your care team for a 10% estore discount    Free Shipping for orders over $75     Benefits of meal replacements products:    Portion and calorie control  Improved nutrition  Structured eating  Simplified food choices  Avoid contact with trigger foods  _________________________________________________________________________________________________________________________________________________________  Interested in working with a health ?  Health coaches work with you to improve your overall health and wellbeing.  They look at the whole person, and may involve discussion of different areas of life, including, but not  limited to the four pillars of health (sleep, exercise, nutrition, and stress management). Discuss with your care team if you would like to start working a health .  Health Coaching-3 Pack: Schedule by calling [phone]    $99 for three health coaching visits    Visits may be done in person or via phone    Coaching is a partnership between the  and the client; Coaches do not prescribe or diagnose    Coaching helps inspire the client to reach his/her personal goals   _________________________________________________________________________________________________________________________________________________________  24 Week Healthy Lifestyle Plan:    Our mission in the 24-week Healthy Lifestyle Plan is to provide you with individualized care by giving you the tools, education and support you need to lose weight and maintain a healthy lifestyle. In your 24-week journey, you ll be supported by a dedicated weight loss team that includes registered dietitians, medical weight management providers, health coaches, and nurses -- all with special expertise in weight loss -- to help you every step of the way.     Monthly meetings with your registered dietician or medical weight management provider help to review your progress, update your care plan, and make any adjustments needed to ensure success. Between these visits, weekly and bi-weekly health  visits will help you focus on the four pillars of weight loss -- stress, sleep, nutrition, and exercise -- and how you can best adapt each to achieve sustainable weight loss results.    In addition, you will be given exclusive access to online wellbeing classes through [employer].  Your initial visit will be with a medical weight management provider who will help to understand your weight loss goals and ensure this program is the right fit for you. Please let our team know if you are interested in the 24 week plan by sending a message to your care team or  calling [phone] to schedule.  _________________________________________________________________________________________________________________________________________________________  __________  [facility] Get Moving Program  Our team of physical therapists is trained to help you understand and take control of your condition. They will perform a thorough evaluation to determine your ability for activity and develop a customized plan to fit your goals and physical ability.  Scheduling: Unsure if the Get Moving program is right for you? Discuss the program with your medical provider or diabetes educator. You can also call us at [phone] to ask questions or schedule an appointment.   [facility] Get Moving Program  ____________________________________________________________________________________________________________________________________________________________________________   [employer] Diabetes Prevention Program (DPP)  If you have prediabetes and Medicare please contact us via [employer] to learn more about the Diabetes Prevention Program (DPP)  Program Details:   [employer] offers the year-long Diabetes Prevention Program (DPP). The program helps you to make lifestyle changes that prevent or delay type 2 diabetes by supporting healthy eating, increased physical activity, stress reduction and use of coping skills.   On average, previous [facility] DPP cohorts have lost and maintained at least 5% of their starting weight throughout the program and averaged more than 150 minutes of physical activity per week.  Participants meet weekly for one-hour group sessions over sixteen weeks, every other week for the next 8 weeks, and monthly for the last six months.   A year-long maintenance program is also available for participants who complete the first year.   Location & Cost:   During the COVID-19 Public Health Emergency, the program is offered virtually. When in-person  classes can resume, they will be held at [facility].  For people with Medicare, the program is covered in full. A self-pay option will also be available for those with non-Medicare insurance plans.   ______________________________________________________________________________________________________________________________________________________________________________________________________________________________    To work with a Behavioral Health Psychologist:    Call to schedule:    Roberto Martinez - [phone]  Emiliana Clay - [phone]  Janine Chacon - [phone]  Ana Maria Lowe - [phone]   Cydney Alvarez PhD (cannot accept Medicare) [phone]        Thank you,   [facility] Comprehensive Weight Management Team

## 2024-10-16 NOTE — PROGRESS NOTES
Return Medical Weight Management Note     Boogie Villa  MRN:  [id number]  :  2000  LEANN:  10/16/2024    Dear CHRISTIN Adams CNP,    I had the pleasure of seeing your patient Boogie Villa. He is a 24 year old male who I am continuing to see for treatment of obesity related to:        2023    12:01 PM   --   I have the following health issues associated with obesity High Blood Pressure   I have the following symptoms associated with obesity Fatigue       Assessment & Plan   Problem List Items Addressed This Visit       Class 2 severe obesity due to excess calories with serious comorbidity and body mass index (BMI) of 39.0 to 39.9 in adult (H) - Primary    Relevant Orders    Hemoglobin A1c    Hepatic panel    Steroid-induced hyperglycemia    Relevant Orders    Hemoglobin A1c    Hepatic panel      Plan  No meds started today, discussed wegovy, topiramate, contrave, metformin. Boogie will run these meds by transplant team to see if there are good options given his kidney transplant   Labs ordered today: A1c and liver function labs- will fax labs the [facility] with [facility]   Follow up with Yessenia in 3 months   Dietician appointment as needed   Keep up the excellent work!     Potential anti-obesity medications for this patient the future  WEGOVY  Would likely be a great candidate, though Boogie had some hesitancy as he feels it would be hard for him to be motivated to implement an injection into his weekly schedule.   No history of pancreatitis   No personal or family history of medullary thyroid carcinoma  No personal or family history of Multiple Endocrine Neoplasia Type 2  .       The following medications could be considered with caution for this patient   TOPIRAMATE  No history of kidney stones  No history of glaucoma   No issues with memory  Caution would be needed with this medication due to status post kidney transplant, would want to confirm  "with transplant team prior to starting .  .  CONTRAVE  No current opioid use  No history of liver disease  No chronic pain  No history of bipolar disorder  No history of cardiovascular disease   No history of cardiac arrhythmias   No history of seizures  No history of bulimia nervosa  No history of anorexia nervosa  Caution would be needed with this medication due to status post kidney transplant, would want to confirm with transplant team on starting   .  METFORMIN  Would likely see the most benefit due to some concern for insulin resistance with chronic prednisone therapy   Is concurrently taking the following medication(s) associated with weight gain: prednisone, tacrolimus,   GFR >45  Caution would be needed with this medication due to status post kidney transplant   .    Contraindicated/Failed Weight loss medications for this patient   PHENTERMINE  Contraindicated due to current stimulant treatment for adhd   .    INTERVAL HISTORY:  MWM generally with Yessenia Heller since 2020   Last see by her 1 year ago, 10/26/23   Tolerated saxenda up to 1.2mg. didn't find beneficial and wanted to stop medication. Struggles more with \"self control\" around food option choices rather than hunger. Discussed how this can be cravings related sometimes. Has started to make meaningful changes to diet since last seen- losing about 1lb a month. Less sugar beverages, more water, smaller portions, monitoring caloric intake. His currently personally set goal is to have an average of 2200 calories daily. Tries to stick to 2,000 during the week and more calories on the weekend. Not actually tracking but feels he is near goal often. Discussed trying to actually track this now to help with consistency and then determining next goal for further progress. Possible that we underestimate frequency of splurges if not tracking. Encouraged a check point with RD in 4 weeks to see what he learned from this and then consider caloric goals. Also discussed " "strategies to further adjust food choices.      Also planning to increase activity. This is limited by time and energy. Currently doing some strength training once a week. Looking at . Discussed slow steps to start this process. Smaller increment goals like a 10min walk which can fit better in schedule.     -kidney transplant April 2024     Today in visit- new to me 10/16/24  Was able to lose weight to transplant goal, but has since seen significant weight regain, about 40lbs. Ongoing prednisone therapy.   Thins are going well with kidney transplant, continuing to follow with transplant team.       Has been trying to lose weight with eating smaller portions, increasing protein over the last 6 months, but has still seen persistent weight gain.     Is unsure if appetite is a huge issue for him.   Wt Readings from Last 5 Encounters:   10/03/24 145.1 kg (319 lb 12.8 oz)   09/24/24 146.5 kg (323 lb)   07/01/24 142.9 kg (315 lb)   05/07/24 138.5 kg (305 lb 4.8 oz)   04/11/24 134.1 kg (295 lb 9.6 oz)       Anti-obesity medication history    Current:   Aderall for adhd- has not been helpful with weight loss     Past/Failed/contraindicated:   Saxenda- tolerated up to 1.2mg, self discontinued due to lack of perceived benefit. Anxious with injectable meds, struggles to stick to these.     Recent diet changes: prioritizing high protein, will have protein poptart for breakfast and for snacks. Burrito for dinner most nights.     Recent exercise/activity changes: no major exercise changes, hoping to get back into this. Prior to transplant was walking on treadmill 2x a week, but fell out of this habit with recovering from transplant. Lifting weights about once a week lately, aims for 2x a week.      Recent stressors: stress levels are pretty high, specifically with anxiety. Working regularly with therapist, started some meds recently as well.     Recent sleep changes: sleep has been \"terrible.\" Able to fall asleep " but will wake up and feel exhausted still. Trying to get sleep study done.     Vitamins/Labs: A1c and lfts ordered today. Kidney labs monitored continuously by transplant team.       CURRENT WEIGHT:   0 lbs 0 oz                      10/26/2023    12:47 PM   Changes and Difficulties   I have made the following changes to my diet since my last visit: trying to eat less   With regards to my diet, I am still struggling with: eating healthy   I have made the following changes to my activity/exercise since my last visit: none   With regards to my activity/exercise, I am still struggling with: nothing             MEDICATIONS:   Current Outpatient Medications   Medication Sig Dispense Refill    acetaminophen (TYLENOL) 325 MG tablet Take 1-2 tablets (325-650 mg) by mouth every 4 hours as needed (For optimal non-opioid multimodal pain management to improve pain control.)      Alcohol Swabs PADS Use to swab the area of the injection or janny as directed Per insurance coverage 200 each 1    amLODIPine (NORVASC) 10 MG tablet Take 1 tablet (10 mg) by mouth daily 30 tablet 11    amphetamine-dextroamphetamine (ADDERALL XR) 15 MG 24 hr capsule Take 1 capsule by mouth daily.      carvedilol (COREG) 12.5 MG tablet Take 1 tablet (12.5 mg) by mouth 2 times daily 180 tablet 3    cetirizine (ZYRTEC) 10 MG tablet Take 10 mg by mouth daily as needed for allergies (in the spring)      cholecalciferol (VITAMIN D3) 25 mcg (1000 units) capsule Take 1 capsule (25 mcg) by mouth daily      losartan (COZAAR) 25 MG tablet Take 1 tablet (25 mg) by mouth every evening 30 tablet 11    magnesium oxide (MAG-OX) 400 MG tablet Take 2 tablets (800 mg) by mouth daily (with lunch) 60 tablet 3    mycophenolate (GENERIC EQUIVALENT) 250 MG capsule Take 4 capsules (1,000 mg) by mouth 2 times daily 240 capsule 11    predniSONE (DELTASONE) 1 MG tablet Take 2 tablets (2 mg) by mouth daily.      sertraline (ZOLOFT) 25 MG tablet Take 150 mg by mouth daily.      Sharps  Container MISC Use as directed to dispose of needles, lancets and other sharps 1 each 1    sodium bicarbonate 650 MG tablet Take 3 tablets (1,950 mg) by mouth 3 times daily 270 tablet 11    sulfamethoxazole-trimethoprim (BACTRIM) 400-80 MG tablet Take 1 tablet by mouth daily 30 tablet 11    tacrolimus (GENERIC EQUIVALENT) 1 MG capsule Take 2 capsules (2 mg) by mouth 2 times daily. Total dose = 7 mg twice daily 180 capsule 11    tacrolimus (GENERIC EQUIVALENT) 5 MG capsule Take 1 capsule (5 mg) by mouth 2 times daily. Total dose = 7 mg twice daily 60 capsule 11           10/26/2023    12:47 PM   Weight Loss Medication History Reviewed With Patient   Which weight loss medications are you currently taking on a regular basis? None   If you are not taking a weight loss medication that was prescribed to you, please indicate why: Other   Are you having any side effects from the weight loss medication that we have prescribed you? No               12/18/2020     2:41 PM 10/17/2023     3:53 PM   MORGAN Score (Last Two)   MORGAN Raw Score 37 29    29   Activation Score 79.2 52.9    52.9   MORGAN Level 4 2    2         PHYSICAL EXAM:  Objective    There were no vitals taken for this visit.    Vitals - Patient Reported  Pain Score: No Pain (0)      Vitals:  No vitals were obtained today due to virtual visit.    GENERAL: alert and no distress  EYES: Eyes grossly normal to inspection.  No discharge or erythema, or obvious scleral/conjunctival abnormalities.  RESP: No audible wheeze, cough, or visible cyanosis.    SKIN: Visible skin clear. No significant rash, abnormal pigmentation or lesions.  NEURO: Cranial nerves grossly intact.  Mentation and speech appropriate for age.  PSYCH: Appropriate affect, tone, and pace of words        Sincerely,    Nteta Yu PA-C      24 minutes spent by me on the date of the encounter doing chart review, history and exam, documentation and further activities per the note    The longitudinal plan of  care for the diagnosis(es)/condition(s) as documented were addressed during this visit. Due to the added complexity in care, I will continue to support Boogie in the subsequent management and with ongoing continuity of care.

## 2024-11-04 ENCOUNTER — TELEPHONE (OUTPATIENT)
Dept: ENDOCRINOLOGY | Facility: CLINIC | Age: 24
End: 2024-11-04
Payer: COMMERCIAL

## 2024-11-04 DIAGNOSIS — N18.6 ESRD (END STAGE RENAL DISEASE) (H): ICD-10-CM

## 2024-11-04 DIAGNOSIS — Z76.82 ORGAN TRANSPLANT CANDIDATE: ICD-10-CM

## 2024-11-04 DIAGNOSIS — E66.01 CLASS 2 SEVERE OBESITY DUE TO EXCESS CALORIES WITH SERIOUS COMORBIDITY AND BODY MASS INDEX (BMI) OF 39.0 TO 39.9 IN ADULT (H): Primary | ICD-10-CM

## 2024-11-04 DIAGNOSIS — E66.812 CLASS 2 SEVERE OBESITY DUE TO EXCESS CALORIES WITH SERIOUS COMORBIDITY AND BODY MASS INDEX (BMI) OF 39.0 TO 39.9 IN ADULT (H): Primary | ICD-10-CM

## 2024-11-04 RX ORDER — SEMAGLUTIDE 0.5 MG/.5ML
0.5 INJECTION, SOLUTION SUBCUTANEOUS WEEKLY
Qty: 2 ML | Refills: 1 | Status: SHIPPED | OUTPATIENT
Start: 2024-11-04

## 2024-11-04 RX ORDER — SEMAGLUTIDE 0.25 MG/.5ML
0.25 INJECTION, SOLUTION SUBCUTANEOUS WEEKLY
Qty: 2 ML | Refills: 0 | Status: SHIPPED | OUTPATIENT
Start: 2024-11-04

## 2024-11-04 NOTE — TELEPHONE ENCOUNTER
PA Initiation    Medication: WEGOVY 0.25 MG/0.5ML SC SOAJ  Insurance Company: [employer] Non-Specialty PA's - Phone [phone] Fax [phone]  Pharmacy Filling the Rx: [facility] [address] [place] [address]  Filling Pharmacy Phone: [phone]  Filling Pharmacy Fax: [phone]  Start Date: 11/4/2024

## 2024-11-04 NOTE — TELEPHONE ENCOUNTER
Prior Authorization Approval    Medication: WEGOVY 0.25 MG/0.5ML SC SOAJ  Authorization Effective Date: 10/5/2024  Authorization Expiration Date: 6/2/2025  Approved Dose/Quantity: 1 month  Reference #: [id number]   Insurance Company: [employer] Non-Specialty PA's - Phone [phone] Fax [phone]  Expected CoPay: $    CoPay Card Available:      Financial Assistance Needed:    Which Pharmacy is filling the prescription: [facility] [address] [place] [address] - [address] [place] [address]  Pharmacy Notified: order sent to pharmacy  Patient Notified: pharmacy to call pt when ready

## 2024-11-07 ENCOUNTER — TELEPHONE (OUTPATIENT)
Dept: TRANSPLANT | Facility: CLINIC | Age: 24
End: 2024-11-07
Payer: COMMERCIAL

## 2024-11-07 DIAGNOSIS — Z94.0 HTN, KIDNEY TRANSPLANT RELATED: ICD-10-CM

## 2024-11-07 DIAGNOSIS — I15.1 HTN, KIDNEY TRANSPLANT RELATED: ICD-10-CM

## 2024-11-07 NOTE — TELEPHONE ENCOUNTER
Patient Name: Boogie Villa   YOB: 2000     [facility] MR# [if applicable]: [id number]   Date & Time:  11/7/2024   Expiration Date: 1 year after date issued      Diagnosis: Kidney Transplant (ICD-10 Z94.0)    Aftercare following organ transplant (ICD-10 Z48.288)    Long term use of medications (ICD-10 Z79.899)    Contact with and (suspected) exposure to other viral communicable    diseases (Z20.828)   We ask your assistance in obtaining the following laboratory tests, which are part of our routine surveillance program for Solid Organ Transplant patients.     Please fax each result to [phone], same day as resulted/available    Critical lab results page [phone]  Monday - Friday 8 am to 5 pm  Evening/Weekend/Holiday communicate Critical labs results [phone]     Monthly   CBC with platelets  Basic Metabolic Panel (Sodium, Potassium, Chloride, Creatinine, CO2, Urea Nitrogen, glucose, Calcium)  Tacrolimus/Prograf/ drug level  BK (Polyoma Virus) PCR Quantitative/Plasma  CMV PCR QT  EBV PCR QT    At 12 months post-transplant (Fasting labs) Due: 12/2024   Hepatic panel  Hemoglobin A1c  Uric Acid  Lipid panel  Urine protein/creatinine  PTH  Vitamin D  Donor Specific Antibodies  (patient to bring kit)

## 2024-11-11 DIAGNOSIS — Z94.0 KIDNEY REPLACED BY TRANSPLANT: Primary | ICD-10-CM

## 2024-11-11 DIAGNOSIS — I15.1 HTN, KIDNEY TRANSPLANT RELATED: ICD-10-CM

## 2024-11-11 DIAGNOSIS — Z94.0 HTN, KIDNEY TRANSPLANT RELATED: ICD-10-CM

## 2024-11-11 RX ORDER — PREDNISONE 1 MG/1
2 TABLET ORAL DAILY
Qty: 60 TABLET | Refills: 2 | Status: SHIPPED | OUTPATIENT
Start: 2024-11-11

## 2024-11-16 ENCOUNTER — TELEPHONE (OUTPATIENT)
Dept: TRANSPLANT | Facility: CLINIC | Age: 24
End: 2024-11-16
Payer: COMMERCIAL

## 2024-11-16 DIAGNOSIS — I15.1 HTN, KIDNEY TRANSPLANT RELATED: ICD-10-CM

## 2024-11-16 DIAGNOSIS — Z94.0 HTN, KIDNEY TRANSPLANT RELATED: ICD-10-CM

## 2024-11-16 NOTE — TELEPHONE ENCOUNTER
Tacrolimus level 16.7     Confirm  current dose of tacrolimus  7 mg twice per day    Called LVM to confirm current dose of tacrolimus  and if 12 hour trough level   Previous levels within goal level         Confirmed with Boogie  not a 12 hour trough level  -  will  repeat in 2 weeks

## 2024-12-02 ENCOUNTER — TELEPHONE (OUTPATIENT)
Dept: TRANSPLANT | Facility: CLINIC | Age: 24
End: 2024-12-02
Payer: COMMERCIAL

## 2024-12-02 DIAGNOSIS — I15.1 HTN, KIDNEY TRANSPLANT RELATED: Primary | ICD-10-CM

## 2024-12-02 DIAGNOSIS — Z94.0 HTN, KIDNEY TRANSPLANT RELATED: Primary | ICD-10-CM

## 2024-12-02 NOTE — TELEPHONE ENCOUNTER
Task        Please send  an order for magnesium  level to his local  labs  one time orders                                         ----- Message -----   From: Abhilash Raza MD     To: Nina Hu RN   Subject: Clinic visit                                     Please check the following labs: Magnesium.

## 2024-12-02 NOTE — LETTER
PHYSICIAN ORDERS      DATE & TIME ISSUED: 2024 11:28 AM  PATIENT NAME: Boogie Villa   : 2000     [facility] MR# [if applicable]: [id number]     DIAGNOSIS:  Kidney Transplant  ICD-10 CODE: Z94.0    Please draw magnesium level.    Any questions please call: [phone]    Please fax each result same day as resulted/available    Critical lab results page [phone].    .

## 2024-12-10 ENCOUNTER — MYC MEDICAL ADVICE (OUTPATIENT)
Dept: TRANSPLANT | Facility: CLINIC | Age: 24
End: 2024-12-10
Payer: COMMERCIAL

## 2024-12-10 DIAGNOSIS — I15.1 HTN, KIDNEY TRANSPLANT RELATED: Primary | ICD-10-CM

## 2024-12-10 DIAGNOSIS — Z94.0 HTN, KIDNEY TRANSPLANT RELATED: Primary | ICD-10-CM

## 2024-12-16 NOTE — TELEPHONE ENCOUNTER
Jeffrey Hyman -   I did hear back from Baylee, our Registered Dietician.  She agreed that 80-90 grams of protein is OK for your daily intake.     Thanks!     Kirsten Nix, RN, BSN, [facility]  Patient Care Supervisor, Post Abdominal Transplant    This MyChart message has not been read.     Mini Roberson, Archana Khan RN15 minutes ago (9:08 AM)       Hi!  Yes, 80-90 g protein per day would be ok for him!

## 2024-12-17 ENCOUNTER — TELEPHONE (OUTPATIENT)
Dept: TRANSPLANT | Facility: CLINIC | Age: 24
End: 2024-12-17
Payer: COMMERCIAL

## 2024-12-17 DIAGNOSIS — I15.1 HTN, KIDNEY TRANSPLANT RELATED: Primary | ICD-10-CM

## 2024-12-17 DIAGNOSIS — Z94.0 HTN, KIDNEY TRANSPLANT RELATED: Primary | ICD-10-CM

## 2024-12-17 NOTE — TELEPHONE ENCOUNTER
Left Voicemail (1st Attempt) and Sent Mychart (1st Attempt) for the patient to call back and schedule the following:    Appointment type: RKT   Provider: Dr. Raza  Return date: 1/7/25  Specialty phone number: [phone]  Additional appointment(s) needed: labs   Additonal Notes: LVM & MyC to r/s appt w/Dr. Raza on 1/7/25, in-person with any MD AL (Noor) 12/17/24

## 2025-01-01 ENCOUNTER — TELEPHONE (OUTPATIENT)
Dept: TRANSPLANT | Facility: CLINIC | Age: 25
End: 2025-01-01
Payer: COMMERCIAL

## 2025-01-01 DIAGNOSIS — I15.1 HTN, KIDNEY TRANSPLANT RELATED: Primary | ICD-10-CM

## 2025-01-01 DIAGNOSIS — Z94.0 HTN, KIDNEY TRANSPLANT RELATED: Primary | ICD-10-CM

## 2025-01-01 NOTE — TELEPHONE ENCOUNTER
Boogie's mom Darcy paged on call RNCC. Boogie accidentally took 2000 mg mycophenolate this afternoon instead of 1000 mg. Darcy wondering if they should be concerned. Informed her that it should be fine, but to not take a dose of it this evening.

## 2025-01-03 ENCOUNTER — TELEPHONE (OUTPATIENT)
Dept: TRANSPLANT | Facility: CLINIC | Age: 25
End: 2025-01-03
Payer: COMMERCIAL

## 2025-01-03 DIAGNOSIS — I15.1 HTN, KIDNEY TRANSPLANT RELATED: Primary | ICD-10-CM

## 2025-01-03 DIAGNOSIS — Z94.0 HTN, KIDNEY TRANSPLANT RELATED: Primary | ICD-10-CM

## 2025-01-03 DIAGNOSIS — Z94.0 KIDNEY REPLACED BY TRANSPLANT: ICD-10-CM

## 2025-01-03 NOTE — TELEPHONE ENCOUNTER
[facility] Call Center    Phone Message    May a detailed message be left on voicemail: yes     Reason for Call: Other: Patient called back to reschedule  was not available. Reschedule for 1/24 can you please extend the orders to attach to the appointments. Thank you       Action Taken: Other: SOT    Travel Screening: Not Applicable     Date of Service:

## 2025-01-24 ENCOUNTER — LAB (OUTPATIENT)
Dept: LAB | Facility: CLINIC | Age: 25
End: 2025-01-24
Attending: STUDENT IN AN ORGANIZED HEALTH CARE EDUCATION/TRAINING PROGRAM
Payer: COMMERCIAL

## 2025-01-24 DIAGNOSIS — I15.1 HTN, KIDNEY TRANSPLANT RELATED: ICD-10-CM

## 2025-01-24 DIAGNOSIS — Z94.0 KIDNEY TRANSPLANTED: ICD-10-CM

## 2025-01-24 DIAGNOSIS — Z94.0 HTN, KIDNEY TRANSPLANT RELATED: ICD-10-CM

## 2025-01-24 DIAGNOSIS — Z94.0 KIDNEY REPLACED BY TRANSPLANT: ICD-10-CM

## 2025-01-24 PROBLEM — E66.813 CLASS 3 OBESITY: Status: ACTIVE | Noted: 2017-12-15

## 2025-01-24 LAB
ANION GAP SERPL CALCULATED.3IONS-SCNC: 17 MMOL/L (ref 7–15)
BUN SERPL-MCNC: 18.4 MG/DL (ref 6–20)
CALCIUM SERPL-MCNC: 11 MG/DL (ref 8.8–10.4)
CHLORIDE SERPL-SCNC: 102 MMOL/L (ref 98–107)
CREAT SERPL-MCNC: 1.39 MG/DL (ref 0.67–1.17)
EGFRCR SERPLBLD CKD-EPI 2021: 73 ML/MIN/1.73M2
GLUCOSE SERPL-MCNC: 144 MG/DL (ref 70–99)
HCO3 SERPL-SCNC: 21 MMOL/L (ref 22–29)
MAGNESIUM SERPL-MCNC: 1.4 MG/DL (ref 1.7–2.3)
POTASSIUM SERPL-SCNC: 4.1 MMOL/L (ref 3.4–5.3)
SODIUM SERPL-SCNC: 140 MMOL/L (ref 135–145)

## 2025-01-24 PROCEDURE — 83735 ASSAY OF MAGNESIUM: CPT | Performed by: PATHOLOGY

## 2025-01-24 PROCEDURE — 36415 COLL VENOUS BLD VENIPUNCTURE: CPT | Performed by: PATHOLOGY

## 2025-01-24 PROCEDURE — 87799 DETECT AGENT NOS DNA QUANT: CPT | Performed by: INTERNAL MEDICINE

## 2025-01-24 PROCEDURE — 99000 SPECIMEN HANDLING OFFICE-LAB: CPT | Performed by: PATHOLOGY

## 2025-01-24 PROCEDURE — 82435 ASSAY OF BLOOD CHLORIDE: CPT | Performed by: STUDENT IN AN ORGANIZED HEALTH CARE EDUCATION/TRAINING PROGRAM

## 2025-01-25 LAB
CMV DNA SPEC NAA+PROBE-ACNC: NOT DETECTED IU/ML
SPECIMEN TYPE: NORMAL

## 2025-01-26 LAB — EBV DNA SERPL NAA+PROBE-ACNC: NOT DETECTED IU/ML

## 2025-03-03 ENCOUNTER — MYC MEDICAL ADVICE (OUTPATIENT)
Dept: TRANSPLANT | Facility: CLINIC | Age: 25
End: 2025-03-03
Payer: COMMERCIAL

## 2025-03-03 DIAGNOSIS — Z94.0 HTN, KIDNEY TRANSPLANT RELATED: ICD-10-CM

## 2025-03-03 DIAGNOSIS — I15.1 HTN, KIDNEY TRANSPLANT RELATED: ICD-10-CM

## 2025-03-05 NOTE — TELEPHONE ENCOUNTER
Boogie      Your transplant  labs are due monthly     I do  not have labs from Feb 2025   so you should have a full set of labs this week      Just double checking    Be Well   Nina Kidney Transplant Coordinator       Kidney Transplant Lab Frequency Protocol      1  to 2 Years:              Monthly  2 to 5 Years:                Every 2 months  > 5 Years:                    Every 3 months         Boogie Villa  YouYesterday (7:35 AM)       Hello. I have my tac labs scheduled for Thursday this week. I had all my other labs done in clinic at my appointment last week.        You  Boogie Aguilare2 days ago     CRYSTAL Hyman          I am just reviewing your chart  today   Your do for your transplant  labs -  unless you had labs in Feb 2025  ?        Please respond to this message  or call       Be Well   Yessenia    Paperwork from [address] [place] [address] for md signature   Placed in md rack

## 2025-04-01 DIAGNOSIS — Z94.0 KIDNEY REPLACED BY TRANSPLANT: Primary | ICD-10-CM

## 2025-04-01 RX ORDER — PREDNISONE 1 MG/1
2 TABLET ORAL DAILY
Qty: 60 TABLET | Refills: 11 | Status: SHIPPED | OUTPATIENT
Start: 2025-04-01

## 2025-04-14 DIAGNOSIS — I15.1 HTN, KIDNEY TRANSPLANT RELATED: ICD-10-CM

## 2025-04-14 DIAGNOSIS — Z94.0 KIDNEY REPLACED BY TRANSPLANT: Chronic | ICD-10-CM

## 2025-04-14 DIAGNOSIS — K21.9 GASTROESOPHAGEAL REFLUX DISEASE, UNSPECIFIED WHETHER ESOPHAGITIS PRESENT: ICD-10-CM

## 2025-04-14 DIAGNOSIS — Z94.0 HTN, KIDNEY TRANSPLANT RELATED: ICD-10-CM

## 2025-04-14 RX ORDER — CARVEDILOL 12.5 MG/1
12.5 TABLET ORAL 2 TIMES DAILY
Qty: 180 TABLET | Refills: 3 | Status: SHIPPED | OUTPATIENT
Start: 2025-04-14

## 2025-04-19 ENCOUNTER — HEALTH MAINTENANCE LETTER (OUTPATIENT)
Age: 25
End: 2025-04-19

## 2025-05-01 ENCOUNTER — MYC MEDICAL ADVICE (OUTPATIENT)
Dept: TRANSPLANT | Facility: CLINIC | Age: 25
End: 2025-05-01
Payer: COMMERCIAL

## 2025-05-01 DIAGNOSIS — Z94.0 HTN, KIDNEY TRANSPLANT RELATED: ICD-10-CM

## 2025-05-01 DIAGNOSIS — I15.1 HTN, KIDNEY TRANSPLANT RELATED: ICD-10-CM

## 2025-05-01 DIAGNOSIS — Z94.0 KIDNEY REPLACED BY TRANSPLANT: Primary | ICD-10-CM

## 2025-05-02 ENCOUNTER — TELEPHONE (OUTPATIENT)
Dept: TRANSPLANT | Facility: CLINIC | Age: 25
End: 2025-05-02
Payer: COMMERCIAL

## 2025-05-02 DIAGNOSIS — I15.1 HTN, KIDNEY TRANSPLANT RELATED: ICD-10-CM

## 2025-05-02 DIAGNOSIS — Z94.0 HTN, KIDNEY TRANSPLANT RELATED: ICD-10-CM

## 2025-05-02 NOTE — LETTER
PHYSICIAN ORDERS      DATE & TIME ISSUED: May 4, 2025 10:51 AM  PATIENT NAME: Boogie Villa   : 2000     [facility] MR# [if applicable]: [id number]     DIAGNOSIS:  Kidney transplant    ICD-10 CODE: z 94.0      Please repeat transplant  labs in ~  2 weeks  CBC with platelets  Basic Metabolic Panel (Sodium, Potassium, Chloride, Creatinine, CO2, Urea Nitrogen, glucose, Calcium)  Tacrolimus/Prograf/ drug level  Please send HLA/DSa kit with this sent of labs in MAY 2025 (patient to  bring in kit)     Any questions please call: [phone]     Please fax each result same day as resulted/available    Critical lab results page [phone] option     [phone].    .

## 2025-05-04 NOTE — TELEPHONE ENCOUNTER
Issue  tacrolimus  level 3.7  below goal level      Sent Edgar message and called     Boogie reports he may missed his dose of his tacrolimus  prior to this set of transplant  labs  Confirmed and discussed no routine  gaps of medications  -   Confirmed has medical and medication coverage for the next year       Reviewed to repeat transplant  labs in 2 weeks

## 2025-05-08 DIAGNOSIS — I15.1 HTN, KIDNEY TRANSPLANT RELATED: ICD-10-CM

## 2025-05-08 DIAGNOSIS — N18.5 CKD (CHRONIC KIDNEY DISEASE) STAGE 5, GFR LESS THAN 15 ML/MIN (H): ICD-10-CM

## 2025-05-08 DIAGNOSIS — I12.9 HYPERTENSION, RENAL: ICD-10-CM

## 2025-05-08 DIAGNOSIS — Z94.0 HTN, KIDNEY TRANSPLANT RELATED: ICD-10-CM

## 2025-05-08 RX ORDER — AMLODIPINE BESYLATE 10 MG/1
10 TABLET ORAL DAILY
Qty: 90 TABLET | Refills: 3 | Status: SHIPPED | OUTPATIENT
Start: 2025-05-08

## 2025-05-17 DIAGNOSIS — Z94.0 KIDNEY REPLACED BY TRANSPLANT: ICD-10-CM

## 2025-05-19 RX ORDER — SULFAMETHOXAZOLE AND TRIMETHOPRIM 400; 80 MG/1; MG/1
1 TABLET ORAL DAILY
Qty: 30 TABLET | Refills: 0 | Status: SHIPPED | OUTPATIENT
Start: 2025-05-19

## 2025-05-27 DIAGNOSIS — I15.1 HTN, KIDNEY TRANSPLANT RELATED: ICD-10-CM

## 2025-05-27 DIAGNOSIS — Z94.0 KIDNEY REPLACED BY TRANSPLANT: Chronic | ICD-10-CM

## 2025-05-27 DIAGNOSIS — Z94.0 HTN, KIDNEY TRANSPLANT RELATED: ICD-10-CM

## 2025-05-27 RX ORDER — MYCOPHENOLATE MOFETIL 250 MG/1
1000 CAPSULE ORAL 2 TIMES DAILY
Qty: 240 CAPSULE | Refills: 11 | Status: SHIPPED | OUTPATIENT
Start: 2025-05-27

## 2025-06-08 DIAGNOSIS — E87.20 METABOLIC ACIDOSIS: Primary | ICD-10-CM

## 2025-06-09 RX ORDER — SODIUM BICARBONATE 650 MG/1
1950 TABLET ORAL 3 TIMES DAILY
Qty: 270 TABLET | Refills: 0 | Status: SHIPPED | OUTPATIENT
Start: 2025-06-09

## 2025-06-14 ENCOUNTER — RESULTS FOLLOW-UP (OUTPATIENT)
Dept: TRANSPLANT | Facility: CLINIC | Age: 25
End: 2025-06-14
Payer: COMMERCIAL

## 2025-06-14 DIAGNOSIS — I15.1 HTN, KIDNEY TRANSPLANT RELATED: ICD-10-CM

## 2025-06-14 DIAGNOSIS — Z94.0 HTN, KIDNEY TRANSPLANT RELATED: ICD-10-CM

## 2025-06-14 DIAGNOSIS — Z94.0 KIDNEY REPLACED BY TRANSPLANT: Primary | Chronic | ICD-10-CM

## 2025-06-16 ENCOUNTER — RESULTS FOLLOW-UP (OUTPATIENT)
Dept: TRANSPLANT | Facility: CLINIC | Age: 25
End: 2025-06-16

## 2025-06-16 ENCOUNTER — MYC MEDICAL ADVICE (OUTPATIENT)
Dept: TRANSPLANT | Facility: CLINIC | Age: 25
End: 2025-06-16
Payer: COMMERCIAL

## 2025-06-16 ENCOUNTER — TELEPHONE (OUTPATIENT)
Dept: TRANSPLANT | Facility: CLINIC | Age: 25
End: 2025-06-16
Payer: COMMERCIAL

## 2025-06-16 DIAGNOSIS — I15.1 HTN, KIDNEY TRANSPLANT RELATED: ICD-10-CM

## 2025-06-16 DIAGNOSIS — Z94.0 HTN, KIDNEY TRANSPLANT RELATED: ICD-10-CM

## 2025-06-16 RX ORDER — SULFAMETHOXAZOLE AND TRIMETHOPRIM 400; 80 MG/1; MG/1
1 TABLET ORAL DAILY
Qty: 30 TABLET | Refills: 11 | Status: SHIPPED | OUTPATIENT
Start: 2025-06-16

## 2025-06-16 RX ORDER — LOSARTAN POTASSIUM 25 MG/1
25 TABLET ORAL EVERY EVENING
Qty: 30 TABLET | Refills: 0 | Status: SHIPPED | OUTPATIENT
Start: 2025-06-16

## 2025-06-16 NOTE — TELEPHONE ENCOUNTER
ISSUE:   Tacrolimus IR level 2.5 on 06/12/25, goal 4-6, dose 7 mg BID.    PLAN:   Call Patient and confirm this was an accurate 12-hour trough.   Verify Tacrolimus IR dose 7 mg BID.   Confirm no new medications or illness.   Confirm no missed doses.     If accurate trough and accurate dose, increase Tacrolimus IR dose to 9 mg BID  Repeat labs in 1-2 weeks.     OUTCOME:   Spoke with Patient, they confirm accurate trough level and current dose 7 mg BID.   Patient confirmed no dose change as he missed a dose. Patient agreed to repeat labs in 1 week.   Orders sent to lab for repeat labs.   Patient voiced understanding of plan.

## 2025-06-17 NOTE — TELEPHONE ENCOUNTER
Patient responded via mychart that he forgot to take his evening dose.  Will continue current dose and repeat labs within 1 week.  Patient educated on the importance of not missing doses.

## 2025-06-20 ENCOUNTER — TELEPHONE (OUTPATIENT)
Dept: TRANSPLANT | Facility: CLINIC | Age: 25
End: 2025-06-20
Payer: COMMERCIAL

## 2025-06-20 DIAGNOSIS — I15.1 HTN, KIDNEY TRANSPLANT RELATED: ICD-10-CM

## 2025-06-20 DIAGNOSIS — Z94.0 HTN, KIDNEY TRANSPLANT RELATED: ICD-10-CM

## 2025-06-20 NOTE — TELEPHONE ENCOUNTER
Called Boogie  reviewed concern about 2nd low level of tacrolimus      Boogie  states he has his CPAP   so sleeping better able to focus on taking medications     Reviewed concern  about developing antibodies , or rejection   with low immunosuppression levels    Boogie verbalized understanding to repeat transplant  labs in one week

## 2025-07-09 DIAGNOSIS — E87.20 METABOLIC ACIDOSIS: Primary | ICD-10-CM

## 2025-07-09 RX ORDER — SODIUM BICARBONATE 650 MG/1
1950 TABLET ORAL 3 TIMES DAILY
Qty: 270 TABLET | Refills: 0 | Status: SHIPPED | OUTPATIENT
Start: 2025-07-09

## 2025-07-11 PROBLEM — Z29.89 NEED FOR PNEUMOCYSTIS PROPHYLAXIS: Status: RESOLVED | Noted: 2024-02-25 | Resolved: 2025-07-11

## 2025-07-11 PROCEDURE — 99000 SPECIMEN HANDLING OFFICE-LAB: CPT | Performed by: PATHOLOGY

## 2025-07-11 PROCEDURE — 87799 DETECT AGENT NOS DNA QUANT: CPT | Performed by: INTERNAL MEDICINE

## 2025-07-11 PROCEDURE — 83036 HEMOGLOBIN GLYCOSYLATED A1C: CPT

## 2025-07-12 ENCOUNTER — HEALTH MAINTENANCE LETTER (OUTPATIENT)
Age: 25
End: 2025-07-12

## 2025-07-16 DIAGNOSIS — Z94.0 HTN, KIDNEY TRANSPLANT RELATED: Primary | ICD-10-CM

## 2025-07-16 DIAGNOSIS — Z94.0 KIDNEY REPLACED BY TRANSPLANT: Chronic | ICD-10-CM

## 2025-07-16 DIAGNOSIS — I15.1 HTN, KIDNEY TRANSPLANT RELATED: Primary | ICD-10-CM

## 2025-07-17 RX ORDER — LOSARTAN POTASSIUM 25 MG/1
25 TABLET ORAL EVERY EVENING
Qty: 30 TABLET | Refills: 0 | Status: SHIPPED | OUTPATIENT
Start: 2025-07-17

## 2025-07-21 ENCOUNTER — TELEPHONE (OUTPATIENT)
Dept: ENDOCRINOLOGY | Facility: CLINIC | Age: 25
End: 2025-07-21
Payer: COMMERCIAL

## 2025-07-21 DIAGNOSIS — E11.9 TYPE 2 DIABETES MELLITUS WITHOUT COMPLICATION, WITHOUT LONG-TERM CURRENT USE OF INSULIN (H): ICD-10-CM

## 2025-07-21 DIAGNOSIS — T38.0X5A STEROID-INDUCED HYPERGLYCEMIA: ICD-10-CM

## 2025-07-21 DIAGNOSIS — E66.812 CLASS 2 SEVERE OBESITY DUE TO EXCESS CALORIES WITH SERIOUS COMORBIDITY AND BODY MASS INDEX (BMI) OF 39.0 TO 39.9 IN ADULT (H): Primary | ICD-10-CM

## 2025-07-21 DIAGNOSIS — R73.9 STEROID-INDUCED HYPERGLYCEMIA: ICD-10-CM

## 2025-07-21 DIAGNOSIS — Z76.82 ORGAN TRANSPLANT CANDIDATE: ICD-10-CM

## 2025-07-21 DIAGNOSIS — E66.01 CLASS 2 SEVERE OBESITY DUE TO EXCESS CALORIES WITH SERIOUS COMORBIDITY AND BODY MASS INDEX (BMI) OF 39.0 TO 39.9 IN ADULT (H): Primary | ICD-10-CM

## 2025-07-21 DIAGNOSIS — Z94.0 KIDNEY REPLACED BY TRANSPLANT: Chronic | ICD-10-CM

## 2025-07-21 RX ORDER — SEMAGLUTIDE 0.5 MG/.5ML
0.5 INJECTION, SOLUTION SUBCUTANEOUS WEEKLY
Qty: 2 ML | Refills: 1 | Status: SHIPPED | OUTPATIENT
Start: 2025-07-21

## 2025-07-21 NOTE — TELEPHONE ENCOUNTER
PA Initiation    Medication: WEGOVY 0.25 MG/0.5ML SC SOAJ  Insurance Company: [employer] Non-Specialty PA's - Phone [phone] Fax [phone]  Pharmacy Filling the Rx: [facility] [address] [place] [address]  Filling Pharmacy Phone: [phone]  Filling Pharmacy Fax: [phone]  Start Date: 7/21/2025

## 2025-07-21 NOTE — TELEPHONE ENCOUNTER
Called patient to discuss lab results- A1c consistent with new diagnosis of type 2 diabetes.     Was taking wegovy back in November 2024, cannot recall why he stopped taking it. Is open to restarting wegovy today.     Will send in prescription for wegovy, will also have schedulers reach out to get scheduled with Yessenia Heller for follow up. Referral will also be placed for diabetes educator and [facility] pharmacist to help with diabetes management.       Netta Yu PA-C

## 2025-07-21 NOTE — TELEPHONE ENCOUNTER
Prior Authorization Approval    Medication: WEGOVY 0.25 MG/0.5ML SC SOAJ  Authorization Effective Date: 6/21/2025  Authorization Expiration Date: 2/16/2026  Approved Dose/Quantity: 1` month  Reference #: [id number]   Insurance Company: [employer] Non-Specialty PA's - Phone [phone] Fax [phone]  Expected CoPay: $    CoPay Card Available:      Financial Assistance Needed:    Which Pharmacy is filling the prescription: [facility] [address] [place] [address] - [address] [place] [address]  Pharmacy Notified: order sent to pharmacy  Patient Notified: pharmacy to call pt when ready

## 2025-07-22 ENCOUNTER — PATIENT OUTREACH (OUTPATIENT)
Dept: CARE COORDINATION | Facility: CLINIC | Age: 25
End: 2025-07-22
Payer: COMMERCIAL

## 2025-07-24 ENCOUNTER — PATIENT OUTREACH (OUTPATIENT)
Dept: CARE COORDINATION | Facility: CLINIC | Age: 25
End: 2025-07-24
Payer: COMMERCIAL

## 2025-07-29 ENCOUNTER — VIRTUAL VISIT (OUTPATIENT)
Dept: PHARMACY | Facility: CLINIC | Age: 25
End: 2025-07-29
Payer: COMMERCIAL

## 2025-07-29 DIAGNOSIS — F32.A DEPRESSION, UNSPECIFIED DEPRESSION TYPE: ICD-10-CM

## 2025-07-29 DIAGNOSIS — E11.65 TYPE 2 DIABETES MELLITUS WITH HYPERGLYCEMIA, WITHOUT LONG-TERM CURRENT USE OF INSULIN (H): Primary | ICD-10-CM

## 2025-07-29 DIAGNOSIS — Z94.0 KIDNEY REPLACED BY TRANSPLANT: ICD-10-CM

## 2025-07-29 DIAGNOSIS — G47.30 SLEEP APNEA, UNSPECIFIED TYPE: ICD-10-CM

## 2025-07-29 DIAGNOSIS — J30.1 SEASONAL ALLERGIC RHINITIS DUE TO POLLEN: ICD-10-CM

## 2025-07-29 DIAGNOSIS — Z48.298 AFTERCARE FOLLOWING ORGAN TRANSPLANT: ICD-10-CM

## 2025-07-29 DIAGNOSIS — F41.9 ANXIETY: ICD-10-CM

## 2025-07-29 DIAGNOSIS — I15.1 HTN, KIDNEY TRANSPLANT RELATED: ICD-10-CM

## 2025-07-29 DIAGNOSIS — Z78.9 TAKES DIETARY SUPPLEMENTS: ICD-10-CM

## 2025-07-29 DIAGNOSIS — Z94.0 HTN, KIDNEY TRANSPLANT RELATED: ICD-10-CM

## 2025-07-29 DIAGNOSIS — F90.9 ATTENTION DEFICIT HYPERACTIVITY DISORDER (ADHD), UNSPECIFIED ADHD TYPE: ICD-10-CM

## 2025-07-29 RX ORDER — CHLORHEXIDINE GLUCONATE 4 %
3 LIQUID (ML) TOPICAL DAILY
COMMUNITY

## 2025-07-29 RX ORDER — THERA TABS 400 MCG
1 TAB ORAL DAILY
COMMUNITY

## 2025-07-29 RX ORDER — LOPERAMIDE HYDROCHLORIDE 2 MG/1
2 CAPSULE ORAL 4 TIMES DAILY PRN
COMMUNITY

## 2025-07-29 NOTE — PATIENT INSTRUCTIONS
"Recommendations from today's MTM visit:                                                       Continue Wegovy 0.25 mg once weekly for 4 weeks, then increase to 0.5 mg once weekly   Will refer to diabetes education for more information on what diabetes is   Ask coordinators to reach out and schedule follow-up with Netta Yu PA-C and registered dietitian     To help with tolerability and effectiveness of Wegovy:  Eat 3 meals with protein focus daily to help with nausea. If you forget to eat, you may feel nausea as a hunger cue.  Focus on getting protein in first with each meal and snack.   A good starting goal is 60 g protein daily (track this, especially if at weight loss plateau). Once you consistently are getting 60g daily, try getting 90 g protein daily.  See list below of protein-rich food ideas  Add fiber with meals (fruits, vegetables, nuts, beans, seeds, whole grains) to help having regular bowel movements. You can try adding a fiber supplement like Metamucil, Benefiber, or Citrucel once daily if needed.   Drink plenty of water - goal 64 oz throughout the day  To optimize weight management - work on incorporating resistance training/weight lifting to build muscle and improve overall metabolism of adipose tissue.    Bupropion is a medication used for depression with a lower risk for sexual side effects. It's sometimes used alone or with other medications for depression. You can discuss if this could be an option for you with your primary care provider. Your carvedilol dose may need to be decreased if you start bupropion due to a drug interaction.     Follow-up: 9/11/25 with medication therapy management     It was great speaking with you today.  I value your experience and would be very thankful for your time in providing feedback in our clinic survey. In the next few days, you may receive an email or text message from "[employer] with a link to a survey related to your  clinical pharmacist.\"     To " schedule another MTM appointment, please call the clinic directly or you may call the MTM scheduling line at [phone].    My Clinical Pharmacist's contact information:                                                      Please feel free to contact me with any questions or concerns you have.      Arianne Roach, PharmD, AAHIVP  Medication Therapy Management Pharmacist         Wegovy Dosing:   Start Wegovy: It is a subcutaneous injection that you inject once weekly and titrate the dose slowly over time. Make sure inject the same time each day of the week, for example Monday evenings.  Each pen is single use.  In each prescription, you will get 4 pens in each box.  You will need a new prescription for each strength of the medication.  Week 1-4: Inject 0.25 mg once weekly  Week 5-8: If tolerating, increase to 0.5 mg once weekly  Week 9-12: If tolerating, increase to 1 mg once weekly  Week 13-15: If tolerating, increase to 1.7 mg once weekly  Week 16 & on: If tolerating, increase to 2.4 mg once weekly  *If you are having some nausea or other side effects to where you are hesitant to move up to the next dose, stay at the same dose you are on for an additional week to see if side effect(s) improves/resolves. Make sure to take this time to hydrate and ensure you are drinking at least 64 oz water per day.  If you are wanting to stay at the same dose and do not have additional refills on that prescription please reach out to the clinic.    The goal is to get to a dose that is well tolerated and effective for you. You do not have to go up on the dose each month or get to maximum dose if getting an effective response with minimal side effects.     Wegovy Administration Video: Video that was created by the .  https://www.[employer].com/watch?v=[email or web address]    Wegovy Storage and Stability:   Make sure that when you get the prescription that you store the prescription in the refrigerator until it is time to use the  Wegovy pen.  Once it is time to use the Wegovy pen, you can keep the pen at room temperature and it is good for up to 28 days at room temperature.     Wegovy Common Side Effects:   Nausea, diarrhea, constipation, headache, tiredness (fatigue), dizziness, stomach upset/pain. Less commonly, Wegovy can cause low blood sugar (symptoms: shaky, dizzy, sweaty, agitation). Please reach out to the care team should you feel like this is occurring. It is important to ensure that you are eating consistent meals and not skipping meals. Ensure you are getting at least 64 oz water daily.

## 2025-07-29 NOTE — PROGRESS NOTES
Medication Therapy Management (MTM) Encounter    ASSESSMENT:                            Medication Adherence/Access: has improved recently     Diabetes / weight management/obstructive sleep apnea:   Provided information on diabetes diagnosis. Will refer to diabetes educator for more information. Provided counseling on Wegovy and discussed importance of adherence for tolerability and effectiveness. He's due for weight management Follow-up. Discussed importance of eating consistently throughout the day to help with tolerability of Wegovy. He expressed understanding but isn't sure he can incorporate changes right now. He's open to seeing registered dietitian.     Kidney transplant (4/2024):   stable    Hypertension   stable    Allergy   stable    Mental Health   ADHD/depression/anxiety:  Could consider switching to bupropion or augmenting with bupropion to reduce side effects but caution due to hypertension (currently well controlled) and interaction with Adderall (could reduce seizure threshold, no history of seizures), and interaction with carvedilol (may need to reduce carvedilol dose). Can discuss with primary care provider.      Supplements   stable    PLAN:                            Continue Wegovy 0.25 mg once weekly for 4 weeks, then increase to 0.5 mg once weekly   Will refer to diabetes education for more information on what diabetes is   Ask coordinators to reach out and schedule follow-up with Netta Yu PA-C and registered dietitian     To help with tolerability and effectiveness of Wegovy:  Eat 3 meals with protein focus daily to help with nausea. If you forget to eat, you may feel nausea as a hunger cue.  Focus on getting protein in first with each meal and snack.   A good starting goal is 60 g protein daily (track this, especially if at weight loss plateau). Once you consistently are getting 60g daily, try getting 90 g protein daily.  See list below of protein-rich food ideas  Add fiber with  meals (fruits, vegetables, nuts, beans, seeds, whole grains) to help having regular bowel movements. You can try adding a fiber supplement like Metamucil, Benefiber, or Citrucel once daily if needed.   Drink plenty of water - goal 64 oz throughout the day  To optimize weight management - work on incorporating resistance training/weight lifting to build muscle and improve overall metabolism of adipose tissue.    Bupropion is a medication used for depression with a lower risk for sexual side effects. It's sometimes used alone or with other medications for depression. You can discuss if this could be an option for you with your primary care provider. Your carvedilol dose may need to be decreased if you start bupropion due to a drug interaction.     Follow-up: 9/11/25 with medication therapy management     SUBJECTIVE/OBJECTIVE:                          Boogie Villa is a 25 year old male seen for a follow-up visit.       Reason for visit: Diabetes and weight management follow-up.    Allergies/ADRs: Reviewed in chart  Past Medical History: Reviewed in chart  Tobacco: He reports that he has never smoked. He has never been exposed to tobacco smoke. He has never used smokeless tobacco.  Alcohol: none  Medication Adherence/Access: has had trouble with adherence due to ADHD and wasn't sleeping well for awhile. His mom helps him prep his medications once a week.  He plans to inject Wegovy the same day as he preps his medications to help remember to take it.    Diabetes /weight management/obstructive sleep apnea:   Wegovy 0.25 mg once weekly - Sundays. One dose so far.   CPAP nightly   Adderall XR 15 mg once daily - for ADHD     Patient doesn't remember discussion with Netta Yu PA-C on type 2 diabetes diagnosis.  Started CPAP about a month ago. Has been sleeping better and has been less forgetful as a result.     Patient is not experiencing side effects on Wegovy so far. Stopped Wegovy last year because he thought it  "was for cravings but didn't struggle with cravings. He didn't take Wegovy consistently due to forgetting to take it.     Estimates eating 1500 calories per day. Doesn't have a kitchen where he can cook or prep food. Usually orders door dash once daily. Thinks lack of activity is his problem. Doesn't think he can increase frequency of meals.     Diet: door dash: one meal and 1-2 snacks per day. Doesn't feel hungry until later in the day due to taking Adderall. Will have a cheat meal 1-2 times a week and get chips and queso with a quesadilla, for example. Snacks: protein pop tart, protein chips.     Exercise: not currently. Needs to be something his brain enjoys. Working with mom to brainstorm ideas.     Blood sugar monitoring: not currently   Eye exam in the last 12 months? No  Foot exam: due    Previous trials:   Aderall for adhd- has not been helpful with weight loss   Saxenda- tolerated up to 1.2 mg, self discontinued due to lack of perceived benefit. Anxious with injectable meds, struggles to stick to these.     Initial Consult Weight: 282 lbs     Current weight today: 0 lbs 0 oz  Cumulative Weight Gain: 30 lb, 10.6% from baseline    Wt Readings from Last 4 Encounters:   07/11/25 (!) 312 lb (141.5 kg)   01/24/25 (!) 304 lb 12.8 oz (138.3 kg)   10/03/24 319 lb 12.8 oz (145.1 kg)   09/24/24 323 lb (146.5 kg)     Estimated body mass index is 43.52 kg/m  as calculated from the following:    Height as of 1/24/25: 5' 11\" (1.803 m).    Weight as of 7/11/25: 312 lb (141.5 kg).    Hemoglobin A1C   Date Value Ref Range Status   07/11/2025 7.6 (H) <5.7 % Final     Comment:     Normal <5.7%   Prediabetes 5.7-6.4%    Diabetes 6.5% or higher     Note: Adopted from ADA consensus guidelines.       Kidney transplant (4/2024):   Mycophenolate 1,000 mg 2 times daily   Tacrolimus 7 mg 2 times daily (goal 4-6)   Bactrim 400-80 mg once daily   Sodium bicarb 1,950 mg 3 times daily   Magnesium oxide 800 mg once daily   Loperamide 2 mg 2 " times daily   Having some diarrhea. Having 2-3 episodes per day. Loperamide has been helpful so far. No other side effects. No longer taking prednisone.     Hypertension   Amlodipine 10 mg every evening.   Carvedilol 12.5 mg twice daily  Losartan 25 mg at bedtime.   Patient reports no current medication side effects  Patient not currently checking home blood pressure.        Allergy   Cetirizine 10 mg daily as needed   Patient reports no current medication side effects.    Patient feels that current therapy is effective.          Mental Health   ADHD/depression/anxiety:  Adderall XR 15 mg once daily   Sertraline 150 mg once daily  Patient reports no current medication side effects. Has lack of sex drive on sertraline.   Patient reports symptoms are stable.       Supplements   Vitamin D 1000 units once daily   Probiotic once daily   Multivitamin once daily   Fiber supplement - 3 capsules daily   No reported issues at this time.        Today's Vitals: There were no vitals taken for this visit.  ----------------    I spent 38 minutes with this patient today. All changes were made via collaborative practice agreement with Netta Yu PA-C.     A summary of these recommendations was sent via [employer].    Telemedicine Visit Details  The patient's medications can be safely assessed via a telemedicine encounter.  Type of service:  Telephone visit  Originating Location (pt. Location): Home    Distant Location (provider location):  Off-site  Start Time: 2:06 PM  End Time: 2:44 PM     Medication Therapy Recommendations  No medication therapy recommendations to display

## 2025-07-29 NOTE — NURSING NOTE
Current patient location: [address] [place] [id number]    Is the patient currently in the state of [place]? no    Visit mode:TELEPHONE    If the visit is dropped, the patient can be reconnected by: TELEPHONE VISIT: Phone number: [phone]    Will anyone else be joining the visit? NO  (If patient encounters technical issues they should call [phone]    Are changes needed to the allergy or medication list? Pt stated no changes to allergies and Pt stated no med changes    Are refills needed on medications prescribed by this physician? NO    Reason for visit: Medication Therapy Management    Diane Colleen VVF

## 2025-07-30 ENCOUNTER — TELEPHONE (OUTPATIENT)
Dept: ENDOCRINOLOGY | Facility: CLINIC | Age: 25
End: 2025-07-30
Payer: COMMERCIAL

## 2025-07-30 ENCOUNTER — PATIENT OUTREACH (OUTPATIENT)
Dept: CARE COORDINATION | Facility: CLINIC | Age: 25
End: 2025-07-30
Payer: COMMERCIAL

## 2025-07-30 NOTE — TELEPHONE ENCOUNTER
Left Voicemail (1st Attempt) and Sent Mychart (1st Attempt) for the patient to call back and schedule the following:    Appointment type: New Med WT MGMT Nutrition  Appointment mode: Virtual Visit  Provider: Marcia Bartlett, Kathleen Pierce, or Kayley Watt  Return date: Approx. Next available  Specialty phone number: [phone] & Direct    Additional Notes:     Appointment type: Return Weight Management  Appointment mode: in-person or Virtual Visit  Provider: Yessenia Heller NP or Netta Yu PA-C  Return date: Approx. Next available & Wait list  Specialty phone number: [phone]    Additional Notes:

## 2025-08-04 ENCOUNTER — VIRTUAL VISIT (OUTPATIENT)
Dept: ENDOCRINOLOGY | Facility: CLINIC | Age: 25
End: 2025-08-04
Payer: COMMERCIAL

## 2025-08-04 VITALS — WEIGHT: 298.6 LBS | BODY MASS INDEX: 41.8 KG/M2 | HEIGHT: 71 IN

## 2025-08-04 DIAGNOSIS — E11.9 TYPE 2 DIABETES MELLITUS WITHOUT COMPLICATION, WITHOUT LONG-TERM CURRENT USE OF INSULIN (H): ICD-10-CM

## 2025-08-04 DIAGNOSIS — E66.9 OBESITY: ICD-10-CM

## 2025-08-04 DIAGNOSIS — Z71.3 NUTRITIONAL COUNSELING: Primary | ICD-10-CM

## 2025-08-04 DIAGNOSIS — Z94.0 KIDNEY REPLACED BY TRANSPLANT: ICD-10-CM

## 2025-08-04 PROCEDURE — 99207 PR NO CHARGE LOS: CPT | Mod: 95

## 2025-08-04 PROCEDURE — 97802 MEDICAL NUTRITION INDIV IN: CPT | Mod: 95

## 2025-08-04 PROCEDURE — 1125F AMNT PAIN NOTED PAIN PRSNT: CPT | Mod: 95

## 2025-08-04 ASSESSMENT — PAIN SCALES - GENERAL: PAINLEVEL_OUTOF10: MILD PAIN (3)

## 2025-08-05 DIAGNOSIS — Z94.0 KIDNEY REPLACED BY TRANSPLANT: Primary | ICD-10-CM

## 2025-08-05 DIAGNOSIS — E87.20 METABOLIC ACIDOSIS: ICD-10-CM

## 2025-08-05 RX ORDER — SODIUM BICARBONATE 650 MG/1
1950 TABLET ORAL 3 TIMES DAILY
Qty: 270 TABLET | Refills: 11 | Status: SHIPPED | OUTPATIENT
Start: 2025-08-05

## 2025-08-07 ENCOUNTER — VIRTUAL VISIT (OUTPATIENT)
Dept: ENDOCRINOLOGY | Facility: CLINIC | Age: 25
End: 2025-08-07
Payer: COMMERCIAL

## 2025-08-07 VITALS — BODY MASS INDEX: 41.8 KG/M2 | WEIGHT: 298.6 LBS | HEIGHT: 71 IN

## 2025-08-07 DIAGNOSIS — E11.9 TYPE 2 DIABETES MELLITUS WITHOUT COMPLICATION, WITHOUT LONG-TERM CURRENT USE OF INSULIN (H): ICD-10-CM

## 2025-08-07 DIAGNOSIS — E66.812 CLASS 2 SEVERE OBESITY DUE TO EXCESS CALORIES WITH SERIOUS COMORBIDITY AND BODY MASS INDEX (BMI) OF 39.0 TO 39.9 IN ADULT (H): ICD-10-CM

## 2025-08-07 DIAGNOSIS — E66.01 CLASS 2 SEVERE OBESITY DUE TO EXCESS CALORIES WITH SERIOUS COMORBIDITY AND BODY MASS INDEX (BMI) OF 39.0 TO 39.9 IN ADULT (H): ICD-10-CM

## 2025-08-07 ASSESSMENT — PAIN SCALES - GENERAL: PAINLEVEL_OUTOF10: NO PAIN (0)

## 2025-08-11 ENCOUNTER — VIRTUAL VISIT (OUTPATIENT)
Dept: EDUCATION SERVICES | Facility: CLINIC | Age: 25
End: 2025-08-11
Payer: COMMERCIAL

## 2025-08-11 DIAGNOSIS — E11.65 TYPE 2 DIABETES MELLITUS WITH HYPERGLYCEMIA, WITHOUT LONG-TERM CURRENT USE OF INSULIN (H): Primary | ICD-10-CM

## 2025-08-11 PROCEDURE — G0108 DIAB MANAGE TRN  PER INDIV: HCPCS | Mod: 95 | Performed by: DIETITIAN, REGISTERED

## 2025-08-11 PROCEDURE — 99207 PR NO CHARGE LOS: CPT | Mod: 95 | Performed by: DIETITIAN, REGISTERED

## 2025-08-17 DIAGNOSIS — I15.1 HTN, KIDNEY TRANSPLANT RELATED: ICD-10-CM

## 2025-08-17 DIAGNOSIS — Z94.0 KIDNEY REPLACED BY TRANSPLANT: Chronic | ICD-10-CM

## 2025-08-17 DIAGNOSIS — Z94.0 HTN, KIDNEY TRANSPLANT RELATED: ICD-10-CM

## 2025-08-18 RX ORDER — LOSARTAN POTASSIUM 25 MG/1
25 TABLET ORAL EVERY EVENING
Qty: 30 TABLET | Refills: 0 | Status: SHIPPED | OUTPATIENT
Start: 2025-08-18 | End: 2025-08-19

## 2025-08-19 DIAGNOSIS — Z94.0 KIDNEY REPLACED BY TRANSPLANT: Primary | ICD-10-CM

## 2025-08-19 RX ORDER — LOSARTAN POTASSIUM 25 MG/1
25 TABLET ORAL EVERY EVENING
Qty: 90 TABLET | Refills: 3 | Status: SHIPPED | OUTPATIENT
Start: 2025-08-19

## 2025-08-25 DIAGNOSIS — I15.1 HTN, KIDNEY TRANSPLANT RELATED: ICD-10-CM

## 2025-08-25 DIAGNOSIS — Z94.0 HTN, KIDNEY TRANSPLANT RELATED: ICD-10-CM

## 2025-08-29 ENCOUNTER — TELEPHONE (OUTPATIENT)
Dept: TRANSPLANT | Facility: CLINIC | Age: 25
End: 2025-08-29
Payer: COMMERCIAL

## 2025-08-29 DIAGNOSIS — Z94.0 HTN, KIDNEY TRANSPLANT RELATED: ICD-10-CM

## 2025-08-29 DIAGNOSIS — I15.1 HTN, KIDNEY TRANSPLANT RELATED: ICD-10-CM

## 2025-09-02 DIAGNOSIS — Z94.0 HTN, KIDNEY TRANSPLANT RELATED: ICD-10-CM

## 2025-09-02 DIAGNOSIS — I15.1 HTN, KIDNEY TRANSPLANT RELATED: ICD-10-CM

## (undated) DEVICE — SU SILK 0 TIE 6X30" A306H

## (undated) DEVICE — SYR 10ML SLIP TIP W/O NDL 303134

## (undated) DEVICE — ESU PENCIL SMOKE EVAC W/ROCKER SWITCH 0703-047-000

## (undated) DEVICE — Device

## (undated) DEVICE — SOL WATER IRRIG 1000ML BOTTLE 2F7114

## (undated) DEVICE — NDL COUNTER 20CT 31142493

## (undated) DEVICE — GLOVE BIOGEL PI ULTRATOUCH SZ 6.5 41165

## (undated) DEVICE — SOL NACL 0.9% IRRIG 1000ML BOTTLE 2F7124

## (undated) DEVICE — SOL NACL 0.9% INJ 1000ML BAG 2B1324X

## (undated) DEVICE — PITCHER STERILE 1000ML  SSK9004A

## (undated) DEVICE — CLIP HORIZON SM RED WIDE SLOT 001201

## (undated) DEVICE — SU MONOCRYL 4-0 PS-2 18" UND Y496G

## (undated) DEVICE — SURGICEL ABSORBABLE HEMOSTAT SNOW 2"X4" 2082

## (undated) DEVICE — SU SILK 4-0 TIE 12X30" A303H

## (undated) DEVICE — GEL ULTRASOUND AQUASONIC 20GM 01-01

## (undated) DEVICE — CATH PLUG W/CAP 000076

## (undated) DEVICE — GOWN IMPERVIOUS BREATHABLE SMART LG 89015

## (undated) DEVICE — SYR 01ML 27GA 0.5" NDL TBC 309623

## (undated) DEVICE — SU SILK 1 TIE 6X30" A307H

## (undated) DEVICE — SUCTION MANIFOLD NEPTUNE 2 SYS 4 PORT 0702-020-000

## (undated) DEVICE — SU PROLENE 1 CTX 30" 8455H

## (undated) DEVICE — ESU HANDPIECE ABC BEND-A-BEAM 6" 134006

## (undated) DEVICE — SU PROLENE 7-0 BV-1DA 4X18" M8701

## (undated) DEVICE — DRSG GAUZE 4X4" TRAY 6939

## (undated) DEVICE — DRAPE STOCKINETTE IMPERVIOUS 10" 21048

## (undated) DEVICE — SU DERMABOND ADVANCED .7ML DNX12

## (undated) DEVICE — DECANTER VIAL 2006S

## (undated) DEVICE — TUBING IRRIG CYSTO/BLADDER SET 81" LF 2C4040

## (undated) DEVICE — LINEN TOWEL PACK X30 5481

## (undated) DEVICE — SYR 03ML LL W/O NDL 309657

## (undated) DEVICE — LABEL MEDICATION SYSTEM 3303-P

## (undated) DEVICE — SU PDS II 6-0 RB-2DA 30" Z149H

## (undated) DEVICE — CANNULA VESSEL 3ML BEVELED DPL 30000

## (undated) DEVICE — EYE SPONGE SPEAR WECK CEL 0008685

## (undated) DEVICE — DRAPE IOBAN INCISE 23X17" 6650EZ

## (undated) DEVICE — SU SILK 3-0 TIE 12X30" A304H

## (undated) DEVICE — LINEN TOWEL PACK X6 WHITE 5487

## (undated) DEVICE — SU MONOCRYL 4-0 PS-2 27" UND Y426H

## (undated) DEVICE — KIT DRSG PREVENA PLUS NEG PRESSURE W/CANISTER PRE4001US

## (undated) DEVICE — PACK AV FISTULA

## (undated) DEVICE — CATH FOLEY 3WAY 16FR 30ML SIL 73016SI

## (undated) DEVICE — CANNULA VESSEL DLP  30001

## (undated) DEVICE — SU PROLENE 6-0 RB-2DA 30" 8711H

## (undated) DEVICE — SU PROLENE 6-0 RB-2DA 18" 8714H

## (undated) DEVICE — CLIP HORIZON MED BLUE 002200

## (undated) DEVICE — GOWN XLG DISP 9545

## (undated) DEVICE — DRAPE SHEET REV FOLD 3/4 9349

## (undated) DEVICE — PUNCH AORTIC 6MM SINGLE USE 1001-629

## (undated) DEVICE — SU PROLENE 5-0 RB-2DA 18" 8713H

## (undated) DEVICE — PREP CHLORAPREP 26ML TINTED HI-LITE ORANGE 930815

## (undated) DEVICE — DECANTER BAG 2002S

## (undated) DEVICE — SU VICRYL 3-0 SH 27" UND J416H

## (undated) DEVICE — DRAPE STOCKINETTE 4" 8544

## (undated) DEVICE — DEVICE CATH STABILIZATION STATLOCK FOLEY 3-WAY FOL0105

## (undated) DEVICE — ESU GROUND PAD ADULT W/CORD E7507

## (undated) DEVICE — SPONGE SURGIFOAM 12 1972

## (undated) DEVICE — SU PROLENE 5-0 RB-1DA 36"  8556H

## (undated) DEVICE — DRAPE TIBURON HAND 29427

## (undated) DEVICE — DRAIN PENROSE 3/8X18" LATEX 30416-038

## (undated) DEVICE — WIPES FOLEY CARE SURESTEP PROVON DFC100

## (undated) DEVICE — DRAPE FLUID WARMING 52 X 60" ORS-321

## (undated) DEVICE — SU PDS II 5-0 RB-1 27" Z303H

## (undated) DEVICE — STRAP UNIVERSAL POSITIONING 2-PIECE 4X47X76" 91-287

## (undated) DEVICE — SURGICEL ABSORBABLE HEMOSTAT SNOW 4"X4" 2083

## (undated) DEVICE — SPONGE RAY-TEC 4X8" 7318

## (undated) DEVICE — INSERT FOGARTY 33MM TRACTION HYDRAJAW HYDRA33

## (undated) DEVICE — SU PDS II 4-0 RB-1 27" Z304H

## (undated) DEVICE — STPL SKIN 35W ROTATING HEAD PRW35

## (undated) DEVICE — BLADE KNIFE SURG 11 371111

## (undated) DEVICE — BLADE CLIPPER SGL USE 9680

## (undated) DEVICE — DRSG TEGADERM 4X4 3/4" 1626W

## (undated) DEVICE — SOL NACL 0.9% INJ 500ML BAG 2B1323Q

## (undated) DEVICE — SU SILK 2-0 TIE 12X30" A305H

## (undated) DEVICE — SU SILK 3-0 SH CR 8X18" C013D

## (undated) DEVICE — BASIN SET SINGLE STERILE 13752-624

## (undated) RX ORDER — FENTANYL CITRATE-0.9 % NACL/PF 10 MCG/ML
PLASTIC BAG, INJECTION (ML) INTRAVENOUS
Status: DISPENSED
Start: 2023-05-25

## (undated) RX ORDER — ONDANSETRON 2 MG/ML
INJECTION INTRAMUSCULAR; INTRAVENOUS
Status: DISPENSED
Start: 2023-05-25

## (undated) RX ORDER — SODIUM CHLORIDE 9 MG/ML
INJECTION, SOLUTION INTRAVENOUS
Status: DISPENSED
Start: 2023-12-28

## (undated) RX ORDER — HYDROMORPHONE HCL IN WATER/PF 6 MG/30 ML
PATIENT CONTROLLED ANALGESIA SYRINGE INTRAVENOUS
Status: DISPENSED
Start: 2023-12-28

## (undated) RX ORDER — HEPARIN SODIUM 1000 [USP'U]/ML
INJECTION, SOLUTION INTRAVENOUS; SUBCUTANEOUS
Status: DISPENSED
Start: 2024-01-30

## (undated) RX ORDER — HEPARIN SODIUM 1000 [USP'U]/ML
INJECTION, SOLUTION INTRAVENOUS; SUBCUTANEOUS
Status: DISPENSED
Start: 2023-05-25

## (undated) RX ORDER — FUROSEMIDE 10 MG/ML
INJECTION INTRAMUSCULAR; INTRAVENOUS
Status: DISPENSED
Start: 2024-02-26

## (undated) RX ORDER — FENTANYL CITRATE 50 UG/ML
INJECTION, SOLUTION INTRAMUSCULAR; INTRAVENOUS
Status: DISPENSED
Start: 2024-01-02

## (undated) RX ORDER — LIDOCAINE HYDROCHLORIDE 10 MG/ML
INJECTION, SOLUTION EPIDURAL; INFILTRATION; INTRACAUDAL; PERINEURAL
Status: DISPENSED
Start: 2024-02-09

## (undated) RX ORDER — FENTANYL CITRATE 50 UG/ML
INJECTION, SOLUTION INTRAMUSCULAR; INTRAVENOUS
Status: DISPENSED
Start: 2023-05-25

## (undated) RX ORDER — CEFAZOLIN SODIUM/WATER 2 G/20 ML
SYRINGE (ML) INTRAVENOUS
Status: DISPENSED
Start: 2023-05-25

## (undated) RX ORDER — PAPAVERINE HYDROCHLORIDE 30 MG/ML
INJECTION INTRAMUSCULAR; INTRAVENOUS
Status: DISPENSED
Start: 2023-08-24

## (undated) RX ORDER — BUPIVACAINE HYDROCHLORIDE 5 MG/ML
INJECTION, SOLUTION EPIDURAL; INTRACAUDAL
Status: DISPENSED
Start: 2023-08-24

## (undated) RX ORDER — CEFAZOLIN SODIUM 1 G/3ML
INJECTION, POWDER, FOR SOLUTION INTRAMUSCULAR; INTRAVENOUS
Status: DISPENSED
Start: 2023-08-24

## (undated) RX ORDER — SODIUM CHLORIDE 9 MG/ML
INJECTION, SOLUTION INTRAVENOUS
Status: DISPENSED
Start: 2024-04-11

## (undated) RX ORDER — HEPARIN SODIUM 1000 [USP'U]/ML
INJECTION, SOLUTION INTRAVENOUS; SUBCUTANEOUS
Status: DISPENSED
Start: 2023-12-28

## (undated) RX ORDER — LIDOCAINE HYDROCHLORIDE 10 MG/ML
INJECTION, SOLUTION EPIDURAL; INFILTRATION; INTRACAUDAL; PERINEURAL
Status: DISPENSED
Start: 2023-05-25

## (undated) RX ORDER — CEPHALEXIN 500 MG/1
CAPSULE ORAL
Status: DISPENSED
Start: 2020-12-03

## (undated) RX ORDER — LIDOCAINE HYDROCHLORIDE 10 MG/ML
INJECTION, SOLUTION EPIDURAL; INFILTRATION; INTRACAUDAL; PERINEURAL
Status: DISPENSED
Start: 2023-08-24

## (undated) RX ORDER — ACETAMINOPHEN 325 MG/1
TABLET ORAL
Status: DISPENSED
Start: 2023-05-25

## (undated) RX ORDER — HYDROMORPHONE HYDROCHLORIDE 1 MG/ML
INJECTION, SOLUTION INTRAMUSCULAR; INTRAVENOUS; SUBCUTANEOUS
Status: DISPENSED
Start: 2023-08-24

## (undated) RX ORDER — HEPARIN SODIUM 1000 [USP'U]/ML
INJECTION, SOLUTION INTRAVENOUS; SUBCUTANEOUS
Status: DISPENSED
Start: 2024-01-15

## (undated) RX ORDER — HEPARIN SODIUM 1000 [USP'U]/ML
INJECTION, SOLUTION INTRAVENOUS; SUBCUTANEOUS
Status: DISPENSED
Start: 2024-01-07

## (undated) RX ORDER — HEPARIN SODIUM 1000 [USP'U]/ML
INJECTION, SOLUTION INTRAVENOUS; SUBCUTANEOUS
Status: DISPENSED
Start: 2023-02-16

## (undated) RX ORDER — FENTANYL CITRATE 50 UG/ML
INJECTION, SOLUTION INTRAMUSCULAR; INTRAVENOUS
Status: DISPENSED
Start: 2023-12-28

## (undated) RX ORDER — DEXTROSE, SODIUM CHLORIDE, SODIUM LACTATE, POTASSIUM CHLORIDE, AND CALCIUM CHLORIDE 5; .6; .31; .03; .02 G/100ML; G/100ML; G/100ML; G/100ML; G/100ML
INJECTION, SOLUTION INTRAVENOUS
Status: DISPENSED
Start: 2023-12-28

## (undated) RX ORDER — OXYCODONE HYDROCHLORIDE 5 MG/1
TABLET ORAL
Status: DISPENSED
Start: 2023-05-25

## (undated) RX ORDER — CALCIUM CHLORIDE 100 MG/ML
INJECTION INTRAVENOUS; INTRAVENTRICULAR
Status: DISPENSED
Start: 2023-05-25

## (undated) RX ORDER — FENTANYL CITRATE 50 UG/ML
INJECTION, SOLUTION INTRAMUSCULAR; INTRAVENOUS
Status: DISPENSED
Start: 2024-04-11

## (undated) RX ORDER — LIDOCAINE HYDROCHLORIDE 10 MG/ML
INJECTION, SOLUTION EPIDURAL; INFILTRATION; INTRACAUDAL; PERINEURAL
Status: DISPENSED
Start: 2024-02-26

## (undated) RX ORDER — HEPARIN SODIUM 1000 [USP'U]/ML
INJECTION, SOLUTION INTRAVENOUS; SUBCUTANEOUS
Status: DISPENSED
Start: 2023-08-24

## (undated) RX ORDER — HEPARIN SODIUM 1000 [USP'U]/ML
INJECTION, SOLUTION INTRAVENOUS; SUBCUTANEOUS
Status: DISPENSED
Start: 2024-01-22

## (undated) RX ORDER — CALCIUM CHLORIDE 100 MG/ML
INJECTION INTRAVENOUS; INTRAVENTRICULAR
Status: DISPENSED
Start: 2023-08-24

## (undated) RX ORDER — FENTANYL CITRATE 50 UG/ML
INJECTION, SOLUTION INTRAMUSCULAR; INTRAVENOUS
Status: DISPENSED
Start: 2024-02-26

## (undated) RX ORDER — HYDROMORPHONE HYDROCHLORIDE 1 MG/ML
INJECTION, SOLUTION INTRAMUSCULAR; INTRAVENOUS; SUBCUTANEOUS
Status: DISPENSED
Start: 2023-12-28

## (undated) RX ORDER — FENTANYL CITRATE 50 UG/ML
INJECTION, SOLUTION INTRAMUSCULAR; INTRAVENOUS
Status: DISPENSED
Start: 2023-02-16

## (undated) RX ORDER — HYDRALAZINE HYDROCHLORIDE 20 MG/ML
INJECTION INTRAMUSCULAR; INTRAVENOUS
Status: DISPENSED
Start: 2023-05-25

## (undated) RX ORDER — PAPAVERINE HYDROCHLORIDE 30 MG/ML
INJECTION INTRAMUSCULAR; INTRAVENOUS
Status: DISPENSED
Start: 2023-12-28

## (undated) RX ORDER — VERAPAMIL HYDROCHLORIDE 2.5 MG/ML
INJECTION, SOLUTION INTRAVENOUS
Status: DISPENSED
Start: 2023-05-25

## (undated) RX ORDER — VERAPAMIL HYDROCHLORIDE 2.5 MG/ML
INJECTION, SOLUTION INTRAVENOUS
Status: DISPENSED
Start: 2023-12-28

## (undated) RX ORDER — PAPAVERINE HYDROCHLORIDE 30 MG/ML
INJECTION INTRAMUSCULAR; INTRAVENOUS
Status: DISPENSED
Start: 2023-05-25

## (undated) RX ORDER — ONDANSETRON 2 MG/ML
INJECTION INTRAMUSCULAR; INTRAVENOUS
Status: DISPENSED
Start: 2023-08-24

## (undated) RX ORDER — HEPARIN SODIUM 1000 [USP'U]/ML
INJECTION, SOLUTION INTRAVENOUS; SUBCUTANEOUS
Status: DISPENSED
Start: 2024-01-08

## (undated) RX ORDER — FENTANYL CITRATE 50 UG/ML
INJECTION, SOLUTION INTRAMUSCULAR; INTRAVENOUS
Status: DISPENSED
Start: 2023-08-24

## (undated) RX ORDER — PROPOFOL 10 MG/ML
INJECTION, EMULSION INTRAVENOUS
Status: DISPENSED
Start: 2023-05-25

## (undated) RX ORDER — FENTANYL CITRATE 50 UG/ML
INJECTION, SOLUTION INTRAMUSCULAR; INTRAVENOUS
Status: DISPENSED
Start: 2024-02-09

## (undated) RX ORDER — CEFAZOLIN SODIUM 1 G/3ML
INJECTION, POWDER, FOR SOLUTION INTRAMUSCULAR; INTRAVENOUS
Status: DISPENSED
Start: 2023-12-28

## (undated) RX ORDER — LIDOCAINE HYDROCHLORIDE 10 MG/ML
INJECTION, SOLUTION EPIDURAL; INFILTRATION; INTRACAUDAL; PERINEURAL
Status: DISPENSED
Start: 2024-01-02

## (undated) RX ORDER — SODIUM CHLORIDE 450 MG/100ML
INJECTION, SOLUTION INTRAVENOUS
Status: DISPENSED
Start: 2023-12-28

## (undated) RX ORDER — BUPIVACAINE HYDROCHLORIDE 5 MG/ML
INJECTION, SOLUTION EPIDURAL; INTRACAUDAL
Status: DISPENSED
Start: 2023-05-25